# Patient Record
Sex: FEMALE | Race: WHITE | NOT HISPANIC OR LATINO | Employment: FULL TIME | URBAN - METROPOLITAN AREA
[De-identification: names, ages, dates, MRNs, and addresses within clinical notes are randomized per-mention and may not be internally consistent; named-entity substitution may affect disease eponyms.]

---

## 2017-01-17 DIAGNOSIS — G47.00 INSOMNIA, UNSPECIFIED TYPE: ICD-10-CM

## 2017-01-17 RX ORDER — ZOLPIDEM TARTRATE 10 MG/1
TABLET ORAL
Qty: 30 TAB | Refills: 0 | Status: SHIPPED
Start: 2017-01-18 | End: 2017-02-14 | Stop reason: SDUPTHER

## 2017-02-01 RX ORDER — CYCLOBENZAPRINE HCL 10 MG
TABLET ORAL
Qty: 90 TAB | Refills: 0 | Status: SHIPPED | OUTPATIENT
Start: 2017-02-01 | End: 2017-02-27 | Stop reason: SDUPTHER

## 2017-02-01 NOTE — TELEPHONE ENCOUNTER
Was the patient seen in the last year in this department? Yes     Does patient have an active prescription for medications requested? No     Received Request Via: Pharmacy      Pt met protocol?: Yes  OV 9/30/16

## 2017-02-01 NOTE — TELEPHONE ENCOUNTER
Please advise pt per last MD note 9/16 pt needs labs and follow-up appt. Will send #90 to pharmacy.

## 2017-02-05 ENCOUNTER — PATIENT OUTREACH (OUTPATIENT)
Dept: HEALTH INFORMATION MANAGEMENT | Facility: OTHER | Age: 52
End: 2017-02-05

## 2017-02-05 NOTE — PROGRESS NOTES
Outcome:DIDNT SCHEDULE ANNUAL     Attempt # 1ST    Annual Wellness Visit Scheduling  Scheduling Status: Scheduled; Not Scheduled; NOT SCHEDULED   If Not Scheduled, Choose Reason Why: WENT TO [facility]         Care Gap Scheduling (Attempt to Schedule EACH Overdue Care Gap!)  Health Maintenance Due   Topic Date Due   • COLON CANCER SCREENING ANNUAL FIT  DISCUSS WITH PT    • Annual Wellness Visit  [facility]          MyChart Activation:  Already Active/Declined/Sent Activation Code  NA

## 2017-02-16 NOTE — PROGRESS NOTES
02/15/2017  Attempt #:final      Annual Wellness Visit Scheduling  1. Scheduling Status:Scheduled          Care Gap Scheduling (Attempt to Schedule EACH Overdue Care Gap!)     Health Maintenance Due   Topic Date Due   • Annual Wellness Visit  Scheduled          MyChart Activation: already active

## 2017-02-27 RX ORDER — CYCLOBENZAPRINE HCL 10 MG
TABLET ORAL
Qty: 90 TAB | Refills: 0 | Status: SHIPPED | OUTPATIENT
Start: 2017-02-27 | End: 2017-03-31 | Stop reason: SDUPTHER

## 2017-02-27 NOTE — TELEPHONE ENCOUNTER
Was the patient seen in the last year in this department? Yes     Does patient have an active prescription for medications requested? No     Received Request Via: Pharmacy      Pt met protocol?: Yes, OV 9/16. Pt reminded at last refill (2/7/17) to make an appt and get labs. Pt has scheduled appt w/Samanda for March, labs still need to be scheduled.

## 2017-03-10 ENCOUNTER — TELEPHONE (OUTPATIENT)
Dept: MEDICAL GROUP | Facility: PHYSICIAN GROUP | Age: 52
End: 2017-03-10

## 2017-03-10 RX ORDER — TIZANIDINE 2 MG/1
1 TABLET ORAL
COMMUNITY
Start: 2017-01-31 | End: 2017-07-28

## 2017-03-10 NOTE — TELEPHONE ENCOUNTER
ANNUAL WELLNESS VISIT PRE-VISIT PLANNING     1.  Reviewed last PCP office visit assessment and plan notes: Yes    2.  If any orders were placed last visit do we have Results/Consult Notes?        •  Labs? No none ordered       •  Imaging? No  none ordered       •  Referrals? No  none ordered    3.  Patient Care Coordination Note was updated with diagnosis information:  Yes    4.  Patient is due for these Health Maintenance Topics:   Health Maintenance Due   Topic Date Due   • Annual Wellness Visit  1965       5.  Immunizations were updated in [facility] using [employer]?: Yes       •  [employer] Recommendations:  Pt is up to date on all vaccines.        •  Is patient due for Tdap/Shingles? No.     6.  Patient has:       •   Diabetes: no       •   COPD: no       •   CHF: no       •   Depression: no    7.  Updated Care Team with [employer] and all specialists?        •   Gait devices, O2, CPAP, etc: N\A        •   Eye professional: no       •   Other specialists (GYN, cardiology, endo, etc): yes    8.  Is patient in need of any refills prior to office visit? No       •    Separate refill encounter created?: no    9.  Patient was informed to arrive 15 min prior to their scheduled appointment and bring in their medication bottles? yes    10.  Patient was advised: “This is a free wellness visit. The provider will screen for medical conditions to help you stay healthy. If you have other concerns to address you may be asked to discuss these at a separate visit or there may be an additional fee.”  Yes

## 2017-03-15 ENCOUNTER — APPOINTMENT (OUTPATIENT)
Dept: SLEEP MEDICINE | Facility: MEDICAL CENTER | Age: 52
End: 2017-03-15
Payer: MEDICARE

## 2017-03-17 ENCOUNTER — OFFICE VISIT (OUTPATIENT)
Dept: MEDICAL GROUP | Facility: PHYSICIAN GROUP | Age: 52
End: 2017-03-17
Payer: MEDICARE

## 2017-03-17 VITALS
RESPIRATION RATE: 16 BRPM | HEART RATE: 94 BPM | TEMPERATURE: 99.1 F | SYSTOLIC BLOOD PRESSURE: 134 MMHG | BODY MASS INDEX: 46.65 KG/M2 | OXYGEN SATURATION: 92 % | WEIGHT: 280 LBS | HEIGHT: 65 IN | DIASTOLIC BLOOD PRESSURE: 80 MMHG

## 2017-03-17 DIAGNOSIS — G47.00 INSOMNIA, UNSPECIFIED TYPE: ICD-10-CM

## 2017-03-17 DIAGNOSIS — I10 ESSENTIAL HYPERTENSION: ICD-10-CM

## 2017-03-17 DIAGNOSIS — M51.36 DDD (DEGENERATIVE DISC DISEASE), LUMBAR: ICD-10-CM

## 2017-03-17 DIAGNOSIS — Z96.651 PRESENCE OF RIGHT ARTIFICIAL KNEE JOINT: ICD-10-CM

## 2017-03-17 DIAGNOSIS — E03.8 SUBCLINICAL HYPOTHYROIDISM: ICD-10-CM

## 2017-03-17 DIAGNOSIS — Z12.11 SCREENING FOR COLON CANCER: ICD-10-CM

## 2017-03-17 DIAGNOSIS — R73.01 IMPAIRED FASTING BLOOD SUGAR: ICD-10-CM

## 2017-03-17 DIAGNOSIS — Z00.00 ROUTINE HEALTH MAINTENANCE: ICD-10-CM

## 2017-03-17 DIAGNOSIS — E78.5 HYPERLIPIDEMIA, UNSPECIFIED HYPERLIPIDEMIA TYPE: ICD-10-CM

## 2017-03-17 DIAGNOSIS — E66.01 MORBID OBESITY WITH BMI OF 45.0-49.9, ADULT (HCC): ICD-10-CM

## 2017-03-17 PROCEDURE — 1036F TOBACCO NON-USER: CPT | Performed by: INTERNAL MEDICINE

## 2017-03-17 PROCEDURE — G8510 SCR DEP NEG, NO PLAN REQD: HCPCS | Performed by: INTERNAL MEDICINE

## 2017-03-17 PROCEDURE — G0439 PPPS, SUBSEQ VISIT: HCPCS | Performed by: INTERNAL MEDICINE

## 2017-03-17 RX ORDER — ZOLPIDEM TARTRATE 10 MG/1
10 TABLET ORAL NIGHTLY PRN
Qty: 30 TAB | Refills: 0 | Status: SHIPPED
Start: 2017-03-19 | End: 2017-04-17 | Stop reason: SDUPTHER

## 2017-03-17 ASSESSMENT — PAIN SCALES - GENERAL: PAINLEVEL: 7=MODERATE-SEVERE PAIN

## 2017-03-17 ASSESSMENT — PATIENT HEALTH QUESTIONNAIRE - PHQ9: CLINICAL INTERPRETATION OF PHQ2 SCORE: 0

## 2017-03-17 NOTE — PROGRESS NOTES
HPI:  Carolynn is a 52 y.o. female here for [facility] Annual Wellness Visit      Patient Active Problem List    Diagnosis Date Noted   • Morbid obesity with BMI of 45.0-49.9, adult ([facility]) 03/17/2017   • BMI 45.0-49.9, adult (CMS-[facility]) 09/30/2016   • Insomnia 06/20/2016   • Degenerative spondylolisthesis 02/17/2016   • Presence of right artificial knee joint 10/08/2015   • Subclinical hypothyroidism 02/21/2012   • Hypertension 12/28/2011   • DDD (degenerative disc disease), lumbar 12/28/2011     Patients most recent cholesterol was reviewed:  Lab Results   Component Value Date/Time    CHOLESTEROL, 03/16/2015 09:46 AM    CHOLESTEROL, 03/13/2014 09:04 AM     Lab Results   Component Value Date/Time    * 03/16/2015 09:46 AM    * 03/13/2014 09:04 AM     Lab Results   Component Value Date/Time    HDL 45 03/16/2015 09:46 AM    HDL 43 03/13/2014 09:04 AM     Lab Results   Component Value Date/Time    TRIGLYCERIDES 156* 03/16/2015 09:46 AM    TRIGLYCERIDES 149 03/13/2014 09:04 AM     Lab Results   Component Value Date/Time    GLYCOHEMOGLOBIN 6.5* 01/29/2016 12:45 PM    GLYCOHEMOGLOBIN 6.2* 03/16/2015 09:46 AM       Current Outpatient Prescriptions   Medication Sig Dispense Refill   • [START ON 3/19/2017] zolpidem (AMBIEN) 10 MG Tab Take 1 Tab by mouth at bedtime as needed for Sleep. 30 Tab 0   • tizanidine (ZANAFLEX) 2 MG tablet Take 1 Tab by mouth every bedtime.     • cyclobenzaprine (FLEXERIL) 10 MG Tab TAKE ONE TABLET BY MOUTH THREE TIMES DAILY AS NEEDED 90 Tab 0   • hydrochlorothiazide (HYDRODIURIL) 25 MG Tab TAKE ONE TABLET BY MOUTH ONCE DAILY Patient must see a new provider for future renewals 90 Tab 3   • amlodipine (NORVASC) 10 MG Tab Take 1 Tab by mouth every day. 90 Tab 3   • losartan (COZAAR) 100 MG Tab Take 1 Tab by mouth every day. 90 Tab 1   • acetaminophen (TYLENOL) 500 MG Tab Take 1,500 mg by mouth 2 Times a Day. Indications: Pain     • Ibuprofen 200 MG Cap Take 800 mg by mouth 2 Times a Day.  Indications: Mild to Moderate Pain       No current facility-administered medications for this visit.        The patient reports adherence to this regimen   Current supplements as per medication list.   Chronic narcotic pain medicines: no    Allergies: Cortisone; Penicillins; Food; Keflex; Latex; Naprosyn; and Tape    Current social contact/activities: visits with friends and family     Is patient current with immunizations?  yes   If no, due for Pt is up to date on all vaccines.     She  reports that she has never smoked. She has never used smokeless tobacco. She reports that she does not drink alcohol or use illicit drugs.  Counseling given: Yes        DPA/Advanced Directive:  Patient does not have an advanced directive.  If not on file, instructed to bring in a copy to scan into her chart. If no advanced directive exists, a packet and workshop information was provided    ROS:    Gait: Uses no assistive device   Ostomy: no   Other tubes: no   Amputations: no   Chronic oxygen use no   Last eye exam July 2016   : Reports incontinence. Only when laughing / sneezing or coughing       Depression Screening    Little interest or pleasure in doing things?  0 - not at all  Feeling down, depressed, or hopeless?  0 - not at all  Patient Health Questionnaire Score: 0    If depressive symptoms identified deferred to follow up visit unless specifically addressed in assessment and plan.    Screening for Cognitive Impairment    Three Minute Recall (banana, sunrise, fence)  3/3    Draws clock face with all 12 numbers and sets the hands to show 10 minutes past 10 o'clock  1 5/5  Cognitive concerns identified deferred for follow up unless specifically addressed in assessment and plan.    Fall Risk Assessment    Has the patient had two or more falls in the last year or any fall with injury in the last year?  No    Safety Assessment    Throw rugs on floor.  No  Handrails on all stairs.  No  Good lighting in all hallways.   Yes  Difficulty hearing.  Yes  Patient counseled about all safety risks that were identified.    Functional Assessment ADLs    Are there any barriers preventing you from cooking for yourself or meeting nutritional needs?  No.    Are there any barriers preventing you from driving safely or obtaining transportation?  Yes. Doesn't drive but has no issues obtaining transportation  Are there any barriers preventing you from using a telephone or calling for help?  No.    Are there any barriers preventing you from shopping?  No. Difficulties due to back problems  Are there any barriers preventing you from taking care of your own finances?  No.    Are there any barriers preventing you from managing your medications?  No.    Are currently engaging any exercise or physical activity?  Yes.  Tries to walk about 15 mins 3 times a wk    Health Maintenance Summary                Annual Wellness Visit Overdue 1965     COLON CANCER SCREENING ANNUAL FIT Postponed 2/15/2018 Originally 1965. Patient Refused    MAMMOGRAM Next Due 6/29/2017      Done 6/29/2016 MA-SCREEN MAMMO W/CAD-BILAT     Patient has more history with this topic...    IMM DTaP/Tdap/Td Vaccine Next Due 9/30/2026      Done 9/30/2016 Imm Admin: Tdap Vaccine     Patient has more history with this topic...          Patient Care Team:  Khadar Fonseca D.O. as PCP - General (Internal Medicine)  Alfredo Belcher M.D. as Consulting Physician (Orthopaedics)    Social History   Substance Use Topics   • Smoking status: Never Smoker    • Smokeless tobacco: Never Used   • Alcohol Use: No     Family History   Problem Relation Age of Onset   • Hypertension Mother    • Cancer Mother 81     breast DCIS   • Heart Disease Father    • Heart Attack Father    • Hypertension Father    • Lung Disease Sister      asthma   • Hypertension Sister    • Arthritis Sister      knee   • Arthritis Brother    • Hypertension Brother    • Diabetes Brother    • Heart Disease Maternal Aunt    •  "Hypertension Maternal Aunt    • Cancer Maternal Aunt 80     breast   • Stroke Maternal Uncle    • Heart Disease Maternal Uncle    • Hypertension Maternal Uncle    • Heart Disease Paternal Grandmother    • Psychiatry Paternal Grandfather      Suicide   • Alcohol/Drug Paternal Grandfather    • Arthritis Maternal Grandmother    • Arthritis Maternal Grandfather    • Arthritis Brother    • Hyperlipidemia Neg Hx      She  has a past medical history of Allergy; Anxiety; ASTHMA; GERD (gastroesophageal reflux disease); Migraine; Heart murmur; Unspecified urinary incontinence; Unspecified disorder of thyroid; Snoring; Anesthesia; Hypertension; Arthritis; Other specified disorder of intestines; Bronchitis (2014); Headache(784.0); and Pain (01-29-16).   Past Surgical History   Procedure Laterality Date   • Other       back surgery    • Laminotomy       Dr. [facility]   • Athroplasty       bi later knees   • Abdominal exploration     • Primary c section       x 2   • Abdominal hysterectomy total  1995   • Appendectomy       1997   • Open reduction  2001, 1990     CLAVICLE BILAT   • Shoulder arthroscopy w/ rotator cuff repair  2001     LEFT   • Other neurological surg       fusion L5-S1    • Knee revision total Right 10/8/2015     Procedure: KNEE REVISION TOTAL KNEE ARTHROPLASTY;  Surgeon: Ihsan Hawthorne M.D.;  Location: [facility];  Service:    • Extreme lateral interbody fusion  2/17/2016     Procedure: EXTREME LATERAL INTERBODY FUSION far lateral interbody  L3-4 ;  Surgeon: Alfredo Belcher M.D.;  Location: [facility];  Service:    • Lumbar decompression  2/17/2016     Procedure: LUMBAR DECOMPRESSION L3-4;  Surgeon: Alfredo Belcher M.D.;  Location: [facility];  Service:        Exam:     Blood pressure 134/80, pulse 94, temperature 37.3 °C (99.1 °F), resp. rate 16, height 1.651 m (5' 5\"), weight 127.007 kg (280 lb), SpO2 92 %. Body mass index is 46.59 kg/(m^2).    Hearing " good.    Dentition poor, no recent dental exam. Last one was 2 years ago.   Alert, oriented in no acute distress.  Eye contact is good, speech goal directed, affect calm    Assessment and Plan. The following treatment and monitoring plan is recommended:   1. Routine health maintenance  - Initial Wellness Visit - Includes PPPS ()    2. Screening for colon cancer  - OCCULT BLOOD,FECAL,IMMUNOASSAY    3. Essential hypertension  Controlled on current medications-continue losartan, hydrochlorothiazide and amlodipine. Check metabolic panel.  - COMP METABOLIC PANEL; Future    4. Insomnia, unspecified type  Has been managed on Ambien. Patient has a sleep evaluation pending to rule out sleep apnea.  - zolpidem (AMBIEN) 10 MG Tab; Take 1 Tab by mouth at bedtime as needed for Sleep.  Dispense: 30 Tab; Refill: 0    5. Subclinical hypothyroidism  Patient has been having increased constipation, dry skin, and hair thinning since stopping the thyroid medication. She has not had any follow-up labs since stopping the medication last year. May need to restart medications if still subclinical and symptomatic.  - TSH WITH REFLEX TO FT4; Future    6. Impaired fasting blood sugar  Last A1c was 6.5% in the range of diabetes. No formal diagnosis of diabetes has been made yet-will recheck labs.  - HEMOGLOBIN A1C; Future    7. Hyperlipidemia, unspecified hyperlipidemia type  Not currently on statin medications.  - TSH WITH REFLEX TO FT4; Future  - LIPID PROFILE; Future    8. BMI 45.0-49.9, adult ([facility])  Patient has been struggling with weight-would like to talk about pharmacotherapy for obesity-she will schedule another office visit to discuss this further. Patient previously saw nutrition/dietitian but had stopped seeing them as she has not taken care of her grandchildren and does not have time to go to appointments.  - Patient identified as having weight management issue.  Appropriate orders and counseling given.    Services needed:  No services needed at this time  Health Care Screening recommendations as per orders if indicated.  Referrals offered: PT/OT/Nutrition counseling/Behavioral Health/Smoking cessation as per orders if indicated.    Discussion today about general wellness and lifestyle habits:    · Prevent falls and reduce trip hazards; Cautioned about securing or removing rugs.  · Have a working fire alarm and carbon monoxide detector;   · Engage in regular physical activity and social activities   · Continue with current daily activities (goals)    Follow-up: Return in about 1 year (around 3/17/2018) for follow-up with PCP.     Please note that this dictation was created using voice recognition software. I have made every reasonable attempt to correct obvious errors, but I expect that there are errors of grammar and possibly content that I did not discover before finalizing the note.

## 2017-03-17 NOTE — MR AVS SNAPSHOT
"        Carolynn Casey   3/17/2017 1:00 PM   Office Visit   MRN: [id number]    Department:  [facility]   Dept Phone:  [phone]    Description:  Female : 1965   Provider:  Khadar Fonseca D.O.; [facility]            Reason for Visit     Annual Wellness Visit           Allergies as of 3/17/2017     Allergen Noted Reactions    Cortisone 2010   Anaphylaxis    RXN=since age 12    Penicillins 2010   Anaphylaxis    RXN=since age 1    Food 2015   Unspecified    \"citrus juice\" =burn ALL FISH - nausea  RXN=whole life    Keflex 2015   Diarrhea, Nausea    RXN=20 years ago    Latex 2015   Rash, Itching    Rash, itching  RXN=20 years ago    Naprosyn [Naproxen] 2010   Unspecified    \"GI bleed\"  RXN=whole life    Tape 2015   Rash    \"bubble up the skin\", reports tegaderm OK  RXN=ongoing      You were diagnosed with     Routine health maintenance   [924300]       Screening for colon cancer   [050926]       Essential hypertension   [5751427]       Insomnia, unspecified type   [3448911]       Subclinical hypothyroidism   [216277]       Impaired fasting blood sugar   [665006]       Hyperlipidemia, unspecified hyperlipidemia type   [3363383]       BMI 45.0-49.9, adult (CMS-[facility])   [886853]         Vital Signs     Blood Pressure Pulse Temperature Respirations Height Weight    134/80 mmHg 94 37.3 °C (99.1 °F) 16 1.651 m (5' 5\") 127.007 kg (280 lb)    Body Mass Index Oxygen Saturation Smoking Status             46.59 kg/m2 92% Never Smoker          Basic Information     Date Of Birth Sex Race Ethnicity Preferred Language    1965 Female White Non- English      Your appointments     Mar 27, 2017  4:25 PM   Established Patient with Khadar Fonseca D.O.   [facility])    [address] [place] [address], [address]  [facility] [address] [phone]           You will be receiving a confirmation call a few days before your appointment " from our automated call confirmation system.              Problem List              ICD-10-CM Priority Class Noted - Resolved    Hypertension I10   12/28/2011 - Present    DDD (degenerative disc disease), lumbar M51.36   12/28/2011 - Present    Subclinical hypothyroidism E03.9   2/21/2012 - Present    Presence of right artificial knee joint Z96.651   10/8/2015 - Present    Degenerative spondylolisthesis M43.10   2/17/2016 - Present    Insomnia G47.00   6/20/2016 - Present    BMI 45.0-49.9, adult (CMS-HCC) Z68.42   9/30/2016 - Present    Morbid obesity with BMI of 45.0-49.9, adult ([facility]) E66.01, Z68.42   3/17/2017 - Present      Health Maintenance        Date Due Completion Dates    COLON CANCER SCREENING ANNUAL FIT 2/15/2018 (Originally 1965) ---    MAMMOGRAM 6/29/2017 6/29/2016, 5/21/2015, 4/28/2014, 9/10/2010    IMM DTaP/Tdap/Td Vaccine (3 - Td) 9/30/2026 9/30/2016, 4/24/2014            Current Immunizations     Hepatitis B Vaccine Non-Recombivax (Ped/Adol) 3/15/2010, 11/2/2009, 8/17/2009    Influenza Vaccine Adult HD 10/13/2007    Influenza Vaccine Quad Inj (Pf) 9/29/2014, 12/27/2012, 10/26/2011    Influenza Vaccine Quad Inj (Preserved) 10/15/2016, 10/9/2015  3:19 AM    Tdap Vaccine 9/30/2016  4:42 PM, 4/24/2014    Tuberculin Skin Test 3/30/2011, 3/22/2011      Below and/or attached are the medications your provider expects you to take. Review all of your home medications and newly ordered medications with your provider and/or pharmacist. Follow medication instructions as directed by your provider and/or pharmacist. Please keep your medication list with you and share with your provider. Update the information when medications are discontinued, doses are changed, or new medications (including over-the-counter products) are added; and carry medication information at all times in the event of emergency situations     Allergies:  CORTISONE - Anaphylaxis     PENICILLINS - Anaphylaxis     FOOD - Unspecified      KEFLEX - Diarrhea,Nausea     LATEX - Rash,Itching     NAPROSYN - Unspecified     TAPE - Rash               Medications  Valid as of: March 17, 2017 -  2:25 PM    Generic Name Brand Name Tablet Size Instructions for use    Acetaminophen (Tab) TYLENOL 500 MG Take 1,500 mg by mouth 2 Times a Day. Indications: Pain        AmLODIPine Besylate (Tab) NORVASC 10 MG Take 1 Tab by mouth every day.        Cyclobenzaprine HCl (Tab) FLEXERIL 10 MG TAKE ONE TABLET BY MOUTH THREE TIMES DAILY AS NEEDED        HydroCHLOROthiazide (Tab) HYDRODIURIL 25 MG TAKE ONE TABLET BY MOUTH ONCE DAILY Patient must see a new provider for future renewals        Ibuprofen (Cap) Ibuprofen 200 MG Take 800 mg by mouth 2 Times a Day. Indications: Mild to Moderate Pain        Losartan Potassium (Tab) COZAAR 100 MG Take 1 Tab by mouth every day.        TiZANidine HCl (Tab) ZANAFLEX 2 MG Take 1 Tab by mouth every bedtime.        Zolpidem Tartrate (Tab) AMBIEN 10 MG Take 1 Tab by mouth at bedtime as needed for Sleep.        .                 Medicines prescribed today were sent to:     [facility] [address] [place] [id number]    Phone: [phone] Fax: [phone]    Open 24 Hours?: No      Medication refill instructions:       If your prescription bottle indicates you have medication refills left, it is not necessary to call your provider’s office. Please contact your pharmacy and they will refill your medication.    If your prescription bottle indicates you do not have any refills left, you may request refills at any time through one of the following ways: The online [employer] system (except Urgent Care), by calling your provider’s office, or by asking your pharmacy to contact your provider’s office with a refill request. Medication refills are processed only during regular business hours and may not be available until the next business day. Your provider may request additional information or to  have a follow-up visit with you prior to refilling your medication.   *Please Note: Medication refills are assigned a new Rx number when refilled electronically. Your pharmacy may indicate that no refills were authorized even though a new prescription for the same medication is available at the pharmacy. Please request the medicine by name with the pharmacy before contacting your provider for a refill.        Your To Do List     Future Labs/Procedures Complete By Expires    COMP METABOLIC PANEL  As directed 3/18/2018    HEMOGLOBIN A1C  As directed 3/18/2018    LIPID PROFILE  As directed 3/18/2018    TSH WITH REFLEX TO FT4  As directed 3/18/2018      Other Notes About Your Plan     This patient has an appointment on 02/10/2016. At the beginning of the year all chronic conditions should be assessed and Documented in conjunction with any other identified concerns during the visit.   Query: Please assess the following Diagnosis:  E66.01: Morbid obesity if BMI is 40+  Z68.4X: please status BMI           MyChart Access Code: Activation code not generated  Current [employer] Status: Active

## 2017-04-03 RX ORDER — CYCLOBENZAPRINE HCL 10 MG
TABLET ORAL
Qty: 90 TAB | Refills: 0 | Status: SHIPPED | OUTPATIENT
Start: 2017-04-03 | End: 2017-05-16 | Stop reason: SDUPTHER

## 2017-04-13 ENCOUNTER — HOSPITAL ENCOUNTER (OUTPATIENT)
Dept: LAB | Facility: MEDICAL CENTER | Age: 52
End: 2017-04-13
Attending: INTERNAL MEDICINE
Payer: MEDICARE

## 2017-04-13 DIAGNOSIS — E55.9 VITAMIN D DEFICIENCY: ICD-10-CM

## 2017-04-13 DIAGNOSIS — R73.01 IMPAIRED FASTING GLUCOSE: ICD-10-CM

## 2017-04-13 DIAGNOSIS — I10 ESSENTIAL HYPERTENSION: ICD-10-CM

## 2017-04-13 DIAGNOSIS — E78.5 HYPERLIPIDEMIA: ICD-10-CM

## 2017-04-13 LAB
25(OH)D3 SERPL-MCNC: 11 NG/ML (ref 30–100)
ALBUMIN SERPL BCP-MCNC: 4.1 G/DL (ref 3.2–4.9)
ALBUMIN/GLOB SERPL: 1.4 G/DL
ALP SERPL-CCNC: 77 U/L (ref 30–99)
ALT SERPL-CCNC: 30 U/L (ref 2–50)
ANION GAP SERPL CALC-SCNC: 6 MMOL/L (ref 0–11.9)
AST SERPL-CCNC: 21 U/L (ref 12–45)
BILIRUB SERPL-MCNC: 0.8 MG/DL (ref 0.1–1.5)
BUN SERPL-MCNC: 16 MG/DL (ref 8–22)
CALCIUM SERPL-MCNC: 9.4 MG/DL (ref 8.5–10.5)
CHLORIDE SERPL-SCNC: 103 MMOL/L (ref 96–112)
CHOLEST SERPL-MCNC: 174 MG/DL (ref 100–199)
CO2 SERPL-SCNC: 29 MMOL/L (ref 20–33)
CREAT SERPL-MCNC: 0.53 MG/DL (ref 0.5–1.4)
EST. AVERAGE GLUCOSE BLD GHB EST-MCNC: 128 MG/DL
GFR SERPL CREATININE-BSD FRML MDRD: >60 ML/MIN/1.73 M 2
GLOBULIN SER CALC-MCNC: 2.9 G/DL (ref 1.9–3.5)
GLUCOSE SERPL-MCNC: 113 MG/DL (ref 65–99)
HBA1C MFR BLD: 6.1 % (ref 0–5.6)
HDLC SERPL-MCNC: 48 MG/DL
LDLC SERPL CALC-MCNC: 96 MG/DL
POTASSIUM SERPL-SCNC: 3.8 MMOL/L (ref 3.6–5.5)
PROT SERPL-MCNC: 7 G/DL (ref 6–8.2)
SODIUM SERPL-SCNC: 138 MMOL/L (ref 135–145)
T4 FREE SERPL-MCNC: 0.59 NG/DL (ref 0.53–1.43)
TRIGL SERPL-MCNC: 151 MG/DL (ref 0–149)
TSH SERPL DL<=0.005 MIU/L-ACNC: 3.98 UIU/ML (ref 0.3–3.7)

## 2017-04-13 PROCEDURE — 36415 COLL VENOUS BLD VENIPUNCTURE: CPT

## 2017-04-13 PROCEDURE — 82306 VITAMIN D 25 HYDROXY: CPT

## 2017-04-13 PROCEDURE — 83036 HEMOGLOBIN GLYCOSYLATED A1C: CPT

## 2017-04-13 PROCEDURE — 84439 ASSAY OF FREE THYROXINE: CPT

## 2017-04-13 PROCEDURE — 80061 LIPID PANEL: CPT

## 2017-04-13 PROCEDURE — 84443 ASSAY THYROID STIM HORMONE: CPT

## 2017-04-13 PROCEDURE — 80053 COMPREHEN METABOLIC PANEL: CPT

## 2017-04-17 ENCOUNTER — OFFICE VISIT (OUTPATIENT)
Dept: MEDICAL GROUP | Facility: PHYSICIAN GROUP | Age: 52
End: 2017-04-17
Payer: MEDICARE

## 2017-04-17 VITALS
OXYGEN SATURATION: 99 % | WEIGHT: 283 LBS | HEART RATE: 91 BPM | TEMPERATURE: 99 F | DIASTOLIC BLOOD PRESSURE: 86 MMHG | HEIGHT: 65 IN | RESPIRATION RATE: 16 BRPM | BODY MASS INDEX: 47.15 KG/M2 | SYSTOLIC BLOOD PRESSURE: 140 MMHG

## 2017-04-17 DIAGNOSIS — G47.00 INSOMNIA, UNSPECIFIED TYPE: ICD-10-CM

## 2017-04-17 DIAGNOSIS — I10 ESSENTIAL HYPERTENSION: ICD-10-CM

## 2017-04-17 DIAGNOSIS — E03.8 SUBCLINICAL HYPOTHYROIDISM: ICD-10-CM

## 2017-04-17 DIAGNOSIS — E55.9 VITAMIN D DEFICIENCY: ICD-10-CM

## 2017-04-17 PROCEDURE — G8417 CALC BMI ABV UP PARAM F/U: HCPCS | Performed by: INTERNAL MEDICINE

## 2017-04-17 PROCEDURE — 3014F SCREEN MAMMO DOC REV: CPT | Performed by: INTERNAL MEDICINE

## 2017-04-17 PROCEDURE — G8432 DEP SCR NOT DOC, RNG: HCPCS | Performed by: INTERNAL MEDICINE

## 2017-04-17 PROCEDURE — 99214 OFFICE O/P EST MOD 30 MIN: CPT | Performed by: INTERNAL MEDICINE

## 2017-04-17 PROCEDURE — 1036F TOBACCO NON-USER: CPT | Performed by: INTERNAL MEDICINE

## 2017-04-17 RX ORDER — ZOLPIDEM TARTRATE 10 MG/1
10 TABLET ORAL NIGHTLY PRN
Qty: 30 TAB | Refills: 0 | Status: SHIPPED
Start: 2017-04-19 | End: 2017-05-12 | Stop reason: SDUPTHER

## 2017-04-17 RX ORDER — LOSARTAN POTASSIUM 100 MG/1
100 TABLET ORAL DAILY
Qty: 90 TAB | Refills: 2 | Status: ON HOLD | OUTPATIENT
Start: 2017-04-17 | End: 2017-08-08

## 2017-04-17 RX ORDER — LEVOTHYROXINE SODIUM 0.03 MG/1
25 TABLET ORAL
Qty: 90 TAB | Refills: 0 | Status: SHIPPED | OUTPATIENT
Start: 2017-04-17 | End: 2017-07-17 | Stop reason: SDUPTHER

## 2017-04-17 RX ORDER — CHOLECALCIFEROL (VITAMIN D3) 1250 MCG
50000 CAPSULE ORAL
Qty: 6 CAP | Refills: 0 | Status: SHIPPED | OUTPATIENT
Start: 2017-04-17 | End: 2017-05-23

## 2017-04-17 NOTE — MR AVS SNAPSHOT
"        Carolynn Casey   2017 10:45 AM   Office Visit   MRN: [id number]    Department:  [facility]   Dept Phone:  [phone]    Description:  Female : 1965   Provider:  Khadar Fonseca D.O.           Reason for Visit     Weight Loss Pt would like to discuss different options for weight loss.       Allergies as of 2017     Allergen Noted Reactions    Cortisone 2010   Anaphylaxis    RXN=since age 12    Penicillins 2010   Anaphylaxis    RXN=since age 1    Food 2015   Unspecified    \"citrus juice\" =burn ALL FISH - nausea  RXN=whole life    Keflex 2015   Diarrhea, Nausea    RXN=20 years ago    Latex 2015   Rash, Itching    Rash, itching  RXN=20 years ago    Naprosyn [Naproxen] 2010   Unspecified    \"GI bleed\"  RXN=whole life    Tape 2015   Rash    \"bubble up the skin\", reports tegaderm OK  RXN=ongoing      You were diagnosed with     Subclinical hypothyroidism   [012114]       Vitamin D deficiency   [7195444]       BMI 45.0-49.9, adult (CMS-[facility])   [226049]       Essential hypertension   [2117468]   Stable, continue losartan 100 mg, hydrochlorothiazide 25 mg, amlodipine 10 mg.    Insomnia, unspecified type   [4370146]         Vital Signs     Blood Pressure Pulse Temperature Respirations Height Weight    140/86 mmHg 91 37.2 °C (99 °F) 16 1.651 m (5' 5\") 128.368 kg (283 lb)    Body Mass Index Oxygen Saturation Breastfeeding? Smoking Status          47.09 kg/m2 99% No Never Smoker         Basic Information     Date Of Birth Sex Race Ethnicity Preferred Language    1965 Female White Non- English      Problem List              ICD-10-CM Priority Class Noted - Resolved    Hypertension I10   2011 - Present    DDD (degenerative disc disease), lumbar M51.36   2011 - Present    Subclinical hypothyroidism E03.9   2012 - Present    Presence of right artificial knee joint Z96.651   10/8/2015 - Present    Degenerative spondylolisthesis " M43.10   2/17/2016 - Present    Insomnia G47.00   6/20/2016 - Present    BMI 45.0-49.9, adult (CMS-[facility]) Z68.42   9/30/2016 - Present    Morbid obesity with BMI of 45.0-49.9, adult ([facility]) E66.01, Z68.42   3/17/2017 - Present      Health Maintenance        Date Due Completion Dates    COLON CANCER SCREENING ANNUAL FIT 2/15/2018 (Originally 1965) ---    MAMMOGRAM 6/29/2017 6/29/2016, 5/21/2015, 4/28/2014, 9/10/2010    IMM DTaP/Tdap/Td Vaccine (3 - Td) 9/30/2026 9/30/2016, 4/24/2014            Current Immunizations     Hepatitis B Vaccine Non-Recombivax (Ped/Adol) 3/15/2010, 11/2/2009, 8/17/2009    Influenza Vaccine Adult HD 10/13/2007    Influenza Vaccine Quad Inj (Pf) 9/29/2014, 12/27/2012, 10/26/2011    Influenza Vaccine Quad Inj (Preserved) 10/15/2016, 10/9/2015  3:19 AM    Tdap Vaccine 9/30/2016  4:42 PM, 4/24/2014    Tuberculin Skin Test 3/30/2011, 3/22/2011      Below and/or attached are the medications your provider expects you to take. Review all of your home medications and newly ordered medications with your provider and/or pharmacist. Follow medication instructions as directed by your provider and/or pharmacist. Please keep your medication list with you and share with your provider. Update the information when medications are discontinued, doses are changed, or new medications (including over-the-counter products) are added; and carry medication information at all times in the event of emergency situations     Allergies:  CORTISONE - Anaphylaxis     PENICILLINS - Anaphylaxis     FOOD - Unspecified     KEFLEX - Diarrhea,Nausea     LATEX - Rash,Itching     NAPROSYN - Unspecified     TAPE - Rash               Medications  Valid as of: April 17, 2017 - 12:46 PM    Generic Name Brand Name Tablet Size Instructions for use    Acetaminophen (Tab) TYLENOL 500 MG Take 1,500 mg by mouth 2 Times a Day. Indications: Pain        AmLODIPine Besylate (Tab) NORVASC 10 MG Take 1 Tab by mouth every day.         Cholecalciferol (Cap) Vitamin D3 46080 UNITS Take 50,000 Int'l Units by mouth every 7 days for 6 doses.        Cyclobenzaprine HCl (Tab) FLEXERIL 10 MG TAKE ONE TABLET BY MOUTH THREE TIMES DAILY AS NEEDED        HydroCHLOROthiazide (Tab) HYDRODIURIL 25 MG TAKE ONE TABLET BY MOUTH ONCE DAILY Patient must see a new provider for future renewals        Ibuprofen (Cap) Ibuprofen 200 MG Take 800 mg by mouth 2 Times a Day. Indications: Mild to Moderate Pain        Levothyroxine Sodium (Tab) SYNTHROID 25 MCG Take 1 Tab by mouth Every morning on an empty stomach.        Losartan Potassium (Tab) COZAAR 100 MG Take 1 Tab by mouth every day.        Orlistat (Cap) SUJEY 60 MG Take 1 Cap by mouth 3 times a day, with meals.        Pseudoephedrine HCl   Take  by mouth.        TiZANidine HCl (Tab) ZANAFLEX 2 MG Take 1 Tab by mouth every bedtime.        Zolpidem Tartrate (Tab) AMBIEN 10 MG Take 1 Tab by mouth at bedtime as needed for Sleep.        .                 Medicines prescribed today were sent to:     [facility] [address] [place] [id number]    Phone: [phone] Fax: [phone]    Open 24 Hours?: No      Medication refill instructions:       If your prescription bottle indicates you have medication refills left, it is not necessary to call your provider’s office. Please contact your pharmacy and they will refill your medication.    If your prescription bottle indicates you do not have any refills left, you may request refills at any time through one of the following ways: The online [employer] system (except Urgent Care), by calling your provider’s office, or by asking your pharmacy to contact your provider’s office with a refill request. Medication refills are processed only during regular business hours and may not be available until the next business day. Your provider may request additional information or to have a follow-up visit with you prior to refilling your  medication.   *Please Note: Medication refills are assigned a new Rx number when refilled electronically. Your pharmacy may indicate that no refills were authorized even though a new prescription for the same medication is available at the pharmacy. Please request the medicine by name with the pharmacy before contacting your provider for a refill.        Your To Do List     Future Labs/Procedures Complete By Expires    TSH WITH REFLEX TO FT4  As directed 4/18/2018      Referral     A referral request has been sent to our patient care coordination department. Please allow 3-5 business days for us to process this request and contact you either by phone or mail. If you do not hear from us by the 5th business day, please call us at [phone].        Other Notes About Your Plan     This patient has an appointment on 02/10/2016. At the beginning of the year all chronic conditions should be assessed and Documented in conjunction with any other identified concerns during the visit.   Query: Please assess the following Diagnosis:  E66.01: Morbid obesity if BMI is 40+  Z68.4X: please status BMI           [email or web address] Access Code: Activation code not generated  Current [employer] Status: Active

## 2017-04-18 NOTE — ASSESSMENT & PLAN NOTE
Patient reports she has been having lost her life-started in middle school. As mentioned  before, she has tried several diets including Weight Watchers, Smiley Hernesto, Slim fast, Nutrisystem - without success or had regained in weight. States that the diets were difficult to stay on. Was previously on Phen-Fen for one year when it was available. Patient has been trying to exercise by walking on her treadmill but recently is helping to take care of her 6-week-old grandson and this is impeding on her schedule. She is motivated to lose weight and is interested in other options prior to considering bariatric surgery. Has seen a dietitian once in the past but quit found it difficult to schedule future appointments with him due to helping take care of her daughter who had just given birth. Previously treated for subclinical hypothyroidism and is not currently on medication. Does have mild constipation and dry skin, hair thinning, decreased energy/sluggishness. History of hypothyroidism. No history of seizure disorder. No history of pancreatitis. History of hypertension. No history of gastric issues including diarrhea/constipation, bloating, acid reflux, excess gas.

## 2017-04-18 NOTE — PROGRESS NOTES
Subjective:   Carolynn Casey is a 52 y.o. female here today for multiple problems as listed below.      BMI 45.0-49.9, adult  Patient reports she has been having lost her life-started in middle school. As mentioned  before, she has tried several diets including Weight Watchers, Smiley Hernesto, Slim fast, Nutrisystem - without success or had regained in weight. States that the diets were difficult to stay on. Was previously on Phen-Fen for one year when it was available. Patient has been trying to exercise by walking on her treadmill but recently is helping to take care of her 6-week-old grandson and this is impeding on her schedule. She is motivated to lose weight and is interested in other options prior to considering bariatric surgery. Has seen a dietitian once in the past but quit found it difficult to schedule future appointments with him due to helping take care of her daughter who had just given birth. Previously treated for subclinical hypothyroidism and is not currently on medication. Does have mild constipation and dry skin, hair thinning, decreased energy/sluggishness. History of hypothyroidism. No history of seizure disorder. No history of pancreatitis. History of hypertension. No history of gastric issues including diarrhea/constipation, bloating, acid reflux, excess gas.       Patients most recent cholesterol was reviewed:  Lab Results   Component Value Date/Time    CHOLESTEROL, 04/13/2017 09:39 AM    CHOLESTEROL, 03/16/2015 09:46 AM     Lab Results   Component Value Date/Time    LDL 96 04/13/2017 09:39 AM    * 03/16/2015 09:46 AM     Lab Results   Component Value Date/Time    HDL 48 04/13/2017 09:39 AM    HDL 45 03/16/2015 09:46 AM     Lab Results   Component Value Date/Time    TRIGLYCERIDES 151* 04/13/2017 09:39 AM    TRIGLYCERIDES 156* 03/16/2015 09:46 AM     Lab Results   Component Value Date/Time    GLYCOHEMOGLOBIN 6.1* 04/13/2017 09:39 AM    GLYCOHEMOGLOBIN 6.5* 01/29/2016  12:45 PM       Lab Results   Component Value Date/Time    25-HYDROXY   VITAMIN D 25 11* 04/13/2017 09:39 AM    25-HYDROXY   VITAMIN D 25 9* 03/16/2015 09:46 AM    not taking vitamin D supplements.    Current medicines (including changes today)  Current Outpatient Prescriptions   Medication Sig Dispense Refill   • Pseudoephedrine HCl (SUDAFED PO) Take  by mouth.     • levothyroxine (SYNTHROID) 25 MCG Tab Take 1 Tab by mouth Every morning on an empty stomach. 90 Tab 0   • Cholecalciferol (VITAMIN D3) 72865 UNITS Cap Take 50,000 Int'l Units by mouth every 7 days for 6 doses. 6 Cap 0   • orlistat (SUJEY) 60 MG capsule Take 1 Cap by mouth 3 times a day, with meals. 90 Cap 0   • losartan (COZAAR) 100 MG Tab Take 1 Tab by mouth every day. 90 Tab 2   • [START ON 4/19/2017] zolpidem (AMBIEN) 10 MG Tab Take 1 Tab by mouth at bedtime as needed for Sleep. 30 Tab 0   • cyclobenzaprine (FLEXERIL) 10 MG Tab TAKE ONE TABLET BY MOUTH THREE TIMES DAILY AS NEEDED 90 Tab 0   • tizanidine (ZANAFLEX) 2 MG tablet Take 1 Tab by mouth every bedtime.     • hydrochlorothiazide (HYDRODIURIL) 25 MG Tab TAKE ONE TABLET BY MOUTH ONCE DAILY Patient must see a new provider for future renewals 90 Tab 3   • amlodipine (NORVASC) 10 MG Tab Take 1 Tab by mouth every day. 90 Tab 3   • acetaminophen (TYLENOL) 500 MG Tab Take 1,500 mg by mouth 2 Times a Day. Indications: Pain     • Ibuprofen 200 MG Cap Take 800 mg by mouth 2 Times a Day. Indications: Mild to Moderate Pain       No current facility-administered medications for this visit.     She  has a past medical history of Allergy; Anxiety; ASTHMA; GERD (gastroesophageal reflux disease); Migraine; Heart murmur; Unspecified urinary incontinence; Unspecified disorder of thyroid; Snoring; Anesthesia; Hypertension; Arthritis; Other specified disorder of intestines; Bronchitis (2014); Headache(784.0); and Pain (01-29-16).    ROS   No chest pain, no shortness of breath, no abdominal pain, no  "diarrhea/constipation, no urinary symptoms.     Objective:     Blood pressure 140/86, pulse 91, temperature 37.2 °C (99 °F), resp. rate 16, height 1.651 m (5' 5\"), weight 128.368 kg (283 lb), SpO2 99 %, not currently breastfeeding. Body mass index is 47.09 kg/(m^2).   Physical Exam:  Alert, oriented in no acute distress.  Eye contact is good, speech goal directed, affect calm  HEENT: conjunctiva non-injected, sclera non-icteric.  Neck No adenopathy or masses in the neck or supraclavicular regions.  Lungs: clear to auscultation bilaterally with good excursion.  CV: regular rate and rhythm.  Ext: no edema, color normal, vascularity normal, temperature normal    Assessment and Plan:   The following treatment plan was discussed   1. BMI 45.0-49.9, adult (CMS-[facility])  Patient is a candidate for obesity pharmacotherapy.  Efficacy and safety of obesity pharmacotherapy will be assessed at least monthly for the first 3 months and at least every 3 months thereafter.  Goals is loss of ?5% body weight at 3 months and medication is safe/tolerated. Advised patient that pharmacotherapy are used as adjuncts to lifestyle modifications. Encouraged her to restart meetings with Shane, dietitian, and to see if she can work with her schedule to keep up with these visits. Discussed pharmacotherapy options-patient would like to try orlistat. She is concerned that other weight loss medications may not be covered by her insurance-advise her to check with her insurance. Discussed medication administration and possible side effects.  Discussed increasing physical activity with goal to start with at least 5-10 minutes of regular exercise daily.Pt advised to keep a food and activity log for at least 1-2 weeks.   - REFERRAL TO [facility] IMPROVEMENT PROGRAMS (HIP) Services Requested:: Weight Management Program; Reason for Visit:: Overweight/Obesity  - orlistat (SUJEY) 60 MG capsule; Take 1 Cap by mouth 3 times a day, with meals.  Dispense: 90 " Cap; Refill: 0    2. Subclinical hypothyroidism  Patient has mild hypothyroid symptoms-trial of restarting thyroid replacement therapy. Recheck labs in 6-8 weeks.  - levothyroxine (SYNTHROID) 25 MCG Tab; Take 1 Tab by mouth Every morning on an empty stomach.  Dispense: 90 Tab; Refill: 0  - TSH WITH REFLEX TO FT4; Future    3. Vitamin D deficiency  Advise high-dose vitamin D replacement. May need to continue daily vitamin D3 supplementation. Recheck labs in 3 months.  - Cholecalciferol (VITAMIN D3) 11314 UNITS Cap; Take 50,000 Int'l Units by mouth every 7 days for 6 doses.  Dispense: 6 Cap; Refill: 0    4. Essential hypertension  Controlled on current medications.  - losartan (COZAAR) 100 MG Tab; Take 1 Tab by mouth every day.  Dispense: 90 Tab; Refill: 2    5. Insomnia, unspecified type  Managed on Ambien which she has been using daily. To discuss further at another office visit.   - zolpidem (AMBIEN) 10 MG Tab; Take 1 Tab by mouth at bedtime as needed for Sleep.  Dispense: 30 Tab; Refill: 0    Followup: Return in about 1 month (around 5/17/2017) for Long, follow-up with PCP.         Please note that dictation has been dictated using voice recognition soft ware. I have made every reasonable attempt to correct obvious errors, but I expect that there are errors of grammar and possibly content that I did not discover before finalizing the note.

## 2017-05-04 ENCOUNTER — TELEPHONE (OUTPATIENT)
Dept: MEDICAL GROUP | Facility: PHYSICIAN GROUP | Age: 52
End: 2017-05-04

## 2017-05-04 NOTE — TELEPHONE ENCOUNTER
ESTABLISHED PATIENT PRE-VISIT PLANNING     Note: Patient will not be contacted if there is no indication to call.     1.  Reviewed note from last office visit with PCP and/or other med group provider: Yes    2.  If any orders were placed at last visit, do we have Results/Consult Notes?        •  Labs - labs ordered but not due yet       •  Imaging - Imaging was not ordered at last office visit.       •  Referrals - No referrals were ordered at last office visit.    3.  Immunizations were updated in [facility] using Web[employer]?: Yes       •  [employer] Recommendations: Patient is up to date on all vaccines    4.  Patient is due for the following Health Maintenance Topics:   Health Maintenance Due   Topic Date Due   • Annual Wellness Visit  1965       - Patient is up-to-date on all Health Maintenance topics. No records have been requested at this time.    5.  Patient was not informed to arrive 15 min prior to their scheduled appointment and bring in their medication bottles.

## 2017-05-08 ENCOUNTER — OFFICE VISIT (OUTPATIENT)
Dept: MEDICAL GROUP | Facility: PHYSICIAN GROUP | Age: 52
End: 2017-05-08
Payer: MEDICARE

## 2017-05-08 VITALS
HEART RATE: 94 BPM | TEMPERATURE: 97.8 F | BODY MASS INDEX: 47.09 KG/M2 | SYSTOLIC BLOOD PRESSURE: 134 MMHG | WEIGHT: 282.6 LBS | RESPIRATION RATE: 16 BRPM | DIASTOLIC BLOOD PRESSURE: 92 MMHG | HEIGHT: 65 IN | OXYGEN SATURATION: 92 %

## 2017-05-08 DIAGNOSIS — N64.4 BREAST PAIN, LEFT: ICD-10-CM

## 2017-05-08 DIAGNOSIS — Z80.3 FAMILY HISTORY OF BREAST CANCER IN MOTHER: ICD-10-CM

## 2017-05-08 PROCEDURE — G8432 DEP SCR NOT DOC, RNG: HCPCS | Performed by: INTERNAL MEDICINE

## 2017-05-08 PROCEDURE — 3014F SCREEN MAMMO DOC REV: CPT | Performed by: INTERNAL MEDICINE

## 2017-05-08 PROCEDURE — 99214 OFFICE O/P EST MOD 30 MIN: CPT | Performed by: INTERNAL MEDICINE

## 2017-05-08 PROCEDURE — G8417 CALC BMI ABV UP PARAM F/U: HCPCS | Performed by: INTERNAL MEDICINE

## 2017-05-08 PROCEDURE — 1036F TOBACCO NON-USER: CPT | Performed by: INTERNAL MEDICINE

## 2017-05-08 ASSESSMENT — PAIN SCALES - GENERAL: PAINLEVEL: 2=MINIMAL-SLIGHT

## 2017-05-08 NOTE — PROGRESS NOTES
Subjective:   Carolynn Casey is a 52 y.o. female here today for multiple problems as listed below.      Patient reports 2 month history of left breast pain with pressure-noticed this when she is having her grandson lie in her arms on her left side. She describes a heaviness sensation on the left compared to right breast. She also notes a pulling sensation on the left area above the breast. No skin changes. Possibly some more itchiness in the area. No nipple discharge. Patient does not feel any lumps. Instead she at first, she thought she might a pulled muscle.  No new soaps or other lotion products.  Not wearing a new problem. Last mammogram was in June 2016 and was within normal limits. Patient is a family history of breast cancer in her mother and aunt.     Current medicines (including changes today)  Current Outpatient Prescriptions   Medication Sig Dispense Refill   • Pseudoephedrine HCl (SUDAFED PO) Take  by mouth.     • levothyroxine (SYNTHROID) 25 MCG Tab Take 1 Tab by mouth Every morning on an empty stomach. 90 Tab 0   • Cholecalciferol (VITAMIN D3) 29014 UNITS Cap Take 50,000 Int'l Units by mouth every 7 days for 6 doses. 6 Cap 0   • orlistat (SUJEY) 60 MG capsule Take 1 Cap by mouth 3 times a day, with meals. 90 Cap 0   • losartan (COZAAR) 100 MG Tab Take 1 Tab by mouth every day. 90 Tab 2   • zolpidem (AMBIEN) 10 MG Tab Take 1 Tab by mouth at bedtime as needed for Sleep. 30 Tab 0   • cyclobenzaprine (FLEXERIL) 10 MG Tab TAKE ONE TABLET BY MOUTH THREE TIMES DAILY AS NEEDED 90 Tab 0   • tizanidine (ZANAFLEX) 2 MG tablet Take 1 Tab by mouth every bedtime.     • hydrochlorothiazide (HYDRODIURIL) 25 MG Tab TAKE ONE TABLET BY MOUTH ONCE DAILY Patient must see a new provider for future renewals 90 Tab 3   • amlodipine (NORVASC) 10 MG Tab Take 1 Tab by mouth every day. 90 Tab 3   • acetaminophen (TYLENOL) 500 MG Tab Take 1,500 mg by mouth 2 Times a Day. Indications: Pain     • Ibuprofen 200 MG Cap Take 800 mg  "by mouth 2 Times a Day. Indications: Mild to Moderate Pain       No current facility-administered medications for this visit.     She  has a past medical history of Allergy; Anxiety; ASTHMA; GERD (gastroesophageal reflux disease); Migraine; Heart murmur; Unspecified urinary incontinence; Unspecified disorder of thyroid; Snoring; Anesthesia; Hypertension; Arthritis; Other specified disorder of intestines; Bronchitis (2014); Headache; and Pain (01-29-16).    ROS   No chest pain, no shortness of breath, no abdominal pain, no diarrhea/constipation, no urinary symptoms.     Objective:     Blood pressure 134/92, pulse 94, temperature 36.6 °C (97.8 °F), resp. rate 16, height 1.651 m (5' 5\"), weight 128.187 kg (282 lb 9.6 oz), SpO2 92 %. Body mass index is 47.03 kg/(m^2).   Physical Exam:  Alert, oriented in no acute distress.  Eye contact is good, speech goal directed, affect calm  HEENT: conjunctiva non-injected, sclera non-icteric.  Neck No adenopathy or masses in the neck or supraclavicular regions.  Lungs: clear to auscultation bilaterally with good excursion.  CV: regular rate and rhythm.  Breast: no axillary tenderness or palpable lymph nodes, no dimpling, masses, inflammation or spontaneous nipple discharge. Left breast with some tenderness to palpation above areola 1 o'clock - higher in breast - possible chest wall mass not freely movable with tenderness  Ext: no edema, color normal, vascularity normal, temperature normal    Assessment and Plan:   The following treatment plan was discussed   1. Breast pain, left  MA-DIAGNOSTIC MAMMO W/O CAD-BILAT    US-BREAST COMPLETE-LEFT   2. Family history of breast cancer in mother  MA-DIAGNOSTIC MAMMO W/O CAD-BILAT    US-BREAST COMPLETE-LEFT     Advise diagnostic mammogram and ultrasound to evaluate abnormality in left breast. Pt is at risk for breast cancer with family history.     Followup: Return if symptoms worsen or fail to improve.         Please note that dictation has " been dictated using voice recognition soft ware. I have made every reasonable attempt to correct obvious errors, but I expect that there are errors of grammar and possibly content that I did not discover before finalizing the note.

## 2017-05-08 NOTE — MR AVS SNAPSHOT
"        Carolynn Casey   2017 10:45 AM   Office Visit   MRN: [id number]    Department:  [facility]   Dept Phone:  [phone]    Description:  Female : 1965   Provider:  Khadar Fonseca D.O.           Reason for Visit     Breast Pain left breast, x 2 months      Allergies as of 2017     Allergen Noted Reactions    Cortisone 2010   Anaphylaxis    RXN=since age 12    Penicillins 2010   Anaphylaxis    RXN=since age 1    Food 2015   Unspecified    \"citrus juice\" =burn ALL FISH - nausea  RXN=whole life    Keflex 2015   Diarrhea, Nausea    RXN=20 years ago    Latex 2015   Rash, Itching    Rash, itching  RXN=20 years ago    Naprosyn [Naproxen] 2010   Unspecified    \"GI bleed\"  RXN=whole life    Tape 2015   Rash    \"bubble up the skin\", reports tegaderm [place]  RXN=ongoing      You were diagnosed with     Breast pain, left   [535377]       Family history of breast cancer in mother   [947069]         Vital Signs     Blood Pressure Pulse Temperature Respirations Height Weight    134/92 mmHg 94 36.6 °C (97.8 °F) 16 1.651 m (5' 5\") 128.187 kg (282 lb 9.6 oz)    Body Mass Index Oxygen Saturation Smoking Status             47.03 kg/m2 92% Never Smoker          Basic Information     Date Of Birth Sex Race Ethnicity Preferred Language    1965 Female White Non- English      Your appointments     2017 10:40 AM   Access New To You with Adriane Lara D.O.   [facility])    [address] [place] [address], [address]  [facility] [id number] [phone]              Problem List              ICD-10-CM Priority Class Noted - Resolved    Hypertension I10   2011 - Present    DDD (degenerative disc disease), lumbar M51.36   2011 - Present    Subclinical hypothyroidism E03.9   2012 - Present    Presence of right artificial knee joint Z96.651   10/8/2015 - Present    Degenerative spondylolisthesis M43.10   2016 - " Present    Insomnia G47.00   6/20/2016 - Present    BMI 45.0-49.9, adult (CMS-[facility]) Z68.42   9/30/2016 - Present    Morbid obesity with BMI of 45.0-49.9, adult ([facility]) E66.01, Z68.42   3/17/2017 - Present      Health Maintenance        Date Due Completion Dates    COLON CANCER SCREENING ANNUAL FIT 2/15/2018 (Originally 1965) ---    MAMMOGRAM 6/29/2017 6/29/2016, 5/21/2015, 4/28/2014, 9/10/2010    IMM DTaP/Tdap/Td Vaccine (3 - Td) 9/30/2026 9/30/2016, 4/24/2014            Current Immunizations     Hepatitis B Vaccine Recombivax (Adol/Adult) 3/15/2010, 11/2/2009, 8/17/2009    Influenza Vaccine Adult HD 10/13/2007    Influenza Vaccine Quad Inj (Pf) 9/29/2014, 12/27/2012, 10/26/2011    Influenza Vaccine Quad Inj (Preserved) 10/15/2016, 10/9/2015  3:19 AM    Tdap Vaccine 9/30/2016  4:42 PM, 4/24/2014    Tuberculin Skin Test 3/30/2011, 3/22/2011      Below and/or attached are the medications your provider expects you to take. Review all of your home medications and newly ordered medications with your provider and/or pharmacist. Follow medication instructions as directed by your provider and/or pharmacist. Please keep your medication list with you and share with your provider. Update the information when medications are discontinued, doses are changed, or new medications (including over-the-counter products) are added; and carry medication information at all times in the event of emergency situations     Allergies:  CORTISONE - Anaphylaxis     PENICILLINS - Anaphylaxis     FOOD - Unspecified     KEFLEX - Diarrhea,Nausea     LATEX - Rash,Itching     NAPROSYN - Unspecified     TAPE - Rash               Medications  Valid as of: May 08, 2017 - 11:47 AM    Generic Name Brand Name Tablet Size Instructions for use    Acetaminophen (Tab) TYLENOL 500 MG Take 1,500 mg by mouth 2 Times a Day. Indications: Pain        AmLODIPine Besylate (Tab) NORVASC 10 MG Take 1 Tab by mouth every day.        Cholecalciferol (Cap) Vitamin D3 90203  UNITS Take 50,000 Int'l Units by mouth every 7 days for 6 doses.        Cyclobenzaprine HCl (Tab) FLEXERIL 10 MG TAKE ONE TABLET BY MOUTH THREE TIMES DAILY AS NEEDED        HydroCHLOROthiazide (Tab) HYDRODIURIL 25 MG TAKE ONE TABLET BY MOUTH ONCE DAILY Patient must see a new provider for future renewals        Ibuprofen (Cap) Ibuprofen 200 MG Take 800 mg by mouth 2 Times a Day. Indications: Mild to Moderate Pain        Levothyroxine Sodium (Tab) SYNTHROID 25 MCG Take 1 Tab by mouth Every morning on an empty stomach.        Losartan Potassium (Tab) COZAAR 100 MG Take 1 Tab by mouth every day.        Orlistat (Cap) SUJEY 60 MG Take 1 Cap by mouth 3 times a day, with meals.        Pseudoephedrine HCl   Take  by mouth.        TiZANidine HCl (Tab) ZANAFLEX 2 MG Take 1 Tab by mouth every bedtime.        Zolpidem Tartrate (Tab) AMBIEN 10 MG Take 1 Tab by mouth at bedtime as needed for Sleep.        .                 Medicines prescribed today were sent to:     [facility] [address] [place] [address]    Phone: [phone] Fax: [phone]    Open 24 Hours?: No      Medication refill instructions:       If your prescription bottle indicates you have medication refills left, it is not necessary to call your provider’s office. Please contact your pharmacy and they will refill your medication.    If your prescription bottle indicates you do not have any refills left, you may request refills at any time through one of the following ways: The online "[employer] system (except Urgent Care), by calling your provider’s office, or by asking your pharmacy to contact your provider’s office with a refill request. Medication refills are processed only during regular business hours and may not be available until the next business day. Your provider may request additional information or to have a follow-up visit with you prior to refilling your medication.   *Please Note: Medication  refills are assigned a new Rx number when refilled electronically. Your pharmacy may indicate that no refills were authorized even though a new prescription for the same medication is available at the pharmacy. Please request the medicine by name with the pharmacy before contacting your provider for a refill.        Your To Do List     Future Labs/Procedures Complete By Expires    MA-DIAGNOSTIC MAMMO W/O CAD-BILAT  As directed 6/9/2018    US-BREAST COMPLETE-LEFT  As directed 5/8/2018      Other Notes About Your Plan     This patient has an appointment on 02/10/2016. At the beginning of the year all chronic conditions should be assessed and Documented in conjunction with any other identified concerns during the visit.   Query: Please assess the following Diagnosis:  E66.01: Morbid obesity if BMI is 40+  Z68.4X: please status BMI           MyChart Access Code: Activation code not generated  Current Trove[facility] Status: Active

## 2017-05-12 ENCOUNTER — HOSPITAL ENCOUNTER (OUTPATIENT)
Dept: RADIOLOGY | Facility: MEDICAL CENTER | Age: 52
End: 2017-05-12
Attending: INTERNAL MEDICINE
Payer: MEDICARE

## 2017-05-12 DIAGNOSIS — Z80.3 FAMILY HISTORY OF BREAST CANCER IN MOTHER: ICD-10-CM

## 2017-05-12 DIAGNOSIS — N64.4 BREAST PAIN, LEFT: ICD-10-CM

## 2017-05-12 PROCEDURE — G0204 DX MAMMO INCL CAD BI: HCPCS

## 2017-05-12 PROCEDURE — 76642 ULTRASOUND BREAST LIMITED: CPT | Mod: LT

## 2017-05-16 NOTE — TELEPHONE ENCOUNTER
Was the patient seen in the last year in this department? Yes     Does patient have an active prescription for medications requested? No     Received Request Via: Pharmacy      Pt met protocol?: Yes, OV last week.

## 2017-05-17 RX ORDER — CYCLOBENZAPRINE HCL 10 MG
TABLET ORAL
Qty: 90 TAB | Refills: 0 | Status: SHIPPED | OUTPATIENT
Start: 2017-05-17 | End: 2017-06-16 | Stop reason: SDUPTHER

## 2017-05-18 RX ORDER — CYCLOBENZAPRINE HCL 10 MG
TABLET ORAL
Refills: 0 | OUTPATIENT
Start: 2017-05-18

## 2017-05-18 RX ORDER — ZOLPIDEM TARTRATE 10 MG/1
10 TABLET ORAL NIGHTLY PRN
Qty: 30 TAB | Refills: 0 | Status: SHIPPED
Start: 2017-05-18 | End: 2017-06-16 | Stop reason: SDUPTHER

## 2017-05-18 NOTE — TELEPHONE ENCOUNTER
reviewed. Please call in script. Please advise patient cyclobenzaprine was filled/faxed to [facility] yesterday.

## 2017-06-15 DIAGNOSIS — M51.36 DDD (DEGENERATIVE DISC DISEASE), LUMBAR: ICD-10-CM

## 2017-06-15 DIAGNOSIS — G47.00 INSOMNIA, UNSPECIFIED TYPE: ICD-10-CM

## 2017-06-16 ENCOUNTER — TELEPHONE (OUTPATIENT)
Dept: MEDICAL GROUP | Facility: PHYSICIAN GROUP | Age: 52
End: 2017-06-16

## 2017-06-16 RX ORDER — CYCLOBENZAPRINE HCL 10 MG
TABLET ORAL
Qty: 90 TAB | Refills: 0 | Status: SHIPPED | OUTPATIENT
Start: 2017-06-16 | End: 2017-08-03 | Stop reason: SDUPTHER

## 2017-06-16 RX ORDER — ZOLPIDEM TARTRATE 10 MG/1
10 TABLET ORAL NIGHTLY PRN
Qty: 30 TAB | Refills: 0 | Status: SHIPPED
Start: 2017-06-16 | End: 2017-07-17 | Stop reason: SDUPTHER

## 2017-06-16 NOTE — TELEPHONE ENCOUNTER
From: Carolynn Casey  To: Khadar Fonseca D.O.  Sent: 6/15/2017 11:07 PM PDT  Subject: Medication Renewal Request    Original authorizing provider: ANGIE Ahuja would like a refill of the following medications:  zolpidem (AMBIEN) 10 MG Tab [Khadar Fonseca D.O.]  cyclobenzaprine (FLEXERIL) 10 MG Tab [Khadar Fonseca D.O.]    Preferred pharmacy: [facility] [address] [place] [address], [place] - [address]    Comment:

## 2017-06-16 NOTE — TELEPHONE ENCOUNTER
ESTABLISHED PATIENT PRE-VISIT PLANNING     Note: Patient will not be contacted if there is no indication to call.     1.  Reviewed notes from the last few office visits within the medical group: Yes    2.  If any orders were placed at last visit or intended to be done for this visit (i.e. 6 mos follow-up), do we have Results/Consult Notes?        •  Labs - Labs ordered, NOT completed. Patient advised to complete prior to next appointment.       •  Imaging - Imaging ordered, completed and results are in chart.       •  Referrals - No referrals were ordered at last office visit.    3. Is this appointment scheduled as a Hospital Follow-Up? No    4.  Immunizations were updated in [employer] using WebIZ?: Yes       •  Web Iz Recommendations: Patient is up to date on all vaccines    5.  Patient is due for the following Health Maintenance Topics:   Health Maintenance Due   Topic Date Due   • Annual Wellness Visit  1965       - Patient is up-to-date on all Health Maintenance topics. No records have been requested at this time.    6.  Patient was NOT informed to arrive 15 min prior to their scheduled appointment and bring in their medication bottles.

## 2017-07-16 DIAGNOSIS — E03.8 SUBCLINICAL HYPOTHYROIDISM: ICD-10-CM

## 2017-07-16 DIAGNOSIS — G47.00 INSOMNIA, UNSPECIFIED TYPE: ICD-10-CM

## 2017-07-17 DIAGNOSIS — G47.00 INSOMNIA, UNSPECIFIED TYPE: ICD-10-CM

## 2017-07-17 RX ORDER — ZOLPIDEM TARTRATE 10 MG/1
10 TABLET ORAL NIGHTLY PRN
Qty: 30 TAB | Refills: 0 | Status: SHIPPED
Start: 2017-07-17 | End: 2017-08-03 | Stop reason: SDUPTHER

## 2017-07-17 RX ORDER — ZOLPIDEM TARTRATE 10 MG/1
10 TABLET ORAL NIGHTLY PRN
Qty: 30 TAB | Refills: 0 | Status: SHIPPED
Start: 2017-07-17 | End: 2017-07-17 | Stop reason: SDUPTHER

## 2017-07-17 RX ORDER — LEVOTHYROXINE SODIUM 0.03 MG/1
25 TABLET ORAL
Qty: 30 TAB | Refills: 1 | Status: SHIPPED | OUTPATIENT
Start: 2017-07-17 | End: 2017-09-25 | Stop reason: SDUPTHER

## 2017-07-17 NOTE — TELEPHONE ENCOUNTER
From: Carolynn Casey  To: Khadar Fonseca D.O.  Sent: 7/16/2017 3:55 PM PDT  Subject: Medication Renewal Request    Original authorizing provider: ANGIE Ahuja would like a refill of the following medications:  levothyroxine (SYNTHROID) 25 MCG Tab [Khadar Fonseca D.O.]  zolpidem (AMBIEN) 10 MG Tab [Khadar Fonseca D.O.]    Preferred pharmacy: [facility] [address] [place] [address], [place] - [address]    Comment:

## 2017-07-28 ENCOUNTER — HOSPITAL ENCOUNTER (OUTPATIENT)
Dept: RADIOLOGY | Facility: MEDICAL CENTER | Age: 52
DRG: 460 | End: 2017-07-28
Attending: ORTHOPAEDIC SURGERY | Admitting: ORTHOPAEDIC SURGERY
Payer: MEDICARE

## 2017-07-28 DIAGNOSIS — Z01.810 PRE-OPERATIVE CARDIOVASCULAR EXAMINATION: ICD-10-CM

## 2017-07-28 DIAGNOSIS — Z01.812 PRE-OPERATIVE LABORATORY EXAMINATION: ICD-10-CM

## 2017-07-28 DIAGNOSIS — Z01.811 PRE-OPERATIVE RESPIRATORY EXAMINATION: ICD-10-CM

## 2017-07-28 LAB
ALBUMIN SERPL BCP-MCNC: 4.3 G/DL (ref 3.2–4.9)
ALBUMIN/GLOB SERPL: 1.3 G/DL
ALP SERPL-CCNC: 73 U/L (ref 30–99)
ALT SERPL-CCNC: 39 U/L (ref 2–50)
ANION GAP SERPL CALC-SCNC: 12 MMOL/L (ref 0–11.9)
APPEARANCE UR: CLEAR
AST SERPL-CCNC: 32 U/L (ref 12–45)
BASOPHILS # BLD AUTO: 0.8 % (ref 0–1.8)
BASOPHILS # BLD: 0.07 K/UL (ref 0–0.12)
BILIRUB SERPL-MCNC: 1.2 MG/DL (ref 0.1–1.5)
BILIRUB UR QL STRIP.AUTO: NEGATIVE
BUN SERPL-MCNC: 16 MG/DL (ref 8–22)
CALCIUM SERPL-MCNC: 10.2 MG/DL (ref 8.5–10.5)
CHLORIDE SERPL-SCNC: 102 MMOL/L (ref 96–112)
CO2 SERPL-SCNC: 22 MMOL/L (ref 20–33)
COLOR UR: YELLOW
CREAT SERPL-MCNC: 0.57 MG/DL (ref 0.5–1.4)
EKG IMPRESSION: NORMAL
EOSINOPHIL # BLD AUTO: 0.45 K/UL (ref 0–0.51)
EOSINOPHIL NFR BLD: 5.2 % (ref 0–6.9)
ERYTHROCYTE [DISTWIDTH] IN BLOOD BY AUTOMATED COUNT: 40.1 FL (ref 35.9–50)
GFR SERPL CREATININE-BSD FRML MDRD: >60 ML/MIN/1.73 M 2
GLOBULIN SER CALC-MCNC: 3.4 G/DL (ref 1.9–3.5)
GLUCOSE SERPL-MCNC: 107 MG/DL (ref 65–99)
GLUCOSE UR STRIP.AUTO-MCNC: NEGATIVE MG/DL
HCT VFR BLD AUTO: 47.7 % (ref 37–47)
HGB BLD-MCNC: 15.6 G/DL (ref 12–16)
IMM GRANULOCYTES # BLD AUTO: 0.03 K/UL (ref 0–0.11)
IMM GRANULOCYTES NFR BLD AUTO: 0.3 % (ref 0–0.9)
KETONES UR STRIP.AUTO-MCNC: NEGATIVE MG/DL
LEUKOCYTE ESTERASE UR QL STRIP.AUTO: NEGATIVE
LYMPHOCYTES # BLD AUTO: 2.74 K/UL (ref 1–4.8)
LYMPHOCYTES NFR BLD: 31.9 % (ref 22–41)
MCH RBC QN AUTO: 28.3 PG (ref 27–33)
MCHC RBC AUTO-ENTMCNC: 32.7 G/DL (ref 33.6–35)
MCV RBC AUTO: 86.4 FL (ref 81.4–97.8)
MICRO URNS: NORMAL
MONOCYTES # BLD AUTO: 0.59 K/UL (ref 0–0.85)
MONOCYTES NFR BLD AUTO: 6.9 % (ref 0–13.4)
NEUTROPHILS # BLD AUTO: 4.72 K/UL (ref 2–7.15)
NEUTROPHILS NFR BLD: 54.9 % (ref 44–72)
NITRITE UR QL STRIP.AUTO: NEGATIVE
NRBC # BLD AUTO: 0 K/UL
NRBC BLD AUTO-RTO: 0 /100 WBC
PH UR STRIP.AUTO: 6.5 [PH]
PLATELET # BLD AUTO: 308 K/UL (ref 164–446)
PMV BLD AUTO: 8.9 FL (ref 9–12.9)
POTASSIUM SERPL-SCNC: 3.8 MMOL/L (ref 3.6–5.5)
PROT SERPL-MCNC: 7.7 G/DL (ref 6–8.2)
PROT UR QL STRIP: NEGATIVE MG/DL
RBC # BLD AUTO: 5.52 M/UL (ref 4.2–5.4)
RBC UR QL AUTO: NEGATIVE
SODIUM SERPL-SCNC: 136 MMOL/L (ref 135–145)
SP GR UR STRIP.AUTO: 1.03
UROBILINOGEN UR STRIP.AUTO-MCNC: 0.2 MG/DL
WBC # BLD AUTO: 8.6 K/UL (ref 4.8–10.8)

## 2017-07-28 PROCEDURE — 93010 ELECTROCARDIOGRAM REPORT: CPT | Performed by: INTERNAL MEDICINE

## 2017-07-28 PROCEDURE — 71010 DX-CHEST-LIMITED (1 VIEW): CPT

## 2017-07-28 PROCEDURE — 81003 URINALYSIS AUTO W/O SCOPE: CPT

## 2017-07-28 PROCEDURE — 80053 COMPREHEN METABOLIC PANEL: CPT

## 2017-07-28 PROCEDURE — 36415 COLL VENOUS BLD VENIPUNCTURE: CPT

## 2017-07-28 PROCEDURE — 93005 ELECTROCARDIOGRAM TRACING: CPT

## 2017-07-28 PROCEDURE — 85025 COMPLETE CBC W/AUTO DIFF WBC: CPT

## 2017-07-28 RX ORDER — TIZANIDINE 4 MG/1
4 TABLET ORAL
COMMUNITY
End: 2017-11-29 | Stop reason: SDUPTHER

## 2017-07-28 RX ORDER — PSEUDOEPHEDRINE HCL 120 MG/1
120 TABLET, FILM COATED, EXTENDED RELEASE ORAL 2 TIMES DAILY PRN
Status: ON HOLD | COMMUNITY
End: 2017-08-02

## 2017-08-02 ENCOUNTER — HOSPITAL ENCOUNTER (INPATIENT)
Facility: MEDICAL CENTER | Age: 52
LOS: 1 days | DRG: 460 | End: 2017-08-02
Attending: ORTHOPAEDIC SURGERY | Admitting: ORTHOPAEDIC SURGERY
Payer: MEDICARE

## 2017-08-02 ENCOUNTER — APPOINTMENT (OUTPATIENT)
Dept: RADIOLOGY | Facility: MEDICAL CENTER | Age: 52
DRG: 460 | End: 2017-08-02
Attending: ORTHOPAEDIC SURGERY
Payer: MEDICARE

## 2017-08-02 VITALS
HEART RATE: 69 BPM | RESPIRATION RATE: 18 BRPM | TEMPERATURE: 96.8 F | OXYGEN SATURATION: 95 % | HEIGHT: 65 IN | WEIGHT: 274.03 LBS | BODY MASS INDEX: 45.66 KG/M2

## 2017-08-02 PROBLEM — M46.1 SACROILIITIS (HCC): Status: ACTIVE | Noted: 2017-08-02

## 2017-08-02 PROCEDURE — 160048 HCHG OR STATISTICAL LEVEL 1-5: Performed by: ORTHOPAEDIC SURGERY

## 2017-08-02 PROCEDURE — 700111 HCHG RX REV CODE 636 W/ 250 OVERRIDE (IP)

## 2017-08-02 PROCEDURE — 72202 X-RAY EXAM SI JOINTS 3/> VWS: CPT

## 2017-08-02 PROCEDURE — 0SG704Z FUSION OF RIGHT SACROILIAC JOINT WITH INTERNAL FIXATION DEVICE, OPEN APPROACH: ICD-10-PCS | Performed by: ORTHOPAEDIC SURGERY

## 2017-08-02 PROCEDURE — 160041 HCHG SURGERY MINUTES - EA ADDL 1 MIN LEVEL 4: Performed by: ORTHOPAEDIC SURGERY

## 2017-08-02 PROCEDURE — 700102 HCHG RX REV CODE 250 W/ 637 OVERRIDE(OP)

## 2017-08-02 PROCEDURE — A9270 NON-COVERED ITEM OR SERVICE: HCPCS

## 2017-08-02 PROCEDURE — 502000 HCHG MISC OR IMPLANTS RC 0278: Performed by: ORTHOPAEDIC SURGERY

## 2017-08-02 PROCEDURE — 160002 HCHG RECOVERY MINUTES (STAT): Performed by: ORTHOPAEDIC SURGERY

## 2017-08-02 PROCEDURE — 501445 HCHG STAPLER, SKIN DISP: Performed by: ORTHOPAEDIC SURGERY

## 2017-08-02 PROCEDURE — 160029 HCHG SURGERY MINUTES - 1ST 30 MINS LEVEL 4: Performed by: ORTHOPAEDIC SURGERY

## 2017-08-02 PROCEDURE — 160009 HCHG ANES TIME/MIN: Performed by: ORTHOPAEDIC SURGERY

## 2017-08-02 PROCEDURE — C1713 ANCHOR/SCREW BN/BN,TIS/BN: HCPCS | Performed by: ORTHOPAEDIC SURGERY

## 2017-08-02 PROCEDURE — 160036 HCHG PACU - EA ADDL 30 MINS PHASE I: Performed by: ORTHOPAEDIC SURGERY

## 2017-08-02 PROCEDURE — 306637 HCHG MISC ORTHO ITEM RC 0274

## 2017-08-02 PROCEDURE — 160035 HCHG PACU - 1ST 60 MINS PHASE I: Performed by: ORTHOPAEDIC SURGERY

## 2017-08-02 PROCEDURE — 700101 HCHG RX REV CODE 250: Mod: JW

## 2017-08-02 PROCEDURE — 501838 HCHG SUTURE GENERAL: Performed by: ORTHOPAEDIC SURGERY

## 2017-08-02 PROCEDURE — 500885 HCHG PACK, JACKSON TABLE: Performed by: ORTHOPAEDIC SURGERY

## 2017-08-02 PROCEDURE — 700101 HCHG RX REV CODE 250

## 2017-08-02 DEVICE — DBM CHUNKY PROGENIX PLS 10.CC: Type: IMPLANTABLE DEVICE | Site: SPINE LUMBAR | Status: FUNCTIONAL

## 2017-08-02 RX ORDER — AMOXICILLIN 250 MG
1 CAPSULE ORAL NIGHTLY
Status: DISCONTINUED | OUTPATIENT
Start: 2017-08-02 | End: 2017-08-02 | Stop reason: HOSPADM

## 2017-08-02 RX ORDER — BISACODYL 10 MG
10 SUPPOSITORY, RECTAL RECTAL
Status: DISCONTINUED | OUTPATIENT
Start: 2017-08-02 | End: 2017-08-02 | Stop reason: HOSPADM

## 2017-08-02 RX ORDER — OXYCODONE HCL 5 MG/5 ML
SOLUTION, ORAL ORAL
Status: COMPLETED
Start: 2017-08-02 | End: 2017-08-02

## 2017-08-02 RX ORDER — LIDOCAINE AND PRILOCAINE 25; 25 MG/G; MG/G
1 CREAM TOPICAL
Status: COMPLETED | OUTPATIENT
Start: 2017-08-02 | End: 2017-08-02

## 2017-08-02 RX ORDER — ONDANSETRON 2 MG/ML
4 INJECTION INTRAMUSCULAR; INTRAVENOUS EVERY 4 HOURS PRN
Status: DISCONTINUED | OUTPATIENT
Start: 2017-08-02 | End: 2017-08-02 | Stop reason: HOSPADM

## 2017-08-02 RX ORDER — DOCUSATE SODIUM 100 MG/1
100 CAPSULE, LIQUID FILLED ORAL 2 TIMES DAILY
Status: DISCONTINUED | OUTPATIENT
Start: 2017-08-02 | End: 2017-08-02 | Stop reason: HOSPADM

## 2017-08-02 RX ORDER — AMOXICILLIN 250 MG
1 CAPSULE ORAL
Status: DISCONTINUED | OUTPATIENT
Start: 2017-08-02 | End: 2017-08-02 | Stop reason: HOSPADM

## 2017-08-02 RX ORDER — ONDANSETRON 4 MG/1
4 TABLET, ORALLY DISINTEGRATING ORAL EVERY 4 HOURS PRN
Status: DISCONTINUED | OUTPATIENT
Start: 2017-08-02 | End: 2017-08-02 | Stop reason: HOSPADM

## 2017-08-02 RX ORDER — DIPHENHYDRAMINE HCL 25 MG
25 TABLET ORAL EVERY 6 HOURS PRN
Status: DISCONTINUED | OUTPATIENT
Start: 2017-08-02 | End: 2017-08-02 | Stop reason: HOSPADM

## 2017-08-02 RX ORDER — METHOCARBAMOL 750 MG/1
750 TABLET, FILM COATED ORAL EVERY 8 HOURS PRN
Status: DISCONTINUED | OUTPATIENT
Start: 2017-08-02 | End: 2017-08-02 | Stop reason: HOSPADM

## 2017-08-02 RX ORDER — BUPIVACAINE HYDROCHLORIDE AND EPINEPHRINE 5; 5 MG/ML; UG/ML
INJECTION, SOLUTION EPIDURAL; INTRACAUDAL; PERINEURAL
Status: DISCONTINUED | OUTPATIENT
Start: 2017-08-02 | End: 2017-08-02 | Stop reason: HOSPADM

## 2017-08-02 RX ORDER — ACETAMINOPHEN 500 MG
1000 TABLET ORAL EVERY 6 HOURS
Status: DISCONTINUED | OUTPATIENT
Start: 2017-08-02 | End: 2017-08-02 | Stop reason: HOSPADM

## 2017-08-02 RX ORDER — OXYCODONE HYDROCHLORIDE 10 MG/1
10 TABLET ORAL
Status: DISCONTINUED | OUTPATIENT
Start: 2017-08-02 | End: 2017-08-02 | Stop reason: HOSPADM

## 2017-08-02 RX ORDER — PROMETHAZINE HYDROCHLORIDE 25 MG/1
12.5-25 TABLET ORAL EVERY 4 HOURS PRN
Status: DISCONTINUED | OUTPATIENT
Start: 2017-08-02 | End: 2017-08-02 | Stop reason: HOSPADM

## 2017-08-02 RX ORDER — SODIUM CHLORIDE 9 MG/ML
INJECTION, SOLUTION INTRAVENOUS CONTINUOUS
Status: DISCONTINUED | OUTPATIENT
Start: 2017-08-02 | End: 2017-08-02 | Stop reason: HOSPADM

## 2017-08-02 RX ORDER — OXYCODONE HYDROCHLORIDE 5 MG/1
5 TABLET ORAL
Status: DISCONTINUED | OUTPATIENT
Start: 2017-08-02 | End: 2017-08-02 | Stop reason: HOSPADM

## 2017-08-02 RX ORDER — PROMETHAZINE HYDROCHLORIDE 25 MG/1
12.5-25 SUPPOSITORY RECTAL EVERY 4 HOURS PRN
Status: DISCONTINUED | OUTPATIENT
Start: 2017-08-02 | End: 2017-08-02 | Stop reason: HOSPADM

## 2017-08-02 RX ORDER — POLYETHYLENE GLYCOL 3350 17 G/17G
1 POWDER, FOR SOLUTION ORAL 2 TIMES DAILY PRN
Status: DISCONTINUED | OUTPATIENT
Start: 2017-08-02 | End: 2017-08-02 | Stop reason: HOSPADM

## 2017-08-02 RX ORDER — ENEMA 19; 7 G/133ML; G/133ML
1 ENEMA RECTAL
Status: DISCONTINUED | OUTPATIENT
Start: 2017-08-02 | End: 2017-08-02 | Stop reason: HOSPADM

## 2017-08-02 RX ORDER — LIDOCAINE HYDROCHLORIDE 10 MG/ML
INJECTION, SOLUTION INFILTRATION; PERINEURAL
Status: COMPLETED
Start: 2017-08-02 | End: 2017-08-02

## 2017-08-02 RX ORDER — HYDROMORPHONE HYDROCHLORIDE 2 MG/ML
INJECTION, SOLUTION INTRAMUSCULAR; INTRAVENOUS; SUBCUTANEOUS
Status: COMPLETED
Start: 2017-08-02 | End: 2017-08-02

## 2017-08-02 RX ORDER — LIDOCAINE HYDROCHLORIDE 10 MG/ML
0.5 INJECTION, SOLUTION INFILTRATION; PERINEURAL
Status: COMPLETED | OUTPATIENT
Start: 2017-08-02 | End: 2017-08-02

## 2017-08-02 RX ORDER — DIPHENHYDRAMINE HYDROCHLORIDE 50 MG/ML
25 INJECTION INTRAMUSCULAR; INTRAVENOUS EVERY 6 HOURS PRN
Status: DISCONTINUED | OUTPATIENT
Start: 2017-08-02 | End: 2017-08-02 | Stop reason: HOSPADM

## 2017-08-02 RX ORDER — MORPHINE SULFATE 4 MG/ML
4 INJECTION, SOLUTION INTRAMUSCULAR; INTRAVENOUS
Status: DISCONTINUED | OUTPATIENT
Start: 2017-08-02 | End: 2017-08-02 | Stop reason: HOSPADM

## 2017-08-02 RX ORDER — SODIUM CHLORIDE, SODIUM LACTATE, POTASSIUM CHLORIDE, CALCIUM CHLORIDE 600; 310; 30; 20 MG/100ML; MG/100ML; MG/100ML; MG/100ML
INJECTION, SOLUTION INTRAVENOUS CONTINUOUS
Status: DISCONTINUED | OUTPATIENT
Start: 2017-08-02 | End: 2017-08-02 | Stop reason: HOSPADM

## 2017-08-02 RX ADMIN — FENTANYL CITRATE 25 MCG: 50 INJECTION, SOLUTION INTRAMUSCULAR; INTRAVENOUS at 14:40

## 2017-08-02 RX ADMIN — HYDROMORPHONE HYDROCHLORIDE 0.5 MG: 2 INJECTION INTRAMUSCULAR; INTRAVENOUS; SUBCUTANEOUS at 14:45

## 2017-08-02 RX ADMIN — HYDROMORPHONE HYDROCHLORIDE 0.5 MG: 2 INJECTION INTRAMUSCULAR; INTRAVENOUS; SUBCUTANEOUS at 16:45

## 2017-08-02 RX ADMIN — ALBUTEROL SULFATE 2.5 MG: 2.5 SOLUTION RESPIRATORY (INHALATION) at 17:15

## 2017-08-02 RX ADMIN — Medication 2.5 MG: at 17:15

## 2017-08-02 RX ADMIN — LIDOCAINE HYDROCHLORIDE 0.5 ML: 10 INJECTION, SOLUTION INFILTRATION; PERINEURAL at 12:00

## 2017-08-02 RX ADMIN — FENTANYL CITRATE 25 MCG: 50 INJECTION, SOLUTION INTRAMUSCULAR; INTRAVENOUS at 14:50

## 2017-08-02 RX ADMIN — OXYCODONE HYDROCHLORIDE 10 MG: 5 SOLUTION ORAL at 15:00

## 2017-08-02 RX ADMIN — FENTANYL CITRATE 25 MCG: 50 INJECTION, SOLUTION INTRAMUSCULAR; INTRAVENOUS at 16:40

## 2017-08-02 RX ADMIN — HYDROMORPHONE HYDROCHLORIDE 0.5 MG: 2 INJECTION INTRAMUSCULAR; INTRAVENOUS; SUBCUTANEOUS at 14:55

## 2017-08-02 RX ADMIN — SODIUM CHLORIDE, SODIUM LACTATE, POTASSIUM CHLORIDE, CALCIUM CHLORIDE: 600; 310; 30; 20 INJECTION, SOLUTION INTRAVENOUS at 12:00

## 2017-08-02 ASSESSMENT — PAIN SCALES - GENERAL
PAINLEVEL_OUTOF10: 2
PAINLEVEL_OUTOF10: 3
PAINLEVEL_OUTOF10: 6
PAINLEVEL_OUTOF10: 5
PAINLEVEL_OUTOF10: 2
PAINLEVEL_OUTOF10: 2
PAINLEVEL_OUTOF10: 3
PAINLEVEL_OUTOF10: 3
PAINLEVEL_OUTOF10: 4
PAINLEVEL_OUTOF10: 3
PAINLEVEL_OUTOF10: 2
PAINLEVEL_OUTOF10: 2
PAINLEVEL_OUTOF10: 0
PAINLEVEL_OUTOF10: 3
PAINLEVEL_OUTOF10: 6
PAINLEVEL_OUTOF10: 3
PAINLEVEL_OUTOF10: 2
PAINLEVEL_OUTOF10: 2

## 2017-08-02 NOTE — OR SURGEON
Operative Report    PreOp Diagnosis: right SI jt OA    PostOp Diagnosis: as above    Procedure(s):  S I JOINT FUSION - Wound Class: Clean    Surgeon(s):  Alfredo Belcher M.D.    Anesthesiologist/Type of Anesthesia:  Anesthesiologist: Getachew Ariza M.D./General    Surgical Staff:  Circulator: Carolynn Pringle R.N.  Scrub Person: Zulema Olmos  Radiology Technologist: Best Hernandez    Specimens:  * No specimens in log *    Estimated Blood Loss: min    Findings: see dictation    Complications: none        8/2/2017 2:03 PM Alfredo Belcher

## 2017-08-02 NOTE — DISCHARGE SUMMARY
CHIEF COMPLAINT ON ADMISSION  Right buttock pain    CODE STATUS  Prior    HPI & HOSPITAL COURSE  This is a 52 y.o. female here with right buttock pain     Therefore, she is discharged in good and stable condition with close outpatient follow-up.    SPECIFIC OUTPATIENT FOLLOW-UP  2 weeks    DISCHARGE PROBLEM LIST  Active Problems:    Sacroiliitis (CMS-HCC) POA: Unknown  Resolved Problems:    * No resolved hospital problems. *      FOLLOW UP  Future Appointments  Date Time Provider Department Center   8/11/2017 8:20 AM Adriane Lara D.O. [facility] NCali Belcher M.D.  [address]  [address]  [facility] [address]  [phone]    In 2 weeks        MEDICATIONS ON DISCHARGE   Carolynn Casey   Home Medication Instructions ROSE:[id number]    Printed on:08/02/17 1226   Medication Information                      acetaminophen (TYLENOL) 500 MG Tab  Take 1,500 mg by mouth every 6 hours as needed. Indications: Pain             amlodipine (NORVASC) 10 MG Tab  Take 1 Tab by mouth every day.             cyclobenzaprine (FLEXERIL) 10 MG Tab  TAKE ONE TABLET BY MOUTH THREE TIMES DAILY AS NEEDED             hydrochlorothiazide (HYDRODIURIL) 25 MG Tab  TAKE ONE TABLET BY MOUTH ONCE DAILY Patient must see a new provider for future renewals             Ibuprofen 200 MG Cap  Take 800 mg by mouth 3 times a day as needed. Indications: Mild to Moderate Pain             levothyroxine (SYNTHROID) 25 MCG Tab  Take 1 Tab by mouth Every morning on an empty stomach.             losartan (COZAAR) 100 MG Tab  Take 1 Tab by mouth every day.             tizanidine (ZANAFLEX) 4 MG Tab  Take 4 mg by mouth every bedtime. Indications: Muscle Spasticity             zolpidem (AMBIEN) 10 MG Tab  Take 1 Tab by mouth at bedtime as needed for Sleep.                 DIET  No orders of the defined types were placed in this encounter.       ACTIVITY  As tolerated.  Touch-down weight bearing RIGHT leg      CONSULTATIONS  none    PROCEDURES  Right SI jt  fusion    LABORATORY  Lab Results   Component Value Date/Time    SODIUM 136 07/28/2017 11:16 AM    POTASSIUM 3.8 07/28/2017 11:16 AM    CHLORIDE 102 07/28/2017 11:16 AM    CO2 22 07/28/2017 11:16 AM    GLUCOSE 107* 07/28/2017 11:16 AM    BUN 16 07/28/2017 11:16 AM    CREATININE 0.57 07/28/2017 11:16 AM        Lab Results   Component Value Date/Time    WBC 8.6 07/28/2017 11:16 AM    HEMOGLOBIN 15.6 07/28/2017 11:16 AM    HEMATOCRIT 47.7* 07/28/2017 11:16 AM    PLATELET COUNT 308 07/28/2017 11:16 AM        Total time of the discharge process exceeds 31 minutes

## 2017-08-02 NOTE — IP AVS SNAPSHOT
8/2/2017    Carolynn Stover Carm[place]  [address] [facility] [address]    Dear Carolynn:    [facility] wants to ensure your discharge home is safe and you or your loved ones have had all of your questions answered regarding your care after you leave the hospital.    Below is a list of resources and contact information should you have any questions regarding your hospital stay, follow-up instructions, or active medical symptoms.    Questions or Concerns Regarding… Contact   Medical Questions Related to Your Discharge  (7 days a week, 8am-5pm) Contact a Nurse Care Coordinator   [phone]   Medical Questions Not Related to Your Discharge  (24 hours a day / 7 days a week)  Contact the Nurse Health Line   [phone]    Medications or Discharge Instructions Refer to your discharge packet   or contact your [facility] Primary Care Provider   [phone]   Follow-up Appointment(s) Schedule your appointment via [employer]   or contact Scheduling [phone]   Billing Review your statement via [employer]  or contact Billing [phone]   Medical Records Review your records via [employer]   or contact Medical Records [phone]     You may receive a telephone call within two days of discharge. This call is to make certain you understand your discharge instructions and have the opportunity to have any questions answered. You can also easily access your medical information, test results and upcoming appointments via the [employer] free online health management tool. You can learn more and sign up at [employer]. For assistance setting up your [employer] account, please call [phone].    Once again, we want to ensure your discharge home is safe and that you have a clear understanding of any next steps in your care. If you have any questions or concerns, please do not hesitate to contact us, we are here for you. Thank you for choosing [facility] for your healthcare needs.    Sincerely,    Your [facility] Healthcare Team

## 2017-08-02 NOTE — OP REPORT
DATE OF SERVICE:  08/02/2017    PREOPERATIVE DIAGNOSES:    1.  Morbid obesity.  2.  Failed back syndrome, fusion syndrome of the lumbar spine.  3.  Right sacroiliac joint arthritis.    POSTOPERATIVE DIAGNOSES:  1.  Morbid obesity.  2.  Failed back syndrome, fusion syndrome of the lumbar spine.  3.  Right sacroiliac joint arthritis.    PROCEDURE RENDERED:  Posterior at least sacroiliac joint fusion using Zyga   screws, right-sided.    Please add a modifier 22 to the case.  Patient is morbidly obese.  She has a   BMI of over 46.  Significant girth density of the tissues, which increased the   technical difficulty, duration, visualization of the case by approximately 40   minutes.    PRIMARY SURGEON:  Alfredo Belcher MD    ANESTHESIA:  GETA.    ANESTHESIOLOGIST:  Getachew Ariza MD    ESTIMATED BLOOD LOSS:  Minimal.    COMPLICATIONS:  None.    DRAINS:  No drains.    DISPOSITION:  Vital signs stable, extubated, taken back to the recovery room   in a satisfactory stable condition.    INDICATIONS FOR PROCEDURE:  Please see attached clinicals.    PROCEDURE IN DETAIL:  After proper consent was obtained from patient, patient   was wheeled back to operating theater room #4.  Patient was placed in usual   supine position, intubated under general anesthesia without any difficulties.    Patient rolled onto the OSI table in usual prone position and prepped and   draped in the usual sterile fashion.  Biplanar fluoroscopy obtained orthogonal   views of the sacroiliac joint, delineating the sacral ala line as well as the   S1, S2 pedicles and foramen.  Patient was prepped and draped in the usual   sterile fashion and antibiotics were administered.  Incision was made over the   right sacral notch.  Once this was done, 2 guidewires were introduced into   the anterior vertebral sacral line.  Going in a trajectory 20 degrees   superiorly as well as going to 15 degrees anterior to posterior, the   guidewires were placed.  Good alignment  was seen both in the AP, lateral as   well as the inlet views.  All guidewires were placed just shy at the lateral   margins of the S1, S2 foramen.  Once this was done, the appropriate drilling   and decortication was performed and 2 screws were placed across the SI joint.    Demineralized bone matrix putty was placed over in the decorticated regions   approximately 10 mL.  The first screw was 55 mm in length superiorly and   inferiorly it was 45 mm in length.  All screws were 12.5 fully threaded Zyga   screws.  The wound was copiously irrigated with normal saline.  The AP,   lateral and inlet views confirmed good placement of the instrumentation.  All   screws were just lateral to the S1, S2 pedicles and foramen.  The subcutaneous   tissues were opposed using 2-0 Vicryl suture.  Dermal edge approximated using   staples.  Wound was dressed sterilely using Bacitracin ointment, Xeroform,   4x4, and tape.  Patient subsequently undraped, rolled onto the stretcher in   the usual supine position, extubated uneventfully and taken back to recovery   room in a satisfactory stable condition.  No complications arose.       ____________________________________     MD MERLENE WELCH / GILBERT    DD:  08/02/2017 14:07:51  DT:  08/02/2017 16:26:00    D#:  [id number]  Job#:  [id number]

## 2017-08-02 NOTE — IP AVS SNAPSHOT
" Home Care Instructions                                                                                                                Name:Carolynn Casey  Medical Record Number:[id number]  CSN: [id number]    YOB: 1965   Age: 52 y.o.  Sex: female  HT:1.638 m (5' 4.5\") WT: 124.3 kg (274 lb 0.5 oz)          Admit Date: 8/2/2017     Discharge Date:   Today's Date: 8/2/2017  Attending Doctor:  Alfredo Belcher M.D.                  Allergies:  Cortisone; Food; Penicillins; Fish; Keflex; Latex; Naprosyn; Tape; and Shellfish allergy              Follow-up Information     1. Follow up with Alfredo Belcher M.D. In 2 weeks.    Specialty:  Orthopaedics    Contact information    [address]  [address]  [place] [address]  [phone]          Discharge Instructions         ACTIVITY: Rest and take it easy for the first 24 hours.  A responsible adult is recommended to remain with you during that time.  It is normal to feel sleepy.  We encourage you to not do anything that requires balance, judgment or coordination.    MILD FLU-LIKE SYMPTOMS ARE NORMAL. YOU MAY EXPERIENCE GENERALIZED MUSCLE ACHES, THROAT IRRITATION, HEADACHE AND/OR SOME NAUSEA.    FOR 24 HOURS DO NOT:  Drive, operate machinery or run household appliances.  Drink beer or alcoholic beverages.   Make important decisions or sign legal documents.    SPECIAL INSTRUCTIONS:  No driving on narcotics   Staple removal in 2 weeks.  Partial weight bearing right leg for 3 weeks   Ice incision to keep swelling down  No lifting over 10 lbs until follow up appointment.    DIET: To avoid nausea, slowly advance diet as tolerated, avoiding spicy or greasy foods for the first day.  Add more substantial food to your diet according to your physician's instructions.  Babies can be fed formula or breast milk as soon as they are hungry.  INCREASE FLUIDS AND FIBER TO AVOID CONSTIPATION.    SURGICAL DRESSING/BATHING: No tub baths or soaking of incision site until surgeon gives " approval.    FOLLOW-UP APPOINTMENT:  A follow-up appointment should be arranged with your doctor.  Call to schedule.    You should CALL YOUR PHYSICIAN if you develop:  Fever greater than 101 degrees F.  Pain not relieved by medication, or persistent nausea or vomiting.  Excessive bleeding (blood soaking through dressing) or unexpected drainage from the wound.  Extreme redness or swelling around the incision site, drainage of pus or foul smelling drainage.  Inability to urinate or empty your bladder within 8 hours.  Problems with breathing or chest pain.    You should call 911 if you develop problems with breathing or chest pain.  If you are unable to contact your doctor or surgical center, you should go to the nearest emergency room or urgent care center.  Physician's telephone #: Dr. Belcher >> [phone]    If any questions arise, call your doctor.  If your doctor is not available, please feel free to call the Surgical Center at [phone].  The Center is open Monday through Friday from 7AM to 7PM.  You can also call the [employer] HOTLINE open 24 hours/day, 7 days/week and speak to a nurse at [phone], or toll free at [phone].    A registered nurse may call you a few days after your surgery to see how you are doing after your procedure.    MEDICATIONS: Resume taking daily medication.  Take prescribed pain medication with food.  If no medication is prescribed, you may take non-aspirin pain medication if needed.  PAIN MEDICATION CAN BE VERY CONSTIPATING.  Take a stool softener or laxative such as senokot, pericolace, or milk of magnesia if needed.    Prescription given for Antibiotic.  Last pain medication given at 3:00PM.    If your physician has prescribed pain medication that includes Acetaminophen (Tylenol), do not take additional Acetaminophen (Tylenol) while taking the prescribed medication.    Depression / Suicide Risk    As you are discharged from this [facility] facility, it is important  to learn how to keep safe from harming yourself.    Recognize the warning signs:  · Abrupt changes in personality, positive or negative- including increase in energy   · Giving away possessions  · Change in eating patterns- significant weight changes-  positive or negative  · Change in sleeping patterns- unable to sleep or sleeping all the time   · Unwillingness or inability to communicate  · Depression  · Unusual sadness, discouragement and loneliness  · Talk of wanting to die  · Neglect of personal appearance   · Rebelliousness- reckless behavior  · Withdrawal from people/activities they love  · Confusion- inability to concentrate     If you or a loved one observes any of these behaviors or has concerns about self-harm, here's what you can do:  · Talk about it- your feelings and reasons for harming yourself  · Remove any means that you might use to hurt yourself (examples: pills, rope, extension cords, firearm)  · Get professional help from the community (Mental Health, Substance Abuse, psychological counseling)  · Do not be alone:Call your Safe Contact- someone whom you trust who will be there for you.  · Call your local CRISIS HOTLINE 102-5884 or 041-018-4281  · Call your local Children's Mobile Crisis Response Team [place] (851) 966-3744 or www.[employer]  · Call the toll free National Suicide Prevention Hotlines   · National Suicide Prevention Lifeline 965-437-NYNL (3087)  · National Hope Line Network 800-SUICIDE (478-8216)       Medication List      CONTINUE taking these medications        Instructions    Morning Afternoon Evening Bedtime    acetaminophen 500 MG Tabs   Commonly known as:  TYLENOL        Take 1,500 mg by mouth every 6 hours as needed. Indications: Pain   Dose:  1500 mg                        amlodipine 10 MG Tabs   Commonly known as:  NORVASC        Take 1 Tab by mouth every day.   Dose:  10 mg                        cyclobenzaprine 10 MG Tabs   Commonly known as:  FLEXERIL         TAKE ONE TABLET BY MOUTH THREE TIMES DAILY AS NEEDED                        hydrochlorothiazide 25 MG Tabs   Commonly known as:  HYDRODIURIL        TAKE ONE TABLET BY MOUTH ONCE DAILY Patient must see a new provider for future renewals                        levothyroxine 25 MCG Tabs   Commonly known as:  SYNTHROID        Doctor's comments:  Must be seen in follow-up appointment for further refills   Take 1 Tab by mouth Every morning on an empty stomach.   Dose:  25 mcg                        losartan 100 MG Tabs   Commonly known as:  COZAAR        Take 1 Tab by mouth every day.   Dose:  100 mg                        tizanidine 4 MG Tabs   Commonly known as:  ZANAFLEX        Take 4 mg by mouth every bedtime. Indications: Muscle Spasticity   Dose:  4 mg                        zolpidem 10 MG Tabs   Commonly known as:  AMBIEN        Doctor's comments:  May refill on 6/17/17. Must last 30 days.   Take 1 Tab by mouth at bedtime as needed for Sleep.   Dose:  10 mg                          STOP taking these medications     Ibuprofen 200 MG Caps                       Medication Information     Above and/or attached are the medications your physician expects you to take upon discharge. Review all of your home medications and newly ordered medications with your doctor and/or pharmacist. Follow medication instructions as directed by your doctor and/or pharmacist. Please keep your medication list with you and share with your physician. Update the information when medications are discontinued, doses are changed, or new medications (including over-the-counter products) are added; and carry medication information at all times in the event of emergency situations.        Resources     Quit Smoking / Tobacco Use:    I understand the use of any tobacco products increases my chance of suffering from future heart disease or stroke and could cause other illnesses which may shorten my life. Quitting the use of tobacco products is the  single most important thing I can do to improve my health. For further information on smoking / tobacco cessation call a Toll Free Quit Line at 1-458.663.2140 (*[employer] [place]) or 1-377.285.2151 (American Lung Association) or you can access the web based program at www.lungusa.org.    [place] Tobacco Users Help Line:  (678) 290-2642       Toll Free: 1-233.113.2350  Quit Tobacco Program [facility] (078)233-9521    Crisis Hotline:    [place] Crisis Hotline:  2-099-IGZMBEC or 1-354.885.7812    [place] Crisis Hotline:    1-950.253.8154 or 217-737-7058    Discharge Survey:   Thank you for choosing [facility]. We hope we did everything we could to make your hospital stay a pleasant one. You may be receiving a survey and we would appreciate your time and participation in answering the questions. Your input is very valuable to us in our efforts to improve our service to our patients and their families.            Signatures     My signature on this form indicates that:    1. I acknowledge receipt and understanding of these Home Care Instruction.  2. My questions regarding this information have been answered to my satisfaction.  3. I have formulated a plan with my discharge nurse to obtain my prescribed medications for home.    __________________________________      __________________________________                   Patient Signature                                 Guardian/Responsible Adult Signature      __________________________________                 __________       ________                       Nurse Signature                                               Date                 Time

## 2017-08-03 ENCOUNTER — OFFICE VISIT (OUTPATIENT)
Dept: MEDICAL GROUP | Facility: PHYSICIAN GROUP | Age: 52
End: 2017-08-03
Payer: MEDICARE

## 2017-08-03 ENCOUNTER — HOSPITAL ENCOUNTER (OUTPATIENT)
Dept: RADIOLOGY | Facility: MEDICAL CENTER | Age: 52
End: 2017-08-03
Attending: FAMILY MEDICINE
Payer: MEDICARE

## 2017-08-03 ENCOUNTER — TELEPHONE (OUTPATIENT)
Dept: MEDICAL GROUP | Facility: PHYSICIAN GROUP | Age: 52
End: 2017-08-03

## 2017-08-03 VITALS
BODY MASS INDEX: 46.65 KG/M2 | OXYGEN SATURATION: 98 % | SYSTOLIC BLOOD PRESSURE: 110 MMHG | HEART RATE: 85 BPM | RESPIRATION RATE: 16 BRPM | WEIGHT: 280 LBS | DIASTOLIC BLOOD PRESSURE: 72 MMHG | TEMPERATURE: 99 F | HEIGHT: 65 IN

## 2017-08-03 DIAGNOSIS — R09.02 HYPOXIA: ICD-10-CM

## 2017-08-03 DIAGNOSIS — E03.8 HYPOTHYROIDISM DUE TO HASHIMOTO'S THYROIDITIS: ICD-10-CM

## 2017-08-03 DIAGNOSIS — E06.3 HYPOTHYROIDISM DUE TO HASHIMOTO'S THYROIDITIS: ICD-10-CM

## 2017-08-03 DIAGNOSIS — N63.20 LEFT BREAST MASS: ICD-10-CM

## 2017-08-03 DIAGNOSIS — I10 ESSENTIAL HYPERTENSION: ICD-10-CM

## 2017-08-03 DIAGNOSIS — M15.9 PRIMARY OSTEOARTHRITIS INVOLVING MULTIPLE JOINTS: ICD-10-CM

## 2017-08-03 DIAGNOSIS — G47.00 INSOMNIA, UNSPECIFIED TYPE: ICD-10-CM

## 2017-08-03 DIAGNOSIS — M46.1 SACROILIITIS (HCC): ICD-10-CM

## 2017-08-03 PROBLEM — E66.01 MORBID OBESITY WITH BMI OF 45.0-49.9, ADULT (HCC): Status: RESOLVED | Noted: 2017-03-17 | Resolved: 2017-08-03

## 2017-08-03 PROBLEM — M15.0 PRIMARY OSTEOARTHRITIS INVOLVING MULTIPLE JOINTS: Status: ACTIVE | Noted: 2017-08-03

## 2017-08-03 PROCEDURE — 99215 OFFICE O/P EST HI 40 MIN: CPT | Performed by: FAMILY MEDICINE

## 2017-08-03 RX ORDER — AMLODIPINE BESYLATE 10 MG/1
10 TABLET ORAL DAILY
Qty: 90 TAB | Refills: 3 | Status: ON HOLD | OUTPATIENT
Start: 2017-08-03 | End: 2017-08-08

## 2017-08-03 RX ORDER — ZOLPIDEM TARTRATE 10 MG/1
10 TABLET ORAL NIGHTLY PRN
Qty: 30 TAB | Refills: 3 | Status: SHIPPED | OUTPATIENT
Start: 2017-08-03 | End: 2017-11-29 | Stop reason: SDUPTHER

## 2017-08-03 RX ORDER — ALBUTEROL SULFATE 90 UG/1
2 AEROSOL, METERED RESPIRATORY (INHALATION) EVERY 6 HOURS PRN
Qty: 8.5 G | Refills: 3 | Status: SHIPPED | OUTPATIENT
Start: 2017-08-03 | End: 2019-03-05

## 2017-08-03 RX ORDER — CLINDAMYCIN HYDROCHLORIDE 300 MG/1
600 CAPSULE ORAL 2 TIMES DAILY
Status: ON HOLD | COMMUNITY
Start: 2017-08-02 | End: 2017-08-06

## 2017-08-03 RX ORDER — CYCLOBENZAPRINE HCL 10 MG
TABLET ORAL
Qty: 90 TAB | Refills: 2 | Status: SHIPPED | OUTPATIENT
Start: 2017-08-03 | End: 2017-11-14 | Stop reason: SDUPTHER

## 2017-08-03 NOTE — OR NURSING
Pt AA/Ox4. VSS. Dressing to right buttock, CDI. CMS+. Pt moves all extremities. Pt reports 2/10 pain scale. Pain medications given. Pt denies numbness or tingling that is new. Pt denies nausea or vomiting. SCDs in place.  at bedside.    Home instructions given to Pt and Pt's . Both were agreeable and expressed understanding. All questions answered at this time. Pt via wheelchair, accompanied by RN, was transported to their vehicle. All personal belongings brought by .    Pt discharged at 1920.

## 2017-08-03 NOTE — PROGRESS NOTES
"Subjective:     Chief Complaint   Patient presents with   • Establish Care     NU2U       Carolynn Casey is a 52 y.o. female here today to [facility] care with new provider. Patient is a former patient of Dr. Fonseca who has changed offices.  Patient's oxygen saturation is low on presentation. Patient denies shortness of breath, cough, chest pain, orthopnea. Patient reports she does feel slight tightness with deep breaths. Patient reports yesterday at the hospital she required nebulized treatments before she could be due to hypoxia.    Pt has hx of OA. Pt had R SI joint fusion yesterday.  She report R sided SI soreness.  Pt is using tylenol, flexeril, and zanaflex PRN  For pain.     Most recent TSH 3.98 in April 2017.  Allergies   Allergen Reactions   • Cortisone Anaphylaxis     RXN=since age 12   • Food Unspecified     \"citrus juice\" =burn and throat swelling.    ALL FISH - nausea  RXN=whole life   • Penicillins Anaphylaxis     RXN=since age 1   • Fish Vomiting and Nausea   • Keflex Diarrhea and Nausea     RXN=20 years ago   • Latex Rash and Itching     Rash, itching  RXN=20 years ago   • Naprosyn [Naproxen] Unspecified     \"GI bleed\"  RXN=whole life   • Tape Rash     Plastic tape \"bubble up the skin\", reports tegaderm OK  RXN=ongoing   • Shellfish Allergy      Betadine/iodine for surgery is ok     Current medicines (including changes today)  Current Outpatient Prescriptions   Medication Sig Dispense Refill   • zolpidem (AMBIEN) 10 MG Tab Take 1 Tab by mouth at bedtime as needed for Sleep. 30 Tab 3   • amlodipine (NORVASC) 10 MG Tab Take 1 Tab by mouth every day. 90 Tab 3   • cyclobenzaprine (FLEXERIL) 10 MG Tab TAKE ONE TABLET BY MOUTH THREE TIMES DAILY AS NEEDED 90 Tab 2   • albuterol 108 (90 BASE) MCG/ACT Aero Soln inhalation aerosol Inhale 2 Puffs by mouth every 6 hours as needed for Shortness of Breath. 8.5 g 3   • tizanidine (ZANAFLEX) 4 MG Tab Take 4 mg by mouth every bedtime. Indications: Muscle Spasticity     • " levothyroxine (SYNTHROID) 25 MCG Tab Take 1 Tab by mouth Every morning on an empty stomach. 30 Tab 1   • losartan (COZAAR) 100 MG Tab Take 1 Tab by mouth every day. 90 Tab 2   • hydrochlorothiazide (HYDRODIURIL) 25 MG Tab TAKE ONE TABLET BY MOUTH ONCE DAILY Patient must see a new provider for future renewals 90 Tab 3   • acetaminophen (TYLENOL) 500 MG Tab Take 1,500 mg by mouth every 6 hours as needed. Indications: Pain     • clindamycin (CLEOCIN) 300 MG Cap Take 600 mg by mouth 2 Times a Day.       No current facility-administered medications for this visit.     Social History   Substance Use Topics   • Smoking status: Never Smoker    • Smokeless tobacco: Never Used      Comment: continue to avoid   • Alcohol Use: No     Family Status   Relation Status Death Age   • Mother Alive    • Father  59     MI   • Sister Alive    • Brother Alive    • Maternal Aunt       breast cancer   • Maternal Uncle     • Paternal Grandmother       MI   • Paternal Grandfather     • Maternal Grandmother  85   • Maternal Grandfather  98   • Brother Alive    • Son Alive    • Daughter Alive      Family History   Problem Relation Age of Onset   • Hypertension Mother    • Cancer Mother 81     breast DCIS   • Heart Disease Father    • Heart Attack Father    • Hypertension Father    • Lung Disease Sister      asthma   • Hypertension Sister    • Arthritis Sister      knee   • Arthritis Brother    • Hypertension Brother    • Diabetes Brother    • Heart Disease Maternal Aunt    • Hypertension Maternal Aunt    • Cancer Maternal Aunt 80     breast   • Stroke Maternal Uncle    • Heart Disease Maternal Uncle    • Hypertension Maternal Uncle    • Heart Disease Paternal Grandmother    • Psychiatry Paternal Grandfather      Suicide   • Alcohol/Drug Paternal Grandfather    • Arthritis Maternal Grandmother    • Arthritis Maternal Grandfather    • Arthritis Brother    • Hyperlipidemia Neg Hx      She     "has a past medical history of Allergy; Anxiety; ASTHMA; GERD (gastroesophageal reflux disease); Migraine; Heart murmur; Unspecified urinary incontinence; Unspecified disorder of thyroid; Snoring; Hypertension; Primary osteoarthritis involving multiple joints; Constipation; Bronchitis (2014); and Pain (01-29-16).        ROS   GEN: no weight loss, fevers, or chills  HEENT: no blurry vision or change in vision, no ear pain, no difficulty swallowing, no throat pain, no runny nose, no nasal congestion  Resp: no shortness of breath, no cough  CV: no racing heart, no irregular beats, no chest pain  Abd: no nausea, no vomiting, no diarrhea, no constipation, no blood in stool, no dark stools, no incontinence  : no dysuria, no hematuria, no urinary incontinence, no increased frequency  MSK: Low back and SI pain       Objective:     Blood pressure 110/72, pulse 85, temperature 37.2 °C (99 °F), resp. rate 16, height 1.651 m (5' 5\"), weight 127.007 kg (280 lb), SpO2 98 %. Body mass index is 46.59 kg/(m^2).   Physical Exam:  Constitutional: Alert, no distress.  Skin: Warm, dry, good turgor, no rashes in visible areas.  Eye: Equal, round and reactive, conjunctiva clear, lids normal.  ENMT: Lips without lesions, oropharynx non-erythematous, no exudate, moist oral mucosa  Neck: Trachea midline, no masses, no thyromegaly. No cervical or supraclavicular lymphadenopathy. Full ROM  Respiratory: Unlabored respiratory effort, good air movement, lungs clear to auscultation, no wheezes, no ronchi.  Cardiovascular:RRR, +S1, S2, no murmur, no peripheral edema, pedal and radial pulses equal and intact bilat  Abdomen: Soft, non-tender, no masses, no hepatosplenomegaly.  MSK:5/5 muscle strength in upper extremities as well as lower extremity bilaterally, Pt ambultes with walker today due to recent surgery, right gluteal area surgical dressing in place without discharge warmth or erythema.   Psych: Alert and oriented, appropriate affect and " mood.  Neuro: CN2-12 intact, no gross motor or sensory deficits      Assessment and Plan:   The following treatment plan was discussed    1. Hypoxia  On presentation oxygen saturation was 80%. With rest saturation was 84%. Patient was then given nebulized treatment during which time oxygen saturation improved to 98%. Although there has been a significant improvement in oxygen saturation and still very concerned about extremely low oxygen saturation. I therefore ordered a CTA to rule out any possibility of pulmonary embolism due to recent surgery. I have also sent albuterol inhaler to pharmacy and advised patient to take 3 times a day until I see her in one week. Patient has no history of COPD, asthma, or tobacco abuse.  - CT-CTA CHEST PULMONARY ARTERY W/ RECONS; Future    2. Primary osteoarthritis involving multiple joints  Chronic: Continue Tylenol, Flexeril, and tizanidine as needed  - cyclobenzaprine (FLEXERIL) 10 MG Tab; TAKE ONE TABLET BY MOUTH THREE TIMES DAILY AS NEEDED  Dispense: 90 Tab; Refill: 2    3. Left breast mass  Patient will follow for removal with Dr. Hansen    4. Essential hypertension  Chronic: Well-controlled  - amlodipine (NORVASC) 10 MG Tab; Take 1 Tab by mouth every day.  Dispense: 90 Tab; Refill: 3    5. Insomnia, unspecified type  Chronic: Advised patient to not take Ambien until follow-up appointment as it may decreased respiratory drive and worsening hypoxia.  - zolpidem (AMBIEN) 10 MG Tab; Take 1 Tab by mouth at bedtime as needed for Sleep.  Dispense: 30 Tab; Refill: 3    6. Hypothyroidism due to Hashimoto's thyroiditis  Chronic: Recommend repeat  - TSH WITH REFLEX TO FT4; Future    7. Sacroiliitis (CMS-HCC)  Chronic: Patient recently underwent fusion. Continue follow-up ortho    This was a 40 minute visit of which greater than 30mins of the time was spent face-to-face with the patient. Additional time was spent coordinating CT scan and reviewing history.  Followup: Return in about 1 week  (around 8/10/2017) for hypoxia.    Please note that this dictation was created using voice recognition software. I have made every reasonable attempt to correct obvious errors, but I expect that there are errors of grammar and possibly content that I did not discover before finalizing the note.

## 2017-08-03 NOTE — OR NURSING
Pt had breathing treatment and was taken off O2. Pt is maintaining O2sats above 90%. Pt will be discharge today per Dr. Belcher order.    RN erroneously put an admission order in. Notified admissions personnel and bed request was removed. She notified RN that it should revert back previous Discharge order.

## 2017-08-03 NOTE — OR NURSING
1700 Unable to wean Pt off O2 at 3LPM. Pt unable to keep O2sats above 90% without O2. Notified Dr. Belcher regarding Pt's status. MD ordered Respiratory Therapy Protocol and breathing treatment. Will continue to closely monitor Pt.

## 2017-08-03 NOTE — DISCHARGE INSTRUCTIONS
ACTIVITY: Rest and take it easy for the first 24 hours.  A responsible adult is recommended to remain with you during that time.  It is normal to feel sleepy.  We encourage you to not do anything that requires balance, judgment or coordination.    MILD FLU-LIKE SYMPTOMS ARE NORMAL. YOU MAY EXPERIENCE GENERALIZED MUSCLE ACHES, THROAT IRRITATION, HEADACHE AND/OR SOME NAUSEA.    FOR 24 HOURS DO NOT:  Drive, operate machinery or run household appliances.  Drink beer or alcoholic beverages.   Make important decisions or sign legal documents.    SPECIAL INSTRUCTIONS:  No driving on narcotics   Staple removal in 2 weeks.  Partial weight bearing right leg for 3 weeks   Ice incision to keep swelling down  No lifting over 10 lbs until follow up appointment.    DIET: To avoid nausea, slowly advance diet as tolerated, avoiding spicy or greasy foods for the first day.  Add more substantial food to your diet according to your physician's instructions.  Babies can be fed formula or breast milk as soon as they are hungry.  INCREASE FLUIDS AND FIBER TO AVOID CONSTIPATION.    SURGICAL DRESSING/BATHING: No tub baths or soaking of incision site until surgeon gives approval.    FOLLOW-UP APPOINTMENT:  A follow-up appointment should be arranged with your doctor.  Call to schedule.    You should CALL YOUR PHYSICIAN if you develop:  Fever greater than 101 degrees F.  Pain not relieved by medication, or persistent nausea or vomiting.  Excessive bleeding (blood soaking through dressing) or unexpected drainage from the wound.  Extreme redness or swelling around the incision site, drainage of pus or foul smelling drainage.  Inability to urinate or empty your bladder within 8 hours.  Problems with breathing or chest pain.    You should call 911 if you develop problems with breathing or chest pain.  If you are unable to contact your doctor or surgical center, you should go to the nearest emergency room or urgent care center.  Physician's  telephone #: Dr. Belcher >> [phone]    If any questions arise, call your doctor.  If your doctor is not available, please feel free to call the Surgical Center at [phone].  The Center is open Monday through Friday from 7AM to 7PM.  You can also call the HEALTH HOTLINE open 24 hours/day, 7 days/week and speak to a nurse at [phone], or toll free at [phone].    A registered nurse may call you a few days after your surgery to see how you are doing after your procedure.    MEDICATIONS: Resume taking daily medication.  Take prescribed pain medication with food.  If no medication is prescribed, you may take non-aspirin pain medication if needed.  PAIN MEDICATION CAN BE VERY CONSTIPATING.  Take a stool softener or laxative such as senokot, pericolace, or milk of magnesia if needed.    Prescription given for Antibiotic.  Last pain medication given at 3:00PM.    If your physician has prescribed pain medication that includes Acetaminophen (Tylenol), do not take additional Acetaminophen (Tylenol) while taking the prescribed medication.    Depression / Suicide Risk    As you are discharged from this [facility] facility, it is important to learn how to keep safe from harming yourself.    Recognize the warning signs:  · Abrupt changes in personality, positive or negative- including increase in energy   · Giving away possessions  · Change in eating patterns- significant weight changes-  positive or negative  · Change in sleeping patterns- unable to sleep or sleeping all the time   · Unwillingness or inability to communicate  · Depression  · Unusual sadness, discouragement and loneliness  · Talk of wanting to die  · Neglect of personal appearance   · Rebelliousness- reckless behavior  · Withdrawal from people/activities they love  · Confusion- inability to concentrate     If you or a loved one observes any of these behaviors or has concerns about self-harm, here's what you can do:  · Talk about it- your  feelings and reasons for harming yourself  · Remove any means that you might use to hurt yourself (examples: pills, rope, extension cords, firearm)  · Get professional help from the community (Mental Health, Substance Abuse, psychological counseling)  · Do not be alone:Call your Safe Contact- someone whom you trust who will be there for you.  · Call your local CRISIS HOTLINE 600-1381 or 625-678-8814  · Call your local Children's Mobile Crisis Response Team [place] (442) 181-5661 or www.[employer]  · Call the toll free National Suicide Prevention Hotlines   · National Suicide Prevention Lifeline 386-149-LSVG (0691)  · National Hope Line Network 800-SUICIDE (262-8136)

## 2017-08-03 NOTE — MR AVS SNAPSHOT
"        Carolynn Casey   8/3/2017 11:20 AM   Office Visit   MRN: [id number]    Department:  [facility]   Dept Phone:  [phone]    Description:  Female : 1965   Provider:  Adriane Lara D.O.           Reason for Visit     Establish Care NU2U      Allergies as of 8/3/2017     Allergen Noted Reactions    Cortisone 2010   Anaphylaxis    RXN=since age 12    Food 2015   Unspecified    \"citrus juice\" =burn and throat swelling.    ALL FISH - nausea  RXN=whole life    Penicillins 2010   Anaphylaxis    RXN=since age 1    Fish 2017   Vomiting, Nausea    Keflex 2015   Diarrhea, Nausea    RXN=20 years ago    Latex 2015   Rash, Itching    Rash, itching  RXN=20 years ago    Naprosyn [Naproxen] 2010   Unspecified    \"GI bleed\"  RXN=whole life    Tape 2015   Rash    Plastic tape \"bubble up the skin\", reports tegaderm OK  RXN=ongoing    Shellfish Allergy 2017       Betadine/iodine for surgery is ok      You were diagnosed with     Hypoxia   [528765]       Primary osteoarthritis involving multiple joints   [3508372]       Left breast mass   [604242]       Essential hypertension   [2505016]       Insomnia, unspecified type   [1490438]       Hypothyroidism due to Hashimoto's thyroiditis   [0339549]       Sacroiliitis (CMS-HCC)   [335072]         Vital Signs     Blood Pressure Pulse Temperature Respirations Height Weight    110/72 mmHg 85 37.2 °C (99 °F) 16 1.651 m (5' 5\") 127.007 kg (280 lb)    Body Mass Index Oxygen Saturation Smoking Status             46.59 kg/m2 98% Never Smoker          Basic Information     Date Of Birth Sex Race Ethnicity Preferred Language    1965 Female White Non- English      Your appointments     Aug 03, 2017  4:30 PM   CT-CTA CHEST W/WO POST PROCESS with [facility]   [facility]    [address]  [place] [address]   [phone]            Aug 11, 2017  3:40 PM   Established Patient with " Adriane Lara D.O.   [facility]    [address], [address]  [facility] [address] [phone]           You will be receiving a confirmation call a few days before your appointment from our automated call confirmation system.              Problem List              ICD-10-CM Priority Class Noted - Resolved    Hypertension I10   12/28/2011 - Present    DDD (degenerative disc disease), lumbar M51.36   12/28/2011 - Present    Hypothyroidism due to Hashimoto's thyroiditis E03.8, E06.3   2/21/2012 - Present    Presence of right artificial knee joint Z96.651   10/8/2015 - Present    Degenerative spondylolisthesis M43.10   2/17/2016 - Present    Insomnia G47.00   6/20/2016 - Present    BMI 45.0-49.9, adult (CMS-HCC) Z68.42   9/30/2016 - Present    Sacroiliitis (CMS-HCC) M46.1   8/2/2017 - Present    Primary osteoarthritis involving multiple joints M15.0   8/3/2017 - Present    Left breast mass N63   8/3/2017 - Present      Health Maintenance        Date Due Completion Dates    COLON CANCER SCREENING ANNUAL FIT 2/15/2018 (Originally 2/4/2013) ---    IMM INFLUENZA (1) 9/1/2017 10/15/2016, 10/9/2015, 9/29/2014, 12/27/2012, 10/26/2011, 10/13/2007    MAMMOGRAM 5/12/2018 5/12/2017, 6/29/2016, 5/21/2015, 4/28/2014, 9/10/2010    IMM DTaP/Tdap/Td Vaccine (3 - Td) 9/30/2026 9/30/2016, 4/24/2014            Current Immunizations     Hepatitis B Vaccine Recombivax (Adol/Adult) 3/15/2010, 11/2/2009, 8/17/2009    Influenza Vaccine Adult HD 10/13/2007    Influenza Vaccine Quad Inj (Pf) 9/29/2014, 12/27/2012, 10/26/2011    Influenza Vaccine Quad Inj (Preserved) 10/15/2016, 10/9/2015  3:19 AM    Tdap Vaccine 9/30/2016  4:42 PM, 4/24/2014    Tuberculin Skin Test 3/30/2011, 3/22/2011      Below and/or attached are the medications your provider expects you to take. Review all of your home medications and newly ordered medications with your provider and/or pharmacist. Follow medication instructions  as directed by your provider and/or pharmacist. Please keep your medication list with you and share with your provider. Update the information when medications are discontinued, doses are changed, or new medications (including over-the-counter products) are added; and carry medication information at all times in the event of emergency situations     Allergies:  CORTISONE - Anaphylaxis     FOOD - Unspecified     PENICILLINS - Anaphylaxis     FISH - Vomiting,Nausea     KEFLEX - Diarrhea,Nausea     LATEX - Rash,Itching     NAPROSYN - Unspecified     TAPE - Rash     SHELLFISH ALLERGY - (reactions not documented)               Medications  Valid as of: August 03, 2017 - 12:12 PM    Generic Name Brand Name Tablet Size Instructions for use    Acetaminophen (Tab) TYLENOL 500 MG Take 1,500 mg by mouth every 6 hours as needed. Indications: Pain        AmLODIPine Besylate (Tab) NORVASC 10 MG Take 1 Tab by mouth every day.        Clindamycin HCl (Cap) CLEOCIN 300 MG Take 600 mg by mouth 2 Times a Day.        Cyclobenzaprine HCl (Tab) FLEXERIL 10 MG TAKE ONE TABLET BY MOUTH THREE TIMES DAILY AS NEEDED        HydroCHLOROthiazide (Tab) HYDRODIURIL 25 MG TAKE ONE TABLET BY MOUTH ONCE DAILY Patient must see a new provider for future renewals        Levothyroxine Sodium (Tab) SYNTHROID 25 MCG Take 1 Tab by mouth Every morning on an empty stomach.        Losartan Potassium (Tab) COZAAR 100 MG Take 1 Tab by mouth every day.        TiZANidine HCl (Tab) ZANAFLEX 4 MG Take 4 mg by mouth every bedtime. Indications: Muscle Spasticity        Zolpidem Tartrate (Tab) AMBIEN 10 MG Take 1 Tab by mouth at bedtime as needed for Sleep.        .                 Medicines prescribed today were sent to:     [facility] [address] [place] [address] - [address] [place] [address]    Phone: [phone] Fax: [phone]    Open 24 Hours?: No      Medication refill instructions:       If your prescription bottle indicates you  have medication refills left, it is not necessary to call your provider’s office. Please contact your pharmacy and they will refill your medication.    If your prescription bottle indicates you do not have any refills left, you may request refills at any time through one of the following ways: The online [employer] system (except Urgent Care), by calling your provider’s office, or by asking your pharmacy to contact your provider’s office with a refill request. Medication refills are processed only during regular business hours and may not be available until the next business day. Your provider may request additional information or to have a follow-up visit with you prior to refilling your medication.   *Please Note: Medication refills are assigned a new Rx number when refilled electronically. Your pharmacy may indicate that no refills were authorized even though a new prescription for the same medication is available at the pharmacy. Please request the medicine by name with the pharmacy before contacting your provider for a refill.        Your To Do List     Future Labs/Procedures Complete By Expires    CT-CTA CHEST PULMONARY ARTERY W/ RECONS  As directed 8/3/2018    TSH WITH REFLEX TO FT4  As directed 8/3/2018      Other Notes About Your Plan     This patient has an appointment on 02/10/2016. At the beginning of the year all chronic conditions should be assessed and Documented in conjunction with any other identified concerns during the visit.   Query: Please assess the following Diagnosis:  E66.01: Morbid obesity if BMI is 40+  Z68.4X: please status BMI           [employer] Access Code: Activation code not generated  Current [employer] Status: Active

## 2017-08-04 ENCOUNTER — HOSPITAL ENCOUNTER (OUTPATIENT)
Dept: RADIOLOGY | Facility: MEDICAL CENTER | Age: 52
DRG: 175 | End: 2017-08-04
Attending: FAMILY MEDICINE
Payer: MEDICARE

## 2017-08-04 ENCOUNTER — TELEPHONE (OUTPATIENT)
Dept: MEDICAL GROUP | Facility: PHYSICIAN GROUP | Age: 52
End: 2017-08-04

## 2017-08-04 ENCOUNTER — RESOLUTE PROFESSIONAL BILLING HOSPITAL PROF FEE (OUTPATIENT)
Dept: HOSPITALIST | Facility: MEDICAL CENTER | Age: 52
End: 2017-08-04
Payer: MEDICARE

## 2017-08-04 ENCOUNTER — HOSPITAL ENCOUNTER (INPATIENT)
Facility: MEDICAL CENTER | Age: 52
LOS: 4 days | DRG: 175 | End: 2017-08-08
Attending: EMERGENCY MEDICINE | Admitting: HOSPITALIST
Payer: MEDICARE

## 2017-08-04 ENCOUNTER — PATIENT OUTREACH (OUTPATIENT)
Dept: HEALTH INFORMATION MANAGEMENT | Facility: OTHER | Age: 52
End: 2017-08-04

## 2017-08-04 DIAGNOSIS — R09.02 HYPOXIA: ICD-10-CM

## 2017-08-04 DIAGNOSIS — I26.02 ACUTE SADDLE PULMONARY EMBOLISM WITH ACUTE COR PULMONALE (HCC): ICD-10-CM

## 2017-08-04 DIAGNOSIS — I26.99 PULMONARY EMBOLISM, OTHER: ICD-10-CM

## 2017-08-04 DIAGNOSIS — I26.99 OTHER ACUTE PULMONARY EMBOLISM WITHOUT ACUTE COR PULMONALE (HCC): ICD-10-CM

## 2017-08-04 LAB
ALBUMIN SERPL BCP-MCNC: 3.6 G/DL (ref 3.2–4.9)
ALBUMIN/GLOB SERPL: 1.1 G/DL
ALP SERPL-CCNC: 61 U/L (ref 30–99)
ALT SERPL-CCNC: 52 U/L (ref 2–50)
ANION GAP SERPL CALC-SCNC: 12 MMOL/L (ref 0–11.9)
AST SERPL-CCNC: 51 U/L (ref 12–45)
BASOPHILS # BLD AUTO: 0.7 % (ref 0–1.8)
BASOPHILS # BLD: 0.07 K/UL (ref 0–0.12)
BILIRUB SERPL-MCNC: 1 MG/DL (ref 0.1–1.5)
BNP SERPL-MCNC: 43 PG/ML (ref 0–100)
BUN SERPL-MCNC: 26 MG/DL (ref 8–22)
CALCIUM SERPL-MCNC: 9.1 MG/DL (ref 8.4–10.2)
CHLORIDE SERPL-SCNC: 100 MMOL/L (ref 96–112)
CO2 SERPL-SCNC: 24 MMOL/L (ref 20–33)
CREAT SERPL-MCNC: 0.91 MG/DL (ref 0.5–1.4)
EOSINOPHIL # BLD AUTO: 0.27 K/UL (ref 0–0.51)
EOSINOPHIL NFR BLD: 2.8 % (ref 0–6.9)
ERYTHROCYTE [DISTWIDTH] IN BLOOD BY AUTOMATED COUNT: 41.8 FL (ref 35.9–50)
GFR SERPL CREATININE-BSD FRML MDRD: >60 ML/MIN/1.73 M 2
GLOBULIN SER CALC-MCNC: 3.2 G/DL (ref 1.9–3.5)
GLUCOSE SERPL-MCNC: 108 MG/DL (ref 65–99)
HCT VFR BLD AUTO: 39.5 % (ref 37–47)
HGB BLD-MCNC: 13 G/DL (ref 12–16)
IMM GRANULOCYTES # BLD AUTO: 0.1 K/UL (ref 0–0.11)
IMM GRANULOCYTES NFR BLD AUTO: 1 % (ref 0–0.9)
INR PPP: 1.01 (ref 0.87–1.13)
LYMPHOCYTES # BLD AUTO: 2.37 K/UL (ref 1–4.8)
LYMPHOCYTES NFR BLD: 24.3 % (ref 22–41)
MCH RBC QN AUTO: 28.6 PG (ref 27–33)
MCHC RBC AUTO-ENTMCNC: 32.9 G/DL (ref 33.6–35)
MCV RBC AUTO: 86.8 FL (ref 81.4–97.8)
MONOCYTES # BLD AUTO: 0.87 K/UL (ref 0–0.85)
MONOCYTES NFR BLD AUTO: 8.9 % (ref 0–13.4)
NEUTROPHILS # BLD AUTO: 6.06 K/UL (ref 2–7.15)
NEUTROPHILS NFR BLD: 62.3 % (ref 44–72)
NRBC # BLD AUTO: 0 K/UL
NRBC BLD AUTO-RTO: 0 /100 WBC
PLATELET # BLD AUTO: 227 K/UL (ref 164–446)
PMV BLD AUTO: 8.5 FL (ref 9–12.9)
POTASSIUM SERPL-SCNC: 3 MMOL/L (ref 3.6–5.5)
PROT SERPL-MCNC: 6.8 G/DL (ref 6–8.2)
PROTHROMBIN TIME: 13.1 SEC (ref 12–14.6)
RBC # BLD AUTO: 4.55 M/UL (ref 4.2–5.4)
SODIUM SERPL-SCNC: 136 MMOL/L (ref 135–145)
TROPONIN I SERPL-MCNC: 0.04 NG/ML (ref 0–0.04)
WBC # BLD AUTO: 9.7 K/UL (ref 4.8–10.8)

## 2017-08-04 PROCEDURE — 700102 HCHG RX REV CODE 250 W/ 637 OVERRIDE(OP): Performed by: HOSPITALIST

## 2017-08-04 PROCEDURE — 304562 HCHG STAT O2 MASK/CANNULA

## 2017-08-04 PROCEDURE — 770020 HCHG ROOM/CARE - TELE (206)

## 2017-08-04 PROCEDURE — 700102 HCHG RX REV CODE 250 W/ 637 OVERRIDE(OP): Performed by: NURSE PRACTITIONER

## 2017-08-04 PROCEDURE — 304561 HCHG STAT O2

## 2017-08-04 PROCEDURE — 99223 1ST HOSP IP/OBS HIGH 75: CPT | Performed by: HOSPITALIST

## 2017-08-04 PROCEDURE — 83880 ASSAY OF NATRIURETIC PEPTIDE: CPT

## 2017-08-04 PROCEDURE — 36415 COLL VENOUS BLD VENIPUNCTURE: CPT

## 2017-08-04 PROCEDURE — 94760 N-INVAS EAR/PLS OXIMETRY 1: CPT

## 2017-08-04 PROCEDURE — 96372 THER/PROPH/DIAG INJ SC/IM: CPT

## 2017-08-04 PROCEDURE — 80053 COMPREHEN METABOLIC PANEL: CPT

## 2017-08-04 PROCEDURE — A9270 NON-COVERED ITEM OR SERVICE: HCPCS | Performed by: NURSE PRACTITIONER

## 2017-08-04 PROCEDURE — 700111 HCHG RX REV CODE 636 W/ 250 OVERRIDE (IP): Performed by: EMERGENCY MEDICINE

## 2017-08-04 PROCEDURE — A9270 NON-COVERED ITEM OR SERVICE: HCPCS | Performed by: HOSPITALIST

## 2017-08-04 PROCEDURE — 85610 PROTHROMBIN TIME: CPT

## 2017-08-04 PROCEDURE — 85025 COMPLETE CBC W/AUTO DIFF WBC: CPT

## 2017-08-04 PROCEDURE — 700111 HCHG RX REV CODE 636 W/ 250 OVERRIDE (IP): Performed by: HOSPITALIST

## 2017-08-04 PROCEDURE — 93005 ELECTROCARDIOGRAM TRACING: CPT | Performed by: EMERGENCY MEDICINE

## 2017-08-04 PROCEDURE — 84484 ASSAY OF TROPONIN QUANT: CPT

## 2017-08-04 PROCEDURE — 99285 EMERGENCY DEPT VISIT HI MDM: CPT

## 2017-08-04 RX ORDER — ONDANSETRON 4 MG/1
4 TABLET, ORALLY DISINTEGRATING ORAL EVERY 4 HOURS PRN
Status: DISCONTINUED | OUTPATIENT
Start: 2017-08-04 | End: 2017-08-08 | Stop reason: HOSPADM

## 2017-08-04 RX ORDER — POTASSIUM CHLORIDE 20 MEQ/1
40 TABLET, EXTENDED RELEASE ORAL ONCE
Status: COMPLETED | OUTPATIENT
Start: 2017-08-04 | End: 2017-08-04

## 2017-08-04 RX ORDER — LEVOTHYROXINE SODIUM 0.05 MG/1
25 TABLET ORAL
Status: DISCONTINUED | OUTPATIENT
Start: 2017-08-05 | End: 2017-08-08 | Stop reason: HOSPADM

## 2017-08-04 RX ORDER — MORPHINE SULFATE 4 MG/ML
4 INJECTION, SOLUTION INTRAMUSCULAR; INTRAVENOUS
Status: DISCONTINUED | OUTPATIENT
Start: 2017-08-04 | End: 2017-08-07

## 2017-08-04 RX ORDER — OXYCODONE HYDROCHLORIDE 5 MG/1
5 TABLET ORAL
Status: DISCONTINUED | OUTPATIENT
Start: 2017-08-04 | End: 2017-08-08 | Stop reason: HOSPADM

## 2017-08-04 RX ORDER — HYDROCHLOROTHIAZIDE 25 MG/1
25 TABLET ORAL
Status: DISCONTINUED | OUTPATIENT
Start: 2017-08-04 | End: 2017-08-07

## 2017-08-04 RX ORDER — ZOLPIDEM TARTRATE 5 MG/1
10 TABLET ORAL NIGHTLY PRN
Status: DISCONTINUED | OUTPATIENT
Start: 2017-08-04 | End: 2017-08-08 | Stop reason: HOSPADM

## 2017-08-04 RX ORDER — AMOXICILLIN 250 MG
2 CAPSULE ORAL 2 TIMES DAILY
Status: DISCONTINUED | OUTPATIENT
Start: 2017-08-04 | End: 2017-08-08 | Stop reason: HOSPADM

## 2017-08-04 RX ORDER — PROMETHAZINE HYDROCHLORIDE 25 MG/1
12.5-25 TABLET ORAL EVERY 4 HOURS PRN
Status: DISCONTINUED | OUTPATIENT
Start: 2017-08-04 | End: 2017-08-08 | Stop reason: HOSPADM

## 2017-08-04 RX ORDER — LOSARTAN POTASSIUM 25 MG/1
100 TABLET ORAL DAILY
Status: DISCONTINUED | OUTPATIENT
Start: 2017-08-04 | End: 2017-08-07

## 2017-08-04 RX ORDER — ACETAMINOPHEN 325 MG/1
650 TABLET ORAL EVERY 6 HOURS PRN
Status: DISCONTINUED | OUTPATIENT
Start: 2017-08-04 | End: 2017-08-08 | Stop reason: HOSPADM

## 2017-08-04 RX ORDER — POLYETHYLENE GLYCOL 3350 17 G/17G
1 POWDER, FOR SOLUTION ORAL
Status: DISCONTINUED | OUTPATIENT
Start: 2017-08-04 | End: 2017-08-08 | Stop reason: HOSPADM

## 2017-08-04 RX ORDER — PROMETHAZINE HYDROCHLORIDE 25 MG/1
12.5-25 SUPPOSITORY RECTAL EVERY 4 HOURS PRN
Status: DISCONTINUED | OUTPATIENT
Start: 2017-08-04 | End: 2017-08-08 | Stop reason: HOSPADM

## 2017-08-04 RX ORDER — ONDANSETRON 2 MG/ML
4 INJECTION INTRAMUSCULAR; INTRAVENOUS EVERY 4 HOURS PRN
Status: DISCONTINUED | OUTPATIENT
Start: 2017-08-04 | End: 2017-08-08 | Stop reason: HOSPADM

## 2017-08-04 RX ORDER — TIZANIDINE 4 MG/1
4 TABLET ORAL
Status: DISCONTINUED | OUTPATIENT
Start: 2017-08-04 | End: 2017-08-08 | Stop reason: HOSPADM

## 2017-08-04 RX ORDER — AMLODIPINE BESYLATE 5 MG/1
10 TABLET ORAL DAILY
Status: DISCONTINUED | OUTPATIENT
Start: 2017-08-04 | End: 2017-08-07

## 2017-08-04 RX ORDER — BISACODYL 10 MG
10 SUPPOSITORY, RECTAL RECTAL
Status: DISCONTINUED | OUTPATIENT
Start: 2017-08-04 | End: 2017-08-08 | Stop reason: HOSPADM

## 2017-08-04 RX ORDER — OXYCODONE HYDROCHLORIDE 5 MG/1
10 TABLET ORAL
Status: DISCONTINUED | OUTPATIENT
Start: 2017-08-04 | End: 2017-08-08 | Stop reason: HOSPADM

## 2017-08-04 RX ORDER — CLINDAMYCIN HYDROCHLORIDE 150 MG/1
600 CAPSULE ORAL 2 TIMES DAILY
Status: DISCONTINUED | OUTPATIENT
Start: 2017-08-04 | End: 2017-08-04

## 2017-08-04 RX ADMIN — POTASSIUM CHLORIDE 40 MEQ: 1500 TABLET, EXTENDED RELEASE ORAL at 23:52

## 2017-08-04 RX ADMIN — ENOXAPARIN SODIUM 40 MG: 100 INJECTION SUBCUTANEOUS at 21:55

## 2017-08-04 RX ADMIN — TIZANIDINE 4 MG: 4 TABLET ORAL at 21:56

## 2017-08-04 RX ADMIN — OXYCODONE HYDROCHLORIDE 10 MG: 5 TABLET ORAL at 21:55

## 2017-08-04 RX ADMIN — ENOXAPARIN SODIUM 80 MG: 100 INJECTION SUBCUTANEOUS at 19:04

## 2017-08-04 RX ADMIN — ZOLPIDEM TARTRATE 10 MG: 5 TABLET ORAL at 21:55

## 2017-08-04 ASSESSMENT — PATIENT HEALTH QUESTIONNAIRE - PHQ9
2. FEELING DOWN, DEPRESSED, IRRITABLE, OR HOPELESS: SEVERAL DAYS
SUM OF ALL RESPONSES TO PHQ QUESTIONS 1-9: 6
6. FEELING BAD ABOUT YOURSELF - OR THAT YOU ARE A FAILURE OR HAVE LET YOURSELF OR YOUR FAMILY DOWN: NOT AL ALL
7. TROUBLE CONCENTRATING ON THINGS, SUCH AS READING THE NEWSPAPER OR WATCHING TELEVISION: NOT AT ALL
SUM OF ALL RESPONSES TO PHQ9 QUESTIONS 1 AND 2: 3
8. MOVING OR SPEAKING SO SLOWLY THAT OTHER PEOPLE COULD HAVE NOTICED. OR THE OPPOSITE, BEING SO FIGETY OR RESTLESS THAT YOU HAVE BEEN MOVING AROUND A LOT MORE THAN USUAL: NOT AT ALL
3. TROUBLE FALLING OR STAYING ASLEEP OR SLEEPING TOO MUCH: SEVERAL DAYS
1. LITTLE INTEREST OR PLEASURE IN DOING THINGS: MORE THAN HALF THE DAYS
5. POOR APPETITE OR OVEREATING: SEVERAL DAYS
4. FEELING TIRED OR HAVING LITTLE ENERGY: SEVERAL DAYS
9. THOUGHTS THAT YOU WOULD BE BETTER OFF DEAD, OR OF HURTING YOURSELF: NOT AT ALL

## 2017-08-04 ASSESSMENT — PAIN SCALES - GENERAL: PAINLEVEL_OUTOF10: 9

## 2017-08-04 ASSESSMENT — LIFESTYLE VARIABLES
ALCOHOL_USE: NO
EVER_SMOKED: NEVER

## 2017-08-04 NOTE — TELEPHONE ENCOUNTER
Pt called due to no results from CT.  Pt states that she could not find a vein. Rescheduled. Pt denies SOB, RICE, or chest pain.  Her  is currently picking up her albuterol. Pt advised to go to ER for  SOB, RICE, or chest pain.

## 2017-08-04 NOTE — IP AVS SNAPSHOT
8/8/2017    Carolynn Stover [place]  [address] [facility] [facility] [address]    Dear Carolynn:    [facility] wants to ensure your discharge home is safe and you or your loved ones have had all of your questions answered regarding your care after you leave the hospital.    Below is a list of resources and contact information should you have any questions regarding your hospital stay, follow-up instructions, or active medical symptoms.    Questions or Concerns Regarding… Contact   Medical Questions Related to Your Discharge  (7 days a week, 8am-5pm) Contact a Nurse Care Coordinator   [phone]   Medical Questions Not Related to Your Discharge  (24 hours a day / 7 days a week)  Contact the Nurse Health Line   [phone]    Medications or Discharge Instructions Refer to your discharge packet   or contact your [facility] Primary Care Provider   [phone]   Follow-up Appointment(s) Schedule your appointment via [employer]   or contact Scheduling [phone]   Billing Review your statement via [employer]  or contact Billing [phone]   Medical Records Review your records via [employer]   or contact Medical Records [phone]     You may receive a telephone call within two days of discharge. This call is to make certain you understand your discharge instructions and have the opportunity to have any questions answered. You can also easily access your medical information, test results and upcoming appointments via the [employer] free online health management tool. You can learn more and sign up at [employer]. For assistance setting up your [employer] account, please call [phone].    Once again, we want to ensure your discharge home is safe and that you have a clear understanding of any next steps in your care. If you have any questions or concerns, please do not hesitate to contact us, we are here for you. Thank you for choosing [facility] for your healthcare needs.    Sincerely,    Your [facility] Healthcare Team

## 2017-08-04 NOTE — IP AVS SNAPSHOT
" <p align=\"LEFT\"><IMG SRC=\"//[email or web address]$/[email or web address]\" alt=\"Image\" WIDTH=\"50%\" HEIGHT=\"200\" BORDER=\"\"></p>                   Name:Carolynn Casey  Medical Record Number:[id number]  CSN: [id number]    YOB: 1965   Age: 52 y.o.  Sex: female  HT:1.626 m (5' 4.02\") WT: 124.74 kg (275 lb)          Admit Date: 8/4/2017     Discharge Date:   Today's Date: 8/8/2017  Attending Doctor:  Nathaly William M.D.                  Allergies:  Cortisone; Food; Penicillins; Fish; Keflex; Latex; Naprosyn; Tape; and Shellfish allergy          Your appointments     Aug 09, 2017  2:00 PM   CARE MANAGER TELEPHONE VISIT with CARE MANAGER   [address]  [facility] [address] [phone]           ***IMPORTANT**** This is a phone visit only. Do not come into the office. The Care Manager will call you at the scheduled time for Care Manager Telephone Visit.            Aug 09, 2017  4:30 PM   New Patient with [facility] EXAM 4   [facility]  (--)    [address] [address]   [phone]            Aug 10, 2017  1:20 PM   Established Patient with MINGO Almendarez   [address]  [facility] [id number] [phone]           You will be receiving a confirmation call a few days before your appointment from our automated call confirmation system.            Aug 11, 2017  3:40 PM   Established Patient with Adriane Lara D.O.   [address], [address]  [facility] [address] [phone]           You will be receiving a confirmation call a few days before your appointment from our automated call confirmation system.              Follow-up Information     1. Follow up with Alfredo Belcher M.D. In 4 weeks.    Specialty:  Orthopaedics    Contact information    [address]  [address]  [place] " [address]  [phone]          2. Follow up with [facility] In 1 week.    Contact information    [place] [id number] [phone]           Medication List      Take these Medications        Instructions    acetaminophen 500 MG Tabs   Commonly known as:  TYLENOL    Take 1,500 mg by mouth every 6 hours as needed. Indications: Pain   Dose:  1500 mg       albuterol 108 (90 BASE) MCG/ACT Aers inhalation aerosol    Inhale 2 Puffs by mouth every 6 hours as needed for Shortness of Breath.   Dose:  2 Puff       cyclobenzaprine 10 MG Tabs   Commonly known as:  FLEXERIL    TAKE ONE TABLET BY MOUTH THREE TIMES DAILY AS NEEDED       levothyroxine 25 MCG Tabs   Commonly known as:  SYNTHROID    Doctor's comments:  Must be seen in follow-up appointment for further refills   Take 1 Tab by mouth Every morning on an empty stomach.   Dose:  25 mcg       * rivaroxaban 15 MG Tabs tablet   Commonly known as:  XARELTO    Take 1 Tab by mouth 2 Times a Day for 21 days.   Dose:  15 mg       * rivaroxaban 20 MG Tabs tablet   Commonly known as:  XARELTO    Take 1 Tab by mouth with dinner.   Dose:  20 mg       tizanidine 4 MG Tabs   Commonly known as:  ZANAFLEX    Take 4 mg by mouth every bedtime. Indications: Muscle Spasticity   Dose:  4 mg       zolpidem 10 MG Tabs   Commonly known as:  AMBIEN    Doctor's comments:  May refill on 8/15/17   Take 1 Tab by mouth at bedtime as needed for Sleep.   Dose:  10 mg       * Notice:  This list has 2 medication(s) that are the same as other medications prescribed for you. Read the directions carefully, and ask your doctor or other care provider to review them with you.

## 2017-08-04 NOTE — IP AVS SNAPSHOT
" Home Care Instructions                                                                                                                  Name:Carolynn Casey  Medical Record Number:[id number]  CSN: [id number]    YOB: 1965   Age: 52 y.o.  Sex: female  HT:1.626 m (5' 4.02\") WT: 124.74 kg (275 lb)          Admit Date: 8/4/2017     Discharge Date:   Today's Date: 8/8/2017  Attending Doctor:  Nathaly William M.D.                  Allergies:  Cortisone; Food; Penicillins; Fish; Keflex; Latex; Naprosyn; Tape; and Shellfish allergy            Discharge Instructions       Discharge Instructions    Discharged to home by car with relative. Discharged via wheelchair, hospital escort: Yes.  Special equipment needed: Not Applicable    Be sure to schedule a follow-up appointment with your primary care doctor or any specialists as instructed.     Discharge Plan:   Influenza Vaccine Indication: Not indicated: Previously immunized this influenza season and > 8 years of age    I understand that a diet low in cholesterol, fat, and sodium is recommended for good health. Unless I have been given specific instructions below for another diet, I accept this instruction as my diet prescription.   Other diet: Regular    Special Instructions: None    · Is patient discharged on Warfarin / Coumadin?   No     · Is patient Post Blood Transfusion?  No    Depression / Suicide Risk    As you are discharged from this [facility] facility, it is important to learn how to keep safe from harming yourself.    Recognize the warning signs:  · Abrupt changes in personality, positive or negative- including increase in energy   · Giving away possessions  · Change in eating patterns- significant weight changes-  positive or negative  · Change in sleeping patterns- unable to sleep or sleeping all the time   · Unwillingness or inability to communicate  · Depression  · Unusual sadness, discouragement and loneliness  · Talk of wanting to " die  · Neglect of personal appearance   · Rebelliousness- reckless behavior  · Withdrawal from people/activities they love  · Confusion- inability to concentrate     If you or a loved one observes any of these behaviors or has concerns about self-harm, here's what you can do:  · Talk about it- your feelings and reasons for harming yourself  · Remove any means that you might use to hurt yourself (examples: pills, rope, extension cords, firearm)  · Get professional help from the community (Mental Health, Substance Abuse, psychological counseling)  · Do not be alone:Call your Safe Contact- someone whom you trust who will be there for you.  · Call your local CRISIS HOTLINE 914-2149 or 333-480-4952  · Call your local Children's Mobile Crisis Response Team [place] (362) 880-2545 or www.Xoinka  · Call the toll free National Suicide Prevention Hotlines   · National Suicide Prevention Lifeline 003-619-YYDB (0817)  · National Hope Line Network 800-SUICIDE (907-9660)        Your appointments     Aug 09, 2017  2:00 PM   CARE MANAGER TELEPHONE VISIT with CARE MANAGER   [address] [id number] [phone]           ***IMPORTANT**** This is a phone visit only. Do not come into the office. The Care Manager will call you at the scheduled time for Care Manager Telephone Visit.            Aug 09, 2017  4:30 PM   New Patient with [facility] EXAM 4   [facility]  (--)    [address] [address]   [phone]            Aug 10, 2017  1:20 PM   Established Patient with MINGO Almendarez   [address] [id number] [phone]           You will be receiving a confirmation call a few days before your appointment from our automated call confirmation system.            Aug 11, 2017  3:40 PM   Established Patient with Adriane Lara D.O.    [facility]    [address], [address]  [place] [id number] [phone]           You will be receiving a confirmation call a few days before your appointment from our automated call confirmation system.              Follow-up Information     1. Follow up with Alfredo Belcher M.D. In 4 weeks.    Specialty:  Orthopaedics    Contact information    [address]  [address]  [place] [address]  [phone]          2. Follow up with [facility] In 1 week.    Contact information    [address]  [facility] [address]  [phone]           Discharge Medication Instructions:    Below are the medications your physician expects you to take upon discharge:    Review all your home medications and newly ordered medications with your doctor and/or pharmacist. Follow medication instructions as directed by your doctor and/or pharmacist.    Please keep your medication list with you and share with your physician.               Medication List      START taking these medications        Instructions    Morning Afternoon Evening Bedtime    * rivaroxaban 15 MG Tabs tablet   Last time this was given:  15 mg on 8/8/2017  7:49 AM   Commonly known as:  XARELTO        Take 1 Tab by mouth 2 Times a Day for 21 days.   Dose:  15 mg                        * rivaroxaban 20 MG Tabs tablet   Last time this was given:  15 mg on 8/8/2017  7:49 AM   Commonly known as:  XARELTO        Take 1 Tab by mouth with dinner.   Dose:  20 mg                        * Notice:  This list has 2 medication(s) that are the same as other medications prescribed for you. Read the directions carefully, and ask your doctor or other care provider to review them with you.      CONTINUE taking these medications        Instructions    Morning Afternoon Evening Bedtime    acetaminophen 500 MG Tabs   Last time this was given:  650 mg on 8/8/2017  6:05 AM   Commonly known as:  TYLENOL        Take 1,500 mg by mouth every  6 hours as needed. Indications: Pain   Dose:  1500 mg                        albuterol 108 (90 BASE) MCG/ACT Aers inhalation aerosol        Inhale 2 Puffs by mouth every 6 hours as needed for Shortness of Breath.   Dose:  2 Puff                        cyclobenzaprine 10 MG Tabs   Last time this was given:  10 mg on 8/7/2017  1:34 PM   Commonly known as:  FLEXERIL        TAKE ONE TABLET BY MOUTH THREE TIMES DAILY AS NEEDED                        levothyroxine 25 MCG Tabs   Last time this was given:  25 mcg on 8/8/2017  6:05 AM   Commonly known as:  SYNTHROID        Doctor's comments:  Must be seen in follow-up appointment for further refills   Take 1 Tab by mouth Every morning on an empty stomach.   Dose:  25 mcg                        tizanidine 4 MG Tabs   Last time this was given:  4 mg on 8/7/2017  8:19 PM   Commonly known as:  ZANAFLEX        Take 4 mg by mouth every bedtime. Indications: Muscle Spasticity   Dose:  4 mg                        zolpidem 10 MG Tabs   Last time this was given:  10 mg on 8/7/2017 11:21 PM   Commonly known as:  AMBIEN        Doctor's comments:  May refill on 8/15/17   Take 1 Tab by mouth at bedtime as needed for Sleep.   Dose:  10 mg                          STOP taking these medications     amlodipine 10 MG Tabs   Commonly known as:  NORVASC               clindamycin 300 MG Caps   Commonly known as:  CLEOCIN               hydrochlorothiazide 25 MG Tabs   Commonly known as:  HYDRODIURIL               losartan 100 MG Tabs   Commonly known as:  COZAAR                    Where to Get Your Medications      You can get these medications from any pharmacy     Bring a paper prescription for each of these medications    - rivaroxaban 15 MG Tabs tablet  - rivaroxaban 20 MG Tabs tablet            Orders for after discharge     DME O2 New Set Up    Complete by:  As directed        DME Pulse Oximeter    Complete by:  As directed    [id number]       REFERRAL TO ANTICOAGULATION MONITORING     Complete by:  As directed    If this Referral to the anticoagulation clinic is being ordered with a Referral to home health, then schedule the anticoagulation visit after the home health treatments are completed.             Instructions           Diet / Nutrition:    Follow any diet instructions given to you by your doctor or the dietician, including how much salt (sodium) you are allowed each day.    If you are overweight, talk to your doctor about a weight reduction plan.    Activity:    Remain physically active following your doctor's instructions about exercise and activity.    Rest often.     Any time you become even a little tired or short of breath, SIT DOWN and rest.    Worsening Symptoms:    Report any of the following signs and symptoms to the doctor's office immediately:    *Pain of jaw, arm, or neck  *Chest pain not relieved by medication                               *Dizziness or loss of consciousness  *Difficulty breathing even when at rest   *More tired than usual                                       *Bleeding drainage or swelling of surgical site  *Swelling of feet, ankles, legs or stomach                 *Fever (>100ºF)  *Pink or blood tinged sputum  *Weight gain (3lbs/day or 5lbs /week)           *Shock from internal defibrillator (if applicable)  *Palpitations or irregular heartbeats                *Cool and/or numb extremities    Stroke Awareness    Common Risk Factors for Stroke include:    Age  Atrial Fibrillation  Carotid Artery Stenosis  Diabetes Mellitus  Excessive alcohol consumption  High blood pressure  Overweight   Physical inactivity  Smoking    Warning signs and symptoms of a stroke include:    *Sudden numbness or weakness of the face, arm or leg (especially on one side of the body).  *Sudden confusion, trouble speaking or understanding.  *Sudden trouble seeing in one or both eyes.  *Sudden trouble walking, dizziness, loss of balance or coordination.Sudden severe headache with no  known cause.    It is very important to get treatment quickly when a stroke occurs. If you experience any of the above warning signs, call 911 immediately.                   Disclaimer         Quit Smoking / Tobacco Use:    I understand the use of any tobacco products increases my chance of suffering from future heart disease or stroke and could cause other illnesses which may shorten my life. Quitting the use of tobacco products is the single most important thing I can do to improve my health. For further information on smoking / tobacco cessation call a Toll Free Quit Line at 1-150.228.9266 (*National Cancer [place]) or 1-501.743.3814 (American Lung Association) or you can access the web based program at www.lungusa.org.    [place] Tobacco Users Help Line:  (259) 381-3830       Toll Free: 1-262.101.3665  Quit Tobacco Program [facility] (376)860-0984    Crisis Hotline:    [place] Crisis Hotline:  0-736-XZJVMIP or 1-305.579.2507    [place] Crisis Hotline:    1-268.557.3302 or 619-999-8915    Discharge Survey:   Thank you for choosing [facility]. We hope we did everything we could to make your hospital stay a pleasant one. You may be receiving a phone survey and we would appreciate your time and participation in answering the questions. Your input is very valuable to us in our efforts to improve our service to our patients and their families.        My signature on this form indicates that:    1. I have reviewed and understand the above information.  2. My questions regarding this information have been answered to my satisfaction.  3. I have formulated a plan with my discharge nurse to obtain my prescribed medications for home.                  Disclaimer         __________________________________                     __________       ________                       Patient Signature                                                 Date                    Time

## 2017-08-04 NOTE — IP AVS SNAPSHOT
EvolveMol Access Code: Activation code not generated  Current EvolveMol Status: Active    Feeligohart  A secure, online tool to manage your health information     [employer]’s EvolveMol® is a secure, online tool that connects you to your personalized health information from the privacy of your home -- day or night - making it very easy for you to manage your healthcare. Once the activation process is completed, you can even access your medical information using the EvolveMol mariam, which is available for free in the Apple Mariam store or Google Play store.     EvolveMol provides the following levels of access (as shown below):   My Chart Features   [facility] Primary Care Doctor [facility]  Specialists [facility]  Urgent  Care Non-[facility]  Primary Care  Doctor   Email your healthcare team securely and privately 24/7 X X X X   Manage appointments: schedule your next appointment; view details of past/upcoming appointments X      Request prescription refills. X      View recent personal medical records, including lab and immunizations X X X X   View health record, including health history, allergies, medications X X X X   Read reports about your outpatient visits, procedures, consult and ER notes X X X X   See your discharge summary, which is a recap of your hospital and/or ER visit that includes your diagnosis, lab results, and care plan. X X       How to register for EvolveMol:  1. Go to  [email or web address].  2. Click on the Sign Up Now box, which takes you to the New Member Sign Up page. You will need to provide the following information:  a. Enter your EvolveMol Access Code exactly as it appears at the top of this page. (You will not need to use this code after you’ve completed the sign-up process. If you do not sign up before the expiration date, you must request a new code.)   b. Enter your date of birth.   c. Enter your home email address.   d. Click Submit, and follow the next screen’s instructions.  3. Create a EvolveMol ID. This will  be your Farseer login ID and cannot be changed, so think of one that is secure and easy to remember.  4. Create a Farseer password. You can change your password at any time.  5. Enter your Password Reset Question and Answer. This can be used at a later time if you forget your password.   6. Enter your e-mail address. This allows you to receive e-mail notifications when new information is available in Farseer.  7. Click Sign Up. You can now view your health information.    For assistance activating your Farseer account, call [phone]

## 2017-08-05 ENCOUNTER — APPOINTMENT (OUTPATIENT)
Dept: RADIOLOGY | Facility: MEDICAL CENTER | Age: 52
DRG: 175 | End: 2017-08-05
Attending: INTERNAL MEDICINE
Payer: MEDICARE

## 2017-08-05 PROBLEM — J96.01 ACUTE RESPIRATORY FAILURE WITH HYPOXIA (HCC): Status: ACTIVE | Noted: 2017-08-05

## 2017-08-05 LAB
ANION GAP SERPL CALC-SCNC: 7 MMOL/L (ref 0–11.9)
BUN SERPL-MCNC: 15 MG/DL (ref 8–22)
CALCIUM SERPL-MCNC: 8.7 MG/DL (ref 8.4–10.2)
CHLORIDE SERPL-SCNC: 101 MMOL/L (ref 96–112)
CO2 SERPL-SCNC: 26 MMOL/L (ref 20–33)
CREAT SERPL-MCNC: 0.68 MG/DL (ref 0.5–1.4)
GFR SERPL CREATININE-BSD FRML MDRD: >60 ML/MIN/1.73 M 2
GLUCOSE SERPL-MCNC: 120 MG/DL (ref 65–99)
LV EJECT FRACT  99904: 65
MAGNESIUM SERPL-MCNC: 1.7 MG/DL (ref 1.5–2.5)
POTASSIUM SERPL-SCNC: 3.7 MMOL/L (ref 3.6–5.5)
SODIUM SERPL-SCNC: 134 MMOL/L (ref 135–145)

## 2017-08-05 PROCEDURE — 93970 EXTREMITY STUDY: CPT

## 2017-08-05 PROCEDURE — 700102 HCHG RX REV CODE 250 W/ 637 OVERRIDE(OP): Performed by: HOSPITALIST

## 2017-08-05 PROCEDURE — 770020 HCHG ROOM/CARE - TELE (206)

## 2017-08-05 PROCEDURE — 80048 BASIC METABOLIC PNL TOTAL CA: CPT

## 2017-08-05 PROCEDURE — 93306 TTE W/DOPPLER COMPLETE: CPT

## 2017-08-05 PROCEDURE — 99232 SBSQ HOSP IP/OBS MODERATE 35: CPT | Performed by: INTERNAL MEDICINE

## 2017-08-05 PROCEDURE — 700111 HCHG RX REV CODE 636 W/ 250 OVERRIDE (IP): Performed by: HOSPITALIST

## 2017-08-05 PROCEDURE — 93306 TTE W/DOPPLER COMPLETE: CPT | Mod: 26 | Performed by: INTERNAL MEDICINE

## 2017-08-05 PROCEDURE — A9270 NON-COVERED ITEM OR SERVICE: HCPCS | Performed by: INTERNAL MEDICINE

## 2017-08-05 PROCEDURE — 83735 ASSAY OF MAGNESIUM: CPT

## 2017-08-05 PROCEDURE — A9270 NON-COVERED ITEM OR SERVICE: HCPCS | Performed by: HOSPITALIST

## 2017-08-05 PROCEDURE — 700102 HCHG RX REV CODE 250 W/ 637 OVERRIDE(OP): Performed by: INTERNAL MEDICINE

## 2017-08-05 RX ORDER — CYCLOBENZAPRINE HCL 10 MG
10 TABLET ORAL 3 TIMES DAILY PRN
Status: DISCONTINUED | OUTPATIENT
Start: 2017-08-05 | End: 2017-08-08 | Stop reason: HOSPADM

## 2017-08-05 RX ADMIN — TIZANIDINE 4 MG: 4 TABLET ORAL at 20:15

## 2017-08-05 RX ADMIN — OXYCODONE HYDROCHLORIDE 10 MG: 5 TABLET ORAL at 10:58

## 2017-08-05 RX ADMIN — ENOXAPARIN SODIUM 120 MG: 150 INJECTION SUBCUTANEOUS at 10:51

## 2017-08-05 RX ADMIN — AMLODIPINE BESYLATE 10 MG: 5 TABLET ORAL at 10:46

## 2017-08-05 RX ADMIN — DOCUSATE SODIUM AND SENNOSIDES 2 TABLET: 8.6; 5 TABLET, FILM COATED ORAL at 20:15

## 2017-08-05 RX ADMIN — OXYCODONE HYDROCHLORIDE 10 MG: 5 TABLET ORAL at 04:03

## 2017-08-05 RX ADMIN — OXYCODONE HYDROCHLORIDE 10 MG: 5 TABLET ORAL at 20:14

## 2017-08-05 RX ADMIN — OXYCODONE HYDROCHLORIDE 10 MG: 5 TABLET ORAL at 14:04

## 2017-08-05 RX ADMIN — OXYCODONE HYDROCHLORIDE 10 MG: 5 TABLET ORAL at 07:32

## 2017-08-05 RX ADMIN — CYCLOBENZAPRINE HYDROCHLORIDE 10 MG: 10 TABLET, FILM COATED ORAL at 14:04

## 2017-08-05 RX ADMIN — HYDROCHLOROTHIAZIDE 25 MG: 25 TABLET ORAL at 10:50

## 2017-08-05 RX ADMIN — LEVOTHYROXINE SODIUM 25 MCG: 50 TABLET ORAL at 06:17

## 2017-08-05 RX ADMIN — OXYCODONE HYDROCHLORIDE 10 MG: 5 TABLET ORAL at 17:03

## 2017-08-05 RX ADMIN — LOSARTAN POTASSIUM 100 MG: 25 TABLET, FILM COATED ORAL at 10:50

## 2017-08-05 RX ADMIN — ENOXAPARIN SODIUM 120 MG: 150 INJECTION SUBCUTANEOUS at 20:14

## 2017-08-05 ASSESSMENT — ENCOUNTER SYMPTOMS
SHORTNESS OF BREATH: 1
FEVER: 0
VOMITING: 0
NAUSEA: 0
PALPITATIONS: 0
DIAPHORESIS: 0
HEADACHES: 0
WEAKNESS: 0
ABDOMINAL PAIN: 0
MYALGIAS: 0
CHILLS: 0
COUGH: 0
WHEEZING: 0
DIZZINESS: 0
CONSTIPATION: 0
DIARRHEA: 0

## 2017-08-05 ASSESSMENT — PATIENT HEALTH QUESTIONNAIRE - PHQ9
SUM OF ALL RESPONSES TO PHQ9 QUESTIONS 1 AND 2: 3
8. MOVING OR SPEAKING SO SLOWLY THAT OTHER PEOPLE COULD HAVE NOTICED. OR THE OPPOSITE, BEING SO FIGETY OR RESTLESS THAT YOU HAVE BEEN MOVING AROUND A LOT MORE THAN USUAL: NOT AT ALL
SUM OF ALL RESPONSES TO PHQ QUESTIONS 1-9: 6
2. FEELING DOWN, DEPRESSED, IRRITABLE, OR HOPELESS: SEVERAL DAYS
5. POOR APPETITE OR OVEREATING: SEVERAL DAYS
3. TROUBLE FALLING OR STAYING ASLEEP OR SLEEPING TOO MUCH: SEVERAL DAYS
6. FEELING BAD ABOUT YOURSELF - OR THAT YOU ARE A FAILURE OR HAVE LET YOURSELF OR YOUR FAMILY DOWN: NOT AL ALL
7. TROUBLE CONCENTRATING ON THINGS, SUCH AS READING THE NEWSPAPER OR WATCHING TELEVISION: NOT AT ALL
9. THOUGHTS THAT YOU WOULD BE BETTER OFF DEAD, OR OF HURTING YOURSELF: NOT AT ALL
4. FEELING TIRED OR HAVING LITTLE ENERGY: SEVERAL DAYS
1. LITTLE INTEREST OR PLEASURE IN DOING THINGS: MORE THAN HALF THE DAYS

## 2017-08-05 ASSESSMENT — PAIN SCALES - GENERAL
PAINLEVEL_OUTOF10: 4
PAINLEVEL_OUTOF10: 6
PAINLEVEL_OUTOF10: 7
PAINLEVEL_OUTOF10: 3
PAINLEVEL_OUTOF10: 4
PAINLEVEL_OUTOF10: 9
PAINLEVEL_OUTOF10: 8

## 2017-08-05 ASSESSMENT — COPD QUESTIONNAIRES
DURING THE PAST 4 WEEKS HOW MUCH DID YOU FEEL SHORT OF BREATH: SOME OF THE TIME
DO YOU EVER COUGH UP ANY MUCUS OR PHLEGM?: NO/ONLY WITH OCCASIONAL COLDS OR INFECTIONS
HAVE YOU SMOKED AT LEAST 100 CIGARETTES IN YOUR ENTIRE LIFE: NO/DON'T KNOW
COPD SCREENING SCORE: 2

## 2017-08-05 NOTE — DISCHARGE PLANNING
YORDAN Myrick called [facility] and was informed that Xarelto 15 mg for 21 days is $31.50 and 20 mg is $45 per month. YORDAN informed [facility].

## 2017-08-05 NOTE — ED PROVIDER NOTES
ED Provider Note    HPI: Patient is a 52-year-old female who presented to the emergency department August 4, 2017 at 6:14 PM with a chief complaint of shortness of breath and hypoxia.    Patient had pelvic surgery/sacral surgery a few days ago. She was at a follow-up appointment where she was noted to be hypoxic with pulse oximetry. She also notes some shortness of breath. She was brought here for CT imaging which showed large pulmonary emboli bilaterally. Patient is not a fever chills or cough. She's had no leg pain or leg swelling. She said no nausea vomiting. No other somatic complaints.    Review of Systems: Positive for shortness of breath negative for fever chills cough-like pain leg swelling nausea vomiting. Review of systems reviewed with patient, all other systems negative    Past medical/surgical history: Anxiety reflux disease heart murmur recent pelvic surgery/back surgery    Medications: Ambien Norvasc Flexeril albuterol Zanaflex Synthroid Cozaar hydrochlorothiazide    Allergies: Cortisone and penicillin Keflex latex Naprosyn    Social History: Patient does not smoke no alcohol use      Physical exam: Constitutional: Pleasant female awake and alert  Vital signs:  Temperature 98.5 pulse 98 respirations 20 blood pressure 181/93 pulse oximetry 87% on room air 92% on 3 L  EYES: PERRL, EOMI, Conjunctivae and sclera normal, eyelids normal bilaterally.  Neck: Trachea midline. No cervical masses seen or palpated. Normal range of motion, supple. No meningeal signs elicited.  Cardiac: Regular rate and rhythm. S1-S2 present. No S3 or S4 present. No murmurs, rubs, or gallops heard. No edema or varicosities were seen.   Lungs: Clear to auscultation with good aeration throughout. No wheezes, rales, or rhonchi heard. Patient's chest wall moved symmetrically with each respiratory effort. Patient was not making use of accessory muscles of respiration in breathing.  Abdomen: Somewhat obese. Soft nontender to palpation.  No rebound or guarding elicited. No organomegaly identified. No pulsatile abdominal masses identified.   Musculoskeletal:  no  pain with palpitation or movement of muscle, bone or joint , no obvious musculoskeletal deformities identified.  Neurologic: alert and awake answers questions appropriately. Moves all four extremities independently, no gross focal abnormalities identified. Normal strength and motor.  Skin: no rash or lesion seen, no palpable dermatologic lesions identified.  Psychiatric: Patient appeared to be somewhat anxious but not inappropriately so. She was not delusional or hallucinating.    Medical decision making:  I reviewed the CT study from earlier today. This was significant for bilateral segmental/subsegmental pulmonary emboli with some extension of the right main pulmonary artery. There is no obvious evidence of right heart strain.    Laboratory studies were obtained (please see lab sheet for all results) significant findings included mild hypokalemia potassium 3.0. Mild induration BUN 26. Troponin is normal at 0.04. Regulation studies unremarkable BNP normal. CBC unremarkable    Patient started on Lovenox. Patient admitted by hospitalist service. Given her CT imaging studies I do consider this patient was critically ill and she certainly has a significant possibility for decompensation.    Please note that I spent 35 minutes in direct care of this critically ill patient. This time was spent evaluating the patient, reviewing test results, discussion with consultants, and for private medical record    Impression 1)  pulmonary embolism, bilateral  2) hypoxia  3) critical care greater than 30 minutes

## 2017-08-05 NOTE — PROGRESS NOTES
Patient approached for vital signs and to administer K-Dur. Patient has no trouble swallowing tablets.

## 2017-08-05 NOTE — ASSESSMENT & PLAN NOTE
Continue xarelto   No ambulation today as patient had a rapid response called for hypotension and is orthostatic  Will check a repeat echocardiogram to evaluate pulmonary artery pressure, initially measured at 40mHg  Patient with oxygen saturation of 88% at rest, will continue supplemental oxygen

## 2017-08-05 NOTE — TELEPHONE ENCOUNTER
Text from answering service at 6:10  Radiology called immediately. Patient has bilateral pulmonary embolisms. She is currently en route to the ER being walks there by an x-ray technician.  Dr. Lara

## 2017-08-05 NOTE — PROGRESS NOTES
2 RN skin assessment done; skin not WDL. See Wound flowsheet.2 RN skin assessment done; skin not WDL. See Wound flowsheet.

## 2017-08-05 NOTE — H&P
CHIEF COMPLAINT:  Hypoxia.    HISTORY OF PRESENT ILLNESS:  This is a 52-year-old female who on Wednesday of   this week (2017) underwent a posterior sacroiliac joint fusion on the   right side by Dr. Alfredo Belcher.  The procedure was completed without any   apparent complications and the patient was discharged home.  She was noted to   be hypoxic during her hospital stay per the patient's report.    Patient went to her primary care physician today, Dr. Adriane Lara.  She was   noted to be hypoxic at the physician's office.  The patient had no complaint   of chest pain or shortness of breath; however.  Dr. Lara was concerned enough   to send her here to the [facility] to have a CT angiogram done.    This has been done and was found to be positive for bilateral pulmonary emboli   including 1 extending down into the right main pulmonary artery.  She was   then transferred immediately to the emergency room.    The patient states that the pain from her back is improving slowly and she   feels she is healing well.  She again does not have any chest pain, no cough   and no shortness of breath.  She notes some swelling in her legs; however, she   states this is normal for her.  She does not complain of any tenderness in   her legs.  She has never had any blood clots on any other occasions which she   is aware of.    REVIEW OF SYSTEMS:  Positive as noted above, otherwise all systems are   reviewed and negative.    PREVIOUS MEDICAL HISTORY:  1.  GERD.  2.  Migraine.  3.  Hypertension.  4.  Asthma.  5.  Hypothyroidism.  6.  Morbid obesity.    SURGICAL HISTORY:  1.  Sacral fusion is noted.  2.  Multiple knee surgeries, the patient reports 29 bilaterally.  3.  .  4.  Appendectomy.  5.  Shoulder arthroscopy.  6.  Lumbar spine surgeries.    MEDICATIONS:  1.  Tylenol.  2.  Albuterol.  3.  Amlodipine 10 mg p.o. daily.  4.  Clindamycin 300 mg p.o. b.i.d.  This is a prophylactic dose started on the   day of  surgery and Dr. Belcher had recommended 6 days' total course.  5.  Flexeril 10 mg 1 p.o. t.i.d. as needed for muscle spasm.  6.  Hydrochlorothiazide 25 mg p.o. daily.  7.  Synthroid 25 mcg p.o. daily.  8.  Cozaar 100 mg p.o. daily.  9.  Zanaflex 4 mg p.o. b.i.d. p.r.n. muscle spasm.  10.  Ambien 10 mg at bedtime.    ALLERGIES:  1.  CORTISONE.  2.  PENICILLIN.  3.  KEFLEX.  4.  LATEX.  5.  NAPROSYN.  6.  TAPE.    SOCIAL HISTORY:  The patient is a lifelong nonsmoker and does not drink any   alcohol.    FAMILY HISTORY:  Negative for any episodes of venous thromboembolism that the   patient is aware of.    PHYSICAL EXAMINATION:  VITAL SIGNS:  Temperature 36.9, heart rate 98, respiratory rate 20, /93,   satting 92% on 2 L nasal cannula.  GENERAL:  The patient is a morbidly obese female in no acute distress.  HEENT:  Head is normocephalic and atraumatic.  Mucous membranes are moist.    Sclerae and conjunctivae are benign.  NECK:  Trachea is in the midline.  Neck is supple.  There is no JVD or bruits   however, the examination for JVD is compromised by body habitus.  RESPIRATORY:  Clear to auscultation bilaterally.  CARDIAC:  Distant heart sounds.  No murmurs, rubs or clicks.  ABDOMEN:  Soft, no guarding or rebound.  Unable to assess hepatosplenomegaly   or masses due to body habitus.  EXTREMITIES:  Trace edema.  No clubbing or cyanosis.  Peripheral pulses   maintained.  Calves are nontender to palpation.  SKIN:  Warm and dry.  No rashes are appreciated.  NEUROLOGIC:  Alert and oriented.  Speech is clear and content logical.  PSYCHIATRIC:  Mood and affect are appropriate.  Hygiene neat and clean.    LABORATORY DATA:  CBC shows white count 9.7, hemoglobin 13.0, platelet count   227.  Chemistries are pending.    IMAGING STUDIES:  CTA of the chest, bilateral segmental and subsegmental   pulmonary emboli with some extension into the right main pulmonary artery.  No   signs of right heart strain.  Question some pulmonary  vascular or congestion   or edema.  Hepatic fatty infiltration.  Question hiatal hernia.    ASSESSMENT AND PLAN:  This is a 52-year-old female with problem list as   follows:  1.  Bilateral pulmonary emboli:  This appears to have been a provoked event.    She does not have any personal or family history of venous thromboembolism.    She carries risk factors including morbid obesity as well as recent surgical   intervention.  Patient will be admitted to telemetry given the rather heavy   clot burden.  We will start her on Lovenox.  Check deep venous thrombosis   study of both legs to assess further clot burden and get a cardiac echo.    Consider discharge on a novel agent if her insurance will cover it if not   Coumadin.  2.  Hypertension:  Continue the outpatient regimen and monitor and titrate.  3.  Asthma:  Placed on O2 and respiratory protocols.  4.  Acute hypoxic respiratory failure:  This would appear to be secondary to   the patient's bilateral pulmonary emboli.  Continue oxygen support and treat   as noted.  5.  Hypothyroidism:  Most recent TSH in our system was in April of this year,   slightly elevated at 3.98.  6.  Morbid obesity:  Encourage weight loss.  7.  Chronic back pain:  Continue supportive care.  8.  Recent sacral fusion:  Dr. Son Vigil will be made aware of the   patient's presence.  She appears to be healing well as far as this is   concerned.  We will continue the clindamycin for a total of 6-day course of   prophylaxis beginning on the day of surgery on August 2nd.       ____________________________________     DO CHARLIE Segovia / GILBERT    DD:  08/04/2017 19:41:03  DT:  08/04/2017 20:27:27    D#:  [id number]  Job#:  [id number]    cc: SON VIGIL MD, Adriane Wynn DO

## 2017-08-05 NOTE — PROGRESS NOTES
[facility] Hospitalist Progress Note    Date of Service: 2017    Chief Complaint  52 y.o. female admitted 2017 with hypoxia. She denies shortness of breath but with movement she does have increased work of breathing.    Interval Problem Update  Today she denies any chest pain  She is on 2 liters oxygen    Consultants/Specialty  None    Disposition  home        Review of Systems   Constitutional: Negative for fever, chills and diaphoresis.   Respiratory: Positive for shortness of breath. Negative for cough and wheezing.    Cardiovascular: Negative for chest pain, palpitations and leg swelling.   Gastrointestinal: Negative for nausea, vomiting, abdominal pain, diarrhea and constipation.   Genitourinary: Negative for dysuria and urgency.   Musculoskeletal: Negative for myalgias.   Skin: Negative for rash.   Neurological: Negative for dizziness, weakness and headaches.      Physical Exam  Laboratory/Imaging   Hemodynamics  Temp (24hrs), Av °C (98.6 °F), Min:36.8 °C (98.2 °F), Max:37.4 °C (99.4 °F)   Temperature: 37.4 °C (99.4 °F)  Pulse  Av.1  Min: 76  Max: 99 Heart Rate (Monitored): 88  Blood Pressure: 130/72 mmHg, NIBP: 154/66 mmHg      Respiratory      Respiration: 18, Pulse Oximetry: 92 %, O2 Daily Delivery Respiratory : Nasal Cannula        RUL Breath Sounds: Clear, RML Breath Sounds: Clear, RLL Breath Sounds: Diminished, REZA Breath Sounds: Clear, LLL Breath Sounds: Diminished    Fluids    Intake/Output Summary (Last 24 hours) at 17 1400  Last data filed at 17 0630   Gross per 24 hour   Intake    120 ml   Output    500 ml   Net   -380 ml       Nutrition  Orders Placed This Encounter   Procedures   • Diet Order     Standing Status: Standing      Number of Occurrences: 1      Standing Expiration Date:      Order Specific Question:  Diet:     Answer:  Regular [1]     Physical Exam   Constitutional: She is oriented to person, place, and time. No distress.   obese   HENT:   Mouth/Throat:  Oropharynx is clear and moist.   Eyes: Conjunctivae are normal. No scleral icterus.   Neck: Neck supple.   Cardiovascular: Normal rate and regular rhythm.    Pulmonary/Chest: Effort normal and breath sounds normal. No respiratory distress. She has no wheezes.   Increased work of breathing with movement   Abdominal: Soft. Bowel sounds are normal.   Musculoskeletal: She exhibits no edema.   Neurological: She is alert and oriented to person, place, and time.   Skin: Skin is warm and dry. She is not diaphoretic.   Nursing note and vitals reviewed.      Recent Labs      08/04/17 1845   WBC  9.7   RBC  4.55   HEMOGLOBIN  13.0   HEMATOCRIT  39.5   MCV  86.8   MCH  28.6   MCHC  32.9*   RDW  41.8   PLATELETCT  227   MPV  8.5*     Recent Labs      08/04/17 1845  08/05/17   0620   SODIUM  136  134*   POTASSIUM  3.0*  3.7   CHLORIDE  100  101   CO2  24  26   GLUCOSE  108*  120*   BUN  26*  15   CREATININE  0.91  0.68   CALCIUM  9.1  8.7     Recent Labs      08/04/17   1845   INR  1.01     Recent Labs      08/04/17   1845   BNPBTYPENAT  43              Assessment/Plan     Pulmonary emboli (CMS-[facility])  Assessment & Plan  Continue anticoagulation with lovenox  Will need xarelto on discharge    Acute respiratory failure with hypoxia (CMS-HCC)  Assessment & Plan  Continue oxygen  Taper as tolerated  Will check an echocardiogram    Labs reviewed and Medications reviewed  Howe catheter: No Howe      DVT Prophylaxis: Enoxaparin (Lovenox)    Ulcer prophylaxis: Not indicated    Assessed for rehab: Patient was assess for and/or received rehabilitation services during this hospitalization

## 2017-08-05 NOTE — PROGRESS NOTES
Ambulated patient to the end of cai 150 ft. Ambulating on room air oxygen saturation dropped to 87%. Titrated oxygen to 3L and patient maintained oxygen saturation 95%. Ambulated back to room and patient got into bed. Mild dyspnea on extertion.

## 2017-08-05 NOTE — ED NOTES
Report called to Maureen PELAEZ. Patient will be transferred to [facility] at this time.

## 2017-08-05 NOTE — PROGRESS NOTES
Patient oriented to room 301 bed 2, in the service of the hospitalists. Patient awake, alert and oriented, walks from rCorea to bed with standby assist for line management. Patient had not foreseen admission, had right total hip surgery on Wednesday, August 2. Dressing to right hip 4x4 with tape, CDI. Admission assessment completed. Patient wears oxygen and 2 liters via nasal cannula. She denies chest pain or discomfort.

## 2017-08-05 NOTE — ED NOTES
Chief Complaint   Patient presents with   • Shortness of Breath     pt to room 10 from radiology. reported pt with positive PE after CT scan.

## 2017-08-05 NOTE — PROGRESS NOTES
Patient calls for assistance to bathroom. Up with standby assistance for line management. Requests and receives prn pain medication.

## 2017-08-05 NOTE — DISCHARGE PLANNING
Medical Social Work    Referral: Pt discussed at IDT rounds this AM.    Intervention: Per flowsheet, pt lives with spouse and expects to d.c home.  SW given Xarelto RX.    Plan: SW met with pt to obtain pharmacy information - [facility] on [address] in [place].  SW faxed RX for Xarelto 15 mg BID X 21 days and then 20 mg daily.  SW will follow up by noon [phone] to obtain cost and coverage.      Care Transition Team Assessment    Information Source  Information Given By: Patient    Readmission Evaluation  Is this a readmission?: Yes - unplanned readmission    Elopement Risk  Legal Hold: No  Ambulatory or Self Mobile in Wheelchair: No-Not an Elopement Risk    Interdisciplinary Discharge Planning  Does Admitting Nurse Feel This Could be a Complex Discharge?: No  Primary Care Physician: Dr. Lara  Lives with - Patient's Self Care Capacity: Spouse  Patient or legal guardian wants to designate a caregiver (see row info): No  Support Systems: Family Member(s), Friends / Neighbors  Housing / Facility: [address]  Do You Take your Prescribed Medications Regularly: Yes  Able to Return to Previous ADL's: Yes  Mobility Issues: No  Prior Services: None  Patient Expects to be Discharged to:: home  Assistance Needed: Unknown at this Time  Durable Medical Equipment: Not Applicable    Discharge Preparedness  What is your plan after discharge?: Uncertain - pending medical team collaboration  What are your discharge supports?: Spouse         Finances  Prescription Coverage: Yes    Vision / Hearing Impairment  Vision Impairment : Yes  Hearing Impairment : No    Values / Beliefs / Concerns  Values / Beliefs Concerns : No    Advance Directive  Advance Directive?: None    Domestic Abuse  Have you ever been the victim of abuse or violence?: No  Physical Abuse or Sexual Abuse: No  Verbal Abuse or Emotional Abuse: No  Possible Abuse Reported to:: Not Applicable    Psychological Assessment  History of Substance Abuse: None          Anticipated Discharge Information  Anticipated discharge disposition: Home

## 2017-08-06 PROCEDURE — 700102 HCHG RX REV CODE 250 W/ 637 OVERRIDE(OP): Performed by: INTERNAL MEDICINE

## 2017-08-06 PROCEDURE — A9270 NON-COVERED ITEM OR SERVICE: HCPCS | Performed by: INTERNAL MEDICINE

## 2017-08-06 PROCEDURE — 700102 HCHG RX REV CODE 250 W/ 637 OVERRIDE(OP): Performed by: HOSPITALIST

## 2017-08-06 PROCEDURE — 99232 SBSQ HOSP IP/OBS MODERATE 35: CPT | Performed by: INTERNAL MEDICINE

## 2017-08-06 PROCEDURE — A9270 NON-COVERED ITEM OR SERVICE: HCPCS | Performed by: HOSPITALIST

## 2017-08-06 PROCEDURE — 94760 N-INVAS EAR/PLS OXIMETRY 1: CPT

## 2017-08-06 PROCEDURE — 770020 HCHG ROOM/CARE - TELE (206)

## 2017-08-06 RX ADMIN — HYDROCHLOROTHIAZIDE 25 MG: 25 TABLET ORAL at 08:36

## 2017-08-06 RX ADMIN — AMLODIPINE BESYLATE 10 MG: 5 TABLET ORAL at 08:36

## 2017-08-06 RX ADMIN — OXYCODONE HYDROCHLORIDE 10 MG: 5 TABLET ORAL at 08:41

## 2017-08-06 RX ADMIN — CYCLOBENZAPRINE HYDROCHLORIDE 10 MG: 10 TABLET, FILM COATED ORAL at 18:15

## 2017-08-06 RX ADMIN — CYCLOBENZAPRINE HYDROCHLORIDE 10 MG: 10 TABLET, FILM COATED ORAL at 08:41

## 2017-08-06 RX ADMIN — LEVOTHYROXINE SODIUM 25 MCG: 50 TABLET ORAL at 06:08

## 2017-08-06 RX ADMIN — RIVAROXABAN 15 MG: 15 TABLET, FILM COATED ORAL at 08:36

## 2017-08-06 RX ADMIN — OXYCODONE HYDROCHLORIDE 10 MG: 5 TABLET ORAL at 13:27

## 2017-08-06 RX ADMIN — OXYCODONE HYDROCHLORIDE 10 MG: 5 TABLET ORAL at 03:59

## 2017-08-06 RX ADMIN — OXYCODONE HYDROCHLORIDE 10 MG: 5 TABLET ORAL at 22:11

## 2017-08-06 RX ADMIN — ZOLPIDEM TARTRATE 10 MG: 5 TABLET ORAL at 22:12

## 2017-08-06 RX ADMIN — DOCUSATE SODIUM AND SENNOSIDES 2 TABLET: 8.6; 5 TABLET, FILM COATED ORAL at 21:08

## 2017-08-06 RX ADMIN — RIVAROXABAN 15 MG: 15 TABLET, FILM COATED ORAL at 18:12

## 2017-08-06 RX ADMIN — OXYCODONE HYDROCHLORIDE 10 MG: 5 TABLET ORAL at 19:20

## 2017-08-06 RX ADMIN — OXYCODONE HYDROCHLORIDE 10 MG: 5 TABLET ORAL at 16:20

## 2017-08-06 RX ADMIN — LOSARTAN POTASSIUM 100 MG: 25 TABLET, FILM COATED ORAL at 08:37

## 2017-08-06 RX ADMIN — TIZANIDINE 4 MG: 4 TABLET ORAL at 21:08

## 2017-08-06 ASSESSMENT — ENCOUNTER SYMPTOMS
COUGH: 0
HEADACHES: 0
ABDOMINAL PAIN: 0
PALPITATIONS: 0
FEVER: 0
VOMITING: 0
DIARRHEA: 0
DIAPHORESIS: 0
SHORTNESS OF BREATH: 0
CHILLS: 0
NAUSEA: 0
MYALGIAS: 0
BRUISES/BLEEDS EASILY: 0

## 2017-08-06 ASSESSMENT — PAIN SCALES - GENERAL
PAINLEVEL_OUTOF10: 3
PAINLEVEL_OUTOF10: 7
PAINLEVEL_OUTOF10: 0
PAINLEVEL_OUTOF10: 9
PAINLEVEL_OUTOF10: 9

## 2017-08-06 NOTE — CARE PLAN
Problem: Knowledge Deficit  Goal: Knowledge of the prescribed therapeutic regimen will improve  Patient instructed on anticoagulation medications.

## 2017-08-06 NOTE — PROGRESS NOTES
Gave report to BENIGNO Reddy. Plan of care discussed. Safety precautions in place. Personal belongings and call light with in reach. Patient has no other needs at this time.

## 2017-08-06 NOTE — CARE PLAN
Problem: Respiratory:  Goal: Respiratory status will improve  Respiratory assessment, monitoring pulmonary status.

## 2017-08-06 NOTE — CARE PLAN
Problem: Knowledge Deficit  Goal: Knowledge of disease process/condition, treatment plan, diagnostic tests, and medications will improve  Outcome: PROGRESSING AS EXPECTED  Educate patient regarding disease process, treatment, and testing. Patient will verbalize understanding.    Problem: Pain Management  Goal: Pain level will decrease to patient’s comfort goal  Outcome: PROGRESSING AS EXPECTED  Patient will use nonpharmacologic methods to reduce pain. Patient will verbalize understanding of pain control and side effects

## 2017-08-06 NOTE — PROGRESS NOTES
Staff approaches patient to administer morning medication. Patient awakens without difficulty, swallows medication and resumes sleeping with respirations even and unlabored.

## 2017-08-06 NOTE — PROGRESS NOTES
Patient awake, reports continuing to require pain medication for back pain. Patient reports her family was in to visit, along with her grandson. Aware of ultrasound results. Patient reports feeling quite tired, falls back asleep.

## 2017-08-06 NOTE — PROGRESS NOTES
[facility] Hospitalist Progress Note    Date of Service: 2017    Chief Complaint  52 y.o. female admitted 2017 with pulmonary emboli and hypoxia after surgery.    Interval Problem Update  She is ambulating in the halls independently    Consultants/Specialty  None    Disposition  Home        Review of Systems   Constitutional: Negative for fever, chills and diaphoresis.   Respiratory: Negative for cough and shortness of breath.    Cardiovascular: Negative for chest pain and palpitations.   Gastrointestinal: Negative for nausea, vomiting, abdominal pain and diarrhea.   Genitourinary: Negative for urgency and frequency.   Musculoskeletal: Negative for myalgias.   Skin: Negative for rash.   Neurological: Negative for headaches.   Endo/Heme/Allergies: Does not bruise/bleed easily.      Physical Exam  Laboratory/Imaging   Hemodynamics  Temp (24hrs), Av.2 °C (98.9 °F), Min:36.9 °C (98.5 °F), Max:37.4 °C (99.4 °F)   Temperature: 36.9 °C (98.5 °F)  Pulse  Av.2  Min: 66  Max: 99 Heart Rate (Monitored): 78  Blood Pressure: 115/78 mmHg      Respiratory      Respiration: 18, Pulse Oximetry: 98 %, O2 Daily Delivery Respiratory : Room Air with O2 Available     Work Of Breathing / Effort: Moderate  RUL Breath Sounds: Clear, RML Breath Sounds: Clear, RLL Breath Sounds: Clear, REZA Breath Sounds: Clear, LLL Breath Sounds: Clear    Fluids    Intake/Output Summary (Last 24 hours) at 17 1039  Last data filed at 17 0300   Gross per 24 hour   Intake    560 ml   Output    350 ml   Net    210 ml       Nutrition  Orders Placed This Encounter   Procedures   • Diet Order     Standing Status: Standing      Number of Occurrences: 1      Standing Expiration Date:      Order Specific Question:  Diet:     Answer:  Regular [1]     Physical Exam   Constitutional: She is oriented to person, place, and time. No distress.   HENT:   Mouth/Throat: Oropharynx is clear and moist.   Eyes: Conjunctivae are normal. No scleral icterus.    Neck: Neck supple.   Cardiovascular: Normal rate and regular rhythm.    Pulmonary/Chest: No respiratory distress. She has no wheezes. She has no rales.   Abdominal: Soft. Bowel sounds are normal.   Musculoskeletal: She exhibits no edema.   Neurological: She is alert and oriented to person, place, and time. Coordination normal.   Skin: No rash noted. No erythema.   Psychiatric: Her behavior is normal.   Nursing note and vitals reviewed.      Recent Labs      08/04/17   1845   WBC  9.7   RBC  4.55   HEMOGLOBIN  13.0   HEMATOCRIT  39.5   MCV  86.8   MCH  28.6   MCHC  32.9*   RDW  41.8   PLATELETCT  227   MPV  8.5*     Recent Labs      08/04/17   1845  08/05/17   0620   SODIUM  136  134*   POTASSIUM  3.0*  3.7   CHLORIDE  100  101   CO2  24  26   GLUCOSE  108*  120*   BUN  26*  15   CREATININE  0.91  0.68   CALCIUM  9.1  8.7     Recent Labs      08/04/17   1845   INR  1.01     Recent Labs      08/04/17   1845   BNPBTYPENAT  43              Assessment/Plan     Pulmonary emboli (CMS-HCC)  Assessment & Plan  Start xarelto today  Ambulate  Patient with oxygen saturation of 88% at rest, will continue supplemental oxygen  Echocardiogram with pulmonary artery pressure of 40mmHg    Acute respiratory failure with hypoxia (CMS-[facility])  Assessment & Plan  Continue oxygen  Taper as tolerated      Labs reviewed and Medications reviewed  Howe catheter: No Howe      DVT: xarelto.    Ulcer prophylaxis: Not indicated    Assessed for rehab: Patient returned to prior level of function, rehabilitation not indicated at this time

## 2017-08-07 ENCOUNTER — PATIENT OUTREACH (OUTPATIENT)
Dept: HEALTH INFORMATION MANAGEMENT | Facility: OTHER | Age: 52
End: 2017-08-07

## 2017-08-07 ENCOUNTER — APPOINTMENT (OUTPATIENT)
Dept: RADIOLOGY | Facility: MEDICAL CENTER | Age: 52
DRG: 175 | End: 2017-08-07
Attending: INTERNAL MEDICINE
Payer: MEDICARE

## 2017-08-07 PROBLEM — E86.0 DEHYDRATION: Status: ACTIVE | Noted: 2017-08-07

## 2017-08-07 PROBLEM — E87.1 HYPONATREMIA: Status: ACTIVE | Noted: 2017-08-07

## 2017-08-07 PROBLEM — N17.9 AKI (ACUTE KIDNEY INJURY) (HCC): Status: ACTIVE | Noted: 2017-08-07

## 2017-08-07 LAB
ANION GAP SERPL CALC-SCNC: 11 MMOL/L (ref 0–11.9)
BASOPHILS # BLD AUTO: 0.4 % (ref 0–1.8)
BASOPHILS # BLD: 0.04 K/UL (ref 0–0.12)
BUN SERPL-MCNC: 29 MG/DL (ref 8–22)
CALCIUM SERPL-MCNC: 8.3 MG/DL (ref 8.4–10.2)
CHLORIDE SERPL-SCNC: 95 MMOL/L (ref 96–112)
CO2 SERPL-SCNC: 23 MMOL/L (ref 20–33)
CREAT SERPL-MCNC: 1.8 MG/DL (ref 0.5–1.4)
EOSINOPHIL # BLD AUTO: 0.29 K/UL (ref 0–0.51)
EOSINOPHIL NFR BLD: 2.8 % (ref 0–6.9)
ERYTHROCYTE [DISTWIDTH] IN BLOOD BY AUTOMATED COUNT: 44.2 FL (ref 35.9–50)
GFR SERPL CREATININE-BSD FRML MDRD: 29 ML/MIN/1.73 M 2
GLUCOSE SERPL-MCNC: 122 MG/DL (ref 65–99)
HCT VFR BLD AUTO: 34.7 % (ref 37–47)
HGB BLD-MCNC: 11 G/DL (ref 12–16)
IMM GRANULOCYTES # BLD AUTO: 0.08 K/UL (ref 0–0.11)
IMM GRANULOCYTES NFR BLD AUTO: 0.8 % (ref 0–0.9)
LV EJECT FRACT  99904: 65
LYMPHOCYTES # BLD AUTO: 1.49 K/UL (ref 1–4.8)
LYMPHOCYTES NFR BLD: 14.6 % (ref 22–41)
MCH RBC QN AUTO: 28.4 PG (ref 27–33)
MCHC RBC AUTO-ENTMCNC: 31.7 G/DL (ref 33.6–35)
MCV RBC AUTO: 89.4 FL (ref 81.4–97.8)
MONOCYTES # BLD AUTO: 0.91 K/UL (ref 0–0.85)
MONOCYTES NFR BLD AUTO: 8.9 % (ref 0–13.4)
NEUTROPHILS # BLD AUTO: 7.39 K/UL (ref 2–7.15)
NEUTROPHILS NFR BLD: 72.5 % (ref 44–72)
NRBC # BLD AUTO: 0 K/UL
NRBC BLD AUTO-RTO: 0 /100 WBC
PLATELET # BLD AUTO: 185 K/UL (ref 164–446)
PMV BLD AUTO: 9.5 FL (ref 9–12.9)
POTASSIUM SERPL-SCNC: 3.4 MMOL/L (ref 3.6–5.5)
RBC # BLD AUTO: 3.88 M/UL (ref 4.2–5.4)
SODIUM SERPL-SCNC: 129 MMOL/L (ref 135–145)
WBC # BLD AUTO: 10.2 K/UL (ref 4.8–10.8)

## 2017-08-07 PROCEDURE — 80048 BASIC METABOLIC PNL TOTAL CA: CPT

## 2017-08-07 PROCEDURE — 700111 HCHG RX REV CODE 636 W/ 250 OVERRIDE (IP): Performed by: INTERNAL MEDICINE

## 2017-08-07 PROCEDURE — 700102 HCHG RX REV CODE 250 W/ 637 OVERRIDE(OP): Performed by: INTERNAL MEDICINE

## 2017-08-07 PROCEDURE — 93308 TTE F-UP OR LMTD: CPT

## 2017-08-07 PROCEDURE — 93308 TTE F-UP OR LMTD: CPT | Mod: 26 | Performed by: INTERNAL MEDICINE

## 2017-08-07 PROCEDURE — 99233 SBSQ HOSP IP/OBS HIGH 50: CPT | Performed by: INTERNAL MEDICINE

## 2017-08-07 PROCEDURE — A9270 NON-COVERED ITEM OR SERVICE: HCPCS | Performed by: INTERNAL MEDICINE

## 2017-08-07 PROCEDURE — A9270 NON-COVERED ITEM OR SERVICE: HCPCS | Performed by: HOSPITALIST

## 2017-08-07 PROCEDURE — 700105 HCHG RX REV CODE 258: Performed by: INTERNAL MEDICINE

## 2017-08-07 PROCEDURE — 700102 HCHG RX REV CODE 250 W/ 637 OVERRIDE(OP): Performed by: HOSPITALIST

## 2017-08-07 PROCEDURE — 85025 COMPLETE CBC W/AUTO DIFF WBC: CPT

## 2017-08-07 PROCEDURE — 71010 DX-CHEST-PORTABLE (1 VIEW): CPT

## 2017-08-07 PROCEDURE — 94760 N-INVAS EAR/PLS OXIMETRY 1: CPT

## 2017-08-07 PROCEDURE — 770020 HCHG ROOM/CARE - TELE (206)

## 2017-08-07 RX ORDER — SODIUM CHLORIDE 9 MG/ML
INJECTION, SOLUTION INTRAVENOUS CONTINUOUS
Status: DISCONTINUED | OUTPATIENT
Start: 2017-08-07 | End: 2017-08-08 | Stop reason: HOSPADM

## 2017-08-07 RX ORDER — SODIUM CHLORIDE 9 MG/ML
INJECTION, SOLUTION INTRAVENOUS
Status: DISCONTINUED | OUTPATIENT
Start: 2017-08-07 | End: 2017-08-07

## 2017-08-07 RX ORDER — SODIUM CHLORIDE 9 MG/ML
INJECTION, SOLUTION INTRAVENOUS ONCE
Status: COMPLETED | OUTPATIENT
Start: 2017-08-07 | End: 2017-08-07

## 2017-08-07 RX ORDER — MORPHINE SULFATE 4 MG/ML
2 INJECTION, SOLUTION INTRAMUSCULAR; INTRAVENOUS EVERY 4 HOURS PRN
Status: DISCONTINUED | OUTPATIENT
Start: 2017-08-07 | End: 2017-08-08 | Stop reason: HOSPADM

## 2017-08-07 RX ADMIN — RIVAROXABAN 15 MG: 15 TABLET, FILM COATED ORAL at 17:20

## 2017-08-07 RX ADMIN — SODIUM CHLORIDE: 9 INJECTION, SOLUTION INTRAVENOUS at 10:30

## 2017-08-07 RX ADMIN — ACETAMINOPHEN 650 MG: 325 TABLET, FILM COATED ORAL at 20:18

## 2017-08-07 RX ADMIN — POLYETHYLENE GLYCOL 3350 1 PACKET: 17 POWDER, FOR SOLUTION ORAL at 20:20

## 2017-08-07 RX ADMIN — ZOLPIDEM TARTRATE 10 MG: 5 TABLET ORAL at 23:21

## 2017-08-07 RX ADMIN — RIVAROXABAN 15 MG: 15 TABLET, FILM COATED ORAL at 08:39

## 2017-08-07 RX ADMIN — OXYCODONE HYDROCHLORIDE 10 MG: 5 TABLET ORAL at 15:10

## 2017-08-07 RX ADMIN — TIZANIDINE 4 MG: 4 TABLET ORAL at 20:19

## 2017-08-07 RX ADMIN — CYCLOBENZAPRINE HYDROCHLORIDE 10 MG: 10 TABLET, FILM COATED ORAL at 04:49

## 2017-08-07 RX ADMIN — OXYCODONE HYDROCHLORIDE 10 MG: 5 TABLET ORAL at 20:17

## 2017-08-07 RX ADMIN — OXYCODONE HYDROCHLORIDE 10 MG: 5 TABLET ORAL at 04:49

## 2017-08-07 RX ADMIN — MORPHINE SULFATE 2 MG: 4 INJECTION INTRAVENOUS at 17:20

## 2017-08-07 RX ADMIN — SODIUM CHLORIDE: 9 INJECTION, SOLUTION INTRAVENOUS at 13:35

## 2017-08-07 RX ADMIN — LEVOTHYROXINE SODIUM 25 MCG: 50 TABLET ORAL at 06:12

## 2017-08-07 RX ADMIN — OXYCODONE HYDROCHLORIDE 10 MG: 5 TABLET ORAL at 23:20

## 2017-08-07 RX ADMIN — DOCUSATE SODIUM AND SENNOSIDES 2 TABLET: 8.6; 5 TABLET, FILM COATED ORAL at 20:19

## 2017-08-07 RX ADMIN — SODIUM CHLORIDE 250 ML/HR: 9 INJECTION, SOLUTION INTRAVENOUS at 09:08

## 2017-08-07 RX ADMIN — CYCLOBENZAPRINE HYDROCHLORIDE 10 MG: 10 TABLET, FILM COATED ORAL at 13:34

## 2017-08-07 RX ADMIN — OXYCODONE HYDROCHLORIDE 10 MG: 5 TABLET ORAL at 09:21

## 2017-08-07 ASSESSMENT — ENCOUNTER SYMPTOMS
NAUSEA: 0
DIAPHORESIS: 0
DIZZINESS: 1
COUGH: 0
HEADACHES: 0
FEVER: 0
SHORTNESS OF BREATH: 1
BRUISES/BLEEDS EASILY: 0
WEAKNESS: 1
DIARRHEA: 0
ABDOMINAL PAIN: 0
MYALGIAS: 0

## 2017-08-07 ASSESSMENT — PAIN SCALES - GENERAL
PAINLEVEL_OUTOF10: 9
PAINLEVEL_OUTOF10: 10
PAINLEVEL_OUTOF10: 7
PAINLEVEL_OUTOF10: 9
PAINLEVEL_OUTOF10: 10
PAINLEVEL_OUTOF10: 8

## 2017-08-07 NOTE — DISCHARGE PLANNING
Patient was discussed in morning rounds. Reportedly this patient's d/c plan continues to be to return home with her spouse when medically able. No current SS needs noted.

## 2017-08-07 NOTE — PROGRESS NOTES
Report received. Care assumed. Resting in bed. Alert and oriented. Respirations even and unlabored. On room air. C/o pain in hip and lower back, will medicate when available. Call light in reach.

## 2017-08-07 NOTE — CARE PLAN
Problem: Safety  Goal: Will remain free from injury  Outcome: PROGRESSING AS EXPECTED  Oriented to room and equipment. Call light instructions given, in reach at all times. Slipper socks in place. Floor dry and uncluttered. Bed locked and in lowest position.     Problem: Pain Management  Goal: Pain level will decrease to patient’s comfort goal  Outcome: PROGRESSING AS EXPECTED  Uses 0-10 scale appropriately. Pharmacologic and nonpharmacologic interventions in place.

## 2017-08-07 NOTE — PROGRESS NOTES
Due medications given without issue. States pain in right hip unrelieved by oxy, MS offered per orders, declines. Ambulate to bathroom as needed using FWW. Call light in reach.

## 2017-08-07 NOTE — PROGRESS NOTES
Assessment completed. Pt noted to be hypotensive and lethargic. RRT initiated. 1 L bolus, CBC, BNP, CXR ordered. Pt c/o pain in hips/back 10/10. Medicated per MAR, OK per MD. Pt noted to desaturate down to 78% this AM on RA by respiratory. Pt on 3 L O2 now. Will monitor closely.

## 2017-08-07 NOTE — PROGRESS NOTES
Bedside report received from Bhakti PELAEZ @ 0730. Assumed care. POC discussed. Pt resting comfortably in bed. Safety precautions in place.

## 2017-08-07 NOTE — RESPIRATORY CARE
Rapid Response note: Rapid called for low BP. 3 liter of 02 being delivered via NC. 02 saturation 98%, RR20. Did not appear to be in respiratory distress at this time. Pt alert and appropriate in responding to staffs questions. No orders given by the MD to respiratory at this time

## 2017-08-07 NOTE — PROGRESS NOTES
Due medication given without issue. Resting on and off. States pain now 3/10, able to sleep and ambulate without increased pain. Call light in reach.

## 2017-08-07 NOTE — PROGRESS NOTES
Additional NS bolus ordered by MD. Completed. Pt BP improving, NS @ 83 infusion ordered. Pt medicated for pain/muscle spasms per MAR. Pt ambulating hallway FWW with SBA, tolerating well. Will CTM.

## 2017-08-07 NOTE — RESPIRATORY CARE
"COPD EDUCATION by COPD CLINICAL EDUCATOR  8/7/2017  at  10:40 AM by Elva Nunez     Patient interviewed by COPD education team.  Patient unable to participate in full program.  Short intervention was completed with this patient covering: What is Asthma (how the lungs work), asthma medications, controlling asthma, asthma action plan, peak flow meter and understanding asthma triggers were covered in detail.  A comprehensive packet including information about asthma and treatments.    A personalized \"Asthma Action Plan\" was reviewed with and provided to the patient.    "

## 2017-08-07 NOTE — PROGRESS NOTES
Resting on and off. Respirations even and unlabored. NC in place. States pain 4/10, declined intervention. Call light in reach.     Tele Report:   Sinus Rhythm/ Sinus Tach  HR   Rare PVC  NM 0.16  QRS 0.08  QT 0.32

## 2017-08-08 VITALS
HEART RATE: 92 BPM | BODY MASS INDEX: 46.95 KG/M2 | SYSTOLIC BLOOD PRESSURE: 140 MMHG | OXYGEN SATURATION: 94 % | HEIGHT: 64 IN | RESPIRATION RATE: 14 BRPM | WEIGHT: 275 LBS | DIASTOLIC BLOOD PRESSURE: 61 MMHG | TEMPERATURE: 97.5 F

## 2017-08-08 LAB
ANION GAP SERPL CALC-SCNC: 9 MMOL/L (ref 0–11.9)
BASOPHILS # BLD AUTO: 0.3 % (ref 0–1.8)
BASOPHILS # BLD: 0.02 K/UL (ref 0–0.12)
BUN SERPL-MCNC: 26 MG/DL (ref 8–22)
CALCIUM SERPL-MCNC: 8.2 MG/DL (ref 8.4–10.2)
CHLORIDE SERPL-SCNC: 98 MMOL/L (ref 96–112)
CO2 SERPL-SCNC: 25 MMOL/L (ref 20–33)
CREAT SERPL-MCNC: 0.92 MG/DL (ref 0.5–1.4)
EOSINOPHIL # BLD AUTO: 0.23 K/UL (ref 0–0.51)
EOSINOPHIL NFR BLD: 3.1 % (ref 0–6.9)
ERYTHROCYTE [DISTWIDTH] IN BLOOD BY AUTOMATED COUNT: 41.5 FL (ref 35.9–50)
GFR SERPL CREATININE-BSD FRML MDRD: >60 ML/MIN/1.73 M 2
GLUCOSE SERPL-MCNC: 114 MG/DL (ref 65–99)
HCT VFR BLD AUTO: 32.3 % (ref 37–47)
HGB BLD-MCNC: 10.7 G/DL (ref 12–16)
IMM GRANULOCYTES # BLD AUTO: 0.08 K/UL (ref 0–0.11)
IMM GRANULOCYTES NFR BLD AUTO: 1.1 % (ref 0–0.9)
LYMPHOCYTES # BLD AUTO: 0.97 K/UL (ref 1–4.8)
LYMPHOCYTES NFR BLD: 13.2 % (ref 22–41)
MCH RBC QN AUTO: 28.3 PG (ref 27–33)
MCHC RBC AUTO-ENTMCNC: 33.1 G/DL (ref 33.6–35)
MCV RBC AUTO: 85.4 FL (ref 81.4–97.8)
MONOCYTES # BLD AUTO: 0.98 K/UL (ref 0–0.85)
MONOCYTES NFR BLD AUTO: 13.3 % (ref 0–13.4)
NEUTROPHILS # BLD AUTO: 5.08 K/UL (ref 2–7.15)
NEUTROPHILS NFR BLD: 69 % (ref 44–72)
NRBC # BLD AUTO: 0 K/UL
NRBC BLD AUTO-RTO: 0 /100 WBC
PLATELET # BLD AUTO: 212 K/UL (ref 164–446)
PMV BLD AUTO: 8.9 FL (ref 9–12.9)
POTASSIUM SERPL-SCNC: 3.3 MMOL/L (ref 3.6–5.5)
RBC # BLD AUTO: 3.78 M/UL (ref 4.2–5.4)
SODIUM SERPL-SCNC: 132 MMOL/L (ref 135–145)
WBC # BLD AUTO: 7.4 K/UL (ref 4.8–10.8)

## 2017-08-08 PROCEDURE — 700102 HCHG RX REV CODE 250 W/ 637 OVERRIDE(OP): Performed by: HOSPITALIST

## 2017-08-08 PROCEDURE — A9270 NON-COVERED ITEM OR SERVICE: HCPCS | Performed by: INTERNAL MEDICINE

## 2017-08-08 PROCEDURE — 80048 BASIC METABOLIC PNL TOTAL CA: CPT

## 2017-08-08 PROCEDURE — A9270 NON-COVERED ITEM OR SERVICE: HCPCS | Performed by: HOSPITALIST

## 2017-08-08 PROCEDURE — 700105 HCHG RX REV CODE 258: Performed by: INTERNAL MEDICINE

## 2017-08-08 PROCEDURE — 85025 COMPLETE CBC W/AUTO DIFF WBC: CPT

## 2017-08-08 PROCEDURE — 700102 HCHG RX REV CODE 250 W/ 637 OVERRIDE(OP): Performed by: INTERNAL MEDICINE

## 2017-08-08 PROCEDURE — 99239 HOSP IP/OBS DSCHRG MGMT >30: CPT | Performed by: FAMILY MEDICINE

## 2017-08-08 RX ADMIN — OXYCODONE HYDROCHLORIDE 10 MG: 5 TABLET ORAL at 10:11

## 2017-08-08 RX ADMIN — SODIUM CHLORIDE 1000 ML: 9 INJECTION, SOLUTION INTRAVENOUS at 06:07

## 2017-08-08 RX ADMIN — ACETAMINOPHEN 650 MG: 325 TABLET, FILM COATED ORAL at 06:05

## 2017-08-08 RX ADMIN — OXYCODONE HYDROCHLORIDE 10 MG: 5 TABLET ORAL at 06:05

## 2017-08-08 RX ADMIN — RIVAROXABAN 15 MG: 15 TABLET, FILM COATED ORAL at 07:49

## 2017-08-08 RX ADMIN — LEVOTHYROXINE SODIUM 25 MCG: 50 TABLET ORAL at 06:05

## 2017-08-08 ASSESSMENT — PAIN SCALES - GENERAL
PAINLEVEL_OUTOF10: 3
PAINLEVEL_OUTOF10: 2
PAINLEVEL_OUTOF10: 2
PAINLEVEL_OUTOF10: 6
PAINLEVEL_OUTOF10: 0

## 2017-08-08 NOTE — PROGRESS NOTES
Received bedside report from RNDahlia.  Discussed POC.  Pt sitting on edge of bed, respirations moderate labored, on 3 L of oxygen NC, c/o back pain, informed pt that this RN will bring morphine after this report is finish, restless, all lines patent, IVF infusing, pt's family at bedside, personal belongings w/in reach, safety precautions in place, assumed pt care, will CTM.

## 2017-08-08 NOTE — FACE TO FACE
Face to Face Note  -  Durable Medical Equipment    Nathaly William M.D. - NPI: [id number]  I certify that this patient is under my care and that they had a durable medical equipment(DME)face to face encounter by myself that meets the physician DME face-to-face encounter requirements with this patient on:    Date of encounter:   Patient:                    MRN:                       YOB: 2019  Carolynn Mahaajney  [id number]  1965     The encounter with the patient was in whole, or in part, for the following medical condition, which is the primary reason for durable medical equipment:  Other - HYPOXIA WITH PE    I certify that, based on my findings, the following durable medical equipment is medically necessary:  Oxygen.    HOME O2 Saturation Measurements:(Values must be present for Home Oxygen orders)  Room air sat at rest: 85  Room air sat with amb: 82  With liters of O2: 3, O2 sat at rest with O2: 95  With Liters of O2: 3, O2 sat with amb with O2 : 94  Is the patient mobile?: Yes    My Clinical findings support the need for the above equipment due to:  Hypoxia    Supporting Symptoms: SHORTNESS OF BREATH

## 2017-08-08 NOTE — ASSESSMENT & PLAN NOTE
Due to low volume state, will stop blood pressure medications until her pressures come back up  Will monitor urine output and recheck creatinine

## 2017-08-08 NOTE — PROGRESS NOTES
Pt c/o 10/10 back pain, administered 2 mg morphine IV (see MAR), respirations even and moderate labored, on 3 L of oxygen via NC, all lines patent, IVF, safety precautions in place, will CTM.

## 2017-08-08 NOTE — PROGRESS NOTES
[facility]ist Progress Note    Date of Service: 2017    Chief Complaint  52 y.o. female admitted 2017 with pulmonary emboli and hypoxia after surgery.    Interval Problem Update  Today she had a rapid response called at about 8am and was found to have blood pressure of systolic in the 70's  She is lethargic and weak today    Consultants/Specialty  None    Disposition  Home        Review of Systems   Constitutional: Positive for malaise/fatigue. Negative for fever and diaphoresis.        Weaker with standing   Respiratory: Positive for shortness of breath. Negative for cough.    Cardiovascular: Negative for chest pain and leg swelling.   Gastrointestinal: Negative for nausea, abdominal pain and diarrhea.   Genitourinary: Negative for dysuria and frequency.   Musculoskeletal: Negative for myalgias.   Skin: Negative for rash.   Neurological: Positive for dizziness and weakness. Negative for headaches.   Endo/Heme/Allergies: Does not bruise/bleed easily.      Physical Exam  Laboratory/Imaging   Hemodynamics  Temp (24hrs), Av.2 °C (98.9 °F), Min:36.8 °C (98.2 °F), Max:37.7 °C (99.9 °F)   Temperature: 36.8 °C (98.2 °F)  Pulse  Av.1  Min: 61  Max: 124 Heart Rate (Monitored): 73  Blood Pressure: 116/83 mmHg      Respiratory      Respiration: 20, Pulse Oximetry: 91 %, O2 Daily Delivery Respiratory : Nasal Cannula     Work Of Breathing / Effort: Mild  RUL Breath Sounds: Clear, RML Breath Sounds: Clear, RLL Breath Sounds: Diminished, REZA Breath Sounds: Clear, LLL Breath Sounds: Diminished    Fluids    Intake/Output Summary (Last 24 hours) at 17 1728  Last data filed at 17 1300   Gross per 24 hour   Intake   4220 ml   Output      0 ml   Net   4220 ml       Nutrition  Orders Placed This Encounter   Procedures   • Diet Order     Standing Status: Standing      Number of Occurrences: 1      Standing Expiration Date:      Order Specific Question:  Diet:     Answer:  Regular [1]     Physical Exam    Constitutional: She is oriented to person, place, and time. No distress.   HENT:   Mouth/Throat: Oropharynx is clear and moist. No oropharyngeal exudate.   Eyes: No scleral icterus.   Cardiovascular: Normal rate and regular rhythm.    Pulmonary/Chest: No respiratory distress. She has no wheezes. She has no rales.   Abdominal: Soft. Bowel sounds are normal. She exhibits no distension.   Musculoskeletal: She exhibits no edema.   Neurological: She is alert and oriented to person, place, and time. Coordination normal.   Skin: Skin is warm and dry. There is pallor.   Psychiatric: Her behavior is normal.   Nursing note and vitals reviewed.      Recent Labs      08/04/17 1845 08/07/17   0920   WBC  9.7  10.2   RBC  4.55  3.88*   HEMOGLOBIN  13.0  11.0*   HEMATOCRIT  39.5  34.7*   MCV  86.8  89.4   MCH  28.6  28.4   MCHC  32.9*  31.7*   RDW  41.8  44.2   PLATELETCT  227  185   MPV  8.5*  9.5     Recent Labs      08/04/17   1845  08/05/17   0620  08/07/17   0920   SODIUM  136  134*  129*   POTASSIUM  3.0*  3.7  3.4*   CHLORIDE  100  101  95*   CO2  24  26  23   GLUCOSE  108*  120*  122*   BUN  26*  15  29*   CREATININE  0.91  0.68  1.80*   CALCIUM  9.1  8.7  8.3*     Recent Labs      08/04/17 1845   INR  1.01     Recent Labs      08/04/17   1845   BNPBTYPENAT  43              Assessment/Plan     Pulmonary emboli (CMS-HCC)  Assessment & Plan  Continue xarelto   No ambulation today as patient had a rapid response called for hypotension and is orthostatic  Will check a repeat echocardiogram to evaluate pulmonary artery pressure, initially measured at 40mHg  Patient with oxygen saturation of 88% at rest, will continue supplemental oxygen      Acute respiratory failure with hypoxia (CMS-HCC)  Assessment & Plan  Continue oxygen  Taper as tolerated      Dehydration  Assessment & Plan  With hypotension  Will give iv fluids today and recheck labs    TASIA (acute kidney injury) (CMS-HCC)  Assessment & Plan  Due to low volume  state, will stop blood pressure medications until her pressures come back up  Will monitor urine output and recheck creatinine    Hyponatremia  Assessment & Plan  Due to poor oral intake,   Will recheck labs  Will hydrate with saline      Labs reviewed and Medications reviewed  Howe catheter: No Howe      DVT: xarelto.    Ulcer prophylaxis: Not indicated    Assessed for rehab: Patient returned to prior level of function, rehabilitation not indicated at this time

## 2017-08-08 NOTE — DISCHARGE SUMMARY
CHIEF COMPLAINT ON ADMISSION  Chief Complaint   Patient presents with   • Shortness of Breath     pt to room 10 from radiology. reported pt with positive PE after CT scan.       CODE STATUS  Full Code    HPI & HOSPITAL COURSE  This is a 52 y.o. female here with pulmonary emboli and hypoxia after surgery,  AND HYPOTENSION.  HER HYPOTENSION HAS RESOLVED AFTER HER BP MEDS WERE DCED.  SHE NEEDS O2 TANK TO GO HOME WITH.SHE ALSO HAD TASIA  2ND TO DEHYDRATION AND HYPOTENSION WHICH RESOLVED WITH IV FLUID.    Therefore, she is discharged in good and stable condition with close outpatient follow-up.    SPECIFIC OUTPATIENT FOLLOW-UP  WITH PCP THIS COMING Thursday OR FRIDAY    DISCHARGE PROBLEM LIST  Active Problems:    Pulmonary emboli (CMS-HCC) POA: Unknown    Acute respiratory failure with hypoxia (CMS-HCC) POA: Unknown    Dehydration POA: Unknown    TASIA (acute kidney injury) (CMS-HCC) POA: Unknown    Hyponatremia POA: Unknown  Resolved Problems:    * No resolved hospital problems. *      FOLLOW UP  Future Appointments  Date Time Provider Department Center   8/9/2017 2:00 PM CARE MANAGER [facility]   8/9/2017 4:30 PM [facility] EXAM 4 VMED None   8/10/2017 1:20 PM MINGO Almendarez [facility]   8/11/2017 3:40 PM Adriane Lara D.O. [facility] Aisha Belcher M.D.  [address]  [address]  [place] [address]  [phone]    In 4 weeks      [facility]  [facility] [facility] [address]  [phone]    In 1 week        MEDICATIONS ON DISCHARGE   Carolynn Casey   Home Medication Instructions ROSE:[id number]    Printed on:08/08/17 1043   Medication Information                      acetaminophen (TYLENOL) 500 MG Tab  Take 1,500 mg by mouth every 6 hours as needed. Indications: Pain             albuterol 108 (90 BASE) MCG/ACT Aero Soln inhalation aerosol  Inhale 2 Puffs by mouth every 6 hours as needed for Shortness of Breath.             cyclobenzaprine (FLEXERIL) 10 MG Tab  TAKE ONE TABLET BY MOUTH THREE  TIMES DAILY AS NEEDED             levothyroxine (SYNTHROID) 25 MCG Tab  Take 1 Tab by mouth Every morning on an empty stomach.             rivaroxaban (XARELTO) 15 MG Tab tablet  Take 1 Tab by mouth 2 Times a Day for 21 days.             rivaroxaban (XARELTO) 20 MG Tab tablet  Take 1 Tab by mouth with dinner.             tizanidine (ZANAFLEX) 4 MG Tab  Take 4 mg by mouth every bedtime. Indications: Muscle Spasticity             zolpidem (AMBIEN) 10 MG Tab  Take 1 Tab by mouth at bedtime as needed for Sleep.                 DIET  Orders Placed This Encounter   Procedures   • Diet Order     Standing Status: Standing      Number of Occurrences: 1      Standing Expiration Date:      Order Specific Question:  Diet:     Answer:  Regular [1]       ACTIVITY  As tolerated.  Weight bearing as tolerated      CONSULTATIONS  NONE    PROCEDURES  NONE    LABORATORY  Lab Results   Component Value Date/Time    SODIUM 132* 08/08/2017 04:29 AM    POTASSIUM 3.3* 08/08/2017 04:29 AM    CHLORIDE 98 08/08/2017 04:29 AM    CO2 25 08/08/2017 04:29 AM    GLUCOSE 114* 08/08/2017 04:29 AM    BUN 26* 08/08/2017 04:29 AM    CREATININE 0.92 08/08/2017 04:29 AM        Lab Results   Component Value Date/Time    WBC 7.4 08/08/2017 04:29 AM    HEMOGLOBIN 10.7* 08/08/2017 04:29 AM    HEMATOCRIT 32.3* 08/08/2017 04:29 AM    PLATELET COUNT 212 08/08/2017 04:29 AM        Total time of the discharge process exceeds 32 minutes

## 2017-08-08 NOTE — DISCHARGE PLANNING
Medical Social Work    Referral: Pt discussed at IDT rounds this AM.    Intervention: Per flowsheet, pt lives with spouse and expects to d.c home.  PT evaluation shows no needs.  Pt d.c on Xarelto (cost in previous note 8/5/17).    Plan: .YORDAN Myrick received an order for anticoagulation clinic and called scheduling at 2077.  YORDAN left a voicemail requesting an appointment 21 days out as pt is on Xarelto and d.c today.  YORDAN received a call from Nancy in scheduling.  She stated that pt has appointment tomorrow and want her to keep so she can establish as a pt and they will schedule follow up appointments after that.  YORDAN informed bs BENIGNO Escalera.

## 2017-08-08 NOTE — DISCHARGE INSTRUCTIONS
Discharge Instructions    Discharged to home by car with relative. Discharged via wheelchair, hospital escort: Yes.  Special equipment needed: Not Applicable    Be sure to schedule a follow-up appointment with your primary care doctor or any specialists as instructed.     Discharge Plan:   Influenza Vaccine Indication: Not indicated: Previously immunized this influenza season and > 8 years of age    I understand that a diet low in cholesterol, fat, and sodium is recommended for good health. Unless I have been given specific instructions below for another diet, I accept this instruction as my diet prescription.   Other diet: Regular    Special Instructions: None    · Is patient discharged on Warfarin / Coumadin?   No     · Is patient Post Blood Transfusion?  No    Depression / Suicide Risk    As [facility] are [facility], it is important to learn how to keep safe from harming yourself.    Recognize the warning signs:  · Abrupt changes in personality, positive or negative- including increase in energy   · Giving away possessions  · Change in eating patterns- significant weight changes-  positive or negative  · Change in sleeping patterns- unable to sleep or sleeping all the time   · Unwillingness or inability to communicate  · Depression  · Unusual sadness, discouragement and loneliness  · Talk of wanting to die  · Neglect of personal appearance   · Rebelliousness- reckless behavior  · Withdrawal from people/activities they love  · Confusion- inability to concentrate     If you or a loved one observes any of these behaviors or has concerns about self-harm, here's what you can do:  · Talk about it- your feelings and reasons for harming yourself  · Remove any means that you might use to hurt yourself (examples: pills, rope, extension cords, firearm)  · Get professional help from the community (Mental Health, Substance Abuse, psychological counseling)  · Do not be alone:Call your Safe Contact-  someone whom you trust who will be there for you.  · Call your local CRISIS HOTLINE 950-1955 or 690-078-9171  · Call your local Children's Mobile Crisis Response Team [place] (137) 417-2522 or www.LiveOnDemand  · Call the toll free National Suicide Prevention Hotlines   · National Suicide Prevention Lifeline 044-951-OWEZ (6554)  · [employer] Line Network 800-SUICIDE (899-5357)

## 2017-08-08 NOTE — DISCHARGE PLANNING
SW noted new order for Home O2 and pulse ox.  YORDAN met with this patient bedside and completed choice form.  Patient chose [facility].  YORDAN faxed completed choice form to PACO Fisher for referral follow up.

## 2017-08-08 NOTE — PROGRESS NOTES
Bedside report given to Gemma PELAEZ @ 3818. POC discussed. Pt resting comfortably in bed. Safety precautions in place. Additional pain medication orders received from MD.    Tele strip at 0706 shows SR w/ HR of 63  Measurements: .16/.08/.38    Tele Shift Summary:  Rhythm: SR/ST  Rate: 70's-100's  Ectopy: Per CCT Tonya, pt had rare PVC's.    Telemetry monitoring strips placed in pt chart.

## 2017-08-08 NOTE — PROGRESS NOTES
Gave bedside report to NOC nurseJo Ann.  Discussed POC.  Pt resting in bed, mild/moderate work of breathing, on 3 L of oxygen via NC, some irritability, no s/s of acute distress, al lines patent, IVF infusing, personal belongings w/in reach, safety precautions in place.

## 2017-08-09 ENCOUNTER — TELEPHONE (OUTPATIENT)
Dept: MEDICAL GROUP | Facility: PHYSICIAN GROUP | Age: 52
End: 2017-08-09

## 2017-08-09 ENCOUNTER — PATIENT OUTREACH (OUTPATIENT)
Dept: HEALTH INFORMATION MANAGEMENT | Facility: OTHER | Age: 52
End: 2017-08-09

## 2017-08-09 NOTE — PROGRESS NOTES
· Chart reviewed.  Discharge clinic appt scheduled for 8/10/17 canceled per pt today 8/9 at 11:10 AM.  Care manager telephone visit scheduled for today at 2:00 PM canceled.  Pt has a PCP f/u scheduled for 8/11/17.  Pt has a lace score of 26 and does not qualify for a discharge outreach phone call from this writer.

## 2017-08-09 NOTE — TELEPHONE ENCOUNTER
1. Caller Name: Jeannine ([facility])                                         Call Back Number: [phone] ext. [phone]    Pt #: [phone] (home)         Patient approves a detailed voicemail message: N\A    Jeannine called requesting pain meds for Pt. She states Pt was recently discharged from hospital for pulmonary embolisms and she is in a lot of pain. Please advise.

## 2017-08-10 DIAGNOSIS — M15.9 PRIMARY OSTEOARTHRITIS INVOLVING MULTIPLE JOINTS: ICD-10-CM

## 2017-08-10 RX ORDER — TRAMADOL HYDROCHLORIDE 50 MG/1
50 TABLET ORAL EVERY 4 HOURS PRN
Qty: 30 TAB | Refills: 0 | Status: SHIPPED
Start: 2017-08-10 | End: 2017-11-29

## 2017-08-10 NOTE — TELEPHONE ENCOUNTER
I recommend tramadol for pain. Patient should not have a significant amount of pain from pulmonary embolism. If she continues to have pain and recommend follow-up appointment

## 2017-08-11 ENCOUNTER — APPOINTMENT (OUTPATIENT)
Dept: MEDICAL GROUP | Facility: PHYSICIAN GROUP | Age: 52
End: 2017-08-11
Payer: MEDICARE

## 2017-08-17 ENCOUNTER — APPOINTMENT (OUTPATIENT)
Dept: VASCULAR LAB | Facility: MEDICAL CENTER | Age: 52
End: 2017-08-17
Payer: MEDICARE

## 2017-08-29 ENCOUNTER — OFFICE VISIT (OUTPATIENT)
Dept: MEDICAL GROUP | Facility: PHYSICIAN GROUP | Age: 52
End: 2017-08-29
Payer: MEDICARE

## 2017-08-29 VITALS
TEMPERATURE: 98.2 F | WEIGHT: 280 LBS | SYSTOLIC BLOOD PRESSURE: 120 MMHG | DIASTOLIC BLOOD PRESSURE: 70 MMHG | RESPIRATION RATE: 16 BRPM | HEART RATE: 125 BPM | BODY MASS INDEX: 46.65 KG/M2 | HEIGHT: 65 IN | OXYGEN SATURATION: 94 %

## 2017-08-29 DIAGNOSIS — M46.1 SACROILIITIS (HCC): ICD-10-CM

## 2017-08-29 DIAGNOSIS — I26.99 OTHER ACUTE PULMONARY EMBOLISM WITHOUT ACUTE COR PULMONALE (HCC): ICD-10-CM

## 2017-08-29 DIAGNOSIS — I10 ESSENTIAL HYPERTENSION: ICD-10-CM

## 2017-08-29 PROCEDURE — 99215 OFFICE O/P EST HI 40 MIN: CPT | Performed by: FAMILY MEDICINE

## 2017-08-29 NOTE — PATIENT INSTRUCTIONS
Pulmonary Embolism  A pulmonary (lung) embolism (PE) is a blood clot that has traveled to the lung and results in a blockage of blood flow in the affected lung. Most clots come from deep veins in the legs or pelvis. PE is a dangerous and potentially life-threatening condition that can be treated if identified.  CAUSES  Blood clots form in a vein for different reasons. Usually several things cause blood clots. They include:  · The flow of blood slows down.  · The inside of the vein is damaged in some way.  · The person has a condition that makes the blood clot more easily.  RISK FACTORS  Some people are more likely than others to develop PE. Risk factors include:   · Smoking.  · Being overweight (obese).  · Sitting or lying still for a long time. This includes long-distance travel, paralysis, or recovery from an illness or surgery.  Other factors that increase risk are:   · Older age, especially over 75 years of age.  · Having a family history of blood clots or if you have already had a blood clot.  · Having major or lengthy surgery. This is especially true for surgery on the hip, knee, or belly (abdomen). Hip surgery is particularly high risk.  · Having a long, thin tube (catheter) placed inside a vein during a medical procedure.  · Breaking a hip or leg.  · Having cancer or cancer treatment.  · Medicines containing the female hormone estrogen. This includes birth control pills and hormone replacement therapy.  · Other circulation or heart problems.  · Pregnancy and childbirth.  ¨ Hormone changes make the blood clot more easily during pregnancy.  ¨ The fetus puts pressure on the veins of the pelvis.  ¨ There is a risk of injury to veins during delivery or a caesarean delivery. The risk is highest just after childbirth.    PREVENTION   · Exercise the legs regularly. Take a brisk 30 minute walk every day.  · Maintain a weight that is appropriate for your height.  · Avoid sitting or lying in bed for long periods of  time without moving your legs.  · Women, particularly those over the age of 35 years, should consider the risks and benefits of taking estrogen medicines, including birth control pills.  · Do not smoke, especially if you take estrogen medicines.  · Long-distance travel can increase your risk. You should exercise your legs by walking or pumping the muscles every hour.  · Many of the risk factors above relate to situations that exist with hospitalization, either for illness, injury, or elective surgery. Prevention may include medical and nonmedical measures.    ¨ Your health care provider will assess you for the need for venous thromboembolism prevention when you are admitted to the hospital. If you are having surgery, your surgeon will assess you the day of or day after surgery.     SYMPTOMS   The symptoms of a PE usually start suddenly and include:  · Shortness of breath.  · Coughing.  · Coughing up blood or blood-tinged mucus.  · Chest pain. Pain is often worse with deep breaths.  · Rapid heartbeat.  DIAGNOSIS   Your health care provider will take a medical history, perform a physical exam, and use rule-out criteria to assess your risk for PE. If your risk is intermediate or high, other tests may be done. These include:  · Blood tests, such as studies of the clotting properties of your blood.  · Imaging tests, such as ultrasound, CT, MRI, and other tests to see if you have clots in your legs or lungs.  · An electrocardiogram. This can look for heart strain from blood clots in the lungs.  TREATMENT   · The most common treatment for a PE is blood thinning (anticoagulant) medicine, which reduces the blood's tendency to clot. Anticoagulants can stop new blood clots from forming and old clots from growing. They cannot dissolve existing clots. Your body does this by itself over time. Anticoagulants can be given by mouth, through an intravenous (IV) tube, or by injection. Your health care provider will determine the best  program for you.  · Less commonly, clot-dissolving medicines (thrombolytics) are used to dissolve a PE. They carry a high risk of bleeding, so they are used mainly in severe cases.  · Very rarely, a blood clot in the leg needs to be removed surgically.  · If you are unable to take anticoagulants, your health care provider may arrange for you to have a filter placed in a main vein in your abdomen. This filter prevents clots from traveling to your lungs.  HOME CARE INSTRUCTIONS   · Take all medicines as directed by your health care provider.  · Learn as much as you can about DVT.  · Wear a medical alert bracelet or carry a medical alert card.  · Ask your health care provider how soon you can go back to normal activities. It is important to stay active to prevent blood clots. If you are on anticoagulant medicine, avoid contact sports.  · It is very important to exercise. This is especially important while traveling, sitting, or standing for long periods of time. Exercise your legs by walking or by tightening and relaxing your leg muscles regularly. Take frequent walks.  · You may need to wear compression stockings. These are tight elastic stockings that apply pressure to the lower legs. This pressure can help keep the blood in the legs from clotting.  Taking Warfarin  Warfarin is a daily medicine that is taken by mouth. Your health care provider will advise you on the length of treatment (usually 3-6 months, sometimes lifelong). If you take warfarin:  · Understand how to take warfarin and foods that can affect how warfarin works in your body.  · Too much and too little warfarin are both dangerous. Too much warfarin increases the risk of bleeding. Too little warfarin continues to allow the risk for blood clots.  Warfarin and Regular Blood Testing  While taking warfarin, you will need to have regular blood tests to measure your blood clotting time. These blood tests usually include both the prothrombin time (PT) and  international normalized ratio (INR) tests. The PT and INR results allow your health care provider to adjust your dose of warfarin. It is very important that you have your PT and INR tested as often as directed by your health care provider.   Warfarin and Your Diet  Avoid major changes in your diet, or notify your health care provider before changing your diet. Arrange a visit with a registered dietitian to answer your questions. Many foods, especially foods high in vitamin K, can interfere with warfarin and affect the PT and INR results. You should eat a consistent amount of foods high in vitamin K. Foods high in vitamin K include:   · Spinach, kale, broccoli, cabbage, lanie and turnip greens, [place] sprouts, peas, cauliflower, seaweed, and parsley.  · Beef and pork liver.  · Green tea.  · Soybean oil.  Warfarin with Other Medicines  Many medicines can interfere with warfarin and affect the PT and INR results. You must:  · Tell your health care provider about any and all medicines, vitamins, and supplements you take, including aspirin and other over-the-counter anti-inflammatory medicines. Be especially cautious with aspirin and anti-inflammatory medicines. Ask your health care provider before taking these.  · Do not take or discontinue any prescribed or over-the-counter medicine except on the advice of your health care provider or pharmacist.  Warfarin Side Effects  Warfarin can have side effects, such as easy bruising and difficulty stopping bleeding. Ask your health care provider or pharmacist about other side effects of warfarin. You will need to:  · Hold pressure over cuts for longer than usual.  · Notify your dentist and other health care providers that you are taking warfarin before you undergo any procedures where bleeding may occur.  Warfarin with Alcohol and Tobacco   · Drinking alcohol frequently can increase the effect of warfarin, leading to excess bleeding. It is best to avoid alcoholic drinks or  consume only very small amounts while taking warfarin. Notify your health care provider if you change your alcohol intake.  · Do not use any tobacco products including cigarettes, chewing tobacco, or electronic cigarettes. If you smoke, quit. Ask your health care provider for help with quitting smoking.  Alternative Medicines to Warfarin: Factor Xa Inhibitor Medicines  · These blood thinning medicines are taken by mouth, usually for several weeks or longer. It is important to take the medicine every single day, at the same time each day.  · There are no regular blood tests required when using these medicines.  · There are fewer food and drug interactions than with warfarin.  · The side effects of this class of medicine is similar to that of warfarin, including excessive bruising or bleeding. Ask your health care provider or pharmacist about other potential side effects.  SEEK MEDICAL CARE IF:   · You notice a rapid heartbeat.  · You feel weaker or more tired than usual.  · You feel faint.  · You notice increased bruising.  · Your symptoms are not getting better in the time expected.  · You are having side effects of medicine.  SEEK IMMEDIATE MEDICAL CARE IF:   · You have chest pain.  · You have trouble breathing.  · You have new or increased swelling or pain in one leg.  · You cough up blood.  · You notice blood in vomit, in a bowel movement, or in urine.  · You have a fever.  Symptoms of PE may represent a serious problem that is an emergency. Do not wait to see if the symptoms will go away. Get medical help right away. Call your local emergency services (911 in the [place]). Do not drive yourself to the hospital.       This information is not intended to replace advice given to you by your health care provider. Make sure you discuss any questions you have with your health care provider.     Document Released: 12/15/2001 Document Revised: 01/08/2016 Document Reviewed: 04/13/2016  [employer] Interactive Patient  Education ©2016 [employer]

## 2017-08-29 NOTE — PROGRESS NOTES
"Subjective:     Chief Complaint   Patient presents with   • Breathing Problem     PE hospital follow up       Carolynn Casey is a 52 y.o. female here today for hospital follow-up for pulmonary embolism.  Patient was seen in our office on 8/3/17. The patient had S I fusion on 8/2/17. At previous office visit patient was found to be hypoxic which resolved with nebulizer. Nevertheless patient was advised to have CTA. Same-day CTA was canceled by radiology due to lack of access for IV contrast. Patient had a TIA on 8/4/17 which showed bilateral pulmonary embolisms. Patient was immediately admitted to [facility] from 8/4/17-8/8/17. During hospitalization patient's blood pressure was noted to be low and blood pressure medications were held. Patient was advised to go home on oxygen. Patient was also started on Lovenox and then Xarelto. Lower extremity ultrasound was done which showed no DVT. Patient had echo on 8/5/17 which showed ejection fraction 65%, patient had second echo on 8/7/17 to evaluate pulmonary artery pressure. Right ventricular systolic pressure was 45 mmHg.  She was discharged on Xarelto 15 mg twice a day. She was told to take for 21 days and then Xarelto 20 mg daily.    Patient states she continues to have no chest pain, shortness of breath, dyspnea on exertion, orthopnea, or lower extremity swelling. She states that her low back pain from SI fusion has improved.    Allergies   Allergen Reactions   • Cortisone Anaphylaxis     RXN=since age 12   • Food Unspecified     \"citrus juice\" =burn and throat swelling.    ALL FISH - nausea  RXN=whole life   • Penicillins Anaphylaxis     RXN=since age 1   • Fish Vomiting and Nausea   • Keflex Diarrhea and Nausea     RXN=20 years ago   • Latex Rash and Itching     Rash, itching  RXN=20 years ago   • Naprosyn [Naproxen] Unspecified     \"GI bleed\"  RXN=whole life   • Tape Rash     Plastic tape \"bubble up the skin\", reports tegaderm OK  RXN=ongoing   • Shellfish " Allergy      Betadine/iodine for surgery is ok     Current medicines (including changes today)  Current Outpatient Prescriptions   Medication Sig Dispense Refill   • LOSARTAN POTASSIUM PO Take  by mouth.     • rivaroxaban (XARELTO) 20 MG Tab tablet Take 1 Tab by mouth with dinner. 30 Tab 3   • zolpidem (AMBIEN) 10 MG Tab Take 1 Tab by mouth at bedtime as needed for Sleep. 30 Tab 3   • cyclobenzaprine (FLEXERIL) 10 MG Tab TAKE ONE TABLET BY MOUTH THREE TIMES DAILY AS NEEDED 90 Tab 2   • albuterol 108 (90 BASE) MCG/ACT Aero Soln inhalation aerosol Inhale 2 Puffs by mouth every 6 hours as needed for Shortness of Breath. 8.5 g 3   • tizanidine (ZANAFLEX) 4 MG Tab Take 4 mg by mouth every bedtime. Indications: Muscle Spasticity     • levothyroxine (SYNTHROID) 25 MCG Tab Take 1 Tab by mouth Every morning on an empty stomach. 30 Tab 1   • acetaminophen (TYLENOL) 500 MG Tab Take 1,500 mg by mouth every 6 hours as needed. Indications: Pain     • hydrocodone/acetaminophen (NORCO)  MG Tab Take 1 Tab by mouth 2 times a day as needed.     • tramadol (ULTRAM) 50 MG Tab Take 1 Tab by mouth every four hours as needed. 30 Tab 0     No current facility-administered medications for this visit.      Social History   Substance Use Topics   • Smoking status: Never Smoker   • Smokeless tobacco: Never Used      Comment: continue to avoid   • Alcohol use No     Family Status   Relation Status   • Mother Alive   • Father  at age 59    MI   • Sister Alive   • Brother Alive   • Maternal Aunt     breast cancer   • Maternal Uncle    • Paternal Grandmother     MI   • Paternal Grandfather    • Maternal Grandmother  at age 85   • Maternal Grandfather  at age 98   • Brother Alive   • Son Alive   • Daughter Alive   • Neg Hx      Family History   Problem Relation Age of Onset   • Hypertension Mother    • Cancer Mother 81     breast DCIS   • Heart Disease Father    • Heart Attack Father    •  "Hypertension Father    • Lung Disease Sister      asthma   • Hypertension Sister    • Arthritis Sister      knee   • Arthritis Brother    • Hypertension Brother    • Diabetes Brother    • Heart Disease Maternal Aunt    • Hypertension Maternal Aunt    • Cancer Maternal Aunt 80     breast   • Stroke Maternal Uncle    • Heart Disease Maternal Uncle    • Hypertension Maternal Uncle    • Heart Disease Paternal Grandmother    • Psychiatry Paternal Grandfather      Suicide   • Alcohol/Drug Paternal Grandfather    • Arthritis Maternal Grandmother    • Arthritis Maternal Grandfather    • Arthritis Brother    • Hyperlipidemia Neg Hx      She    has a past medical history of Allergy; Anxiety; ASTHMA; Bronchitis (2014); Constipation; GERD (gastroesophageal reflux disease); Heart murmur; Hypertension; Migraine; Pain (01-29-16); Primary osteoarthritis involving multiple joints; Snoring; Unspecified disorder of thyroid; and Unspecified urinary incontinence.        ROS   GEN: no weight loss, fevers, or chills  HEENT: no blurry vision or change in vision, no ear pain, no difficulty swallowing, no throat pain, no runny nose, no nasal congestion  Resp: no shortness of breath, no cough  CV: no racing heart, no irregular beats, no chest pain  Abd: no nausea, no vomiting, no diarrhea, no constipation, no blood in stool, no dark stools, no incontinence  : no dysuria, no hematuria, no urinary incontinence, no increased frequency  Neuro: no headaches, no dizziness, no LOC, no weakness, no numbness/tingling       Objective:     Blood pressure 120/70, pulse (!) 125, temperature 36.8 °C (98.2 °F), resp. rate 16, height 1.651 m (5' 5\"), weight (!) 127 kg (280 lb), SpO2 94 %. Body mass index is 46.59 kg/m².   Physical Exam:  Constitutional: Alert, no distress.  Skin: Warm, dry, good turgor, no rashes in visible areas.  Eye: Equal, round and reactive, conjunctiva clear, lids normal.  ENMT: Lips without lesions, oropharynx non-erythematous, no " exudate, moist oral mucosa, bilateral tympanic membranes: No bulging, no retraction, no fluid, nonerythematous, positive light reflex, external auditory canals: Clear, scant cerumen, nonerythematous  Neck: Trachea midline, no masses, no thyromegaly. No cervical or supraclavicular lymphadenopathy. Full ROM  Respiratory: Unlabored respiratory effort, good air movement, lungs clear to auscultation, no wheezes, no ronchi.  Cardiovascular:RRR, +S1, S2, no murmur, no peripheral edema, pedal and radial pulses equal and intact bilat  Abdomen: Soft, non-tender, no masses, no hepatosplenomegaly.  MSK:5/5 muscle strength in upper extremities as well as lower extremity bilaterally, Patient ambulates with walker.  Psych: Alert and oriented, appropriate affect and mood.  Neuro: CN2-12 intact, no gross motor or sensory deficits      Assessment and Plan:   The following treatment plan was discussed    1. Other acute pulmonary embolism without acute cor pulmonale  Provoked pulmonary embolism. Recommends a relative for at least 3 months. We will evaluate risk factors in 3 months. Patient advised to takes about 2 with food. Risks of bleeding discussed.    2. Sacroiliitis (CMS-HCC)  Chronic: Patient has not yet started physical therapy. Recommend continuing walker as needed. Also continue tramadol or hydrocodone as needed.    3. Essential hypertension  Chronic: Currently well controlled. Patient had hypotensive event while in the hospital. Patient's continue taking losartan with blood pressure now well controlled.      This was a 40 minute face-to-face visit spent reviewing patient's medical records and discussing anticoagulation    Followup: Return in about 8 weeks (around 10/24/2017) for chronic conditions.    Please note that this dictation was created using voice recognition software. I have made every reasonable attempt to correct obvious errors, but I expect that there are errors of grammar and possibly content that I did not  discover before finalizing the note.

## 2017-08-31 PROBLEM — I27.20 PULMONARY HTN (HCC): Status: ACTIVE | Noted: 2017-08-31

## 2017-08-31 RX ORDER — HYDROCODONE BITARTRATE AND ACETAMINOPHEN 10; 325 MG/1; MG/1
1 TABLET ORAL 2 TIMES DAILY PRN
COMMUNITY
Start: 2017-08-11 | End: 2017-11-29

## 2017-09-12 ENCOUNTER — PATIENT OUTREACH (OUTPATIENT)
Dept: HEALTH INFORMATION MANAGEMENT | Facility: OTHER | Age: 52
End: 2017-09-12

## 2017-09-12 NOTE — PROGRESS NOTES
Patient discharged on 8/8/2017. [facility] patient advocate assisted with multiple discharge needs including 2 appointments, 1 Surgeon appointment, 1 Primary care physician appointment. Of the 2 appointments the patient kept 2.

## 2017-09-25 DIAGNOSIS — I10 ESSENTIAL HYPERTENSION: ICD-10-CM

## 2017-09-25 DIAGNOSIS — E03.8 SUBCLINICAL HYPOTHYROIDISM: ICD-10-CM

## 2017-09-26 RX ORDER — LEVOTHYROXINE SODIUM 0.03 MG/1
25 TABLET ORAL
Qty: 90 TAB | Refills: 0 | Status: SHIPPED | OUTPATIENT
Start: 2017-09-26 | End: 2017-12-22 | Stop reason: SDUPTHER

## 2017-09-26 RX ORDER — HYDROCHLOROTHIAZIDE 25 MG/1
25 TABLET ORAL DAILY
Qty: 90 TAB | Refills: 0 | Status: SHIPPED | OUTPATIENT
Start: 2017-09-26 | End: 2017-12-22 | Stop reason: SDUPTHER

## 2017-10-02 ENCOUNTER — NON-PROVIDER VISIT (OUTPATIENT)
Dept: MEDICAL GROUP | Facility: PHYSICIAN GROUP | Age: 52
End: 2017-10-02
Payer: MEDICARE

## 2017-10-02 DIAGNOSIS — Z23 NEED FOR VACCINATION: ICD-10-CM

## 2017-10-02 PROCEDURE — G0008 ADMIN INFLUENZA VIRUS VAC: HCPCS | Performed by: FAMILY MEDICINE

## 2017-10-02 PROCEDURE — 90686 IIV4 VACC NO PRSV 0.5 ML IM: CPT | Performed by: FAMILY MEDICINE

## 2017-11-03 NOTE — ADDENDUM NOTE
Encounter addended by: Kimberlyn Coburn on: 11/3/2017 11:34 AM<BR>    Actions taken: Flowsheet accepted

## 2017-11-14 DIAGNOSIS — M15.9 PRIMARY OSTEOARTHRITIS INVOLVING MULTIPLE JOINTS: ICD-10-CM

## 2017-11-15 RX ORDER — CYCLOBENZAPRINE HCL 10 MG
TABLET ORAL
Qty: 90 TAB | Refills: 0 | Status: SHIPPED | OUTPATIENT
Start: 2017-11-15 | End: 2017-11-29 | Stop reason: SDUPTHER

## 2017-11-15 NOTE — TELEPHONE ENCOUNTER
Was the patient seen in the last year in this department? Yes     Does patient have an active prescription for medications requested? No     Received Request Via: Pharmacy      Pt met protocol?: Yes  O.V 8/17

## 2017-11-19 ENCOUNTER — PATIENT MESSAGE (OUTPATIENT)
Dept: MEDICAL GROUP | Facility: PHYSICIAN GROUP | Age: 52
End: 2017-11-19

## 2017-11-20 ENCOUNTER — PATIENT MESSAGE (OUTPATIENT)
Dept: MEDICAL GROUP | Facility: PHYSICIAN GROUP | Age: 52
End: 2017-11-20

## 2017-11-21 ENCOUNTER — HOSPITAL ENCOUNTER (OUTPATIENT)
Dept: LAB | Facility: MEDICAL CENTER | Age: 52
End: 2017-11-21
Attending: FAMILY MEDICINE
Payer: MEDICARE

## 2017-11-21 DIAGNOSIS — E03.8 HYPOTHYROIDISM DUE TO HASHIMOTO'S THYROIDITIS: ICD-10-CM

## 2017-11-21 DIAGNOSIS — E06.3 HYPOTHYROIDISM DUE TO HASHIMOTO'S THYROIDITIS: ICD-10-CM

## 2017-11-21 LAB — TSH SERPL DL<=0.005 MIU/L-ACNC: 3.24 UIU/ML (ref 0.3–3.7)

## 2017-11-21 PROCEDURE — 36415 COLL VENOUS BLD VENIPUNCTURE: CPT

## 2017-11-21 PROCEDURE — 84443 ASSAY THYROID STIM HORMONE: CPT

## 2017-11-29 ENCOUNTER — OFFICE VISIT (OUTPATIENT)
Dept: MEDICAL GROUP | Facility: PHYSICIAN GROUP | Age: 52
End: 2017-11-29
Payer: MEDICARE

## 2017-11-29 VITALS
TEMPERATURE: 98.2 F | BODY MASS INDEX: 46.48 KG/M2 | RESPIRATION RATE: 16 BRPM | WEIGHT: 279 LBS | SYSTOLIC BLOOD PRESSURE: 140 MMHG | HEART RATE: 78 BPM | DIASTOLIC BLOOD PRESSURE: 80 MMHG | HEIGHT: 65 IN | OXYGEN SATURATION: 95 %

## 2017-11-29 DIAGNOSIS — M15.9 PRIMARY OSTEOARTHRITIS INVOLVING MULTIPLE JOINTS: ICD-10-CM

## 2017-11-29 DIAGNOSIS — J96.01 ACUTE RESPIRATORY FAILURE WITH HYPOXIA (HCC): ICD-10-CM

## 2017-11-29 DIAGNOSIS — I27.20 PULMONARY HTN (HCC): ICD-10-CM

## 2017-11-29 DIAGNOSIS — G47.00 INSOMNIA, UNSPECIFIED TYPE: ICD-10-CM

## 2017-11-29 DIAGNOSIS — M46.1 SACROILIITIS (HCC): ICD-10-CM

## 2017-11-29 DIAGNOSIS — I26.99 OTHER ACUTE PULMONARY EMBOLISM WITHOUT ACUTE COR PULMONALE (HCC): ICD-10-CM

## 2017-11-29 PROBLEM — N17.9 AKI (ACUTE KIDNEY INJURY) (HCC): Status: RESOLVED | Noted: 2017-08-07 | Resolved: 2017-11-29

## 2017-11-29 PROBLEM — E86.0 DEHYDRATION: Status: RESOLVED | Noted: 2017-08-07 | Resolved: 2017-11-29

## 2017-11-29 PROCEDURE — 99214 OFFICE O/P EST MOD 30 MIN: CPT | Performed by: FAMILY MEDICINE

## 2017-11-29 RX ORDER — ZOLPIDEM TARTRATE 10 MG/1
10 TABLET ORAL NIGHTLY PRN
Qty: 30 TAB | Refills: 3 | Status: SHIPPED
Start: 2017-11-29 | End: 2018-04-11 | Stop reason: SDUPTHER

## 2017-11-29 RX ORDER — TIZANIDINE 4 MG/1
4 TABLET ORAL
Qty: 30 TAB | Refills: 11 | Status: SHIPPED | OUTPATIENT
Start: 2017-11-29 | End: 2018-12-04 | Stop reason: SDUPTHER

## 2017-11-29 RX ORDER — CYCLOBENZAPRINE HCL 10 MG
TABLET ORAL
Qty: 90 TAB | Refills: 0 | Status: SHIPPED | OUTPATIENT
Start: 2017-11-29 | End: 2018-01-08 | Stop reason: SDUPTHER

## 2017-11-29 NOTE — PROGRESS NOTES
"Subjective:     Chief Complaint   Patient presents with   • Insomnia   • Pain       Carolynn Casey is a 52 y.o. female here today forFollow-up on pulmonary embolism, chronic low back pain, and insomnia.    Pt completed 3 months of treatment with Xarelto for provoked pulmonary embolism after surgical sacral fusion. Patient has stopped the Route toe. She denies any shortness of breath, chest pain, hematuria, hematochezia, epistaxis, or easy bruising.    Pt has not taken losartan for several weeks. She has forgotten to take medication. Denies blurry vision, change of vision, headaches, chest pain, change in urination or lower extremity swelling.    Patient is followed by orthopedics for sacroiliitis. She reports mild improvement in pain with sacral fusion. She continues to take Tylenol and Advil as needed 1-2 times per day. She is also taking Flexeril during the day and tizanidine at night if needed. Patient reports 20 years of opiate use with self weaning. She does not wish to restart medications.    Patient is using Ambien at night to induce sleep. His Ambien she is unable to sleep.    Allergies   Allergen Reactions   • Cortisone Anaphylaxis     RXN=since age 12   • Food Unspecified     \"citrus juice\" =burn and throat swelling.    ALL FISH - nausea  RXN=whole life   • Penicillins Anaphylaxis     RXN=since age 1   • Fish Vomiting and Nausea   • Keflex Diarrhea and Nausea     RXN=20 years ago   • Latex Rash and Itching     Rash, itching  RXN=20 years ago   • Naprosyn [Naproxen] Unspecified     \"GI bleed\"  RXN=whole life   • Tape Rash     Plastic tape \"bubble up the skin\", reports tegaderm OK  RXN=ongoing   • Shellfish Allergy      Betadine/iodine for surgery is ok     Current medicines (including changes today)  Current Outpatient Prescriptions   Medication Sig Dispense Refill   • zolpidem (AMBIEN) 10 MG Tab Take 1 Tab by mouth at bedtime as needed for Sleep. 30 Tab 3   • cyclobenzaprine (FLEXERIL) 10 MG Tab TAKE " ONE TABLET BY MOUTH THREE TIMES DAILY AS NEEDED 90 Tab 0   • tizanidine (ZANAFLEX) 4 MG Tab Take 1 Tab by mouth every bedtime. 30 Tab 11   • hydrochlorothiazide (HYDRODIURIL) 25 MG Tab Take 1 Tab by mouth every day. 90 Tab 0   • levothyroxine (SYNTHROID) 25 MCG Tab Take 1 Tab by mouth Every morning on an empty stomach. 90 Tab 0   • albuterol 108 (90 BASE) MCG/ACT Aero Soln inhalation aerosol Inhale 2 Puffs by mouth every 6 hours as needed for Shortness of Breath. 8.5 g 3   • acetaminophen (TYLENOL) 500 MG Tab Take 1,500 mg by mouth every 6 hours as needed. Indications: Pain     • LOSARTAN POTASSIUM PO Take  by mouth.       No current facility-administered medications for this visit.      Social History   Substance Use Topics   • Smoking status: Never Smoker   • Smokeless tobacco: Never Used      Comment: continue to avoid   • Alcohol use No     Family Status   Relation Status   • Mother Alive   • Father  at age 59    MI   • Sister Alive   • Brother Alive   • Maternal Aunt     breast cancer   • Maternal Uncle    • Paternal Grandmother     MI   • Paternal Grandfather    • Maternal Grandmother  at age 85   • Maternal Grandfather  at age 98   • Brother Alive   • Son Alive   • Daughter Alive   • Neg Hx      Family History   Problem Relation Age of Onset   • Hypertension Mother    • Cancer Mother 81     breast DCIS   • Heart Disease Father    • Heart Attack Father    • Hypertension Father    • Lung Disease Sister      asthma   • Hypertension Sister    • Arthritis Sister      knee   • Arthritis Brother    • Hypertension Brother    • Diabetes Brother    • Heart Disease Maternal Aunt    • Hypertension Maternal Aunt    • Cancer Maternal Aunt 80     breast   • Stroke Maternal Uncle    • Heart Disease Maternal Uncle    • Hypertension Maternal Uncle    • Heart Disease Paternal Grandmother    • Psychiatry Paternal Grandfather      Suicide   • Alcohol/Drug Paternal  "Grandfather    • Arthritis Maternal Grandmother    • Arthritis Maternal Grandfather    • Arthritis Brother    • Hyperlipidemia Neg Hx      She    has a past medical history of Allergy; Anxiety; ASTHMA; Bronchitis (2014); Constipation; GERD (gastroesophageal reflux disease); Heart murmur; Hypertension; Migraine; Pain (01-29-16); Primary osteoarthritis involving multiple joints; Snoring; Unspecified disorder of thyroid; and Unspecified urinary incontinence.        ROS  As per history of present illness. All others reviewed and negative         Objective:     Blood pressure 140/80, pulse 78, temperature 36.8 °C (98.2 °F), resp. rate 16, height 1.651 m (5' 5\"), weight (!) 126.6 kg (279 lb), SpO2 95 %. Body mass index is 46.43 kg/m².   Physical Exam:  Constitutional: Alert, no distress.  Skin: Warm, dry, good turgor, no rashes in visible areas.  Eye: Equal, round and reactive, conjunctiva clear, lids normal.  ENMT: Lips without lesions, oropharynx non-erythematous, no exudate, moist oral mucosa  Neck: Trachea midline, no masses, no thyromegaly. No cervical or supraclavicular lymphadenopathy. Full ROM  Respiratory: Unlabored respiratory effort, good air movement, lungs clear to auscultation, no wheezes, no ronchi.  Cardiovascular:RRR, +S1, S2, no murmur, no peripheral edema, pedal and radial pulses equal and intact bilat  Abdomen: Soft, non-tender, no masses, no hepatosplenomegaly.  Psych: Alert and oriented, appropriate affect and mood.  Neuro: CN2-12 intact, no gross motor or sensory deficits      Assessment and Plan:   The following treatment plan was discussed    1. Pulmonary HTN  Noted on Echo. Pt notes SOB or chest pain    2. Acute respiratory failure with hypoxia (CMS-HCC)  Resolved    3. Other acute pulmonary embolism without acute cor pulmonale (CMS-HCC)  Provoked due to surgery, completed 3 months of Xarelto    4. Sacroiliitis (CMS-HCC)  Controlled: continue to follow with Ortho  - cyclobenzaprine (FLEXERIL) 10 " MG Tab; TAKE ONE TABLET BY MOUTH THREE TIMES DAILY AS NEEDED  Dispense: 90 Tab; Refill: 0  - tizanidine (ZANAFLEX) 4 MG Tab; Take 1 Tab by mouth every bedtime.  Dispense: 30 Tab; Refill: 11    5. Primary osteoarthritis involving multiple joints  Controlled: continue to follow with Ortho  - cyclobenzaprine (FLEXERIL) 10 MG Tab; TAKE ONE TABLET BY MOUTH THREE TIMES DAILY AS NEEDED  Dispense: 90 Tab; Refill: 0  - tizanidine (ZANAFLEX) 4 MG Tab; Take 1 Tab by mouth every bedtime.  Dispense: 30 Tab; Refill: 11    6. Insomnia, unspecified type  Chronic: stable  - zolpidem (AMBIEN) 10 MG Tab; Take 1 Tab by mouth at bedtime as needed for Sleep.  Dispense: 30 Tab; Refill: 3      Followup: Return in about 6 months (around 5/29/2018) for chronic conditions.    Please note that this dictation was created using voice recognition software. I have made every reasonable attempt to correct obvious errors, but I expect that there are errors of grammar and possibly content that I did not discover before finalizing the note.

## 2017-12-22 DIAGNOSIS — E03.8 SUBCLINICAL HYPOTHYROIDISM: ICD-10-CM

## 2017-12-22 DIAGNOSIS — I10 ESSENTIAL HYPERTENSION: ICD-10-CM

## 2017-12-22 RX ORDER — HYDROCHLOROTHIAZIDE 25 MG/1
TABLET ORAL
Qty: 90 TAB | Refills: 1 | Status: SHIPPED | OUTPATIENT
Start: 2017-12-22 | End: 2018-06-22 | Stop reason: SDUPTHER

## 2017-12-22 RX ORDER — LEVOTHYROXINE SODIUM 0.03 MG/1
TABLET ORAL
Qty: 90 TAB | Refills: 1 | Status: SHIPPED | OUTPATIENT
Start: 2017-12-22 | End: 2018-06-22 | Stop reason: SDUPTHER

## 2017-12-22 NOTE — TELEPHONE ENCOUNTER
Was the patient seen in the last year in this department? Yes     Does patient have an active prescription for medications requested? No     Received Request Via: Pharmacy      Pt met protocol?: Yes pt last ov 11/17   TSH   Date Value Ref Range Status   11/21/2017 3.240 0.300 - 3.700 uIU/mL Final       BP Readings from Last 1 Encounters:   11/29/17 140/80

## 2017-12-23 NOTE — TELEPHONE ENCOUNTER
Refill X 6 months, sent to pharmacy.Pt. Seen in the last 6 months per protocol. Last TSH from 11/17 was 3.24.  Lab Results   Component Value Date/Time    SODIUM 132 (L) 08/08/2017 04:29 AM    POTASSIUM 3.3 (L) 08/08/2017 04:29 AM    CHLORIDE 98 08/08/2017 04:29 AM    CO2 25 08/08/2017 04:29 AM    GLUCOSE 114 (H) 08/08/2017 04:29 AM    BUN 26 (H) 08/08/2017 04:29 AM    CREATININE 0.92 08/08/2017 04:29 AM

## 2018-01-08 DIAGNOSIS — M46.1 SACROILIITIS (HCC): ICD-10-CM

## 2018-01-08 DIAGNOSIS — M15.9 PRIMARY OSTEOARTHRITIS INVOLVING MULTIPLE JOINTS: ICD-10-CM

## 2018-01-09 RX ORDER — CYCLOBENZAPRINE HCL 10 MG
TABLET ORAL
Qty: 90 TAB | Refills: 0 | Status: SHIPPED | OUTPATIENT
Start: 2018-01-09 | End: 2018-03-01 | Stop reason: SDUPTHER

## 2018-01-12 ENCOUNTER — PATIENT OUTREACH (OUTPATIENT)
Dept: HEALTH INFORMATION MANAGEMENT | Facility: OTHER | Age: 53
End: 2018-01-12

## 2018-01-12 NOTE — PROGRESS NOTES
1. Attempt #: 1    2. [facility] Verified: yes    3. Verify PCP: yes    4. Care Team Updated:       •   DME Company (gait device, O2, CPAP, etc.): YES       •   Other Specialists (eye doctor, derm, GYN, cardiology, endo, etc): YES    5.  Reviewed/Updated the following with patient:       •   Communication Preference Obtained? YES       •   Preferred Pharmacy? YES       •   Preferred Lab? YES       •   Family History (document living status of immediate family members and if + hx of cancer, diabetes, hypertension, hyperlipidemia, heart attack, stroke) YES. Was Abstract Encounter opened and chart updated? YES    6. [employer] Activation: already active    7. [employer] Mariam: no    8. Annual Wellness Visit Scheduling  Scheduling Status:Scheduled      9. Care Gap Scheduling (Attempt to Schedule EACH Overdue Care Gap!)     Health Maintenance Due   Topic Date Due   • Annual Wellness Visit  1965   • IMM PNEUMOCOCCAL 19-64 (ADULT) MEDIUM RISK SERIES (1 of 1 - PPSV23) 02/04/1982        Scheduled patient for Annual Wellness Visit      10. Patient was advised: “This is a free wellness visit. The provider will screen for medical conditions to help you stay healthy. If you have other concerns to address you may be asked to discuss these at a separate visit or there may be an additional fee.”     11. Patient was informed to arrive 15 min prior to their scheduled appointment and bring in their medication bottles.

## 2018-02-05 ENCOUNTER — OFFICE VISIT (OUTPATIENT)
Dept: MEDICAL GROUP | Facility: PHYSICIAN GROUP | Age: 53
End: 2018-02-05
Payer: MEDICARE

## 2018-02-05 VITALS
OXYGEN SATURATION: 100 % | HEIGHT: 65 IN | DIASTOLIC BLOOD PRESSURE: 92 MMHG | TEMPERATURE: 97 F | HEART RATE: 85 BPM | BODY MASS INDEX: 44.65 KG/M2 | WEIGHT: 268 LBS | SYSTOLIC BLOOD PRESSURE: 130 MMHG

## 2018-02-05 DIAGNOSIS — M43.10 DEGENERATIVE SPONDYLOLISTHESIS: ICD-10-CM

## 2018-02-05 DIAGNOSIS — E06.3 HYPOTHYROIDISM DUE TO HASHIMOTO'S THYROIDITIS: ICD-10-CM

## 2018-02-05 DIAGNOSIS — I10 ESSENTIAL HYPERTENSION: ICD-10-CM

## 2018-02-05 DIAGNOSIS — E87.6 HYPOKALEMIA: ICD-10-CM

## 2018-02-05 DIAGNOSIS — D64.9 ANEMIA, UNSPECIFIED TYPE: ICD-10-CM

## 2018-02-05 DIAGNOSIS — E03.8 HYPOTHYROIDISM DUE TO HASHIMOTO'S THYROIDITIS: ICD-10-CM

## 2018-02-05 DIAGNOSIS — E87.1 HYPONATREMIA: ICD-10-CM

## 2018-02-05 DIAGNOSIS — E66.01 MORBID OBESITY WITH BMI OF 40.0-44.9, ADULT (HCC): ICD-10-CM

## 2018-02-05 DIAGNOSIS — Z13.220 SCREENING FOR LIPID DISORDERS: ICD-10-CM

## 2018-02-05 DIAGNOSIS — M46.1 SACROILIITIS (HCC): ICD-10-CM

## 2018-02-05 DIAGNOSIS — M15.9 PRIMARY OSTEOARTHRITIS INVOLVING MULTIPLE JOINTS: ICD-10-CM

## 2018-02-05 DIAGNOSIS — F51.01 PRIMARY INSOMNIA: ICD-10-CM

## 2018-02-05 DIAGNOSIS — R06.83 SNORING: ICD-10-CM

## 2018-02-05 DIAGNOSIS — I27.20 PULMONARY HTN (HCC): ICD-10-CM

## 2018-02-05 DIAGNOSIS — N63.20 LEFT BREAST MASS: ICD-10-CM

## 2018-02-05 DIAGNOSIS — M51.36 DDD (DEGENERATIVE DISC DISEASE), LUMBAR: ICD-10-CM

## 2018-02-05 DIAGNOSIS — E55.9 VITAMIN D DEFICIENCY: ICD-10-CM

## 2018-02-05 PROCEDURE — 99214 OFFICE O/P EST MOD 30 MIN: CPT | Performed by: PHYSICIAN ASSISTANT

## 2018-02-05 NOTE — ASSESSMENT & PLAN NOTE
Endorses arthritis in neck, both shoulders, thumbs, wrists, and knees. Endorses 29 prior knee surgeries, including bilateral replacements and partial right knee revision. Taking Tylenol and Advil as needed for pain which she states works only some of the time. Has previously tried Celebrex which she states was not helpful at all. Follows with orthopedist Dr. Ihsan Hawthorne at [facility].

## 2018-02-05 NOTE — ASSESSMENT & PLAN NOTE
Chronic issue per patient. History of previous SI joint fusion. Continues to have pain which is managed with PRN Tylenol/Advil.

## 2018-02-05 NOTE — PROGRESS NOTES
"Subjective:   Carolynn Casey is a 53 y.o. female here today for insomnia, left breast mass, follow-up on other chronic conditions. She is a new patient to me and is also establishing care today.    Previous PCP: Adriane Lara,     HPI: Patient has the following current medical problems:    Hypertension  Has had hypertension \"for years\" (20 years or more) per patient. Current regimen includes losartan, HCTZ, and amlodipine. She is needing refills of her medication today but is unsure of her doses. States has not been compliant with her losartan lately. States blood pressure has been relatively well-controlled with current medications. Notes elevations with stress. Not currently checking home blood pressures.    Hypothyroidism due to Hashimoto's thyroiditis  Diagnosed with hypothyroidism about 10 years ago per patient. Has been on levothyroxine since then. Endorses stability on current dose. Last TSH done 11/2017 which patient states was normal.    Primary osteoarthritis involving multiple joints  Endorses arthritis in neck, both shoulders, thumbs, wrists, and knees. Endorses 29 prior knee surgeries, including bilateral replacements and partial right knee revision. Taking Tylenol and Advil as needed for pain which she states works only some of the time. Has previously tried Celebrex which she states was not helpful at all. Follows with orthopedist Dr. Ihsan Hawthorne at [facility].    DDD (degenerative disc disease), lumbar  Ongoing for many years per patient. Has had multiple back surgeries, the first of which was in early 2000s. Follows with Dr. Ames at [facility]. Takes Tizanidine 4 mg at bedtime as well as Tylenol/Advil and cyclobenzaprine PRN during the day to help with pain. States does the cyclobenzaprine during the day because it doesn't make her sleepy.    Insomnia  Ongoing for many years per patient. Has previously tried all OTC options, \"old wives' remedies\" with no relief. Has been on Ambien which she feels works " reasonably well, although there are still nights where she has a hard time getting to sleep. Does endorse snoring on regular basis.  has not noticed any apneic spells. Denies previous evaluation for sleep apnea.    Left breast mass  History of left breast tenderness for over a year. Mass found on left mammogram/US in 5/2017. States has appointment on 3/9/18 with surgeon Dr. Madden at [facility].    Sacroiliitis (CMS-[facility])  Chronic issue per patient. History of previous SI joint fusion. Continues to have pain which is managed with PRN Tylenol/Advil.       Current medicines (including changes today)  Current Outpatient Prescriptions   Medication Sig Dispense Refill   • AMLODIPINE BESYLATE PO Take  by mouth.     • cyclobenzaprine (FLEXERIL) 10 MG Tab TAKE ONE TABLET BY MOUTH THREE TIMES DAILY AS NEEDED 90 Tab 0   • hydrochlorothiazide (HYDRODIURIL) 25 MG Tab TAKE ONE TABLET BY MOUTH ONCE DAILY 90 Tab 1   • levothyroxine (SYNTHROID) 25 MCG Tab TAKE ONE TABLET BY MOUTH ONCE DAILY IN THE MORNING ON EMPTY STOMACH 90 Tab 1   • zolpidem (AMBIEN) 10 MG Tab Take 1 Tab by mouth at bedtime as needed for Sleep. 30 Tab 3   • tizanidine (ZANAFLEX) 4 MG Tab Take 1 Tab by mouth every bedtime. 30 Tab 11   • LOSARTAN POTASSIUM PO Take  by mouth.     • albuterol 108 (90 BASE) MCG/ACT Aero Soln inhalation aerosol Inhale 2 Puffs by mouth every 6 hours as needed for Shortness of Breath. 8.5 g 3   • acetaminophen (TYLENOL) 500 MG Tab Take 1,500 mg by mouth every 6 hours as needed. Indications: Pain       No current facility-administered medications for this visit.      She  has a past medical history of Allergy; Anxiety; ASTHMA; Bronchitis (2014); Constipation; GERD (gastroesophageal reflux disease); Heart murmur; Hypertension; Migraine; Pain (01-29-16); Primary osteoarthritis involving multiple joints; Snoring; Unspecified disorder of thyroid; and Unspecified urinary incontinence.      ROS  As per HPI.       Objective:     Blood  "pressure 130/92, pulse 85, temperature 36.1 °C (97 °F), height 1.651 m (5' 5\"), weight 121.6 kg (268 lb), SpO2 100 %. Body mass index is 44.6 kg/m².   Physical Exam:  Constitutional: Alert, obese, no distress.  Skin: No rashes in visible areas.  Eye: Conjunctiva clear, lids normal.  ENMT: Lips without lesions, moist mucus membranes.        Assessment and Plan:   The following treatment plan was discussed    1. Essential hypertension  Chronic issue, well-controlled on current medication regimen. Advised to re-start losartan and take daily. BP slightly above goal today at 130/92. Advised to send message or call office with her medication doses when she gets home so I can refill. Would prefer combination losartan-HCTZ rather than each medicine separately.    2. Hypothyroidism due to Hashimoto's thyroiditis  Chronic issue, well-controlled on current medication regimen. TSH from 11/2017 reviewed which was normal at 3.240. Continue levothyroxine at current dose.    3. Primary insomnia  4. Snoring  5. Pulmonary HTN  Insomnia has been chronic problem. Historically managed with Ambien. She does have risk factors for DANIELLE, however, including persistent snoring and pulmonary HTN. Recommend referral to sleep clinic for sleep study to further evaluate.  - REFERRAL TO SLEEP STUDIES    6. Primary osteoarthritis involving multiple joints  Chronic issue, currently controlling pain with PRN Tylenol/Advil which has worked reasonably well for her. Weight loss encouraged. Was going to prescribe prescription anti-inflammatory, but patient endorses history of upper GI bleed with past Naproxen use so will hold off on that. Explained that even OTC anti-inflammatories can lead to GI bleeding, so cautioned against excessive Advil use.    7. DDD (degenerative disc disease), lumbar  8. Degenerative spondylolisthesis  9. Sacroiliitis (CMS-HCC)  Chronic issues, pain controlled with OTC agents. Will continue to follow with back specialist at [facility].    10. Left breast mass  Chronic issue. Reviewed breast US from 5/2017 which showed mass to be likely fibroadenoma. F/u imaging was recommended, which I encouraged her to have done before her appointment with surgeon. Order was placed for this.  - US-BREAST COMPLETE-LEFT; Future    11. Vitamin D deficiency  Reviewed prior level from 4/2017 which was low at 11. Will re-check and if still low, recommend prescription vitamin D supplementation.  - VITAMIN D,25 HYDROXY; Future    12. Hypokalemia  Potassium noted to be low (3.3) on last set of labs from 8/2017. Not currently on supplementation. Will re-check with next set of labs.  - COMP METABOLIC PANEL; Future    13. Hyponatremia  Sodium noted to be low (132) on last set of labs from 8/2017. Will re-check with next set of labs.  - COMP METABOLIC PANEL; Future    14. Anemia, unspecified type  Hgb/Hct noted to be low on last set of labs from 8/2017. Will re-check with next set of labs.  - CBC WITH DIFFERENTIAL; Future    15. Morbid obesity with BMI of 40.0-44.9, adult (CMS-HCC)  Patient counseled on importance of dietary improvement and regular exercise, which will also help with her other chronic conditions. Offered referral to [facility] but she declines at this time. Has recently been successful in losing some weight on her own.  - REFERRAL TO SLEEP STUDIES  - Patient identified as having weight management issue.  Appropriate orders and counseling given.    16. Screening for lipid disorders  - LIPID PROFILE; Future    Patient advised to have lab work done just before next appointment in 3 months.      Followup: Return in about 3 months (around 5/5/2018) for F/u, lab results; Short.    Lisa Gilbert P.A.-C.

## 2018-02-05 NOTE — ASSESSMENT & PLAN NOTE
"Ongoing for many years per patient. Has previously tried all OTC options, \"old wives' remedies\" with no relief. Has been on Ambien which she feels works reasonably well, although there are still nights where she has a hard time getting to sleep. Does endorse snoring on regular basis.  has not noticed any apneic spells. Denies previous evaluation for sleep apnea.  "

## 2018-02-05 NOTE — ASSESSMENT & PLAN NOTE
Diagnosed with hypothyroidism about 10 years ago per patient. Has been on levothyroxine since then. Endorses stability on current dose. Last TSH done 11/2017 which patient states was normal.

## 2018-02-05 NOTE — ASSESSMENT & PLAN NOTE
"Has had hypertension \"for years\" (20 years or more) per patient. Current regimen includes losartan, HCTZ, and amlodipine. She is needing refills of her medication today but is unsure of her doses.  has not been compliant with her losartan lately. States blood pressure has been relatively well-controlled with current medications. Notes elevations with stress. Not currently checking home blood pressures.  "

## 2018-02-05 NOTE — ASSESSMENT & PLAN NOTE
Ongoing for many years per patient. Has had multiple back surgeries, the first of which was in early 2000s. Follows with Dr. Ames at [facility]. Takes Tizanidine 4 mg at bedtime as well as Tylenol/Advil and cyclobenzaprine PRN during the day to help with pain. States does the cyclobenzaprine during the day because it doesn't make her sleepy.

## 2018-02-05 NOTE — ASSESSMENT & PLAN NOTE
History of left breast tenderness for over a year. Mass found on left mammogram/US in 5/2017. States has appointment on 3/9/18 with surgeon Dr. Madden at [facility].

## 2018-02-21 ENCOUNTER — TELEPHONE (OUTPATIENT)
Dept: MEDICAL GROUP | Facility: PHYSICIAN GROUP | Age: 53
End: 2018-02-21

## 2018-02-21 NOTE — TELEPHONE ENCOUNTER
Future Appointments       Provider Department Center    2/27/2018 10:05 AM Lisa Gilbert P.A.-C.; [facility]  [facility]    3/1/2018 10:30 AM [address] [place] [address]    5/16/2018 11:05 AM Lisa Gilbert P.A.-C. [facility]    5/25/2018 11:40 AM [facility] Sleep Medicine         ANNUAL WELLNESS VISIT PRE-VISIT PLANNING WITH OUTREACH    1.  Immunizations were updated in Epic using WebIZ?:Yes       •  WebIZ Recommendations: Patient is up to date on all vaccines       •  Is patient due for Tdap? NO       •  Is patient due for Shingles?NO    2.  MDX printed and highlighted for Provider? YES    3.  Reviewed note from last office visit with PCP: YES 02/05/2018    4.  If any orders were placed at last visit, do we have Results/Consult Notes?        •  Labs - Labs ordered, NOT completed. Patient advised to complete prior to next appointment. Pt. Will need to complete before 05/126/2018       •  Imaging - Imaging ordered, NOT completed. Patient advised to complete prior to next appointment.       •  Referrals - Referral ordered, patient has NOT been seen. sleep medicine     5.  Patient is due for the following Health Maintenance Topics:   Health Maintenance Due   Topic Date Due   • Annual Wellness Visit  1965   • IMM PNEUMOCOCCAL 19 (1 of 1 - PPSV23) 02/04/1982   • COLON CANCER SCREENING ANNUAL FIT  02/04/2013       6.  Patient has the following Care Path diagnoses on Problem List:  NONE

## 2018-02-27 ENCOUNTER — OFFICE VISIT (OUTPATIENT)
Dept: MEDICAL GROUP | Facility: PHYSICIAN GROUP | Age: 53
End: 2018-02-27
Payer: MEDICARE

## 2018-02-27 VITALS
DIASTOLIC BLOOD PRESSURE: 86 MMHG | HEART RATE: 80 BPM | BODY MASS INDEX: 44.98 KG/M2 | HEIGHT: 65 IN | WEIGHT: 270 LBS | SYSTOLIC BLOOD PRESSURE: 130 MMHG | OXYGEN SATURATION: 96 % | TEMPERATURE: 97.6 F

## 2018-02-27 DIAGNOSIS — E06.3 HYPOTHYROIDISM DUE TO HASHIMOTO'S THYROIDITIS: ICD-10-CM

## 2018-02-27 DIAGNOSIS — N63.20 LEFT BREAST MASS: ICD-10-CM

## 2018-02-27 DIAGNOSIS — I27.20 PULMONARY HTN (HCC): ICD-10-CM

## 2018-02-27 DIAGNOSIS — Z23 NEED FOR PNEUMOCOCCAL VACCINATION: ICD-10-CM

## 2018-02-27 DIAGNOSIS — F51.01 PRIMARY INSOMNIA: ICD-10-CM

## 2018-02-27 DIAGNOSIS — M46.1 SACROILIITIS (HCC): ICD-10-CM

## 2018-02-27 DIAGNOSIS — H91.92 HEARING LOSS OF LEFT EAR, UNSPECIFIED HEARING LOSS TYPE: ICD-10-CM

## 2018-02-27 DIAGNOSIS — E55.9 VITAMIN D DEFICIENCY: ICD-10-CM

## 2018-02-27 DIAGNOSIS — D64.9 ANEMIA, UNSPECIFIED TYPE: ICD-10-CM

## 2018-02-27 DIAGNOSIS — I10 ESSENTIAL HYPERTENSION: ICD-10-CM

## 2018-02-27 DIAGNOSIS — E87.1 HYPONATREMIA: ICD-10-CM

## 2018-02-27 DIAGNOSIS — Z12.11 SCREENING FOR COLORECTAL CANCER: ICD-10-CM

## 2018-02-27 DIAGNOSIS — E66.01 MORBID OBESITY WITH BMI OF 40.0-44.9, ADULT (HCC): ICD-10-CM

## 2018-02-27 DIAGNOSIS — M51.36 DDD (DEGENERATIVE DISC DISEASE), LUMBAR: ICD-10-CM

## 2018-02-27 DIAGNOSIS — F32.1 MODERATE SINGLE CURRENT EPISODE OF MAJOR DEPRESSIVE DISORDER (HCC): ICD-10-CM

## 2018-02-27 DIAGNOSIS — M15.9 PRIMARY OSTEOARTHRITIS INVOLVING MULTIPLE JOINTS: ICD-10-CM

## 2018-02-27 DIAGNOSIS — E87.6 HYPOKALEMIA: ICD-10-CM

## 2018-02-27 DIAGNOSIS — M43.10 DEGENERATIVE SPONDYLOLISTHESIS: ICD-10-CM

## 2018-02-27 DIAGNOSIS — E03.8 HYPOTHYROIDISM DUE TO HASHIMOTO'S THYROIDITIS: ICD-10-CM

## 2018-02-27 DIAGNOSIS — Z12.12 SCREENING FOR COLORECTAL CANCER: ICD-10-CM

## 2018-02-27 DIAGNOSIS — Z00.00 MEDICARE ANNUAL WELLNESS VISIT, SUBSEQUENT: ICD-10-CM

## 2018-02-27 PROBLEM — F32.9 MAJOR DEPRESSIVE DISORDER: Status: ACTIVE | Noted: 2018-02-27

## 2018-02-27 PROCEDURE — 90732 PPSV23 VACC 2 YRS+ SUBQ/IM: CPT | Performed by: PHYSICIAN ASSISTANT

## 2018-02-27 PROCEDURE — G0439 PPPS, SUBSEQ VISIT: HCPCS | Mod: 25 | Performed by: PHYSICIAN ASSISTANT

## 2018-02-27 PROCEDURE — G0009 ADMIN PNEUMOCOCCAL VACCINE: HCPCS | Performed by: PHYSICIAN ASSISTANT

## 2018-02-27 RX ORDER — AMLODIPINE BESYLATE 10 MG/1
10 TABLET ORAL DAILY
Qty: 30 TAB | Refills: 5 | Status: SHIPPED | OUTPATIENT
Start: 2018-02-27 | End: 2018-07-25 | Stop reason: SDUPTHER

## 2018-02-27 RX ORDER — IBUPROFEN 200 MG
800 TABLET ORAL EVERY 6 HOURS PRN
Status: ON HOLD | COMMUNITY
End: 2019-05-16

## 2018-02-27 ASSESSMENT — PATIENT HEALTH QUESTIONNAIRE - PHQ9
5. POOR APPETITE OR OVEREATING: 3 - NEARLY EVERY DAY
SUM OF ALL RESPONSES TO PHQ QUESTIONS 1-9: 17
CLINICAL INTERPRETATION OF PHQ2 SCORE: 4

## 2018-02-27 ASSESSMENT — ACTIVITIES OF DAILY LIVING (ADL): BATHING_REQUIRES_ASSISTANCE: 0

## 2018-02-27 NOTE — PROGRESS NOTES
Chief Complaint   Patient presents with   • Annual Wellness Visit         HPI:  Carolynn is a 53 y.o. here for Medicare Annual Wellness Visit. Is an established patient of mine.        Patient Active Problem List    Diagnosis Date Noted   • Left ear hearing loss 02/27/2018   • Major depressive disorder 02/27/2018   • Morbid obesity with BMI of 40.0-44.9, adult ([facility]) 02/05/2018   • Vitamin D deficiency 02/05/2018   • Hypokalemia 02/05/2018   • Anemia 02/05/2018   • Pulmonary HTN 08/31/2017   • Hyponatremia 08/07/2017   • Primary osteoarthritis involving multiple joints 08/03/2017   • Left breast mass 08/03/2017   • Sacroiliitis (CMS-[facility]) 08/02/2017   • Insomnia 06/20/2016   • Degenerative spondylolisthesis 02/17/2016   • Hypothyroidism due to Hashimoto's thyroiditis 02/21/2012   • Hypertension 12/28/2011   • DDD (degenerative disc disease), lumbar 12/28/2011       Current Outpatient Prescriptions   Medication Sig Dispense Refill   • ibuprofen (MOTRIN) 200 MG Tab Take 200 mg by mouth every 6 hours as needed.     • Pseudoephedrine HCl (SUDAFED PO) Take  by mouth.     • amLODIPine (NORVASC) 10 MG Tab Take 1 Tab by mouth every day. 30 Tab 5   • cyclobenzaprine (FLEXERIL) 10 MG Tab TAKE ONE TABLET BY MOUTH THREE TIMES DAILY AS NEEDED 90 Tab 0   • hydrochlorothiazide (HYDRODIURIL) 25 MG Tab TAKE ONE TABLET BY MOUTH ONCE DAILY 90 Tab 1   • levothyroxine (SYNTHROID) 25 MCG Tab TAKE ONE TABLET BY MOUTH ONCE DAILY IN THE MORNING ON EMPTY STOMACH 90 Tab 1   • zolpidem (AMBIEN) 10 MG Tab Take 1 Tab by mouth at bedtime as needed for Sleep. 30 Tab 3   • tizanidine (ZANAFLEX) 4 MG Tab Take 1 Tab by mouth every bedtime. 30 Tab 11   • LOSARTAN POTASSIUM PO Take 100 mg by mouth.     • acetaminophen (TYLENOL) 500 MG Tab Take 1,500 mg by mouth every 6 hours as needed. Indications: Pain     • albuterol 108 (90 BASE) MCG/ACT Aero Soln inhalation aerosol Inhale 2 Puffs by mouth every 6 hours as needed for Shortness of Breath. 8.5 g 3     No  current facility-administered medications for this visit.         Patient is taking medications as noted in medication list.  Current supplements as per medication list.     Allergies: Cortisone; Food; Penicillins; Fish; Keflex; Latex; Naprosyn [naproxen]; Tape; and Shellfish allergy    Current social contact/activities: visit with family and friends, in town events     Is patient current with immunizations? No, due for PNEUMOVAX (PPSV23). Patient is interested in receiving PNEUMOVAX (PPSV23) today.    She  reports that she has never smoked. She has never used smokeless tobacco. She reports that she does not drink alcohol or use drugs.  Counseling given: Not Answered        DPA/Advanced directive: Patient does not have an Advanced Directive.  A packet and workshop information was given on Advanced Directives.    ROS:    Gait: Uses a cane, walker   Ostomy: no  Other tubes: no   Amputations: no  Chronic oxygen use no   Last eye exam 2017   Wears hearing aids: no   : Reports urinary leakage during the last 6 months that has not interfered at all with their daily activities or sleep.      Depression Screening    Little interest or pleasure in doing things?  2 - more than half the days  Feeling down, depressed, or hopeless? 2 - more than half the days  Trouble falling or staying asleep, or sleeping too much?  3 - nearly every day  Feeling tired or having little energy?  3 - nearly every day  Poor appetite or overeating?  3 - nearly every day  Feeling bad about yourself - or that you are a failure or have let yourself or your family down? 1 - several days  Trouble concentrating on things, such as reading the newspaper or watching television? 2 - more than half the days  Moving or speaking so slowly that other people could have noticed.  Or the opposite - being so fidgety or restless that you have been moving around a lot more than usual?  0 - not at all  Thoughts that you would be better off dead, or of hurting  yourself?  1 - several days  Patient Health Questionnaire Score: 17      If depressive symptoms identified deferred to follow up visit unless specifically addressed in assessment and plan.    Interpretation of PHQ-9 Total Score   Score Severity   1-4 No Depression   5-9 Mild Depression   10-14 Moderate Depression   15-19 Moderately Severe Depression   20-27 Severe Depression      Screening for Cognitive Impairment    Three Minute Recall   3/3 [facility] kitchen baby  Draw clock face with all 12 numbers set to the hand to show  1 3:45  If cognitive concerns identified, deferred for follow up unless specifically addressed in assessment and plan.    Fall Risk Assessment    Has the patient had two or more falls in the last year or any fall with injury in the last year?  No  If fall risk identified, deferred for follow up unless specifically addressed in assessment and plan.      Safety Assessment    Throw rugs on floor.  No  Handrails on all stairs.  Yes  Good lighting in all hallways.  Yes  Difficulty hearing.  No  Patient counseled about all safety risks that were identified.    Functional Assessment ADLs    Are there any barriers preventing you from cooking for yourself or meeting nutritional needs?  No.    Are there any barriers preventing you from driving safely or obtaining transportation?  No.    Are there any barriers preventing you from using a telephone or calling for help?  No.    Are there any barriers preventing you from shopping?  No.    Are there any barriers preventing you from taking care of your own finances?  No.    Are there any barriers preventing you from managing your medications?    No.    Are there any barriers preventing you from showering/bathing yourself?  No.    Are you currently engaging any exercise or physical activity?  Yes.  Chasing grandson around     Health Maintenance Summary                Annual Wellness Visit Overdue 1965     IMM PNEUMOCOCCAL 19-64 (ADULT) MEDIUM RISK  SERIES Overdue 2/4/1982     COLON CANCER SCREENING ANNUAL FIT Overdue 2/4/2013     MAMMOGRAM Next Due 5/12/2018      Done 5/12/2017 MA-MAMMO DIAGNOSTIC BILAT W/TOMOSYNTHESIS W/CAD     Patient has more history with this topic...    IMM DTaP/Tdap/Td Vaccine Next Due 9/30/2026      Done 9/30/2016 Imm Admin: Tdap Vaccine     Patient has more history with this topic...          Patient Care Team:  Lisa Gilbert P.A.-C. as PCP - General (Physician Assistant)  Getachew Hansen M.D. (Surgery)  Nima Ames M.D. (Orthopaedics)      Social History   Substance Use Topics   • Smoking status: Never Smoker   • Smokeless tobacco: Never Used      Comment: continue to avoid   • Alcohol use No     Family History   Problem Relation Age of Onset   • Hypertension Mother    • Cancer Mother 81     breast DCIS   • Heart Disease Father    • Heart Attack Father    • Hypertension Father    • Lung Disease Sister      asthma   • Hypertension Sister    • Arthritis Sister      knee   • Arthritis Brother    • Hypertension Brother    • Diabetes Brother    • Heart Disease Maternal Aunt    • Hypertension Maternal Aunt    • Cancer Maternal Aunt 80     breast   • Stroke Maternal Uncle    • Heart Disease Maternal Uncle    • Hypertension Maternal Uncle    • Heart Disease Paternal Grandmother    • Psychiatry Paternal Grandfather      Suicide   • Alcohol/Drug Paternal Grandfather    • Arthritis Maternal Grandmother    • Arthritis Maternal Grandfather    • Arthritis Brother    • Hyperlipidemia Neg Hx      She  has a past medical history of Allergy; Anxiety; ASTHMA; Bronchitis (2014); Constipation; DVT (deep venous thrombosis) ([facility]); GERD (gastroesophageal reflux disease); Heart murmur; Hypertension; Migraine; Pain (01-29-16); Primary osteoarthritis involving multiple joints; Snoring; Unspecified disorder of thyroid; Unspecified urinary incontinence; and Upper GI bleed.   Past Surgical History:   Procedure Laterality Date   • S I JOINT FUSION  "Right 8/2/2017    Procedure: S I JOINT FUSION;  Surgeon: Alfredo Belcher M.D.;  Location: SURGERY [facility];  Service:    • EXTREME LATERAL INTERBODY FUSION  2/17/2016    Procedure: EXTREME LATERAL INTERBODY FUSION far lateral interbody  L3-4 ;  Surgeon: Alfredo Belcher M.D.;  Location: SURGERY [facility];  Service:    • LUMBAR DECOMPRESSION  2/17/2016    Procedure: LUMBAR DECOMPRESSION L3-4;  Surgeon: Alfredo Belcher M.D.;  Location: SURGERY [facility];  Service:    • KNEE REVISION TOTAL Right 10/8/2015    Procedure: KNEE REVISION TOTAL KNEE ARTHROPLASTY;  Surgeon: Ihsan Hawthorne M.D.;  Location: SURGERY [facility];  Service:    • SHOULDER ARTHROSCOPY W/ ROTATOR CUFF REPAIR  2001    LEFT   • ABDOMINAL HYSTERECTOMY TOTAL  1995   • ABDOMINAL EXPLORATION     • APPENDECTOMY      1997   • ATHROPLASTY      bi later knees   • LAMINOTOMY      Dr. [facility]   • OPEN REDUCTION  2001, 1990    CLAVICLE BILAT   • OTHER      back surgery    • OTHER NEUROLOGICAL SURG      fusion L5-S1    • PRIMARY C SECTION      x 2         Exam:     Blood pressure 130/86, pulse 80, temperature 36.4 °C (97.6 °F), height 1.651 m (5' 5\"), weight 122.5 kg (270 lb), SpO2 96 %. Body mass index is 44.93 kg/m².    Hearing fair.  Decreased perception of finger rubbing in front of left EAC. External ear canals are clear, minimal cerumen. Tympanic membranes pearly gray without injection or bulging. +scarring noted bilaterally.  Dentition poor. Decay/caries noted.  Alert, oriented in no acute distress.  Eye contact is good, speech goal directed, affect calm  Unlabored respiratory effort. Lungs clear to auscultation bilaterally.  Normal S1/S2, RRR.    Assessment and Plan. The following treatment and monitoring plan is recommended:      Hypertension  Chronic issue, well-controlled on current medication regimen. Continue amlodipine, HCTZ, and losartan.    DDD (degenerative disc disease), lumbar  Chronic issue, " well-controlled with current medication regimen. Continue tizanidine, Tylenol/Advil, and PRN cyclobenzaprine. Will continue to follows with Dr. Ames at [facility].    Hypothyroidism due to Hashimoto's thyroiditis  Chronic issue, well-controlled on daily levothyroxine. Last TSH from 11/2017 reviewed which was normal. Will be due for repeat TSH in 11/2018.    Degenerative spondylolisthesis  Chronic issue, diagnosed about 8-9 years ago per patient. Controlled with PRN medication. Will continue to follow with Dr. Ames at [facility].    Insomnia  Chronic issue for many years, reasonably well-controlled with nightly Ambien 10 mg. Still has nights where it takes her 3-4 hours to fall asleep.    Sacroiliitis (CMS-HCC)  Chronic issue, well-controlled on PRN medication. Previous SI joint fusion which did not help much with symptoms.    Primary osteoarthritis involving multiple joints  Chronic issue, multiple prior knee surgeries. Pain reasonably well-controlled with PRN Tylenol/Advil. Will continue to follow with orthopedist, Dr. Ihsan Hawthorne at [facility].    Left breast mass  Chronic issue, stable, found on left breast imaging in 5/2017. Has upcoming appointments for repeat breast imaging and for consultation with surgeon.    Hyponatremia  Problem since most recent hospitalization. Most recent labs from 8/2017 reviewed which show low sodium level of 132. Repeat labs ordered at last visit, which patient was advised to have done.    Vitamin D deficiency  Chronic issue, unsure of current level of control given no recent labs. Most recent vitamin D level reviewed from 4/2017 which was low at 11. Patient not currently on vitamin D supplement. Will re-check level.    Hypokalemia  Problem since most recent hospitalization. Most recent labs from 8/2017 reviewed which show low potassium level of 3.3. Repeat labs ordered at last visit, which patient was advised to have done.    Anemia  Chronic issue since 2/2016 per lab review. No previous  evaluation. Most recent labs from 8/2017 reviewed which show Hgb/Hct of 10.7/32.3. Repeat labs ordered at last visit which patient was advised to have done.    Pulmonary HTN  Discovered during hospitalization for PE in 8/2017. ECHO reviewed, which showed R ventricular systolic pressure of 45 mmHg. Likely secondary to acute PE. Will continue to monitor.    Left ear hearing loss  Endorses chronic problems hearing out of L ear. Would like to have evaluated.    Major depressive disorder  New problem, patient attributes mostly to chronic pain. Patient advised to schedule follow-up appointment with me to discuss further.      Services suggested: Audiology referral for evaluation of left-sided hearing loss.  Health Care Screening recommendations as per orders if indicated.  Referrals offered: PT/OT/Nutrition counseling/Behavioral Health/Smoking cessation as per orders if indicated.    Discussion today about general wellness and lifestyle habits:    · Prevent falls and reduce trip hazards; Cautioned about securing or removing rugs.  · Have a working fire alarm and carbon monoxide detector;   · Engage in regular physical activity and social activities  · Follow up with dentist to have dental decay addressed  · Have labs done that were ordered at last visit       Follow-up: Return for f/u depression; Short.       Lisa Gilbert P.A.-C.

## 2018-02-27 NOTE — ASSESSMENT & PLAN NOTE
Chronic issue, multiple prior knee surgeries. Pain reasonably well-controlled with PRN Tylenol/Advil. Will continue to follow with orthopedist, Dr. Ihsan Hawthorne at [facility].

## 2018-02-27 NOTE — ASSESSMENT & PLAN NOTE
Chronic issue, well-controlled on daily levothyroxine. Last TSH from 11/2017 reviewed which was normal. Will be due for repeat TSH in 11/2018.

## 2018-02-27 NOTE — ASSESSMENT & PLAN NOTE
Discovered during hospitalization for PE in 8/2017. ECHO reviewed, which showed R ventricular systolic pressure of 45 mmHg. Likely secondary to acute PE. Will continue to monitor.

## 2018-02-27 NOTE — ASSESSMENT & PLAN NOTE
Chronic issue, well-controlled on current medication regimen. Continue amlodipine, HCTZ, and losartan.

## 2018-02-27 NOTE — ASSESSMENT & PLAN NOTE
New problem, patient attributes mostly to chronic pain. Patient advised to schedule follow-up appointment with me to discuss further.

## 2018-02-27 NOTE — ASSESSMENT & PLAN NOTE
Problem since most recent hospitalization. Most recent labs from 8/2017 reviewed which show low sodium level of 132. Repeat labs ordered at last visit, which patient was advised to have done.

## 2018-02-27 NOTE — ASSESSMENT & PLAN NOTE
Chronic issue for many years, reasonably well-controlled with nightly Ambien 10 mg. Still has nights where it takes her 3-4 hours to fall asleep.

## 2018-02-27 NOTE — ASSESSMENT & PLAN NOTE
Chronic issue, well-controlled with current medication regimen. Continue tizanidine, Tylenol/Advil, and PRN cyclobenzaprine. Will continue to follows with Dr. Ames at [facility].

## 2018-02-27 NOTE — ASSESSMENT & PLAN NOTE
Problem since most recent hospitalization. Most recent labs from 8/2017 reviewed which show low potassium level of 3.3. Repeat labs ordered at last visit, which patient was advised to have done.

## 2018-02-27 NOTE — ASSESSMENT & PLAN NOTE
Chronic issue, stable, found on left breast imaging in 5/2017. Has upcoming appointments for repeat breast imaging and for consultation with surgeon.

## 2018-02-27 NOTE — ASSESSMENT & PLAN NOTE
Chronic issue, well-controlled on PRN medication. Previous SI joint fusion which did not help much with symptoms.

## 2018-02-27 NOTE — ASSESSMENT & PLAN NOTE
Chronic issue since 2/2016 per lab review. No previous evaluation. Most recent labs from 8/2017 reviewed which show Hgb/Hct of 10.7/32.3. Repeat labs ordered at last visit which patient was advised to have done.

## 2018-02-27 NOTE — ASSESSMENT & PLAN NOTE
Chronic issue, unsure of current level of control given no recent labs. Most recent vitamin D level reviewed from 4/2017 which was low at 11. Patient not currently on vitamin D supplement. Will re-check level.

## 2018-02-27 NOTE — ASSESSMENT & PLAN NOTE
Chronic issue, diagnosed about 8-9 years ago per patient. Controlled with PRN medication. Will continue to follow with Dr. Ames at [facility].

## 2018-02-28 ENCOUNTER — TELEPHONE (OUTPATIENT)
Dept: RADIOLOGY | Facility: MEDICAL CENTER | Age: 53
End: 2018-02-28

## 2018-03-01 ENCOUNTER — HOSPITAL ENCOUNTER (OUTPATIENT)
Dept: RADIOLOGY | Facility: MEDICAL CENTER | Age: 53
End: 2018-03-01
Attending: PHYSICIAN ASSISTANT
Payer: MEDICARE

## 2018-03-01 DIAGNOSIS — M15.9 PRIMARY OSTEOARTHRITIS INVOLVING MULTIPLE JOINTS: ICD-10-CM

## 2018-03-01 DIAGNOSIS — N63.20 LEFT BREAST MASS: ICD-10-CM

## 2018-03-01 DIAGNOSIS — M46.1 SACROILIITIS (HCC): ICD-10-CM

## 2018-03-01 PROCEDURE — 76642 ULTRASOUND BREAST LIMITED: CPT | Mod: LT

## 2018-03-01 PROCEDURE — G0279 TOMOSYNTHESIS, MAMMO: HCPCS

## 2018-03-01 RX ORDER — CYCLOBENZAPRINE HCL 10 MG
TABLET ORAL
Qty: 90 TAB | Refills: 0 | Status: SHIPPED | OUTPATIENT
Start: 2018-03-01 | End: 2018-04-12 | Stop reason: SDUPTHER

## 2018-03-01 NOTE — TELEPHONE ENCOUNTER
Was the patient seen in the last year in this department? Yes     Does patient have an active prescription for medications requested? No     Received Request Via: Pharmacy      Pt met protocol?: Yes, OV 2/18

## 2018-03-12 ENCOUNTER — TELEPHONE (OUTPATIENT)
Dept: MEDICAL GROUP | Facility: PHYSICIAN GROUP | Age: 53
End: 2018-03-12

## 2018-04-11 DIAGNOSIS — G47.00 INSOMNIA, UNSPECIFIED TYPE: ICD-10-CM

## 2018-04-12 DIAGNOSIS — M46.1 SACROILIITIS (HCC): ICD-10-CM

## 2018-04-12 DIAGNOSIS — M15.9 PRIMARY OSTEOARTHRITIS INVOLVING MULTIPLE JOINTS: ICD-10-CM

## 2018-04-12 RX ORDER — CYCLOBENZAPRINE HCL 10 MG
TABLET ORAL
Qty: 90 TAB | Refills: 0 | Status: CANCELLED | OUTPATIENT
Start: 2018-04-12

## 2018-04-12 RX ORDER — ZOLPIDEM TARTRATE 10 MG/1
10 TABLET ORAL NIGHTLY PRN
Qty: 30 TAB | Refills: 0 | Status: SHIPPED | OUTPATIENT
Start: 2018-04-12 | End: 2018-04-12 | Stop reason: SDUPTHER

## 2018-04-12 RX ORDER — ZOLPIDEM TARTRATE 10 MG/1
10 TABLET ORAL NIGHTLY PRN
Qty: 30 TAB | Refills: 0 | Status: SHIPPED
Start: 2018-04-12 | End: 2018-05-08 | Stop reason: SDUPTHER

## 2018-04-12 NOTE — TELEPHONE ENCOUNTER
Was the patient seen in the last year in this department? Yes     Does patient have an active prescription for medications requested? No     Received Request Via: Patient  '

## 2018-04-12 NOTE — TELEPHONE ENCOUNTER
Was the patient seen in the last year in this department? Yes     Does patient have an active prescription for medications requested? No     Received Request Via: Pharmacy      Pt met protocol?: Yes    OV 2/18

## 2018-04-13 RX ORDER — CYCLOBENZAPRINE HCL 10 MG
TABLET ORAL
Qty: 90 TAB | Refills: 0 | Status: SHIPPED | OUTPATIENT
Start: 2018-04-13 | End: 2018-07-12 | Stop reason: SDUPTHER

## 2018-05-08 DIAGNOSIS — G47.00 INSOMNIA, UNSPECIFIED TYPE: ICD-10-CM

## 2018-05-08 RX ORDER — ZOLPIDEM TARTRATE 10 MG/1
10 TABLET ORAL NIGHTLY PRN
Qty: 30 TAB | Refills: 0 | OUTPATIENT
Start: 2018-05-12 | End: 2018-06-14 | Stop reason: SDUPTHER

## 2018-05-23 ENCOUNTER — APPOINTMENT (OUTPATIENT)
Dept: MEDICAL GROUP | Facility: PHYSICIAN GROUP | Age: 53
End: 2018-05-23
Payer: MEDICARE

## 2018-05-25 ENCOUNTER — APPOINTMENT (OUTPATIENT)
Dept: SLEEP MEDICINE | Facility: MEDICAL CENTER | Age: 53
End: 2018-05-25
Payer: MEDICARE

## 2018-06-14 DIAGNOSIS — G47.00 INSOMNIA, UNSPECIFIED TYPE: ICD-10-CM

## 2018-06-14 RX ORDER — ZOLPIDEM TARTRATE 10 MG/1
10 TABLET ORAL NIGHTLY PRN
Qty: 30 TAB | Refills: 2 | Status: SHIPPED
Start: 2018-06-14 | End: 2018-07-14

## 2018-06-22 DIAGNOSIS — I10 ESSENTIAL HYPERTENSION: ICD-10-CM

## 2018-06-22 DIAGNOSIS — E03.8 SUBCLINICAL HYPOTHYROIDISM: ICD-10-CM

## 2018-06-24 RX ORDER — HYDROCHLOROTHIAZIDE 25 MG/1
TABLET ORAL
Qty: 90 TAB | Refills: 1 | Status: SHIPPED | OUTPATIENT
Start: 2018-06-24 | End: 2018-08-10

## 2018-06-24 RX ORDER — LOSARTAN POTASSIUM 100 MG/1
TABLET ORAL
Qty: 90 TAB | Refills: 0 | Status: SHIPPED | OUTPATIENT
Start: 2018-06-24 | End: 2018-07-17 | Stop reason: SDUPTHER

## 2018-06-24 RX ORDER — LEVOTHYROXINE SODIUM 0.03 MG/1
TABLET ORAL
Qty: 90 TAB | Refills: 1 | Status: SHIPPED | OUTPATIENT
Start: 2018-06-24 | End: 2018-12-11 | Stop reason: SDUPTHER

## 2018-06-25 NOTE — TELEPHONE ENCOUNTER
Refill X 6 months, sent to pharmacy.Pt. Seen in the last 6 months per protocol. TSH from 11/17 was 3.4.  Lab Results   Component Value Date/Time    SODIUM 132 (L) 08/08/2017 04:29 AM    POTASSIUM 3.3 (L) 08/08/2017 04:29 AM    CHLORIDE 98 08/08/2017 04:29 AM    CO2 25 08/08/2017 04:29 AM    GLUCOSE 114 (H) 08/08/2017 04:29 AM    BUN 26 (H) 08/08/2017 04:29 AM    CREATININE 0.92 08/08/2017 04:29 AM

## 2018-07-12 ENCOUNTER — HOSPITAL ENCOUNTER (OUTPATIENT)
Facility: MEDICAL CENTER | Age: 53
End: 2018-07-12
Attending: PAIN MEDICINE | Admitting: PAIN MEDICINE
Payer: MEDICARE

## 2018-07-12 DIAGNOSIS — M46.1 SACROILIITIS (HCC): ICD-10-CM

## 2018-07-12 DIAGNOSIS — M15.9 PRIMARY OSTEOARTHRITIS INVOLVING MULTIPLE JOINTS: ICD-10-CM

## 2018-07-12 NOTE — TELEPHONE ENCOUNTER
Was the patient seen in the last year in this department? Yes     Does patient have an active prescription for medications requested? No     Received Request Via: Pharmacy      Pt met protocol?: Yes, ov 2/18 no appt upcoming

## 2018-07-13 RX ORDER — CYCLOBENZAPRINE HCL 10 MG
TABLET ORAL
Qty: 90 TAB | Refills: 0 | Status: SHIPPED | OUTPATIENT
Start: 2018-07-13 | End: 2018-09-25 | Stop reason: SDUPTHER

## 2018-07-17 DIAGNOSIS — M46.1 SACROILIITIS (HCC): ICD-10-CM

## 2018-07-17 DIAGNOSIS — I10 ESSENTIAL HYPERTENSION: ICD-10-CM

## 2018-07-17 DIAGNOSIS — M15.9 PRIMARY OSTEOARTHRITIS INVOLVING MULTIPLE JOINTS: ICD-10-CM

## 2018-07-17 RX ORDER — LOSARTAN POTASSIUM 100 MG/1
TABLET ORAL
Qty: 90 TAB | Refills: 0 | Status: SHIPPED | OUTPATIENT
Start: 2018-07-17 | End: 2019-02-07 | Stop reason: SDUPTHER

## 2018-07-17 RX ORDER — CYCLOBENZAPRINE HCL 10 MG
TABLET ORAL
Qty: 90 TAB | Refills: 0 | Status: CANCELLED | OUTPATIENT
Start: 2018-07-17

## 2018-07-17 RX ORDER — LOSARTAN POTASSIUM 100 MG/1
100 TABLET ORAL
Qty: 90 TAB | Refills: 0 | Status: CANCELLED | OUTPATIENT
Start: 2018-07-17

## 2018-07-17 NOTE — TELEPHONE ENCOUNTER
*Change in pharmacy*  Was the patient seen in the last year in this department? Yes     Does patient have an active prescription for medications requested? Yes     Received Request Via: Pharmacy    Pt met protocol?: Yes     Last OV 05/2018  BP Readings from Last 1 Encounters:   02/27/18 130/86

## 2018-07-18 VITALS — BODY MASS INDEX: 46.63 KG/M2 | HEIGHT: 65 IN | WEIGHT: 279.9 LBS

## 2018-07-18 DIAGNOSIS — Z01.812 PRE-OPERATIVE LABORATORY EXAMINATION: ICD-10-CM

## 2018-07-18 DIAGNOSIS — Z01.810 PRE-OPERATIVE CARDIOVASCULAR EXAMINATION: ICD-10-CM

## 2018-07-18 LAB
ANION GAP SERPL CALC-SCNC: 11 MMOL/L (ref 0–11.9)
BUN SERPL-MCNC: 15 MG/DL (ref 8–22)
CALCIUM SERPL-MCNC: 9.7 MG/DL (ref 8.5–10.5)
CHLORIDE SERPL-SCNC: 100 MMOL/L (ref 96–112)
CO2 SERPL-SCNC: 25 MMOL/L (ref 20–33)
CREAT SERPL-MCNC: 0.58 MG/DL (ref 0.5–1.4)
EKG IMPRESSION: NORMAL
ERYTHROCYTE [DISTWIDTH] IN BLOOD BY AUTOMATED COUNT: 40.7 FL (ref 35.9–50)
GLUCOSE SERPL-MCNC: 203 MG/DL (ref 65–99)
HCT VFR BLD AUTO: 45 % (ref 37–47)
HGB BLD-MCNC: 14.9 G/DL (ref 12–16)
MCH RBC QN AUTO: 28 PG (ref 27–33)
MCHC RBC AUTO-ENTMCNC: 33.1 G/DL (ref 33.6–35)
MCV RBC AUTO: 84.4 FL (ref 81.4–97.8)
PLATELET # BLD AUTO: 350 K/UL (ref 164–446)
PMV BLD AUTO: 9 FL (ref 9–12.9)
POTASSIUM SERPL-SCNC: 3.1 MMOL/L (ref 3.6–5.5)
RBC # BLD AUTO: 5.33 M/UL (ref 4.2–5.4)
SODIUM SERPL-SCNC: 136 MMOL/L (ref 135–145)
WBC # BLD AUTO: 10.6 K/UL (ref 4.8–10.8)

## 2018-07-18 PROCEDURE — 93010 ELECTROCARDIOGRAM REPORT: CPT | Performed by: INTERNAL MEDICINE

## 2018-07-18 PROCEDURE — 36415 COLL VENOUS BLD VENIPUNCTURE: CPT

## 2018-07-18 PROCEDURE — 93005 ELECTROCARDIOGRAM TRACING: CPT

## 2018-07-18 PROCEDURE — 85027 COMPLETE CBC AUTOMATED: CPT

## 2018-07-18 PROCEDURE — 80048 BASIC METABOLIC PNL TOTAL CA: CPT

## 2018-07-18 PROCEDURE — 83036 HEMOGLOBIN GLYCOSYLATED A1C: CPT

## 2018-07-18 RX ORDER — ZOLPIDEM TARTRATE 10 MG/1
10 TABLET ORAL EVERY EVENING
COMMUNITY
End: 2018-09-12 | Stop reason: SDUPTHER

## 2018-07-18 NOTE — OR NURSING
"Preadmit appointment: \" Preparing for your Procedure information\" sheet given to patient with verbal and written instructions. Patient instructed to continue prescribed medications through the day before surgery, instructed to take the following medications the day of surgery per anesthesia protocol: Amlodipine, Levothyroxine, Albuterol inhaler if needed and pt instructed to bring inhaler day of surgery. Dr. Flores notified of admit, health summary and BMI  "

## 2018-07-18 NOTE — OP REPORT
Surgeon:   Dominique Saha MD  Patient name:   Carolynn Casey  YOB: 1965  Date Seen:   08/16/2018    Assistants: None    Surgeon: Dominique Saha MD    Assistants: None    Preoperative diagnosis: Chronic Pain, failed back surgery syndrome  Post operative diagnosis: Chronic Pain, failed back surgery syndrome    Type of Sedation:  General Anesthesia with ETT  Procedure:  1. Insertion of a Permanent Midline Octrode Spinal Cord Stimulator Lead  2. Insertion of a Permanent Left Octrode Spinal Cord Stimulator Lead  3. Intraoperative Neurostimulation Trial  4. Creation of a  right Implantable Power Generator (IPG) Pocket  5. Implantation of the IPG    Method of Surgery:  After discussing risks, benefits, and alternatives to the procedure, the patient expressed understanding and wished to proceed.  An IV was started in the pre-op area and IV fluid administration was continued throughout the procedure.  The patient was brought into the fluoroscopy suite and placed in the prone position.  Procedural pause was conducted to verify: correct patient identity, procedure to be performed and as applicable, correct side and site, correct patient position, and availability of implants, special equipment or special instruments. Anesthesia appropriate to the procedure was administered by the physician and is accurately reflected in the nurse's notes. Blood pressure, heart rate, pulse oximetry, and cardiac monitoring were monitored throughout the procedure. The patient's vital signs remained stable throughout the procedure.  A dose of Ancef was administered intravenously prior to starting the procedure.     The skin was sterilely prepped and draped in the usual fashion using chlorhexidine times three in the region that the procedure was to be performed.      Insertion of a Permanent Midline Octrode Spinal Cord Stimulator Lead    The right T12 - L1 interlaminar space was identified fluoroscopically.  The skin and subcutaneous  tissues overlying the right L1 lamina were anesthetized with 1% Lidocaine without epinephrine using a 25G 1.5 inch needle and a 25G 3.5 inch spinal needle for deeper tissues.  Then the 25 gauge 3.5 inch spinal needle was used to inject 0.5% bupivacaine with epinephrine for further local anesthetic control and hemostasis.  A 10-blade scalpel was used to make a 5 cm midline incision overlying the L1 spinous process to the L4 spinous process. Dissection was carried out with bovie to expose the bilateral interlaminar ligaments.  After blunt dissection was completed, the incision was copiously irrigated with bacitracin solution.  A 14-gauge 4.5 inch Tuohy epidural needle was inserted through the incision to the right L1 lamina and was then “walked off” the superior aspect of this lamina into the T12-L1 epidural space.  The epidural space was localized using a loss of resistance technique and intermittent fluoroscopic    guidance.  An octrode spinal cord stimulator lead was then advanced up the midline of the posterior epidural space to the top of T8 vertebra.  The 14-gauge 4.5 inch Tuohy epidural needle was then removed using a push-pull technique under direct fluoroscopic visualization to make sure the lead did not move. The proximal portion of the lead was anchored to the right interlaminar ligament using standard technique with interrupted 2-0 ethiobond sutures and a torpedo anchor.    Insertion of a Permanent Left Octrode Spinal Cord Stimulator Lead    The right T12-L1 interlaminar space was identified fluoroscopically.  The skin and subcutaneous tissues overlying the right L2 lamina were anesthetized with 1% Lidocaine without epinephrine using a 25G 1.5 inch needle and a 25G 3.5 inch spinal needle for deeper tissues.  A 14-gauge 4.5 inch Tuohy epidural needle was inserted through the skin to the right L1  lamina and was then “walked off” the superior aspect of this lamina into the T12-L1  epidural space.  The  epidural space was localized using a loss of resistance technique and intermittent fluoroscopic guidance.  An octrode spinal cord stimulator lead was then advanced up the left side of the posterior epidural space to the top of T9  vertebra.  The 14-gauge 4.5 inch Tuohy epidural needle was then removed using a push-pull technique under direct fluoroscopic visualization to make sure the lead did not move and the end of the lead  was pulled back into the incision side through the skin. The proximal portion of the lead was anchored to the left interlaminar ligament using standard technique with interrupted 2-0 ethibond sutures and a torpedo anchor.     Intraoperative Neurostimulation Trial    At this point, the distal ends of the leads were inserted and locked into the IPG.  Impedance was checked and multiple electrode combinations were utilized to provide coverage of the patient's area of pain.      Creation of a right IPG Pocket    The subcutaneous tissues overlying the right midline incision  was anesthetized with 1% Lidocaine without epinephrine using a 25G 1.5 inch needle. Dissection was made with bunt technique, [place] and bovie right lateral to the incision. Hemostasis was achieved.  A generous subcutaneous pocket was created using blunt dissection that was approximately 2 cm deep to the skin's surface.  After it was completed, the pocket was copiously irrigated.  The left spinal cord stimulator lead was connected to the inferior connector of the IPG.  The right    spinal cord stimulator lead was connected to the superior connector of the IPG.  After connection to the IPG, the system was activated confirming that the system was operational.    Implantation of the IPG    After the distal ends of the spinal cord stimulator leads were placed into the IPG, the sterile 4x4's in the IPG pocket were removed.  The remaining length of the spinal cord stimulator leads were coiled underneath the IPG and the IPG was placed  into the pocket.  A sponge count was performed and all sponges were accounted for.  2-0 Vicryl was used to close the subcutaneous tissues of the pocket and midline incision with interrupted sutures.  The skin of both incisions were closed using staples.  An AP spot film was obtained to record the final lead positions.  one gram vancomycin was applied into the pocket and midline incision.    The patient was seen in recovery and the patient received good stimulation that covered their targeted pain.  The patient was instructed in the proper use of the  and understood its use prior to discharge.  The patient tolerated the procedure well and there were no apparent complications.  After an appropriate amount of observation, the patient was dismissed from the clinic in good condition under their own power.    Complications:  None   Disposition:   1.  The patient was discharged to the recovery area in good condition.  2.  Discharge instructions were provided and explained.  3.  Patient was instructed to call with any questions or problems.  4.  Apply ice to the procedure site and keep clean and dry for 24 hours.  5.  Medications were reviewed for efficacy and appropriateness.  6.  Continue current medications.  7.  Return in 1 to 2 weeks for follow up.

## 2018-07-19 ENCOUNTER — TELEPHONE (OUTPATIENT)
Dept: MEDICAL GROUP | Facility: PHYSICIAN GROUP | Age: 53
End: 2018-07-19

## 2018-07-19 ENCOUNTER — HOSPITAL ENCOUNTER (OUTPATIENT)
Dept: LAB | Facility: MEDICAL CENTER | Age: 53
End: 2018-07-19
Attending: PHYSICIAN ASSISTANT
Payer: MEDICARE

## 2018-07-19 DIAGNOSIS — E55.9 VITAMIN D DEFICIENCY: ICD-10-CM

## 2018-07-19 DIAGNOSIS — E87.1 HYPONATREMIA: ICD-10-CM

## 2018-07-19 DIAGNOSIS — E87.6 HYPOKALEMIA: ICD-10-CM

## 2018-07-19 DIAGNOSIS — Z13.220 SCREENING FOR LIPID DISORDERS: ICD-10-CM

## 2018-07-19 LAB
25(OH)D3 SERPL-MCNC: 16 NG/ML (ref 30–100)
ALBUMIN SERPL BCP-MCNC: 4.1 G/DL (ref 3.2–4.9)
ALBUMIN/GLOB SERPL: 1.3 G/DL
ALP SERPL-CCNC: 67 U/L (ref 30–99)
ALT SERPL-CCNC: 46 U/L (ref 2–50)
ANION GAP SERPL CALC-SCNC: 12 MMOL/L (ref 0–11.9)
AST SERPL-CCNC: 33 U/L (ref 12–45)
BILIRUB SERPL-MCNC: 1.2 MG/DL (ref 0.1–1.5)
BUN SERPL-MCNC: 14 MG/DL (ref 8–22)
CALCIUM SERPL-MCNC: 9.9 MG/DL (ref 8.5–10.5)
CHLORIDE SERPL-SCNC: 102 MMOL/L (ref 96–112)
CHOLEST SERPL-MCNC: 172 MG/DL (ref 100–199)
CO2 SERPL-SCNC: 24 MMOL/L (ref 20–33)
CREAT SERPL-MCNC: 0.56 MG/DL (ref 0.5–1.4)
EST. AVERAGE GLUCOSE BLD GHB EST-MCNC: 160 MG/DL
GLOBULIN SER CALC-MCNC: 3.1 G/DL (ref 1.9–3.5)
GLUCOSE SERPL-MCNC: 124 MG/DL (ref 65–99)
HBA1C MFR BLD: 7.2 % (ref 0–5.6)
HDLC SERPL-MCNC: 42 MG/DL
LDLC SERPL CALC-MCNC: 93 MG/DL
POTASSIUM SERPL-SCNC: 3.3 MMOL/L (ref 3.6–5.5)
PROT SERPL-MCNC: 7.2 G/DL (ref 6–8.2)
SODIUM SERPL-SCNC: 138 MMOL/L (ref 135–145)
TRIGL SERPL-MCNC: 186 MG/DL (ref 0–149)

## 2018-07-19 PROCEDURE — 36415 COLL VENOUS BLD VENIPUNCTURE: CPT

## 2018-07-19 PROCEDURE — 80061 LIPID PANEL: CPT | Mod: GA

## 2018-07-19 PROCEDURE — 82306 VITAMIN D 25 HYDROXY: CPT

## 2018-07-19 PROCEDURE — 80053 COMPREHEN METABOLIC PANEL: CPT

## 2018-07-19 NOTE — TELEPHONE ENCOUNTER
Future Appointments       Provider Department [place]    7/19/2018 12:45 PM [facility]     7/20/2018 11:05 AM Lisa Gilbert P.A.-C. [facility]      ESTABLISHED PATIENT PRE-VISIT PLANNING     Note: Patient will not be contacted if there is no indication to call.     1.  Reviewed notes from the last few office visits within the medical group: Yes    2.  If any orders were placed at last visit or intended to be done for this visit (i.e. 6 mos follow-up), do we have Results/Consult Notes?        •  Labs - Labs ordered, completed on 07/18/18 and results are in chart.       •  Imaging - Imaging ordered, completed and results are in chart.       •  Referrals - Referral ordered, patient has NOT been seen.    3. Is this appointment scheduled as a Hospital Follow-Up? No    4.  Immunizations were updated in [employer] using WebIZ?: Yes       •  Web Iz Recommendations: FLU, HEPATITIS A , MMR , TD, VARICELLA (Chicken Pox)  and ZOSTAVAX (Shingles)    5.  Patient is due for the following Health Maintenance Topics:   Health Maintenance Due   Topic Date Due   • COLON CANCER SCREENING ANNUAL FIT  02/04/2013       6.  MDX printed for Provider? NO complete and compliant on 02/27/18    7.  Patient was informed to arrive 15 min prior to their scheduled appointment and bring in their medication bottles.

## 2018-07-20 ENCOUNTER — OFFICE VISIT (OUTPATIENT)
Dept: MEDICAL GROUP | Facility: PHYSICIAN GROUP | Age: 53
End: 2018-07-20
Payer: MEDICARE

## 2018-07-20 VITALS
SYSTOLIC BLOOD PRESSURE: 134 MMHG | OXYGEN SATURATION: 94 % | WEIGHT: 275.7 LBS | HEIGHT: 65 IN | HEART RATE: 89 BPM | TEMPERATURE: 99.6 F | BODY MASS INDEX: 45.94 KG/M2 | DIASTOLIC BLOOD PRESSURE: 72 MMHG

## 2018-07-20 DIAGNOSIS — E11.9 TYPE 2 DIABETES MELLITUS WITHOUT COMPLICATION, WITHOUT LONG-TERM CURRENT USE OF INSULIN (HCC): ICD-10-CM

## 2018-07-20 DIAGNOSIS — E87.6 HYPOKALEMIA: ICD-10-CM

## 2018-07-20 DIAGNOSIS — E55.9 VITAMIN D DEFICIENCY: ICD-10-CM

## 2018-07-20 PROBLEM — E87.1 HYPONATREMIA: Status: RESOLVED | Noted: 2017-08-07 | Resolved: 2018-07-20

## 2018-07-20 PROCEDURE — 99214 OFFICE O/P EST MOD 30 MIN: CPT | Performed by: PHYSICIAN ASSISTANT

## 2018-07-20 RX ORDER — METFORMIN HYDROCHLORIDE 500 MG/1
500 TABLET, EXTENDED RELEASE ORAL DAILY
Qty: 30 TAB | Refills: 5 | Status: SHIPPED | OUTPATIENT
Start: 2018-07-20 | End: 2018-08-10

## 2018-07-20 RX ORDER — POTASSIUM CHLORIDE 750 MG/1
10 TABLET, FILM COATED, EXTENDED RELEASE ORAL DAILY
Qty: 30 TAB | Refills: 5 | Status: SHIPPED | OUTPATIENT
Start: 2018-07-20 | End: 2019-05-13 | Stop reason: SDUPTHER

## 2018-07-20 RX ORDER — LANCETS 30 GAUGE
EACH MISCELLANEOUS
Qty: 100 EACH | Refills: 3 | Status: SHIPPED | OUTPATIENT
Start: 2018-07-20 | End: 2018-08-10

## 2018-07-20 RX ORDER — ERGOCALCIFEROL 1.25 MG/1
50000 CAPSULE ORAL
Qty: 12 CAP | Refills: 0 | Status: SHIPPED | OUTPATIENT
Start: 2018-07-20 | End: 2019-03-05

## 2018-07-20 NOTE — PATIENT INSTRUCTIONS
Type 2 Diabetes Mellitus, Self Care, Adult  When you have type 2 diabetes (type 2 diabetes mellitus), you must keep your blood sugar (glucose) under control. You can do this with:  · Nutrition.  · Exercise.  · Lifestyle changes.  · Medicines or insulin, if needed.  · Support from your doctors and others.  How do I manage my blood sugar?  · Check your blood sugar level every day, as often as told.  · Call your doctor if your blood sugar is above your goal numbers for 2 tests in a row.  · Have your A1c (hemoglobin A1c) level checked at least two times a year. Have it checked more often if your doctor tells you to.  Your doctor will set treatment goals for you. Generally, you should have these blood sugar levels:  · Before meals (preprandial):  mg/dL (4.4-7.2 mmol/L).  · After meals (postprandial): lower than 180 mg/dL (10 mmol/L).  · A1c level: less than 7%.  What do I need to know about high blood sugar?  High blood sugar is called hyperglycemia. Know the signs of high blood sugar. Signs may include:  · Feeling:  ¨ Thirsty.  ¨ Hungry.  ¨ Very tired.  · Needing to pee (urinate) more than usual.  · Blurry vision.  What do I need to know about low blood sugar?  Low blood sugar is called hypoglycemia. This is when blood sugar is at or below 70 mg/dL (3.9 mmol/L). Symptoms may include:  · Feeling:  ¨ Hungry.  ¨ Worried or nervous (anxious).  ¨ Sweaty and clammy.  ¨ Confused.  ¨ Dizzy.  ¨ Sleepy.  ¨ Sick to your stomach (nauseous).  · Having:  ¨ A fast heartbeat (palpitations).  ¨ A headache.  ¨ A change in your vision.  ¨ Jerky movements that you cannot control (seizure).  ¨ Nightmares.  ¨ Tingling or no feeling (numbness) around the mouth, lips, or tongue.  · Having trouble with:  ¨ Talking.  ¨ Paying attention (concentrating).  ¨ Moving (coordination).  ¨ Sleeping.  · Shaking.  · Passing out (fainting).  · Getting upset easily (irritability).  Treating low blood sugar   To treat low blood sugar, eat or drink  something sugary right away. If you can think clearly and swallow safely, follow the 15:15 rule:  · Take 15 grams of a fast-acting carb (carbohydrate). Some fast-acting carbs are:  ¨ 1 tube of glucose gel.  ¨ 3 sugar tablets (glucose pills).  ¨ 6-8 pieces of hard candy.  ¨ 4 oz (120 mL) of fruit juice.  ¨ 4 oz (120 mL) regular (not diet) soda.  · Check your blood sugar 15 minutes after you take the carb.  · If your blood sugar is still at or below 70 mg/dL (3.9 mmol/L), take 15 grams of a carb again.  · If your blood sugar does not go above 70 mg/dL (3.9 mmol/L) after 3 tries, get help right away.  · After your blood sugar goes back to normal, eat a meal or a snack within 1 hour.  Treating very low blood sugar   If your blood sugar is at or below 54 mg/dL (3 mmol/L), you have very low blood sugar (severe hypoglycemia). This is an emergency. Do not wait to see if the symptoms will go away. Get medical help right away. Call your local emergency services (911 in the [place] Do not drive yourself to the hospital.  If you have very low blood sugar and you cannot eat or drink, you may need a glucagon shot (injection). A family member or friend should learn how to check your blood sugar and how to give you a glucagon shot. Ask your doctor if you need to have a glucagon shot kit at home.  What else is important to manage my diabetes?  Medicine   Follow these instructions about insulin and diabetes medicines:  · Take them as told by your doctor.  · Adjust them as told by your doctor.  · Do not run out of them.  Having diabetes can raise your risk for other long-term conditions. These include heart or kidney disease. Your doctor may prescribe medicines to help prevent problems from diabetes.  Food   · Make healthy food choices. These include:  ¨ Chicken, fish, egg whites, and beans.  ¨ Oats, whole wheat, bulgur, brown rice, quinoa, and millet.  ¨ Fresh fruits and vegetables.  ¨ Low-fat dairy products.  ¨ Nuts, avocado, olive  oil, and canola oil.  · Make a food plan with a specialist (dietitian).  · Follow instructions from your doctor about what you cannot eat or drink.  · Drink enough fluid to keep your pee (urine) clear or pale yellow.  · Eat healthy snacks between healthy meals.  · Keep track of carbs that you eat. Read food labels. Learn food serving sizes.  · Follow your sick day plan when you cannot eat or drink normally. Make this plan with your doctor so it is ready to use.  Activity  · Exercise at least 3 times a week.  · Do not go more than 2 days without exercising.  · Talk with your doctor before you start a new exercise. Your doctor may need to adjust your insulin, medicines, or food.  Lifestyle   · Do not use any tobacco products. These include cigarettes, chewing tobacco, and e-cigarettes.If you need help quitting, ask your doctor.  · Ask your doctor how much alcohol is safe for you.  · Learn to deal with stress. If you need help with this, ask your doctor.  Body care  · Stay up to date with your shots (immunizations).  · Have your eyes and feet checked by a doctor as often as told.  · Check your skin and feet every day. Check for cuts, bruises, redness, blisters, or sores.  · Brush your teeth and gums two times a day.  · Floss at least one time a day.  · Go to the dentist least one time every 6 months.  · Stay at a healthy weight.  General instructions   · Take over-the-counter and prescription medicines only as told by your doctor.  · Share your diabetes care plan with:  ¨ Your work or school.  ¨ People you live with.  · Check your pee (urine) for ketones:  ¨ When you are sick.  ¨ As told by your doctor.  · Carry a card or wear jewelry that says that you have diabetes.  · Ask your doctor:  ¨ Do I need to meet with a diabetes educator?  ¨ Where can I find a support group for people with diabetes?  · Keep all follow-up visits as told by your doctor. This is important.  Where to find more information:  To learn more about  diabetes, visit:  · American Diabetes Association: www.diabetes.org  · American Association of Diabetes Educators: www.diabeteseducator.org/patient-resources  This information is not intended to replace advice given to you by your health care provider. Make sure you discuss any questions you have with your health care provider.  Document Released: 04/10/2017 Document Revised: 05/25/2017 Document Reviewed: 01/20/2017  [employer] Interactive Patient Education © 2017 [employer]    Metformin extended-release tablets  What is this medicine?  METFORMIN (met FOR min) is used to treat type 2 diabetes. It helps to control blood sugar. Treatment is combined with diet and exercise. This medicine can be used alone or with other medicines for diabetes.  This medicine may be used for other purposes; ask your health care provider or pharmacist if you have questions.  COMMON BRAND NAME(S): Fortamet, Glucophage XR, Glumetza  What should I tell my health care provider before I take this medicine?  They need to know if you have any of these conditions:  -anemia  -frequently drink alcohol-containing beverages  -become easily dehydrated  -heart attack  -heart failure  -kidney disease  -liver disease  -polycystic ovary syndrome  -serious infection or injury  -vomiting  -an unusual or allergic reaction to metformin, other medicines, foods, dyes, or preservatives  -pregnant or trying to get pregnant  -breast-feeding  How should I use this medicine?  Take this medicine by mouth with a glass of water. Take it with meals. Swallow whole, do not crush or chew. Follow the directions on the prescription label. Take your medicine at regular intervals. Do not take your medicine more often than directed.  Talk to your pediatrician regarding the use of this medicine in children. Special care may be needed.  Overdosage: If you think you have taken too much of this medicine contact a poison control center or emergency room at once.  NOTE: This  medicine is only for you. Do not share this medicine with others.  What if I miss a dose?  If you miss a dose, take it as soon as you can. If it is almost time for your next dose, take only that dose. Do not take double or extra doses.  What may interact with this medicine?  Do not take this medicine with any of the following medications:  -dofetilide  -gatifloxacin  -certain contrast medicines given before X-rays, CT scans, MRI, or other procedures  This medicine may also interact with the following medications:  -acetazolamide  -certain medicines for HIV infection or hepatitis, like adefovir, emtricitabine, entecavir, lamivudine, or tenofovir  -cimetidine  -crizotinib  -digoxin  -diuretics  -female hormones, like estrogens or progestins and birth control pills  -glycopyrrolate  -isoniazid  -lamotrigine  -medicines for blood pressure, heart disease, irregular heart beat  -memantine  -midodrine  -methazolamide  -morphine  -nicotinic acid  -phenothiazines like chlorpromazine, mesoridazine, prochlorperazine, thioridazine  -phenytoin  -procainamide  -propantheline  -quinidine  -quinine  -ranitidine  -ranolazine  -steroid medicines like prednisone or cortisone  -stimulant medicines for attention disorders, weight loss, or to stay awake  -thyroid medicines  -topiramate  -trimethoprim  -trospium  -vancomycin  -vandetanib  -zonisamide  This list may not describe all possible interactions. Give your health care provider a list of all the medicines, herbs, non-prescription drugs, or dietary supplements you use. Also tell them if you smoke, drink alcohol, or use illegal drugs. Some items may interact with your medicine.  What should I watch for while using this medicine?  Visit your doctor or health care professional for regular checks on your progress.  A test called the HbA1C (A1C) will be monitored. This is a simple blood test. It measures your blood sugar control over the last 2 to 3 months. You will receive this test  every 3 to 6 months.  Learn how to check your blood sugar. Learn the symptoms of low and high blood sugar and how to manage them.  Always carry a quick-source of sugar with you in case you have symptoms of low blood sugar. Examples include hard sugar candy or glucose tablets. Make sure others know that you can choke if you eat or drink when you develop serious symptoms of low blood sugar, such as seizures or unconsciousness. They must get medical help at once.  Tell your doctor or health care professional if you have high blood sugar. You might need to change the dose of your medicine. If you are sick or exercising more than usual, you might need to change the dose of your medicine.  Do not skip meals. Ask your doctor or health care professional if you should avoid alcohol. Many nonprescription cough and cold products contain sugar or alcohol. These can affect blood sugar.  This medicine may cause ovulation in premenopausal women who do not have regular monthly periods. This may increase your chances of becoming pregnant. You should not take this medicine if you become pregnant or think you may be pregnant. Talk with your doctor or health care professional about your birth control options while taking this medicine. Contact your doctor or health care professional right away if think you are pregnant.  The tablet shell for some brands of this medicine does not dissolve. This is normal. The tablet shell may appear whole in the stool. This is not a cause for concern.  If you are going to need surgery, a MRI, CT scan, or other procedure, tell your doctor that you are taking this medicine. You may need to stop taking this medicine before the procedure.  Wear a medical ID bracelet or chain, and carry a card that describes your disease and details of your medicine and dosage times.  What side effects may I notice from receiving this medicine?  Side effects that you should report to your doctor or health care professional  as soon as possible:  -allergic reactions like skin rash, itching or hives, swelling of the face, lips, or tongue  -breathing problems  -feeling faint or lightheaded, falls  -muscle aches or pains  -signs and symptoms of low blood sugar such as feeling anxious, confusion, dizziness, increased hunger, unusually weak or tired, sweating, shakiness, cold, irritable, headache, blurred vision, fast heartbeat, loss of consciousness  -slow or irregular heartbeat  -unusual stomach pain or discomfort  -unusually tired or weak  Side effects that usually do not require medical attention (report to your doctor or health care professional if they continue or are bothersome):  -diarrhea  -headache  -heartburn  -metallic taste in mouth  -nausea  -stomach gas, upset  This list may not describe all possible side effects. Call your doctor for medical advice about side effects. You may report side effects to FDA at 3-721-FDA-1203.  Where should I keep my medicine?  Keep out of the reach of children.  Store at room temperature between 15 and 30 degrees C (59 and 86 degrees F). Protect from light. Throw away any unused medicine after the expiration date.  NOTE: This sheet is a summary. It may not cover all possible information. If you have questions about this medicine, talk to your doctor, pharmacist, or health care provider.  © 2018 [employer] (2015-06-02 22:12:16)

## 2018-07-24 NOTE — PROGRESS NOTES
Subjective:   Carolynn Casey is a 53 y.o. female here today for follow-up on diabetes, hypokalemia, vitamin D. Is an established patient of mine.    HPI:    Patient presents to the office today to follow-up on recent lab results. Significant findings as follows:    1) Elevated fasting glucose of 124 and A1c elevated at 7.2. Patient was unaware she is a diabetic. Is not currently checking blood sugars nor is she on medication. Last A1c before this one was back in 4/2017. Currently difficult for her to exercise given ongoing arthritis/back issues. Scheduled for spinal cord stimulator placement in the near future.    2)  Continued low potassium of 3.3. Patient has been taking oral supplement.    3) Low vitamin D level of 16. Not currently on supplement.    Current medicines (including changes today)  Current Outpatient Prescriptions   Medication Sig Dispense Refill   • potassium chloride ER (KLOR-CON) 10 MEQ tablet Take 1 Tab by mouth every day. 30 Tab 5   • vitamin D, Ergocalciferol, (DRISDOL) 59865 units Cap capsule Take 1 Cap by mouth every 7 days. 12 Cap 0   • metFORMIN ER (GLUCOPHAGE XR) 500 MG TABLET SR 24 HR Take 1 Tab by mouth every day. 30 Tab 5   • Lancets Misc Lancets order: Lancets for Contour Next One meter. Sig: use every morning before breakfast and prn ssx high or low sugar. 100 Each 3   • Blood Glucose Monitoring Suppl Supplies Misc Test strips order: Test strips for Contour Next One meter. Sig: use every morning before breakfast and prn ssx high or low sugar 100 Strip 3   • zolpidem (AMBIEN) 10 MG Tab Take 10 mg by mouth every evening.     • losartan (COZAAR) 100 MG Tab TAKE ONE TABLET BY MOUTH ONCE DAILY (Patient taking differently: TAKE ONE TABLET BY MOUTH ONCE DAILY AT NOON) 90 Tab 0   • cyclobenzaprine (FLEXERIL) 10 MG Tab TAKE ONE TABLET BY MOUTH THREE TIMES DAILY AS NEEDED 90 Tab 0   • hydroCHLOROthiazide (HYDRODIURIL) 25 MG Tab TAKE ONE TABLET BY MOUTH ONCE DAILY 90 Tab 1   • levothyroxine  "(SYNTHROID) 25 MCG Tab TAKE ONE TABLET BY MOUTH ONCE DAILY IN THE MORNING ON EMPTY STOMACH 90 Tab 1   • ibuprofen (MOTRIN) 200 MG Tab Take 800 mg by mouth every 6 hours as needed.     • amLODIPine (NORVASC) 10 MG Tab Take 1 Tab by mouth every day. 30 Tab 5   • tizanidine (ZANAFLEX) 4 MG Tab Take 1 Tab by mouth every bedtime. 30 Tab 11   • acetaminophen (TYLENOL) 500 MG Tab Take 1,500 mg by mouth every 6 hours as needed. Indications: Pain     • Pseudoephedrine HCl (SUDAFED PO) Take 10 mg by mouth as needed.     • albuterol 108 (90 BASE) MCG/ACT Aero Soln inhalation aerosol Inhale 2 Puffs by mouth every 6 hours as needed for Shortness of Breath. 8.5 g 3     No current facility-administered medications for this visit.      She  has a past medical history of Allergy; Anesthesia; Anxiety; Arthritis; ASTHMA; Bronchitis (2014); Constipation; DVT (deep venous thrombosis) (HCC); Elevated glucose; GERD (gastroesophageal reflux disease); Heart burn; Heart murmur; Hypertension; Migraine; Pain (01-29-16); Pain; Primary osteoarthritis involving multiple joints; Pulmonary emboli ([facility]) (2017); Snoring; Unspecified disorder of thyroid; Unspecified urinary incontinence; and Upper GI bleed.    ROS  Musculoskeletal/Extremities ROS: Positive for chronic low back pain, bilateral knee pain/stiffness       Objective:     Blood pressure 134/72, pulse 89, temperature 37.6 °C (99.6 °F), height 1.651 m (5' 5\"), weight (!) 125.1 kg (275 lb 11.2 oz), SpO2 94 %. Body mass index is 45.88 kg/m².     Physical Exam:  Constitutional: Alert, morbidly obese but otherwise well-appearing, no distress.  Skin: No rashes in visible areas.  Eye: Conjunctiva clear, lids normal.  ENMT: Lips without lesions, moist mucus membranes.      Assessment and Plan:   The following treatment plan was discussed    1. Type 2 diabetes mellitus without complication, without long-term current use of insulin (HCC)  New problem, uncontrolled given A1c >7. Recommend initiation of " metformin. Will prescribe extended-release formulation to minimize side effects. I have also ordered glucometer and supplies. Patient advised to start checking daily fasting BS, write down, and bring log to next office visit for review. Discussed importance of diabetic diet, exercise, weight loss. Will refer to diabetes education for classes. Next follow-up for diabetes care visit in 3 months.  - REFERRAL TO DIABETIC EDUCATION Diabetes Self Management Education / Training (DSME/T) and Medical Nutrition Therapy (MNT): Initial Group DSME/MNT as authorized by payor, Follow-Up DSME/MNT as authorized by payor; DSME/T Content: Monitoring Diabete...  - metFORMIN ER (GLUCOPHAGE XR) 500 MG TABLET SR 24 HR; Take 1 Tab by mouth every day.  Dispense: 30 Tab; Refill: 5  - Lancets Misc; Lancets order: Lancets for Contour Next One meter. Sig: use every morning before breakfast and prn ssx high or low sugar.  Dispense: 100 Each; Refill: 3  - Blood Glucose Monitoring Suppl Supplies Misc; Test strips order: Test strips for Contour Next One meter. Sig: use every morning before breakfast and prn ssx high or low sugar  Dispense: 100 Strip; Refill: 3    2. Hypokalemia  Established problem, uncontrolled on OTC potassium supplementation. Recommend starting prescription supplement. Will start at dose of 10 mEq daily. Repeat BMP in 3 months with dose adjustment at that time if needed.  - potassium chloride ER (KLOR-CON) 10 MEQ tablet; Take 1 Tab by mouth every day.  Dispense: 30 Tab; Refill: 5  - BASIC METABOLIC PANEL; Future    3. Vitamin D deficiency  New problem. Recommend prescription supplementation which she was advised to take weekly for a total of 12 weeks. Needs to have level re-checked at that time.  - VITAMIN D,25 HYDROXY; Future  - vitamin D, Ergocalciferol, (DRISDOL) 89550 units Cap capsule; Take 1 Cap by mouth every 7 days.  Dispense: 12 Cap; Refill: 0    Total 25 minutes face-to-face time spent with patient, with greater than  50% of the total time discussing patient's issues and symptoms as listed above in assessment and plan, as well as managing coordination of care for future evaluation and treatment.    Followup: Return in about 3 months (around 10/20/2018) for diabetes care visit.    Lisa Gilbert P.A.-C.

## 2018-07-25 DIAGNOSIS — I10 ESSENTIAL HYPERTENSION: ICD-10-CM

## 2018-07-25 RX ORDER — AMLODIPINE BESYLATE 10 MG/1
10 TABLET ORAL DAILY
Qty: 90 TAB | Refills: 1 | Status: SHIPPED | OUTPATIENT
Start: 2018-07-25 | End: 2018-08-28 | Stop reason: SDUPTHER

## 2018-07-25 NOTE — TELEPHONE ENCOUNTER
Lisa- Just wanted to let you know last few potassium levels have been low. Not sure if you wanted to adjust K supplement. Please advise.    Lab Results   Component Value Date/Time    SODIUM 138 07/19/2018 12:09 PM    POTASSIUM 3.3 (L) 07/19/2018 12:09 PM    CHLORIDE 102 07/19/2018 12:09 PM    CO2 24 07/19/2018 12:09 PM    GLUCOSE 124 (H) 07/19/2018 12:09 PM    BUN 14 07/19/2018 12:09 PM    CREATININE 0.56 07/19/2018 12:09 PM

## 2018-07-25 NOTE — TELEPHONE ENCOUNTER
I recently saw patient in clinic and started her on prescription potassium supplement. Will be re-checking potassium in 3 months.  Lisa Gilbert P.A.-C.

## 2018-07-25 NOTE — TELEPHONE ENCOUNTER
Was the patient seen in the last year in this department? No     Does patient have an active prescription for medications requested? No     Received Request Via: Patient      Pt met protocol?: Yes, pt has one refill left at pharmacy. Ins is asking for 90 ds at pharmacy. Ov 7/18 bp 134/72

## 2018-08-01 ENCOUNTER — PATIENT MESSAGE (OUTPATIENT)
Dept: HEALTH INFORMATION MANAGEMENT | Facility: OTHER | Age: 53
End: 2018-08-01

## 2018-08-10 ENCOUNTER — APPOINTMENT (OUTPATIENT)
Dept: ADMISSIONS | Facility: MEDICAL CENTER | Age: 53
End: 2018-08-10
Attending: PAIN MEDICINE
Payer: MEDICARE

## 2018-08-10 DIAGNOSIS — Z01.812 PRE-OPERATIVE LABORATORY EXAMINATION: ICD-10-CM

## 2018-08-10 LAB
ERYTHROCYTE [DISTWIDTH] IN BLOOD BY AUTOMATED COUNT: 43.9 FL (ref 35.9–50)
HCT VFR BLD AUTO: 45.1 % (ref 37–47)
HGB BLD-MCNC: 14.4 G/DL (ref 12–16)
MCH RBC QN AUTO: 27.9 PG (ref 27–33)
MCHC RBC AUTO-ENTMCNC: 31.9 G/DL (ref 33.6–35)
MCV RBC AUTO: 87.4 FL (ref 81.4–97.8)
PLATELET # BLD AUTO: 322 K/UL (ref 164–446)
PMV BLD AUTO: 8.9 FL (ref 9–12.9)
RBC # BLD AUTO: 5.16 M/UL (ref 4.2–5.4)
WBC # BLD AUTO: 8.6 K/UL (ref 4.8–10.8)

## 2018-08-10 PROCEDURE — 36415 COLL VENOUS BLD VENIPUNCTURE: CPT

## 2018-08-10 PROCEDURE — 85027 COMPLETE CBC AUTOMATED: CPT

## 2018-08-10 NOTE — OR NURSING
Hx and meds reviewed, pre op instructions given. Pt aware may take the listed meds morning of surgery; amlodipine,synthroid and albuterol and tylenol if needed. Asked to bring albuterol DOS. Anesthesia fasting guidelines reviewed with pt

## 2018-08-15 NOTE — OP REPORT
Surgeon: Dominique Saha MD    Assistants: None    Preoperative diagnosis: Chronic Pain , Failed Back Surgery syndrome     Post operative diagnosis: Chronic Pain, Failed Back surgery syndrome     Type of Sedation:  GA with ETT  Procedure:  1. Insertion of a Permanent Midline Octrode Spinal Cord Stimulator Lead  2. Insertion of a Permanent Left Octrode Spinal Cord Stimulator Lead  3. Intraoperative Neurostimulation Trial  4. Creation of a right Implantable Power Generator (IPG) Pocket  5. Implantation of the IPG    Method of Surgery:  After discussing risks, benefits, and alternatives to the procedure, the patient expressed understanding and wished to proceed.  An IV was started in the pre-op area and IV fluid administration was continued throughout the procedure.  The patient was brought into the fluoroscopy suite and placed in the prone position.  Procedural pause was conducted to verify: correct patient identity, procedure to be performed and as applicable, correct side and site, correct patient position, and availability of implants, special equipment or special instruments.  Intravenous sedation appropriate to the procedure was administered by the physician/nurse and is accurately reflected in the nurse's notes. Blood pressure, heart rate, pulse oximetry, and cardiac monitoring were monitored throughout the procedure. The patient's vital signs remained stable throughout the procedure.  EKG monitoring revealed normal sinus rhythm.  A dose of Ancef was administered intravenously prior to starting the procedure.     The skin was sterilely prepped and draped in the usual fashion using chlorhexidine times three in the region that the procedure was to be performed.      Insertion of a Permanent Midline Octrode Spinal Cord Stimulator Lead    The right T12 - L1 interlaminar space was identified fluoroscopically.  The skin and subcutaneous tissues overlying the right L1 lamina were anesthetized with 1% Lidocaine without  epinephrine using a 25G 1.5 inch needle and a 25G 3.5 inch spinal needle for deeper tissues.  Then the 25 gauge 3.5 inch spinal needle was used to inject 0.5% bupivacaine with epinephrine for further local anesthetic control and hemostasis.  A 10-blade scalpel was used to make a 5 cm midline incision overlying the L1 spinous process to the L4 spinous process. Dissection was carried out with bovie to expose the bilateral interlaminar ligaments, The thoracolumbar fascia was exposed on the left side.  After blunt dissection was completed, the incision was copiously irrigated with bacitracin solution.  A 14-gauge 4.5 inch Tuohy epidural needle was inserted through the incision to the right L1 lamina and was then “walked off” the superior aspect of this lamina into the T12-L1 epidural space.  The epidural space was localized using a loss of resistance technique and intermittent fluoroscopic guidance.  An octrode spinal cord stimulator lead was then advanced up the midline of the posterior epidural space to the mid of T9 vertebra.  The 14-gauge 4.5 inch Tuohy epidural needle was then removed using a push-pull technique under direct fluoroscopic visualization to make sure the lead did not move. The proximal portion of the lead was anchored to the right interlaminar ligament using standard technique with interrupted 2-0 ethiobond sutures and a torpedo anchor.    Insertion of a Permanent Left Octrode Spinal Cord Stimulator Lead    The right T12-L1 interlaminar space was identified fluoroscopically.  The skin and subcutaneous tissues overlying the right L1 lamina were anesthetized with 1% Lidocaine without epinephrine using a 25G 1.5 inch needle and a 25G 3.5 inch spinal needle for deeper tissues.  A 14-gauge 4.5 inch Tuohy epidural needle was inserted through the skin to the right L2 lamina and was then “walked off” the superior aspect of this lamina into the T12-L1 epidural space.  The epidural space was localized using a  loss of resistance technique and intermittent fluoroscopic guidance.  An octrode spinal cord stimulator lead was then advanced up the left side of the posterior epidural space to the top of T8 vertebra.  The 14-gauge 4.5 inch Tuohy epidural needle was then removed using a push-pull technique under direct fluoroscopic visualization to make sure the lead did not move and the end of the lead  was pulled back into the incision side through the skin. The proximal portion of the lead was anchored to the left interlaminar ligament using standard technique with interrupted 2-0 ethibond sutures and a torpedo anchor.     Intraoperative Neurostimulation Trial    At this point, the distal ends of the leads were inserted and locked into the trial stimulator connectors.  Impedance was checked and multiple electrode combinations were utilized to provide paresthesia coverage of the patient's area of pain.      Creation of a Right IPG Pocket    The  subcutaneous tissues overlying the right midline incision was anesthetized with 1% Lidocaine without epinephrine using a 25G 1.5 inch needle. Dissection was made with Bovgregoria echavarria and  blunt technique . Hemostasis was achieved.  A generous subcutaneous pocket was created using blunt dissection that was approximately 2 cm deep to the skin's surface.  After it was completed, the pocket was copiously irrigated with bacitracin solution and packed with sterile 4x4's. The left spinal cord stimulator lead was connected to the inferior connector of the IPG.  The right spinal cord stimulator lead was connected to the superior connector of the IPG.  After connection to the IPG, the system was activated confirming that the system was operational.    Implantation of the IPG    After the distal ends of the spinal cord stimulator leads were placed into the IPG, the sterile 4x4's in the IPG pocket were removed.  The remaining length of the spinal cord stimulator leads were coiled underneath the IPG and  the IPG was placed into the pocket.  A sponge count was performed and all sponges were accounted for.  2-0 Vicryl was used to close the subcutaneous tissues of the pocket and midline incision with interrupted sutures.  The skin of incision was closed using staples.  An AP spot film was obtained to record the final lead positions.  One gram of vancomyin was applied inside the pocket. Triple antibiotic ointment was placed over the incision.     The patient was seen in recovery and the patient received good stimulation that covered their targeted pain.  The patient was instructed in the proper use of the  and understood its use prior to discharge.  The patient tolerated the procedure well and there were no apparent complications.  After an appropriate amount of observation, the patient was dismissed from the clinic in good condition under their own power.    Complications:  None   Disposition:   1.  The patient was discharged to the recovery area in good condition.  2.  Discharge instructions were provided and explained.  3.  Patient was instructed to call with any questions or problems.  4.  Apply ice to the procedure site and keep clean and dry for 24 hours.  5.  Medications were reviewed for efficacy and appropriateness.  6.  Continue current medications.  7.  Return in 1 to 2 weeks for follow up.

## 2018-08-16 ENCOUNTER — APPOINTMENT (OUTPATIENT)
Dept: RADIOLOGY | Facility: MEDICAL CENTER | Age: 53
End: 2018-08-16
Attending: PAIN MEDICINE
Payer: MEDICARE

## 2018-08-16 ENCOUNTER — HOSPITAL ENCOUNTER (OUTPATIENT)
Facility: MEDICAL CENTER | Age: 53
End: 2018-08-16
Attending: PAIN MEDICINE | Admitting: PAIN MEDICINE
Payer: MEDICARE

## 2018-08-16 VITALS
BODY MASS INDEX: 46.32 KG/M2 | WEIGHT: 278 LBS | DIASTOLIC BLOOD PRESSURE: 61 MMHG | SYSTOLIC BLOOD PRESSURE: 126 MMHG | HEART RATE: 84 BPM | OXYGEN SATURATION: 93 % | RESPIRATION RATE: 16 BRPM | HEIGHT: 65 IN | TEMPERATURE: 97.5 F

## 2018-08-16 PROCEDURE — 160009 HCHG ANES TIME/MIN: Performed by: PAIN MEDICINE

## 2018-08-16 PROCEDURE — 160002 HCHG RECOVERY MINUTES (STAT): Performed by: PAIN MEDICINE

## 2018-08-16 PROCEDURE — 501838 HCHG SUTURE GENERAL: Performed by: PAIN MEDICINE

## 2018-08-16 PROCEDURE — 502240 HCHG MISC OR SUPPLY RC 0272: Performed by: PAIN MEDICINE

## 2018-08-16 PROCEDURE — 700111 HCHG RX REV CODE 636 W/ 250 OVERRIDE (IP)

## 2018-08-16 PROCEDURE — 700105 HCHG RX REV CODE 258: Performed by: PAIN MEDICINE

## 2018-08-16 PROCEDURE — 700101 HCHG RX REV CODE 250

## 2018-08-16 PROCEDURE — C1787 PATIENT PROGR, NEUROSTIM: HCPCS | Performed by: PAIN MEDICINE

## 2018-08-16 PROCEDURE — 160041 HCHG SURGERY MINUTES - EA ADDL 1 MIN LEVEL 4: Performed by: PAIN MEDICINE

## 2018-08-16 PROCEDURE — C1883 ADAPT/EXT, PACING/NEURO LEAD: HCPCS | Performed by: PAIN MEDICINE

## 2018-08-16 PROCEDURE — 160029 HCHG SURGERY MINUTES - 1ST 30 MINS LEVEL 4: Performed by: PAIN MEDICINE

## 2018-08-16 PROCEDURE — 160025 RECOVERY II MINUTES (STATS): Performed by: PAIN MEDICINE

## 2018-08-16 PROCEDURE — 160046 HCHG PACU - 1ST 60 MINS PHASE II: Performed by: PAIN MEDICINE

## 2018-08-16 PROCEDURE — 72070 X-RAY EXAM THORAC SPINE 2VWS: CPT

## 2018-08-16 PROCEDURE — 700105 HCHG RX REV CODE 258

## 2018-08-16 PROCEDURE — 700102 HCHG RX REV CODE 250 W/ 637 OVERRIDE(OP)

## 2018-08-16 PROCEDURE — 160048 HCHG OR STATISTICAL LEVEL 1-5: Performed by: PAIN MEDICINE

## 2018-08-16 PROCEDURE — 160036 HCHG PACU - EA ADDL 30 MINS PHASE I: Performed by: PAIN MEDICINE

## 2018-08-16 PROCEDURE — A9270 NON-COVERED ITEM OR SERVICE: HCPCS

## 2018-08-16 PROCEDURE — 500448 HCHG DRESSING, TELFA 3X4: Performed by: PAIN MEDICINE

## 2018-08-16 PROCEDURE — 160035 HCHG PACU - 1ST 60 MINS PHASE I: Performed by: PAIN MEDICINE

## 2018-08-16 PROCEDURE — 502000 HCHG MISC OR IMPLANTS RC 0278: Performed by: PAIN MEDICINE

## 2018-08-16 PROCEDURE — C1822 GEN, NEURO, HF, RECHG BAT: HCPCS | Performed by: PAIN MEDICINE

## 2018-08-16 PROCEDURE — A6402 STERILE GAUZE <= 16 SQ IN: HCPCS | Performed by: PAIN MEDICINE

## 2018-08-16 DEVICE — IMPLANTABLE DEVICE: Type: IMPLANTABLE DEVICE | Site: BACK | Status: FUNCTIONAL

## 2018-08-16 RX ORDER — SCOLOPAMINE TRANSDERMAL SYSTEM 1 MG/1
PATCH, EXTENDED RELEASE TRANSDERMAL
Status: COMPLETED
Start: 2018-08-16 | End: 2018-08-16

## 2018-08-16 RX ORDER — BUPIVACAINE HYDROCHLORIDE 2.5 MG/ML
INJECTION, SOLUTION EPIDURAL; INFILTRATION; INTRACAUDAL
Status: DISCONTINUED | OUTPATIENT
Start: 2018-08-16 | End: 2018-08-16 | Stop reason: HOSPADM

## 2018-08-16 RX ORDER — LIDOCAINE HYDROCHLORIDE 10 MG/ML
INJECTION, SOLUTION EPIDURAL; INFILTRATION; INTRACAUDAL; PERINEURAL
Status: COMPLETED
Start: 2018-08-16 | End: 2018-08-16

## 2018-08-16 RX ORDER — SODIUM CHLORIDE 9 MG/ML
1000 INJECTION, SOLUTION INTRAVENOUS
Status: DISCONTINUED | OUTPATIENT
Start: 2018-08-16 | End: 2018-08-16 | Stop reason: HOSPADM

## 2018-08-16 RX ORDER — BACITRACIN 65 UNIT/MG
POWDER (GRAM) MISCELLANEOUS
Status: DISCONTINUED | OUTPATIENT
Start: 2018-08-16 | End: 2018-08-16 | Stop reason: HOSPADM

## 2018-08-16 RX ADMIN — ALBUTEROL SULFATE: 2.5 SOLUTION RESPIRATORY (INHALATION) at 09:28

## 2018-08-16 RX ADMIN — SODIUM CHLORIDE 1000 ML: 900 INJECTION, SOLUTION INTRAVENOUS at 07:00

## 2018-08-16 RX ADMIN — SCOPALAMINE 1 PATCH: 1 PATCH, EXTENDED RELEASE TRANSDERMAL at 06:57

## 2018-08-16 ASSESSMENT — PAIN SCALES - GENERAL
PAINLEVEL_OUTOF10: 0
PAINLEVEL_OUTOF10: 0
PAINLEVEL_OUTOF10: 7
PAINLEVEL_OUTOF10: 0

## 2018-08-16 NOTE — OR NURSING
АЛЕКСАНДР MCINTYRE tolerated well. Sats above 90 percent for half hour in PACU 2.  Patient and her  verbalize good understanding of discharge instructions. Gauze and tegaderm clean,dry,intact and abdominal binder in place.

## 2018-08-16 NOTE — DISCHARGE INSTRUCTIONS
ACTIVITY: Rest and take it easy for the first 24 hours.  A responsible adult is recommended to remain with you during that time.  It is normal to feel sleepy.  We encourage you to not do anything that requires balance, judgment or coordination.    MILD FLU-LIKE SYMPTOMS ARE NORMAL. YOU MAY EXPERIENCE GENERALIZED MUSCLE ACHES, THROAT IRRITATION, HEADACHE AND/OR SOME NAUSEA.    FOR 24 HOURS DO NOT:  Drive, operate machinery or run household appliances.  Drink beer or alcoholic beverages.   Make important decisions or sign legal documents.    SPECIAL INSTRUCTIONS:    Continue antibiotics as provided with your discharge instructions    DIET: To avoid nausea, slowly advance diet as tolerated, avoiding spicy or greasy foods for the first day.  Add more substantial food to your diet according to your physician's instructions.  Babies can be fed formula or breast milk as soon as they are hungry.  INCREASE FLUIDS AND FIBER TO AVOID CONSTIPATION.    SURGICAL DRESSING/BATHING: Do not remove dressing for 72 hours, Do not shower/bath for 72 hours    FOLLOW-UP APPOINTMENT:  A follow-up appointment should be arranged with your doctor in 10-14 days; call to schedule.    You should CALL YOUR PHYSICIAN if you develop:  Fever greater than 101 degrees F.  Pain not relieved by medication, or persistent nausea or vomiting.  Excessive bleeding (blood soaking through dressing) or unexpected drainage from the wound.  Extreme redness or swelling around the incision site, drainage of pus or foul smelling drainage.  Inability to urinate or empty your bladder within 8 hours.  Problems with breathing or chest pain.    You should call 911 if you develop problems with breathing or chest pain.  If you are unable to contact your doctor or surgical center, you should go to the nearest emergency room or urgent care center.  Physician's telephone #: [phone]    If any questions arise, call your doctor.  If your doctor is not available, please  feel free to call the Surgical Center at [phone].  The Center is open Monday through Friday from 7AM to 7PM.  You can also call the HEALTH HOTLINE open 24 hours/day, 7 days/week and speak to a nurse at [phone], or toll free at [phone].    A registered nurse may call you a few days after your surgery to see how you are doing after your procedure.    MEDICATIONS: Resume taking daily medication.  Take prescribed pain medication with food.  If no medication is prescribed, you may take non-aspirin pain medication if needed.  PAIN MEDICATION CAN BE VERY CONSTIPATING.  Take a stool softener or laxative such as senokot, pericolace, or milk of magnesia if needed.    Prescription given for antibiotic Clindamycin.     If your physician has prescribed pain medication that includes Acetaminophen (Tylenol), do not take additional Acetaminophen (Tylenol) while taking the prescribed medication.    Depression / Suicide Risk    As you are discharged from this [facility] facility, it is important to learn how to keep safe from harming yourself.    Recognize the warning signs:  · Abrupt changes in personality, positive or negative- including increase in energy   · Giving away possessions  · Change in eating patterns- significant weight changes-  positive or negative  · Change in sleeping patterns- unable to sleep or sleeping all the time   · Unwillingness or inability to communicate  · Depression  · Unusual sadness, discouragement and loneliness  · Talk of wanting to die  · Neglect of personal appearance   · Rebelliousness- reckless behavior  · Withdrawal from people/activities they love  · Confusion- inability to concentrate     If you or a loved one observes any of these behaviors or has concerns about self-harm, here's what you can do:  · Talk about it- your feelings and reasons for harming yourself  · Remove any means that you might use to hurt yourself (examples: pills, rope, extension cords, firearm)  · Get  professional help from the community (Mental Health, Substance Abuse, psychological counseling)  · Do not be alone:Call your Safe Contact- someone whom you trust who will be there for you.  · Call your local CRISIS HOTLINE 700-7182 or 782-881-4922  · Call your local Children's Mobile Crisis Response Team [place] (476) 874-0752 or www.Gingerd  · Call the toll free National Suicide Prevention Hotlines   · National Suicide Prevention Lifeline 588-377-AXBA (4227)  · National Hope Line Network 800-SUICIDE (629-3033)

## 2018-08-16 NOTE — OR NURSING
0620 PT TO PRE OP TO ASSUME CARE.  0711 Patient allergies and NPO status verified, home medication reconciliation completed and belongings secured. Patient verbalizes understanding of pain scale, expected course of stay and plan of care. Surgical site verified with patient. IV access established. Sequentials placed AT BEDSIDE

## 2018-08-16 NOTE — OR NURSING
0924- Patient admitted to PACU stage I area. Expiratory wheezing noted to upper airways. Patient currently receiving oxygen via a face mask at 10 liters with an SPO2 reading of 98%. Albuterol nebulizer ordered by physician at bedside.     0930- Albuterol treatment given as charted in Medication administration record. Patient placed on oxygen via nasal cannula at 2 liters.    0940- Patient resting in bed. Patient denies any pain or discomfort at this time    0955- Patient resting in bed. SPO2 95% on 2 liters of oxygen infusing via nasal cannula. Patient denies pain at surgical site. Abdominal binder in place.     1005- Incentive spirometer given and patient instructed on use. SPO2 fluctuating between 90%-93% on room air. Patient denies any pain with deep inspiration. Oxygen placed to patient at 2 liters.    1020- Patient resting in bed. Patient denies any pain.      1030- Report given to stage II RN.

## 2018-08-28 DIAGNOSIS — I10 ESSENTIAL HYPERTENSION: ICD-10-CM

## 2018-08-29 RX ORDER — AMLODIPINE BESYLATE 10 MG/1
TABLET ORAL
Qty: 90 TAB | Refills: 1 | Status: SHIPPED | OUTPATIENT
Start: 2018-08-29 | End: 2019-02-18 | Stop reason: SDUPTHER

## 2018-08-29 NOTE — TELEPHONE ENCOUNTER
Was the patient seen in the last year in this department? Yes    Does patient have an active prescription for medications requested? No     Received Request Via: Pharmacy      Pt met protocol?: Yes    OV 7/18     BP Readings from Last 1 Encounters:   08/16/18 126/61     PHARMACY CHG

## 2018-08-29 NOTE — TELEPHONE ENCOUNTER
Keep an eye on K.  Refill X 6 months, sent to pharmacy.Pt. Seen in the last 6 months per protocol.   Lab Results   Component Value Date/Time    SODIUM 138 07/19/2018 12:09 PM    POTASSIUM 3.3 (L) 07/19/2018 12:09 PM    CHLORIDE 102 07/19/2018 12:09 PM    CO2 24 07/19/2018 12:09 PM    GLUCOSE 124 (H) 07/19/2018 12:09 PM    BUN 14 07/19/2018 12:09 PM    CREATININE 0.56 07/19/2018 12:09 PM

## 2018-09-12 DIAGNOSIS — G47.00 INSOMNIA, UNSPECIFIED TYPE: ICD-10-CM

## 2018-09-13 RX ORDER — ZOLPIDEM TARTRATE 10 MG/1
TABLET ORAL
Qty: 30 TAB | Refills: 0 | Status: SHIPPED
Start: 2018-09-13 | End: 2018-10-10 | Stop reason: SDUPTHER

## 2018-09-13 NOTE — TELEPHONE ENCOUNTER
Refill done.  reviewed. Last fill of Ambien on 8/15/18--#30 with no refills.  Lisa Gilbert P.A.-C.

## 2018-09-25 DIAGNOSIS — M46.1 SACROILIITIS (HCC): ICD-10-CM

## 2018-09-25 DIAGNOSIS — M15.9 PRIMARY OSTEOARTHRITIS INVOLVING MULTIPLE JOINTS: ICD-10-CM

## 2018-09-26 RX ORDER — CYCLOBENZAPRINE HCL 10 MG
TABLET ORAL
Qty: 90 TAB | Refills: 0 | Status: SHIPPED | OUTPATIENT
Start: 2018-09-26 | End: 2018-10-23 | Stop reason: SDUPTHER

## 2018-10-10 DIAGNOSIS — G47.00 INSOMNIA, UNSPECIFIED TYPE: ICD-10-CM

## 2018-10-11 RX ORDER — ZOLPIDEM TARTRATE 10 MG/1
10 TABLET ORAL NIGHTLY PRN
Qty: 30 TAB | Refills: 2 | Status: SHIPPED | OUTPATIENT
Start: 2018-10-11 | End: 2018-11-10

## 2018-10-11 NOTE — TELEPHONE ENCOUNTER
Refill done.  reviewed. Last fill of Ambien on 9/13/18--#30 with no refills.  Lisa Gilbert P.A.-C.

## 2018-10-23 DIAGNOSIS — M15.9 PRIMARY OSTEOARTHRITIS INVOLVING MULTIPLE JOINTS: ICD-10-CM

## 2018-10-23 DIAGNOSIS — M46.1 SACROILIITIS (HCC): ICD-10-CM

## 2018-10-23 RX ORDER — CYCLOBENZAPRINE HCL 10 MG
TABLET ORAL
Qty: 90 TAB | Refills: 0 | Status: SHIPPED | OUTPATIENT
Start: 2018-10-23 | End: 2019-06-10 | Stop reason: SDUPTHER

## 2018-12-04 DIAGNOSIS — M15.9 PRIMARY OSTEOARTHRITIS INVOLVING MULTIPLE JOINTS: ICD-10-CM

## 2018-12-04 DIAGNOSIS — M46.1 SACROILIITIS (HCC): ICD-10-CM

## 2018-12-04 NOTE — TELEPHONE ENCOUNTER
----- Message from Carolynn Casey sent at 12/3/2018 10:29 PM PST -----  Regarding: Prescription Question  Contact: [phone]  I need to get my tizanadine refilled. I have no refills on it, and need the rest of meds to have refills added.    Thank you

## 2018-12-04 NOTE — TELEPHONE ENCOUNTER
New Rx to you, refill at your discretion    Was the patient seen in the last year in this department? Yes    Does patient have an active prescription for medications requested? No     Received Request Via: Patient

## 2018-12-06 RX ORDER — TIZANIDINE 4 MG/1
4 TABLET ORAL
Qty: 90 TAB | Refills: 1 | Status: SHIPPED | OUTPATIENT
Start: 2018-12-06 | End: 2019-05-20

## 2018-12-11 DIAGNOSIS — E03.8 SUBCLINICAL HYPOTHYROIDISM: ICD-10-CM

## 2018-12-11 DIAGNOSIS — E06.3 HYPOTHYROIDISM DUE TO HASHIMOTO'S THYROIDITIS: ICD-10-CM

## 2018-12-11 DIAGNOSIS — E03.8 HYPOTHYROIDISM DUE TO HASHIMOTO'S THYROIDITIS: ICD-10-CM

## 2018-12-12 RX ORDER — LEVOTHYROXINE SODIUM 0.03 MG/1
TABLET ORAL
Qty: 90 TAB | Refills: 0 | Status: SHIPPED | OUTPATIENT
Start: 2018-12-12 | End: 2018-12-20

## 2018-12-12 NOTE — TELEPHONE ENCOUNTER
*PT NEEDS TO GET LABS DONE*  Was the patient seen in the last year in this department? Yes    Does patient have an active prescription for medications requested? No     Received Request Via: Pharmacy      Pt met protocol?: NO    LAST OV 07/20/2018      Lab Results  Component Value Date/Time   TSHULTRASEN 3.240 11/21/2017 1229     No results found for: TSH

## 2018-12-19 DIAGNOSIS — E03.8 SUBCLINICAL HYPOTHYROIDISM: ICD-10-CM

## 2018-12-20 RX ORDER — LEVOTHYROXINE SODIUM 0.03 MG/1
TABLET ORAL
Qty: 90 TAB | Refills: 0 | Status: SHIPPED | OUTPATIENT
Start: 2018-12-20 | End: 2019-02-07 | Stop reason: SDUPTHER

## 2018-12-20 NOTE — TELEPHONE ENCOUNTER
Was the patient seen in the last year in this department? Yes    Does patient have an active prescription for medications requested? No     Received Request Via: Pharmacy    Pt met protocol?: Yes     Last OV 07/2018  TSH   Date Value Ref Range Status   11/21/2017 3.240 0.300 - 3.700 uIU/mL Final

## 2019-01-12 DIAGNOSIS — G47.00 INSOMNIA, UNSPECIFIED TYPE: ICD-10-CM

## 2019-01-15 RX ORDER — ZOLPIDEM TARTRATE 10 MG/1
TABLET ORAL
Qty: 30 TAB | Refills: 0 | Status: SHIPPED | OUTPATIENT
Start: 2019-01-15 | End: 2019-02-13 | Stop reason: SDUPTHER

## 2019-01-16 NOTE — TELEPHONE ENCOUNTER
Refill done. Last fill of zolpidem on 12/12/18--#30 with no refills. Please advise patient that she is due for a follow-up appointment with me. No further refills until she is seen.  Lisa Gilbert P.A.-C.

## 2019-02-07 DIAGNOSIS — E11.9 TYPE 2 DIABETES MELLITUS WITHOUT COMPLICATION, WITHOUT LONG-TERM CURRENT USE OF INSULIN (HCC): ICD-10-CM

## 2019-02-07 DIAGNOSIS — G47.00 INSOMNIA, UNSPECIFIED TYPE: ICD-10-CM

## 2019-02-07 DIAGNOSIS — I10 ESSENTIAL HYPERTENSION: ICD-10-CM

## 2019-02-07 DIAGNOSIS — E03.8 SUBCLINICAL HYPOTHYROIDISM: ICD-10-CM

## 2019-02-08 NOTE — TELEPHONE ENCOUNTER
From: Carolynn Casey  Sent: 2/7/2019 11:11 PM PST  Subject: Medication Renewal Request    Carolynn Casey would like a refill of the following medications:     losartan (COZAAR) 100 MG Tab [Lisa Gilbert, P.A.-C.]     levothyroxine (SYNTHROID) 25 MCG Tab [Lisa Gilbert, P.A.-C.]     zolpidem (AMBIEN) 10 MG Tab [Lisa Gilbert, P.A.-C.]    Preferred pharmacy: [facility] [address] [place] [address], [place] - [address] [facility]

## 2019-02-09 RX ORDER — LEVOTHYROXINE SODIUM 0.03 MG/1
25 TABLET ORAL
Qty: 90 TAB | Refills: 0 | Status: SHIPPED | OUTPATIENT
Start: 2019-02-09 | End: 2019-03-05 | Stop reason: SDUPTHER

## 2019-02-09 RX ORDER — LOSARTAN POTASSIUM 100 MG/1
TABLET ORAL
Qty: 90 TAB | Refills: 0 | Status: SHIPPED | OUTPATIENT
Start: 2019-02-09 | End: 2019-03-05 | Stop reason: SDUPTHER

## 2019-02-13 DIAGNOSIS — G47.00 INSOMNIA, UNSPECIFIED TYPE: ICD-10-CM

## 2019-02-13 RX ORDER — ZOLPIDEM TARTRATE 10 MG/1
10 TABLET ORAL NIGHTLY PRN
Qty: 30 TAB | Refills: 0 | OUTPATIENT
Start: 2019-02-13

## 2019-02-13 NOTE — TELEPHONE ENCOUNTER
Patient was informed last month that no further refills would be given without appointment. Needs to schedule appointment.  Lisa Gilbert P.A.-C.

## 2019-02-15 RX ORDER — ZOLPIDEM TARTRATE 10 MG/1
TABLET ORAL
Qty: 30 TAB | Refills: 0 | Status: SHIPPED | OUTPATIENT
Start: 2019-02-15 | End: 2019-03-05 | Stop reason: SDUPTHER

## 2019-02-18 DIAGNOSIS — I10 ESSENTIAL HYPERTENSION: ICD-10-CM

## 2019-02-20 RX ORDER — AMLODIPINE BESYLATE 10 MG/1
TABLET ORAL
Qty: 90 TAB | Refills: 1 | Status: SHIPPED | OUTPATIENT
Start: 2019-02-20 | End: 2019-03-05 | Stop reason: SDUPTHER

## 2019-02-20 NOTE — TELEPHONE ENCOUNTER
Was the patient seen in the last year in this department? Yes    Does patient have an active prescription for medications requested? No     Received Request Via: Pharmacy      Pt met protocol?: No, OV 7/18   BP Readings from Last 1 Encounters:   08/16/18 126/61

## 2019-02-20 NOTE — TELEPHONE ENCOUNTER
Refill X 6 months, sent to pharmacy.Pt. Seen in the last 6 months per protocol.   Lab Results   Component Value Date/Time    SODIUM 138 07/19/2018 12:09 PM    POTASSIUM 3.3 (L) 07/19/2018 12:09 PM    CHLORIDE 102 07/19/2018 12:09 PM    CO2 24 07/19/2018 12:09 PM    GLUCOSE 124 (H) 07/19/2018 12:09 PM    BUN 14 07/19/2018 12:09 PM    CREATININE 0.56 07/19/2018 12:09 PM

## 2019-03-05 ENCOUNTER — OFFICE VISIT (OUTPATIENT)
Dept: MEDICAL GROUP | Facility: PHYSICIAN GROUP | Age: 54
End: 2019-03-05
Payer: MEDICARE

## 2019-03-05 ENCOUNTER — APPOINTMENT (OUTPATIENT)
Dept: RADIOLOGY | Facility: IMAGING CENTER | Age: 54
End: 2019-03-05
Attending: FAMILY MEDICINE
Payer: MEDICARE

## 2019-03-05 VITALS
SYSTOLIC BLOOD PRESSURE: 130 MMHG | OXYGEN SATURATION: 97 % | RESPIRATION RATE: 18 BRPM | WEIGHT: 277 LBS | DIASTOLIC BLOOD PRESSURE: 82 MMHG | BODY MASS INDEX: 46.15 KG/M2 | TEMPERATURE: 97.3 F | HEART RATE: 80 BPM | HEIGHT: 65 IN

## 2019-03-05 DIAGNOSIS — F33.42 RECURRENT MAJOR DEPRESSIVE DISORDER, IN FULL REMISSION (HCC): ICD-10-CM

## 2019-03-05 DIAGNOSIS — I27.20 PULMONARY HTN (HCC): ICD-10-CM

## 2019-03-05 DIAGNOSIS — M15.9 PRIMARY OSTEOARTHRITIS INVOLVING MULTIPLE JOINTS: ICD-10-CM

## 2019-03-05 DIAGNOSIS — R07.81 RIB PAIN: ICD-10-CM

## 2019-03-05 DIAGNOSIS — M46.1 SACROILIITIS (HCC): ICD-10-CM

## 2019-03-05 DIAGNOSIS — G47.00 INSOMNIA, UNSPECIFIED TYPE: ICD-10-CM

## 2019-03-05 DIAGNOSIS — E06.3 HYPOTHYROIDISM DUE TO HASHIMOTO'S THYROIDITIS: ICD-10-CM

## 2019-03-05 DIAGNOSIS — R06.83 SNORING: ICD-10-CM

## 2019-03-05 DIAGNOSIS — E11.9 TYPE 2 DIABETES MELLITUS WITHOUT COMPLICATION, WITHOUT LONG-TERM CURRENT USE OF INSULIN (HCC): ICD-10-CM

## 2019-03-05 DIAGNOSIS — E66.01 MORBID OBESITY WITH BMI OF 40.0-44.9, ADULT (HCC): ICD-10-CM

## 2019-03-05 DIAGNOSIS — I10 ESSENTIAL HYPERTENSION: ICD-10-CM

## 2019-03-05 DIAGNOSIS — F13.20 HYPNOTIC DEPENDENCE (HCC): ICD-10-CM

## 2019-03-05 DIAGNOSIS — E03.8 HYPOTHYROIDISM DUE TO HASHIMOTO'S THYROIDITIS: ICD-10-CM

## 2019-03-05 PROBLEM — D64.9 ANEMIA: Status: RESOLVED | Noted: 2018-02-05 | Resolved: 2019-03-05

## 2019-03-05 PROCEDURE — 71046 X-RAY EXAM CHEST 2 VIEWS: CPT | Mod: TC | Performed by: FAMILY MEDICINE

## 2019-03-05 PROCEDURE — 8041 PR SCP AHA: Performed by: FAMILY MEDICINE

## 2019-03-05 PROCEDURE — 99396 PREV VISIT EST AGE 40-64: CPT | Mod: 25 | Performed by: FAMILY MEDICINE

## 2019-03-05 RX ORDER — ZOLPIDEM TARTRATE 10 MG/1
10 TABLET ORAL NIGHTLY PRN
Qty: 30 TAB | Refills: 0 | Status: SHIPPED | OUTPATIENT
Start: 2019-03-05 | End: 2019-04-17 | Stop reason: SDUPTHER

## 2019-03-05 RX ORDER — LOSARTAN POTASSIUM 100 MG/1
TABLET ORAL
Qty: 90 TAB | Refills: 1 | Status: SHIPPED | OUTPATIENT
Start: 2019-03-05 | End: 2019-05-13 | Stop reason: SDUPTHER

## 2019-03-05 RX ORDER — LEVOTHYROXINE SODIUM 0.03 MG/1
25 TABLET ORAL
Qty: 90 TAB | Refills: 0 | Status: SHIPPED | OUTPATIENT
Start: 2019-03-05 | End: 2019-05-13 | Stop reason: SDUPTHER

## 2019-03-05 RX ORDER — AMLODIPINE BESYLATE 10 MG/1
TABLET ORAL
Qty: 90 TAB | Refills: 1 | Status: SHIPPED | OUTPATIENT
Start: 2019-03-05 | End: 2019-05-13 | Stop reason: SDUPTHER

## 2019-03-05 ASSESSMENT — PATIENT HEALTH QUESTIONNAIRE - PHQ9
9. THOUGHTS THAT YOU WOULD BE BETTER OFF DEAD, OR OF HURTING YOURSELF: NOT AT ALL
7. TROUBLE CONCENTRATING ON THINGS, SUCH AS READING THE NEWSPAPER OR WATCHING TELEVISION: NOT AT ALL
SUM OF ALL RESPONSES TO PHQ QUESTIONS 1-9: 0
4. FEELING TIRED OR HAVING LITTLE ENERGY: NOT AT ALL
SUM OF ALL RESPONSES TO PHQ9 QUESTIONS 1 AND 2: 0
1. LITTLE INTEREST OR PLEASURE IN DOING THINGS: NOT AT ALL
2. FEELING DOWN, DEPRESSED, IRRITABLE, OR HOPELESS: NOT AT ALL
6. FEELING BAD ABOUT YOURSELF - OR THAT YOU ARE A FAILURE OR HAVE LET YOURSELF OR YOUR FAMILY DOWN: NOT AL ALL
8. MOVING OR SPEAKING SO SLOWLY THAT OTHER PEOPLE COULD HAVE NOTICED. OR THE OPPOSITE, BEING SO FIGETY OR RESTLESS THAT YOU HAVE BEEN MOVING AROUND A LOT MORE THAN USUAL: NOT AT ALL
3. TROUBLE FALLING OR STAYING ASLEEP OR SLEEPING TOO MUCH: NOT AT ALL
5. POOR APPETITE OR OVEREATING: NOT AT ALL

## 2019-03-05 NOTE — PROGRESS NOTES
Subjective:     Carolynn Casey is a 54 y.o. female here today for pain after recent fall and Annual Health Assessment.    Fall-4 days ago patient had a mechanical fall where she tripped and fell forward.  She landed on her left front/side.  The pain from this has been slowly improving she is continued to have a tender area on the left lateral ribs.  She does not have any shortness of breath, cough.  The area is sore with big breaths and sneezes.  No fever/chills, sternal chest pain.    Otherwise, patient is feeling at baseline.  She admits she is not taking metformin,, she would like a repeat hemoglobin A1c to ensure she truly has diabetes.  She is not having any lightheadedness, chest pain, swelling, palpitations.    Health Maintenance Summary                DIABETES MONOFILAMENT / LE EXAM Overdue 1965     RETINAL SCREENING Overdue 2/4/1981     COLON CANCER SCREENING ANNUAL FIT Overdue 2/4/2013     IMM ZOSTER VACCINES Overdue 2/4/2013     URINE ACR / MICROALBUMIN Overdue 3/16/2016      Done 3/16/2015 MICROALBUMIN CREAT RATIO URINE     Patient has more history with this topic...    IMM INFLUENZA Overdue 9/1/2018      Done 10/2/2017 Imm Admin: Influenza Vaccine Quad Inj (Pf)     Patient has more history with this topic...    A1C SCREENING Overdue 1/18/2019      Done 7/18/2018 HEMOGLOBIN A1C      Patient has more history with this topic...    Annual Wellness Visit Overdue 2/28/2019      Done 2/27/2018 Visit Dx: Medicare annual wellness visit, subsequent    MAMMOGRAM Overdue 3/1/2019      Done 3/1/2018 MA-MAMMO DIAGNOSTIC BILAT W/TOMOSYNTHESIS W/CAD     Patient has more history with this topic...    FASTING LIPID PROFILE Next Due 7/19/2019      Done 7/19/2018 LIPID PROFILE      Patient has more history with this topic...    SERUM CREATININE Next Due 7/19/2019      Done 7/19/2018 COMP METABOLIC PANEL      Patient has more history with this topic...    IMM DTaP/Tdap/Td Vaccine Next Due 9/30/2026      Done  9/30/2016 Imm Admin: Tdap Vaccine     Patient has more history with this topic...          Annual Health Assessment Questions:     1.  Are you currently engaging in any exercise or physical activity? Yes    2.  How would you describe your mood or emotional well-being today? good    3.  Have you had any falls in the last year? No (She has, see above)    4.  Have you noticed any problems with your balance or had difficulty walking? No    5.  In the last six months have you experienced any leakage of urine? No    6. DPA/Advanced Directive: Patient does not have an Advanced Directive.  A packet and workshop information was given on Advanced Directives.    Current medicines (including changes today)  Current Outpatient Prescriptions   Medication Sig Dispense Refill   • levothyroxine (SYNTHROID) 25 MCG Tab Take 1 Tab by mouth Every morning on an empty stomach. 90 Tab 0   • losartan (COZAAR) 100 MG Tab TAKE ONE TABLET BY MOUTH ONCE DAILY 90 Tab 1   • zolpidem (AMBIEN) 10 MG Tab Take 1 Tab by mouth at bedtime as needed for Sleep for up to 30 days. 30 Tab 0   • amLODIPine (NORVASC) 10 MG Tab TAKE ONE TABLET BY MOUTH DAILY 90 Tab 1   • tizanidine (ZANAFLEX) 4 MG Tab Take 1 Tab by mouth every bedtime. 90 Tab 1   • cyclobenzaprine (FLEXERIL) 10 MG Tab TAKE ONE TABLET BY MOUTH THREE TIMES A DAY AS NEEDED   GENERIC FOR FLEXERIL 90 Tab 0   • potassium chloride ER (KLOR-CON) 10 MEQ tablet Take 1 Tab by mouth every day. 30 Tab 5   • ibuprofen (MOTRIN) 200 MG Tab Take 800 mg by mouth every 6 hours as needed.     • Pseudoephedrine HCl (SUDAFED PO) Take 10 mg by mouth as needed.     • acetaminophen (TYLENOL) 500 MG Tab Take 1,500 mg by mouth every 6 hours as needed. Indications: Pain       No current facility-administered medications for this visit.        She  has a past medical history of Allergy; Anesthesia; Anxiety; Arthritis; ASTHMA; Bronchitis (2014); Constipation; Diabetes (HCC); DVT (deep venous thrombosis) ([facility]); Elevated  "glucose; GERD (gastroesophageal reflux disease); Heart burn; Heart murmur; Hiatus hernia syndrome; Hypertension; Migraine; Pain (01-29-16); Pain; Primary osteoarthritis involving multiple joints; Pulmonary emboli ([facility]) (2017); Snoring; Unspecified disorder of thyroid; Unspecified urinary incontinence; and Upper GI bleed.    Cortisone; Food; Penicillins; Fish; Keflex; Latex; Naprosyn [naproxen]; Tape; and Shellfish allergy    She  reports that she has never smoked. She has never used smokeless tobacco. She reports that she does not drink alcohol or use drugs.  Counseling given: Not Answered      ROS   No sternal chest pain, no shortness of breath, no abdominal pain.     Objective:     Physical Exam:  Blood pressure 130/82, pulse 80, temperature 36.3 °C (97.3 °F), temperature source Temporal, resp. rate 18, height 1.651 m (5' 5\"), weight (!) 125.6 kg (277 lb), SpO2 97 %, not currently breastfeeding. Body mass index is 46.1 kg/m².   Constitutional: Alert, no distress.  Skin: Warm, dry, good turgor, no rashes in visible areas.  Eye: Equal, round and reactive, conjunctiva clear, lids normal.  ENMT: Lips without lesions, good dentition, oropharynx clear.  Neck: Trachea midline, no masses, no thyromegaly. No cervical or supraclavicular lymphadenopathy.  Respiratory: Unlabored respiratory effort, lungs clear to auscultation, no wheezes, no rhonchi.  Tender to palpation diffusely over left lateral ribs.  Cardiovascular: Normal S1, S2, no murmur, no edema.  Abdomen: Soft, non-tender, no masses, no hepatosplenomegaly.  Psych: Alert and oriented x3, normal affect and mood.    Assessment and Plan:     Rib pain  Patient with recent fall onto her front and is mostly improving but does have a consistent sore area on left lateral ribs.  Her oxygen saturations are fine and she denies shortness of breath, so less likely a recurrent pulmonary embolus.  Will check chest x-ray to ensure no pneumonia, rib fracture.  - DX-CHEST-2 VIEWS; " Future    Type 2 diabetes mellitus without complication, without long-term current use of insulin (HCC)  Patient has not been taking metformin as she is not yet convinced that she has diabetes.  Will recheck hemoglobin A1c to ensure this is a true diagnosis.  Patient does high risk due to obesity.  - HEMOGLOBIN A1C; Future  - Comp Metabolic Panel; Future     Insomnia, unspecified type/Hypnotic dependence (HCC): Patient has been on Ambien for many years but is interested in stopping this medication.  Will instead trial amitriptyline for both nerve pain and sleep.  Explained that amitriptyline can react with cyclobenzaprine and increase risk of serotonin syndrome and QT prolongation.  Patient does not have a history of QT prolongation..  And as patient takes cyclobenzaprine 10 mg twice daily in the morning and noon and we will start with a low-dose amitriptyline at nighttime, this is less likely to be a problem.  Patient to then wean Ambien as tolerated.  Patient understands to not take amitriptyline and Ambien together.  - REFERRAL TO SLEEP STUDIES  - zolpidem (AMBIEN) 10 MG Tab; Take 1 Tab by mouth at bedtime as needed for Sleep for up to 30 days.  Dispense: 30 Tab; Refill: 0  - AMITRIPTYLINE (ELAVIL) SERUM; Future    Essential hypertension  Well-controlled chronic problem patient without side effects on current medications.  Will check renal and liver function.  - losartan (COZAAR) 100 MG Tab; TAKE ONE TABLET BY MOUTH ONCE DAILY  Dispense: 90 Tab; Refill: 1  - Comp Metabolic Panel; Future  - amLODIPine (NORVASC) 10 MG Tab; TAKE ONE TABLET BY MOUTH DAILY  Dispense: 90 Tab; Refill: 1    Primary osteoarthritis involving multiple joints/ Sacroiliitis (HCC): Patient does have chronic pain from these conditions and is followed by pain management.  Does take cyclobenzaprine 10 mg twice daily and Zanaflex 4 mg at nighttime.  These medications managed by pain management.    Hypothyroidism due to Hashimoto's  thyroiditis  Patient on low-dose Synthroid.  Will check TSH and T4 to ensure this is needed.  - levothyroxine (SYNTHROID) 25 MCG Tab; Take 1 Tab by mouth Every morning on an empty stomach.  Dispense: 90 Tab; Refill: 0  - TSH; Future  - FREE THYROXINE; Future    Pulmonary HTN (HCC)/Snoring: Patient was diagnosed with pulmonary embolism and pulmonary hypertension after surgery in 2017.  Since then she has not had any chronic shortness of breath or chest pain but does have difficulty with insomnia and also daytime sleepiness.  Also her weight has worsened so concern for sleep apnea.  Patient was referred to pulmonary medicine/sleep studies in the past but now feels more ready to do this so referral again placed today.  - REFERRAL TO SLEEP STUDIES     Morbid obesity with BMI of 40.0-44.9, adult (HCC)  Patient is aware that this is a problem but does have difficulty with exercise and diet.    Recurrent major depressive disorder, in full remission ([facility])  Patient denies low mood currently.    Discussion today about general wellness and lifestyle habits:    · Engage in regular physical activity and social activities.  · Prevent falls and reduce trip hazards; using ambulatory aides, hearing and vision testing if appropriate.  · Steps to improve urinary incontinence.  · Advanced care planning.    Follow-Up: Return in about 4 weeks (around 4/2/2019).         PLEASE NOTE: This dictation was created using voice recognition software. I have made every reasonable attempt to correct obvious errors, but I expect that there are errors of grammar and possibly content that I did not discover before finalizing the note.

## 2019-03-12 DIAGNOSIS — Z72.4 PROBLEMS RELATED TO INAPPROPRIATE DIET AND EATING HABITS: ICD-10-CM

## 2019-03-12 DIAGNOSIS — E66.01 MORBID OBESITY WITH BMI OF 40.0-44.9, ADULT (HCC): ICD-10-CM

## 2019-03-28 ENCOUNTER — HOSPITAL ENCOUNTER (OUTPATIENT)
Dept: LAB | Facility: MEDICAL CENTER | Age: 54
End: 2019-03-28
Attending: FAMILY MEDICINE
Payer: MEDICARE

## 2019-03-28 DIAGNOSIS — G47.00 INSOMNIA, UNSPECIFIED TYPE: ICD-10-CM

## 2019-03-28 DIAGNOSIS — E06.3 HYPOTHYROIDISM DUE TO HASHIMOTO'S THYROIDITIS: ICD-10-CM

## 2019-03-28 DIAGNOSIS — E11.9 TYPE 2 DIABETES MELLITUS WITHOUT COMPLICATION, WITHOUT LONG-TERM CURRENT USE OF INSULIN (HCC): ICD-10-CM

## 2019-03-28 DIAGNOSIS — I10 ESSENTIAL HYPERTENSION: ICD-10-CM

## 2019-03-28 DIAGNOSIS — E03.8 HYPOTHYROIDISM DUE TO HASHIMOTO'S THYROIDITIS: ICD-10-CM

## 2019-03-28 LAB
ALBUMIN SERPL BCP-MCNC: 4.1 G/DL (ref 3.2–4.9)
ALBUMIN/GLOB SERPL: 1.4 G/DL
ALP SERPL-CCNC: 104 U/L (ref 30–99)
ALT SERPL-CCNC: 39 U/L (ref 2–50)
ANION GAP SERPL CALC-SCNC: 8 MMOL/L (ref 0–11.9)
AST SERPL-CCNC: 23 U/L (ref 12–45)
BILIRUB SERPL-MCNC: 0.8 MG/DL (ref 0.1–1.5)
BUN SERPL-MCNC: 19 MG/DL (ref 8–22)
CALCIUM SERPL-MCNC: 9.4 MG/DL (ref 8.5–10.5)
CHLORIDE SERPL-SCNC: 103 MMOL/L (ref 96–112)
CO2 SERPL-SCNC: 25 MMOL/L (ref 20–33)
CREAT SERPL-MCNC: 0.53 MG/DL (ref 0.5–1.4)
GLOBULIN SER CALC-MCNC: 3 G/DL (ref 1.9–3.5)
GLUCOSE SERPL-MCNC: 225 MG/DL (ref 65–99)
POTASSIUM SERPL-SCNC: 3.5 MMOL/L (ref 3.6–5.5)
PROT SERPL-MCNC: 7.1 G/DL (ref 6–8.2)
SODIUM SERPL-SCNC: 136 MMOL/L (ref 135–145)
T4 FREE SERPL-MCNC: 0.84 NG/DL (ref 0.53–1.43)
TSH SERPL DL<=0.005 MIU/L-ACNC: 3.87 UIU/ML (ref 0.38–5.33)

## 2019-03-28 PROCEDURE — 83036 HEMOGLOBIN GLYCOSYLATED A1C: CPT

## 2019-03-28 PROCEDURE — G0480 DRUG TEST DEF 1-7 CLASSES: HCPCS

## 2019-03-28 PROCEDURE — 80053 COMPREHEN METABOLIC PANEL: CPT

## 2019-03-28 PROCEDURE — 84439 ASSAY OF FREE THYROXINE: CPT

## 2019-03-28 PROCEDURE — 84443 ASSAY THYROID STIM HORMONE: CPT

## 2019-03-28 PROCEDURE — 36415 COLL VENOUS BLD VENIPUNCTURE: CPT

## 2019-03-29 LAB
EST. AVERAGE GLUCOSE BLD GHB EST-MCNC: 166 MG/DL
HBA1C MFR BLD: 7.4 % (ref 0–5.6)

## 2019-03-30 ENCOUNTER — PATIENT MESSAGE (OUTPATIENT)
Dept: MEDICAL GROUP | Facility: PHYSICIAN GROUP | Age: 54
End: 2019-03-30

## 2019-04-01 ENCOUNTER — HOSPITAL ENCOUNTER (OUTPATIENT)
Dept: RADIOLOGY | Facility: MEDICAL CENTER | Age: 54
End: 2019-04-01
Attending: COLON & RECTAL SURGERY
Payer: MEDICARE

## 2019-04-01 ENCOUNTER — HOSPITAL ENCOUNTER (OUTPATIENT)
Dept: RADIOLOGY | Facility: MEDICAL CENTER | Age: 54
End: 2019-04-01
Attending: SURGERY
Payer: MEDICARE

## 2019-04-01 DIAGNOSIS — N60.02 BREAST CYST, LEFT: ICD-10-CM

## 2019-04-01 DIAGNOSIS — N63.24 UNSPECIFIED LUMP IN THE LEFT BREAST, LOWER INNER QUADRANT: ICD-10-CM

## 2019-04-01 DIAGNOSIS — E66.01 MORBID OBESITY (HCC): ICD-10-CM

## 2019-04-01 DIAGNOSIS — Z01.818 PREOP EXAMINATION: ICD-10-CM

## 2019-04-01 PROCEDURE — 76642 ULTRASOUND BREAST LIMITED: CPT | Mod: LT

## 2019-04-01 PROCEDURE — G0279 TOMOSYNTHESIS, MAMMO: HCPCS

## 2019-04-01 RX ORDER — AMITRIPTYLINE HYDROCHLORIDE 10 MG/1
10 TABLET, FILM COATED ORAL NIGHTLY PRN
Qty: 30 TAB | Refills: 2 | Status: SHIPPED | OUTPATIENT
Start: 2019-04-01 | End: 2019-04-17

## 2019-04-01 NOTE — TELEPHONE ENCOUNTER
From: Carolynn Casey  To: Charu Tamayo M.D.  Sent: 3/30/2019 9:09 PM PDT  Subject: Test Result Question    Hi Dr. Tamayo,    When I called the lab to make the appointment for my blood work, they said no fasting was necessary. But looking at  the results, all of them said u should have.    How much will this affect the results? Do  I need to have them done again?    If you could let me know, I'd appreciate it.    Thank you

## 2019-04-08 ENCOUNTER — HOSPITAL ENCOUNTER (OUTPATIENT)
Dept: RADIOLOGY | Facility: MEDICAL CENTER | Age: 54
End: 2019-04-08
Attending: COLON & RECTAL SURGERY
Payer: MEDICARE

## 2019-04-08 DIAGNOSIS — Z01.818 PREOP EXAMINATION: ICD-10-CM

## 2019-04-08 DIAGNOSIS — E66.01 MORBID OBESITY (HCC): ICD-10-CM

## 2019-04-08 PROCEDURE — 74247 DX-UPPER GI-AIR CONTRAST: CPT

## 2019-04-08 PROCEDURE — A9270 NON-COVERED ITEM OR SERVICE: HCPCS | Performed by: COLON & RECTAL SURGERY

## 2019-04-08 PROCEDURE — 700112 HCHG RX REV CODE 229: Performed by: COLON & RECTAL SURGERY

## 2019-04-08 RX ADMIN — ANTACID/ANTIFLATULENT 1 PACKET: 380; 550; 10; 10 GRANULE, EFFERVESCENT ORAL at 09:30

## 2019-04-17 ENCOUNTER — TELEPHONE (OUTPATIENT)
Dept: MEDICAL GROUP | Facility: PHYSICIAN GROUP | Age: 54
End: 2019-04-17

## 2019-04-17 ENCOUNTER — PATIENT MESSAGE (OUTPATIENT)
Dept: MEDICAL GROUP | Facility: PHYSICIAN GROUP | Age: 54
End: 2019-04-17

## 2019-04-17 DIAGNOSIS — G47.00 INSOMNIA, UNSPECIFIED TYPE: ICD-10-CM

## 2019-04-17 RX ORDER — ZOLPIDEM TARTRATE 10 MG/1
10 TABLET ORAL NIGHTLY PRN
Qty: 30 TAB | Refills: 0 | Status: SHIPPED | OUTPATIENT
Start: 2019-04-17 | End: 2019-04-18 | Stop reason: SDUPTHER

## 2019-04-17 NOTE — TELEPHONE ENCOUNTER
Patient requesting ambien refill, but it looks like it was a hospital med. I can't pend it to refill for you. Sorry!    Was the patient seen in the last year in this department? Yes    Does patient have an active prescription for medications requested? No     Received Request Via: Patient

## 2019-04-17 NOTE — TELEPHONE ENCOUNTER
Pt states she will not be able to come in for a while and would like to know her options. Please advise.

## 2019-04-17 NOTE — TELEPHONE ENCOUNTER
On my last appointment with her we had discussed weaning off of Ambien and starting amitriptyline at nighttime.  If this wean has not been successful, I suggest she make an appointment to discuss her sleep.

## 2019-04-18 DIAGNOSIS — G47.00 INSOMNIA, UNSPECIFIED TYPE: ICD-10-CM

## 2019-04-18 NOTE — TELEPHONE ENCOUNTER
She can come for an appointment at her convenience. Medication management requires an appointment.

## 2019-04-19 RX ORDER — ZOLPIDEM TARTRATE 10 MG/1
TABLET ORAL
Qty: 30 TAB | Refills: 0 | Status: SHIPPED | OUTPATIENT
Start: 2019-04-19 | End: 2019-04-19

## 2019-05-13 ENCOUNTER — TELEPHONE (OUTPATIENT)
Dept: MEDICAL GROUP | Facility: PHYSICIAN GROUP | Age: 54
End: 2019-05-13

## 2019-05-13 ENCOUNTER — PATIENT MESSAGE (OUTPATIENT)
Dept: MEDICAL GROUP | Facility: PHYSICIAN GROUP | Age: 54
End: 2019-05-13

## 2019-05-13 DIAGNOSIS — E06.3 HYPOTHYROIDISM DUE TO HASHIMOTO'S THYROIDITIS: ICD-10-CM

## 2019-05-13 DIAGNOSIS — E03.8 HYPOTHYROIDISM DUE TO HASHIMOTO'S THYROIDITIS: ICD-10-CM

## 2019-05-13 DIAGNOSIS — M46.1 SACROILIITIS (HCC): ICD-10-CM

## 2019-05-13 DIAGNOSIS — I10 ESSENTIAL HYPERTENSION: ICD-10-CM

## 2019-05-13 DIAGNOSIS — M15.9 PRIMARY OSTEOARTHRITIS INVOLVING MULTIPLE JOINTS: ICD-10-CM

## 2019-05-13 DIAGNOSIS — E87.6 HYPOKALEMIA: ICD-10-CM

## 2019-05-13 RX ORDER — TIZANIDINE 4 MG/1
4 TABLET ORAL
Qty: 90 TAB | Refills: 1 | Status: CANCELLED | OUTPATIENT
Start: 2019-05-13

## 2019-05-13 RX ORDER — LEVOTHYROXINE SODIUM 0.03 MG/1
25 TABLET ORAL
Qty: 90 TAB | Refills: 1 | OUTPATIENT
Start: 2019-05-13

## 2019-05-13 RX ORDER — CYCLOBENZAPRINE HCL 10 MG
TABLET ORAL
Qty: 90 TAB | Refills: 0 | Status: CANCELLED | OUTPATIENT
Start: 2019-05-13

## 2019-05-13 RX ORDER — LEVOTHYROXINE SODIUM 0.03 MG/1
25 TABLET ORAL
Qty: 90 TAB | Refills: 1 | Status: SHIPPED | OUTPATIENT
Start: 2019-05-13 | End: 2019-06-17 | Stop reason: SDUPTHER

## 2019-05-13 RX ORDER — LOSARTAN POTASSIUM 100 MG/1
TABLET ORAL
Qty: 90 TAB | Refills: 1 | Status: ON HOLD | OUTPATIENT
Start: 2019-05-13 | End: 2019-05-15

## 2019-05-13 RX ORDER — LOSARTAN POTASSIUM 100 MG/1
TABLET ORAL
Qty: 90 TAB | Refills: 1 | Status: CANCELLED | OUTPATIENT
Start: 2019-05-13

## 2019-05-13 RX ORDER — LEVOTHYROXINE SODIUM 0.03 MG/1
25 TABLET ORAL
Qty: 90 TAB | Refills: 0 | Status: CANCELLED | OUTPATIENT
Start: 2019-05-13

## 2019-05-13 RX ORDER — AMLODIPINE BESYLATE 10 MG/1
TABLET ORAL
Qty: 90 TAB | Refills: 1 | Status: ON HOLD | OUTPATIENT
Start: 2019-05-13 | End: 2019-05-15

## 2019-05-13 RX ORDER — ACETAMINOPHEN 500 MG
1500 TABLET ORAL EVERY 6 HOURS PRN
Qty: 30 TAB | Status: CANCELLED | OUTPATIENT
Start: 2019-05-13

## 2019-05-13 RX ORDER — IBUPROFEN 200 MG
800 TABLET ORAL EVERY 6 HOURS PRN
Qty: 30 TAB | Status: CANCELLED | OUTPATIENT
Start: 2019-05-13

## 2019-05-13 RX ORDER — AMLODIPINE BESYLATE 10 MG/1
TABLET ORAL
Qty: 90 TAB | Refills: 1 | Status: CANCELLED | OUTPATIENT
Start: 2019-05-13

## 2019-05-13 RX ORDER — POTASSIUM CHLORIDE 750 MG/1
10 TABLET, FILM COATED, EXTENDED RELEASE ORAL DAILY
Qty: 90 TAB | Refills: 1 | Status: SHIPPED | OUTPATIENT
Start: 2019-05-13 | End: 2019-05-14

## 2019-05-13 NOTE — TELEPHONE ENCOUNTER
From: Carolynn Casey  Sent: 5/13/2019 1:52 AM PDT  Subject: Medication Renewal Request    Carolynn Casey would like a refill of the following medications:     levothyroxine (SYNTHROID) 25 MCG Tab [Charu Tamayo M.D.]    Preferred pharmacy: [facility] [address] [place] [address]

## 2019-05-13 NOTE — TELEPHONE ENCOUNTER
Dr Tamayo- Not sure if you want pt to be on both muscle relaxers. Please refill as you see fit. You also have never prescribed APAP or IBU for pt.   -Refill X 6 months, sent to pharmacy.Pt. Seen in the last 6 months per protocol.   Lab Results   Component Value Date/Time    SODIUM 136 03/28/2019 09:12 AM    POTASSIUM 3.5 (L) 03/28/2019 09:12 AM    CHLORIDE 103 03/28/2019 09:12 AM    CO2 25 03/28/2019 09:12 AM    GLUCOSE 225 (H) 03/28/2019 09:12 AM    BUN 19 03/28/2019 09:12 AM    CREATININE 0.53 03/28/2019 09:12 AM

## 2019-05-13 NOTE — TELEPHONE ENCOUNTER
----- Message from Carolynn Casey sent at 5/13/2019  1:59 AM PDT -----  Regarding: Prescription Question  Contact: [phone]  Hi again,   I need refills on all my meds. I know you said you would not refill them without seeing me, but with my money right now, all I can do is try. It will either have to be Monday or at  the end of the week after I get home from the hospital. I can't promise anything, but I do need all my meds refilled. I'll be in the hospital over night Wednesday, you can come see me?  Take care

## 2019-05-13 NOTE — TELEPHONE ENCOUNTER
Future Appointments       Provider Department Center    5/14/2019 10:15 AM PREADMIT 5 PREADMIT TESTS [facility]     5/20/2019 9:45 AM Lisa Gilbert P.A.-C. [facility]    8/1/2019 2:00 PM C [facility]

## 2019-05-13 NOTE — TELEPHONE ENCOUNTER
Waiting on mychart message from patient regarding muscle relaxer refill. These haven't been filled in quite some time. Also shouldn't be on NSAID as she has upcoming gastric surgery. Will fill if appropriate.

## 2019-05-13 NOTE — TELEPHONE ENCOUNTER
Regarding: RE: Non-Urgent Medical Question  Contact: [phone]  ----- Message from MINGO Fregoso sent at 2019 12:49 PM PDT -----       ----- Message sent from MINGO Fregoso to Carolynn Casey at 2019 12:48 PM -----   I will send your message to Dr. Tamayo's staff, this will require an in office visit for refill due to the controlled nature of this medication.   Take care.     ----- Message -----     From: Carolynn aCsey     Sent: 2019 12:26 PM PDT       To: MINGO Fregoso  Subject: Non-Urgent Medical Question    Just my ambien. I can't take the other stuff she gave me for sleep.  ----- Message -----  From: MINGO Fregoso  Sent: 2019 10:23 AM PDT  To: Carolynn Casey  Subject: RE: Non-Urgent Medical Question  I have refilled your synthroid, losartan and amlodipine. Are there any other daily medications that you take?     ----- Message -----     From: Carolynn Casey     Sent: 2019  1:51 AM PDT       To: Charu Tamayo M.D.  Subject: Non-Urgent Medical Question    Hi DR. Tamayo,  Didn't know if you years, but I am scheduled to have   Gastric Sleeve laporotomy on Wednesday, May 15th. I  need to her if there is any way that my meds Davie be switched to either chewables or liquid forms. My doctor, Dr Cox says that either form would be better.   I know I need to see you, but literally don't have the copay right now. Everything is going to [facility]. I won't get paid until the end of the month, but I obviously will need to switch them very soon.  Thank you, and hope you're doing well with baby.  Carolynn Casey   1965  Home phone (message is ok) [phone]  Pharmacy-[facility] on [address]

## 2019-05-14 ENCOUNTER — HOSPITAL ENCOUNTER (OUTPATIENT)
Dept: RADIOLOGY | Facility: MEDICAL CENTER | Age: 54
End: 2019-05-14
Attending: COLON & RECTAL SURGERY | Admitting: COLON & RECTAL SURGERY
Payer: MEDICARE

## 2019-05-14 DIAGNOSIS — Z01.810 PRE-OPERATIVE CARDIOVASCULAR EXAMINATION: ICD-10-CM

## 2019-05-14 DIAGNOSIS — Z01.811 PRE-OPERATIVE RESPIRATORY EXAMINATION: ICD-10-CM

## 2019-05-14 DIAGNOSIS — Z01.812 PRE-OPERATIVE LABORATORY EXAMINATION: ICD-10-CM

## 2019-05-14 LAB
ALBUMIN SERPL BCP-MCNC: 4.4 G/DL (ref 3.2–4.9)
ALBUMIN/GLOB SERPL: 1.3 G/DL
ALP SERPL-CCNC: 96 U/L (ref 30–99)
ALT SERPL-CCNC: 44 U/L (ref 2–50)
ANION GAP SERPL CALC-SCNC: 13 MMOL/L (ref 0–11.9)
AST SERPL-CCNC: 27 U/L (ref 12–45)
BASOPHILS # BLD AUTO: 0.7 % (ref 0–1.8)
BASOPHILS # BLD: 0.06 K/UL (ref 0–0.12)
BILIRUB SERPL-MCNC: 1.3 MG/DL (ref 0.1–1.5)
BUN SERPL-MCNC: 17 MG/DL (ref 8–22)
CALCIUM SERPL-MCNC: 11.1 MG/DL (ref 8.5–10.5)
CHLORIDE SERPL-SCNC: 102 MMOL/L (ref 96–112)
CO2 SERPL-SCNC: 22 MMOL/L (ref 20–33)
CREAT SERPL-MCNC: 0.72 MG/DL (ref 0.5–1.4)
EKG IMPRESSION: NORMAL
EOSINOPHIL # BLD AUTO: 0.35 K/UL (ref 0–0.51)
EOSINOPHIL NFR BLD: 4.1 % (ref 0–6.9)
ERYTHROCYTE [DISTWIDTH] IN BLOOD BY AUTOMATED COUNT: 42.7 FL (ref 35.9–50)
GLOBULIN SER CALC-MCNC: 3.3 G/DL (ref 1.9–3.5)
GLUCOSE SERPL-MCNC: 154 MG/DL (ref 65–99)
HCT VFR BLD AUTO: 48.5 % (ref 37–47)
HGB BLD-MCNC: 15.7 G/DL (ref 12–16)
IMM GRANULOCYTES # BLD AUTO: 0.02 K/UL (ref 0–0.11)
IMM GRANULOCYTES NFR BLD AUTO: 0.2 % (ref 0–0.9)
INR PPP: 1.03 (ref 0.87–1.13)
LYMPHOCYTES # BLD AUTO: 2.57 K/UL (ref 1–4.8)
LYMPHOCYTES NFR BLD: 30.2 % (ref 22–41)
MCH RBC QN AUTO: 28.7 PG (ref 27–33)
MCHC RBC AUTO-ENTMCNC: 32.4 G/DL (ref 33.6–35)
MCV RBC AUTO: 88.7 FL (ref 81.4–97.8)
MONOCYTES # BLD AUTO: 0.53 K/UL (ref 0–0.85)
MONOCYTES NFR BLD AUTO: 6.2 % (ref 0–13.4)
NEUTROPHILS # BLD AUTO: 4.97 K/UL (ref 2–7.15)
NEUTROPHILS NFR BLD: 58.6 % (ref 44–72)
NRBC # BLD AUTO: 0 K/UL
NRBC BLD-RTO: 0 /100 WBC
PLATELET # BLD AUTO: 342 K/UL (ref 164–446)
PMV BLD AUTO: 8.7 FL (ref 9–12.9)
POTASSIUM SERPL-SCNC: 3.8 MMOL/L (ref 3.6–5.5)
PROT SERPL-MCNC: 7.7 G/DL (ref 6–8.2)
PROTHROMBIN TIME: 13.6 SEC (ref 12–14.6)
RBC # BLD AUTO: 5.47 M/UL (ref 4.2–5.4)
SODIUM SERPL-SCNC: 137 MMOL/L (ref 135–145)
WBC # BLD AUTO: 8.5 K/UL (ref 4.8–10.8)

## 2019-05-14 PROCEDURE — 93010 ELECTROCARDIOGRAM REPORT: CPT | Performed by: INTERNAL MEDICINE

## 2019-05-14 PROCEDURE — 71046 X-RAY EXAM CHEST 2 VIEWS: CPT

## 2019-05-14 PROCEDURE — 85610 PROTHROMBIN TIME: CPT

## 2019-05-14 PROCEDURE — 93005 ELECTROCARDIOGRAM TRACING: CPT

## 2019-05-14 PROCEDURE — 80053 COMPREHEN METABOLIC PANEL: CPT

## 2019-05-14 PROCEDURE — 85025 COMPLETE CBC W/AUTO DIFF WBC: CPT

## 2019-05-14 PROCEDURE — 36415 COLL VENOUS BLD VENIPUNCTURE: CPT

## 2019-05-14 RX ORDER — ZOLPIDEM TARTRATE 10 MG/1
10 TABLET ORAL
COMMUNITY
End: 2019-05-20 | Stop reason: SDUPTHER

## 2019-05-14 RX ORDER — ACETAMINOPHEN 10 MG/ML
1 INJECTION, SOLUTION INTRAVENOUS ONCE
Status: DISCONTINUED | OUTPATIENT
Start: 2019-05-15 | End: 2019-05-15 | Stop reason: HOSPADM

## 2019-05-15 ENCOUNTER — HOSPITAL ENCOUNTER (OUTPATIENT)
Facility: MEDICAL CENTER | Age: 54
End: 2019-05-16
Attending: COLON & RECTAL SURGERY | Admitting: COLON & RECTAL SURGERY
Payer: MEDICARE

## 2019-05-15 ENCOUNTER — ANESTHESIA EVENT (OUTPATIENT)
Dept: SURGERY | Facility: MEDICAL CENTER | Age: 54
End: 2019-05-15
Payer: MEDICARE

## 2019-05-15 ENCOUNTER — ANESTHESIA (OUTPATIENT)
Dept: SURGERY | Facility: MEDICAL CENTER | Age: 54
End: 2019-05-15
Payer: MEDICARE

## 2019-05-15 LAB — PATHOLOGY CONSULT NOTE: NORMAL

## 2019-05-15 PROCEDURE — 700111 HCHG RX REV CODE 636 W/ 250 OVERRIDE (IP): Performed by: ANESTHESIOLOGY

## 2019-05-15 PROCEDURE — 88305 TISSUE EXAM BY PATHOLOGIST: CPT

## 2019-05-15 PROCEDURE — 700101 HCHG RX REV CODE 250: Performed by: ANESTHESIOLOGY

## 2019-05-15 PROCEDURE — 500448 HCHG DRESSING, TELFA 3X4: Performed by: COLON & RECTAL SURGERY

## 2019-05-15 PROCEDURE — 700105 HCHG RX REV CODE 258: Performed by: COLON & RECTAL SURGERY

## 2019-05-15 PROCEDURE — G0378 HOSPITAL OBSERVATION PER HR: HCPCS

## 2019-05-15 PROCEDURE — 160009 HCHG ANES TIME/MIN: Performed by: COLON & RECTAL SURGERY

## 2019-05-15 PROCEDURE — 700111 HCHG RX REV CODE 636 W/ 250 OVERRIDE (IP): Performed by: COLON & RECTAL SURGERY

## 2019-05-15 PROCEDURE — 501497 HCHG SURGICLIP: Performed by: COLON & RECTAL SURGERY

## 2019-05-15 PROCEDURE — 160002 HCHG RECOVERY MINUTES (STAT): Performed by: COLON & RECTAL SURGERY

## 2019-05-15 PROCEDURE — 160029 HCHG SURGERY MINUTES - 1ST 30 MINS LEVEL 4: Performed by: COLON & RECTAL SURGERY

## 2019-05-15 PROCEDURE — 501583 HCHG TROCAR, THRD CAN&SEAL 5X100: Performed by: COLON & RECTAL SURGERY

## 2019-05-15 PROCEDURE — 160041 HCHG SURGERY MINUTES - EA ADDL 1 MIN LEVEL 4: Performed by: COLON & RECTAL SURGERY

## 2019-05-15 PROCEDURE — 82962 GLUCOSE BLOOD TEST: CPT

## 2019-05-15 PROCEDURE — 700105 HCHG RX REV CODE 258: Performed by: ANESTHESIOLOGY

## 2019-05-15 PROCEDURE — 700102 HCHG RX REV CODE 250 W/ 637 OVERRIDE(OP): Performed by: ANESTHESIOLOGY

## 2019-05-15 PROCEDURE — A9270 NON-COVERED ITEM OR SERVICE: HCPCS | Performed by: ANESTHESIOLOGY

## 2019-05-15 PROCEDURE — 501838 HCHG SUTURE GENERAL: Performed by: COLON & RECTAL SURGERY

## 2019-05-15 PROCEDURE — 160035 HCHG PACU - 1ST 60 MINS PHASE I: Performed by: COLON & RECTAL SURGERY

## 2019-05-15 PROCEDURE — 501399 HCHG SPECIMAN BAG, ENDO CATC: Performed by: COLON & RECTAL SURGERY

## 2019-05-15 PROCEDURE — 700102 HCHG RX REV CODE 250 W/ 637 OVERRIDE(OP): Performed by: COLON & RECTAL SURGERY

## 2019-05-15 PROCEDURE — 160036 HCHG PACU - EA ADDL 30 MINS PHASE I: Performed by: COLON & RECTAL SURGERY

## 2019-05-15 PROCEDURE — A9270 NON-COVERED ITEM OR SERVICE: HCPCS | Performed by: COLON & RECTAL SURGERY

## 2019-05-15 PROCEDURE — 501338 HCHG SHEARS, ENDO: Performed by: COLON & RECTAL SURGERY

## 2019-05-15 PROCEDURE — 700111 HCHG RX REV CODE 636 W/ 250 OVERRIDE (IP)

## 2019-05-15 PROCEDURE — 501570 HCHG TROCAR, SEPARATOR: Performed by: COLON & RECTAL SURGERY

## 2019-05-15 PROCEDURE — 700111 HCHG RX REV CODE 636 W/ 250 OVERRIDE (IP): Performed by: NURSE PRACTITIONER

## 2019-05-15 PROCEDURE — 160048 HCHG OR STATISTICAL LEVEL 1-5: Performed by: COLON & RECTAL SURGERY

## 2019-05-15 PROCEDURE — 502570 HCHG PACK, GASTRIC BANDING: Performed by: COLON & RECTAL SURGERY

## 2019-05-15 RX ORDER — PROMETHAZINE HYDROCHLORIDE 25 MG/1
25 SUPPOSITORY RECTAL EVERY 4 HOURS PRN
Status: DISCONTINUED | OUTPATIENT
Start: 2019-05-15 | End: 2019-05-16 | Stop reason: HOSPADM

## 2019-05-15 RX ORDER — SODIUM CHLORIDE, SODIUM LACTATE, POTASSIUM CHLORIDE, CALCIUM CHLORIDE 600; 310; 30; 20 MG/100ML; MG/100ML; MG/100ML; MG/100ML
INJECTION, SOLUTION INTRAVENOUS CONTINUOUS
Status: DISCONTINUED | OUTPATIENT
Start: 2019-05-15 | End: 2019-05-15 | Stop reason: HOSPADM

## 2019-05-15 RX ORDER — OXYCODONE HCL 5 MG/5 ML
10 SOLUTION, ORAL ORAL
Status: DISCONTINUED | OUTPATIENT
Start: 2019-05-15 | End: 2019-05-16 | Stop reason: HOSPADM

## 2019-05-15 RX ORDER — OXYCODONE HCL 5 MG/5 ML
5 SOLUTION, ORAL ORAL
Status: DISCONTINUED | OUTPATIENT
Start: 2019-05-15 | End: 2019-05-16 | Stop reason: HOSPADM

## 2019-05-15 RX ORDER — GABAPENTIN 300 MG/1
300 CAPSULE ORAL EVERY 8 HOURS
Status: DISCONTINUED | OUTPATIENT
Start: 2019-05-15 | End: 2019-05-16 | Stop reason: HOSPADM

## 2019-05-15 RX ORDER — MEPERIDINE HYDROCHLORIDE 25 MG/ML
12.5 INJECTION INTRAMUSCULAR; INTRAVENOUS; SUBCUTANEOUS
Status: DISCONTINUED | OUTPATIENT
Start: 2019-05-15 | End: 2019-05-15 | Stop reason: HOSPADM

## 2019-05-15 RX ORDER — HYDROMORPHONE HYDROCHLORIDE 1 MG/ML
0.1 INJECTION, SOLUTION INTRAMUSCULAR; INTRAVENOUS; SUBCUTANEOUS
Status: DISCONTINUED | OUTPATIENT
Start: 2019-05-15 | End: 2019-05-15 | Stop reason: HOSPADM

## 2019-05-15 RX ORDER — DIPHENHYDRAMINE HYDROCHLORIDE 50 MG/ML
12.5 INJECTION INTRAMUSCULAR; INTRAVENOUS EVERY 6 HOURS PRN
Status: DISCONTINUED | OUTPATIENT
Start: 2019-05-15 | End: 2019-05-16 | Stop reason: HOSPADM

## 2019-05-15 RX ORDER — MIDAZOLAM HYDROCHLORIDE 1 MG/ML
1 INJECTION INTRAMUSCULAR; INTRAVENOUS
Status: DISCONTINUED | OUTPATIENT
Start: 2019-05-15 | End: 2019-05-15 | Stop reason: HOSPADM

## 2019-05-15 RX ORDER — LORAZEPAM 2 MG/ML
0.5 INJECTION INTRAMUSCULAR EVERY 4 HOURS PRN
Status: DISCONTINUED | OUTPATIENT
Start: 2019-05-15 | End: 2019-05-16 | Stop reason: HOSPADM

## 2019-05-15 RX ORDER — GABAPENTIN 300 MG/1
300 CAPSULE ORAL ONCE
Status: COMPLETED | OUTPATIENT
Start: 2019-05-15 | End: 2019-05-15

## 2019-05-15 RX ORDER — SODIUM CHLORIDE, SODIUM LACTATE, POTASSIUM CHLORIDE, CALCIUM CHLORIDE 600; 310; 30; 20 MG/100ML; MG/100ML; MG/100ML; MG/100ML
INJECTION, SOLUTION INTRAVENOUS CONTINUOUS
Status: ACTIVE | OUTPATIENT
Start: 2019-05-15 | End: 2019-05-16

## 2019-05-15 RX ORDER — SODIUM CHLORIDE, SODIUM LACTATE, POTASSIUM CHLORIDE, AND CALCIUM CHLORIDE .6; .31; .03; .02 G/100ML; G/100ML; G/100ML; G/100ML
500 INJECTION, SOLUTION INTRAVENOUS
Status: DISCONTINUED | OUTPATIENT
Start: 2019-05-15 | End: 2019-05-16 | Stop reason: HOSPADM

## 2019-05-15 RX ORDER — HALOPERIDOL 5 MG/ML
1 INJECTION INTRAMUSCULAR EVERY 6 HOURS PRN
Status: DISCONTINUED | OUTPATIENT
Start: 2019-05-15 | End: 2019-05-16 | Stop reason: HOSPADM

## 2019-05-15 RX ORDER — HYDROMORPHONE HYDROCHLORIDE 1 MG/ML
0.5 INJECTION, SOLUTION INTRAMUSCULAR; INTRAVENOUS; SUBCUTANEOUS
Status: DISCONTINUED | OUTPATIENT
Start: 2019-05-15 | End: 2019-05-16 | Stop reason: HOSPADM

## 2019-05-15 RX ORDER — HYDROMORPHONE HYDROCHLORIDE 1 MG/ML
0.4 INJECTION, SOLUTION INTRAMUSCULAR; INTRAVENOUS; SUBCUTANEOUS
Status: DISCONTINUED | OUTPATIENT
Start: 2019-05-15 | End: 2019-05-15 | Stop reason: HOSPADM

## 2019-05-15 RX ORDER — LOSARTAN POTASSIUM 50 MG/1
100 TABLET ORAL DAILY
Status: DISCONTINUED | OUTPATIENT
Start: 2019-05-16 | End: 2019-05-16 | Stop reason: HOSPADM

## 2019-05-15 RX ORDER — HALOPERIDOL 5 MG/ML
1 INJECTION INTRAMUSCULAR
Status: DISCONTINUED | OUTPATIENT
Start: 2019-05-15 | End: 2019-05-15 | Stop reason: HOSPADM

## 2019-05-15 RX ORDER — OXYCODONE HCL 5 MG/5 ML
10 SOLUTION, ORAL ORAL
Status: COMPLETED | OUTPATIENT
Start: 2019-05-15 | End: 2019-05-15

## 2019-05-15 RX ORDER — ONDANSETRON 2 MG/ML
INJECTION INTRAMUSCULAR; INTRAVENOUS PRN
Status: DISCONTINUED | OUTPATIENT
Start: 2019-05-15 | End: 2019-05-15 | Stop reason: SURG

## 2019-05-15 RX ORDER — DEXAMETHASONE SODIUM PHOSPHATE 4 MG/ML
INJECTION, SOLUTION INTRA-ARTICULAR; INTRALESIONAL; INTRAMUSCULAR; INTRAVENOUS; SOFT TISSUE PRN
Status: DISCONTINUED | OUTPATIENT
Start: 2019-05-15 | End: 2019-05-15 | Stop reason: SURG

## 2019-05-15 RX ORDER — SCOLOPAMINE TRANSDERMAL SYSTEM 1 MG/1
1 PATCH, EXTENDED RELEASE TRANSDERMAL
Status: DISCONTINUED | OUTPATIENT
Start: 2019-05-15 | End: 2019-05-16 | Stop reason: HOSPADM

## 2019-05-15 RX ORDER — OXYCODONE HCL 5 MG/5 ML
5 SOLUTION, ORAL ORAL
Status: COMPLETED | OUTPATIENT
Start: 2019-05-15 | End: 2019-05-15

## 2019-05-15 RX ORDER — AMLODIPINE BESYLATE 10 MG/1
10 TABLET ORAL DAILY
Status: DISCONTINUED | OUTPATIENT
Start: 2019-05-16 | End: 2019-05-16 | Stop reason: HOSPADM

## 2019-05-15 RX ORDER — SODIUM CHLORIDE 9 MG/ML
INJECTION, SOLUTION INTRAVENOUS
Status: COMPLETED
Start: 2019-05-15 | End: 2019-05-16

## 2019-05-15 RX ORDER — ACETAMINOPHEN 10 MG/ML
1000 INJECTION, SOLUTION INTRAVENOUS EVERY 6 HOURS
Status: COMPLETED | OUTPATIENT
Start: 2019-05-15 | End: 2019-05-16

## 2019-05-15 RX ORDER — AMLODIPINE BESYLATE 10 MG/1
10 TABLET ORAL DAILY
COMMUNITY
End: 2019-06-17 | Stop reason: SDUPTHER

## 2019-05-15 RX ORDER — ACETAMINOPHEN 500 MG
1000 TABLET ORAL EVERY 6 HOURS
Status: DISCONTINUED | OUTPATIENT
Start: 2019-05-16 | End: 2019-05-16 | Stop reason: HOSPADM

## 2019-05-15 RX ORDER — BUPIVACAINE HYDROCHLORIDE AND EPINEPHRINE 5; 5 MG/ML; UG/ML
INJECTION, SOLUTION PERINEURAL
Status: DISCONTINUED | OUTPATIENT
Start: 2019-05-15 | End: 2019-05-15 | Stop reason: HOSPADM

## 2019-05-15 RX ORDER — LEVOTHYROXINE SODIUM 0.03 MG/1
25 TABLET ORAL
Status: DISCONTINUED | OUTPATIENT
Start: 2019-05-16 | End: 2019-05-16 | Stop reason: HOSPADM

## 2019-05-15 RX ORDER — LOSARTAN POTASSIUM 100 MG/1
100 TABLET ORAL DAILY
COMMUNITY
End: 2020-01-06

## 2019-05-15 RX ORDER — OXYCODONE HCL 10 MG/1
10 TABLET, FILM COATED, EXTENDED RELEASE ORAL ONCE
Status: COMPLETED | OUTPATIENT
Start: 2019-05-15 | End: 2019-05-15

## 2019-05-15 RX ORDER — HYDRALAZINE HYDROCHLORIDE 20 MG/ML
20 INJECTION INTRAMUSCULAR; INTRAVENOUS EVERY 6 HOURS PRN
Status: DISCONTINUED | OUTPATIENT
Start: 2019-05-15 | End: 2019-05-16 | Stop reason: HOSPADM

## 2019-05-15 RX ORDER — DIPHENHYDRAMINE HYDROCHLORIDE 50 MG/ML
12.5 INJECTION INTRAMUSCULAR; INTRAVENOUS
Status: DISCONTINUED | OUTPATIENT
Start: 2019-05-15 | End: 2019-05-15 | Stop reason: HOSPADM

## 2019-05-15 RX ORDER — SIMETHICONE 80 MG
80 TABLET,CHEWABLE ORAL 3 TIMES DAILY PRN
Status: DISCONTINUED | OUTPATIENT
Start: 2019-05-15 | End: 2019-05-16 | Stop reason: HOSPADM

## 2019-05-15 RX ORDER — ENALAPRILAT 1.25 MG/ML
2.5 INJECTION INTRAVENOUS EVERY 6 HOURS PRN
Status: DISCONTINUED | OUTPATIENT
Start: 2019-05-15 | End: 2019-05-16 | Stop reason: HOSPADM

## 2019-05-15 RX ORDER — ONDANSETRON 2 MG/ML
4 INJECTION INTRAMUSCULAR; INTRAVENOUS EVERY 6 HOURS
Status: DISCONTINUED | OUTPATIENT
Start: 2019-05-15 | End: 2019-05-16 | Stop reason: HOSPADM

## 2019-05-15 RX ORDER — ONDANSETRON 2 MG/ML
4 INJECTION INTRAMUSCULAR; INTRAVENOUS
Status: COMPLETED | OUTPATIENT
Start: 2019-05-15 | End: 2019-05-15

## 2019-05-15 RX ORDER — METOCLOPRAMIDE HYDROCHLORIDE 5 MG/ML
10 INJECTION INTRAMUSCULAR; INTRAVENOUS EVERY 6 HOURS PRN
Status: DISCONTINUED | OUTPATIENT
Start: 2019-05-15 | End: 2019-05-16 | Stop reason: HOSPADM

## 2019-05-15 RX ORDER — HYDRALAZINE HYDROCHLORIDE 20 MG/ML
5 INJECTION INTRAMUSCULAR; INTRAVENOUS
Status: DISCONTINUED | OUTPATIENT
Start: 2019-05-15 | End: 2019-05-15 | Stop reason: HOSPADM

## 2019-05-15 RX ORDER — HYDROMORPHONE HYDROCHLORIDE 1 MG/ML
0.2 INJECTION, SOLUTION INTRAMUSCULAR; INTRAVENOUS; SUBCUTANEOUS
Status: DISCONTINUED | OUTPATIENT
Start: 2019-05-15 | End: 2019-05-15 | Stop reason: HOSPADM

## 2019-05-15 RX ORDER — CALCIUM CARBONATE 500 MG/1
500 TABLET, CHEWABLE ORAL
Status: DISCONTINUED | OUTPATIENT
Start: 2019-05-15 | End: 2019-05-16 | Stop reason: HOSPADM

## 2019-05-15 RX ADMIN — CLINDAMYCIN PHOSPHATE 900 MG: 150 INJECTION, SOLUTION INTRAMUSCULAR; INTRAVENOUS at 14:57

## 2019-05-15 RX ADMIN — SCOPALAMINE 1 PATCH: 1 PATCH, EXTENDED RELEASE TRANSDERMAL at 13:15

## 2019-05-15 RX ADMIN — HALOPERIDOL LACTATE 1 MG: 5 INJECTION, SOLUTION INTRAMUSCULAR at 18:41

## 2019-05-15 RX ADMIN — SODIUM CHLORIDE, POTASSIUM CHLORIDE, SODIUM LACTATE AND CALCIUM CHLORIDE: 600; 310; 30; 20 INJECTION, SOLUTION INTRAVENOUS at 16:20

## 2019-05-15 RX ADMIN — FENTANYL CITRATE 50 MCG: 50 INJECTION, SOLUTION INTRAMUSCULAR; INTRAVENOUS at 16:16

## 2019-05-15 RX ADMIN — MIDAZOLAM HYDROCHLORIDE 1 MG: 1 INJECTION, SOLUTION INTRAMUSCULAR; INTRAVENOUS at 16:40

## 2019-05-15 RX ADMIN — MIDAZOLAM HYDROCHLORIDE 1 MG: 1 INJECTION, SOLUTION INTRAMUSCULAR; INTRAVENOUS at 16:32

## 2019-05-15 RX ADMIN — OXYCODONE HYDROCHLORIDE 10 MG: 10 TABLET, FILM COATED, EXTENDED RELEASE ORAL at 13:16

## 2019-05-15 RX ADMIN — ACETAMINOPHEN 1000 MG: 10 INJECTION, SOLUTION INTRAVENOUS at 20:46

## 2019-05-15 RX ADMIN — PROPOFOL 200 MG: 10 INJECTION, EMULSION INTRAVENOUS at 14:52

## 2019-05-15 RX ADMIN — LIDOCAINE HYDROCHLORIDE 40 MG: 20 INJECTION, SOLUTION INTRAVENOUS at 14:52

## 2019-05-15 RX ADMIN — ONDANSETRON 4 MG: 2 INJECTION INTRAMUSCULAR; INTRAVENOUS at 23:59

## 2019-05-15 RX ADMIN — SUGAMMADEX 200 MG: 100 INJECTION, SOLUTION INTRAVENOUS at 15:34

## 2019-05-15 RX ADMIN — FENTANYL CITRATE 100 MCG: 50 INJECTION, SOLUTION INTRAMUSCULAR; INTRAVENOUS at 14:50

## 2019-05-15 RX ADMIN — OXYCODONE HYDROCHLORIDE 10 MG: 5 SOLUTION ORAL at 16:11

## 2019-05-15 RX ADMIN — FENTANYL CITRATE 50 MCG: 50 INJECTION, SOLUTION INTRAMUSCULAR; INTRAVENOUS at 15:58

## 2019-05-15 RX ADMIN — ONDANSETRON 4 MG: 2 INJECTION INTRAMUSCULAR; INTRAVENOUS at 17:16

## 2019-05-15 RX ADMIN — SODIUM CHLORIDE, POTASSIUM CHLORIDE, SODIUM LACTATE AND CALCIUM CHLORIDE: 600; 310; 30; 20 INJECTION, SOLUTION INTRAVENOUS at 13:36

## 2019-05-15 RX ADMIN — FAMOTIDINE 20 MG: 10 INJECTION INTRAVENOUS at 18:45

## 2019-05-15 RX ADMIN — ROCURONIUM BROMIDE 50 MG: 10 INJECTION, SOLUTION INTRAVENOUS at 14:52

## 2019-05-15 RX ADMIN — ONDANSETRON 4 MG: 2 INJECTION INTRAMUSCULAR; INTRAVENOUS at 15:35

## 2019-05-15 RX ADMIN — DEXAMETHASONE SODIUM PHOSPHATE 8 MG: 4 INJECTION, SOLUTION INTRA-ARTICULAR; INTRALESIONAL; INTRAMUSCULAR; INTRAVENOUS; SOFT TISSUE at 15:12

## 2019-05-15 RX ADMIN — FENTANYL CITRATE 50 MCG: 50 INJECTION, SOLUTION INTRAMUSCULAR; INTRAVENOUS at 16:04

## 2019-05-15 RX ADMIN — SODIUM CHLORIDE, POTASSIUM CHLORIDE, SODIUM LACTATE AND CALCIUM CHLORIDE: 600; 310; 30; 20 INJECTION, SOLUTION INTRAVENOUS at 14:48

## 2019-05-15 RX ADMIN — GABAPENTIN 300 MG: 300 CAPSULE ORAL at 13:16

## 2019-05-15 RX ADMIN — DIPHENHYDRAMINE HYDROCHLORIDE 12.5 MG: 50 INJECTION INTRAMUSCULAR; INTRAVENOUS at 18:45

## 2019-05-15 ASSESSMENT — COGNITIVE AND FUNCTIONAL STATUS - GENERAL
HELP NEEDED FOR BATHING: A LITTLE
MOVING TO AND FROM BED TO CHAIR: A LITTLE
TOILETING: A LITTLE
DAILY ACTIVITIY SCORE: 20
CLIMB 3 TO 5 STEPS WITH RAILING: A LITTLE
STANDING UP FROM CHAIR USING ARMS: A LITTLE
MOBILITY SCORE: 20
SUGGESTED CMS G CODE MODIFIER MOBILITY: CJ
DRESSING REGULAR UPPER BODY CLOTHING: A LITTLE
DRESSING REGULAR LOWER BODY CLOTHING: A LITTLE
TURNING FROM BACK TO SIDE WHILE IN FLAT BAD: A LITTLE
SUGGESTED CMS G CODE MODIFIER DAILY ACTIVITY: CJ

## 2019-05-15 ASSESSMENT — PATIENT HEALTH QUESTIONNAIRE - PHQ9
SUM OF ALL RESPONSES TO PHQ9 QUESTIONS 1 AND 2: 0
2. FEELING DOWN, DEPRESSED, IRRITABLE, OR HOPELESS: NOT AT ALL
1. LITTLE INTEREST OR PLEASURE IN DOING THINGS: NOT AT ALL

## 2019-05-15 ASSESSMENT — LIFESTYLE VARIABLES: EVER_SMOKED: NEVER

## 2019-05-15 ASSESSMENT — PAIN SCALES - GENERAL: PAIN_LEVEL: 3

## 2019-05-15 NOTE — OP REPORT
NAME:  Carolynn Casey  MRN:  [id number]  :  1965      DATE OF OPERATION: 5/15/2019    PREOPERATIVE DIAGNOSIS: Morbid Obesity with medical sequelae;  Hiatal hernia    POSTOPERATIVE DIAGNOSIS: Morbid Obesity with medical sequelae;  Hiatal hernia    OPERATION PERFORMED: 1.  Laparoscopic Sleeve Gastrectomy  2.  Cruroplasty hiatal hernia repair      SURGEON: Gadiel Cox MD    ASSISTANT:  Lani Buckner PA-C    ANESTHESIOLOGIST:  Anesthesiologist: Bill Piedra M.D.    ANESTHESIA: General endotracheal anesthesia.     SPECIMEN: Stomach; Liver Biopsy    ESTIMATED BLOOD LOSS: <10cc.     INDICATIONS: The patient is a 54 y.o. female with a diagnosis of morbid obesity with medical sequelae. She is taken to the operating room today for Laparoscopic Sleeve Gastrectomy, hiatal hernia repair and liver biopsy.     PROCEDURE: Following informed consent, the patient was properly identified, taken to the operating room, and placed in the supine position where general endotracheal anesthesia was administered. Intravenous antibiotics were administered by the anesthesiologist in the correct time interval. Sequential compression devices were employed. The abdomen was prepped and draped into a sterile field.     An optical entry bladeless  trocar was utilized and pneumoperitoneum carefully established in the usual fashion.  The bladeless 5 mm separator trocar was introduced and the 5 mm lens/camera was passed into the peritoneal cavity.  Three additional separator trocars were placed under direct vision.  A 5 mm Kisha-type liver retractor was placed into position.  This was used to elevate the left sided segment of the liver.  It was secured to the patients right side with a robot arm.  Careful inspection revealed no untoward events with placement of the trocars.    The gastrocolic omentum was examined and dissected with the ligasure device and a point on the distal antrum was selected to begin the sleeve gastrectomy.  A  40 Yi bougie was then passed down into the antrum.  A careful inspection at the hiatus demonstrated a 3 cm hiatal hernia which would require repair or risk significant reflux. An [employer] linear stapler with a thick-tissue cartridges, was employed to divide partway across the stomach.  With the bougie in position, the stapler was then used to march proximally along the stomach and transsection of the stomach was performed, beginning 5 cm proximal to the pylorus in the method of the sleeve gastrectomy.  The greater curvature aspect of the stomach was then dissected and the greater curvature vessels and short gastric vessels were divided with the ligasure, and use of some hemoclips.  The hiataus and crura were exposed, The herniated fat and retroesophageal lipoma were reduced. 2cm or more of intraabdominal esophagus was freed.    The last endomechanical stapler firings were used to complete the transection of the stomach.  Hemoclips were used if any oozing along staple line locations. Careful inspection of the staple line demonstrated excellent, meticulous hemostasis and a completely intact staple line with seemless tissue approximation.  Seromuscular sutures were placed using polysorb suture to further secure the staple line.  The liver exhibited hepatic steatosis.     The hiatus was examined and dissected to free the hernia sac and mobilize the esophagus circumferentially, into the mediastinum and freeing 3cm of intra-abdominal esophagus. The hiatal hernia was then repaired using 0 Ethibond endostitches as anterior cruroplasty.The bougie was then removed.     The large endocatch bag was then used to retrieve the stomach specimen.  Tisseal fibrin glue sealant was sprayed along the entire staple line. The ports were removed under direct vision and the pneumoperitoneum was allowed to escape. The fascia of this port was closed with 0-Vicryl suture.  The port sites were then irrigated well.  The port site skin  incisions were closed with interrupted 4-0 Vicryl subcuticular sutures.  Steri-Strips and Benzoin were applied beneath sterile Band-Aids.     The patient tolerated the procedure well and there were no apparent complications. All sponge, needle, and instrument counts were correct on 2 separate occasions. She was awakened, extubated, and transferred to the recovery room in satisfactory condition.       ____________________________________   Gadiel Cox MD  DD: 5/15/2019  3:37 PM    CC:  [facility];

## 2019-05-15 NOTE — ANESTHESIA POSTPROCEDURE EVALUATION
Patient: Carolynn Casey    Procedure Summary     Date:  05/15/19 Room / Location:  [place] [address] / SURGERY [facility]    Anesthesia Start:  1448 Anesthesia Stop:  1544    Procedures:       GASTRECTOMY, SLEEVE, LAPAROSCOPIC (Abdomen)      REPAIR, HERNIA, HIATAL, LAPAROSCOPIC (Abdomen) Diagnosis:       Morbid obesity (HCC)      (MORBID OBESITY )    Surgeon:  Gadiel Cox M.D. Responsible Provider:  Bill Piedra M.D.    Anesthesia Type:  general ASA Status:  3          Final Anesthesia Type: general  Last vitals  BP   Blood Pressure: (!) 178/96    Temp   36.6 °C (97.8 °F)    Pulse   Pulse: 96   Resp   18    SpO2   93 %      Anesthesia Post Evaluation    Patient location during evaluation: PACU  Patient participation: complete - patient participated  Level of consciousness: awake and alert  Pain score: 3    Airway patency: patent  Anesthetic complications: no  Cardiovascular status: hemodynamically stable  Respiratory status: acceptable  Hydration status: euvolemic    PONV: none           Nurse Pain Score: 1 (NPRS)

## 2019-05-15 NOTE — OR NURSING
Dr Piedra and Dr Cox notified pt took Tylenol 2gm PO at 0915 this am, orders to hold Ofirmev 1gm IVPB at this time.

## 2019-05-15 NOTE — PROGRESS NOTES
Med rec updated and complete  Allergies reviewed  Pt reports for years she has been taking TYLENOL 500MG 4 tablets twice a day.  I informed the nurse and pharmacists.  Pt reports no antibiotics in the last 30 days.  Pt reports no vitamins

## 2019-05-15 NOTE — ANESTHESIA PREPROCEDURE EVALUATION
Relevant Problems   (+) Hypertension   (+) Hypothyroidism due to Hashimoto's thyroiditis   (+) Type 2 diabetes mellitus without complication (HCC)       Physical Exam    Airway   Mallampati: II  TM distance: >3 FB  Neck ROM: full       Cardiovascular - normal exam  Rhythm: regular  Rate: normal  (-) murmur     Dental - normal exam         Pulmonary - normal exam  Breath sounds clear to auscultation     Abdominal    Neurological - normal exam                 Anesthesia Plan    ASA 3   ASA physical status 3 criteria: diabetes - poorly controlled, morbid obesity - BMI greater than or equal to 40 and hypertension - poorly controlled    Plan - general       Airway plan will be ETT        Induction: intravenous    Postoperative Plan: Postoperative administration of opioids is intended.    Pertinent diagnostic labs and testing reviewed    Informed Consent:    Anesthetic plan and risks discussed with patient.    Use of blood products discussed with: patient whom consented to blood products.

## 2019-05-15 NOTE — ANESTHESIA PROCEDURE NOTES
Airway  Date/Time: 5/15/2019 2:53 PM  Performed by: JUSTINE CALZADA  Authorized by: JUSTINE CALZADA     Location:  OR  Urgency:  Elective  Difficult Airway: No    Indications for Airway Management:  Anesthesia  Spontaneous Ventilation: absent    Sedation Level:  Deep  Preoxygenated: Yes    Patient Position:  Sniffing  Final Airway Type:  Endotracheal airway  Final Endotracheal Airway:  ETT  Cuffed: Yes    Technique Used for Successful ETT Placement:  Direct laryngoscopy  Insertion Site:  Oral  Blade Type:  Ashli  Laryngoscope Blade/Videolaryngoscope Blade Size:  3  ETT Size (mm):  7.5  Measured from:  Lips  ETT to Lips (cm):  21  Placement Verified by: auscultation and capnometry    Cormack-Lehane Classification:  Grade IIa - partial view of glottis  Number of Attempts at Approach:  1

## 2019-05-15 NOTE — ANESTHESIA QCDR
2019 [facility] Clinical Data Registry (for Quality Improvement)     Postoperative nausea/vomiting risk protocol (Adult = 18 yrs and Pediatric 3-17 yrs)- (430 and 463)  General inhalation anesthetic (NOT TIVA) with PONV risk factors: Yes  Provision of anti-emetic therapy with at least 2 different classes of agents: Yes   Patient DID NOT receive anti-emetic therapy and reason is documented in Medical Record:  N/A    Multimodal Pain Management- (AQI59)  Patient undergoing Elective Surgery (i.e. Outpatient, or ASC, or Prescheduled Surgery prior to Hospital Admission): Yes  Use of Multimodal Pain Management, two or more drugs and/or interventions, NOT including systemic opioids: Yes   Exception: Documented allergy to multiple classes of analgesics:  N/A    PACU assessment of acute postoperative pain prior to Anesthesia Care End- Applies to Patients Age = 18- (ABG7)  Initial PACU pain score is which of the following: < 7/10  Patient unable to report pain score: N/A    Post-anesthetic transfer of care checklist/protocol to PACU/ICU- (426 and 427)  Upon conclusion of case, patient transferred to which of the following locations: PACU/Non-ICU  Use of transfer checklist/protocol: Yes  Exclusion: Service Performed in Patient Hospital Room (and thus did not require transfer): N/A    PACU Reintubation- (AQI31)  General anesthesia requiring endotracheal intubation (ETT) along with subsequent extubation in OR or PACU: Yes  Required reintubation in the PACU: No   Extubation was a planned trial documented in the medical record prior to removal of the original airway device:  N/A    Unplanned admission to ICU related to anesthesia service up through end of PACU care- (MD51)  Unplanned admission to ICU (not initially anticipated at anesthesia start time): No

## 2019-05-15 NOTE — ANESTHESIA TIME REPORT
Anesthesia Start and Stop Event Times     Date Time Event    5/15/2019 1448 Anesthesia Start     1544 Anesthesia Stop        Responsible Staff  05/15/19    Name Role Begin End    Bill Piedra M.D. Anesth 1448 1544        Preop Diagnosis (Free Text):  Pre-op Diagnosis     MORBID OBESITY         Preop Diagnosis (Codes):  Diagnosis Information     Diagnosis Code(s): Morbid obesity (HCC) [E66.01]        Post op Diagnosis  Morbid obesity (HCC)      Premium Reason  A. 3PM - 7AM    Comments:

## 2019-05-16 VITALS
HEART RATE: 80 BPM | HEIGHT: 65 IN | SYSTOLIC BLOOD PRESSURE: 135 MMHG | WEIGHT: 270.5 LBS | DIASTOLIC BLOOD PRESSURE: 68 MMHG | OXYGEN SATURATION: 92 % | TEMPERATURE: 99.5 F | BODY MASS INDEX: 45.07 KG/M2 | RESPIRATION RATE: 16 BRPM

## 2019-05-16 LAB
ALBUMIN SERPL BCP-MCNC: 4.3 G/DL (ref 3.2–4.9)
ALBUMIN/GLOB SERPL: 1.3 G/DL
ALP SERPL-CCNC: 85 U/L (ref 30–99)
ALT SERPL-CCNC: 58 U/L (ref 2–50)
ANION GAP SERPL CALC-SCNC: 11 MMOL/L (ref 0–11.9)
AST SERPL-CCNC: 36 U/L (ref 12–45)
BILIRUB SERPL-MCNC: 1.1 MG/DL (ref 0.1–1.5)
BUN SERPL-MCNC: 10 MG/DL (ref 8–22)
CALCIUM SERPL-MCNC: 9.1 MG/DL (ref 8.5–10.5)
CHLORIDE SERPL-SCNC: 102 MMOL/L (ref 96–112)
CO2 SERPL-SCNC: 22 MMOL/L (ref 20–33)
CREAT SERPL-MCNC: 0.54 MG/DL (ref 0.5–1.4)
ERYTHROCYTE [DISTWIDTH] IN BLOOD BY AUTOMATED COUNT: 41.6 FL (ref 35.9–50)
GLOBULIN SER CALC-MCNC: 3.3 G/DL (ref 1.9–3.5)
GLUCOSE BLD-MCNC: 99 MG/DL (ref 65–99)
GLUCOSE SERPL-MCNC: 144 MG/DL (ref 65–99)
HCT VFR BLD AUTO: 47.7 % (ref 37–47)
HGB BLD-MCNC: 15.1 G/DL (ref 12–16)
MCH RBC QN AUTO: 27.7 PG (ref 27–33)
MCHC RBC AUTO-ENTMCNC: 31.7 G/DL (ref 33.6–35)
MCV RBC AUTO: 87.5 FL (ref 81.4–97.8)
PLATELET # BLD AUTO: 326 K/UL (ref 164–446)
PMV BLD AUTO: 8.5 FL (ref 9–12.9)
POTASSIUM SERPL-SCNC: 4.1 MMOL/L (ref 3.6–5.5)
PROT SERPL-MCNC: 7.6 G/DL (ref 6–8.2)
RBC # BLD AUTO: 5.45 M/UL (ref 4.2–5.4)
SODIUM SERPL-SCNC: 135 MMOL/L (ref 135–145)
WBC # BLD AUTO: 11.6 K/UL (ref 4.8–10.8)

## 2019-05-16 PROCEDURE — 94760 N-INVAS EAR/PLS OXIMETRY 1: CPT

## 2019-05-16 PROCEDURE — 36415 COLL VENOUS BLD VENIPUNCTURE: CPT

## 2019-05-16 PROCEDURE — 85027 COMPLETE CBC AUTOMATED: CPT

## 2019-05-16 PROCEDURE — A9270 NON-COVERED ITEM OR SERVICE: HCPCS | Performed by: NURSE PRACTITIONER

## 2019-05-16 PROCEDURE — G0378 HOSPITAL OBSERVATION PER HR: HCPCS

## 2019-05-16 PROCEDURE — 80053 COMPREHEN METABOLIC PANEL: CPT

## 2019-05-16 PROCEDURE — 700102 HCHG RX REV CODE 250 W/ 637 OVERRIDE(OP): Performed by: NURSE PRACTITIONER

## 2019-05-16 PROCEDURE — 700105 HCHG RX REV CODE 258

## 2019-05-16 PROCEDURE — 700111 HCHG RX REV CODE 636 W/ 250 OVERRIDE (IP): Performed by: NURSE PRACTITIONER

## 2019-05-16 PROCEDURE — 700105 HCHG RX REV CODE 258: Performed by: NURSE PRACTITIONER

## 2019-05-16 RX ADMIN — FAMOTIDINE 20 MG: 10 INJECTION INTRAVENOUS at 04:59

## 2019-05-16 RX ADMIN — OXYCODONE HYDROCHLORIDE 5 MG: 5 SOLUTION ORAL at 05:45

## 2019-05-16 RX ADMIN — OXYCODONE HYDROCHLORIDE 10 MG: 5 SOLUTION ORAL at 10:15

## 2019-05-16 RX ADMIN — AMLODIPINE BESYLATE 10 MG: 10 TABLET ORAL at 07:59

## 2019-05-16 RX ADMIN — ACETAMINOPHEN 1000 MG: 10 INJECTION, SOLUTION INTRAVENOUS at 02:34

## 2019-05-16 RX ADMIN — ENOXAPARIN SODIUM 40 MG: 100 INJECTION SUBCUTANEOUS at 04:58

## 2019-05-16 RX ADMIN — GABAPENTIN 300 MG: 300 CAPSULE ORAL at 00:00

## 2019-05-16 RX ADMIN — ONDANSETRON 4 MG: 2 INJECTION INTRAMUSCULAR; INTRAVENOUS at 04:58

## 2019-05-16 RX ADMIN — SODIUM CHLORIDE 500 ML: 9 INJECTION, SOLUTION INTRAVENOUS at 00:00

## 2019-05-16 RX ADMIN — GABAPENTIN 300 MG: 300 CAPSULE ORAL at 04:59

## 2019-05-16 RX ADMIN — LEVOTHYROXINE SODIUM 25 MCG: 25 TABLET ORAL at 07:59

## 2019-05-16 RX ADMIN — POTASSIUM CHLORIDE: 2 INJECTION, SOLUTION, CONCENTRATE INTRAVENOUS at 00:00

## 2019-05-16 ASSESSMENT — LIFESTYLE VARIABLES: EVER_SMOKED: NEVER

## 2019-05-16 ASSESSMENT — ENCOUNTER SYMPTOMS
HEARTBURN: 0
SHORTNESS OF BREATH: 0
FEVER: 0
COUGH: 0
CHILLS: 0
VOMITING: 0
ABDOMINAL PAIN: 1
DIARRHEA: 0
NAUSEA: 0

## 2019-05-16 ASSESSMENT — COPD QUESTIONNAIRES
COPD SCREENING SCORE: 4
DURING THE PAST 4 WEEKS HOW MUCH DID YOU FEEL SHORT OF BREATH: SOME OF THE TIME
HAVE YOU SMOKED AT LEAST 100 CIGARETTES IN YOUR ENTIRE LIFE: NO/DON'T KNOW
DO YOU EVER COUGH UP ANY MUCUS OR PHLEGM?: YES, A FEW DAYS A WEEK OR MONTH

## 2019-05-16 NOTE — PROGRESS NOTES
"Received from PACU, c/o nausea medication given too soon for zofran see Mar.  /88   Pulse 85   Temp 36.6 °C (97.8 °F) (Temporal)   Resp 16   Ht 1.651 m (5' 5\")   Wt 122.7 kg (270 lb 8.1 oz)   SpO2 92%   4L NC on with saturation 92%.   CLD starts 1140pm.    Report endorsed to karl PELAEZ as a new admission.   Pharmacy notified of missing medication.   Patient is otherwise comfortable.   "

## 2019-05-16 NOTE — CARE PLAN
Problem: Safety  Goal: Will remain free from falls  Outcome: PROGRESSING AS EXPECTED  Calls for assistance when appropriate. Call light and personal belongings within reach. Bed in low and locked position. Fall education provided to patient.     Problem: Infection  Goal: Will remain free from infection  Outcome: PROGRESSING AS EXPECTED  Dressing will remain CDI throughout shift. Dressing changes performed as ordered. Patient will receive IV antibiotics as ordered and seen in eMAR. Lab values monitored daily, abnormals reported to MD.  Dressings monitored for signs of infection including redness, swelling, warmth, odor, drainage every shift.  Standard precautions in use. Handwashing before and after patient contact. Education provided to patient regarding infection prevention techniques.

## 2019-05-16 NOTE — RESPIRATORY CARE
COPD EDUCATION by COPD CLINICAL EDUCATOR  5/16/2019 at 9:13 AM by Rachel Trotter     Patient reviewed by COPD education team. Patient does not have a history or diagnosis of COPD and is a non-smoker, therefore does not qualify for the COPD program.

## 2019-05-16 NOTE — OR NURSING
The pt is awake and oriented. Respirations are regular and easy. Pain is controlled, the pt is comfortable. Dressings on abdomen, one lap site reinforced for oozing, the other 3 dry and intact.

## 2019-05-16 NOTE — PROGRESS NOTES
Surgical Progress Note    Author: Jayla Nguyen Date & Time created: 2019   8:18 AM     Interval Events:  Pt doing well POD #1 s/p sleeve gastrectomy. Denies nausea, vomiting. Sitting up in chair. Voiding, ambulating. Encourage IS. Alert and oriented. NAD. Breathing unlabored. Abdomen soft, tender to incision sites. Dressings clean, dry, intact. VS reviewed. Labs reviewed. Hopefully home alter today. Discussed with Dr. Cox.   Review of Systems   Constitutional: Negative for chills and fever.   Respiratory: Negative for cough and shortness of breath.    Gastrointestinal: Positive for abdominal pain. Negative for diarrhea, heartburn, nausea and vomiting.   Skin: Negative for itching and rash.     Hemodynamics:  Temp (24hrs), Av.7 °C (98 °F), Min:36.2 °C (97.2 °F), Max:37 °C (98.6 °F)  Temperature: 36.6 °C (97.8 °F)  Pulse  Av.1  Min: 69  Max: 96Heart Rate (Monitored): 92  Blood Pressure: 142/88, NIBP: 149/78     Respiratory:    Respiration: 17, Pulse Oximetry: 90 %, O2 Daily Delivery Respiratory : Room Air with O2 Available           Neuro:  GCS       Fluids:    Intake/Output Summary (Last 24 hours) at 19 0818  Last data filed at 19 0000   Gross per 24 hour   Intake              470 ml   Output               25 ml   Net              445 ml     Weight: 122.7 kg (270 lb 8.1 oz)  Current Diet Order   Procedures   • Diet Order Clear Liquid     Physical Exam   Constitutional: She is oriented to person, place, and time. She appears well-developed and well-nourished. No distress.   Cardiovascular: Normal rate.    Pulmonary/Chest: Effort normal. No respiratory distress.   Abdominal: Soft. She exhibits distension. There is tenderness. There is no rebound and no guarding.   Neurological: She is alert and oriented to person, place, and time.   Skin: Skin is warm and dry. No rash noted. She is not diaphoretic. No erythema.   Psychiatric: She has a normal mood and affect. Her behavior is normal.   Vitals  reviewed.    Labs:  Recent Results (from the past 24 hour(s))   ACCU-CHEK GLUCOSE    Collection Time: 05/15/19  1:23 PM   Result Value Ref Range    Glucose - Accu-Ck 99 65 - 99 mg/dL   Histology Request    Collection Time: 05/15/19  4:47 PM   Result Value Ref Range    Pathology Request Sent to Histo    CBC WITHOUT DIFFERENTIAL    Collection Time: 05/16/19  4:17 AM   Result Value Ref Range    WBC 11.6 (H) 4.8 - 10.8 K/uL    RBC 5.45 (H) 4.20 - 5.40 M/uL    Hemoglobin 15.1 12.0 - 16.0 g/dL    Hematocrit 47.7 (H) 37.0 - 47.0 %    MCV 87.5 81.4 - 97.8 fL    MCH 27.7 27.0 - 33.0 pg    MCHC 31.7 (L) 33.6 - 35.0 g/dL    RDW 41.6 35.9 - 50.0 fL    Platelet Count 326 164 - 446 K/uL    MPV 8.5 (L) 9.0 - 12.9 fL   Comp Metabolic Panel    Collection Time: 05/16/19  4:17 AM   Result Value Ref Range    Sodium 135 135 - 145 mmol/L    Potassium 4.1 3.6 - 5.5 mmol/L    Chloride 102 96 - 112 mmol/L    Co2 22 20 - 33 mmol/L    Anion Gap 11.0 0.0 - 11.9    Glucose 144 (H) 65 - 99 mg/dL    Bun 10 8 - 22 mg/dL    Creatinine 0.54 0.50 - 1.40 mg/dL    Calcium 9.1 8.5 - 10.5 mg/dL    AST(SGOT) 36 12 - 45 U/L    ALT(SGPT) 58 (H) 2 - 50 U/L    Alkaline Phosphatase 85 30 - 99 U/L    Total Bilirubin 1.1 0.1 - 1.5 mg/dL    Albumin 4.3 3.2 - 4.9 g/dL    Total Protein 7.6 6.0 - 8.2 g/dL    Globulin 3.3 1.9 - 3.5 g/dL    A-G Ratio 1.3 g/dL   ESTIMATED GFR    Collection Time: 05/16/19  4:17 AM   Result Value Ref Range    GFR If African American >60 >60 mL/min/1.73 m 2    GFR If Non African American >60 >60 mL/min/1.73 m 2     Medical Decision Making, by Problem:  There are no active hospital problems to display for this patient.    Plan:  Pt doing well POD #1 s/p sleeve gastrectomy. Denies nausea, vomiting. Sitting up in chair. Voiding, ambulating. Encourage IS. Alert and oriented. NAD. Breathing unlabored. Abdomen soft, tender to incision sites. Dressings clean, dry, intact. VS reviewed. Labs reviewed. Hopefully home alter today. Discussed with   Kenny.     Quality Measures:  Quality-Core Measures   Reviewed items::  Labs reviewed and Medications reviewed  Howe catheter::  No Howe  DVT prophylaxis pharmacological::  Enoxaparin (Lovenox)  DVT prophylaxis - mechanical:  SCDs  Ulcer Prophylaxis::  Yes      Discussed patient condition with Patient and Dr. Cox.

## 2019-05-16 NOTE — DIETARY
NUTRITION SERVICES: BMI - Pt with BMI >40 (=Body mass index is 45.01 kg/m².), class III (extreme) obesity. Weight loss counseling not appropriate in acute care setting. Pt s/p Laparoscopic Sleeve Gastrectomy anticipate F/u planned with MD/RD post D/c. RECOMMEND - Referral to outpatient nutrition services for weight management, or F/u with MD/RD after D/C.

## 2019-05-16 NOTE — DISCHARGE INSTRUCTIONS
1. DIET: Follow the diet progression detailed in your post-op booklet.  Progressing as instructed will make for a smooth transition after surgery and prevent any pain associated with eating foods before your stomach is ready or eating too much.  Water and hydration is much more important than food intake the first week or two after surgery.  Drink enough water to keep your urine pale yellow.  Increase water intake if your urine is a darker yellow or if have burning with urination.  Nausea and vomiting once or twice after you leave the hospital is normal.  Sip fluids continually and stay hydrated.     2.  SUPPLEMENTS: Start your supplements 1 week after surgery.  You may need to cut some supplements in half initially.  Follow the guidelines from your supplement handout from your pre-op class.     3. ACTIVITIES: After discharge from the hospital, you may resume full routine activities. However, there should be no heavy lifting (greater than 15 pounds) and no strenuous activities until after your follow-up visit. Otherwise, routine activities of daily living are acceptable.  More movement and walking after surgery the better.     4. DRIVING: You may drive whenever you are off pain medications and are able to perform the activities needed to drive, i.e. turning, bending, twisting, etc.     5. BATHING AND WOUND CARE: You may get the wound wet 2 days after surgery. You may shower, but do not submerge in a bath for at least a week. Dressings may come off after 48 hours. Please leave the steri-strips in place, they will fall off over 5-7 days. You may notice some clear or slightly bloody drainage from your wounds and there may be some mild redness or bruising around your incision sites.  This is normal.     6. BOWEL FUNCTION: Constipation is common after an operation, especially with pain medications. The combination of pain medication and decreased activity level can cause constipation in otherwise normal patients. If you  feel this is occurring, take a laxative (Milk of Magnesia, Miralax, etc.) until the problem has resolved.  Diarrhea the first few days after surgery can also be normal.  You may notice that it is dark in color or see blood, which is also normal and should subside in the first week post-op.     7. PAIN MEDICATION: You have been given a prescription for pain medication preoperatively.  Please take these as directed. It is important to remember not to take medications on an empty stomach as this may cause nausea.  For minimal discomfort you may use liquid Tylenol 650 mg every 4 hours.  DO NOT exceed 4,000 mg of Tylenol in 24 hours.  DO NOT use non steroidal anti-inflamatory medication such as: Asprin, Ibuprofen, Advil, Motrin, Aleve, or steroids.  These medication can cause ulcers after weight loss surgery.     8.CALL IF YOU HAVE: (1) Fevers to more than 101.5 F, (2) leg pain or swelling (3) Drainage or fluid from incision that may be foul smelling, increased tenderness or soreness at the wound or the wound edges are no longer together, redness or swelling at the incision site. (4) Night Sweats (5) Shaking, Chills (6) Persistent Nausea or Vomiting for over 24 hours.  Use your anti nausea medication as prescribed. (7) Call 911 if sudden onset of chest pain or shortness of breath that does not improve with 5-10 min of rest.     9. APPOINTMENT: Contact our office at [phone] for a follow-up appointment in 1 weeks following your procedure.  Our office hours are Monday-Friday, 8am-5pm.  Please try to call during these hours when we are better able to assist you.     If you have any additional questions, please do not hesitate to call the office and speak to either myself or the physician on call.     Office address:   [facility].   [address] [place] [address]   [address]   [place] [address]                  Discharge Instructions    Discharged to home by car with relative. Discharged via wheelchair, hospital escort:  Yes.  Special equipment needed: Not Applicable    Be sure to schedule a follow-up appointment with your primary care doctor or any specialists as instructed.     Discharge Plan:   Influenza Vaccine Indication: Patient Refuses    I understand that a diet low in cholesterol, fat, and sodium is recommended for good health. Unless I have been given specific instructions below for another diet, I accept this instruction as my diet prescription.   Other diet: gastric bypass diet    Special Instructions: None    · Is patient discharged on Warfarin / Coumadin?   No     Depression / Suicide Risk    As you are discharged from this [facility] facility, it is important to learn how to keep safe from harming yourself.    Recognize the warning signs:  · Abrupt changes in personality, positive or negative- including increase in energy   · Giving away possessions  · Change in eating patterns- significant weight changes-  positive or negative  · Change in sleeping patterns- unable to sleep or sleeping all the time   · Unwillingness or inability to communicate  · Depression  · Unusual sadness, discouragement and loneliness  · Talk of wanting to die  · Neglect of personal appearance   · Rebelliousness- reckless behavior  · Withdrawal from people/activities they love  · Confusion- inability to concentrate     If you or a loved one observes any of these behaviors or has concerns about self-harm, here's what you can do:  · Talk about it- your feelings and reasons for harming yourself  · Remove any means that you might use to hurt yourself (examples: pills, rope, extension cords, firearm)  · Get professional help from the community (Mental Health, Substance Abuse, psychological counseling)  · Do not be alone:Call your Safe Contact- someone whom you trust who will be there for you.  · Call your local CRISIS HOTLINE 352-9482 or 693-213-2783  · Call your local Children's Mobile Crisis Response Team [place] (031) 613-5995 or  www."[employer]  · Call the toll free National Suicide Prevention Hotlines   · National Suicide Prevention Lifeline 495-966-JFXM (7591)  · National Hope Line Network 800-SUICIDE (257-3049)

## 2019-05-16 NOTE — PROGRESS NOTES
2 RN skin check completed with Diane PELAEZ. No skin break down noted. Sacrum red and blanchable, abdominal incision dressings clean dry and intact. Patient ambulatory and tolerates positioning self well. SCD in place.

## 2019-05-16 NOTE — CARE PLAN
Problem: Pain Management  Goal: Pain level will decrease to patient's comfort goal    Intervention: Follow pain managment plan developed in collaboration with patient and Interdisciplinary Team  Patient will be aware of pain managment interventions available.      Problem: Safety  Goal: Will remain free from injury    Intervention: Provide assistance with mobility  Patient will call before getting up.

## 2019-05-16 NOTE — OR NURSING
The pt has her phone and is wearing her glasses.She was dangled from [employer]. She became nauseated and was medicated. She was not able to stand.

## 2019-05-16 NOTE — PROGRESS NOTES
Bedside shift report received from night RN.  Assumed care at 0715, patient awake in bed, belongings and call light within reach, PIV assessed CDI, infusing, dressing drain visualized CDI. Needs met at this time.    Reviewed current POC with family present. POC review with CNA including vital sign frequency/drains/mobility.  Labs, notes, orders reviewed at this time.

## 2019-05-17 ENCOUNTER — TELEPHONE (OUTPATIENT)
Dept: MEDICAL GROUP | Facility: PHYSICIAN GROUP | Age: 54
End: 2019-05-17

## 2019-05-17 ENCOUNTER — PATIENT OUTREACH (OUTPATIENT)
Dept: HEALTH INFORMATION MANAGEMENT | Facility: OTHER | Age: 54
End: 2019-05-17

## 2019-05-17 RX ORDER — OMEPRAZOLE 20 MG/1
20 CAPSULE, DELAYED RELEASE ORAL DAILY
COMMUNITY
End: 2020-01-06

## 2019-05-17 RX ORDER — ONDANSETRON 4 MG/1
4 TABLET, ORALLY DISINTEGRATING ORAL EVERY 6 HOURS PRN
COMMUNITY
End: 2019-05-20

## 2019-05-17 NOTE — TELEPHONE ENCOUNTER
Future Appointments       Provider Department Center    5/20/2019 9:45 AM Lisa Gilbert P.A.-C. [facility]    8/1/2019 2:00 PM STEPH Galloway [facility] Sleep Medicine         ESTABLISHED PATIENT PRE-VISIT PLANNING     Patient was NOT contacted to complete PVP.     Note: Patient will not be contacted if there is no indication to call.     1.  Reviewed notes from the last few office visits within the medical group: Yes    2.  If any orders were placed at last visit or intended to be done for this visit (i.e. 6 mos follow-up), do we have Results/Consult Notes?        •  Labs - Labs ordered, completed on 05/16/2019 and results are in chart.       •  Imaging - Imaging ordered, completed and results are in chart.       •  Referrals - Referral ordered, patient was seen and consult notes are in chart. Care Teams updated  YES.    3. Is this appointment scheduled as a Hospital Follow-Up? No    4.  Immunizations were updated in [facility] using WebIZ?: Yes       •  Web Iz Recommendations: HEPATITIS A , MMR , TD, VARICELLA (Chicken Pox)  and SHINGRIX (Shingles)    5.  Patient is due for the following Health Maintenance Topics:   Health Maintenance Due   Topic Date Due   • DIABETES MONOFILAMENT / LE EXAM  1965   • RETINAL SCREENING  02/04/1981   • COLON CANCER SCREENING ANNUAL FIT  02/04/2013   • IMM ZOSTER VACCINES (1 of 2) 02/04/2013   • URINE ACR / MICROALBUMIN  03/16/2016   • Annual Wellness Visit  02/28/2019     6. Orders for overdue Health Maintenance topics pended in Pre-Charting? YES    7.  AHA (MDX) form printed for Provider? No, already completed    8.  Patient was informed to arrive 15 min prior to their scheduled appointment and bring in their medication bottles.confirmed through automated call

## 2019-05-17 NOTE — PROGRESS NOTES
SCP post discharge med rec completed. No clinically significant medication issues noted. Patient denies any side effects, barriers to accessing medications, or trouble with adherence.     Updated patient's med list with post surgery medications. She takes a potassium supplement which may or may not be crushable. Advised patient to call pharmacy for  recommendations. Patient is agreeable.

## 2019-05-20 ENCOUNTER — OFFICE VISIT (OUTPATIENT)
Dept: MEDICAL GROUP | Facility: PHYSICIAN GROUP | Age: 54
End: 2019-05-20
Payer: MEDICARE

## 2019-05-20 VITALS
HEART RATE: 90 BPM | BODY MASS INDEX: 44.32 KG/M2 | WEIGHT: 266 LBS | HEIGHT: 65 IN | SYSTOLIC BLOOD PRESSURE: 122 MMHG | TEMPERATURE: 97.9 F | DIASTOLIC BLOOD PRESSURE: 84 MMHG | OXYGEN SATURATION: 92 %

## 2019-05-20 DIAGNOSIS — E11.9 TYPE 2 DIABETES MELLITUS WITHOUT COMPLICATION, WITHOUT LONG-TERM CURRENT USE OF INSULIN (HCC): ICD-10-CM

## 2019-05-20 DIAGNOSIS — Z98.84 S/P BARIATRIC SURGERY: ICD-10-CM

## 2019-05-20 DIAGNOSIS — E66.01 MORBID OBESITY WITH BMI OF 40.0-44.9, ADULT (HCC): ICD-10-CM

## 2019-05-20 DIAGNOSIS — M51.36 DDD (DEGENERATIVE DISC DISEASE), LUMBAR: ICD-10-CM

## 2019-05-20 DIAGNOSIS — F51.01 PRIMARY INSOMNIA: ICD-10-CM

## 2019-05-20 PROCEDURE — 99214 OFFICE O/P EST MOD 30 MIN: CPT | Performed by: PHYSICIAN ASSISTANT

## 2019-05-20 RX ORDER — TIZANIDINE HYDROCHLORIDE 4 MG/1
4 CAPSULE, GELATIN COATED ORAL NIGHTLY PRN
Qty: 90 CAP | Refills: 1 | Status: SHIPPED | OUTPATIENT
Start: 2019-05-20 | End: 2019-07-17 | Stop reason: SDUPTHER

## 2019-05-20 RX ORDER — ZOLPIDEM TARTRATE 10 MG/1
10 TABLET ORAL
Qty: 30 EACH | Refills: 2 | Status: SHIPPED | OUTPATIENT
Start: 2019-05-20 | End: 2019-06-13 | Stop reason: SDUPTHER

## 2019-05-20 NOTE — PROGRESS NOTES
Subjective:   Carolynn Casey is a 54 y.o. female here today for follow-up insomnia, bariatric surgery. Is an established patient of mine.    HPI:    Patient presents to the office today for follow-up on insomnia. She is requesting refills of Ambien. She received trial of amitriptyline to take as alternative for Ambien but developed visual hallucinations so quickly stopped taking it. The Ambien continues to work well for her. She does have an upcoming evaluation scheduled with the [facility] Sleep Medicine department in August.     Patient recently underwent gastric sleeve surgery on 5/15. She denies concern for infection, but would like me to look at her incisions to make sure they're healing fine. Her bariatric doctor recommended that all of her medications be changed to either chewable or liquid formulations if possible for better absorption. She is currently on protein shakes and will be starting vitamin supplements in the near future. Next f/u with Dr. Cox is later this week.    She is also requesting refill of Zanaflex, which she takes every night to help relax her back. She has lumbar DDD and chronic back pain, and states the medication helps the the pain and spasms so she can sleep. During the day, takes Flexeril (usually just once/day). She knows not to take the Flexeril and Zanaflex together.    Patient is known type 2 diabetic. The last time I saw her in the office I had prescribed her metformin, which she states she was unable to tolerate. She states that her diabetes was a big motivating factor for having the recent weight loss surgery. It is her goal to be able to manage her diabetes without surgery as she prefers not to be on medications.      Current medicines (including changes today)  Current Outpatient Prescriptions   Medication Sig Dispense Refill   • tizanidine (ZANAFLEX) 4 MG capsule Take 1 Cap by mouth at bedtime as needed. 90 Cap 1   • zolpidem (AMBIEN) 10 MG Tab Take 1 Tab by mouth every  "bedtime for 30 days. 30 Each 2   • omeprazole (PRILOSEC) 20 MG delayed-release capsule Take 20 mg by mouth every day.     • Enoxaparin Sodium (LOVENOX SC) Inject  as instructed.     • Scopolamine Base (SCOPOLAMINE TD) Apply  to skin as directed.     • POTASSIUM CHLORIDE JOHN ER PO Take  by mouth.     • amLODIPine (NORVASC) 10 MG Tab Take 10 mg by mouth every day.     • losartan (COZAAR) 100 MG Tab Take 100 mg by mouth every day.     • levothyroxine (SYNTHROID) 25 MCG Tab Take 1 Tab by mouth Every morning on an empty stomach. OK TO CRUSH. 90 Tab 1   • cyclobenzaprine (FLEXERIL) 10 MG Tab TAKE ONE TABLET BY MOUTH THREE TIMES A DAY AS NEEDED   GENERIC FOR FLEXERIL 90 Tab 0   • Pseudoephedrine HCl (SUDAFED PO) Take 10 mg by mouth as needed (For allergies).     • acetaminophen (TYLENOL) 500 MG Tab Take 2,000 mg by mouth 2 Times a Day.       No current facility-administered medications for this visit.      She  has a past medical history of Allergy; Anesthesia; Anxiety; Arthritis; ASTHMA; Bronchitis (2014); Constipation; Diabetes ([facility]); DVT (deep venous thrombosis) ([facility]); Elevated glucose; GERD (gastroesophageal reflux disease); Heart burn; Heart murmur; Hiatus hernia syndrome; Hypertension; Migraine; Pain (01-29-16); Pain; Primary osteoarthritis involving multiple joints; Pulmonary emboli ([facility]) (2017); Snoring; Unspecified disorder of thyroid; Unspecified urinary incontinence; and Upper GI bleed.    ROS  Pulmonary ROS: No shortness of breath  Cardiovascular ROS: No chest pain       Objective:     /84 (BP Location: Right arm, Patient Position: Sitting, BP Cuff Size: Large adult long)   Pulse 90   Temp 36.6 °C (97.9 °F)   Ht 1.651 m (5' 5\")   Wt 120.7 kg (266 lb)   SpO2 92%  Body mass index is 44.26 kg/m².     Physical Exam:  Constitutional: Alert, well-appearing, no distress.  Skin: No rashes in visible areas. Incision sites on abdominal wall are covered by steri-strips. There is surrounding ecchymosis, but no " surrounding erythema or edema.  Eye: Pupils are equal and round, conjunctiva clear, lids normal.  ENMT: Lips without lesions, moist mucus membranes.  Respiratory: Unlabored respiratory effort, lungs clear to auscultation, no wheezes, no rhonchi.  Cardiovascular: Normal S1, S2, no murmur, no lower extremity edema.      Assessment and Plan:   The following treatment plan was discussed    1. Primary insomnia  Chronic issue, well controlled with nightly Ambien.  For the time being, she wishes to continue this as alternatives have not been successful.  She will be evaluated by sleep medicine, August. Roc reviewed.  Last fill of Ambien on 4/18/2019--#30 with no refills.  - zolpidem (AMBIEN) 10 MG Tab; Take 1 Tab by mouth every bedtime for 30 days.  Dispense: 30 Each; Refill: 2    2. Morbid obesity with BMI of 40.0-44.9, adult ([facility])  3. S/P bariatric surgery  Chronic issue, improving and expect continued improvement given recent gastric sleeve surgery.  She will continue to follow with her bariatric surgeon, Dr. Cox. Reassured patient that her incisions appear to be healing well with no signs of infection. She will f/u as scheduled later this week for post-op exam.  I did go through Carolynn his entire medication list with her.  There are a couple that have the option for liquid formulation, but explained that this would likely be more expensive and she is able to crush up her tablets which is what she is opting to do.    4. DDD (degenerative disc disease), lumbar  Chronic issue, well-controlled with daytime Flexeril as needed as well as nightly tizanidine.  Continue current management.  I have refilled the tizanidine for her.  I did switch to the capsule form of the medication per her preference, so she can open it up and just consume the powder given her recent surgery.  - tizanidine (ZANAFLEX) 4 MG capsule; Take 1 Cap by mouth at bedtime as needed.  Dispense: 90 Cap; Refill: 1    5. Type 2 diabetes mellitus without  complication, without long-term current use of insulin (HCC)  Chronic issue, uncontrolled.  Her most recent A1c done at the end of March was high at 7.4 which I discussed with patient.  She did not tolerate metformin.  She is open to the idea of medication, but is wanting to do a trial of lifestyle modification, especially given her recent surgery and the fact that she has already lost weight.  Will plan to recheck A1c at the end of June and reassess at that time.  - HEMOGLOBIN A1C; Future      Followup: Return for f/u pending A1c results.    Lisa Gilbert P.A.-C.

## 2019-06-06 DIAGNOSIS — M46.1 SACROILIITIS (HCC): ICD-10-CM

## 2019-06-06 DIAGNOSIS — M15.9 PRIMARY OSTEOARTHRITIS INVOLVING MULTIPLE JOINTS: ICD-10-CM

## 2019-06-07 RX ORDER — CYCLOBENZAPRINE HCL 10 MG
TABLET ORAL
Qty: 90 TAB | Refills: 0 | Status: CANCELLED | OUTPATIENT
Start: 2019-06-07

## 2019-06-07 NOTE — PROGRESS NOTES
----- Message from Jerome Nettles DO sent at 6/7/2019  9:23 AM CDT -----  Please notify patient that his testosterone levels have actually worsened. Please check to make sure that the patient is taking his medication as prescribed. If he is taking the medication as prescribed I would recommend increasing to 2 mL twice monthly. Recheck testosterone level in one month     Med rec complete per pt  Allergies reviewed

## 2019-06-08 DIAGNOSIS — M15.9 PRIMARY OSTEOARTHRITIS INVOLVING MULTIPLE JOINTS: ICD-10-CM

## 2019-06-08 DIAGNOSIS — M46.1 SACROILIITIS (HCC): ICD-10-CM

## 2019-06-10 ENCOUNTER — PATIENT MESSAGE (OUTPATIENT)
Dept: MEDICAL GROUP | Facility: PHYSICIAN GROUP | Age: 54
End: 2019-06-10

## 2019-06-10 DIAGNOSIS — M46.1 SACROILIITIS (HCC): ICD-10-CM

## 2019-06-10 DIAGNOSIS — M15.9 PRIMARY OSTEOARTHRITIS INVOLVING MULTIPLE JOINTS: ICD-10-CM

## 2019-06-10 RX ORDER — CYCLOBENZAPRINE HCL 10 MG
TABLET ORAL
Qty: 90 TAB | Refills: 0 | Status: SHIPPED | OUTPATIENT
Start: 2019-06-10 | End: 2019-08-19 | Stop reason: SDUPTHER

## 2019-06-10 RX ORDER — CYCLOBENZAPRINE HCL 10 MG
TABLET ORAL
Qty: 90 TAB | Refills: 0 | OUTPATIENT
Start: 2019-06-10

## 2019-06-11 ENCOUNTER — TELEPHONE (OUTPATIENT)
Dept: MEDICAL GROUP | Facility: PHYSICIAN GROUP | Age: 54
End: 2019-06-11

## 2019-06-11 NOTE — TELEPHONE ENCOUNTER
Future Appointments       Provider Department Center    6/12/2019 1:50 PM Sindy Quinteros D.O. [facility]    8/1/2019 2:00 PM STEPH Galloway [facility] Sleep Medicine         ESTABLISHED PATIENT PRE-VISIT PLANNING     Patient was NOT contacted to complete PVP.     Note: Patient will not be contacted if there is no indication to call.     1.  Reviewed notes from the last few office visits within the medical group: Yes    2.  If any orders were placed at last visit or intended to be done for this visit (i.e. 6 mos follow-up), do we have Results/Consult Notes?        •  Labs - Labs ordered, NOT completed. Patient advised to complete prior to next appointment.   Note: If patient appointment is for lab review and patient did not complete labs, check with provider if OK to reschedule patient until labs completed.       •  Imaging - Imaging was not ordered at last office visit.       •  Referrals - No referrals were ordered at last office visit.    3. Is this appointment scheduled as a Hospital Follow-Up? No    4.  Immunizations were updated in [place] using WebIZ?: Yes       •  Web Iz Recommendations: HEPATITIS A , MMR , TD, VARICELLA (Chicken Pox)  and SHINGRIX (Shingles)    5.  Patient is due for the following Health Maintenance Topics:   Health Maintenance Due   Topic Date Due   • HEPATITIS C SCREENING  1965   • DIABETES MONOFILAMENT / LE EXAM  1965   • RETINAL SCREENING  02/04/1981   • COLON CANCER SCREENING ANNUAL FIT  02/04/2013   • IMM ZOSTER VACCINES (1 of 2) 02/04/2013   • URINE ACR / MICROALBUMIN  03/16/2016   • Annual Wellness Visit  02/28/2019       6. Orders for overdue Health Maintenance topics pended in Pre-Charting? YES    7.  AHA (MDX) form printed for Provider? No, already completed    8.  Patient was informed to arrive 15 min prior to their scheduled appointment and bring in their medication bottles.

## 2019-06-12 ENCOUNTER — OFFICE VISIT (OUTPATIENT)
Dept: MEDICAL GROUP | Facility: PHYSICIAN GROUP | Age: 54
End: 2019-06-12
Payer: MEDICARE

## 2019-06-12 DIAGNOSIS — F51.01 PRIMARY INSOMNIA: ICD-10-CM

## 2019-06-12 PROCEDURE — 99214 OFFICE O/P EST MOD 30 MIN: CPT | Performed by: FAMILY MEDICINE

## 2019-06-13 VITALS
DIASTOLIC BLOOD PRESSURE: 76 MMHG | HEART RATE: 92 BPM | WEIGHT: 256 LBS | TEMPERATURE: 98 F | OXYGEN SATURATION: 97 % | SYSTOLIC BLOOD PRESSURE: 128 MMHG | BODY MASS INDEX: 42.65 KG/M2 | HEIGHT: 65 IN

## 2019-06-13 RX ORDER — ZOLPIDEM TARTRATE 10 MG/1
10 TABLET ORAL
Qty: 30 EACH | Refills: 0 | Status: SHIPPED | OUTPATIENT
Start: 2019-06-19 | End: 2019-07-16 | Stop reason: SDUPTHER

## 2019-06-17 ENCOUNTER — TELEPHONE (OUTPATIENT)
Dept: MEDICAL GROUP | Facility: PHYSICIAN GROUP | Age: 54
End: 2019-06-17

## 2019-06-17 DIAGNOSIS — E06.3 HYPOTHYROIDISM DUE TO HASHIMOTO'S THYROIDITIS: ICD-10-CM

## 2019-06-17 DIAGNOSIS — E03.8 HYPOTHYROIDISM DUE TO HASHIMOTO'S THYROIDITIS: ICD-10-CM

## 2019-06-17 RX ORDER — LEVOTHYROXINE SODIUM 0.03 MG/1
25 TABLET ORAL
Qty: 90 TAB | Refills: 1 | Status: SHIPPED | OUTPATIENT
Start: 2019-06-17 | End: 2020-01-06 | Stop reason: SDUPTHER

## 2019-06-17 RX ORDER — AMLODIPINE BESYLATE 10 MG/1
10 TABLET ORAL DAILY
Qty: 30 TAB | Refills: 11 | Status: SHIPPED
Start: 2019-06-17 | End: 2020-01-06

## 2019-06-17 NOTE — TELEPHONE ENCOUNTER
----- Message from Sindy Quinteros D.O. sent at 6/17/2019 12:05 PM PDT -----  Regarding: FW: Prescription Question  Contact: [phone]  Could you please call patient and let her know that all 3 medications have already been sent to the pharmacy.  I do not know why I am receiving multiple Watcher Enterprisest messages about this, but it does not look like she is reading them.    ----- Message -----  From: Natalie Freire, Lalo Ass't  Sent: 6/17/2019  11:32 AM  To: Sindy Quinteros D.O.  Subject: FW: Prescription Question                            ----- Message -----  From: Carolynn Casey  Sent: 6/17/2019  10:50 AM  To: [facility]  Subject: Prescription Question                            Hi dr. Quinteros,    I just wanted to remind you to please send the ok to refill my Ambien over to [facility] on [address]. This is why I came to see you last week. I also need to get a one month refill on my amlodipine and levothyroxin.  Thank you. I only have two Ambien left, so if that could be sent soon it would be great.    Thanks again

## 2019-06-21 ENCOUNTER — PATIENT OUTREACH (OUTPATIENT)
Dept: HEALTH INFORMATION MANAGEMENT | Facility: OTHER | Age: 54
End: 2019-06-21

## 2019-06-25 NOTE — PROGRESS NOTES
Carolynn Casey was admitted on 5/15/19 for elective gastrectomy sleeve laparoscopic, once treated she was discharged home on 5/16/19. [facility] patient advocate assisted with multiple discharge needs including 3 appointments, 1 Primary Care Physician appointment, 2 Surgeon appointments with Dr. Gadiel Cox @ [facility]. Of the 3 appointments the patient kept 3. Patient is also scheduled for 2 future appointments with, Pulmonology on 8/1, and Surgeon on 8/6. [facility] Patient Advocate did not conduct a PPS Screening as the LACE + was at a 42.

## 2019-07-16 DIAGNOSIS — M51.36 DDD (DEGENERATIVE DISC DISEASE), LUMBAR: ICD-10-CM

## 2019-07-16 DIAGNOSIS — F51.01 PRIMARY INSOMNIA: ICD-10-CM

## 2019-07-17 RX ORDER — ZOLPIDEM TARTRATE 10 MG/1
10 TABLET ORAL
Qty: 30 EACH | Refills: 0 | Status: SHIPPED | OUTPATIENT
Start: 2019-07-17 | End: 2019-08-16

## 2019-07-17 RX ORDER — TIZANIDINE HYDROCHLORIDE 4 MG/1
4 CAPSULE, GELATIN COATED ORAL NIGHTLY PRN
Qty: 90 CAP | Refills: 1 | Status: SHIPPED | OUTPATIENT
Start: 2019-07-17 | End: 2020-01-06 | Stop reason: SDUPTHER

## 2019-07-17 NOTE — TELEPHONE ENCOUNTER
From: Carolynn Casey  Sent: 7/16/2019 8:14 PM PDT  Subject: Medication Renewal Request    Carolynn Casey would like a refill of the following medications:     zolpidem (AMBIEN) 10 MG Tab [Sindy Quinteros D.O.]    Preferred pharmacy: [facility] [address] [place] [address], [place] - [address] [place] [address]        Medication renewals requested in this message routed separately:      cyclobenzaprine (FLEXERIL) 10 MG Tab [Lisa Amador A.P.R.N.]

## 2019-07-26 ENCOUNTER — HOSPITAL ENCOUNTER (OUTPATIENT)
Dept: LAB | Facility: MEDICAL CENTER | Age: 54
End: 2019-07-26
Attending: PHYSICIAN ASSISTANT
Payer: MEDICARE

## 2019-07-26 LAB
25(OH)D3 SERPL-MCNC: 23 NG/ML (ref 30–100)
ALBUMIN SERPL BCP-MCNC: 4.4 G/DL (ref 3.2–4.9)
ALBUMIN/GLOB SERPL: 1.3 G/DL
ALP SERPL-CCNC: 91 U/L (ref 30–99)
ALT SERPL-CCNC: 29 U/L (ref 2–50)
ANION GAP SERPL CALC-SCNC: 10 MMOL/L (ref 0–11.9)
AST SERPL-CCNC: 21 U/L (ref 12–45)
BASOPHILS # BLD AUTO: 0.6 % (ref 0–1.8)
BASOPHILS # BLD: 0.05 K/UL (ref 0–0.12)
BILIRUB SERPL-MCNC: 1.2 MG/DL (ref 0.1–1.5)
BUN SERPL-MCNC: 16 MG/DL (ref 8–22)
CALCIUM SERPL-MCNC: 10 MG/DL (ref 8.5–10.5)
CHLORIDE SERPL-SCNC: 102 MMOL/L (ref 96–112)
CHOLEST SERPL-MCNC: 184 MG/DL (ref 100–199)
CO2 SERPL-SCNC: 26 MMOL/L (ref 20–33)
CREAT SERPL-MCNC: 0.71 MG/DL (ref 0.5–1.4)
EOSINOPHIL # BLD AUTO: 0.32 K/UL (ref 0–0.51)
EOSINOPHIL NFR BLD: 3.8 % (ref 0–6.9)
ERYTHROCYTE [DISTWIDTH] IN BLOOD BY AUTOMATED COUNT: 43.9 FL (ref 35.9–50)
FOLATE SERPL-MCNC: 6.7 NG/ML
GLOBULIN SER CALC-MCNC: 3.4 G/DL (ref 1.9–3.5)
GLUCOSE SERPL-MCNC: 98 MG/DL (ref 65–99)
HCT VFR BLD AUTO: 47 % (ref 37–47)
HDLC SERPL-MCNC: 40 MG/DL
HGB BLD-MCNC: 14.9 G/DL (ref 12–16)
IMM GRANULOCYTES # BLD AUTO: 0.02 K/UL (ref 0–0.11)
IMM GRANULOCYTES NFR BLD AUTO: 0.2 % (ref 0–0.9)
IRON SATN MFR SERPL: 20 % (ref 15–55)
IRON SERPL-MCNC: 79 UG/DL (ref 40–170)
LDLC SERPL CALC-MCNC: 106 MG/DL
LYMPHOCYTES # BLD AUTO: 2.32 K/UL (ref 1–4.8)
LYMPHOCYTES NFR BLD: 27.5 % (ref 22–41)
MCH RBC QN AUTO: 28.5 PG (ref 27–33)
MCHC RBC AUTO-ENTMCNC: 31.7 G/DL (ref 33.6–35)
MCV RBC AUTO: 89.9 FL (ref 81.4–97.8)
MONOCYTES # BLD AUTO: 0.56 K/UL (ref 0–0.85)
MONOCYTES NFR BLD AUTO: 6.6 % (ref 0–13.4)
NEUTROPHILS # BLD AUTO: 5.16 K/UL (ref 2–7.15)
NEUTROPHILS NFR BLD: 61.3 % (ref 44–72)
NRBC # BLD AUTO: 0 K/UL
NRBC BLD-RTO: 0 /100 WBC
PLATELET # BLD AUTO: 295 K/UL (ref 164–446)
PMV BLD AUTO: 8.9 FL (ref 9–12.9)
POTASSIUM SERPL-SCNC: 3.4 MMOL/L (ref 3.6–5.5)
PREALB SERPL-MCNC: 23 MG/DL (ref 18–38)
PROT SERPL-MCNC: 7.8 G/DL (ref 6–8.2)
RBC # BLD AUTO: 5.23 M/UL (ref 4.2–5.4)
SODIUM SERPL-SCNC: 138 MMOL/L (ref 135–145)
TIBC SERPL-MCNC: 400 UG/DL (ref 250–450)
TRANSFERRIN SERPL-MCNC: 286 MG/DL (ref 200–370)
TRIGL SERPL-MCNC: 191 MG/DL (ref 0–149)
VIT B12 SERPL-MCNC: 489 PG/ML (ref 211–911)
WBC # BLD AUTO: 8.4 K/UL (ref 4.8–10.8)

## 2019-07-26 PROCEDURE — 84252 ASSAY OF VITAMIN B-2: CPT

## 2019-07-26 PROCEDURE — 80053 COMPREHEN METABOLIC PANEL: CPT

## 2019-07-26 PROCEDURE — 82746 ASSAY OF FOLIC ACID SERUM: CPT

## 2019-07-26 PROCEDURE — 84425 ASSAY OF VITAMIN B-1: CPT

## 2019-07-26 PROCEDURE — 84134 ASSAY OF PREALBUMIN: CPT

## 2019-07-26 PROCEDURE — 84466 ASSAY OF TRANSFERRIN: CPT

## 2019-07-26 PROCEDURE — 85025 COMPLETE CBC W/AUTO DIFF WBC: CPT

## 2019-07-26 PROCEDURE — 84207 ASSAY OF VITAMIN B-6: CPT

## 2019-07-26 PROCEDURE — 36415 COLL VENOUS BLD VENIPUNCTURE: CPT

## 2019-07-26 PROCEDURE — 83540 ASSAY OF IRON: CPT

## 2019-07-26 PROCEDURE — 83550 IRON BINDING TEST: CPT

## 2019-07-26 PROCEDURE — 82607 VITAMIN B-12: CPT

## 2019-07-26 PROCEDURE — 82306 VITAMIN D 25 HYDROXY: CPT

## 2019-07-26 PROCEDURE — 80061 LIPID PANEL: CPT

## 2019-07-29 ENCOUNTER — PATIENT MESSAGE (OUTPATIENT)
Dept: MEDICAL GROUP | Facility: PHYSICIAN GROUP | Age: 54
End: 2019-07-29

## 2019-07-29 DIAGNOSIS — E03.8 HYPOTHYROIDISM DUE TO HASHIMOTO'S THYROIDITIS: ICD-10-CM

## 2019-07-29 DIAGNOSIS — E06.3 HYPOTHYROIDISM DUE TO HASHIMOTO'S THYROIDITIS: ICD-10-CM

## 2019-07-29 DIAGNOSIS — E11.9 TYPE 2 DIABETES MELLITUS WITHOUT COMPLICATION, WITHOUT LONG-TERM CURRENT USE OF INSULIN (HCC): ICD-10-CM

## 2019-07-30 LAB
VIT B1 BLD-MCNC: 119 NMOL/L (ref 70–180)
VIT B6 SERPL-MCNC: 34.3 NMOL/L (ref 20–125)

## 2019-07-31 ENCOUNTER — HOSPITAL ENCOUNTER (OUTPATIENT)
Dept: LAB | Facility: MEDICAL CENTER | Age: 54
End: 2019-07-31
Attending: NURSE PRACTITIONER
Payer: MEDICARE

## 2019-07-31 DIAGNOSIS — E03.8 HYPOTHYROIDISM DUE TO HASHIMOTO'S THYROIDITIS: ICD-10-CM

## 2019-07-31 DIAGNOSIS — E06.3 HYPOTHYROIDISM DUE TO HASHIMOTO'S THYROIDITIS: ICD-10-CM

## 2019-07-31 DIAGNOSIS — E11.9 TYPE 2 DIABETES MELLITUS WITHOUT COMPLICATION, WITHOUT LONG-TERM CURRENT USE OF INSULIN (HCC): ICD-10-CM

## 2019-07-31 LAB
EST. AVERAGE GLUCOSE BLD GHB EST-MCNC: 128 MG/DL
HBA1C MFR BLD: 6.1 % (ref 0–5.6)
T4 FREE SERPL-MCNC: 0.86 NG/DL (ref 0.53–1.43)
TSH SERPL DL<=0.005 MIU/L-ACNC: 3.28 UIU/ML (ref 0.38–5.33)

## 2019-07-31 PROCEDURE — 84439 ASSAY OF FREE THYROXINE: CPT

## 2019-07-31 PROCEDURE — 83036 HEMOGLOBIN GLYCOSYLATED A1C: CPT

## 2019-07-31 PROCEDURE — 84443 ASSAY THYROID STIM HORMONE: CPT

## 2019-07-31 PROCEDURE — 36415 COLL VENOUS BLD VENIPUNCTURE: CPT

## 2019-08-01 LAB — VIT B2 SERPL-SCNC: 7 NMOL/L (ref 5–50)

## 2019-08-14 DIAGNOSIS — F51.01 PRIMARY INSOMNIA: ICD-10-CM

## 2019-08-14 DIAGNOSIS — M46.1 SACROILIITIS (HCC): ICD-10-CM

## 2019-08-14 DIAGNOSIS — M15.9 PRIMARY OSTEOARTHRITIS INVOLVING MULTIPLE JOINTS: ICD-10-CM

## 2019-08-15 RX ORDER — CYCLOBENZAPRINE HCL 10 MG
TABLET ORAL
Qty: 90 TAB | Refills: 0 | Status: CANCELLED | OUTPATIENT
Start: 2019-08-15

## 2019-08-15 RX ORDER — ZOLPIDEM TARTRATE 10 MG/1
10 TABLET ORAL
Qty: 30 EACH | Refills: 0 | Status: CANCELLED | OUTPATIENT
Start: 2019-08-15 | End: 2019-09-14

## 2019-08-16 ENCOUNTER — TELEPHONE (OUTPATIENT)
Dept: MEDICAL GROUP | Facility: PHYSICIAN GROUP | Age: 54
End: 2019-08-16

## 2019-08-16 NOTE — TELEPHONE ENCOUNTER
Future Appointments       Provider Department Center    8/19/2019 1:25 PM Lisa Gilbert P.A.-C. [facility]        ESTABLISHED PATIENT PRE-VISIT PLANNING     Patient was NOT contacted to complete PVP.     Note: Patient will not be contacted if there is no indication to call.     1.  Reviewed notes from the last few office visits within the medical group: Yes    2.  If any orders were placed at last visit or intended to be done for this visit (i.e. 6 mos follow-up), do we have Results/Consult Notes?        •  Labs - Labs ordered, completed on 07/26-31/2019 and results are in chart.   Note: If patient appointment is for lab review and patient did not complete labs, check with provider if OK to reschedule patient until labs completed.       •  Imaging - Imaging was not ordered at last office visit.       •  Referrals - No referrals were ordered at last office visit.    3. Is this appointment scheduled as a Hospital Follow-Up? No    4.  Immunizations were updated in [place] using WebIZ?: Yes       •  Web Iz Recommendations: FLU, HEPATITIS A , MMR , TD, VARICELLA (Chicken Pox)  and SHINGRIX (Shingles)    5.  Patient is due for the following Health Maintenance Topics:   Health Maintenance Due   Topic Date Due   • HEPATITIS C SCREENING  1965   • DIABETES MONOFILAMENT / LE EXAM  1965   • RETINAL SCREENING  02/04/1981   • COLON CANCER SCREENING ANNUAL FIT  02/04/2013   • IMM ZOSTER VACCINES (1 of 2) 02/04/2013   • URINE ACR / MICROALBUMIN  03/16/2016   • Annual Wellness Visit  02/28/2019       6. Orders for overdue Health Maintenance topics pended in Pre-Charting? YES    7.  AHA (MDX) form printed for Provider? No, already completed    8.  Patient was informed to arrive 15 min prior to their scheduled appointment and bring in their medication bottles.

## 2019-08-19 ENCOUNTER — OFFICE VISIT (OUTPATIENT)
Dept: MEDICAL GROUP | Facility: PHYSICIAN GROUP | Age: 54
End: 2019-08-19
Payer: MEDICARE

## 2019-08-19 VITALS
OXYGEN SATURATION: 94 % | WEIGHT: 244 LBS | TEMPERATURE: 97.3 F | DIASTOLIC BLOOD PRESSURE: 90 MMHG | HEIGHT: 65 IN | BODY MASS INDEX: 40.65 KG/M2 | HEART RATE: 110 BPM | SYSTOLIC BLOOD PRESSURE: 152 MMHG

## 2019-08-19 DIAGNOSIS — F51.01 PRIMARY INSOMNIA: ICD-10-CM

## 2019-08-19 DIAGNOSIS — Z12.11 SCREENING FOR COLON CANCER: ICD-10-CM

## 2019-08-19 DIAGNOSIS — E11.9 TYPE 2 DIABETES MELLITUS WITHOUT COMPLICATION, WITHOUT LONG-TERM CURRENT USE OF INSULIN (HCC): ICD-10-CM

## 2019-08-19 DIAGNOSIS — M15.9 PRIMARY OSTEOARTHRITIS INVOLVING MULTIPLE JOINTS: ICD-10-CM

## 2019-08-19 DIAGNOSIS — M46.1 SACROILIITIS (HCC): ICD-10-CM

## 2019-08-19 PROCEDURE — 99214 OFFICE O/P EST MOD 30 MIN: CPT | Performed by: PHYSICIAN ASSISTANT

## 2019-08-19 RX ORDER — ZOLPIDEM TARTRATE 10 MG/1
10 TABLET ORAL
Qty: 30 EACH | Refills: 0 | Status: CANCELLED | OUTPATIENT
Start: 2019-08-19 | End: 2019-09-18

## 2019-08-19 RX ORDER — ZOLPIDEM TARTRATE 10 MG/1
10 TABLET ORAL NIGHTLY PRN
Qty: 30 TAB | Refills: 2 | Status: SHIPPED | OUTPATIENT
Start: 2019-08-19 | End: 2019-09-18

## 2019-08-19 RX ORDER — CYCLOBENZAPRINE HCL 10 MG
10 TABLET ORAL 2 TIMES DAILY PRN
Qty: 180 TAB | Refills: 1 | Status: SHIPPED | OUTPATIENT
Start: 2019-08-19 | End: 2020-01-06 | Stop reason: SDUPTHER

## 2019-08-19 RX ORDER — TRAZODONE HYDROCHLORIDE 50 MG/1
50 TABLET ORAL NIGHTLY PRN
Qty: 30 TAB | Refills: 3 | Status: SHIPPED
Start: 2019-08-19 | End: 2020-01-06

## 2019-08-19 NOTE — PROGRESS NOTES
Subjective:   Carolynn Casey is a 54 y.o. female here today for follow-up on insomnia and other chronic conditions. Is an established patient of mine.    HPI:    Patient presents to the office today for routine follow-up.  I last saw her in May.  She states that she has been completely out of her Ambien which she takes nightly to help her sleep.  She is open to trying other options.  She previously tried amitriptyline which gave her hallucinations so she stopped it.  She does suspect that she has sleep apnea and is in the process of establishing with the sleep medicine clinic.  She had to cancel her last appointment but is planning on rescheduling.    Is also needing refills of cyclobenzaprine which she takes for back pain.  Typically takes twice daily during the day and at nighttime only she takes tizanidine.    She states that she has lost a total of 39 pounds since her weight loss surgery.  She follows closely with Dr. Cox's office.  She is very pleased that her A1c has come down.  On most recent check it was 6.1, down from 7.4 prior to her surgery.  She is not currently on any diabetic medication.  She has been following a low sugar diet and has not drink soda in about 6 months.    Blood pressure is noted to be elevated today in the office at 152/90.  She states that this is the highest it has been since her surgery.  She has it checked regularly at her follow-ups with Dr. Cox and states it has been running normal, most recently 122/80 1 week ago at her appointment.  Her blood pressure is currently treated with amlodipine 10 mg daily.  She was on losartan prior to her surgery, but has not been taking due to the recall.      Current medicines (including changes today)  Current Outpatient Medications   Medication Sig Dispense Refill   • cyclobenzaprine (FLEXERIL) 10 MG Tab Take 1 Tab by mouth 2 times a day as needed. 180 Tab 1   • traZODone (DESYREL) 50 MG Tab Take 1 Tab by mouth at bedtime as needed for  "Sleep. 30 Tab 3   • zolpidem (AMBIEN) 10 MG Tab Take 1 Tab by mouth at bedtime as needed for Sleep for up to 30 days. 30 Tab 2   • tizanidine (ZANAFLEX) 4 MG capsule Take 1 Cap by mouth at bedtime as needed. 90 Cap 1   • amLODIPine (NORVASC) 10 MG Tab Take 1 Tab by mouth every day. 30 Tab 11   • levothyroxine (SYNTHROID) 25 MCG Tab Take 1 Tab by mouth Every morning on an empty stomach. OK TO CRUSH. 90 Tab 1   • omeprazole (PRILOSEC) 20 MG delayed-release capsule Take 20 mg by mouth every day.     • Pseudoephedrine HCl (SUDAFED PO) Take 10 mg by mouth as needed (For allergies).     • acetaminophen (TYLENOL) 500 MG Tab Take 2,000 mg by mouth 2 Times a Day.     • losartan (COZAAR) 100 MG Tab Take 100 mg by mouth every day.       No current facility-administered medications for this visit.      She  has a past medical history of Allergy, Anesthesia, Anxiety, Arthritis, ASTHMA, Bronchitis (2014), Constipation, Diabetes ([facility]), DVT (deep venous thrombosis) ([facility]), Elevated glucose, GERD (gastroesophageal reflux disease), Heart burn, Heart murmur, Hiatus hernia syndrome, Hypertension, Migraine, Pain (01-29-16), Pain, Primary osteoarthritis involving multiple joints, Pulmonary emboli ([facility]) (2017), Snoring, Unspecified disorder of thyroid, Unspecified urinary incontinence, and Upper GI bleed.    ROS  No chest pain  No shortness of breath  +occasional constipation. Denies other GI issues.  +insomnia - chronic  +back pain - chronic        Objective:     /90 (BP Location: Right arm, Patient Position: Sitting, BP Cuff Size: Large adult)   Pulse (!) 110   Temp 36.3 °C (97.3 °F)   Ht 1.651 m (5' 5\")   Wt 110.7 kg (244 lb)   SpO2 94%  Body mass index is 40.6 kg/m².     Physical Exam:  Constitutional: Alert, no distress.  Skin: Warm, dry, good turgor, no rashes in visible areas.  Eye: Pupils are equal and round, conjunctiva clear, lids normal.  ENMT: Lips without lesions, moist mucus membranes.  Neck: No masses. No " submandibular or cervical lymphadenopathy.  Respiratory: Unlabored respiratory effort, lungs clear to auscultation, no wheezes, no rhonchi.  Cardiovascular: Normal S1, S2, no murmur, no lower extremity edema.      Assessment and Plan:   The following treatment plan was discussed    1. Primary insomnia  Chronic issue, well-controlled on Ambien but we discussed that an alternative would be preferred. She is very open to this. I have prescribed trazodone. Advised to trial this for at least 1 week. If not effective, can fill the Ambien. Also recommend follow-up with Sleep Medicine as she is already planning.  - traZODone (DESYREL) 50 MG Tab; Take 1 Tab by mouth at bedtime as needed for Sleep.  Dispense: 30 Tab; Refill: 3  - zolpidem (AMBIEN) 10 MG Tab; Take 1 Tab by mouth at bedtime as needed for Sleep for up to 30 days.  Dispense: 30 Tab; Refill: 2    2. Type 2 diabetes mellitus without complication, without long-term current use of insulin (HCC)  Chronic issue, well-controlled with dietary improvement/weight loss. Recent A1c is 6.1. Recommend continued monitoring every 6 months.  - MICROALBUMIN CREAT RATIO URINE; Future  - HEMOGLOBIN A1C; Future    3. Primary osteoarthritis involving multiple joints  4. Sacroiliitis (HCC)  Chronic issues, well-controlled with muscle relaxant therapy. Questioned regarding need for 2 muscle relaxants. She is substituting bedtime dose of Flexeril with tizanidine. She knows not to take both at onc.e  - cyclobenzaprine (FLEXERIL) 10 MG Tab; Take 1 Tab by mouth 2 times a day as needed.  Dispense: 180 Tab; Refill: 1    5. Screening for colon cancer   - OCCULT BLOOD FECES IMMUNOASSAY; Future      Followup: Return in about 6 months (around 2/19/2020).    Lisa Gilbert P.A.-C.

## 2019-09-06 NOTE — TELEPHONE ENCOUNTER
Was the patient seen in the last year in this department? Yes     Does patient have an active prescription for medications requested? No     Received Request Via: Pharmacy      Pt met protocol?: Yes    OV 11/17      Patient is not pregnant (male or female)

## 2019-10-18 ENCOUNTER — HOSPITAL ENCOUNTER (OUTPATIENT)
Dept: RADIOLOGY | Facility: MEDICAL CENTER | Age: 54
End: 2019-10-18
Attending: SURGERY
Payer: MEDICARE

## 2019-10-18 ENCOUNTER — TELEPHONE (OUTPATIENT)
Dept: RADIOLOGY | Facility: MEDICAL CENTER | Age: 54
End: 2019-10-18

## 2019-10-18 DIAGNOSIS — N60.02 CYST, BREAST, LEFT: ICD-10-CM

## 2019-10-18 PROCEDURE — 76642 ULTRASOUND BREAST LIMITED: CPT | Mod: LT

## 2019-10-18 PROCEDURE — G0279 TOMOSYNTHESIS, MAMMO: HCPCS

## 2019-10-18 NOTE — TELEPHONE ENCOUNTER
PER MUKUL: PT REQUESTED A BILAT DIAG W/US TO RE-EVALUATE SAME AREA, [facility] LAST EXAM NORMAL BILAT DIAG/TML

## 2019-10-31 ENCOUNTER — HOSPITAL ENCOUNTER (OUTPATIENT)
Dept: LAB | Facility: MEDICAL CENTER | Age: 54
End: 2019-10-31
Attending: NURSE PRACTITIONER
Payer: MEDICARE

## 2019-10-31 LAB
25(OH)D3 SERPL-MCNC: 18 NG/ML (ref 30–100)
ALBUMIN SERPL BCP-MCNC: 4 G/DL (ref 3.2–4.9)
ALBUMIN/GLOB SERPL: 1.4 G/DL
ALP SERPL-CCNC: 109 U/L (ref 30–99)
ALT SERPL-CCNC: 56 U/L (ref 2–50)
ANION GAP SERPL CALC-SCNC: 9 MMOL/L (ref 0–11.9)
AST SERPL-CCNC: 32 U/L (ref 12–45)
BASOPHILS # BLD AUTO: 0.5 % (ref 0–1.8)
BASOPHILS # BLD: 0.04 K/UL (ref 0–0.12)
BILIRUB SERPL-MCNC: 0.9 MG/DL (ref 0.1–1.5)
BUN SERPL-MCNC: 18 MG/DL (ref 8–22)
CALCIUM SERPL-MCNC: 9.2 MG/DL (ref 8.5–10.5)
CHLORIDE SERPL-SCNC: 103 MMOL/L (ref 96–112)
CHOLEST SERPL-MCNC: 149 MG/DL (ref 100–199)
CO2 SERPL-SCNC: 28 MMOL/L (ref 20–33)
CREAT SERPL-MCNC: 0.57 MG/DL (ref 0.5–1.4)
EOSINOPHIL # BLD AUTO: 0.25 K/UL (ref 0–0.51)
EOSINOPHIL NFR BLD: 3.2 % (ref 0–6.9)
ERYTHROCYTE [DISTWIDTH] IN BLOOD BY AUTOMATED COUNT: 44.1 FL (ref 35.9–50)
EST. AVERAGE GLUCOSE BLD GHB EST-MCNC: 126 MG/DL
FOLATE SERPL-MCNC: 6.6 NG/ML
GLOBULIN SER CALC-MCNC: 2.9 G/DL (ref 1.9–3.5)
GLUCOSE SERPL-MCNC: 79 MG/DL (ref 65–99)
HBA1C MFR BLD: 6 % (ref 0–5.6)
HCT VFR BLD AUTO: 48.4 % (ref 37–47)
HDLC SERPL-MCNC: 36 MG/DL
HGB BLD-MCNC: 15.3 G/DL (ref 12–16)
IMM GRANULOCYTES # BLD AUTO: 0.03 K/UL (ref 0–0.11)
IMM GRANULOCYTES NFR BLD AUTO: 0.4 % (ref 0–0.9)
IRON SATN MFR SERPL: 19 % (ref 15–55)
IRON SERPL-MCNC: 78 UG/DL (ref 40–170)
LDLC SERPL CALC-MCNC: 81 MG/DL
LYMPHOCYTES # BLD AUTO: 2.55 K/UL (ref 1–4.8)
LYMPHOCYTES NFR BLD: 32.7 % (ref 22–41)
MCH RBC QN AUTO: 28.9 PG (ref 27–33)
MCHC RBC AUTO-ENTMCNC: 31.6 G/DL (ref 33.6–35)
MCV RBC AUTO: 91.5 FL (ref 81.4–97.8)
MONOCYTES # BLD AUTO: 0.53 K/UL (ref 0–0.85)
MONOCYTES NFR BLD AUTO: 6.8 % (ref 0–13.4)
NEUTROPHILS # BLD AUTO: 4.4 K/UL (ref 2–7.15)
NEUTROPHILS NFR BLD: 56.4 % (ref 44–72)
NRBC # BLD AUTO: 0 K/UL
NRBC BLD-RTO: 0 /100 WBC
PLATELET # BLD AUTO: 345 K/UL (ref 164–446)
PMV BLD AUTO: 8.8 FL (ref 9–12.9)
POTASSIUM SERPL-SCNC: 3.7 MMOL/L (ref 3.6–5.5)
PREALB SERPL-MCNC: 23 MG/DL (ref 18–38)
PROT SERPL-MCNC: 6.9 G/DL (ref 6–8.2)
RBC # BLD AUTO: 5.29 M/UL (ref 4.2–5.4)
SODIUM SERPL-SCNC: 140 MMOL/L (ref 135–145)
TIBC SERPL-MCNC: 410 UG/DL (ref 250–450)
TRANSFERRIN SERPL-MCNC: 284 MG/DL (ref 200–370)
TRIGL SERPL-MCNC: 159 MG/DL (ref 0–149)
VIT B12 SERPL-MCNC: 388 PG/ML (ref 211–911)
WBC # BLD AUTO: 7.8 K/UL (ref 4.8–10.8)

## 2019-10-31 PROCEDURE — 84466 ASSAY OF TRANSFERRIN: CPT

## 2019-10-31 PROCEDURE — 36415 COLL VENOUS BLD VENIPUNCTURE: CPT

## 2019-10-31 PROCEDURE — 82306 VITAMIN D 25 HYDROXY: CPT

## 2019-10-31 PROCEDURE — 84425 ASSAY OF VITAMIN B-1: CPT

## 2019-10-31 PROCEDURE — 82607 VITAMIN B-12: CPT

## 2019-10-31 PROCEDURE — 84134 ASSAY OF PREALBUMIN: CPT

## 2019-10-31 PROCEDURE — 85025 COMPLETE CBC W/AUTO DIFF WBC: CPT

## 2019-10-31 PROCEDURE — 83550 IRON BINDING TEST: CPT

## 2019-10-31 PROCEDURE — 83540 ASSAY OF IRON: CPT

## 2019-10-31 PROCEDURE — 84207 ASSAY OF VITAMIN B-6: CPT

## 2019-10-31 PROCEDURE — 84252 ASSAY OF VITAMIN B-2: CPT

## 2019-10-31 PROCEDURE — 80061 LIPID PANEL: CPT

## 2019-10-31 PROCEDURE — 80053 COMPREHEN METABOLIC PANEL: CPT

## 2019-10-31 PROCEDURE — 82746 ASSAY OF FOLIC ACID SERUM: CPT

## 2019-10-31 PROCEDURE — 83036 HEMOGLOBIN GLYCOSYLATED A1C: CPT

## 2019-11-04 LAB
VIT B1 BLD-MCNC: 94 NMOL/L (ref 70–180)
VIT B2 SERPL-SCNC: 6 NMOL/L (ref 5–50)
VIT B6 SERPL-MCNC: 31 NMOL/L (ref 20–125)

## 2020-01-06 ENCOUNTER — OFFICE VISIT (OUTPATIENT)
Dept: MEDICAL GROUP | Facility: PHYSICIAN GROUP | Age: 55
End: 2020-01-06
Payer: MEDICARE

## 2020-01-06 VITALS
TEMPERATURE: 97 F | HEIGHT: 65 IN | OXYGEN SATURATION: 97 % | RESPIRATION RATE: 18 BRPM | DIASTOLIC BLOOD PRESSURE: 80 MMHG | BODY MASS INDEX: 40.65 KG/M2 | HEART RATE: 78 BPM | SYSTOLIC BLOOD PRESSURE: 122 MMHG | WEIGHT: 244 LBS

## 2020-01-06 DIAGNOSIS — I27.20 PULMONARY HTN (HCC): ICD-10-CM

## 2020-01-06 DIAGNOSIS — E11.9 TYPE 2 DIABETES MELLITUS WITHOUT COMPLICATION, WITHOUT LONG-TERM CURRENT USE OF INSULIN (HCC): ICD-10-CM

## 2020-01-06 DIAGNOSIS — M51.36 DDD (DEGENERATIVE DISC DISEASE), LUMBAR: ICD-10-CM

## 2020-01-06 DIAGNOSIS — E06.3 HYPOTHYROIDISM DUE TO HASHIMOTO'S THYROIDITIS: ICD-10-CM

## 2020-01-06 DIAGNOSIS — I10 ESSENTIAL HYPERTENSION: ICD-10-CM

## 2020-01-06 DIAGNOSIS — E66.01 MORBID OBESITY WITH BMI OF 40.0-44.9, ADULT (HCC): ICD-10-CM

## 2020-01-06 DIAGNOSIS — M15.9 PRIMARY OSTEOARTHRITIS INVOLVING MULTIPLE JOINTS: ICD-10-CM

## 2020-01-06 DIAGNOSIS — F13.20 HYPNOTIC DEPENDENCE (HCC): ICD-10-CM

## 2020-01-06 DIAGNOSIS — R73.03 PREDIABETES: ICD-10-CM

## 2020-01-06 DIAGNOSIS — F51.01 PRIMARY INSOMNIA: ICD-10-CM

## 2020-01-06 DIAGNOSIS — F33.42 RECURRENT MAJOR DEPRESSIVE DISORDER, IN FULL REMISSION (HCC): ICD-10-CM

## 2020-01-06 DIAGNOSIS — Z98.84 S/P BARIATRIC SURGERY: ICD-10-CM

## 2020-01-06 DIAGNOSIS — E03.8 HYPOTHYROIDISM DUE TO HASHIMOTO'S THYROIDITIS: ICD-10-CM

## 2020-01-06 PROBLEM — M46.1 SACROILIITIS (HCC): Status: RESOLVED | Noted: 2017-08-02 | Resolved: 2020-01-06

## 2020-01-06 PROBLEM — E87.6 HYPOKALEMIA: Status: RESOLVED | Noted: 2018-02-05 | Resolved: 2020-01-06

## 2020-01-06 PROCEDURE — 99214 OFFICE O/P EST MOD 30 MIN: CPT | Performed by: FAMILY MEDICINE

## 2020-01-06 RX ORDER — LEVOTHYROXINE SODIUM 0.03 MG/1
25 TABLET ORAL
Qty: 90 TAB | Refills: 1 | Status: SHIPPED | OUTPATIENT
Start: 2020-01-06 | End: 2020-09-30

## 2020-01-06 RX ORDER — ZOLPIDEM TARTRATE 10 MG/1
TABLET ORAL
Qty: 16 TAB | Refills: 2 | Status: SHIPPED | OUTPATIENT
Start: 2020-01-06 | End: 2020-01-27 | Stop reason: SDUPTHER

## 2020-01-06 RX ORDER — TIZANIDINE HYDROCHLORIDE 4 MG/1
4 CAPSULE, GELATIN COATED ORAL NIGHTLY PRN
Qty: 90 CAP | Refills: 1 | Status: SHIPPED | OUTPATIENT
Start: 2020-01-06 | End: 2020-08-10 | Stop reason: SDUPTHER

## 2020-01-06 RX ORDER — ZOLPIDEM TARTRATE 10 MG/1
TABLET ORAL
COMMUNITY
End: 2020-01-06 | Stop reason: SDUPTHER

## 2020-01-06 RX ORDER — CYCLOBENZAPRINE HCL 10 MG
10 TABLET ORAL 2 TIMES DAILY PRN
Qty: 180 TAB | Refills: 1 | Status: SHIPPED | OUTPATIENT
Start: 2020-01-06 | End: 2020-08-10 | Stop reason: SDUPTHER

## 2020-01-06 RX ORDER — LOSARTAN POTASSIUM 100 MG/1
100 TABLET ORAL DAILY
Qty: 90 TAB | Refills: 3 | Status: SHIPPED
Start: 2020-01-06 | End: 2020-04-30

## 2020-01-06 RX ORDER — TRAZODONE HYDROCHLORIDE 50 MG/1
50 TABLET ORAL NIGHTLY PRN
Qty: 30 TAB | Refills: 3 | Status: SHIPPED
Start: 2020-01-06 | End: 2020-04-30

## 2020-01-06 NOTE — PROGRESS NOTES
"cc:       Subjective:     Carolynn Casey is a 54 y.o. female presenting for the following:     Patient with bariatric surgery May 2019. Has been loosing weight and has started exercising.     HA1C has been improving. Now at 6.0. Patient denies any polyuria/polydipsia, rashes, recurrent infections, changes in vision, changes in sensation.      Review of systems:  All others reviewed and are negative.       Current Outpatient Medications:   •  losartan (COZAAR) 100 MG Tab, Take 1 Tab by mouth every day., Disp: 90 Tab, Rfl: 3  •  cyclobenzaprine (FLEXERIL) 10 MG Tab, Take 1 Tab by mouth 2 times a day as needed for Mild Pain or Muscle Spasms., Disp: 180 Tab, Rfl: 1  •  traZODone (DESYREL) 50 MG Tab, Take 1 Tab by mouth at bedtime as needed for Sleep., Disp: 30 Tab, Rfl: 3  •  tizanidine (ZANAFLEX) 4 MG capsule, Take 1 Cap by mouth at bedtime as needed., Disp: 90 Cap, Rfl: 1  •  zolpidem (AMBIEN) 10 MG Tab, Take one tablet nightly for up to 4 nights per week as needed for sleep., Disp: 16 Tab, Rfl: 2  •  levothyroxine (SYNTHROID) 25 MCG Tab, Take 1 Tab by mouth Every morning on an empty stomach. OK TO CRUSH., Disp: 90 Tab, Rfl: 1  •  Pseudoephedrine HCl (SUDAFED PO), Take 10 mg by mouth as needed (For allergies)., Disp: , Rfl:   •  acetaminophen (TYLENOL) 500 MG Tab, Take 2,000 mg by mouth 2 Times a Day., Disp: , Rfl:     Allergies, past medical history, past surgical history, family history, social history reviewed and updated    Objective:     Vitals: /80 (BP Location: Right arm, Patient Position: Sitting, BP Cuff Size: Large adult)   Pulse 78   Temp 36.1 °C (97 °F) (Temporal)   Resp 18   Ht 1.651 m (5' 5\")   Wt 110.7 kg (244 lb)   SpO2 97%   BMI 40.60 kg/m²   General: Alert, pleasant, NAD  HEENT: Normocephalic.   EOMI, no icterus or pallor.    Heart: Regular rate   Respiratory: Normal respiratory effort.    Psych:  Affect is normal, judgement is good, grooming is appropriate.    Assessment/Plan: "     Carolynn was seen today for medication refill.    Diagnoses and all orders for this visit:    Type 2 diabetes mellitus without complication, without long-term current use of insulin (HCC)/Prediabetes: Patient previously with a hemoglobin A1c in the diabetic range.  However, now status post gastric sleeve and has been losing weight.  Last 2 hemoglobin A1c in prediabetic range.  Will change diagnosis to prediabetes.    Primary osteoarthritis involving multiple joints: Chronic problem for this patient.  Patient does take 1-2 tablets of Flexeril daily and this is been stable for more than a year.  -     cyclobenzaprine (FLEXERIL) 10 MG Tab; Take 1 Tab by mouth 2 times a day as needed for Mild Pain or Muscle Spasms.    Hypnotic dependence (HCC)/Primary insomnia: Patient did have side effect with amitriptyline in the past.  She does usually take Ambien every night but is open to weaning from this.  Will trial trazodone 3 nights a week and Ambien 4 nights a week.  Patient to wean more if tolerated.   No side effect with Ambien, denies oversedation, confusion, sleepwalking.  -     zolpidem (AMBIEN) 10 MG Tab; Take one tablet nightly for up to 4 nights per week as needed for sleep.  -     traZODone (DESYREL) 50 MG Tab; Take 1 Tab by mouth at bedtime as needed for Sleep.    DDD (degenerative disc disease), lumbar: Chronic unchanged problem for this patient.  When having muscle tightness will take Zanaflex at nighttime.  -     tizanidine (ZANAFLEX) 4 MG capsule; Take 1 Cap by mouth at bedtime as needed.    Essential hypertension : Patient was previously on both losartan and amlodipine.  She has stopped the losartan because of the recall and is currently only on amlodipine.  As she has had weight loss her blood pressure is well controlled on one agent.  However, because of her history of diabetes and now prediabetes would prefer losartan over amlodipine.  So patient to switch to losartan and discontinue amlodipine.  -      losartan (COZAAR) 100 MG Tab; Take 1 Tab by mouth every day.    Hypothyroidism due to Hashimoto's thyroiditis: Patient on low-dose Synthroid.  Will refill this now.  -     levothyroxine (SYNTHROID) 25 MCG Tab; Take 1 Tab by mouth Every morning on an empty stomach. OK TO CRUSH.    Morbid obesity with BMI of 40.0-44.9, adult (HCC): Improving problem after patient's weight loss surgery.    S/P bariatric surgery: Patient losing weight and feeling well.  Does have follow-up with surgery next week.    Recurrent major depressive disorder, in full remission ([facility]): Recurrent problem for patient in the past but currently feeling well without low mood.    Pulmonary HTN ([facility]): Last echocardiogram did show some mild pulmonary hypertension.  This developed after a pulmonary embolism that has not resolved.      Return in about 6 months (around 7/6/2020), or if symptoms worsen or fail to improve.

## 2020-01-20 ENCOUNTER — HOSPITAL ENCOUNTER (OUTPATIENT)
Dept: LAB | Facility: MEDICAL CENTER | Age: 55
End: 2020-01-20
Attending: NURSE PRACTITIONER
Payer: MEDICARE

## 2020-01-20 LAB
25(OH)D3 SERPL-MCNC: 16 NG/ML (ref 30–100)
ALBUMIN SERPL BCP-MCNC: 4.2 G/DL (ref 3.2–4.9)
ALBUMIN/GLOB SERPL: 1.4 G/DL
ALP SERPL-CCNC: 96 U/L (ref 30–99)
ALT SERPL-CCNC: 24 U/L (ref 2–50)
ANION GAP SERPL CALC-SCNC: 10 MMOL/L (ref 0–11.9)
AST SERPL-CCNC: 18 U/L (ref 12–45)
BASOPHILS # BLD AUTO: 0.7 % (ref 0–1.8)
BASOPHILS # BLD: 0.05 K/UL (ref 0–0.12)
BILIRUB SERPL-MCNC: 1 MG/DL (ref 0.1–1.5)
BUN SERPL-MCNC: 20 MG/DL (ref 8–22)
CALCIUM SERPL-MCNC: 9.7 MG/DL (ref 8.5–10.5)
CHLORIDE SERPL-SCNC: 104 MMOL/L (ref 96–112)
CHOLEST SERPL-MCNC: 179 MG/DL (ref 100–199)
CO2 SERPL-SCNC: 26 MMOL/L (ref 20–33)
CREAT SERPL-MCNC: 0.6 MG/DL (ref 0.5–1.4)
EOSINOPHIL # BLD AUTO: 0.33 K/UL (ref 0–0.51)
EOSINOPHIL NFR BLD: 4.3 % (ref 0–6.9)
ERYTHROCYTE [DISTWIDTH] IN BLOOD BY AUTOMATED COUNT: 42.1 FL (ref 35.9–50)
GLOBULIN SER CALC-MCNC: 3.1 G/DL (ref 1.9–3.5)
GLUCOSE SERPL-MCNC: 75 MG/DL (ref 65–99)
HCT VFR BLD AUTO: 46.8 % (ref 37–47)
HDLC SERPL-MCNC: 49 MG/DL
HGB BLD-MCNC: 14.9 G/DL (ref 12–16)
IMM GRANULOCYTES # BLD AUTO: 0.02 K/UL (ref 0–0.11)
IMM GRANULOCYTES NFR BLD AUTO: 0.3 % (ref 0–0.9)
IRON SATN MFR SERPL: 16 % (ref 15–55)
IRON SERPL-MCNC: 70 UG/DL (ref 40–170)
LDLC SERPL CALC-MCNC: 99 MG/DL
LYMPHOCYTES # BLD AUTO: 2.7 K/UL (ref 1–4.8)
LYMPHOCYTES NFR BLD: 35.6 % (ref 22–41)
MCH RBC QN AUTO: 29 PG (ref 27–33)
MCHC RBC AUTO-ENTMCNC: 31.8 G/DL (ref 33.6–35)
MCV RBC AUTO: 91.2 FL (ref 81.4–97.8)
MONOCYTES # BLD AUTO: 0.53 K/UL (ref 0–0.85)
MONOCYTES NFR BLD AUTO: 7 % (ref 0–13.4)
NEUTROPHILS # BLD AUTO: 3.96 K/UL (ref 2–7.15)
NEUTROPHILS NFR BLD: 52.1 % (ref 44–72)
NRBC # BLD AUTO: 0 K/UL
NRBC BLD-RTO: 0 /100 WBC
PLATELET # BLD AUTO: 356 K/UL (ref 164–446)
PMV BLD AUTO: 9.2 FL (ref 9–12.9)
POTASSIUM SERPL-SCNC: 4.1 MMOL/L (ref 3.6–5.5)
PREALB SERPL-MCNC: 27 MG/DL (ref 18–38)
PROT SERPL-MCNC: 7.3 G/DL (ref 6–8.2)
RBC # BLD AUTO: 5.13 M/UL (ref 4.2–5.4)
SODIUM SERPL-SCNC: 140 MMOL/L (ref 135–145)
TIBC SERPL-MCNC: 431 UG/DL (ref 250–450)
TRANSFERRIN SERPL-MCNC: 310 MG/DL (ref 200–370)
TRIGL SERPL-MCNC: 154 MG/DL (ref 0–149)
WBC # BLD AUTO: 7.6 K/UL (ref 4.8–10.8)

## 2020-01-20 PROCEDURE — 82746 ASSAY OF FOLIC ACID SERUM: CPT

## 2020-01-20 PROCEDURE — 80061 LIPID PANEL: CPT

## 2020-01-20 PROCEDURE — 84466 ASSAY OF TRANSFERRIN: CPT

## 2020-01-20 PROCEDURE — 84207 ASSAY OF VITAMIN B-6: CPT

## 2020-01-20 PROCEDURE — 84425 ASSAY OF VITAMIN B-1: CPT

## 2020-01-20 PROCEDURE — 84134 ASSAY OF PREALBUMIN: CPT

## 2020-01-20 PROCEDURE — 83540 ASSAY OF IRON: CPT

## 2020-01-20 PROCEDURE — 85025 COMPLETE CBC W/AUTO DIFF WBC: CPT

## 2020-01-20 PROCEDURE — 36415 COLL VENOUS BLD VENIPUNCTURE: CPT

## 2020-01-20 PROCEDURE — 80053 COMPREHEN METABOLIC PANEL: CPT

## 2020-01-20 PROCEDURE — 84252 ASSAY OF VITAMIN B-2: CPT

## 2020-01-20 PROCEDURE — 83550 IRON BINDING TEST: CPT

## 2020-01-20 PROCEDURE — 82306 VITAMIN D 25 HYDROXY: CPT

## 2020-01-20 PROCEDURE — 82607 VITAMIN B-12: CPT

## 2020-01-20 PROCEDURE — 84443 ASSAY THYROID STIM HORMONE: CPT

## 2020-01-20 PROCEDURE — 83036 HEMOGLOBIN GLYCOSYLATED A1C: CPT

## 2020-01-21 LAB
EST. AVERAGE GLUCOSE BLD GHB EST-MCNC: 123 MG/DL
FOLATE SERPL-MCNC: 7.9 NG/ML
HBA1C MFR BLD: 5.9 % (ref 0–5.6)
TSH SERPL DL<=0.005 MIU/L-ACNC: 2.39 UIU/ML (ref 0.38–5.33)
VIT B12 SERPL-MCNC: 325 PG/ML (ref 211–911)

## 2020-01-23 LAB — VIT B6 SERPL-MCNC: 27.5 NMOL/L (ref 20–125)

## 2020-01-24 LAB
VIT B1 BLD-MCNC: 126 NMOL/L (ref 70–180)
VIT B2 SERPL-SCNC: 8 NMOL/L (ref 5–50)

## 2020-01-28 ENCOUNTER — PATIENT MESSAGE (OUTPATIENT)
Dept: MEDICAL GROUP | Facility: PHYSICIAN GROUP | Age: 55
End: 2020-01-28

## 2020-01-29 NOTE — PATIENT COMMUNICATION
I called and spoke with pharmacy, they did receive the RX but there is no day supply. What should the day supply be? Please advise.

## 2020-02-04 DIAGNOSIS — F51.01 PRIMARY INSOMNIA: ICD-10-CM

## 2020-02-04 DIAGNOSIS — F13.20 HYPNOTIC DEPENDENCE (HCC): ICD-10-CM

## 2020-02-06 RX ORDER — ZOLPIDEM TARTRATE 10 MG/1
10 TABLET ORAL NIGHTLY PRN
Qty: 30 TAB | OUTPATIENT
Start: 2020-02-06 | End: 2020-05-06

## 2020-03-17 DIAGNOSIS — M15.9 PRIMARY OSTEOARTHRITIS INVOLVING MULTIPLE JOINTS: ICD-10-CM

## 2020-03-17 DIAGNOSIS — E03.8 HYPOTHYROIDISM DUE TO HASHIMOTO'S THYROIDITIS: ICD-10-CM

## 2020-03-17 DIAGNOSIS — E06.3 HYPOTHYROIDISM DUE TO HASHIMOTO'S THYROIDITIS: ICD-10-CM

## 2020-03-17 RX ORDER — LEVOTHYROXINE SODIUM 0.03 MG/1
25 TABLET ORAL
Qty: 90 TAB | Refills: 1 | Status: CANCELLED | OUTPATIENT
Start: 2020-03-17

## 2020-03-17 RX ORDER — CYCLOBENZAPRINE HCL 10 MG
10 TABLET ORAL 2 TIMES DAILY PRN
Qty: 180 TAB | Refills: 1 | Status: CANCELLED | OUTPATIENT
Start: 2020-03-17

## 2020-03-30 ENCOUNTER — PATIENT OUTREACH (OUTPATIENT)
Dept: HEALTH INFORMATION MANAGEMENT | Facility: OTHER | Age: 55
End: 2020-03-30

## 2020-03-30 NOTE — PROGRESS NOTES
1. [facility] Verified: yes    2. Verify PCP: yes    3. Review and add  to Care Team: yes        5. Reviewed/Updated the following with patient:       •   Communication Preference Obtained? YES  • MyChart Activation: already active       •   E-Mail Address Obtained? YES       •   Appointment Day and Time Preferences? YES       •   Preferred Pharmacy? YES       •   Preferred Lab? YES    6. Care Gap Scheduling (Attempt to Schedule EACH Overdue Care Gap!)    Scheduled patient for Annual Wellness Visit   Called member to introduce myself as their SCP  and schedule AHA/AWV. Member had no questions at this time, direct phone number given for future contact.

## 2020-04-21 ENCOUNTER — HOSPITAL ENCOUNTER (OUTPATIENT)
Dept: LAB | Facility: MEDICAL CENTER | Age: 55
End: 2020-04-21
Attending: PHYSICIAN ASSISTANT
Payer: MEDICARE

## 2020-04-21 ENCOUNTER — HOSPITAL ENCOUNTER (OUTPATIENT)
Facility: MEDICAL CENTER | Age: 55
End: 2020-04-21
Attending: PHYSICIAN ASSISTANT
Payer: MEDICARE

## 2020-04-21 DIAGNOSIS — E11.9 TYPE 2 DIABETES MELLITUS WITHOUT COMPLICATION, WITHOUT LONG-TERM CURRENT USE OF INSULIN (HCC): ICD-10-CM

## 2020-04-21 LAB
25(OH)D3 SERPL-MCNC: 14 NG/ML (ref 30–100)
ALBUMIN SERPL BCP-MCNC: 4.4 G/DL (ref 3.2–4.9)
ALBUMIN/GLOB SERPL: 1.3 G/DL
ALP SERPL-CCNC: 112 U/L (ref 30–99)
ALT SERPL-CCNC: 26 U/L (ref 2–50)
ANION GAP SERPL CALC-SCNC: 19 MMOL/L (ref 7–16)
AST SERPL-CCNC: 18 U/L (ref 12–45)
BASOPHILS # BLD AUTO: 0.7 % (ref 0–1.8)
BASOPHILS # BLD: 0.05 K/UL (ref 0–0.12)
BILIRUB SERPL-MCNC: 0.8 MG/DL (ref 0.1–1.5)
BUN SERPL-MCNC: 18 MG/DL (ref 8–22)
CALCIUM SERPL-MCNC: 9.6 MG/DL (ref 8.5–10.5)
CHLORIDE SERPL-SCNC: 102 MMOL/L (ref 96–112)
CHOLEST SERPL-MCNC: 209 MG/DL (ref 100–199)
CO2 SERPL-SCNC: 19 MMOL/L (ref 20–33)
CREAT SERPL-MCNC: 0.47 MG/DL (ref 0.5–1.4)
CREAT UR-MCNC: 57.94 MG/DL
EOSINOPHIL # BLD AUTO: 0.37 K/UL (ref 0–0.51)
EOSINOPHIL NFR BLD: 5.2 % (ref 0–6.9)
ERYTHROCYTE [DISTWIDTH] IN BLOOD BY AUTOMATED COUNT: 45.2 FL (ref 35.9–50)
FASTING STATUS PATIENT QL REPORTED: NORMAL
FOLATE SERPL-MCNC: 7.2 NG/ML
GLOBULIN SER CALC-MCNC: 3.3 G/DL (ref 1.9–3.5)
GLUCOSE SERPL-MCNC: 76 MG/DL (ref 65–99)
HCT VFR BLD AUTO: 49.2 % (ref 37–47)
HDLC SERPL-MCNC: 63 MG/DL
HGB BLD-MCNC: 16.1 G/DL (ref 12–16)
IMM GRANULOCYTES # BLD AUTO: 0.02 K/UL (ref 0–0.11)
IMM GRANULOCYTES NFR BLD AUTO: 0.3 % (ref 0–0.9)
LDLC SERPL CALC-MCNC: 111 MG/DL
LYMPHOCYTES # BLD AUTO: 2.66 K/UL (ref 1–4.8)
LYMPHOCYTES NFR BLD: 37.5 % (ref 22–41)
MCH RBC QN AUTO: 29.4 PG (ref 27–33)
MCHC RBC AUTO-ENTMCNC: 32.7 G/DL (ref 33.6–35)
MCV RBC AUTO: 89.8 FL (ref 81.4–97.8)
MICROALBUMIN UR-MCNC: <1.2 MG/DL
MICROALBUMIN/CREAT UR: NORMAL MG/G (ref 0–30)
MONOCYTES # BLD AUTO: 0.42 K/UL (ref 0–0.85)
MONOCYTES NFR BLD AUTO: 5.9 % (ref 0–13.4)
NEUTROPHILS # BLD AUTO: 3.57 K/UL (ref 2–7.15)
NEUTROPHILS NFR BLD: 50.4 % (ref 44–72)
NRBC # BLD AUTO: 0 K/UL
NRBC BLD-RTO: 0 /100 WBC
PLATELET # BLD AUTO: 278 K/UL (ref 164–446)
PMV BLD AUTO: 9.8 FL (ref 9–12.9)
POTASSIUM SERPL-SCNC: 4.2 MMOL/L (ref 3.6–5.5)
PREALB SERPL-MCNC: 26.2 MG/DL (ref 18–38)
PROT SERPL-MCNC: 7.7 G/DL (ref 6–8.2)
RBC # BLD AUTO: 5.48 M/UL (ref 4.2–5.4)
SODIUM SERPL-SCNC: 140 MMOL/L (ref 135–145)
TRANSFERRIN SERPL-MCNC: 301 MG/DL (ref 200–370)
TRIGL SERPL-MCNC: 173 MG/DL (ref 0–149)
VIT B12 SERPL-MCNC: 475 PG/ML (ref 211–911)
WBC # BLD AUTO: 7.1 K/UL (ref 4.8–10.8)

## 2020-04-21 PROCEDURE — 83036 HEMOGLOBIN GLYCOSYLATED A1C: CPT

## 2020-04-21 PROCEDURE — 84425 ASSAY OF VITAMIN B-1: CPT

## 2020-04-21 PROCEDURE — 82043 UR ALBUMIN QUANTITATIVE: CPT

## 2020-04-21 PROCEDURE — 85025 COMPLETE CBC W/AUTO DIFF WBC: CPT

## 2020-04-21 PROCEDURE — 83550 IRON BINDING TEST: CPT

## 2020-04-21 PROCEDURE — 36415 COLL VENOUS BLD VENIPUNCTURE: CPT

## 2020-04-21 PROCEDURE — 80061 LIPID PANEL: CPT

## 2020-04-21 PROCEDURE — 82746 ASSAY OF FOLIC ACID SERUM: CPT

## 2020-04-21 PROCEDURE — 84466 ASSAY OF TRANSFERRIN: CPT

## 2020-04-21 PROCEDURE — 80053 COMPREHEN METABOLIC PANEL: CPT

## 2020-04-21 PROCEDURE — 82570 ASSAY OF URINE CREATININE: CPT

## 2020-04-21 PROCEDURE — 84207 ASSAY OF VITAMIN B-6: CPT

## 2020-04-21 PROCEDURE — 82306 VITAMIN D 25 HYDROXY: CPT

## 2020-04-21 PROCEDURE — 83540 ASSAY OF IRON: CPT

## 2020-04-21 PROCEDURE — 84134 ASSAY OF PREALBUMIN: CPT

## 2020-04-21 PROCEDURE — 84252 ASSAY OF VITAMIN B-2: CPT

## 2020-04-21 PROCEDURE — 82607 VITAMIN B-12: CPT

## 2020-04-22 LAB
EST. AVERAGE GLUCOSE BLD GHB EST-MCNC: 128 MG/DL
HBA1C MFR BLD: 6.1 % (ref 0–5.6)
IRON SATN MFR SERPL: 17 % (ref 15–55)
IRON SERPL-MCNC: 65 UG/DL (ref 40–170)
TIBC SERPL-MCNC: 376 UG/DL (ref 250–450)
UIBC SERPL-MCNC: 311 UG/DL (ref 110–370)

## 2020-04-24 LAB
VIT B1 BLD-MCNC: 148 NMOL/L (ref 70–180)
VIT B6 SERPL-MCNC: 52.3 NMOL/L (ref 20–125)

## 2020-04-25 LAB — VIT B2 SERPL-SCNC: 6 NMOL/L (ref 5–50)

## 2020-04-27 ENCOUNTER — TELEPHONE (OUTPATIENT)
Dept: MEDICAL GROUP | Facility: PHYSICIAN GROUP | Age: 55
End: 2020-04-27

## 2020-04-27 NOTE — TELEPHONE ENCOUNTER
ESTABLISHED PATIENT PRE-VISIT PLANNING     Patient was NOT contacted to complete PVP.    1.  Reviewed notes from the last few office visits within the medical group: Yes    2.  If any orders were placed at last visit or intended to be done for this visit (i.e. 6 mos follow-up), do we have Results/Consult Notes?        •  Labs - Labs were not ordered at last office visit.       •  Imaging - Imaging was not ordered at last office visit.       •  Referrals - No referrals were ordered at last office visit.    3. Is this appointment scheduled as a Hospital Follow-Up? No    4.  Immunizations were updated in [employer] using WebIZ?: Yes       •  Web Iz Recommendations: HEPATITIS A , MMR , TD, VARICELLA (Chicken Pox)  and SHINGRIX (Shingles)    5.  Patient is due for the following Health Maintenance Topics:   Health Maintenance Due   Topic Date Due   • HEPATITIS C SCREENING  1965   • DIABETES MONOFILAMENT / LE EXAM  1965   • RETINAL SCREENING  02/04/1981   • COLON CANCER SCREENING ANNUAL FIT  02/04/2013   • IMM ZOSTER VACCINES (1 of 2) 02/04/2013   • Annual Wellness Visit  02/28/2019       6. Orders for overdue Health Maintenance topics pended in Pre-Charting? NO    7.  AHA (MDX) form printed for Provider? No, already completed    8.  Patient was NOT informed to arrive 15 min prior to their scheduled appointment and bring in their medication bottles.

## 2020-04-30 ENCOUNTER — TELEMEDICINE (OUTPATIENT)
Dept: MEDICAL GROUP | Facility: PHYSICIAN GROUP | Age: 55
End: 2020-04-30
Payer: MEDICARE

## 2020-04-30 DIAGNOSIS — F13.20 HYPNOTIC DEPENDENCE (HCC): ICD-10-CM

## 2020-04-30 DIAGNOSIS — D58.2 ELEVATED HEMOGLOBIN (HCC): ICD-10-CM

## 2020-04-30 DIAGNOSIS — F51.01 PRIMARY INSOMNIA: ICD-10-CM

## 2020-04-30 DIAGNOSIS — I10 ESSENTIAL HYPERTENSION: ICD-10-CM

## 2020-04-30 DIAGNOSIS — E66.01 MORBID OBESITY WITH BMI OF 40.0-44.9, ADULT (HCC): ICD-10-CM

## 2020-04-30 PROCEDURE — 99214 OFFICE O/P EST MOD 30 MIN: CPT | Mod: 95,CR | Performed by: FAMILY MEDICINE

## 2020-04-30 RX ORDER — AMLODIPINE BESYLATE 10 MG/1
TABLET ORAL
COMMUNITY
Start: 2020-04-28 | End: 2020-07-20

## 2020-04-30 RX ORDER — ERGOCALCIFEROL 1.25 MG/1
CAPSULE ORAL
COMMUNITY
Start: 2020-04-27 | End: 2020-08-31

## 2020-04-30 RX ORDER — ZOLPIDEM TARTRATE 10 MG/1
10 TABLET ORAL NIGHTLY PRN
Qty: 30 TAB | Refills: 2 | Status: SHIPPED | OUTPATIENT
Start: 2020-04-30 | End: 2020-07-29

## 2020-04-30 RX ORDER — LOSARTAN POTASSIUM 50 MG/1
50 TABLET ORAL DAILY
Qty: 90 TAB | Refills: 1 | Status: SHIPPED | OUTPATIENT
Start: 2020-04-30 | End: 2020-08-13 | Stop reason: SDUPTHER

## 2020-04-30 RX ORDER — ZOLPIDEM TARTRATE 10 MG/1
10 TABLET ORAL NIGHTLY PRN
Qty: 30 TAB | Refills: 2 | Status: CANCELLED | OUTPATIENT
Start: 2020-04-30 | End: 2020-07-29

## 2020-04-30 RX ORDER — TOPIRAMATE 25 MG/1
TABLET ORAL
COMMUNITY
Start: 2020-04-14 | End: 2020-04-30

## 2020-04-30 NOTE — PROGRESS NOTES
"Telemedicine Visit: Established Patient     This encounter was conducted via Zoom .   Verbal consent was obtained. Patient's identity was verified.    Subjective:   CC: Insomnia    Carolynn Casey is a 55 y.o. female presenting for evaluation and management of:    Patient does have a long history of insomnia.  Did trial weaning off of Ambien but had very poor sleep every night she did not take this.  She did not have any improvement in her sleep with trazodone.  She denies any side effect with Ambien-no oversedation, confusion, sleepwalking/activity.    Patient thinks she does snore.  She has been losing weight since having bariatric surgery but has not had any improvement in her snoring.  She does have some daytime sleepiness.  She has never fallen asleep while driving.  She has never had a sleep study.  No history of smoking or known history of lung disease.    ROS   Denies any recent fevers or chills. No nausea or vomiting. No chest pains or shortness of breath.     Allergies   Allergen Reactions   • Cortisone Anaphylaxis     RXN=since age 12   • Food Unspecified     \"citrus juice\" =burn and throat swelling.    ALL FISH - nausea  RXN=whole life   • Penicillins Anaphylaxis     RXN=since age 1   • Fish Vomiting and Nausea   • Keflex Diarrhea and Nausea     RXN=20 years ago   • Latex Rash and Itching     Rash, itching  RXN=20 years ago   • Naprosyn [Naproxen] Unspecified     \"GI bleed\"  RXN=whole life   • Tape Rash     Plastic tape \"bubble up the skin\", reports tegaderm OK  RXN=ongoing   • Shellfish Allergy Vomiting and Nausea     Betadine/iodine for surgery is ok       Current medicines (including changes today)  Current Outpatient Medications   Medication Sig Dispense Refill   • losartan (COZAAR) 50 MG Tab Take 1 Tab by mouth every day. 90 Tab 1   • zolpidem (AMBIEN) 10 MG Tab Take 1 Tab by mouth at bedtime as needed for Sleep for up to 90 days. 30 Tab 2   • cyclobenzaprine (FLEXERIL) 10 MG Tab Take 1 Tab by " mouth 2 times a day as needed for Mild Pain or Muscle Spasms. 180 Tab 1   • tizanidine (ZANAFLEX) 4 MG capsule Take 1 Cap by mouth at bedtime as needed. 90 Cap 1   • levothyroxine (SYNTHROID) 25 MCG Tab Take 1 Tab by mouth Every morning on an empty stomach. OK TO CRUSH. 90 Tab 1   • Pseudoephedrine HCl (SUDAFED PO) Take 10 mg by mouth as needed (For allergies).     • acetaminophen (TYLENOL) 500 MG Tab Take 2,000 mg by mouth 2 Times a Day.     • amLODIPine (NORVASC) 10 MG Tab      • vitamin D, Ergocalciferol, (DRISDOL) 1.25 MG (74427 UT) Cap capsule        No current facility-administered medications for this visit.        Patient Active Problem List    Diagnosis Date Noted   • Prediabetes 01/06/2020   • S/P bariatric surgery 05/20/2019   • Hypnotic dependence ([facility]) 03/05/2019   • Left ear hearing loss 02/27/2018   • Major depressive disorder 02/27/2018   • Morbid obesity with BMI of 40.0-44.9, adult ([facility]) 02/05/2018   • Vitamin D deficiency 02/05/2018   • Pulmonary HTN ([facility]) 08/31/2017   • Primary osteoarthritis involving multiple joints 08/03/2017   • Left breast mass 08/03/2017   • Insomnia 06/20/2016   • Degenerative spondylolisthesis 02/17/2016   • Hypothyroidism due to Hashimoto's thyroiditis 02/21/2012   • Hypertension 12/28/2011   • DDD (degenerative disc disease), lumbar 12/28/2011       Family History   Problem Relation Age of Onset   • Hypertension Mother    • Cancer Mother 81        breast DCIS   • Heart Disease Father    • Heart Attack Father    • Hypertension Father    • Lung Disease Sister         asthma   • Hypertension Sister    • Arthritis Sister         knee   • Arthritis Brother    • Hypertension Brother    • Diabetes Brother    • Heart Disease Maternal Aunt    • Hypertension Maternal Aunt    • Cancer Maternal Aunt 80        breast   • Stroke Maternal Uncle    • Heart Disease Maternal Uncle    • Hypertension Maternal Uncle    • Heart Disease Paternal Grandmother    • Psychiatric Illness Paternal  Grandfather         Suicide   • Alcohol/Drug Paternal Grandfather    • Arthritis Maternal Grandmother    • Arthritis Maternal Grandfather    • Arthritis Brother    • Hyperlipidemia Neg Hx        She  has a past medical history of Allergy, Anesthesia, Anxiety, Arthritis, ASTHMA, Bronchitis (2014), Constipation, Diabetes (HCC), DVT (deep venous thrombosis) (HCC), Elevated glucose, GERD (gastroesophageal reflux disease), Heart burn, Heart murmur, Hiatus hernia syndrome, Hypertension, Migraine, Pain (01-29-16), Pain, Primary osteoarthritis involving multiple joints, Pulmonary emboli ([facility]) (2017), Sacroiliitis ([facility]) (8/2/2017), Snoring, Type 2 diabetes mellitus without complication ([facility]) (7/20/2018), Unspecified disorder of thyroid, Unspecified urinary incontinence, and Upper GI bleed.  She  has a past surgical history that includes primary c section (1983, 1990); abdominal hysterectomy total (1995); appendectomy (1998); shoulder arthroscopy w/ rotator cuff repair (2001); knee revision total (Right, 10/8/2015); extreme lateral interbody fusion (2/17/2016); lumbar decompression (2/17/2016); s i joint fusion (Right, 8/2/2017); laminotomy (2006); open reduction (2001, 1990); knee arthroplasty total (Left, 2003); knee arthroplasty total (Right, 2003); shoulder arthroscopy (Right, 2001); lumbar fusion anterior (2013); carpal tunnel release (Right, 1983); spinal cord stimulator (8/16/2018); and pr lap, margie restrict proc, longitudinal gas* (5/15/2019).       Objective:   Vitals obtained by patient:  Respirations through observation: 18, Height: 5 5 and Weight: 240    Physical Exam:  Constitutional: Alert, no distress, well-groomed.  Skin: No rashes in visible areas.  Eye: Round. Conjunctiva clear, lids normal. No icterus.   ENMT: Lips pink without lesions, good dentition, moist mucous membranes. Phonation normal.  Neck: No masses, no thyromegaly. Moves freely without pain.  CV: Pulse as reported by patient  Respiratory:  Unlabored respiratory effort, no cough or audible wheeze  Psych: Alert and oriented x3, normal affect and mood.       Assessment and Plan:   The following treatment plan was discussed:     Patient with difficulty with insomnia for many years.  Has been taking Ambien nightly and did not tolerate a trial wean from this.  As patient is at elevated risk for sleep apnea and does have daytime sleepiness, snoring, and a slightly elevated hemoglobin, will refer for sleep studies for evaluation of this.  1. Primary insomnia  - zolpidem (AMBIEN) 10 MG Tab; Take 1 Tab by mouth at bedtime as needed for Sleep for up to 90 days.  Dispense: 30 Tab; Refill: 2    2. Hypnotic dependence (HCC)  - zolpidem (AMBIEN) 10 MG Tab; Take 1 Tab by mouth at bedtime as needed for Sleep for up to 90 days.  Dispense: 30 Tab; Refill: 2    3. Morbid obesity with BMI of 40.0-44.9, adult (HCC)  - REFERRAL TO SLEEP STUDIES    4. Elevated hemoglobin (HCC)  - REFERRAL TO SLEEP STUDIES    5. Essential hypertension  Patient was previously on 100 mg of losartan.  But she did start feeling lightheaded and nauseated when she restarted this dose.  As she has had some weight loss, possibly due to overtreatment.  So will reduce dose to 50 mg.  Other orders  - amLODIPine (NORVASC) 10 MG Tab  - vitamin D, Ergocalciferol, (DRISDOL) 1.25 MG (91244 UT) Cap capsule  - losartan (COZAAR) 50 MG Tab; Take 1 Tab by mouth every day.  Dispense: 90 Tab; Refill: 1        Follow-up: Return in about 3 months (around 7/30/2020), or if symptoms worsen or fail to improve.

## 2020-05-11 DIAGNOSIS — F13.20 HYPNOTIC DEPENDENCE (HCC): ICD-10-CM

## 2020-05-11 DIAGNOSIS — F51.01 PRIMARY INSOMNIA: ICD-10-CM

## 2020-05-11 NOTE — TELEPHONE ENCOUNTER
Received request via: Patient    Was the patient seen in the last year in this department? Yes LOV 01/06/2020    Does the patient have an active prescription (recently filled or refills available) for medication(s) requested? No

## 2020-05-12 RX ORDER — ZOLPIDEM TARTRATE 10 MG/1
10 TABLET ORAL NIGHTLY PRN
Qty: 30 TAB | Refills: 0
Start: 2020-05-12 | End: 2020-08-10

## 2020-07-20 RX ORDER — AMLODIPINE BESYLATE 10 MG/1
TABLET ORAL
Qty: 90 TAB | Refills: 1 | Status: SHIPPED | OUTPATIENT
Start: 2020-07-20 | End: 2021-01-28

## 2020-07-20 NOTE — TELEPHONE ENCOUNTER
Last seen by PCP 04/30/2020. Will send 6 month(s) to the pharmacy.  Last Blood Pressure reading was 122/80 on 1/6/2020

## 2020-08-10 ENCOUNTER — OFFICE VISIT (OUTPATIENT)
Dept: MEDICAL GROUP | Facility: PHYSICIAN GROUP | Age: 55
End: 2020-08-10
Payer: MEDICARE

## 2020-08-10 ENCOUNTER — HOSPITAL ENCOUNTER (OUTPATIENT)
Dept: LAB | Facility: MEDICAL CENTER | Age: 55
End: 2020-08-10
Attending: PHYSICIAN ASSISTANT
Payer: MEDICARE

## 2020-08-10 VITALS
HEART RATE: 106 BPM | HEIGHT: 65 IN | WEIGHT: 260.1 LBS | TEMPERATURE: 97.4 F | BODY MASS INDEX: 43.34 KG/M2 | OXYGEN SATURATION: 92 % | DIASTOLIC BLOOD PRESSURE: 60 MMHG | SYSTOLIC BLOOD PRESSURE: 112 MMHG

## 2020-08-10 DIAGNOSIS — E55.9 VITAMIN D DEFICIENCY: ICD-10-CM

## 2020-08-10 DIAGNOSIS — E03.8 HYPOTHYROIDISM DUE TO HASHIMOTO'S THYROIDITIS: ICD-10-CM

## 2020-08-10 DIAGNOSIS — M15.9 PRIMARY OSTEOARTHRITIS INVOLVING MULTIPLE JOINTS: ICD-10-CM

## 2020-08-10 DIAGNOSIS — F51.01 PRIMARY INSOMNIA: ICD-10-CM

## 2020-08-10 DIAGNOSIS — E06.3 HYPOTHYROIDISM DUE TO HASHIMOTO'S THYROIDITIS: ICD-10-CM

## 2020-08-10 DIAGNOSIS — Z98.84 S/P BARIATRIC SURGERY: ICD-10-CM

## 2020-08-10 DIAGNOSIS — Z11.59 NEED FOR HEPATITIS C SCREENING TEST: ICD-10-CM

## 2020-08-10 DIAGNOSIS — R73.03 PREDIABETES: ICD-10-CM

## 2020-08-10 DIAGNOSIS — I10 ESSENTIAL HYPERTENSION: ICD-10-CM

## 2020-08-10 DIAGNOSIS — M51.36 DDD (DEGENERATIVE DISC DISEASE), LUMBAR: ICD-10-CM

## 2020-08-10 DIAGNOSIS — E11.9 TYPE 2 DIABETES MELLITUS WITHOUT COMPLICATION, WITHOUT LONG-TERM CURRENT USE OF INSULIN (HCC): ICD-10-CM

## 2020-08-10 DIAGNOSIS — Z23 NEED FOR VACCINATION: ICD-10-CM

## 2020-08-10 LAB
25(OH)D3 SERPL-MCNC: 24 NG/ML (ref 30–100)
EST. AVERAGE GLUCOSE BLD GHB EST-MCNC: 128 MG/DL
HBA1C MFR BLD: 6.1 % (ref 0–5.6)
HCV AB SER QL: NORMAL
T4 FREE SERPL-MCNC: 0.99 NG/DL (ref 0.93–1.7)
TSH SERPL DL<=0.005 MIU/L-ACNC: 1.5 UIU/ML (ref 0.38–5.33)

## 2020-08-10 PROCEDURE — G0472 HEP C SCREEN HIGH RISK/OTHER: HCPCS

## 2020-08-10 PROCEDURE — 99214 OFFICE O/P EST MOD 30 MIN: CPT | Mod: 25 | Performed by: PHYSICIAN ASSISTANT

## 2020-08-10 PROCEDURE — 90670 PCV13 VACCINE IM: CPT | Performed by: PHYSICIAN ASSISTANT

## 2020-08-10 PROCEDURE — G0009 ADMIN PNEUMOCOCCAL VACCINE: HCPCS | Performed by: PHYSICIAN ASSISTANT

## 2020-08-10 PROCEDURE — 82306 VITAMIN D 25 HYDROXY: CPT

## 2020-08-10 PROCEDURE — 36415 COLL VENOUS BLD VENIPUNCTURE: CPT

## 2020-08-10 PROCEDURE — 84443 ASSAY THYROID STIM HORMONE: CPT

## 2020-08-10 PROCEDURE — 83036 HEMOGLOBIN GLYCOSYLATED A1C: CPT

## 2020-08-10 PROCEDURE — 84439 ASSAY OF FREE THYROXINE: CPT

## 2020-08-10 RX ORDER — CYCLOBENZAPRINE HCL 10 MG
10 TABLET ORAL 2 TIMES DAILY PRN
Qty: 180 TAB | Refills: 1 | Status: SHIPPED | OUTPATIENT
Start: 2020-08-10 | End: 2021-03-04 | Stop reason: SINTOL

## 2020-08-10 RX ORDER — TIZANIDINE 4 MG/1
4 TABLET ORAL
COMMUNITY
Start: 2020-05-25 | End: 2020-08-10

## 2020-08-10 RX ORDER — TIZANIDINE HYDROCHLORIDE 4 MG/1
4 CAPSULE, GELATIN COATED ORAL NIGHTLY PRN
Qty: 90 CAP | Refills: 1 | Status: SHIPPED | OUTPATIENT
Start: 2020-08-10 | End: 2021-03-01 | Stop reason: SDUPTHER

## 2020-08-10 RX ORDER — ZOLPIDEM TARTRATE 10 MG/1
10 TABLET ORAL NIGHTLY PRN
Qty: 30 TAB | Refills: 2 | Status: SHIPPED
Start: 2020-08-10 | End: 2020-11-10 | Stop reason: SDUPTHER

## 2020-08-10 ASSESSMENT — FIBROSIS 4 INDEX: FIB4 SCORE: 0.72

## 2020-08-10 NOTE — PROGRESS NOTES
"Subjective:   Carolynn Casey is a 55 y.o. female here today for follow up on hypothyroidism and other medical problems. Is an established patient of mine.    HPI:    Carolynn presents to the office today for routine follow-up on chronic conditions. Last office visit was in 1/2020--was seen by my colleague. Last visit with me was in 8/2019. Patient has concerns today regarding her thyroid. She is on low-dose levothyroxine for hypothyroidism which she states she's been on for 12 years. Takes 25 mcg daily and dose has never been adjusted. She questions if she may need dose increase as she's had multiple ongoing symptoms that she feels may be related to her thyroid.  She states that she has had very dry, itchy skin, dry hair, and brittle nails that break easily.  Has noticed hair loss.  She has had ongoing bouts of depression and some mood swings with continued difficulty sleeping.  She feels like she is constipated constantly with no improvement with increasing water and fiber intake.  All of her joints hurt.  She has noticed some variations in her body temperature.  Complains of \"foggy brain.\"  Feels constantly fatigued.  Finally, has noticed some weight gain since her gastric sleeve surgery and has not had any significant change in diet.    Patient has chronic hypertension which is treated with amlodipine 10 mg in the morning and losartan 100 mg at night. BP today in the office is 112/60.     She continues to follow closely with Dr. Cox's office for routine lab work. Underwent gastric sleeve surgery in May 2019. Did have type 2 diabetes prior to surgery which resolved with weight loss. A1c has been in pre-diabetic range--mostly recently 6.1 in April.    She has chronic insomnia which has historically been treated with Ambien. She was started on trazodone 3 nights/week by my colleague back in January in an attempt to help wean from the Ambien but she states that this did not help at all with her sleep, so has only " been taking the Ambien.  She denies any new side effects, has always tolerated Ambien well.  She specifically denies any sleep behaviors, morning grogginess/sedation, dizziness, or falls.    Finally, she is needing refills of both cyclobenzaprine and tizanidine which help with her back and joint pains. Typically takes the cyclobenzaprine twice daily during the day (as it doesn't make her sleeping) and at nighttime only she takes tizanidine. In addition to the muscle relaxants, also takes extra-strength Tylenol (total of 4) and Advil (total of 2) at bedtime to help with pain. She follows with pain management--Dr. Hobbs. Recently had another back surgery.      Current medicines (including changes today)  Current Outpatient Medications   Medication Sig Dispense Refill   • tizanidine (ZANAFLEX) 4 MG capsule Take 1 Cap by mouth at bedtime as needed. 90 Cap 1   • cyclobenzaprine (FLEXERIL) 10 MG Tab Take 1 Tab by mouth 2 times a day as needed for Mild Pain or Muscle Spasms. 180 Tab 1   • zolpidem (AMBIEN) 10 MG Tab Take 1 Tab by mouth at bedtime as needed for Sleep for up to 30 days. 30 Tab 2   • amLODIPine (NORVASC) 10 MG Tab Take 1 tablet by mouth once daily 90 Tab 1   • vitamin D, Ergocalciferol, (DRISDOL) 1.25 MG (80823 UT) Cap capsule      • losartan (COZAAR) 50 MG Tab Take 1 Tab by mouth every day. 90 Tab 1   • levothyroxine (SYNTHROID) 25 MCG Tab Take 1 Tab by mouth Every morning on an empty stomach. OK TO CRUSH. 90 Tab 1   • Pseudoephedrine HCl (SUDAFED PO) Take 10 mg by mouth as needed (For allergies).     • acetaminophen (TYLENOL) 500 MG Tab Take 2,000 mg by mouth 2 Times a Day.       No current facility-administered medications for this visit.      She  has a past medical history of Allergy, Anesthesia, Anxiety, Arthritis, ASTHMA, Bronchitis (2014), Constipation, Diabetes (HCC), DVT (deep venous thrombosis) ([facility]), Elevated glucose, GERD (gastroesophageal reflux disease), Heart burn, Heart murmur, Hiatus  "hernia syndrome, Hypertension, Migraine, Pain (01-29-16), Pain, Primary osteoarthritis involving multiple joints, Pulmonary emboli (HCC) (2017), Sacroiliitis (HCC) (8/2/2017), Snoring, Type 2 diabetes mellitus without complication (HCC) (7/20/2018), Unspecified disorder of thyroid, Unspecified urinary incontinence, and Upper GI bleed.    ROS  No chest pain  No shortness of breath  No dizziness, lightheadedness, headaches       Objective:     /60 (BP Location: Right arm, Patient Position: Sitting, BP Cuff Size: Adult)   Pulse (!) 106   Temp 36.3 °C (97.4 °F) (Temporal)   Ht 1.651 m (5' 5\")   Wt 118 kg (260 lb 1.6 oz)   SpO2 92%  Body mass index is 43.28 kg/m².     Physical Exam:  Constitutional: Alert, obese but otherwise well-appearing, no distress.  Skin: Warm, dry, good turgor, no rashes in visible areas.  Eye: Pupils are equal and round, conjunctiva clear, lids normal.  ENMT: Lips without lesions, moist mucus membranes.  Neck: No masses. No submandibular or cervical lymphadenopathy. No palpable thyromegaly.  Respiratory: Unlabored respiratory effort, lungs clear to auscultation, no wheezes, no rhonchi.  Cardiovascular: Normal S1, S2, no murmur.      Assessment and Plan:   The following treatment plan was discussed    1. Hypothyroidism due to Hashimoto's thyroiditis  Established problem, unclear level of control. TSH was normal at last check in January, but having multiple symptoms that may be related to uncontrolled hypothyroidism. Will check both TSH and free T4. Further recommendations regarding levothyroxine dose pending results. We did discuss that a lot of the symptoms she is having are non-specific and may be attributable to other things besides thyroid--I.e. discussed that there is good chance that her vitamin D deficiency is at least partially contributing to her fatigue, low mood, body aches.   - TSH; Future  - FREE THYROXINE; Future    2. Essential hypertension  Established problem, " well-controlled with amlodipine and losartan. BP is at-goal. Continue current management.    3. Prediabetes  Established problem, slight worsening on last check per review (went from 5.9 to 6.1.). Discussed importance of carbohydrate limitation. Will continue to have A1c monitored through her bariatric surgeon's office.    4. S/P bariatric surgery  S/p gastric sleeve in 5/2019. Will continue to follow with Dr. Cox for routine lab monitoring.    5. Primary insomnia  Established problem, well-controlled with Ambien. Has been on for long time, continues to tolerate well with no concerning side effects. Roc reviewed. Last fill on 7/11/2020--#30 with no refills. I have refilled.  - zolpidem (AMBIEN) 10 MG Tab; Take 1 Tab by mouth at bedtime as needed for Sleep for up to 30 days.  Dispense: 30 Tab; Refill: 2    6. DDD (degenerative disc disease), lumbar  7. Primary osteoarthritis involving multiple joints  Established problems, well-controlled with cyclobenzaprine during the day and tizanidine at night, both of which I have refilled.  In regards to her over-the-counter pain medications, explained that 4 extra strength Tylenol capsules at a time is too much.  Maximum is 2.  She could increase the amount of Advil, however, to 3 to 4 tablets.  - tizanidine (ZANAFLEX) 4 MG capsule; Take 1 Cap by mouth at bedtime as needed.  Dispense: 90 Cap; Refill: 1  - cyclobenzaprine (FLEXERIL) 10 MG Tab; Take 1 Tab by mouth 2 times a day as needed for Mild Pain or Muscle Spasms.  Dispense: 180 Tab; Refill: 1    8. Vitamin D deficiency  Established problem, uncontrolled per review of last lab work from April. Patient does state that she was prescribed high-dose weekly vitamin D supplement by her bariatric surgeon's office which she took as directed for a total of 12 weeks. She has not yet had level re-checked since stopping which I advised her to have done. I have ordered this.  - VITAMIN D,25 HYDROXY; Future    9. Need for  hepatitis C screening test  - HCV Scrn ( [id number] 1xLife); Future    10. Need for vaccination  - Pneumococcal Conjugate Vaccine 13-Valent    Had discussion with patient regarding recommended health maintenances items for her age and chronic conditions. Discussed recommendation for Shingrix vaccine. She would like to hold off for now due to financial reasons but plans to have done in the future. She is agreeable with Prevnar vaccine today. Also agreeable with hepatitis C screening.      Followup: Return for establish care with new PCP.    Lisa Gilbert P.A.-C.

## 2020-08-11 ENCOUNTER — TELEPHONE (OUTPATIENT)
Dept: MEDICAL GROUP | Facility: PHYSICIAN GROUP | Age: 55
End: 2020-08-11

## 2020-08-11 NOTE — TELEPHONE ENCOUNTER
MEDICATION PRIOR AUTHORIZATION NEEDED:    1. Name of Medication: Tizanidine 4 mg Cap    2. Requested By (Name of Pharmacy): [facility]     3. Is insurance on file current? yes    4. What is the name & phone number of the 3rd party payor? yes     DOCUMENTATION OF PAR STATUS:    1. Name of Medication & Dose: Tizanidine 4 mg Cap     2. Name of Prescription Coverage Company & phone #: [facility] [phone]    3. Date Prior Auth Submitted: 8/11/20    4. What information was given to obtain insurance decision? Diagnosis, medication information, reason for medication usage.    5. Prior Auth Status? Pending    6. Patient Notified: no

## 2020-08-12 DIAGNOSIS — E55.9 VITAMIN D DEFICIENCY: ICD-10-CM

## 2020-08-13 ENCOUNTER — TELEPHONE (OUTPATIENT)
Dept: MEDICAL GROUP | Facility: PHYSICIAN GROUP | Age: 55
End: 2020-08-13

## 2020-08-13 NOTE — TELEPHONE ENCOUNTER
FINAL PRIOR AUTHORIZATION STATUS:    1.  Name of Medication & Dose: Tizanidine 4 mg     2. Prior Auth Status: Approved through insurance.     3. Action Taken: Pharmacy Notified: yes Patient Notified: yes

## 2020-08-14 RX ORDER — LOSARTAN POTASSIUM 50 MG/1
50 TABLET ORAL DAILY
Qty: 90 TAB | Refills: 0 | Status: SHIPPED | OUTPATIENT
Start: 2020-08-14 | End: 2021-03-04 | Stop reason: SDUPTHER

## 2020-08-31 ENCOUNTER — PRE-ADMISSION TESTING (OUTPATIENT)
Dept: ADMISSIONS | Facility: MEDICAL CENTER | Age: 55
End: 2020-08-31
Attending: COLON & RECTAL SURGERY
Payer: MEDICARE

## 2020-08-31 DIAGNOSIS — Z01.812 PRE-OPERATIVE LABORATORY EXAMINATION: ICD-10-CM

## 2020-08-31 DIAGNOSIS — Z01.810 PRE-OPERATIVE CARDIOVASCULAR EXAMINATION: ICD-10-CM

## 2020-08-31 LAB
ANION GAP SERPL CALC-SCNC: 15 MMOL/L (ref 7–16)
BUN SERPL-MCNC: 14 MG/DL (ref 8–22)
CALCIUM SERPL-MCNC: 10 MG/DL (ref 8.5–10.5)
CHLORIDE SERPL-SCNC: 98 MMOL/L (ref 96–112)
CO2 SERPL-SCNC: 24 MMOL/L (ref 20–33)
COVID ORDER STATUS COVID19: NORMAL
CREAT SERPL-MCNC: 0.5 MG/DL (ref 0.5–1.4)
EKG IMPRESSION: NORMAL
GLUCOSE SERPL-MCNC: 112 MG/DL (ref 65–99)
POTASSIUM SERPL-SCNC: 4.3 MMOL/L (ref 3.6–5.5)
SARS-COV-2 RNA RESP QL NAA+PROBE: NOTDETECTED
SODIUM SERPL-SCNC: 137 MMOL/L (ref 135–145)
SPECIMEN SOURCE: NORMAL

## 2020-08-31 PROCEDURE — 80048 BASIC METABOLIC PNL TOTAL CA: CPT

## 2020-08-31 PROCEDURE — U0003 INFECTIOUS AGENT DETECTION BY NUCLEIC ACID (DNA OR RNA); SEVERE ACUTE RESPIRATORY SYNDROME CORONAVIRUS 2 (SARS-COV-2) (CORONAVIRUS DISEASE [COVID-19]), AMPLIFIED PROBE TECHNIQUE, MAKING USE OF HIGH THROUGHPUT TECHNOLOGIES AS DESCRIBED BY CMS-2020-01-R: HCPCS

## 2020-08-31 PROCEDURE — 93005 ELECTROCARDIOGRAM TRACING: CPT

## 2020-08-31 PROCEDURE — 36415 COLL VENOUS BLD VENIPUNCTURE: CPT

## 2020-08-31 PROCEDURE — 93010 ELECTROCARDIOGRAM REPORT: CPT | Performed by: INTERNAL MEDICINE

## 2020-08-31 RX ORDER — OMEGA-3 FATTY ACIDS/FISH OIL 300-1000MG
400 CAPSULE ORAL 2 TIMES DAILY
Status: ON HOLD | COMMUNITY
End: 2020-09-02

## 2020-08-31 ASSESSMENT — FIBROSIS 4 INDEX: FIB4 SCORE: 0.72

## 2020-09-02 ENCOUNTER — ANESTHESIA (OUTPATIENT)
Dept: SURGERY | Facility: MEDICAL CENTER | Age: 55
End: 2020-09-02
Payer: MEDICARE

## 2020-09-02 ENCOUNTER — HOSPITAL ENCOUNTER (OUTPATIENT)
Facility: MEDICAL CENTER | Age: 55
End: 2020-09-02
Attending: COLON & RECTAL SURGERY | Admitting: COLON & RECTAL SURGERY
Payer: MEDICARE

## 2020-09-02 ENCOUNTER — ANESTHESIA EVENT (OUTPATIENT)
Dept: SURGERY | Facility: MEDICAL CENTER | Age: 55
End: 2020-09-02
Payer: MEDICARE

## 2020-09-02 ENCOUNTER — TELEPHONE (OUTPATIENT)
Dept: MEDICAL GROUP | Facility: PHYSICIAN GROUP | Age: 55
End: 2020-09-02

## 2020-09-02 VITALS
OXYGEN SATURATION: 95 % | WEIGHT: 259.04 LBS | HEIGHT: 65 IN | RESPIRATION RATE: 16 BRPM | TEMPERATURE: 97.9 F | DIASTOLIC BLOOD PRESSURE: 78 MMHG | BODY MASS INDEX: 43.16 KG/M2 | SYSTOLIC BLOOD PRESSURE: 116 MMHG | HEART RATE: 75 BPM

## 2020-09-02 LAB
GLUCOSE BLD-MCNC: 89 MG/DL (ref 65–99)
PATHOLOGY CONSULT NOTE: NORMAL

## 2020-09-02 PROCEDURE — 502240 HCHG MISC OR SUPPLY RC 0272: Performed by: COLON & RECTAL SURGERY

## 2020-09-02 PROCEDURE — 160002 HCHG RECOVERY MINUTES (STAT): Performed by: COLON & RECTAL SURGERY

## 2020-09-02 PROCEDURE — 700105 HCHG RX REV CODE 258: Performed by: COLON & RECTAL SURGERY

## 2020-09-02 PROCEDURE — 160035 HCHG PACU - 1ST 60 MINS PHASE I: Performed by: COLON & RECTAL SURGERY

## 2020-09-02 PROCEDURE — 501629 HCHG TUBE, LUKI TRAP STERILE DISP: Performed by: COLON & RECTAL SURGERY

## 2020-09-02 PROCEDURE — 82962 GLUCOSE BLOOD TEST: CPT

## 2020-09-02 PROCEDURE — 700101 HCHG RX REV CODE 250: Performed by: COLON & RECTAL SURGERY

## 2020-09-02 PROCEDURE — 700101 HCHG RX REV CODE 250: Performed by: ANESTHESIOLOGY

## 2020-09-02 PROCEDURE — 500066 HCHG BITE BLOCK, ECT: Performed by: COLON & RECTAL SURGERY

## 2020-09-02 PROCEDURE — 160048 HCHG OR STATISTICAL LEVEL 1-5: Performed by: COLON & RECTAL SURGERY

## 2020-09-02 PROCEDURE — 160009 HCHG ANES TIME/MIN: Performed by: COLON & RECTAL SURGERY

## 2020-09-02 PROCEDURE — 160202 HCHG ENDO MINUTES - 1ST 30 MINS LEVEL 3: Performed by: COLON & RECTAL SURGERY

## 2020-09-02 PROCEDURE — 88305 TISSUE EXAM BY PATHOLOGIST: CPT

## 2020-09-02 PROCEDURE — 700102 HCHG RX REV CODE 250 W/ 637 OVERRIDE(OP): Performed by: COLON & RECTAL SURGERY

## 2020-09-02 PROCEDURE — 700111 HCHG RX REV CODE 636 W/ 250 OVERRIDE (IP): Performed by: ANESTHESIOLOGY

## 2020-09-02 PROCEDURE — A9270 NON-COVERED ITEM OR SERVICE: HCPCS | Performed by: COLON & RECTAL SURGERY

## 2020-09-02 RX ORDER — LIDOCAINE HYDROCHLORIDE 20 MG/ML
INJECTION, SOLUTION EPIDURAL; INFILTRATION; INTRACAUDAL; PERINEURAL PRN
Status: DISCONTINUED | OUTPATIENT
Start: 2020-09-02 | End: 2020-09-02 | Stop reason: SURG

## 2020-09-02 RX ORDER — SUCCINYLCHOLINE/SOD CL,ISO/PF 200MG/10ML
SYRINGE (ML) INTRAVENOUS PRN
Status: DISCONTINUED | OUTPATIENT
Start: 2020-09-02 | End: 2020-09-02 | Stop reason: SURG

## 2020-09-02 RX ORDER — MEPERIDINE HYDROCHLORIDE 25 MG/ML
INJECTION INTRAMUSCULAR; INTRAVENOUS; SUBCUTANEOUS PRN
Status: DISCONTINUED | OUTPATIENT
Start: 2020-09-02 | End: 2020-09-02 | Stop reason: SURG

## 2020-09-02 RX ORDER — ONDANSETRON 2 MG/ML
4 INJECTION INTRAMUSCULAR; INTRAVENOUS
Status: DISCONTINUED | OUTPATIENT
Start: 2020-09-02 | End: 2020-09-02 | Stop reason: HOSPADM

## 2020-09-02 RX ORDER — DIPHENHYDRAMINE HYDROCHLORIDE 50 MG/ML
12.5 INJECTION INTRAMUSCULAR; INTRAVENOUS
Status: DISCONTINUED | OUTPATIENT
Start: 2020-09-02 | End: 2020-09-02 | Stop reason: HOSPADM

## 2020-09-02 RX ORDER — SODIUM CHLORIDE, SODIUM LACTATE, POTASSIUM CHLORIDE, CALCIUM CHLORIDE 600; 310; 30; 20 MG/100ML; MG/100ML; MG/100ML; MG/100ML
INJECTION, SOLUTION INTRAVENOUS CONTINUOUS
Status: DISCONTINUED | OUTPATIENT
Start: 2020-09-02 | End: 2020-09-02 | Stop reason: HOSPADM

## 2020-09-02 RX ORDER — LIDOCAINE HYDROCHLORIDE 40 MG/ML
SOLUTION TOPICAL PRN
Status: DISCONTINUED | OUTPATIENT
Start: 2020-09-02 | End: 2020-09-02 | Stop reason: SURG

## 2020-09-02 RX ORDER — HALOPERIDOL 5 MG/ML
1 INJECTION INTRAMUSCULAR
Status: DISCONTINUED | OUTPATIENT
Start: 2020-09-02 | End: 2020-09-02 | Stop reason: HOSPADM

## 2020-09-02 RX ORDER — ONDANSETRON 2 MG/ML
INJECTION INTRAMUSCULAR; INTRAVENOUS PRN
Status: DISCONTINUED | OUTPATIENT
Start: 2020-09-02 | End: 2020-09-02 | Stop reason: SURG

## 2020-09-02 RX ADMIN — SODIUM CHLORIDE, POTASSIUM CHLORIDE, SODIUM LACTATE AND CALCIUM CHLORIDE: 600; 310; 30; 20 INJECTION, SOLUTION INTRAVENOUS at 07:03

## 2020-09-02 RX ADMIN — ONDANSETRON 4 MG: 2 INJECTION INTRAMUSCULAR; INTRAVENOUS at 07:24

## 2020-09-02 RX ADMIN — PROPOFOL 100 MG: 10 INJECTION, EMULSION INTRAVENOUS at 07:09

## 2020-09-02 RX ADMIN — MEPERIDINE HYDROCHLORIDE 25 MG: 25 INJECTION INTRAMUSCULAR; INTRAVENOUS; SUBCUTANEOUS at 07:24

## 2020-09-02 RX ADMIN — ALFENTANIL HYDROCHLORIDE 1000 MCG: 500 INJECTION INTRAVENOUS at 07:07

## 2020-09-02 RX ADMIN — POVIDONE-IODINE 15 ML: 10 SOLUTION TOPICAL at 06:21

## 2020-09-02 RX ADMIN — ROCURONIUM BROMIDE 5 MG: 10 INJECTION, SOLUTION INTRAVENOUS at 07:07

## 2020-09-02 RX ADMIN — LIDOCAINE HYDROCHLORIDE 160 MG: 40 SOLUTION TOPICAL at 07:10

## 2020-09-02 RX ADMIN — LIDOCAINE HYDROCHLORIDE 0.5 ML: 10 INJECTION, SOLUTION EPIDURAL; INFILTRATION; INTRACAUDAL at 07:03

## 2020-09-02 RX ADMIN — Medication 120 MG: at 07:09

## 2020-09-02 RX ADMIN — LIDOCAINE HYDROCHLORIDE 20 MG: 20 INJECTION, SOLUTION EPIDURAL; INFILTRATION; INTRACAUDAL at 07:07

## 2020-09-02 RX ADMIN — PROPOFOL 20 MG: 10 INJECTION, EMULSION INTRAVENOUS at 07:07

## 2020-09-02 SDOH — HEALTH STABILITY: MENTAL HEALTH: HOW OFTEN DO YOU HAVE 6 OR MORE DRINKS ON ONE OCCASION?: NEVER

## 2020-09-02 SDOH — HEALTH STABILITY: MENTAL HEALTH: HOW OFTEN DO YOU HAVE A DRINK CONTAINING ALCOHOL?: NEVER

## 2020-09-02 ASSESSMENT — PAIN SCALES - GENERAL: PAIN_LEVEL: 0

## 2020-09-02 ASSESSMENT — FIBROSIS 4 INDEX: FIB4 SCORE: 0.72

## 2020-09-02 NOTE — DISCHARGE INSTRUCTIONS
ACTIVITY: Rest and take it easy for the first 24 hours.  A responsible adult is recommended to remain with you during that time.  It is normal to feel sleepy.  We encourage you to not do anything that requires balance, judgment or coordination.    MILD FLU-LIKE SYMPTOMS ARE NORMAL. YOU MAY EXPERIENCE GENERALIZED MUSCLE ACHES, THROAT IRRITATION, HEADACHE AND/OR SOME NAUSEA.    FOR 24 HOURS DO NOT:  Drive, operate machinery or run household appliances.  Drink beer or alcoholic beverages.   Make important decisions or sign legal documents.    SPECIAL INSTRUCTIONs    1. DIET: Upon discharge from the hospital you may resume your normal preoperative diet. Depending on how you are feeling and whether you have nausea or not, you may wish to stay with a bland diet for the first few days. However, you can advance this as quickly as you feel ready.    2. ACTIVITIES: Upon discharge from the hospital, the day of surgery it is requested that you do no significant physical activity and limit mental activities, as you have had sedation. The day after discharge, you may resume full routine activities.     3. DRIVING: You may drive whenever you are off pain medications.    4. BOWEL FUNCTION: Constipation is common after receiving anesthesia. The combination of pain medication and decreased activity level can cause constipation in otherwise normal patients. If you feel this is occurring, take a laxative (Miralax, Milk of Magnesia, Ex-Lax, Senokot, etc.) until the problem has resolved.    5.CALL IF YOU HAVE: (1) Fevers to more than 101.0 F, (2) Unusual chest or leg pain, (3) Excessive bleeding or persistent nausea/vomiting, Please do not hesitate to call with any other questions.     6. APPOINTMENT: A biopsy was performed, Dr. Cox's office will receive a copy of the report and you can call the office in 2 weeks for the results.Contact our office at [phone] with any questions or concerns. Our office hours are Monday-Friday,  8am-5pm.  Please try to call during these hours when we are better able to assist you.    If you have any additional questions, please do not hesitate to call the office and speak to either myself or the physician on call.    Office address:  [facility].  [address] [place] [address]  [address]  [place] [address]      DIET: To avoid nausea, slowly advance diet as tolerated, avoiding spicy or greasy foods for the first day.  Add more substantial food to your diet according to your physician's instructions.  Babies can be fed formula or breast milk as soon as they are hungry.  INCREASE FLUIDS AND FIBER TO AVOID CONSTIPATION.    SURGICAL DRESSING/BATHING: see above    FOLLOW-UP APPOINTMENT:  A follow-up appointment should be arranged with your doctor in 1-2 weeks; call to schedule.    You should CALL YOUR PHYSICIAN if you develop:  Fever greater than 101 degrees F.  Pain not relieved by medication, or persistent nausea or vomiting.  Excessive bleeding (blood soaking through dressing) or unexpected drainage from the wound.  Extreme redness or swelling around the incision site, drainage of pus or foul smelling drainage.  Inability to urinate or empty your bladder within 8 hours.  Problems with breathing or chest pain.    You should call 911 if you develop problems with breathing or chest pain.  If you are unable to contact your doctor or surgical center, you should go to the nearest emergency room or urgent care center.  Physician's telephone #: [phone]    If any questions arise, call your doctor.  If your doctor is not available, please feel free to call the Surgical Center at [phone].  The Center is open Monday through Friday from 7AM to 7PM.  You can also call the [facility] HOTLINE open 24 hours/day, 7 days/week and speak to a nurse at [phone], or toll free at [phone].    A registered nurse may call you a few days after your surgery to see how you are doing after your  procedure.    MEDICATIONS: Resume taking daily medication.  Take prescribed pain medication with food.  If no medication is prescribed, you may take non-aspirin pain medication if needed.  PAIN MEDICATION CAN BE VERY CONSTIPATING.  Take a stool softener or laxative such as senokot, pericolace, or milk of magnesia if needed.        If your physician has prescribed pain medication that includes Acetaminophen (Tylenol), do not take additional Acetaminophen (Tylenol) while taking the prescribed medication.    Depression / Suicide Risk    As you are discharged from this [facility] facility, it is important to learn how to keep safe from harming yourself.    Recognize the warning signs:  · Abrupt changes in personality, positive or negative- including increase in energy   · Giving away possessions  · Change in eating patterns- significant weight changes-  positive or negative  · Change in sleeping patterns- unable to sleep or sleeping all the time   · Unwillingness or inability to communicate  · Depression  · Unusual sadness, discouragement and loneliness  · Talk of wanting to die  · Neglect of personal appearance   · Rebelliousness- reckless behavior  · Withdrawal from people/activities they love  · Confusion- inability to concentrate     If you or a loved one observes any of these behaviors or has concerns about self-harm, here's what you can do:  · Talk about it- your feelings and reasons for harming yourself  · Remove any means that you might use to hurt yourself (examples: pills, rope, extension cords, firearm)  · Get professional help from the community (Mental Health, Substance Abuse, psychological counseling)  · Do not be alone:Call your Safe Contact- someone whom you trust who will be there for you.  · Call your local CRISIS HOTLINE 762-3127 or 219-187-2105  · Call your local Children's Mobile Crisis Response Team [place] (998) 041-7167 or www.[facility]  · Call the toll free National Suicide  Prevention Hotlines   · National Suicide Prevention Lifeline 050-824-RCPC (5716)  · National Hope Line Network 800-SUICIDE (071-6252)

## 2020-09-02 NOTE — OP REPORT
"DATE OF SERVICE:  09/02/2020    PREOPERATIVE DIAGNOSES:  Dysphagia, severe gastroesophageal reflux.    POSTOPERATIVE DIAGNOSES:  1.  Irregular Z-line with distal esophagitis, LA grade B.  2.  Polypoid lesion in distal esophagus, 4 cm proximal to Z-line.  3.  Hiatal hernia.  4.  Somewhat irregular, mildly narrowed proximal sleeve.  5.  Mild antral gastritis.  6.  Otherwise, normal gastric sleeve anatomy and duodenum.    OPERATIONS PERFORMED:  1.  Esophagogastroduodenoscopy.  2.  WATS brushings biopsy of gastroesophageal junction and distal esophagus.  3.  Hot snare polypectomy and retrieval of 10 mm lobular polypoid distal   esophageal lesion.    SURGEON:  Gadiel Cox MD    ANESTHESIOLOGIST:  Elbert Cornelius MD    INDICATIONS FOR PROCEDURE:  The patient is a 55-year-old female with history   of morbid obesity and prior sleeve gastrectomy who has severe gastroesophageal   reflux disease as well as a sensation of dysphagia and \"cat esophagus\" per   her description.  She comes today for endoscopic evaluation.    DETAILS OF PROCEDURE:  After an extensive informed consent discussion process,   the patient was brought to the operating room.  She was placed in a supine   position on the operating table.  After induction of general anesthesia and   placement of an endotracheal tube, the oral bite block was carefully placed   into position.  The well-lubricated gastroscope was slowly advanced into the   mouth and into the oropharynx and esophagus without difficulty.  It was slowly   advanced down and an erythematous beefy red polypoid lesion was present in   the distal esophagus approximately 4 cm proximal to the Z-line, which was   irregular and had significant 1-2 cm tongues of salmon-colored mucosa   extending upward with further subtle irregularity.  The gastroscope was slowly   advanced through the gastroesophageal junction and the proximal portion of   the sleeve exhibited a bit of narrowing with some web-like scar " tissue and   angulation, but the gastroscope was easily advanced through this area.  The   remainder of the sleeve anatomy appeared generally normal.  Mild antral   gastritis was noted.  The pylorus was normal in appearance.  The gastroscope   was advanced through the pylorus to the first, second, and third portions of   the duodenum.  Careful inspection was performed, which demonstrated   normal-appearing duodenum and papillae.  Gradually, the gastroscope was   withdrawn.  The anatomy was otherwise generally normal.  No retroflexion view   was performed.    Slowly, the gastroscope was withdrawn.  The rest of the sleeve exhibited   fairly normal appearance with proximal mentioned findings.    The WATS brushings were passed down the gastroscope channel and brushings were   obtained of the gastroesophageal junction, Z-line, distal esophagus area and   sent for outside pathology.  Next, careful examination of the polypoid lesion   was performed.  A hot snare was chosen.  This was passed down the gastroscope   and looped around the lesion at its neck.  Cautery was applied and the polyp   was excised.  It was retrieved with the snare and brought out and sent for   pathology.    Gastroscope was reintroduced and careful inspection demonstrated excellent   hemostasis at the stalk of the snare.  The remainder of the tissue exhibited   normal hemostasis.  Gastroscope was then slowly withdrawn.    Note that there was a small-to-moderate hiatal hernia present.    IMPRESSION:  1.  Small-to-moderate hiatal hernia.  2.  Esophagitis and suspicion for Hand's esophagus.  3.  Distal esophageal polypoid lesion.    PLAN:  Await pathology.  Recommend revision surgery with hiatal hernia repair   and conversion to gastroenterostomy given these findings.       ____________________________________     MD SASHA MARY / NTS    DD:  09/02/2020 07:48:05  DT:  09/02/2020 08:38:20    D#:  [id number]  Job#:  [id number]   The patient is a 1y3m Female complaining of see chief complaint quote.

## 2020-09-02 NOTE — ANESTHESIA QCDR
2019 [facility] Clinical Data Registry (for Quality Improvement)     Postoperative nausea/vomiting risk protocol (Adult = 18 yrs and Pediatric 3-17 yrs)- (430 and 463)  General inhalation anesthetic (NOT TIVA) with PONV risk factors: No  Provision of anti-emetic therapy with at least 2 different classes of agents: N/A  Patient DID NOT receive anti-emetic therapy and reason is documented in Medical Record: N/A    Multimodal Pain Management- (477)  Non-emergent surgery AND patient age >= 18: Yes  Use of Multimodal Pain Management, two or more drugs and/or interventions, NOT including systemic opioids: Yes  Exception: Documented allergy to multiple classes of analgesics: N/A    Smoking Abstinence (404)  Patient is current smoker (cigarette, pipe, e-cig, marijuanna): No  Elective Surgery:   Abstinence instructions provided prior to day of surgery:   Patient abstained from smoking on day of surgery:     Pre-Op Beta-Blocker in Isolated CABG (44)  Isolated CABG AND patient age >= 18: No  Beta-blocker admin within 24 hours of surgical incision:   Exception:of medical reason(s) for not administering beta blocker within 24 hours prior to surgical incision (e.g., not  indicated,other medical reason):     PACU assessment of acute postoperative pain prior to Anesthesia Care End- Applies to Patients Age = 18- (ABG7)  Initial PACU pain score is which of the following: < 7/10  Patient unable to report pain score: N/A    Post-anesthetic transfer of care checklist/protocol to PACU/ICU- (426 and 427)  Upon conclusion of case, patient transferred to which of the following locations: PACU/Non-ICU  Use of transfer checklist/protocol: Yes  Exclusion: Service Performed in Patient Hospital Room (and thus did not require transfer): N/A  Unplanned admission to ICU related to anesthesia service up through end of PACU care- (MD51)  Unplanned admission to ICU (not initially anticipated at anesthesia start time): No

## 2020-09-02 NOTE — ANESTHESIA TIME REPORT
Anesthesia Start and Stop Event Times     Date Time Event    9/2/2020 0634 Ready for Procedure     0706 Anesthesia Start     0733 Anesthesia Stop        Responsible Staff  09/02/20    Name Role Begin End    Elbert Cornelius M.D. Anesth 0706 0733        Preop Diagnosis (Free Text):  Pre-op Diagnosis     GERD        Preop Diagnosis (Codes):    Post op Diagnosis  Morbid (severe) obesity due to excess calories      Premium Reason  Non-Premium    Comments:

## 2020-09-02 NOTE — TELEPHONE ENCOUNTER
ESTABLISHED PATIENT PRE-VISIT PLANNING     Patient was NOT contacted to complete PVP.  1.  Reviewed notes from the last few office visits within the medical group: Yes    2.  If any orders were placed at last visit or intended to be done for this visit (i.e. 6 mos follow-up), do we have Results/Consult Notes?        •  Labs - Labs were not ordered at last office visit.       •  Imaging - Imaging was not ordered at last office visit.       •  Referrals - No referrals were ordered at last office visit.    3. Is this appointment scheduled as a Hospital Follow-Up? No    4.  Immunizations were updated in Epic using WebIZ?:        •  Web Iz Recommendations:     5.  Patient is due for the following Health Maintenance Topics:   Health Maintenance Due   Topic Date Due   • COLON CANCER SCREENING ANNUAL FIT  02/04/2013   • IMM ZOSTER VACCINES (1 of 2) 02/04/2013   • Annual Wellness Visit  02/28/2019   • IMM INFLUENZA (1) 09/01/2020       6. Orders for overdue Health Maintenance topics pended in Pre-Charting? NO    7.  AHA (MDX) form printed for Provider? No, already completed    8.  Patient was NOT informed to arrive 15 min prior to their scheduled appointment and bring in their medication bottles.

## 2020-09-02 NOTE — ANESTHESIA PREPROCEDURE EVALUATION
Relevant Problems   CARDIAC   (+) Hypertension   (+) Pulmonary HTN (HCC)      ENDO   (+) Hypothyroidism due to Hashimoto's thyroiditis       Physical Exam    Airway   Mallampati: II  TM distance: >3 FB  Neck ROM: full       Cardiovascular - normal exam  Rhythm: regular  Rate: normal     Dental - normal exam           Pulmonary - normal exam  Breath sounds clear to auscultation     Abdominal    Neurological - normal exam                 Anesthesia Plan    ASA 4       Plan - general       Airway plan will be ETT        Induction: intravenous    Postoperative Plan: Postoperative administration of opioids is intended.    Pertinent diagnostic labs and testing reviewed    Informed Consent:    Anesthetic plan and risks discussed with patient.    Use of blood products discussed with: patient whom consented to blood products.

## 2020-09-02 NOTE — ANESTHESIA POSTPROCEDURE EVALUATION
Patient: Carolynn Casey    Procedure Summary     Date: 09/02/20 Room / Location: [place] [address] / SURGERY [facility]    Anesthesia Start: 0706 Anesthesia Stop: 0733    Procedure: GASTROSCOPY-WITH WATS BIOPSY (Esophagus) Diagnosis: (GERD)    Surgeon: Gadiel Cox M.D. Responsible Provider: Elbert Cornelius M.D.    Anesthesia Type: general ASA Status: 4          Final Anesthesia Type: general  Last vitals  BP   Blood Pressure: (!) 181/102    Temp   36.5 °C (97.7 °F)    Pulse   Pulse: 85   Resp   16    SpO2   95 %      Anesthesia Post Evaluation    Patient location during evaluation: PACU  Patient participation: complete - patient participated  Level of consciousness: awake and alert  Pain score: 0    Airway patency: patent  Anesthetic complications: no  Cardiovascular status: hemodynamically stable  Respiratory status: acceptable  Hydration status: euvolemic    PONV: none           Nurse Pain Score: 3 (NPRS)

## 2020-09-02 NOTE — ANESTHESIA PROCEDURE NOTES
Airway    Date/Time: 9/2/2020 7:10 AM  Performed by: Elbert Cornelius M.D.  Authorized by: Elbert Cornelius M.D.     Location:  OR  Urgency:  Elective  Indications for Airway Management:  Anesthesia      Spontaneous Ventilation: absent    Sedation Level:  Deep  Preoxygenated: Yes    Patient Position:  Sniffing  Final Airway Type:  Endotracheal airway  Final Endotracheal Airway:  ETT  Cuffed: Yes    Technique Used for Successful ETT Placement:  Direct laryngoscopy  Devices/Methods Used in Placement:  Intubating stylet    Insertion Site:  Oral  Blade Type:  Oates  Laryngoscope Blade/Videolaryngoscope Blade Size:  2  ETT Size (mm):  7.0  Measured from:  Lips  ETT to Lips (cm):  21  Placement Verified by: auscultation and capnometry    Cormack-Lehane Classification:  Grade IIa - partial view of glottis  Number of Attempts at Approach:  1

## 2020-09-04 SDOH — ECONOMIC STABILITY: HOUSING INSECURITY
IN THE LAST 12 MONTHS, WAS THERE A TIME WHEN YOU DID NOT HAVE A STEADY PLACE TO SLEEP OR SLEPT IN A SHELTER (INCLUDING NOW)?: NO

## 2020-09-04 SDOH — ECONOMIC STABILITY: HOUSING INSECURITY: IN THE LAST 12 MONTHS, HOW MANY PLACES HAVE YOU LIVED?: 1

## 2020-09-04 SDOH — ECONOMIC STABILITY: INCOME INSECURITY: IN THE LAST 12 MONTHS, WAS THERE A TIME WHEN YOU WERE NOT ABLE TO PAY THE MORTGAGE OR RENT ON TIME?: NO

## 2020-09-04 SDOH — ECONOMIC STABILITY: TRANSPORTATION INSECURITY
IN THE PAST 12 MONTHS, HAS LACK OF RELIABLE TRANSPORTATION KEPT YOU FROM MEDICAL APPOINTMENTS, MEETINGS, WORK OR FROM GETTING THINGS NEEDED FOR DAILY LIVING?: YES

## 2020-09-04 SDOH — HEALTH STABILITY: PHYSICAL HEALTH: ON AVERAGE, HOW MANY DAYS PER WEEK DO YOU ENGAGE IN MODERATE TO STRENUOUS EXERCISE (LIKE A BRISK WALK)?: 1 DAY

## 2020-09-04 SDOH — ECONOMIC STABILITY: TRANSPORTATION INSECURITY
IN THE PAST 12 MONTHS, HAS THE LACK OF TRANSPORTATION KEPT YOU FROM MEDICAL APPOINTMENTS OR FROM GETTING MEDICATIONS?: YES

## 2020-09-04 SDOH — HEALTH STABILITY: MENTAL HEALTH
STRESS IS WHEN SOMEONE FEELS TENSE, NERVOUS, ANXIOUS, OR CAN'T SLEEP AT NIGHT BECAUSE THEIR MIND IS TROUBLED. HOW STRESSED ARE YOU?: VERY MUCH

## 2020-09-04 SDOH — HEALTH STABILITY: PHYSICAL HEALTH: ON AVERAGE, HOW MANY MINUTES DO YOU ENGAGE IN EXERCISE AT THIS LEVEL?: 20 MINUTES

## 2020-09-04 ASSESSMENT — SOCIAL DETERMINANTS OF HEALTH (SDOH)
HOW OFTEN DO YOU HAVE SIX OR MORE DRINKS ON ONE OCCASION: NEVER
HOW HARD IS IT FOR YOU TO PAY FOR THE VERY BASICS LIKE FOOD, HOUSING, MEDICAL CARE, AND HEATING?: SOMEWHAT HARD
IN A TYPICAL WEEK, HOW MANY TIMES DO YOU TALK ON THE PHONE WITH FAMILY, FRIENDS, OR NEIGHBORS?: MORE THAN THREE TIMES A WEEK
HOW OFTEN DO YOU GET TOGETHER WITH FRIENDS OR RELATIVES?: THREE TIMES A WEEK
WITHIN THE PAST 12 MONTHS, THE FOOD YOU BOUGHT JUST DIDN'T LAST AND YOU DIDN'T HAVE MONEY TO GET MORE: NEVER TRUE
HOW OFTEN DO YOU ATTENT MEETINGS OF THE CLUB OR ORGANIZATION YOU BELONG TO?: NEVER
WITHIN THE PAST 12 MONTHS, YOU WORRIED THAT YOUR FOOD WOULD RUN OUT BEFORE YOU GOT THE MONEY TO BUY MORE: NEVER TRUE
HOW OFTEN DO YOU ATTEND CHURCH OR RELIGIOUS SERVICES?: NEVER
DO YOU BELONG TO ANY CLUBS OR ORGANIZATIONS SUCH AS CHURCH GROUPS UNIONS, FRATERNAL OR ATHLETIC GROUPS, OR SCHOOL GROUPS?: NO
HOW OFTEN DO YOU HAVE A DRINK CONTAINING ALCOHOL: NEVER

## 2020-09-08 ENCOUNTER — OFFICE VISIT (OUTPATIENT)
Dept: MEDICAL GROUP | Facility: MEDICAL CENTER | Age: 55
End: 2020-09-08
Payer: MEDICARE

## 2020-09-08 VITALS
RESPIRATION RATE: 16 BRPM | HEIGHT: 65 IN | OXYGEN SATURATION: 96 % | SYSTOLIC BLOOD PRESSURE: 128 MMHG | WEIGHT: 260 LBS | TEMPERATURE: 97.6 F | HEART RATE: 74 BPM | DIASTOLIC BLOOD PRESSURE: 70 MMHG | BODY MASS INDEX: 43.32 KG/M2

## 2020-09-08 DIAGNOSIS — R73.03 PREDIABETES: ICD-10-CM

## 2020-09-08 DIAGNOSIS — E03.9 HYPOTHYROIDISM, UNSPECIFIED TYPE: ICD-10-CM

## 2020-09-08 DIAGNOSIS — Z98.84 S/P BARIATRIC SURGERY: ICD-10-CM

## 2020-09-08 DIAGNOSIS — Z12.11 SCREEN FOR COLON CANCER: ICD-10-CM

## 2020-09-08 DIAGNOSIS — I10 ESSENTIAL HYPERTENSION: ICD-10-CM

## 2020-09-08 DIAGNOSIS — E66.01 MORBID OBESITY WITH BMI OF 40.0-44.9, ADULT (HCC): ICD-10-CM

## 2020-09-08 DIAGNOSIS — M15.9 PRIMARY OSTEOARTHRITIS INVOLVING MULTIPLE JOINTS: ICD-10-CM

## 2020-09-08 DIAGNOSIS — F51.01 PRIMARY INSOMNIA: ICD-10-CM

## 2020-09-08 DIAGNOSIS — M43.10 DEGENERATIVE SPONDYLOLISTHESIS: ICD-10-CM

## 2020-09-08 DIAGNOSIS — Z98.890 HX OF LUMBOSACRAL SPINE SURGERY: ICD-10-CM

## 2020-09-08 DIAGNOSIS — F13.20 HYPNOTIC DEPENDENCE (HCC): ICD-10-CM

## 2020-09-08 PROBLEM — N63.20 LEFT BREAST MASS: Status: RESOLVED | Noted: 2017-08-03 | Resolved: 2020-09-08

## 2020-09-08 PROBLEM — F32.9 MAJOR DEPRESSIVE DISORDER: Status: RESOLVED | Noted: 2018-02-27 | Resolved: 2020-09-08

## 2020-09-08 PROCEDURE — 99214 OFFICE O/P EST MOD 30 MIN: CPT | Performed by: FAMILY MEDICINE

## 2020-09-08 RX ORDER — TIZANIDINE 4 MG/1
4 TABLET ORAL
COMMUNITY
Start: 2020-08-14 | End: 2020-09-08

## 2020-09-08 ASSESSMENT — FIBROSIS 4 INDEX: FIB4 SCORE: 0.72

## 2020-09-08 NOTE — PROGRESS NOTES
cc: Hypothyroidism    Subjective:     Carolynn Casey is a 55 y.o. female presenting [facility] care:    1.  Hypothyroidism: She reports being diagnosed with hypothyroidism over 12 years ago.  She has been on levothyroxine 25 mcg daily since then.  She wonders if the dose is accurate.  Her last labs recently were normal.  She continues to describe dry skin, itchy at times, hair loss, brittle nails, constipation, heat/cold intolerance, weight gain.  She occasionally feels depressed and has mood swings, but is not consistent.  She also has low energy, feels somewhat foggy brain.  She denies any suicidal thoughts, anhedonia.  She denies any apneic episodes, no snoring.  She does not feel refreshed in the morning, but does not feel the need to nap during the daytime.      2.  Hypertension: Has a history of hypertension, has been stable.  Is currently on amlodipine 10 mg daily, losartan 50 mg daily.  She does not exercise regularly.  She does not eat healthy.    3.  Obesity: Has a history of a gastric sleeve surgery last year, but has gained some weight.  She also has severe heartburn.  She plans to undergo a revision later this year, surgery has not been set yet.  She has tried omeprazole, Tums, Luz-Blossom for her heartburn, which do not seem to help.  She is not interested in trying another medication for this.  She would like to wait until after her surgery.    4.  Arthritis: Has a history of arthritis in her knees on back.  She sees the [place] [facility].  Has had multiple surgeries in the past.  Is currently on Tylenol thousand milligrams twice a day, ibuprofen 400 mg twice a day, Flexeril as needed during the daytime, tizanidine at night.  Denies any side effects.  She reports being on opioids in the past, but has been off of these for about 10 years.    5.  Insomnia: Has a history of insomnia, is currently on Ambien 10 mg daily.  Denies any side effects.      Review of systems:  See above.       Current  "Outpatient Medications:   •  losartan (COZAAR) 50 MG Tab, Take 1 Tab by mouth every day., Disp: 90 Tab, Rfl: 0  •  tizanidine (ZANAFLEX) 4 MG capsule, Take 1 Cap by mouth at bedtime as needed., Disp: 90 Cap, Rfl: 1  •  cyclobenzaprine (FLEXERIL) 10 MG Tab, Take 1 Tab by mouth 2 times a day as needed for Mild Pain or Muscle Spasms., Disp: 180 Tab, Rfl: 1  •  zolpidem (AMBIEN) 10 MG Tab, Take 1 Tab by mouth at bedtime as needed for Sleep for up to 30 days., Disp: 30 Tab, Rfl: 2  •  amLODIPine (NORVASC) 10 MG Tab, Take 1 tablet by mouth once daily, Disp: 90 Tab, Rfl: 1  •  levothyroxine (SYNTHROID) 25 MCG Tab, Take 1 Tab by mouth Every morning on an empty stomach. OK TO CRUSH., Disp: 90 Tab, Rfl: 1  •  acetaminophen (TYLENOL) 500 MG Tab, Take 2,000 mg by mouth 2 Times a Day., Disp: , Rfl:     Allergies, past medical history, past surgical history, family history, social history reviewed and updated    Objective:     Vitals: /70   Pulse 74   Temp 36.4 °C (97.6 °F)   Resp 16   Ht 1.651 m (5' 5\")   Wt 117.9 kg (260 lb)   LMP  (LMP Unknown)   SpO2 96%   BMI 43.27 kg/m²   General: Alert, pleasant, NAD  HEENT: Normocephalic.  EOMI, no icterus or pallor.  Conjunctivae and lids normal. External ears normal. Oropharynx non-erythematous, mucous membranes moist.  Neck supple.  No thyromegaly or masses palpated. No cervical or supraclavicular lymphadenopathy.  Heart: Regular rate and rhythm.  S1 and S2 normal.  No murmurs appreciated.  Respiratory: Normal respiratory effort.  Clear to auscultation bilaterally.  Abdomen: Non-distended, soft  Skin: Warm, dry, no rashes.  Musculoskeletal: Gait is normal.  Moves all extremities well.  Extremities: No leg edema.  Psych:  Affect/mood is normal, judgement is good, memory is intact, grooming is appropriate.    Assessment/Plan:     Carolynn was seen today for establish care.    Diagnoses and all orders for this visit:    Hypothyroidism, unspecified type  New problem.  We " discussed that her symptoms are likely not due to her thyroid.  Also question whether she actually needs the levothyroxine given that it is such a low dose.  We discussed potentially stopping in the future and rechecking labs.  Will wait until after her bariatric surgery.  We also discussed testing for sleep apnea, will revisit this after her surgery    Essential hypertension  New problem, stable, continue amlodipine and losartan    Morbid obesity with BMI of 40.0-44.9, adult (HCC)  -     Patient identified as having weight management issue.  Appropriate orders and counseling given.    S/P bariatric surgery  New problem, managed by bariatric surgery    Prediabetes  New problem, stable.  Continue to monitor    Degenerative spondylolisthesis  Primary osteoarthritis involving multiple joints  Hx of lumbosacral spine surgery  New problem, stable, managed by orthopedics    Primary insomnia  Hypnotic dependence (HCC)  New problem, stable.  She will make an appointment when she needs refills of her Ambien.  We discussed doing a controlled substance agreement/uds at that point.    Screen for colon cancer  -     COLOGUARD (FIT DNA)          Return in about 2 months (around 11/8/2020) for Med check, routine follow up.

## 2020-09-11 RX ORDER — ERGOCALCIFEROL 1.25 MG/1
CAPSULE ORAL
Qty: 5 CAP | OUTPATIENT
Start: 2020-09-11

## 2020-09-16 ENCOUNTER — PATIENT MESSAGE (OUTPATIENT)
Dept: MEDICAL GROUP | Facility: MEDICAL CENTER | Age: 55
End: 2020-09-16

## 2020-09-18 RX ORDER — AMLODIPINE BESYLATE 10 MG/1
10 TABLET ORAL DAILY
Qty: 10 TAB | Refills: 0 | Status: SHIPPED | OUTPATIENT
Start: 2020-09-18 | End: 2020-09-28

## 2020-09-30 ENCOUNTER — HOSPITAL ENCOUNTER (OUTPATIENT)
Dept: RADIOLOGY | Facility: MEDICAL CENTER | Age: 55
DRG: 327 | End: 2020-09-30
Attending: COLON & RECTAL SURGERY
Payer: MEDICARE

## 2020-09-30 ENCOUNTER — PRE-ADMISSION TESTING (OUTPATIENT)
Dept: ADMISSIONS | Facility: MEDICAL CENTER | Age: 55
DRG: 327 | End: 2020-09-30
Attending: COLON & RECTAL SURGERY
Payer: MEDICARE

## 2020-09-30 DIAGNOSIS — Z01.812 PRE-OPERATIVE LABORATORY EXAMINATION: ICD-10-CM

## 2020-09-30 DIAGNOSIS — Z01.811 PRE-OPERATIVE RESPIRATORY EXAMINATION: ICD-10-CM

## 2020-09-30 LAB
ALBUMIN SERPL BCP-MCNC: 4.6 G/DL (ref 3.2–4.9)
ALBUMIN/GLOB SERPL: 1.4 G/DL
ALP SERPL-CCNC: 96 U/L (ref 30–99)
ALT SERPL-CCNC: 29 U/L (ref 2–50)
ANION GAP SERPL CALC-SCNC: 16 MMOL/L (ref 7–16)
AST SERPL-CCNC: 16 U/L (ref 12–45)
BASOPHILS # BLD AUTO: 0.8 % (ref 0–1.8)
BASOPHILS # BLD: 0.08 K/UL (ref 0–0.12)
BILIRUB SERPL-MCNC: 0.9 MG/DL (ref 0.1–1.5)
BUN SERPL-MCNC: 11 MG/DL (ref 8–22)
CALCIUM SERPL-MCNC: 9.8 MG/DL (ref 8.5–10.5)
CHLORIDE SERPL-SCNC: 98 MMOL/L (ref 96–112)
CO2 SERPL-SCNC: 23 MMOL/L (ref 20–33)
COVID ORDER STATUS COVID19: NORMAL
CREAT SERPL-MCNC: 0.46 MG/DL (ref 0.5–1.4)
EOSINOPHIL # BLD AUTO: 0.47 K/UL (ref 0–0.51)
EOSINOPHIL NFR BLD: 4.5 % (ref 0–6.9)
ERYTHROCYTE [DISTWIDTH] IN BLOOD BY AUTOMATED COUNT: 42.3 FL (ref 35.9–50)
GLOBULIN SER CALC-MCNC: 3.3 G/DL (ref 1.9–3.5)
GLUCOSE SERPL-MCNC: 99 MG/DL (ref 65–99)
HCT VFR BLD AUTO: 50.4 % (ref 37–47)
HGB BLD-MCNC: 16.2 G/DL (ref 12–16)
IMM GRANULOCYTES # BLD AUTO: 0.04 K/UL (ref 0–0.11)
IMM GRANULOCYTES NFR BLD AUTO: 0.4 % (ref 0–0.9)
INR PPP: 0.9 (ref 0.87–1.13)
LYMPHOCYTES # BLD AUTO: 3.23 K/UL (ref 1–4.8)
LYMPHOCYTES NFR BLD: 31 % (ref 22–41)
MCH RBC QN AUTO: 28.5 PG (ref 27–33)
MCHC RBC AUTO-ENTMCNC: 32.1 G/DL (ref 33.6–35)
MCV RBC AUTO: 88.7 FL (ref 81.4–97.8)
MONOCYTES # BLD AUTO: 0.66 K/UL (ref 0–0.85)
MONOCYTES NFR BLD AUTO: 6.3 % (ref 0–13.4)
NEUTROPHILS # BLD AUTO: 5.94 K/UL (ref 2–7.15)
NEUTROPHILS NFR BLD: 57 % (ref 44–72)
NRBC # BLD AUTO: 0 K/UL
NRBC BLD-RTO: 0 /100 WBC
PLATELET # BLD AUTO: 345 K/UL (ref 164–446)
PMV BLD AUTO: 8.8 FL (ref 9–12.9)
POTASSIUM SERPL-SCNC: 4.4 MMOL/L (ref 3.6–5.5)
PROT SERPL-MCNC: 7.9 G/DL (ref 6–8.2)
PROTHROMBIN TIME: 12.5 SEC (ref 12–14.6)
RBC # BLD AUTO: 5.68 M/UL (ref 4.2–5.4)
SARS-COV-2 RNA RESP QL NAA+PROBE: NOTDETECTED
SODIUM SERPL-SCNC: 137 MMOL/L (ref 135–145)
SPECIMEN SOURCE: NORMAL
WBC # BLD AUTO: 10.4 K/UL (ref 4.8–10.8)

## 2020-09-30 PROCEDURE — 85025 COMPLETE CBC W/AUTO DIFF WBC: CPT

## 2020-09-30 PROCEDURE — 71045 X-RAY EXAM CHEST 1 VIEW: CPT

## 2020-09-30 PROCEDURE — 85610 PROTHROMBIN TIME: CPT

## 2020-09-30 PROCEDURE — 80053 COMPREHEN METABOLIC PANEL: CPT

## 2020-09-30 PROCEDURE — 36415 COLL VENOUS BLD VENIPUNCTURE: CPT

## 2020-09-30 PROCEDURE — U0003 INFECTIOUS AGENT DETECTION BY NUCLEIC ACID (DNA OR RNA); SEVERE ACUTE RESPIRATORY SYNDROME CORONAVIRUS 2 (SARS-COV-2) (CORONAVIRUS DISEASE [COVID-19]), AMPLIFIED PROBE TECHNIQUE, MAKING USE OF HIGH THROUGHPUT TECHNOLOGIES AS DESCRIBED BY CMS-2020-01-R: HCPCS

## 2020-09-30 ASSESSMENT — FIBROSIS 4 INDEX: FIB4 SCORE: 0.72

## 2020-10-02 ENCOUNTER — APPOINTMENT (OUTPATIENT)
Dept: RADIOLOGY | Facility: MEDICAL CENTER | Age: 55
DRG: 327 | End: 2020-10-02
Attending: COLON & RECTAL SURGERY
Payer: MEDICARE

## 2020-10-02 ENCOUNTER — HOSPITAL ENCOUNTER (INPATIENT)
Facility: MEDICAL CENTER | Age: 55
LOS: 1 days | DRG: 327 | End: 2020-10-03
Attending: COLON & RECTAL SURGERY | Admitting: COLON & RECTAL SURGERY
Payer: MEDICARE

## 2020-10-02 ENCOUNTER — ANESTHESIA EVENT (OUTPATIENT)
Dept: SURGERY | Facility: MEDICAL CENTER | Age: 55
DRG: 327 | End: 2020-10-02
Payer: MEDICARE

## 2020-10-02 ENCOUNTER — ANESTHESIA (OUTPATIENT)
Dept: SURGERY | Facility: MEDICAL CENTER | Age: 55
DRG: 327 | End: 2020-10-02
Payer: MEDICARE

## 2020-10-02 DIAGNOSIS — G89.18 POSTOPERATIVE PAIN: ICD-10-CM

## 2020-10-02 PROCEDURE — 0DQV4ZZ REPAIR MESENTERY, PERCUTANEOUS ENDOSCOPIC APPROACH: ICD-10-PCS | Performed by: COLON & RECTAL SURGERY

## 2020-10-02 PROCEDURE — A9270 NON-COVERED ITEM OR SERVICE: HCPCS | Performed by: PHYSICIAN ASSISTANT

## 2020-10-02 PROCEDURE — 160048 HCHG OR STATISTICAL LEVEL 1-5: Performed by: COLON & RECTAL SURGERY

## 2020-10-02 PROCEDURE — 0D164ZA BYPASS STOMACH TO JEJUNUM, PERCUTANEOUS ENDOSCOPIC APPROACH: ICD-10-PCS | Performed by: COLON & RECTAL SURGERY

## 2020-10-02 PROCEDURE — 500522 HCHG ENDOSTITCH SUTURING DEVICE: Performed by: COLON & RECTAL SURGERY

## 2020-10-02 PROCEDURE — A9270 NON-COVERED ITEM OR SERVICE: HCPCS | Performed by: ANESTHESIOLOGY

## 2020-10-02 PROCEDURE — 94760 N-INVAS EAR/PLS OXIMETRY 1: CPT

## 2020-10-02 PROCEDURE — 700105 HCHG RX REV CODE 258: Performed by: PHYSICIAN ASSISTANT

## 2020-10-02 PROCEDURE — 502652 HCHG STAPLES, ETHICON ECR60: Performed by: COLON & RECTAL SURGERY

## 2020-10-02 PROCEDURE — 160035 HCHG PACU - 1ST 60 MINS PHASE I: Performed by: COLON & RECTAL SURGERY

## 2020-10-02 PROCEDURE — 501571 HCHG TROCAR, SEPARATOR 12X100: Performed by: COLON & RECTAL SURGERY

## 2020-10-02 PROCEDURE — 160009 HCHG ANES TIME/MIN: Performed by: COLON & RECTAL SURGERY

## 2020-10-02 PROCEDURE — 700101 HCHG RX REV CODE 250: Performed by: ANESTHESIOLOGY

## 2020-10-02 PROCEDURE — 160039 HCHG SURGERY MINUTES - EA ADDL 1 MIN LEVEL 3: Performed by: COLON & RECTAL SURGERY

## 2020-10-02 PROCEDURE — 700111 HCHG RX REV CODE 636 W/ 250 OVERRIDE (IP): Performed by: ANESTHESIOLOGY

## 2020-10-02 PROCEDURE — 700102 HCHG RX REV CODE 250 W/ 637 OVERRIDE(OP): Performed by: ANESTHESIOLOGY

## 2020-10-02 PROCEDURE — 502570 HCHG PACK, GASTRIC BANDING: Performed by: COLON & RECTAL SURGERY

## 2020-10-02 PROCEDURE — 501570 HCHG TROCAR, SEPARATOR: Performed by: COLON & RECTAL SURGERY

## 2020-10-02 PROCEDURE — 501576 HCHG TROCAR, SMTH CAN&SEAL12: Performed by: COLON & RECTAL SURGERY

## 2020-10-02 PROCEDURE — 700111 HCHG RX REV CODE 636 W/ 250 OVERRIDE (IP): Performed by: COLON & RECTAL SURGERY

## 2020-10-02 PROCEDURE — 501838 HCHG SUTURE GENERAL: Performed by: COLON & RECTAL SURGERY

## 2020-10-02 PROCEDURE — 160028 HCHG SURGERY MINUTES - 1ST 30 MINS LEVEL 3: Performed by: COLON & RECTAL SURGERY

## 2020-10-02 PROCEDURE — 501497 HCHG SURGICLIP: Performed by: COLON & RECTAL SURGERY

## 2020-10-02 PROCEDURE — 700102 HCHG RX REV CODE 250 W/ 637 OVERRIDE(OP): Performed by: PHYSICIAN ASSISTANT

## 2020-10-02 PROCEDURE — 501338 HCHG SHEARS, ENDO: Performed by: COLON & RECTAL SURGERY

## 2020-10-02 PROCEDURE — 700105 HCHG RX REV CODE 258: Performed by: ANESTHESIOLOGY

## 2020-10-02 PROCEDURE — 160036 HCHG PACU - EA ADDL 30 MINS PHASE I: Performed by: COLON & RECTAL SURGERY

## 2020-10-02 PROCEDURE — 700111 HCHG RX REV CODE 636 W/ 250 OVERRIDE (IP): Performed by: PHYSICIAN ASSISTANT

## 2020-10-02 PROCEDURE — 500521 HCHG ENDOSTITCH LOAD UNIT: Performed by: COLON & RECTAL SURGERY

## 2020-10-02 PROCEDURE — A9270 NON-COVERED ITEM OR SERVICE: HCPCS | Performed by: COLON & RECTAL SURGERY

## 2020-10-02 PROCEDURE — 160002 HCHG RECOVERY MINUTES (STAT): Performed by: COLON & RECTAL SURGERY

## 2020-10-02 PROCEDURE — 700101 HCHG RX REV CODE 250: Performed by: COLON & RECTAL SURGERY

## 2020-10-02 PROCEDURE — 501583 HCHG TROCAR, THRD CAN&SEAL 5X100: Performed by: COLON & RECTAL SURGERY

## 2020-10-02 PROCEDURE — 700102 HCHG RX REV CODE 250 W/ 637 OVERRIDE(OP): Performed by: COLON & RECTAL SURGERY

## 2020-10-02 PROCEDURE — 770006 HCHG ROOM/CARE - MED/SURG/GYN SEMI*

## 2020-10-02 RX ORDER — HYDROMORPHONE HYDROCHLORIDE 1 MG/ML
0.2 INJECTION, SOLUTION INTRAMUSCULAR; INTRAVENOUS; SUBCUTANEOUS
Status: DISCONTINUED | OUTPATIENT
Start: 2020-10-02 | End: 2020-10-02 | Stop reason: HOSPADM

## 2020-10-02 RX ORDER — ACETAMINOPHEN 500 MG
1000 TABLET ORAL EVERY 6 HOURS
Status: DISCONTINUED | OUTPATIENT
Start: 2020-10-03 | End: 2020-10-03 | Stop reason: HOSPADM

## 2020-10-02 RX ORDER — HYDROMORPHONE HYDROCHLORIDE 1 MG/ML
0.1 INJECTION, SOLUTION INTRAMUSCULAR; INTRAVENOUS; SUBCUTANEOUS
Status: DISCONTINUED | OUTPATIENT
Start: 2020-10-02 | End: 2020-10-02 | Stop reason: HOSPADM

## 2020-10-02 RX ORDER — ONDANSETRON 2 MG/ML
INJECTION INTRAMUSCULAR; INTRAVENOUS PRN
Status: DISCONTINUED | OUTPATIENT
Start: 2020-10-02 | End: 2020-10-02 | Stop reason: SURG

## 2020-10-02 RX ORDER — GABAPENTIN 300 MG/1
300 CAPSULE ORAL
Status: COMPLETED | OUTPATIENT
Start: 2020-10-02 | End: 2020-10-02

## 2020-10-02 RX ORDER — SIMETHICONE 80 MG
80 TABLET,CHEWABLE ORAL 3 TIMES DAILY PRN
Status: DISCONTINUED | OUTPATIENT
Start: 2020-10-02 | End: 2020-10-03 | Stop reason: HOSPADM

## 2020-10-02 RX ORDER — SCOLOPAMINE TRANSDERMAL SYSTEM 1 MG/1
1 PATCH, EXTENDED RELEASE TRANSDERMAL
Status: DISCONTINUED | OUTPATIENT
Start: 2020-10-02 | End: 2020-10-03 | Stop reason: HOSPADM

## 2020-10-02 RX ORDER — OXYCODONE HCL 10 MG/1
10 TABLET, FILM COATED, EXTENDED RELEASE ORAL
Status: COMPLETED | OUTPATIENT
Start: 2020-10-02 | End: 2020-10-02

## 2020-10-02 RX ORDER — DIPHENHYDRAMINE HYDROCHLORIDE 50 MG/ML
12.5 INJECTION INTRAMUSCULAR; INTRAVENOUS
Status: DISCONTINUED | OUTPATIENT
Start: 2020-10-02 | End: 2020-10-02 | Stop reason: HOSPADM

## 2020-10-02 RX ORDER — HYDROMORPHONE HYDROCHLORIDE 1 MG/ML
0.5 INJECTION, SOLUTION INTRAMUSCULAR; INTRAVENOUS; SUBCUTANEOUS
Status: DISCONTINUED | OUTPATIENT
Start: 2020-10-02 | End: 2020-10-03 | Stop reason: HOSPADM

## 2020-10-02 RX ORDER — OXYCODONE HCL 5 MG/5 ML
5 SOLUTION, ORAL ORAL
Status: COMPLETED | OUTPATIENT
Start: 2020-10-02 | End: 2020-10-02

## 2020-10-02 RX ORDER — OXYCODONE HCL 5 MG/5 ML
5 SOLUTION, ORAL ORAL
Status: DISCONTINUED | OUTPATIENT
Start: 2020-10-02 | End: 2020-10-03 | Stop reason: HOSPADM

## 2020-10-02 RX ORDER — OXYCODONE HCL 5 MG/5 ML
10 SOLUTION, ORAL ORAL
Status: COMPLETED | OUTPATIENT
Start: 2020-10-02 | End: 2020-10-02

## 2020-10-02 RX ORDER — MEPERIDINE HYDROCHLORIDE 25 MG/ML
6.25 INJECTION INTRAMUSCULAR; INTRAVENOUS; SUBCUTANEOUS
Status: DISCONTINUED | OUTPATIENT
Start: 2020-10-02 | End: 2020-10-02 | Stop reason: HOSPADM

## 2020-10-02 RX ORDER — ENALAPRILAT 1.25 MG/ML
2.5 INJECTION INTRAVENOUS EVERY 6 HOURS PRN
Status: DISCONTINUED | OUTPATIENT
Start: 2020-10-02 | End: 2020-10-03 | Stop reason: HOSPADM

## 2020-10-02 RX ORDER — BUPIVACAINE HYDROCHLORIDE AND EPINEPHRINE 5; 5 MG/ML; UG/ML
INJECTION, SOLUTION PERINEURAL
Status: DISCONTINUED | OUTPATIENT
Start: 2020-10-02 | End: 2020-10-02 | Stop reason: HOSPADM

## 2020-10-02 RX ORDER — SODIUM CHLORIDE, SODIUM LACTATE, POTASSIUM CHLORIDE, CALCIUM CHLORIDE 600; 310; 30; 20 MG/100ML; MG/100ML; MG/100ML; MG/100ML
INJECTION, SOLUTION INTRAVENOUS CONTINUOUS
Status: DISCONTINUED | OUTPATIENT
Start: 2020-10-02 | End: 2020-10-02 | Stop reason: HOSPADM

## 2020-10-02 RX ORDER — DEXAMETHASONE SODIUM PHOSPHATE 4 MG/ML
INJECTION, SOLUTION INTRA-ARTICULAR; INTRALESIONAL; INTRAMUSCULAR; INTRAVENOUS; SOFT TISSUE PRN
Status: DISCONTINUED | OUTPATIENT
Start: 2020-10-02 | End: 2020-10-02 | Stop reason: SURG

## 2020-10-02 RX ORDER — AMLODIPINE BESYLATE 10 MG/1
10 TABLET ORAL
Status: DISCONTINUED | OUTPATIENT
Start: 2020-10-03 | End: 2020-10-03 | Stop reason: HOSPADM

## 2020-10-02 RX ORDER — LIDOCAINE HYDROCHLORIDE 20 MG/ML
INJECTION, SOLUTION EPIDURAL; INFILTRATION; INTRACAUDAL; PERINEURAL PRN
Status: DISCONTINUED | OUTPATIENT
Start: 2020-10-02 | End: 2020-10-02 | Stop reason: SURG

## 2020-10-02 RX ORDER — SODIUM CHLORIDE, SODIUM LACTATE, POTASSIUM CHLORIDE, AND CALCIUM CHLORIDE .6; .31; .03; .02 G/100ML; G/100ML; G/100ML; G/100ML
500 INJECTION, SOLUTION INTRAVENOUS
Status: DISCONTINUED | OUTPATIENT
Start: 2020-10-02 | End: 2020-10-03 | Stop reason: HOSPADM

## 2020-10-02 RX ORDER — CEFAZOLIN SODIUM 1 G/3ML
INJECTION, POWDER, FOR SOLUTION INTRAMUSCULAR; INTRAVENOUS PRN
Status: DISCONTINUED | OUTPATIENT
Start: 2020-10-02 | End: 2020-10-02 | Stop reason: SURG

## 2020-10-02 RX ORDER — ACETAMINOPHEN 10 MG/ML
1 INJECTION, SOLUTION INTRAVENOUS ONCE
Status: DISCONTINUED | OUTPATIENT
Start: 2020-10-02 | End: 2020-10-02 | Stop reason: HOSPADM

## 2020-10-02 RX ORDER — DIPHENHYDRAMINE HYDROCHLORIDE 50 MG/ML
12.5 INJECTION INTRAMUSCULAR; INTRAVENOUS EVERY 6 HOURS PRN
Status: DISCONTINUED | OUTPATIENT
Start: 2020-10-02 | End: 2020-10-03 | Stop reason: HOSPADM

## 2020-10-02 RX ORDER — ROCURONIUM BROMIDE 10 MG/ML
INJECTION, SOLUTION INTRAVENOUS PRN
Status: DISCONTINUED | OUTPATIENT
Start: 2020-10-02 | End: 2020-10-02 | Stop reason: SURG

## 2020-10-02 RX ORDER — GABAPENTIN 300 MG/1
300 CAPSULE ORAL 3 TIMES DAILY
Status: DISCONTINUED | OUTPATIENT
Start: 2020-10-02 | End: 2020-10-03 | Stop reason: HOSPADM

## 2020-10-02 RX ORDER — SODIUM CHLORIDE, SODIUM LACTATE, POTASSIUM CHLORIDE, CALCIUM CHLORIDE 600; 310; 30; 20 MG/100ML; MG/100ML; MG/100ML; MG/100ML
INJECTION, SOLUTION INTRAVENOUS CONTINUOUS
Status: DISCONTINUED | OUTPATIENT
Start: 2020-10-02 | End: 2020-10-02

## 2020-10-02 RX ORDER — PROMETHAZINE HYDROCHLORIDE 25 MG/1
25 SUPPOSITORY RECTAL EVERY 4 HOURS PRN
Status: DISCONTINUED | OUTPATIENT
Start: 2020-10-02 | End: 2020-10-03 | Stop reason: HOSPADM

## 2020-10-02 RX ORDER — ONDANSETRON 2 MG/ML
4 INJECTION INTRAMUSCULAR; INTRAVENOUS
Status: DISCONTINUED | OUTPATIENT
Start: 2020-10-02 | End: 2020-10-02 | Stop reason: HOSPADM

## 2020-10-02 RX ORDER — HALOPERIDOL 5 MG/ML
1 INJECTION INTRAMUSCULAR EVERY 6 HOURS PRN
Status: DISCONTINUED | OUTPATIENT
Start: 2020-10-02 | End: 2020-10-03 | Stop reason: HOSPADM

## 2020-10-02 RX ORDER — ONDANSETRON 2 MG/ML
4 INJECTION INTRAMUSCULAR; INTRAVENOUS EVERY 6 HOURS PRN
Status: DISCONTINUED | OUTPATIENT
Start: 2020-10-02 | End: 2020-10-03 | Stop reason: HOSPADM

## 2020-10-02 RX ORDER — HYDROMORPHONE HYDROCHLORIDE 1 MG/ML
0.4 INJECTION, SOLUTION INTRAMUSCULAR; INTRAVENOUS; SUBCUTANEOUS
Status: DISCONTINUED | OUTPATIENT
Start: 2020-10-02 | End: 2020-10-02 | Stop reason: HOSPADM

## 2020-10-02 RX ORDER — CALCIUM CARBONATE 500 MG/1
500 TABLET, CHEWABLE ORAL
Status: DISCONTINUED | OUTPATIENT
Start: 2020-10-02 | End: 2020-10-03 | Stop reason: HOSPADM

## 2020-10-02 RX ORDER — FAMOTIDINE 20 MG/1
20 TABLET, FILM COATED ORAL 2 TIMES DAILY
Status: DISCONTINUED | OUTPATIENT
Start: 2020-10-03 | End: 2020-10-03 | Stop reason: HOSPADM

## 2020-10-02 RX ORDER — LOSARTAN POTASSIUM 50 MG/1
50 TABLET ORAL DAILY
Status: DISCONTINUED | OUTPATIENT
Start: 2020-10-02 | End: 2020-10-03 | Stop reason: HOSPADM

## 2020-10-02 RX ORDER — HALOPERIDOL 5 MG/ML
1 INJECTION INTRAMUSCULAR
Status: DISCONTINUED | OUTPATIENT
Start: 2020-10-02 | End: 2020-10-02 | Stop reason: HOSPADM

## 2020-10-02 RX ORDER — ACETAMINOPHEN 10 MG/ML
1000 INJECTION, SOLUTION INTRAVENOUS EVERY 6 HOURS
Status: COMPLETED | OUTPATIENT
Start: 2020-10-02 | End: 2020-10-02

## 2020-10-02 RX ADMIN — FENTANYL CITRATE 250 MCG: 50 INJECTION, SOLUTION INTRAMUSCULAR; INTRAVENOUS at 10:10

## 2020-10-02 RX ADMIN — HYDROMORPHONE HYDROCHLORIDE 0.2 MG: 1 INJECTION, SOLUTION INTRAMUSCULAR; INTRAVENOUS; SUBCUTANEOUS at 12:14

## 2020-10-02 RX ADMIN — ROCURONIUM BROMIDE 50 MG: 10 INJECTION, SOLUTION INTRAVENOUS at 10:03

## 2020-10-02 RX ADMIN — OXYCODONE HYDROCHLORIDE 10 MG: 5 SOLUTION ORAL at 12:02

## 2020-10-02 RX ADMIN — POTASSIUM CHLORIDE: 149 INJECTION, SOLUTION, CONCENTRATE INTRAVENOUS at 14:23

## 2020-10-02 RX ADMIN — HYDROMORPHONE HYDROCHLORIDE 0.2 MG: 1 INJECTION, SOLUTION INTRAMUSCULAR; INTRAVENOUS; SUBCUTANEOUS at 12:37

## 2020-10-02 RX ADMIN — GABAPENTIN 300 MG: 300 CAPSULE ORAL at 15:55

## 2020-10-02 RX ADMIN — GABAPENTIN 300 MG: 300 CAPSULE ORAL at 08:08

## 2020-10-02 RX ADMIN — SUGAMMADEX 200 MG: 100 INJECTION, SOLUTION INTRAVENOUS at 11:22

## 2020-10-02 RX ADMIN — MIDAZOLAM 2 MG: 1 INJECTION INTRAMUSCULAR; INTRAVENOUS at 09:56

## 2020-10-02 RX ADMIN — DEXAMETHASONE SODIUM PHOSPHATE 4 MG: 4 INJECTION, SOLUTION INTRA-ARTICULAR; INTRALESIONAL; INTRAMUSCULAR; INTRAVENOUS; SOFT TISSUE at 10:10

## 2020-10-02 RX ADMIN — HYDROMORPHONE HYDROCHLORIDE 0.2 MG: 1 INJECTION, SOLUTION INTRAMUSCULAR; INTRAVENOUS; SUBCUTANEOUS at 12:30

## 2020-10-02 RX ADMIN — ACETAMINOPHEN 1000 MG: 10 INJECTION, SOLUTION INTRAVENOUS at 14:23

## 2020-10-02 RX ADMIN — OXYCODONE HYDROCHLORIDE 10 MG: 10 TABLET, FILM COATED, EXTENDED RELEASE ORAL at 08:08

## 2020-10-02 RX ADMIN — LOSARTAN POTASSIUM 50 MG: 50 TABLET, FILM COATED ORAL at 15:55

## 2020-10-02 RX ADMIN — FENTANYL CITRATE 50 MCG: 50 INJECTION INTRAMUSCULAR; INTRAVENOUS at 11:50

## 2020-10-02 RX ADMIN — CEFAZOLIN 2 G: 330 INJECTION, POWDER, FOR SOLUTION INTRAMUSCULAR; INTRAVENOUS at 09:56

## 2020-10-02 RX ADMIN — GABAPENTIN 300 MG: 300 CAPSULE ORAL at 17:35

## 2020-10-02 RX ADMIN — FENTANYL CITRATE 50 MCG: 50 INJECTION INTRAMUSCULAR; INTRAVENOUS at 12:05

## 2020-10-02 RX ADMIN — ACETAMINOPHEN 1000 MG: 10 INJECTION, SOLUTION INTRAVENOUS at 17:34

## 2020-10-02 RX ADMIN — FAMOTIDINE 20 MG: 10 INJECTION INTRAVENOUS at 17:35

## 2020-10-02 RX ADMIN — SODIUM CHLORIDE, POTASSIUM CHLORIDE, SODIUM LACTATE AND CALCIUM CHLORIDE 50 ML: 600; 310; 30; 20 INJECTION, SOLUTION INTRAVENOUS at 12:10

## 2020-10-02 RX ADMIN — OXYCODONE HYDROCHLORIDE 5 MG: 5 SOLUTION ORAL at 19:51

## 2020-10-02 RX ADMIN — Medication 100 MG: at 10:03

## 2020-10-02 RX ADMIN — PROPOFOL 200 MG: 10 INJECTION, EMULSION INTRAVENOUS at 10:03

## 2020-10-02 RX ADMIN — LIDOCAINE HYDROCHLORIDE 100 MG: 20 INJECTION, SOLUTION EPIDURAL; INFILTRATION; INTRACAUDAL at 10:03

## 2020-10-02 RX ADMIN — SCOLOPAMINE TRANSDERMAL SYSTEM 1 PATCH: 1 PATCH, EXTENDED RELEASE TRANSDERMAL at 08:07

## 2020-10-02 RX ADMIN — POVIDONE IODINE 15 ML: 100 SOLUTION TOPICAL at 08:08

## 2020-10-02 RX ADMIN — HYDROMORPHONE HYDROCHLORIDE 0.2 MG: 1 INJECTION, SOLUTION INTRAMUSCULAR; INTRAVENOUS; SUBCUTANEOUS at 12:45

## 2020-10-02 RX ADMIN — POTASSIUM CHLORIDE: 149 INJECTION, SOLUTION, CONCENTRATE INTRAVENOUS at 21:51

## 2020-10-02 RX ADMIN — ONDANSETRON 4 MG: 2 INJECTION INTRAMUSCULAR; INTRAVENOUS at 10:10

## 2020-10-02 ASSESSMENT — LIFESTYLE VARIABLES
TOTAL SCORE: 0
ALCOHOL_USE: NO
EVER FELT BAD OR GUILTY ABOUT YOUR DRINKING: NO
CONSUMPTION TOTAL: NEGATIVE
TOTAL SCORE: 0
HAVE PEOPLE ANNOYED YOU BY CRITICIZING YOUR DRINKING: NO
HAVE YOU EVER FELT YOU SHOULD CUT DOWN ON YOUR DRINKING: NO
EVER HAD A DRINK FIRST THING IN THE MORNING TO STEADY YOUR NERVES TO GET RID OF A HANGOVER: NO
HOW MANY TIMES IN THE PAST YEAR HAVE YOU HAD 5 OR MORE DRINKS IN A DAY: 0
AVERAGE NUMBER OF DAYS PER WEEK YOU HAVE A DRINK CONTAINING ALCOHOL: 0
DOES PATIENT WANT TO STOP DRINKING: NO
TOTAL SCORE: 0
ON A TYPICAL DAY WHEN YOU DRINK ALCOHOL HOW MANY DRINKS DO YOU HAVE: 0

## 2020-10-02 ASSESSMENT — PAIN DESCRIPTION - PAIN TYPE
TYPE: SURGICAL PAIN

## 2020-10-02 ASSESSMENT — COGNITIVE AND FUNCTIONAL STATUS - GENERAL
DAILY ACTIVITIY SCORE: 24
SUGGESTED CMS G CODE MODIFIER MOBILITY: CH
MOBILITY SCORE: 24
SUGGESTED CMS G CODE MODIFIER DAILY ACTIVITY: CH

## 2020-10-02 ASSESSMENT — COPD QUESTIONNAIRES
COPD SCREENING SCORE: 3
DO YOU EVER COUGH UP ANY MUCUS OR PHLEGM?: YES, A FEW DAYS A WEEK OR MONTH
DURING THE PAST 4 WEEKS HOW MUCH DID YOU FEEL SHORT OF BREATH: NONE/LITTLE OF THE TIME
HAVE YOU SMOKED AT LEAST 100 CIGARETTES IN YOUR ENTIRE LIFE: NO/DON'T KNOW

## 2020-10-02 ASSESSMENT — PATIENT HEALTH QUESTIONNAIRE - PHQ9
1. LITTLE INTEREST OR PLEASURE IN DOING THINGS: NOT AT ALL
SUM OF ALL RESPONSES TO PHQ9 QUESTIONS 1 AND 2: 0
2. FEELING DOWN, DEPRESSED, IRRITABLE, OR HOPELESS: NOT AT ALL

## 2020-10-02 ASSESSMENT — FIBROSIS 4 INDEX: FIB4 SCORE: 0.49

## 2020-10-02 NOTE — ANESTHESIA PREPROCEDURE EVALUATION
Relevant Problems   CARDIAC   (+) Essential hypertension   (+) Pulmonary HTN (HCC)      ENDO   (+) Hypothyroidism       Physical Exam    Airway   Mallampati: II  TM distance: >3 FB  Neck ROM: full       Cardiovascular - normal exam  Rhythm: regular  Rate: normal  (-) murmur     Dental - normal exam           Pulmonary - normal exam  Breath sounds clear to auscultation     Abdominal    Neurological - normal exam                 Anesthesia Plan    ASA 3   ASA physical status 3 criteria: morbid obesity - BMI greater than or equal to 40    Plan - general       Airway plan will be ETT        Induction: intravenous    Postoperative Plan: Postoperative administration of opioids is intended.    Pertinent diagnostic labs and testing reviewed    Informed Consent:    Anesthetic plan and risks discussed with patient.    Use of blood products discussed with: patient whom consented to blood products.

## 2020-10-02 NOTE — CARE PLAN
Problem: Pain Management  Goal: Pain level will decrease to patient's comfort goal  Outcome: PROGRESSING AS EXPECTED  Note: Medicate per MAR prn

## 2020-10-02 NOTE — OP REPORT
NAME:  Carolynn Casey  MRN:  [id number]  :  1965      DATE OF OPERATION: 10/2/2020    PREOPERATIVE DIAGNOSIS: Refractory GERD    POSTOPERATIVE DIAGNOSIS: Refractory GERD    OPERATION PERFORMED: 1.  Laparoscopic Jourdan-en-Y Gastroenterostomy  2.   Methylene blue insufflation leak test    SURGEON: Gadiel Cox MD    ASSISTANT:  GERI Spann PA-C    ANESTHESIOLOGIST:  Anesthesiologist: Kyrie Alcazar M.D.    ANESTHESIA: General endotracheal anesthesia.     SPECIMEN: None    ESTIMATED BLOOD LOSS: <10cc.     INDICATIONS: The patient is a 55 y.o. female with a diagnosis of refractory GERD and past surgical history of sleeve gastrectomy for morbid obesity with medical sequelae. She is taken to the operating room today for laparoscopic Jourdan-en-Y Gastroenterostomy.     PROCEDURE: Following informed consent, the patient was properly identified, taken to the operating room, and placed in the supine position where general endotracheal anesthesia was administered. Intravenous antibiotics were administered by the anesthesiologist in the correct time interval. Sequential compression devices were employed. The abdomen was prepped and draped into a sterile field.     A bladeless optical entry trocar was carefully inserted into the abdomen and a pneumoperitoneum was established in the usual fashion.  A bladeless 5 mm separator trocar was introduced. A 5 mm lens/camera was passed into the peritoneal cavity.  Three additional separator trocars were placed under direct vision.  A 5 mm Kisha-type liver retractor was placed into position.  This was used to elevate the left sided segment of the liver.  It was secured to the patients right side with a robot arm.  Careful inspection revealed no untoward events with placement of the first trocar.    Careful examination of the abdomen and pelvis was performed.  An antecolic antegastric Jourdan-en-Y gastric procedure was then performed.  The greater omentum was elevated.  The ligament of  treitz was identified.  Approximately 100 cm downstream from the ligament of Treitz, the small bowel was divided with the endo mechanical stapler .  A second firing of the endomechanical stapler was used to divide a portion of the mesentery to give mobility on boris limb.  A 100 cm boris limb was carefully measured downstream.  At this position, the small bowel was approximated to the proximal stapled jejunal end with multiple interrupted endo stitches.  The enterotomes were made.  The jejunojejunal anastomosis was created with two firings of the endo mechanical stapler, one in each direction.  The anastomosis was inspected and found to be nicely patent and excellent blood supply and hemostasis.  The enterotomy defect was closed with the endomechanical stapler and reinforced with imbricating seromuscular endostitches with care taken to avoid liminal narrowing.  The mesentery defect was then closed with running and interrupted suture.    The lesser curvature of the stomach was then dissected just adjacent to the stomach wall with care taken to avoid the vagus nerve.  The first firing of the endo mechanical stapler on the stomach was then performed using a 3.5 mm staple depth.  A sight was chosen on the stomach so as to create a comfortable tension-free gastric pouch. A second stapler completed the transection across the sleeve. A 40 Salvadorean blunt-tipped bougie was passed down into the gastric pouch. Careful inspection of the staple line demonstrated excellent hemostasis.    The boris limb was brought up and comfortably approximated to the gastric pouch with multiple interrupted endostitches.  The gastrostomy and enterostomy were made, the gastrojejunal anastomosis was then created with the endomechanical stapler using a 45 mm cartridge and 3.5 mm staple depth.  The bougie was passed down across the anastomosis and well down into the boris limb.  The gastrotomy/enterotomy defect was then closed with the endomechanical  stapler and reinforced with imbricating seromuscular endostitches.  After completion of the healthy, intact, tension free and well-perfused anastomosis, the bougie was removed.      A methylene blue insufflation leak test was performed.  Cricoid pressure was held.  The boris limb was occluded.  The gastric pouch and proximal boris limb were then tautly distended with the blue dye.  A careful examination of the anastomosis and gastric pouch showed no extravasation.  After successful test, the dye was aspirated and the orogastric tube was removed.    The ports were removed under direct vision and the pneumoperitoneum was allowed to escape. The port sites were then irrigated well.  The port site skin incisions were closed with interrupted 4-0 Vicryl subcuticular sutures.  Steri-Strips and Benzoin were applied beneath sterile Band-Aids.     The patient tolerated the procedure well and there were no apparent complications. All sponge, needle, and instrument counts were correct on 2 separate occasions. She was awakened, extubated, and transferred to the recovery room in satisfactory condition.       ____________________________________   Gadiel Cox MD  DD: 10/2/2020  11:14 AM    CC:  [facility];

## 2020-10-02 NOTE — ANESTHESIA PROCEDURE NOTES
Airway    Date/Time: 10/2/2020 10:07 AM  Performed by: Kyrie Alcazar M.D.  Authorized by: Kyrie Alcazar M.D.     Location:  OR  Urgency:  Elective  Indications for Airway Management:  Anesthesia      Spontaneous Ventilation: absent    Sedation Level:  Deep  Preoxygenated: Yes    Patient Position:  Sniffing  Final Airway Type:  Endotracheal airway  Final Endotracheal Airway:  ETT  Cuffed: Yes    Technique Used for Successful ETT Placement:  Direct laryngoscopy    Insertion Site:  Oral  Blade Type:  Ashli  Laryngoscope Blade/Videolaryngoscope Blade Size:  3  ETT Size (mm):  7.0  Measured from:  Teeth  ETT to Teeth (cm):  22  Placement Verified by: auscultation and capnometry    Cormack-Lehane Classification:  Grade I - full view of glottis  Number of Attempts at Approach:  1

## 2020-10-02 NOTE — OR NURSING
Patient arrived to PACU in stable condition. Medicated with IV and oral pain medications. Denies nausea at this time. Belongings are at bedside.     Patients  was called, did not answer .I will try again soon. Pain is tolerable, VSS and denies nausea.

## 2020-10-02 NOTE — ANESTHESIA POSTPROCEDURE EVALUATION
Patient: Carolynn Casey    Procedure Summary     Date: 10/02/20 Room / Location: [place] [address] / SURGERY [facility]    Anesthesia Start: 0956 Anesthesia Stop: 1123    Procedures:       GASTROENTEROSTOMY, LAPAROSCOPIC (N/A Abdomen)      REPAIR, HERNIA, HIATAL, LAPAROSCOPIC (N/A Abdomen) Diagnosis: (GASTRIC STENOSIS)    Surgeon: Gadiel Cox M.D. Responsible Provider: Kyrie Alcazar M.D.    Anesthesia Type: general ASA Status: 3          Final Anesthesia Type: general  Last vitals  BP   Blood Pressure: 118/77    Temp   36.7 °C (98 °F)    Pulse   Pulse: 81   Resp   16    SpO2   95 %      Anesthesia Post Evaluation    Patient location during evaluation: PACU  Patient participation: complete - patient participated  Level of consciousness: awake and alert    Airway patency: patent  Anesthetic complications: no  Cardiovascular status: hemodynamically stable  Respiratory status: acceptable  Hydration status: euvolemic    PONV: none           Nurse Pain Score: 3 (NPRS)

## 2020-10-02 NOTE — ANESTHESIA TIME REPORT
Anesthesia Start and Stop Event Times     Date Time Event    10/2/2020 0949 Ready for Procedure     0956 Anesthesia Start     1130 Anesthesia Stop        Responsible Staff  10/02/20    Name Role Begin End    Kyrie Alcazar M.D. Anesth 0956 1130        Preop Diagnosis (Free Text):  Pre-op Diagnosis     GASTRIC STENOSIS        Preop Diagnosis (Codes):    Post op Diagnosis  Morbid obesity (HCC)      Premium Reason  Non-Premium    Comments:

## 2020-10-02 NOTE — PROGRESS NOTES
"Assumed care of patient from night shift RN.  Patient is alert and oriented times 4, states pain of 3/10, declines intervention at this time.  VSS /62   Pulse 79   Temp 36.4 °C (97.5 °F) (Temporal)   Resp 16   Ht 1.651 m (5' 5\")   Wt 120 kg (264 lb 8.8 oz)   LMP  (LMP Unknown)   SpO2 92%   BMI 44.02 kg/m²   PIV in the RFA, patent and running LR with 20K at 150mL/hr.  On 2L oxygen via NC, with saturations in the mid 90s.   in use.  Pulling 1000 on IS, encouraged frequent use.  Last BM PTA, urinating without difficulty.  Gastric clear liquid diet to start this evening.  4 lap sites to the abdomen with gauze and tegaderm.  CDI.  ELLE drain to the L abdomen, self compressed to suction.  Sanguinous output noted.  Patient is a SBA, demonstrates steady gait, minimal assistance needed.  POC discussed for the day, bed is locked and in the lowest position, call light is within reach.  All needs are met at this time, hourly rounding is in place.    "

## 2020-10-03 VITALS
OXYGEN SATURATION: 97 % | HEIGHT: 65 IN | TEMPERATURE: 97.1 F | SYSTOLIC BLOOD PRESSURE: 135 MMHG | DIASTOLIC BLOOD PRESSURE: 78 MMHG | BODY MASS INDEX: 44.63 KG/M2 | HEART RATE: 65 BPM | RESPIRATION RATE: 18 BRPM | WEIGHT: 267.86 LBS

## 2020-10-03 LAB
ALBUMIN SERPL BCP-MCNC: 3.9 G/DL (ref 3.2–4.9)
ALBUMIN/GLOB SERPL: 1.6 G/DL
ALP SERPL-CCNC: 75 U/L (ref 30–99)
ALT SERPL-CCNC: 81 U/L (ref 2–50)
ANION GAP SERPL CALC-SCNC: 10 MMOL/L (ref 7–16)
AST SERPL-CCNC: 43 U/L (ref 12–45)
BILIRUB SERPL-MCNC: 1.1 MG/DL (ref 0.1–1.5)
BUN SERPL-MCNC: 10 MG/DL (ref 8–22)
CALCIUM SERPL-MCNC: 9.2 MG/DL (ref 8.5–10.5)
CHLORIDE SERPL-SCNC: 102 MMOL/L (ref 96–112)
CO2 SERPL-SCNC: 25 MMOL/L (ref 20–33)
CREAT SERPL-MCNC: 0.51 MG/DL (ref 0.5–1.4)
ERYTHROCYTE [DISTWIDTH] IN BLOOD BY AUTOMATED COUNT: 44.8 FL (ref 35.9–50)
GLOBULIN SER CALC-MCNC: 2.5 G/DL (ref 1.9–3.5)
GLUCOSE SERPL-MCNC: 106 MG/DL (ref 65–99)
HCT VFR BLD AUTO: 42.5 % (ref 37–47)
HGB BLD-MCNC: 13.5 G/DL (ref 12–16)
MCH RBC QN AUTO: 29.3 PG (ref 27–33)
MCHC RBC AUTO-ENTMCNC: 31.8 G/DL (ref 33.6–35)
MCV RBC AUTO: 92.2 FL (ref 81.4–97.8)
PLATELET # BLD AUTO: 287 K/UL (ref 164–446)
PMV BLD AUTO: 8.5 FL (ref 9–12.9)
POTASSIUM SERPL-SCNC: 4.5 MMOL/L (ref 3.6–5.5)
PROT SERPL-MCNC: 6.4 G/DL (ref 6–8.2)
RBC # BLD AUTO: 4.61 M/UL (ref 4.2–5.4)
SODIUM SERPL-SCNC: 137 MMOL/L (ref 135–145)
WBC # BLD AUTO: 11.9 K/UL (ref 4.8–10.8)

## 2020-10-03 PROCEDURE — 700102 HCHG RX REV CODE 250 W/ 637 OVERRIDE(OP): Performed by: PHYSICIAN ASSISTANT

## 2020-10-03 PROCEDURE — 700105 HCHG RX REV CODE 258: Performed by: PHYSICIAN ASSISTANT

## 2020-10-03 PROCEDURE — 700111 HCHG RX REV CODE 636 W/ 250 OVERRIDE (IP): Performed by: PHYSICIAN ASSISTANT

## 2020-10-03 PROCEDURE — A9270 NON-COVERED ITEM OR SERVICE: HCPCS | Performed by: PHYSICIAN ASSISTANT

## 2020-10-03 PROCEDURE — 80053 COMPREHEN METABOLIC PANEL: CPT

## 2020-10-03 PROCEDURE — 85027 COMPLETE CBC AUTOMATED: CPT

## 2020-10-03 PROCEDURE — 36415 COLL VENOUS BLD VENIPUNCTURE: CPT

## 2020-10-03 RX ORDER — OXYCODONE HYDROCHLORIDE 5 MG/1
5 TABLET ORAL EVERY 6 HOURS PRN
Qty: 20 TAB | Refills: 0 | Status: SHIPPED | OUTPATIENT
Start: 2020-10-03 | End: 2020-10-08

## 2020-10-03 RX ADMIN — OXYCODONE HYDROCHLORIDE 5 MG: 5 SOLUTION ORAL at 05:04

## 2020-10-03 RX ADMIN — GABAPENTIN 300 MG: 300 CAPSULE ORAL at 04:58

## 2020-10-03 RX ADMIN — GABAPENTIN 300 MG: 300 CAPSULE ORAL at 12:25

## 2020-10-03 RX ADMIN — ACETAMINOPHEN 1000 MG: 500 TABLET ORAL at 00:03

## 2020-10-03 RX ADMIN — ACETAMINOPHEN 1000 MG: 500 TABLET ORAL at 04:57

## 2020-10-03 RX ADMIN — OXYCODONE HYDROCHLORIDE 5 MG: 5 SOLUTION ORAL at 11:19

## 2020-10-03 RX ADMIN — POTASSIUM CHLORIDE: 149 INJECTION, SOLUTION, CONCENTRATE INTRAVENOUS at 04:17

## 2020-10-03 RX ADMIN — AMLODIPINE BESYLATE 10 MG: 10 TABLET ORAL at 04:57

## 2020-10-03 ASSESSMENT — ENCOUNTER SYMPTOMS
DIZZINESS: 0
ABDOMINAL PAIN: 1
COUGH: 0
NAUSEA: 0
FEVER: 0
CHILLS: 0
VOMITING: 0

## 2020-10-03 ASSESSMENT — PAIN DESCRIPTION - PAIN TYPE
TYPE: ACUTE PAIN;SURGICAL PAIN
TYPE: ACUTE PAIN;SURGICAL PAIN
TYPE: SURGICAL PAIN

## 2020-10-03 ASSESSMENT — FIBROSIS 4 INDEX: FIB4 SCORE: 0.95

## 2020-10-03 NOTE — PROGRESS NOTES
Bedside report received. Assessment completed.  Pt is A&O x4. Pt on room air.     Medicating for pain PRN per MAR Pain 6/10  Denies nausea.   - numbness, - tingling.  Lap sitesx4 with gauze/teg, LLQ ELLE with sanguineous output  Last BM PTA 10/1/20 . -flatus,   +void.  Clear liquid diet. Tolerates well.   Pt up self.  Call light and belongings within reach. All needs met at this time. Fall Precautions and hourly rounding in place.

## 2020-10-03 NOTE — CARE PLAN
Problem: Pain Management  Goal: Pain level will decrease to patient's comfort goal  Outcome: PROGRESSING AS EXPECTED  Note: Medicate per MAR prn     Problem: Safety  Goal: Will remain free from injury  Outcome: PROGRESSING AS EXPECTED  Note: Educated to dangle at edge of bed prior to standing

## 2020-10-03 NOTE — PROGRESS NOTES
Surgical Progress Note    Author: Alex Spann P.A.-C. Date & Time created: 10/3/2020   7:52 AM     Interval Events:  POD1 Gastroenterostomy. Tolerating clears without nausea/vomiting. Admits to typical incisional abdominal pain, controlled with medication. Pt is ambulating and voiding. Patient is interested in going home today and has no current concerns. Labs still pending.     Review of Systems   Constitutional: Negative for chills and fever.   Respiratory: Negative for cough.    Cardiovascular: Negative for chest pain.   Gastrointestinal: Positive for abdominal pain (typical incisional pain). Negative for nausea and vomiting.   Skin: Negative for rash.   Neurological: Negative for dizziness.     Hemodynamics:  Temp (24hrs), Av.7 °C (98 °F), Min:36.4 °C (97.5 °F), Max:36.8 °C (98.3 °F)  Temperature: 36.4 °C (97.6 °F)  Pulse  Av.6  Min: 56  Max: 81   Blood Pressure: 149/79     Respiratory:    Respiration: 16, Pulse Oximetry: 96 %     Work Of Breathing / Effort: Mild  RUL Breath Sounds: Clear, RML Breath Sounds: Clear, RLL Breath Sounds: Clear, REZA Breath Sounds: Clear, LLL Breath Sounds: Clear  Neuro:  GCS       Fluids:    Intake/Output Summary (Last 24 hours) at 10/3/2020 0752  Last data filed at 10/3/2020 0400  Gross per 24 hour   Intake 360 ml   Output 130 ml   Net 230 ml        Current Diet Order   Procedures   • Diet Order Clear Liquid     Physical Exam  Constitutional:       Appearance: Normal appearance. She is obese.   HENT:      Head: Normocephalic and atraumatic.      Mouth/Throat:      Mouth: Mucous membranes are moist.   Eyes:      Extraocular Movements: Extraocular movements intact.   Neck:      Musculoskeletal: Normal range of motion.   Cardiovascular:      Rate and Rhythm: Normal rate and regular rhythm.   Pulmonary:      Effort: Pulmonary effort is normal.   Abdominal:      Palpations: Abdomen is soft. There is no mass.      Tenderness: There is abdominal tenderness.      Hernia: No  hernia is present.      Comments: wali drain with serosanguinous fluids   Skin:     General: Skin is warm and dry.   Neurological:      General: No focal deficit present.      Mental Status: She is alert.       Labs:  No results found for this or any previous visit (from the past 24 hour(s)).  Medical Decision Making, by Problem:  There are no active hospital problems to display for this patient.    Plan:  Patient was seen and discussed with Dr. Cox. Pt is ok for discharge home today after 2pm if tolerating PO with good control of nausea, pain controlled, oxygen weaned, ambulating, and voiding. Please call if the patient needs to stay another night. Patient will follow up with out office in 1-2 weeks.    Quality Measures:  Quality-Core Measures   Reviewed items::  Labs reviewed  Howe catheter::  No Howe  DVT prophylaxis pharmacological::  Enoxaparin (Lovenox)  DVT prophylaxis - mechanical:  SCDs  Ulcer Prophylaxis::  Yes      Discussed patient condition with Dr. Cox and Patient.

## 2020-10-03 NOTE — CARE PLAN
Problem: Pain Management  Goal: Pain level will decrease to patient's comfort goal  Outcome: PROGRESSING AS EXPECTED  Note: Pain managed with current regimen.     Problem: Safety  Goal: Will remain free from falls  Outcome: PROGRESSING AS EXPECTED  Note: Call light and personal belongings within reach. Pt calls appropriately. Safety education provided.

## 2020-10-03 NOTE — PROGRESS NOTES
Discharging Patient home per physician order.  Discharged with family.  Demonstrated understanding of discharge instructions, follow up appointments, home medications, prescriptions, home care for surgical wound, and nursing care instructions for boris-en-y.  Ambulating without assistance, voiding without difficulty, pain well controlled, tolerating oral medications, oxygen saturation greater than 90% , tolerating diet.   Educational handouts given and discussed.  Verbalized understanding of discharge instructions and educational handouts.  All questions answered.  Belongings with patient at time of discharge.

## 2020-10-03 NOTE — RESPIRATORY CARE
COPD EDUCATION by COPD CLINICAL EDUCATOR  10/3/2020 at 7:45 AM by Luz Cohen, [place]     Patient reviewed by COPD education team. Patient does not have a history or diagnosis of COPD and is a non-smoker.  Therefore does not qualify for the COPD program.

## 2020-10-03 NOTE — PROGRESS NOTES
"Assumed care of patient from night shift RN.  Patient is alert and oriented times 4, states pain of 4/10, declines intervention at this time.  VSS /79   Pulse 62   Temp 36.4 °C (97.6 °F) (Temporal)   Resp 16   Ht 1.651 m (5' 5\")   Wt 120 kg (264 lb 8.8 oz)   LMP  (LMP Unknown)   SpO2 96%   BMI 44.02 kg/m²   PIV in the RFA, patent and running LR with 20K at 150mL/hr.  On RA with saturations in the mid 90s.  Last BM PTA, urinating without difficulty.  Gastric clear liquid diet, tolerating well.  Denies nausea or vomiting.  4 lap sites to the abdomen with gauze and tegaderm, small old drainage noted.  ELLE drain to the LLQ, self compressed to suction.  Dressing with old drainage noted.  Patient is up self, demonstrates steady gait, no assistance needed.  POC discussed for the day, possible discharge home this afternoon.  Bed is locked and in the lowest position, call light is within reach.  All needs are met at this time, hourly rounding is in place.  "

## 2020-10-03 NOTE — DIETARY
NUTRITION SERVICES: BMI - Pt with BMI >40 (=Body mass index is 44.02 kg/m².), morbid obesity. Weight loss counseling not appropriate in acute care setting. Pt s/p laparoscopic Jourdan-en-Y gastroenterostomy anticipate follow-up planned with MD/RD post discharge. RECOMMEND - Referral to outpatient nutrition services for post-op counseling or follow up with MD/RD after discharge.

## 2020-10-03 NOTE — DISCHARGE INSTRUCTIONS
DIET: Follow the diet progression detailed in your post-op booklet.  Progressing as instructed will make for a smooth transition after surgery and prevent any pain associated with eating foods before your stomach is ready or eating too much.  Water and hydration is much more important than food intake the first week or two after surgery.  Drink enough water to keep your urine pale yellow.  Increase water intake if your urine is a darker yellow or if have burning with urination.  Nausea and vomiting once or twice after you leave the hospital is normal.  Sip fluids continually and stay hydrated.     2.  SUPPLEMENTS: Start your supplements 1 week after surgery.  You may need to cut some supplements in half initially.  Follow the guidelines from your supplement handout from your pre-op class.     3. ACTIVITIES: After discharge from the hospital, you may resume full routine activities. However, there should be no heavy lifting (greater than 15 pounds) and no strenuous activities until after your follow-up visit. Otherwise, routine activities of daily living are acceptable.  More movement and walking after surgery the better.     4. DRIVING: You may drive whenever you are off pain medications and are able to perform the activities needed to drive, i.e. turning, bending, twisting, etc.     5. BATHING AND WOUND CARE: You may get the wound wet 2 days after surgery. You may shower, but do not submerge in a bath for at least a week. Dressings may come off after 48 hours. Please leave the steri-strips in place, they will fall off over 5-7 days. You may notice some clear or slightly bloody drainage from your wounds and there may be some mild redness or bruising around your incision sites.  This is normal.     6. BOWEL FUNCTION: Constipation is common after an operation, especially with pain medications. The combination of pain medication and decreased activity level can cause constipation in otherwise normal patients. If you  feel this is occurring, take a laxative (Milk of Magnesia, Miralax, etc.) until the problem has resolved.  Diarrhea the first few days after surgery can also be normal.  You may notice that it is dark in color or see blood, which is also normal and should subside in the first week post-op.     7. PAIN MEDICATION: You have been given a prescription for pain medication preoperatively.  Please take these as directed. It is important to remember not to take medications on an empty stomach as this may cause nausea.  For minimal discomfort you may use liquid Tylenol 650 mg every 4 hours.  DO NOT exceed 4,000 mg of Tylenol in 24 hours.  DO NOT use non steroidal anti-inflamatory medication such as: Asprin, Ibuprofen, Advil, Motrin, Aleve, or steroids.  These medication can cause ulcers after weight loss surgery.     8.CALL IF YOU HAVE: (1) Fevers to more than 101.5 F, (2) leg pain or swelling (3) Drainage or fluid from incision that may be foul smelling, increased tenderness or soreness at the wound or the wound edges are no longer together, redness or swelling at the incision site. (4) Night Sweats (5) Shaking, Chills (6) Persistent Nausea or Vomiting for over 24 hours.  Use your anti nausea medication as prescribed. (7) Call 911 if sudden onset of chest pain or shortness of breath that does not improve with 5-10 min of rest.     9. APPOINTMENT: Contact our office at [phone] for a follow-up appointment in 1-2 weeks following your procedure.  Our office hours are Monday-Friday, 8am-5pm.  Please try to call during these hours when we are better able to assist you.     If you have any additional questions, please do not hesitate to call the office and speak to either myself or the physician on call.     Office address:   \[facility].   [address] [place] [address]   [address]   [facility] [place] [address]    Discharge Instructions    Discharged to home by car with relative. Discharged via wheelchair, hospital escort:  Yes.  Special equipment needed: Not Applicable    Be sure to schedule a follow-up appointment with your primary care doctor or any specialists as instructed.     Discharge Plan:   Influenza Vaccine Indication: Not indicated: Previously immunized this influenza season and > 8 years of age    I understand that a diet low in cholesterol, fat, and sodium is recommended for good health. Unless I have been given specific instructions below for another diet, I accept this instruction as my diet prescription.   Other diet: gastric clears    Special Instructions: None    · Is patient discharged on Warfarin / Coumadin?   No     Depression / Suicide Risk    As you are discharged from this [facility] facility, it is important to learn how to keep safe from harming yourself.    Recognize the warning signs:  · Abrupt changes in personality, positive or negative- including increase in energy   · Giving away possessions  · Change in eating patterns- significant weight changes-  positive or negative  · Change in sleeping patterns- unable to sleep or sleeping all the time   · Unwillingness or inability to communicate  · Depression  · Unusual sadness, discouragement and loneliness  · Talk of wanting to die  · Neglect of personal appearance   · Rebelliousness- reckless behavior  · Withdrawal from people/activities they love  · Confusion- inability to concentrate     If you or a loved one observes any of these behaviors or has concerns about self-harm, here's what you can do:  · Talk about it- your feelings and reasons for harming yourself  · Remove any means that you might use to hurt yourself (examples: pills, rope, extension cords, firearm)  · Get professional help from the community (Mental Health, Substance Abuse, psychological counseling)  · Do not be alone:Call your Safe Contact- someone whom you trust who will be there for you.  · Call your local CRISIS HOTLINE 890-9044 or 997-068-1287  · Call your local Children's Mobile  Crisis Response Team [place] (912) 227-9473 or www.[employer]  · Call the toll free National Suicide Prevention Hotlines   · National Suicide Prevention Lifeline 892-743-UPPC (5816)  · [facility] Line Network 800-SUICIDE (274-3351)      Laparoscopic Gastric Bypass Surgery  Gastric bypass, also called Jourdan-en-Y gastric bypass, is a type of weight loss surgery (bariatric surgery). This procedure helps a person lose weight by making the stomach much smaller. This limits the amount of nutrients the body can absorb. You may need this procedure if you are dangerously overweight (morbidly obese). It may also help reduce your risk of developing heart disease, high blood pressure, and type 2 diabetes.  This procedure is done though small incisions in your abdomen (laparoscopic surgery). In some cases, a laparoscopic procedure may be turned into an open surgery. This is a surgery that is done through one large incision.  Tell a health care provider about:  · Any allergies you have.  · All medicines you are taking, including vitamins, herbs, eye drops, creams, and over-the-counter medicines.  · Any problems you or family members have had with anesthetic medicines.  · Any blood disorders you have.  · Any surgeries you have had.  · Any medical conditions you have.  · Whether you are pregnant or may be pregnant.  What are the risks?  Generally, this is a safe procedure. However, problems may occur, including:  · Infection.  · Bleeding.  · Allergic reactions to medicines or dyes.  · Damage to other structures or organs.  · A blood clot that forms in the leg and travels to the heart or lungs.  · Leaking of digestive juices into the abdomen.  · Pneumonia.  Long-term risks and complications may occur, including:  · Lack (deficiency) of vitamins and minerals.  · Loss of fluid in the body (dehydration).  · Lack of nutrients (malnutrition).  · Low blood sugar (hypoglycemia).  · Bowel obstruction.  · Tissue that bulges at  the incision area (incisional hernia).  · Gallstones.  · Narrowing of the digestive tract (stricture or stenosis).  · Ulcers.  · Diarrhea, nausea, or vomiting after eating (dumping syndrome).  · Needing to have the surgery repeated if complications develop (revision surgery).  What happens before the procedure?  Staying hydrated  Follow instructions from your health care provider about hydration, which may include:  · Up to 2 hours before the procedure - you may continue to drink clear liquids, such as water, clear fruit juice, black coffee, and plain tea.    Eating and eating restrictions  Follow instructions from your health care provider about eating and drinking, which may include:  · 8 hours before the procedure - stop eating heavy meals or foods, such as meat, fried foods, or fatty foods.  · 6 hours before the procedure - stop eating light meals or foods, such as toast or cereal.  · 6 hours before the procedure - stop drinking milk or drinks that contain milk.  · 2 hours before the procedure - stop drinking clear liquids.  Medicines  Ask your health care provider about:  · Changing or stopping your regular medicines. This is especially important if you are taking diabetes medicines or blood thinners.  · Taking medicines such as aspirin and ibuprofen. These medicines can thin your blood. Do not take these medicines unless your health care provider tells you to take them.  · Taking over-the-counter medicines, vitamins, herbs, and supplements.  General instructions  · You will have a complete exam and assessment of your nutrition status. You will also meet with a nutrition specialist (dietitian) to learn about nutrition after surgery. In some cases, you may meet with a mental health specialist (psychologist).  · Plan to have someone take you home from the hospital or clinic.  · Plan to have a responsible adult care for you for at least 24 hours after you leave the hospital or clinic. This is important.  · Ask  your health care provider how your surgical site will be marked or identified.  · Ask your health care provider what steps will be taken to help prevent infection. These may include:  ? Washing skin with a germ-killing soap.  ? Taking antibiotic medicine.  What happens during the procedure?    · An IV will be inserted into one of your veins.  · You will be given one or both of the following:  ? A medicine to help you relax (sedative).  ? A medicine to make you fall asleep (general anesthetic).  · Several small incisions will be made in your abdomen.  · The laparoscope will be placed through one incision. The laparoscope will send images to a video screen in the room.  · Laparoscopic surgical instruments will be placed through the other incisions.  · The top part of your stomach will be  from the rest of your stomach. The open ends will be closed with a stapling tool.  · The upper part of your small intestine will be divided:  ? The lower end will be brought up and attached to your new, smaller stomach.  ? The upper end will be attached lower down on your small intestine. This end will remain attached to the rest of your stomach.  ? All of the attachments will be stapled together.  · The laparoscope and surgical instruments will be removed.  · Your incisions will be closed with stitches (sutures), staples, or adhesive strips.  · Light bandages (dressings) will be placed over your incisions.  The procedure may vary among health care providers and hospitals.  What happens after the procedure?  · Your blood pressure, heart rate, breathing rate, and blood oxygen level will be monitored until you leave the hospital or clinic.  · You will be given medicine to help relieve pain.  · You will continue to get fluids and nutrition through an IV. You will begin a liquid diet after the first day.  · You will be encouraged to get up and walk around to prevent blood clots.  · You may have to wear compression stockings.  These stockings help to prevent blood clots and reduce swelling in your legs.  · You will be encouraged to breathe deeply and cough. This helps to prevent pneumonia.  · You may meet with a dietitian to discuss your diet at home.  Summary  · Laparoscopic gastric bypass surgery is a surgery to help you lose weight.  · This is a safe procedure, but there are some risks, including infection, bleeding, deficiency of minerals and vitamins, hernias, and ulcers.  · First, a small pouch is created by dividing the top and bottom half of the stomach. Second, the first portion of the small intestine is divided and the bottom end is brought up and connected to the small stomach pouch. The top portion of the small intestine is brought down and connected to the small intestine.  This information is not intended to replace advice given to you by your health care provider. Make sure you discuss any questions you have with your health care provider.  Document Released: 01/13/2017 Document Revised: 07/02/2019 Document Reviewed: 07/02/2019  [email or web address] Patient Education © 2020 [employer]      Incision Care, Adult  An incision is a surgical cut that is made through your skin. Most incisions are closed after surgery. Your incision may be closed with stitches (sutures), staples, skin glue, or adhesive strips. You may need to return to your health care provider to have sutures or staples removed. This may occur several days to several weeks after your surgery. The incision needs to be cared for properly to prevent infection.  How to care for your incision  Incision care    · Follow instructions from your health care provider about how to take care of your incision. Make sure you:  ? Wash your hands with soap and water before you change the bandage (dressing). If soap and water are not available, use hand .  ? Change your dressing as told by your health care provider.  ? Leave sutures, skin glue, or adhesive strips in place. These  skin closures may need to stay in place for 2 weeks or longer. If adhesive strip edges start to loosen and curl up, you may trim the loose edges. Do not remove adhesive strips completely unless your health care provider tells you to do that.  · Check your incision area every day for signs of infection. Check for:  ? More redness, swelling, or pain.  ? More fluid or blood.  ? Warmth.  ? Pus or a bad smell.  · Ask your health care provider how to clean the incision. This may include:  ? Using mild soap and water.  ? Using a clean towel to pat the incision dry after cleaning it.  ? Applying a cream or ointment. Do this only as told by your health care provider.  ? Covering the incision with a clean dressing.  · Ask your health care provider when you can leave the incision uncovered.  · Do not take baths, swim, or use a hot tub until your health care provider approves. Ask your health care provider if you can take showers. You may only be allowed to take sponge baths for bathing.  Medicines  · If you were prescribed an antibiotic medicine, cream, or ointment, take or apply the antibiotic as told by your health care provider. Do not stop taking or applying the antibiotic even if your condition improves.  · Take over-the-counter and prescription medicines only as told by your health care provider.  General instructions  · Limit movement around your incision to improve healing.  ? Avoid straining, lifting, or exercise for the first month, or for as long as told by your health care provider.  ? Follow instructions from your health care provider about returning to your normal activities.  ? Ask your health care provider what activities are safe.  · Protect your incision from the sun when you are outside for the first 6 months, or for as long as told by your health care provider. Apply sunscreen around the scar or cover it up.  · Keep all follow-up visits as told by your health care provider. This is important.  Contact a  health care provider if:  · Your have more redness, swelling, or pain around the incision.  · You have more fluid or blood coming from the incision.  · Your incision feels warm to the touch.  · You have pus or a bad smell coming from the incision.  · You have a fever or shaking chills.  · You are nauseous or you vomit.  · You are dizzy.  · Your sutures or staples come undone.  Get help right away if:  · You have a red streak coming from your incision.  · Your incision bleeds through the dressing and the bleeding does not stop with gentle pressure.  · The edges of your incision open up and separate.  · You have severe pain.  · You have a rash.  · You are confused.  · You faint.  · You have trouble breathing and a fast heartbeat.  This information is not intended to replace advice given to you by your health care provider. Make sure you discuss any questions you have with your health care provider.  Document Released: 07/07/2006 Document Revised: 12/20/2019 Document Reviewed: 07/05/2017  [employer]vier Patient Education © 2020 [employer]

## 2020-10-22 ENCOUNTER — PATIENT MESSAGE (OUTPATIENT)
Dept: MEDICAL GROUP | Facility: MEDICAL CENTER | Age: 55
End: 2020-10-22

## 2020-10-22 DIAGNOSIS — E03.9 HYPOTHYROIDISM, UNSPECIFIED TYPE: ICD-10-CM

## 2020-10-28 ENCOUNTER — HOSPITAL ENCOUNTER (OUTPATIENT)
Dept: LAB | Facility: MEDICAL CENTER | Age: 55
End: 2020-10-28
Attending: FAMILY MEDICINE
Payer: MEDICARE

## 2020-10-28 DIAGNOSIS — E03.9 HYPOTHYROIDISM, UNSPECIFIED TYPE: ICD-10-CM

## 2020-10-28 PROCEDURE — 36415 COLL VENOUS BLD VENIPUNCTURE: CPT

## 2020-10-28 PROCEDURE — 84443 ASSAY THYROID STIM HORMONE: CPT

## 2020-10-29 LAB — TSH SERPL DL<=0.005 MIU/L-ACNC: 2.84 UIU/ML (ref 0.38–5.33)

## 2020-11-10 ENCOUNTER — OFFICE VISIT (OUTPATIENT)
Dept: MEDICAL GROUP | Facility: MEDICAL CENTER | Age: 55
End: 2020-11-10
Payer: MEDICARE

## 2020-11-10 VITALS
WEIGHT: 245 LBS | HEART RATE: 74 BPM | OXYGEN SATURATION: 96 % | DIASTOLIC BLOOD PRESSURE: 72 MMHG | HEIGHT: 62 IN | RESPIRATION RATE: 16 BRPM | TEMPERATURE: 97.6 F | BODY MASS INDEX: 45.08 KG/M2 | SYSTOLIC BLOOD PRESSURE: 126 MMHG

## 2020-11-10 DIAGNOSIS — Z98.84 S/P BARIATRIC SURGERY: ICD-10-CM

## 2020-11-10 DIAGNOSIS — Z12.31 ENCOUNTER FOR SCREENING MAMMOGRAM FOR MALIGNANT NEOPLASM OF BREAST: ICD-10-CM

## 2020-11-10 DIAGNOSIS — I10 ESSENTIAL HYPERTENSION: ICD-10-CM

## 2020-11-10 DIAGNOSIS — F13.20 HYPNOTIC DEPENDENCE (HCC): ICD-10-CM

## 2020-11-10 DIAGNOSIS — F51.01 PRIMARY INSOMNIA: ICD-10-CM

## 2020-11-10 DIAGNOSIS — E03.9 HYPOTHYROIDISM, UNSPECIFIED TYPE: ICD-10-CM

## 2020-11-10 PROBLEM — E66.01 MORBID OBESITY WITH BMI OF 40.0-44.9, ADULT (HCC): Status: RESOLVED | Noted: 2018-02-05 | Resolved: 2020-11-10

## 2020-11-10 PROCEDURE — 99214 OFFICE O/P EST MOD 30 MIN: CPT | Performed by: FAMILY MEDICINE

## 2020-11-10 RX ORDER — PROMETHAZINE HYDROCHLORIDE 12.5 MG/1
TABLET ORAL
COMMUNITY
Start: 2020-09-28 | End: 2020-11-10

## 2020-11-10 RX ORDER — ZOLPIDEM TARTRATE 10 MG/1
TABLET ORAL
COMMUNITY
End: 2020-11-10

## 2020-11-10 RX ORDER — PANTOPRAZOLE SODIUM 20 MG/1
TABLET, DELAYED RELEASE ORAL
COMMUNITY
Start: 2020-09-28 | End: 2020-11-10

## 2020-11-10 RX ORDER — ZOLPIDEM TARTRATE 10 MG/1
10 TABLET ORAL
COMMUNITY
Start: 2020-10-10 | End: 2020-11-10

## 2020-11-10 RX ORDER — ZOLPIDEM TARTRATE 10 MG/1
10 TABLET ORAL NIGHTLY PRN
Qty: 30 TAB | Refills: 2 | Status: SHIPPED | OUTPATIENT
Start: 2020-11-10 | End: 2020-12-07

## 2020-11-10 ASSESSMENT — PATIENT HEALTH QUESTIONNAIRE - PHQ9
SUM OF ALL RESPONSES TO PHQ QUESTIONS 1-9: 10
CLINICAL INTERPRETATION OF PHQ2 SCORE: 2
5. POOR APPETITE OR OVEREATING: 1 - SEVERAL DAYS

## 2020-11-10 ASSESSMENT — FIBROSIS 4 INDEX: FIB4 SCORE: 0.95

## 2020-11-10 NOTE — PROGRESS NOTES
cc: Bariatric surgery    Subjective:     Carolynn Casey is a 55 y.o. female presenting for:    1.  Bariatric surgery: She recently underwent revision of her bariatric surgery.  She has already lost about 15 pounds over the past month.  She is quite pleased with her progress.  Her heartburn symptoms have been significantly improving, is only taking Tums about once a week.    2.  Hypothyroidism: She has been off the levothyroxine for several months now.  She overall feels quite well.  Her recent TSH was normal.    3.  Hypertension: Has been stable, she continues on the amlodipine and losartan.    4.  Insomnia: Has a history of insomnia for years, has been stable.  Is needing refills.  She finds that the Ambien seems to be working better with the weight loss.  Has tried trazodone in the past.    Is the medication improving the patient’s symptoms: Yes  Any adverse effects: No    Alcohol or illicit drug use:   She  reports previous alcohol use.  She  reports previous drug use.     History of controlled substance used in a way other than prescribed? No  Any early refills of a controlled substance: No  History of lost or stolen controlled substance prescription: No  Any aberrant behavior or intoxication while on a controlled substance: No  Has the patient self-modified their dose or frequency of the medication :No  Compliant with treatment recommendations and plan: Yes  Any major health change to the patient: Yes see above  Concerns for misuse, abuse or addiction: No    /NarxCheck report reviewed: Yes  History of abnormal drug screening: No    Opioid Risk Score: 4 (hx of preadol abuse, depression)    Interpretation of Opioid Risk Score   Score 0-3 = Low risk of abuse. Do UDS at least once per year.  Score 4-7 = Moderate risk of abuse. Do UDS 1-4 times per year.  Score 8+ = High risk of abuse. Refer to specialist.        Review of systems:  See above.       Current Outpatient Medications:   •  zolpidem (AMBIEN) 10 MG  "Tab, Take 1 Tab by mouth at bedtime as needed for Sleep for up to 30 days., Disp: 30 Tab, Rfl: 2  •  Pseudoephedrine-DM-GG (SUDAFED COUGH PO), Take 1 Tab by mouth as needed., Disp: , Rfl:   •  losartan (COZAAR) 50 MG Tab, Take 1 Tab by mouth every day., Disp: 90 Tab, Rfl: 0  •  tizanidine (ZANAFLEX) 4 MG capsule, Take 1 Cap by mouth at bedtime as needed., Disp: 90 Cap, Rfl: 1  •  cyclobenzaprine (FLEXERIL) 10 MG Tab, Take 1 Tab by mouth 2 times a day as needed for Mild Pain or Muscle Spasms., Disp: 180 Tab, Rfl: 1  •  amLODIPine (NORVASC) 10 MG Tab, Take 1 tablet by mouth once daily, Disp: 90 Tab, Rfl: 1  •  acetaminophen (TYLENOL) 500 MG Tab, Take 2,000 mg by mouth 2 Times a Day., Disp: , Rfl:     Allergies, past medical history, past surgical history, family history, social history reviewed and updated    Objective:     Vitals: /72   Pulse 74   Temp 36.4 °C (97.6 °F)   Resp 16   Ht 1.575 m (5' 2\")   Wt 111.1 kg (245 lb)   LMP  (LMP Unknown)   SpO2 96%   BMI 44.81 kg/m²   General: Alert, pleasant, NAD  HEENT: Normocephalic.    Psych:  Affect/mood is normal, judgement is good, memory is intact, grooming is appropriate.    Assessment/Plan:     Carolynn was seen today for follow-up.    Diagnoses and all orders for this visit:    S/P bariatric surgery  Stable, managed by surgery.    Hypothyroidism, unspecified type  Stable, will continue to monitor off the levothyroxine.  We will recheck a TSH in a couple months.    Essential hypertension  Stable, we discussed cutting the amlodipine in half if she loses another 10 to 15 pounds.  Follow-up in 3 months    Primary insomnia  Hypnotic dependence (HCC)  Stable.  I have reviewed the medical records and have determined that a controlled substance treatment is medically indicated.  Alternatives considered and discussed with patient.   Discussed risks of Ambien use.  ORT is 4.  Controlled substance agreement/informed consent on file. [place] and [place]  checked, " appropriate.  Script for three months sent electronically. F/u in 3 months.   -     zolpidem (AMBIEN) 10 MG Tab; Take 1 Tab by mouth at bedtime as needed for Sleep for up to 30 days. #30 with 2 refills  -     Controlled Substance Treatment Agreement    Encounter for screening mammogram for malignant neoplasm of breast  -     MA-SCREENING MAMMO BILAT W/TOMOSYNTHESIS W/CAD; Future          Return in about 3 months (around 2/10/2021) for controlled substance management.

## 2020-11-17 ENCOUNTER — APPOINTMENT (OUTPATIENT)
Dept: RADIOLOGY | Facility: MEDICAL CENTER | Age: 55
End: 2020-11-17
Attending: EMERGENCY MEDICINE
Payer: MEDICARE

## 2020-11-17 ENCOUNTER — HOSPITAL ENCOUNTER (EMERGENCY)
Facility: MEDICAL CENTER | Age: 55
End: 2020-11-17
Attending: EMERGENCY MEDICINE
Payer: MEDICARE

## 2020-11-17 VITALS
WEIGHT: 245 LBS | OXYGEN SATURATION: 99 % | DIASTOLIC BLOOD PRESSURE: 75 MMHG | HEART RATE: 98 BPM | TEMPERATURE: 98 F | RESPIRATION RATE: 18 BRPM | HEIGHT: 65 IN | BODY MASS INDEX: 40.82 KG/M2 | SYSTOLIC BLOOD PRESSURE: 124 MMHG

## 2020-11-17 DIAGNOSIS — S63.259A DISLOCATION OF FINGER, INITIAL ENCOUNTER: ICD-10-CM

## 2020-11-17 DIAGNOSIS — S01.81XA FACIAL LACERATION, INITIAL ENCOUNTER: ICD-10-CM

## 2020-11-17 DIAGNOSIS — S63.501A WRIST SPRAIN, RIGHT, INITIAL ENCOUNTER: ICD-10-CM
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
## 2020-11-17 DIAGNOSIS — S02.2XXA CLOSED FRACTURE OF NASAL BONE, INITIAL ENCOUNTER: ICD-10-CM

## 2020-11-17 PROCEDURE — 303353 HCHG DERMABOND SKIN ADHESIVE

## 2020-11-17 PROCEDURE — 26770 TREAT FINGER DISLOCATION: CPT

## 2020-11-17 PROCEDURE — 99283 EMERGENCY DEPT VISIT LOW MDM: CPT

## 2020-11-17 PROCEDURE — 73140 X-RAY EXAM OF FINGER(S): CPT | Mod: RT

## 2020-11-17 PROCEDURE — 73110 X-RAY EXAM OF WRIST: CPT | Mod: RT

## 2020-11-17 PROCEDURE — 700101 HCHG RX REV CODE 250: Performed by: EMERGENCY MEDICINE

## 2020-11-17 PROCEDURE — 304999 HCHG REPAIR-SIMPLE/INTERMED LEVEL 1

## 2020-11-17 PROCEDURE — 70160 X-RAY EXAM OF NASAL BONES: CPT

## 2020-11-17 RX ORDER — LIDOCAINE HYDROCHLORIDE 10 MG/ML
20 INJECTION, SOLUTION INFILTRATION; PERINEURAL ONCE
Status: COMPLETED | OUTPATIENT
Start: 2020-11-17 | End: 2020-11-17

## 2020-11-17 RX ADMIN — LIDOCAINE HYDROCHLORIDE 20 ML: 10 INJECTION, SOLUTION INFILTRATION; PERINEURAL at 14:45

## 2020-11-17 ASSESSMENT — FIBROSIS 4 INDEX: FIB4 SCORE: 0.95

## 2020-11-17 NOTE — ED PROVIDER NOTES
ED Provider Note    Scribed for Mary Pierre M.D. by Franko Connor. 11/17/2020, 2:15 PM.    Primary care provider: Kandace Dent M.D.  Means of arrival: walk-in  History obtained from: patient  History limited by: none    CHIEF COMPLAINT  Chief Complaint   Patient presents with   • T-5000 GLF     pt reports running to the bus stop when she tripped and fell. -LOC. pain to bridge of nose and RT hand, pinky finger. appears dislocated.    • Facial Injury   • Digit Pain       HPI  Carolynn Casey is a 55 y.o. female who presents to the Emergency Department with complaints of mild-moderate facial pain and right hand pain acute onset shortly prior to arrival. She reports she was running to the bus stop when she fell and hit her face. She did not lose consciousness. She has complaints of pain and deformity to her nose. She denies any new neck pain, back pain, headache, vomiting, or dental pain. She is also complaining of pain to her right 4th and 5th digits. Patient is right hand dominant. She also has an abrasion on her right eyelid. She does report having minimal right knee pain. She is not anticoagulated currently. Her last TDAP was 3 years ago.     REVIEW OF SYSTEMS  Pertinent positives include facial pain, right 4th/5th digit pain, abrasion over right eye, minimal right knee pain. Pertinent negatives include no neck pain, back pain, headache, vomiting, or dental pain. All other systems reviewed and negative.     PAST MEDICAL HISTORY   has a past medical history of Allergy, Anesthesia, Anxiety, Arthritis, ASTHMA, Bronchitis (2014), Constipation, Diabetes ([facility]) (08/31/2020), Elevated glucose, GERD (gastroesophageal reflux disease), Heart burn, Heart murmur, Hiatus hernia syndrome, Hypertension, Left breast mass (8/3/2017), Major depressive disorder (2/27/2018), Migraine, Pain (08/31/2020), Primary osteoarthritis involving multiple joints, Pulmonary emboli ([facility]) (2017), Sacroiliitis ([facility]) (8/2/2017), Type 2  diabetes mellitus without complication (HCC) (7/20/2018), Unspecified disorder of thyroid (2020), Unspecified urinary incontinence, and Upper GI bleed.    SURGICAL HISTORY   has a past surgical history that includes primary c section (1983, 1990); abdominal hysterectomy total (1995); extreme lateral interbody fusion (2/17/2016); lumbar decompression (2/17/2016); s i joint fusion (Right, 8/2/2017); laminotomy (2006); lumbar fusion anterior (2013); spinal cord stimulator (8/16/2018); lap, margie restrict proc, longitudinal gas* (5/15/2019); shoulder arthroscopy w/ rotator cuff repair (2001); knee revision total (Right, 10/8/2015); open reduction (2001, 1990); knee arthroplasty total (Left, 2003); knee arthroplasty total (Right, 2003); shoulder arthroscopy (Right, 2001); carpal tunnel release (Right, 1983); spinal cord stimulator (07/2020); appendectomy (1998); other abdominal surgery (2018); gastroscopy (9/2/2020); and gastroenterostomy, laparoscopic (N/A, 10/2/2020).    SOCIAL HISTORY  Social History     Tobacco Use   • Smoking status: Never Smoker   • Smokeless tobacco: Never Used   • Tobacco comment: continue to avoid   Substance Use Topics   • Alcohol use: Not Currently     Alcohol/week: 0.0 oz     Frequency: Never     Binge frequency: Never   • Drug use: Not Currently      Social History     Substance and Sexual Activity   Drug Use Not Currently       FAMILY HISTORY  Family History   Problem Relation Age of Onset   • Hypertension Mother    • Cancer Mother 81        breast DCIS   • Heart Disease Father    • Heart Attack Father    • Hypertension Father    • Lung Disease Sister         asthma   • Hypertension Sister    • Arthritis Sister         knee   • Arthritis Brother    • Hypertension Brother    • Diabetes Brother    • Heart Disease Maternal Aunt    • Hypertension Maternal Aunt    • Cancer Maternal Aunt 80        breast   • Stroke Maternal Uncle    • Heart Disease Maternal Uncle    • Hypertension Maternal Uncle   "  • Heart Disease Paternal Grandmother    • Psychiatric Illness Paternal Grandfather         Suicide   • Alcohol/Drug Paternal Grandfather    • Arthritis Maternal Grandmother    • Arthritis Maternal Grandfather    • Arthritis Brother    • Hyperlipidemia Neg Hx        CURRENT MEDICATIONS  Home Medications     Reviewed by Rodrigo Lagunas R.N. (Registered Nurse) on 11/17/20 at 1306  Med List Status: Partial   Medication Last Dose Status   acetaminophen (TYLENOL) 500 MG Tab  Active   amLODIPine (NORVASC) 10 MG Tab  Active   cyclobenzaprine (FLEXERIL) 10 MG Tab  Active   losartan (COZAAR) 50 MG Tab  Active   Pseudoephedrine-DM-GG (SUDAFED COUGH PO)  Active   tizanidine (ZANAFLEX) 4 MG capsule  Active   zolpidem (AMBIEN) 10 MG Tab  Active                ALLERGIES  Allergies   Allergen Reactions   • Cortisone Anaphylaxis     RXN=since age 12   • Food Unspecified     \"citrus juice\" =burn and throat swelling.    ALL FISH - nausea  RXN=whole life   • Naprosyn [Naproxen] Unspecified     \"GI bleed\"  RXN=whole life   • Penicillins Anaphylaxis     RXN=since age 1   • Fish Vomiting and Nausea   • Keflex Diarrhea and Nausea     RXN=20 years ago   • Latex Rash and Itching     Rash, itching  RXN=20 years ago   • Shellfish Allergy Vomiting and Nausea     Betadine/iodine for surgery is ok   • Tape Rash     Plastic tape \"bubble up the skin\", reports tegaderm OK  RXN=ongoing       PHYSICAL EXAM  VITAL SIGNS: /83   Pulse (!) 105   Temp 36.9 °C (98.4 °F) (Temporal)   Resp 16   Ht 1.651 m (5' 5\")   Wt 111.1 kg (245 lb)   LMP  (LMP Unknown)   SpO2 91%   BMI 40.77 kg/m²     Constitutional:  Well developed, No acute distress, Laying down in bed. Non-toxic appearance.   HENT: Normocephalic, 2 cm laceration to the right eyelid, obvious swelling and deformity to the bridge fof the nose. No blood in nares or septal hematoma. No evidence of oral trauma. Bilateral external ears normal, Oropharynx moist.  Eyes: PERRL, EOMI, Conjunctiva " normal, No discharge.   Neck: Normal range of motion, No tenderness, Supple, No stridor.   Cardiovascular: Tachycardic heart rate, Normal rhythm.  Thorax & Lungs: Normal breath sounds, No respiratory distress, No chest tenderness.   Abdomen: Benign abdominal exam, no tenderness, no distention  Skin: Warm, Dry, No erythema, No rash. Abrasion to the right wrist   Back: No tenderness, No CVA tenderness.   Extremities: Intact distal pulses, No edema, Tenderness to the right wrist. Deformity to the right pinky. Slight tenderness to palpation of the right 4th digit. Good capillary refill. Sensation intact. Slight tenderness to the lateal aspect of the right knee but normal range of motion. Able to weight bare.   Neurologic: Alert & oriented x 3, Normal motor function, Normal sensory function, No focal deficits noted.   Psychiatric: Appropriate                                                     DIAGNOSTIC STUDIES / PROCEDURES\    RADIOLOGY  DX-FINGER(S) 2+ RIGHT   Final Result      Interval reduction of previously seen dislocated fifth PIP joint. There are likely small avulsion fragments from the volar plate.      DX-FINGER(S) 2+ RIGHT   Final Result      Dorsal subluxation/incomplete dislocation of the fifth PIP joint.      DX-WRIST-COMPLETE 3+ RIGHT   Final Result      No evidence of acute fracture or dislocation.         DX-NASAL BONES 3+   Final Result      Minimally displaced fracture involving the tip of the nasal bone.        The radiologist's interpretation of all radiological studies have been reviewed by me.    Joint Reduction Procedure Note    Indication: Joint dislocation    Consent: The patient was counseled regarding the procedure, it's indications, risks, potential complications and alternatives and any questions were answered. Consent was obtained.    Procedure: The pre-reduction exam showed distal perfusion & neurologic function to be normal. The patient was placed in the appropriate position.  Anesthesia/pain control was obtained using a digital block of the right short (pinkie) finger using 1% Lidocaine without epinephrine. Reduction of the right short (pinkie) finger PIP joint was performed by direct traction. Post reduction films were obtained and revealed satisfactory reduction. A post-reduction exam revealed distal perfusion & neurologic function to be normal. The affected area was immobilized with a finger tip splint.    The patient tolerated the procedure well.    Complications: None        Laceration Repair Procedure Note    Indication: Laceration    Procedure: The patient was placed in the appropriate position.  The laceration was closed with Dermabond. There were no additional lacerations requiring repair. The wound area was then dressed with bacitracin.      Total repaired wound length: 2 cm.     Other Items: None    The patient tolerated the procedure well.    Complications: None      COURSE & MEDICAL DECISION MAKING  Nursing notes, VS, PMSFHx reviewed in chart.     2:15 PM Patient seen and examined at bedside. The patient presents with complaints of facial pain and right digit pain following a fall earlier today and the differential diagnosis includes but is not limited to contusions vs. fracture. Patient did not suffer a loss of consciousness, she is not anticoagulated, and she does not have a headache. Discussed CT scan of the head, but we will defer at this time. Ordered for xrays of the right wrist, right fingers, and nasal bones to evaluate. Patient was offered pain medications, but she declined at this time.    3:51 PM Discussed patient's radiology results which showed evidence of an incomplete dislocation of the fifth PIP joint. Advised completing a joint reduction, and the patient agreed. This procedure was completed as noted above. Patient's nasal bone xray also showed evidence of a minimally displaced fracture at the tip of the nasal bone.     5:03 PM - Laceration was repaired as  noted above. Patient tolerated this well. Discussed the plan for discharge and follow-up. ED return precautions were reviewed. Patient verbalizes understanding and agreement to this plan of care.      The patient will return for new or worsening symptoms and is stable at the time of discharge. The patient is referred to a primary physician for blood pressure management, diabetic screening, and for all other preventative health concerns.    DISPOSITION:  Patient will be discharged home in stable condition.    FOLLOW UP:  Kandace Dent M.D.  [address] [place] [address]  [facility] [address]  [phone]          FINAL IMPRESSION  1. Dislocation of finger, initial encounter    2. Facial laceration, initial encounter    3. Closed fracture of nasal bone, initial encounter    4. Fall, initial encounter    5. Wrist sprain, right, initial encounter    Joint reduction procedure.   Laceration repair procedure.     Franko FLORES (Scribe), am scribing for, and in the presence of, Mary Pierre M.D..    Electronically signed by: Franko Connor (Bessyibrichard), 11/17/2020    Mary FLORES M.D. personally performed the services described in this documentation, as scribed by Franko Connor in my presence, and it is both accurate and complete.    The note accurately reflects work and decisions made by me.  Mary Pierre M.D.  11/17/2020  7:15 PM

## 2020-11-17 NOTE — ED TRIAGE NOTES
"Chief Complaint   Patient presents with   • T-5000 GLF     pt reports running to the bus stop when she tripped and fell. -LOC. pain to bridge of nose and RT hand, pinky finger. appears dislocated.    • Facial Injury   • Digit Pain     Pt to triage for above via w/c. Denies blood thinners and/or asa.     Denies covid s/sx, denies travel or contact with it.     /83   Pulse (!) 125   Temp 36.9 °C (98.4 °F) (Temporal)   Resp 16   Ht 1.651 m (5' 5\")   Wt 111.1 kg (245 lb)   SpO2 91%     "

## 2020-11-17 NOTE — ED NOTES
Patient denies anticoagulant/antiplatelet use. Patient GCS 15, A&Ox4. Patient denies head, neck, back pain. No step offs noted throughout. CMS intact throughout.

## 2020-11-18 NOTE — ED NOTES
This RN reviewed AVS with patient, patient verbalized understanding. Patient stable upon discharge.  Patient verbalized wound care.

## 2020-11-18 NOTE — DISCHARGE INSTRUCTIONS
Wear the finger splint until seen by the specialist.  Ice your sore areas.  You likely have a big bruise on your nose and eye.  Any new or different symptoms, headache vomiting or concerns return.

## 2020-12-04 ENCOUNTER — PATIENT MESSAGE (OUTPATIENT)
Dept: MEDICAL GROUP | Facility: MEDICAL CENTER | Age: 55
End: 2020-12-04

## 2020-12-04 DIAGNOSIS — F51.01 PRIMARY INSOMNIA: ICD-10-CM

## 2020-12-07 RX ORDER — ZOLPIDEM TARTRATE 5 MG/1
5 TABLET ORAL NIGHTLY PRN
Qty: 30 TAB | Refills: 1 | Status: SHIPPED | OUTPATIENT
Start: 2020-12-07 | End: 2021-02-05 | Stop reason: SDUPTHER

## 2020-12-28 ENCOUNTER — HOSPITAL ENCOUNTER (OUTPATIENT)
Dept: LAB | Facility: MEDICAL CENTER | Age: 55
End: 2020-12-28
Attending: PHYSICIAN ASSISTANT
Payer: MEDICARE

## 2020-12-28 LAB
25(OH)D3 SERPL-MCNC: 21 NG/ML (ref 30–100)
ALBUMIN SERPL BCP-MCNC: 4.4 G/DL (ref 3.2–4.9)
ALBUMIN/GLOB SERPL: 1.5 G/DL
ALP SERPL-CCNC: 110 U/L (ref 30–99)
ALT SERPL-CCNC: 28 U/L (ref 2–50)
ANION GAP SERPL CALC-SCNC: 11 MMOL/L (ref 7–16)
AST SERPL-CCNC: 20 U/L (ref 12–45)
BASOPHILS # BLD AUTO: 0.4 % (ref 0–1.8)
BASOPHILS # BLD: 0.04 K/UL (ref 0–0.12)
BILIRUB SERPL-MCNC: 0.8 MG/DL (ref 0.1–1.5)
BUN SERPL-MCNC: 15 MG/DL (ref 8–22)
CALCIUM SERPL-MCNC: 9.6 MG/DL (ref 8.5–10.5)
CHLORIDE SERPL-SCNC: 99 MMOL/L (ref 96–112)
CHOLEST SERPL-MCNC: 172 MG/DL (ref 100–199)
CO2 SERPL-SCNC: 24 MMOL/L (ref 20–33)
CREAT SERPL-MCNC: 0.62 MG/DL (ref 0.5–1.4)
EOSINOPHIL # BLD AUTO: 0.18 K/UL (ref 0–0.51)
EOSINOPHIL NFR BLD: 1.8 % (ref 0–6.9)
ERYTHROCYTE [DISTWIDTH] IN BLOOD BY AUTOMATED COUNT: 45 FL (ref 35.9–50)
FERRITIN SERPL-MCNC: 112 NG/ML (ref 10–291)
FOLATE SERPL-MCNC: 5.4 NG/ML
GLOBULIN SER CALC-MCNC: 3 G/DL (ref 1.9–3.5)
GLUCOSE SERPL-MCNC: 163 MG/DL (ref 65–99)
HCT VFR BLD AUTO: 48.6 % (ref 37–47)
HDLC SERPL-MCNC: 47 MG/DL
HGB BLD-MCNC: 15.5 G/DL (ref 12–16)
IMM GRANULOCYTES # BLD AUTO: 0.03 K/UL (ref 0–0.11)
IMM GRANULOCYTES NFR BLD AUTO: 0.3 % (ref 0–0.9)
IRON SATN MFR SERPL: 16 % (ref 15–55)
IRON SERPL-MCNC: 55 UG/DL (ref 40–170)
LDLC SERPL CALC-MCNC: 87 MG/DL
LYMPHOCYTES # BLD AUTO: 2.45 K/UL (ref 1–4.8)
LYMPHOCYTES NFR BLD: 24.5 % (ref 22–41)
MCH RBC QN AUTO: 29.1 PG (ref 27–33)
MCHC RBC AUTO-ENTMCNC: 31.9 G/DL (ref 33.6–35)
MCV RBC AUTO: 91.2 FL (ref 81.4–97.8)
MONOCYTES # BLD AUTO: 0.43 K/UL (ref 0–0.85)
MONOCYTES NFR BLD AUTO: 4.3 % (ref 0–13.4)
NEUTROPHILS # BLD AUTO: 6.88 K/UL (ref 2–7.15)
NEUTROPHILS NFR BLD: 68.7 % (ref 44–72)
NRBC # BLD AUTO: 0 K/UL
NRBC BLD-RTO: 0 /100 WBC
PLATELET # BLD AUTO: 338 K/UL (ref 164–446)
PMV BLD AUTO: 9.1 FL (ref 9–12.9)
POTASSIUM SERPL-SCNC: 3.5 MMOL/L (ref 3.6–5.5)
PREALB SERPL-MCNC: 22.6 MG/DL (ref 18–38)
PROT SERPL-MCNC: 7.4 G/DL (ref 6–8.2)
RBC # BLD AUTO: 5.33 M/UL (ref 4.2–5.4)
SODIUM SERPL-SCNC: 134 MMOL/L (ref 135–145)
TIBC SERPL-MCNC: 345 UG/DL (ref 250–450)
TRANSFERRIN SERPL-MCNC: 283 MG/DL (ref 200–370)
TRIGL SERPL-MCNC: 188 MG/DL (ref 0–149)
UIBC SERPL-MCNC: 290 UG/DL (ref 110–370)
VIT B12 SERPL-MCNC: 443 PG/ML (ref 211–911)
WBC # BLD AUTO: 10 K/UL (ref 4.8–10.8)

## 2020-12-28 PROCEDURE — 80053 COMPREHEN METABOLIC PANEL: CPT

## 2020-12-28 PROCEDURE — 84134 ASSAY OF PREALBUMIN: CPT

## 2020-12-28 PROCEDURE — 84425 ASSAY OF VITAMIN B-1: CPT

## 2020-12-28 PROCEDURE — 85025 COMPLETE CBC W/AUTO DIFF WBC: CPT

## 2020-12-28 PROCEDURE — 83540 ASSAY OF IRON: CPT

## 2020-12-28 PROCEDURE — 84207 ASSAY OF VITAMIN B-6: CPT

## 2020-12-28 PROCEDURE — 83550 IRON BINDING TEST: CPT

## 2020-12-28 PROCEDURE — 82728 ASSAY OF FERRITIN: CPT

## 2020-12-28 PROCEDURE — 84466 ASSAY OF TRANSFERRIN: CPT

## 2020-12-28 PROCEDURE — 84252 ASSAY OF VITAMIN B-2: CPT

## 2020-12-28 PROCEDURE — 82607 VITAMIN B-12: CPT

## 2020-12-28 PROCEDURE — 84630 ASSAY OF ZINC: CPT

## 2020-12-28 PROCEDURE — 80061 LIPID PANEL: CPT

## 2020-12-28 PROCEDURE — 82746 ASSAY OF FOLIC ACID SERUM: CPT

## 2020-12-28 PROCEDURE — 82306 VITAMIN D 25 HYDROXY: CPT

## 2020-12-28 PROCEDURE — 36415 COLL VENOUS BLD VENIPUNCTURE: CPT

## 2020-12-30 LAB — ZINC SERPL-MCNC: 65.1 UG/DL (ref 60–120)

## 2020-12-31 LAB
VIT B1 BLD-MCNC: 133 NMOL/L (ref 70–180)
VIT B2 SERPL-SCNC: 4 NMOL/L (ref 5–50)

## 2021-01-01 LAB — VIT B6 SERPL-MCNC: 30.8 NMOL/L (ref 20–125)

## 2021-01-06 ENCOUNTER — HOSPITAL ENCOUNTER (OUTPATIENT)
Dept: RADIOLOGY | Facility: MEDICAL CENTER | Age: 56
End: 2021-01-06
Attending: NURSE PRACTITIONER
Payer: MEDICARE

## 2021-01-06 DIAGNOSIS — R10.11 RUQ ABDOMINAL PAIN: ICD-10-CM

## 2021-01-06 DIAGNOSIS — E66.9 OBESITY, UNSPECIFIED CLASSIFICATION, UNSPECIFIED OBESITY TYPE, UNSPECIFIED WHETHER SERIOUS COMORBIDITY PRESENT: ICD-10-CM

## 2021-01-06 DIAGNOSIS — Z13.89 ENCOUNTER FOR SCREENING FOR OTHER DISORDER: ICD-10-CM

## 2021-01-06 DIAGNOSIS — Z98.84 S/P LAPAROSCOPIC SLEEVE GASTRECTOMY: ICD-10-CM

## 2021-01-06 PROCEDURE — 76700 US EXAM ABDOM COMPLETE: CPT

## 2021-01-12 ENCOUNTER — PRE-ADMISSION TESTING (OUTPATIENT)
Dept: ADMISSIONS | Facility: MEDICAL CENTER | Age: 56
End: 2021-01-12
Attending: COLON & RECTAL SURGERY
Payer: MEDICARE

## 2021-01-12 DIAGNOSIS — Z01.812 PRE-OPERATIVE LABORATORY EXAMINATION: ICD-10-CM

## 2021-01-12 LAB
COVID ORDER STATUS COVID19: NORMAL
SARS-COV-2 RDRP RESP QL NAA+PROBE: NOTDETECTED
SPECIMEN SOURCE: NORMAL

## 2021-01-12 PROCEDURE — U0005 INFEC AGEN DETEC AMPLI PROBE: HCPCS

## 2021-01-12 PROCEDURE — U0004 COV-19 TEST NON-CDC HGH THRU: HCPCS

## 2021-01-12 RX ORDER — ZOLPIDEM TARTRATE 5 MG/1
5 TABLET ORAL NIGHTLY PRN
COMMUNITY
End: 2021-02-05

## 2021-01-12 ASSESSMENT — FIBROSIS 4 INDEX: FIB4 SCORE: 0.64

## 2021-01-12 NOTE — PREPROCEDURE INSTRUCTIONS
Pt instructed to take flexeril and amlodipine day of surgery.     Pt instructed to hold all vitamins/supplements from now until surgery per anesthesia protocol.

## 2021-01-13 ENCOUNTER — ANESTHESIA EVENT (OUTPATIENT)
Dept: SURGERY | Facility: MEDICAL CENTER | Age: 56
End: 2021-01-13
Payer: MEDICARE

## 2021-01-13 ENCOUNTER — ANESTHESIA (OUTPATIENT)
Dept: SURGERY | Facility: MEDICAL CENTER | Age: 56
End: 2021-01-13
Payer: MEDICARE

## 2021-01-13 ENCOUNTER — HOSPITAL ENCOUNTER (OUTPATIENT)
Facility: MEDICAL CENTER | Age: 56
End: 2021-01-13
Attending: COLON & RECTAL SURGERY | Admitting: COLON & RECTAL SURGERY
Payer: MEDICARE

## 2021-01-13 VITALS
HEART RATE: 94 BPM | WEIGHT: 239.2 LBS | DIASTOLIC BLOOD PRESSURE: 56 MMHG | OXYGEN SATURATION: 93 % | HEIGHT: 65 IN | SYSTOLIC BLOOD PRESSURE: 109 MMHG | BODY MASS INDEX: 39.85 KG/M2 | TEMPERATURE: 98 F | RESPIRATION RATE: 16 BRPM

## 2021-01-13 DIAGNOSIS — G89.18 POSTOPERATIVE PAIN: ICD-10-CM

## 2021-01-13 LAB
GLUCOSE BLD-MCNC: 83 MG/DL (ref 65–99)
PATHOLOGY CONSULT NOTE: NORMAL

## 2021-01-13 PROCEDURE — 501399 HCHG SPECIMAN BAG, ENDO CATC: Performed by: COLON & RECTAL SURGERY

## 2021-01-13 PROCEDURE — 501338 HCHG SHEARS, ENDO: Performed by: COLON & RECTAL SURGERY

## 2021-01-13 PROCEDURE — 160002 HCHG RECOVERY MINUTES (STAT): Performed by: COLON & RECTAL SURGERY

## 2021-01-13 PROCEDURE — 88304 TISSUE EXAM BY PATHOLOGIST: CPT

## 2021-01-13 PROCEDURE — 160046 HCHG PACU - 1ST 60 MINS PHASE II: Performed by: COLON & RECTAL SURGERY

## 2021-01-13 PROCEDURE — 700111 HCHG RX REV CODE 636 W/ 250 OVERRIDE (IP): Performed by: ANESTHESIOLOGY

## 2021-01-13 PROCEDURE — 160036 HCHG PACU - EA ADDL 30 MINS PHASE I: Performed by: COLON & RECTAL SURGERY

## 2021-01-13 PROCEDURE — 160025 RECOVERY II MINUTES (STATS): Performed by: COLON & RECTAL SURGERY

## 2021-01-13 PROCEDURE — 160048 HCHG OR STATISTICAL LEVEL 1-5: Performed by: COLON & RECTAL SURGERY

## 2021-01-13 PROCEDURE — 160041 HCHG SURGERY MINUTES - EA ADDL 1 MIN LEVEL 4: Performed by: COLON & RECTAL SURGERY

## 2021-01-13 PROCEDURE — 160035 HCHG PACU - 1ST 60 MINS PHASE I: Performed by: COLON & RECTAL SURGERY

## 2021-01-13 PROCEDURE — 700101 HCHG RX REV CODE 250: Performed by: ANESTHESIOLOGY

## 2021-01-13 PROCEDURE — 160009 HCHG ANES TIME/MIN: Performed by: COLON & RECTAL SURGERY

## 2021-01-13 PROCEDURE — A9270 NON-COVERED ITEM OR SERVICE: HCPCS | Performed by: ANESTHESIOLOGY

## 2021-01-13 PROCEDURE — 501571 HCHG TROCAR, SEPARATOR 12X100: Performed by: COLON & RECTAL SURGERY

## 2021-01-13 PROCEDURE — 700105 HCHG RX REV CODE 258: Performed by: COLON & RECTAL SURGERY

## 2021-01-13 PROCEDURE — 82962 GLUCOSE BLOOD TEST: CPT

## 2021-01-13 PROCEDURE — 501583 HCHG TROCAR, THRD CAN&SEAL 5X100: Performed by: COLON & RECTAL SURGERY

## 2021-01-13 PROCEDURE — 700102 HCHG RX REV CODE 250 W/ 637 OVERRIDE(OP): Performed by: ANESTHESIOLOGY

## 2021-01-13 PROCEDURE — 501570 HCHG TROCAR, SEPARATOR: Performed by: COLON & RECTAL SURGERY

## 2021-01-13 PROCEDURE — 700101 HCHG RX REV CODE 250: Performed by: COLON & RECTAL SURGERY

## 2021-01-13 PROCEDURE — 502571 HCHG PACK, LAP CHOLE: Performed by: COLON & RECTAL SURGERY

## 2021-01-13 PROCEDURE — 160029 HCHG SURGERY MINUTES - 1ST 30 MINS LEVEL 4: Performed by: COLON & RECTAL SURGERY

## 2021-01-13 PROCEDURE — 501497 HCHG SURGICLIP: Performed by: COLON & RECTAL SURGERY

## 2021-01-13 PROCEDURE — 501838 HCHG SUTURE GENERAL: Performed by: COLON & RECTAL SURGERY

## 2021-01-13 RX ORDER — HYDROCODONE BITARTRATE AND ACETAMINOPHEN 5; 325 MG/1; MG/1
1 TABLET ORAL EVERY 4 HOURS PRN
Qty: 18 TAB | Refills: 0 | Status: SHIPPED | OUTPATIENT
Start: 2021-01-13 | End: 2021-01-16

## 2021-01-13 RX ORDER — HYDROMORPHONE HYDROCHLORIDE 1 MG/ML
0.4 INJECTION, SOLUTION INTRAMUSCULAR; INTRAVENOUS; SUBCUTANEOUS
Status: DISCONTINUED | OUTPATIENT
Start: 2021-01-13 | End: 2021-01-13 | Stop reason: HOSPADM

## 2021-01-13 RX ORDER — DIPHENHYDRAMINE HYDROCHLORIDE 50 MG/ML
12.5 INJECTION INTRAMUSCULAR; INTRAVENOUS
Status: DISCONTINUED | OUTPATIENT
Start: 2021-01-13 | End: 2021-01-13 | Stop reason: HOSPADM

## 2021-01-13 RX ORDER — DEXAMETHASONE SODIUM PHOSPHATE 4 MG/ML
INJECTION, SOLUTION INTRA-ARTICULAR; INTRALESIONAL; INTRAMUSCULAR; INTRAVENOUS; SOFT TISSUE PRN
Status: DISCONTINUED | OUTPATIENT
Start: 2021-01-13 | End: 2021-01-13 | Stop reason: SURG

## 2021-01-13 RX ORDER — ONDANSETRON 2 MG/ML
4 INJECTION INTRAMUSCULAR; INTRAVENOUS
Status: DISCONTINUED | OUTPATIENT
Start: 2021-01-13 | End: 2021-01-13 | Stop reason: HOSPADM

## 2021-01-13 RX ORDER — HYDRALAZINE HYDROCHLORIDE 20 MG/ML
5 INJECTION INTRAMUSCULAR; INTRAVENOUS
Status: DISCONTINUED | OUTPATIENT
Start: 2021-01-13 | End: 2021-01-13 | Stop reason: HOSPADM

## 2021-01-13 RX ORDER — SODIUM CHLORIDE, SODIUM LACTATE, POTASSIUM CHLORIDE, CALCIUM CHLORIDE 600; 310; 30; 20 MG/100ML; MG/100ML; MG/100ML; MG/100ML
INJECTION, SOLUTION INTRAVENOUS CONTINUOUS
Status: DISCONTINUED | OUTPATIENT
Start: 2021-01-13 | End: 2021-01-13 | Stop reason: HOSPADM

## 2021-01-13 RX ORDER — MEPERIDINE HYDROCHLORIDE 25 MG/ML
6.25 INJECTION INTRAMUSCULAR; INTRAVENOUS; SUBCUTANEOUS
Status: DISCONTINUED | OUTPATIENT
Start: 2021-01-13 | End: 2021-01-13 | Stop reason: HOSPADM

## 2021-01-13 RX ORDER — CEFAZOLIN SODIUM 1 G/3ML
INJECTION, POWDER, FOR SOLUTION INTRAMUSCULAR; INTRAVENOUS PRN
Status: DISCONTINUED | OUTPATIENT
Start: 2021-01-13 | End: 2021-01-13 | Stop reason: SURG

## 2021-01-13 RX ORDER — HYDROMORPHONE HYDROCHLORIDE 1 MG/ML
0.2 INJECTION, SOLUTION INTRAMUSCULAR; INTRAVENOUS; SUBCUTANEOUS
Status: DISCONTINUED | OUTPATIENT
Start: 2021-01-13 | End: 2021-01-13 | Stop reason: HOSPADM

## 2021-01-13 RX ORDER — ONDANSETRON 2 MG/ML
INJECTION INTRAMUSCULAR; INTRAVENOUS PRN
Status: DISCONTINUED | OUTPATIENT
Start: 2021-01-13 | End: 2021-01-13 | Stop reason: SURG

## 2021-01-13 RX ORDER — LIDOCAINE HYDROCHLORIDE 20 MG/ML
INJECTION, SOLUTION EPIDURAL; INFILTRATION; INTRACAUDAL; PERINEURAL PRN
Status: DISCONTINUED | OUTPATIENT
Start: 2021-01-13 | End: 2021-01-13 | Stop reason: SURG

## 2021-01-13 RX ORDER — OXYCODONE HCL 5 MG/5 ML
5 SOLUTION, ORAL ORAL
Status: COMPLETED | OUTPATIENT
Start: 2021-01-13 | End: 2021-01-13

## 2021-01-13 RX ORDER — OXYCODONE HCL 5 MG/5 ML
10 SOLUTION, ORAL ORAL
Status: COMPLETED | OUTPATIENT
Start: 2021-01-13 | End: 2021-01-13

## 2021-01-13 RX ORDER — PHENYLEPHRINE HCL IN 0.9% NACL 0.5 MG/5ML
SYRINGE (ML) INTRAVENOUS PRN
Status: DISCONTINUED | OUTPATIENT
Start: 2021-01-13 | End: 2021-01-13 | Stop reason: SURG

## 2021-01-13 RX ORDER — HALOPERIDOL 5 MG/ML
1 INJECTION INTRAMUSCULAR
Status: DISCONTINUED | OUTPATIENT
Start: 2021-01-13 | End: 2021-01-13 | Stop reason: HOSPADM

## 2021-01-13 RX ORDER — ROCURONIUM BROMIDE 10 MG/ML
INJECTION, SOLUTION INTRAVENOUS PRN
Status: DISCONTINUED | OUTPATIENT
Start: 2021-01-13 | End: 2021-01-13 | Stop reason: SURG

## 2021-01-13 RX ORDER — BUPIVACAINE HYDROCHLORIDE AND EPINEPHRINE 5; 5 MG/ML; UG/ML
INJECTION, SOLUTION EPIDURAL; INTRACAUDAL; PERINEURAL
Status: DISCONTINUED | OUTPATIENT
Start: 2021-01-13 | End: 2021-01-13 | Stop reason: HOSPADM

## 2021-01-13 RX ORDER — HYDROMORPHONE HYDROCHLORIDE 1 MG/ML
0.1 INJECTION, SOLUTION INTRAMUSCULAR; INTRAVENOUS; SUBCUTANEOUS
Status: DISCONTINUED | OUTPATIENT
Start: 2021-01-13 | End: 2021-01-13 | Stop reason: HOSPADM

## 2021-01-13 RX ORDER — LABETALOL HYDROCHLORIDE 5 MG/ML
5 INJECTION, SOLUTION INTRAVENOUS
Status: DISCONTINUED | OUTPATIENT
Start: 2021-01-13 | End: 2021-01-13 | Stop reason: HOSPADM

## 2021-01-13 RX ADMIN — ONDANSETRON 4 MG: 2 INJECTION INTRAMUSCULAR; INTRAVENOUS at 13:27

## 2021-01-13 RX ADMIN — SUGAMMADEX 200 MG: 100 INJECTION, SOLUTION INTRAVENOUS at 13:53

## 2021-01-13 RX ADMIN — FENTANYL CITRATE 50 MCG: 50 INJECTION, SOLUTION INTRAMUSCULAR; INTRAVENOUS at 13:55

## 2021-01-13 RX ADMIN — CEFAZOLIN 2 G: 330 INJECTION, POWDER, FOR SOLUTION INTRAMUSCULAR; INTRAVENOUS at 13:26

## 2021-01-13 RX ADMIN — PROPOFOL 200 MG: 10 INJECTION, EMULSION INTRAVENOUS at 13:26

## 2021-01-13 RX ADMIN — FENTANYL CITRATE 100 MCG: 50 INJECTION, SOLUTION INTRAMUSCULAR; INTRAVENOUS at 13:26

## 2021-01-13 RX ADMIN — DEXAMETHASONE SODIUM PHOSPHATE 8 MG: 4 INJECTION, SOLUTION INTRA-ARTICULAR; INTRALESIONAL; INTRAMUSCULAR; INTRAVENOUS; SOFT TISSUE at 13:27

## 2021-01-13 RX ADMIN — FENTANYL CITRATE 50 MCG: 50 INJECTION, SOLUTION INTRAMUSCULAR; INTRAVENOUS at 13:53

## 2021-01-13 RX ADMIN — ROCURONIUM BROMIDE 50 MG: 10 INJECTION, SOLUTION INTRAVENOUS at 13:26

## 2021-01-13 RX ADMIN — SODIUM CHLORIDE, POTASSIUM CHLORIDE, SODIUM LACTATE AND CALCIUM CHLORIDE: 600; 310; 30; 20 INJECTION, SOLUTION INTRAVENOUS at 11:47

## 2021-01-13 RX ADMIN — FENTANYL CITRATE 50 MCG: 50 INJECTION, SOLUTION INTRAMUSCULAR; INTRAVENOUS at 13:35

## 2021-01-13 RX ADMIN — FENTANYL CITRATE 50 MCG: 50 INJECTION, SOLUTION INTRAMUSCULAR; INTRAVENOUS at 14:24

## 2021-01-13 RX ADMIN — LIDOCAINE HYDROCHLORIDE 50 MG: 20 INJECTION, SOLUTION EPIDURAL; INFILTRATION; INTRACAUDAL at 13:26

## 2021-01-13 RX ADMIN — OXYCODONE HYDROCHLORIDE 10 MG: 5 SOLUTION ORAL at 14:24

## 2021-01-13 RX ADMIN — HALOPERIDOL LACTATE 1 MG: 5 INJECTION, SOLUTION INTRAMUSCULAR at 14:14

## 2021-01-13 RX ADMIN — Medication 100 MCG: at 13:45

## 2021-01-13 ASSESSMENT — PAIN DESCRIPTION - PAIN TYPE
TYPE: SURGICAL PAIN

## 2021-01-13 ASSESSMENT — FIBROSIS 4 INDEX: FIB4 SCORE: 0.64

## 2021-01-13 ASSESSMENT — PAIN SCALES - GENERAL: PAIN_LEVEL: 4

## 2021-01-13 NOTE — ANESTHESIA TIME REPORT
Anesthesia Start and Stop Event Times     Date Time Event    1/13/2021 1319 Ready for Procedure     1322 Anesthesia Start     1407 Anesthesia Stop        Responsible Staff  01/13/21    Name Role Begin End    Nima Galvan M.D. Anesth 1322 1407        Preop Diagnosis (Free Text):  Pre-op Diagnosis     biliary colic        Preop Diagnosis (Codes):    Post op Diagnosis  Biliary colic      Premium Reason  Non-Premium    Comments:

## 2021-01-13 NOTE — OP REPORT
NAME:  Carolynn Casey  MRN:  [id number]  :  1965      DATE OF OPERATION: 2021    PREOPERATIVE DIAGNOSIS: Biliary colic    POSTOPERATIVE DIAGNOSIS: Chronic cholecystitis    OPERATION PERFORMED: Laparoscopic cholecystectomy    SURGEON: Gadiel Cox MD    ASSISTANT:  Flory Guo PA-C, PA-C    ANESTHESIOLOGIST:  Anesthesiologist: Nima Galvan M.D.    ANESTHESIA: General endotracheal anesthesia.     FINDINGS: The gallbladder showed signs of chronic cholecystitis.     SPECIMEN: Gallbladder.    ESTIMATED BLOOD LOSS: <5 cc.     INDICATIONS: The patient is a 55 y.o. female with a history of symptomatic biliary colic. She is taken to the operating room today for laparoscopic cholecystectomy.     PROCEDURE: Following informed consent, the patient was properly identified, taken to the operating room, and placed in the supine position where general endotracheal anesthesia was administered. Intravenous antibiotics were administered by the anesthesiologist in the correct time interval. Sequential compression devices were employed. The abdomen was prepped and draped into a sterile field.     A bladeless optical entry trocar was carefully inserted into the abdomen and a pneumoperitoneum was established in the usual fashion.  A 5 mm separator port was introduced. A 5 mm lens/camera was passed into the peritoneal cavity.  An 11 mm port was placed in the epigastric midline under direct vision. Two 5 mm right subcostal ports were placed under direct vision.     Careful examination of the gallbladder and liver were performed.  The gallbladder was distended and exhibited adhesions to the serosal surface which were carefully lysed. 80cc of bile were aspirated. The gallbladder was then grasped and elevated upward toward the right shoulder.  Dissection was carried out in hepatocystic triangle definitively identifying the cystic duct and cystic   artery. These structures were multiply clipped proximally, once distally and    divided. The gallbladder was dissected free from the undersurface of the   liver using electrocautery and placed within an EndoCatch bag. It was delivered intact from the abdominal cavity through the epigastric port site. The gallbladder fossa was irrigated. All excess irrigant was evacuated from the abdominal cavity. Hemostasis was satisfactory.     The ports were removed and the abdomen desufflated. The enlarged epigastric port site fascia was approximated with a 0 Vicryl suture. The port site skin incisions were closed with interrupted 4-0 Vicryl subcuticular sutures.  Steri-Strips and Benzoin were applied beneath sterile Band-Aids.     The patient tolerated the procedure well and there were no apparent complications. All sponge, needle, and instrument counts were correct on 2 separate occasions. She was awakened, extubated, and transferred to the recovery room in satisfactory condition.       ____________________________________   Gadiel Cox MD  DD: 1/13/2021  2:05 PM    CC:  [facility];

## 2021-01-13 NOTE — ANESTHESIA POSTPROCEDURE EVALUATION
Patient: Carolynn Casey    Procedure Summary     Date: 01/13/21 Room / Location: [place] [id number] / SURGERY [facility]    Anesthesia Start: 1322 Anesthesia Stop: 1407    Procedure: CHOLECYSTECTOMY, LAPAROSCOPIC (Abdomen) Diagnosis: (biliary colic)    Surgeons: Gadiel Cox M.D. Responsible Provider: Nima Galvan M.D.    Anesthesia Type: general ASA Status: 3          Final Anesthesia Type: general  Last vitals  BP   Blood Pressure: 102/45    Temp   36.2 °C (97.1 °F)    Pulse   Pulse: 60   Resp   12    SpO2   93 %      Anesthesia Post Evaluation    Patient location during evaluation: PACU  Patient participation: complete - patient participated  Level of consciousness: awake and alert  Pain score: 4    Airway patency: patent  Anesthetic complications: no  Cardiovascular status: hemodynamically stable  Respiratory status: acceptable  Hydration status: euvolemic    PONV: none                  [Sclera] : the sclera and conjunctiva were normal [No Focal Deficits] : no focal deficits [Normal] : oriented to person, place and time, the affect was normal, the mood was normal and not anxious [Nl Inspection] : the anus was normal on inspection. [Nl Sphincter Tone] : normal sphincter tone

## 2021-01-13 NOTE — ANESTHESIA PROCEDURE NOTES
Airway    Date/Time: 1/13/2021 1:26 PM  Performed by: Nima Galvan M.D.  Authorized by: Nima Galvan M.D.     Location:  OR  Urgency:  Elective  Indications for Airway Management:  Anesthesia      Spontaneous Ventilation: absent    Sedation Level:  Deep  Preoxygenated: Yes    Patient Position:  Sniffing  Final Airway Type:  Endotracheal airway  Final Endotracheal Airway:  ETT  Cuffed: Yes    Technique Used for Successful ETT Placement:  Direct laryngoscopy    Insertion Site:  Oral  Blade Type:  Glide  Laryngoscope Blade/Videolaryngoscope Blade Size:  3  ETT Size (mm):  7.0  Measured from:  Teeth  ETT to Teeth (cm):  22  Placement Verified by: auscultation and capnometry    Cormack-Lehane Classification:  Grade I - full view of glottis  Number of Attempts at Approach:  1

## 2021-01-13 NOTE — ANESTHESIA PREPROCEDURE EVALUATION
Relevant Problems   ANESTHESIA (within normal limits)      PULMONARY (within normal limits)      NEURO (within normal limits)      CARDIAC   (+) Essential hypertension   (+) Pulmonary HTN (HCC)      GI (within normal limits)       (within normal limits)      ENDO   (+) Hypothyroidism      Other   (+) Degenerative spondylolisthesis   (+) Prediabetes   (+) S/P bariatric surgery   (+) Vitamin D deficiency       Physical Exam    Airway   Mallampati: II  TM distance: >3 FB  Neck ROM: full       Cardiovascular - normal exam  Rhythm: regular  Rate: normal  (-) murmur     Dental - normal exam           Pulmonary - normal exam  Breath sounds clear to auscultation     Abdominal    Neurological - normal exam                 Anesthesia Plan    ASA 3   ASA physical status 3 criteria: morbid obesity - BMI greater than or equal to 40    Plan - general       Airway plan will be ETT        Induction: intravenous    Postoperative Plan: Postoperative administration of opioids is intended.    Pertinent diagnostic labs and testing reviewed    Informed Consent:    Anesthetic plan and risks discussed with patient.    Use of blood products discussed with: patient whom consented to blood products.

## 2021-01-14 NOTE — OR NURSING
1643- Pt arrives from PACU, report received.  Pt placed on room air.  Pt using IS.    1650- Spoke with pt's BALA via phone, d/c instructions provided, all questions answered.  Pt sats between 80-92% on room air.    1705- Pt ambulated around unit, able to void without difficulty.  Pt instructed at home to use IS at least 10x an hour, take frequent walks, cough/deep breath.  Pt verbalized understanding.    1719- Pt sts ready to go.  Pt's ride called.  IV removed. Pt taken out via w/c.

## 2021-01-14 NOTE — DISCHARGE INSTRUCTIONS
ACTIVITY: Rest and take it easy for the first 24 hours.  A responsible adult is recommended to remain with you during that time.  It is normal to feel sleepy.  We encourage you to not do anything that requires balance, judgment or coordination.    MILD FLU-LIKE SYMPTOMS ARE NORMAL. YOU MAY EXPERIENCE GENERALIZED MUSCLE ACHES, THROAT IRRITATION, HEADACHE AND/OR SOME NAUSEA.    FOR 24 HOURS DO NOT:  Drive, operate machinery or run household appliances.  Drink beer or alcoholic beverages.   Make important decisions or sign legal documents.    SPECIAL INSTRUCTIONS:     1. DIET: Upon discharge from the hospital you may resume your normal preoperative diet. Depending on how you are feeling and whether you have nausea or not, you may wish to stay with a bland diet for the first few days. However, you can advance this as quickly as you feel ready.    2. ACTIVITIES: After discharge from the hospital, you may resume full routine activities. However, there should be no heavy lifting (greater than 15 pounds) and no strenuous activities for 4-6 weeks after surgery.  Otherwise, routine activities of daily living are acceptable.    3. DRIVING: You may drive whenever you are off pain medications and are able to perform the activities needed to drive, i.e. turning, bending, twisting, etc.    4. BATHING: You may get the wound wet 2 days after surgery. You may shower, but do not submerge in a bath for at least a week. Dressings may come off after 48 hours. You have steri-strips under your dressings. These steri-strips should stay in place. They will fall off over 5-7 days.     5. BOWEL FUNCTION: Constipation is common after an operation, especially with pain medications. The combination of pain medication and decreased activity level can cause constipation in otherwise normal patients. If you feel this is occurring, take a laxative (Miralax, Milk of Magnesia, Ex-Lax, Senokot, etc.) until the problem has resolved.    6. PAIN  MEDICATION: You will be given a prescription for pain medication at discharge. This will be sent electronically to your preferred pharmacy. Please take these as directed. It is important to remember not to take medications on an empty stomach as this may cause nausea. If you opt not to take narcotic/opioid pain medication, you may take Tylenol 1000 mg every 6 hours.     7.CALL IF YOU HAVE: (1) Fevers to more than 101.0 F, (2) Unusual chest or leg pain, (3) Drainage or fluid from incision that may be foul smelling, increased tenderness or soreness at the wound or the wound edges are no longer together, redness or swelling at the incision site. Please do not hesitate to call with any other questions.     8. APPOINTMENT: Contact our office at [phone] for a follow-up appointment in 1-2 weeks following your procedure.    If you have any additional questions, please do not hesitate to call the office and speak to either myself or the physician on call.    Office address:  [facility].  [address] [place] [id number]    DIET: To avoid nausea, slowly advance diet as tolerated, avoiding spicy or greasy foods for the first day.  Add more substantial food to your diet according to your physician's instructions.  Babies can be fed formula or breast milk as soon as they are hungry.  INCREASE FLUIDS AND FIBER TO AVOID CONSTIPATION.    SURGICAL DRESSING/BATHING: see above    FOLLOW-UP APPOINTMENT:  A follow-up appointment should be arranged with your doctor; call to schedule.    You should CALL YOUR PHYSICIAN if you develop:  Fever greater than 101 degrees F.  Pain not relieved by medication, or persistent nausea or vomiting.  Excessive bleeding (blood soaking through dressing) or unexpected drainage from the wound.  Extreme redness or swelling around the incision site, drainage of pus or foul smelling drainage.  Inability to urinate or empty your bladder within 8 hours.  Problems with breathing  or chest pain.    You should call 911 if you develop problems with breathing or chest pain.  If you are unable to contact your doctor or surgical center, you should go to the nearest emergency room or urgent care center.  Physician's telephone #: *Dr Cox [phone]    If any questions arise, call your doctor.  If your doctor is not available, please feel free to call the Surgical Center at [phone]. The Contact Center is open Monday through Friday 7AM to 5PM and may speak to a nurse at [phone], or toll free at [phone].     A registered nurse may call you a few days after your surgery to see how you are doing after your procedure.    MEDICATIONS: Resume taking daily medication.  Take prescribed pain medication with food.  If no medication is prescribed, you may take non-aspirin pain medication if needed.  PAIN MEDICATION CAN BE VERY CONSTIPATING.  Take a stool softener or laxative such as senokot, pericolace, or milk of magnesia if needed.    Prescription given for Norco.  Last pain medication given at 2:25pm.    If your physician has prescribed pain medication that includes Acetaminophen (Tylenol), do not take additional Acetaminophen (Tylenol) while taking the prescribed medication.    Depression / Suicide Risk    As you are discharged from this [facility] facility, it is important to learn how to keep safe from harming yourself.    Recognize the warning signs:  · Abrupt changes in personality, positive or negative- including increase in energy   · Giving away possessions  · Change in eating patterns- significant weight changes-  positive or negative  · Change in sleeping patterns- unable to sleep or sleeping all the time   · Unwillingness or inability to communicate  · Depression  · Unusual sadness, discouragement and loneliness  · Talk of wanting to die  · Neglect of personal appearance   · Rebelliousness- reckless behavior  · Withdrawal from people/activities they love  · Confusion-  inability to concentrate     If you or a loved one observes any of these behaviors or has concerns about self-harm, here's what you can do:  · Talk about it- your feelings and reasons for harming yourself  · Remove any means that you might use to hurt yourself (examples: pills, rope, extension cords, firearm)  · Get professional help from the community (Mental Health, Substance Abuse, psychological counseling)  · Do not be alone:Call your Safe Contact- someone whom you trust who will be there for you.  · Call your local CRISIS HOTLINE 269-2034 or 607-011-8405  · Call your local Children's Mobile Crisis Response Team [place] (122) 952-5219 or www.[facility]  · Call the toll free National Suicide Prevention Hotlines   · National Suicide Prevention Lifeline 563-137-FQNE (1914)  · National Hope Line Network 800-SUICIDE (857-5774)

## 2021-01-14 NOTE — OR NURSING
Patient awake and alert and tolerating sips of water. Nausea has subsided and pt is calm and cooperative. Pt pain is at a tolerable level and pt VSS. Dressings to abd are intact and pt is working on incentive spirometer. Pt O2sats low 90s/high 80s. Pt on 1L oxygen. Education provided to pt on deep breaths and that we need to work on more IS so she can go home. Pt is very motivated to go home. Agreed to go to discharge so she can sit up in a chair vs on gurney to improve oxygen SATs. Pt is in good spirits and making jokes.     Pt  and son in law called and updated on pt status and plan of care.     Will continue to monitor.

## 2021-01-28 RX ORDER — AMLODIPINE BESYLATE 10 MG/1
10 TABLET ORAL DAILY
Qty: 90 TAB | Refills: 3 | Status: SHIPPED | OUTPATIENT
Start: 2021-01-28 | End: 2022-01-26 | Stop reason: SDUPTHER

## 2021-02-01 ENCOUNTER — PATIENT MESSAGE (OUTPATIENT)
Dept: MEDICAL GROUP | Facility: MEDICAL CENTER | Age: 56
End: 2021-02-01

## 2021-02-01 DIAGNOSIS — E03.9 HYPOTHYROIDISM, UNSPECIFIED TYPE: ICD-10-CM

## 2021-02-01 DIAGNOSIS — N28.89 RENAL MASS: ICD-10-CM

## 2021-02-05 ENCOUNTER — PATIENT MESSAGE (OUTPATIENT)
Dept: MEDICAL GROUP | Facility: MEDICAL CENTER | Age: 56
End: 2021-02-05

## 2021-02-05 DIAGNOSIS — F51.01 PRIMARY INSOMNIA: ICD-10-CM

## 2021-02-05 RX ORDER — ZOLPIDEM TARTRATE 5 MG/1
5 TABLET ORAL NIGHTLY PRN
Qty: 30 TAB | Status: CANCELLED | OUTPATIENT
Start: 2021-02-05

## 2021-02-05 RX ORDER — ZOLPIDEM TARTRATE 5 MG/1
5 TABLET ORAL NIGHTLY PRN
Qty: 30 TAB | Refills: 2 | Status: SHIPPED | OUTPATIENT
Start: 2021-02-05 | End: 2021-03-04

## 2021-02-05 NOTE — PATIENT COMMUNICATION
Received request via: Patient    Was the patient seen in the last year in this department? Yes    Does the patient have an active prescription (recently filled or refills available) for medication(s) requested? No     Requested Prescriptions     Pending Prescriptions Disp Refills   • zolpidem (AMBIEN) 5 MG Tab 30 Tab      Sig: Take 1 Tab by mouth at bedtime as needed for Sleep.

## 2021-02-05 NOTE — TELEPHONE ENCOUNTER
From: Carolynn Casey  To: Physician Kandace Dent  Sent: 2/5/2021 8:37 AM PST  Subject: Prescription Question    Hi Dr ALSTON,  I was wondering if you would ok a refill for my Ambien? I have three left but don't see you until next Friday.  Still using [facility] on [address].  Thanks,  Carolynn

## 2021-02-11 ENCOUNTER — HOSPITAL ENCOUNTER (INPATIENT)
Facility: MEDICAL CENTER | Age: 56
LOS: 7 days | DRG: 853 | End: 2021-02-18
Attending: EMERGENCY MEDICINE | Admitting: COLON & RECTAL SURGERY
Payer: MEDICARE

## 2021-02-11 ENCOUNTER — HOSPITAL ENCOUNTER (OUTPATIENT)
Dept: RADIOLOGY | Facility: MEDICAL CENTER | Age: 56
DRG: 853 | End: 2021-02-11
Attending: NURSE PRACTITIONER
Payer: MEDICARE

## 2021-02-11 ENCOUNTER — ANESTHESIA (OUTPATIENT)
Dept: SURGERY | Facility: MEDICAL CENTER | Age: 56
DRG: 853 | End: 2021-02-11
Payer: MEDICARE

## 2021-02-11 ENCOUNTER — ANESTHESIA EVENT (OUTPATIENT)
Dept: SURGERY | Facility: MEDICAL CENTER | Age: 56
DRG: 853 | End: 2021-02-11
Payer: MEDICARE

## 2021-02-11 DIAGNOSIS — Z90.49 S/P CHOLECYSTECTOMY: ICD-10-CM

## 2021-02-11 DIAGNOSIS — B95.61 MSSA BACTEREMIA: ICD-10-CM

## 2021-02-11 DIAGNOSIS — R10.11 RIGHT UPPER QUADRANT PAIN: ICD-10-CM

## 2021-02-11 DIAGNOSIS — R19.8 PERFORATED VISCUS: ICD-10-CM

## 2021-02-11 DIAGNOSIS — R78.81 MSSA BACTEREMIA: ICD-10-CM

## 2021-02-11 DIAGNOSIS — K66.8 INTRA-ABDOMINAL FREE AIR OF UNKNOWN ETIOLOGY: ICD-10-CM

## 2021-02-11 LAB
ALBUMIN SERPL BCP-MCNC: 4.1 G/DL (ref 3.2–4.9)
ALBUMIN/GLOB SERPL: 1.3 G/DL
ALP SERPL-CCNC: 114 U/L (ref 30–99)
ALT SERPL-CCNC: 52 U/L (ref 2–50)
ANION GAP SERPL CALC-SCNC: 15 MMOL/L (ref 7–16)
AST SERPL-CCNC: 46 U/L (ref 12–45)
BASOPHILS # BLD AUTO: 0.1 % (ref 0–1.8)
BASOPHILS # BLD: 0.02 K/UL (ref 0–0.12)
BILIRUB SERPL-MCNC: 2.2 MG/DL (ref 0.1–1.5)
BUN SERPL-MCNC: 17 MG/DL (ref 8–22)
CALCIUM SERPL-MCNC: 9.9 MG/DL (ref 8.5–10.5)
CHLORIDE SERPL-SCNC: 99 MMOL/L (ref 96–112)
CO2 SERPL-SCNC: 22 MMOL/L (ref 20–33)
CREAT SERPL-MCNC: 0.8 MG/DL (ref 0.5–1.4)
EOSINOPHIL # BLD AUTO: 0 K/UL (ref 0–0.51)
EOSINOPHIL NFR BLD: 0 % (ref 0–6.9)
ERYTHROCYTE [DISTWIDTH] IN BLOOD BY AUTOMATED COUNT: 43.9 FL (ref 35.9–50)
GLOBULIN SER CALC-MCNC: 3.2 G/DL (ref 1.9–3.5)
GLUCOSE SERPL-MCNC: 150 MG/DL (ref 65–99)
HCT VFR BLD AUTO: 48 % (ref 37–47)
HGB BLD-MCNC: 15.5 G/DL (ref 12–16)
IMM GRANULOCYTES # BLD AUTO: 0.11 K/UL (ref 0–0.11)
IMM GRANULOCYTES NFR BLD AUTO: 0.6 % (ref 0–0.9)
LACTATE BLD-SCNC: 3 MMOL/L (ref 0.5–2)
LYMPHOCYTES # BLD AUTO: 1 K/UL (ref 1–4.8)
LYMPHOCYTES NFR BLD: 5.4 % (ref 22–41)
MCH RBC QN AUTO: 29.1 PG (ref 27–33)
MCHC RBC AUTO-ENTMCNC: 32.3 G/DL (ref 33.6–35)
MCV RBC AUTO: 90.1 FL (ref 81.4–97.8)
MONOCYTES # BLD AUTO: 1.09 K/UL (ref 0–0.85)
MONOCYTES NFR BLD AUTO: 5.9 % (ref 0–13.4)
NEUTROPHILS # BLD AUTO: 16.14 K/UL (ref 2–7.15)
NEUTROPHILS NFR BLD: 88 % (ref 44–72)
NRBC # BLD AUTO: 0 K/UL
NRBC BLD-RTO: 0 /100 WBC
PLATELET # BLD AUTO: 386 K/UL (ref 164–446)
PMV BLD AUTO: 8.7 FL (ref 9–12.9)
POTASSIUM SERPL-SCNC: 4.3 MMOL/L (ref 3.6–5.5)
PROT SERPL-MCNC: 7.3 G/DL (ref 6–8.2)
RBC # BLD AUTO: 5.33 M/UL (ref 4.2–5.4)
SARS-COV+SARS-COV-2 AG RESP QL IA.RAPID: NOTDETECTED
SARS-COV-2 RNA RESP QL NAA+PROBE: NOTDETECTED
SODIUM SERPL-SCNC: 136 MMOL/L (ref 135–145)
SPECIMEN SOURCE: NORMAL
SPECIMEN SOURCE: NORMAL
WBC # BLD AUTO: 18.4 K/UL (ref 4.8–10.8)

## 2021-02-11 PROCEDURE — 500002 HCHG ADHESIVE, DERMABOND: Performed by: COLON & RECTAL SURGERY

## 2021-02-11 PROCEDURE — 700101 HCHG RX REV CODE 250: Performed by: ANESTHESIOLOGY

## 2021-02-11 PROCEDURE — 96374 THER/PROPH/DIAG INJ IV PUSH: CPT

## 2021-02-11 PROCEDURE — A6402 STERILE GAUZE <= 16 SQ IN: HCPCS | Performed by: COLON & RECTAL SURGERY

## 2021-02-11 PROCEDURE — 99291 CRITICAL CARE FIRST HOUR: CPT

## 2021-02-11 PROCEDURE — 700111 HCHG RX REV CODE 636 W/ 250 OVERRIDE (IP): Performed by: ANESTHESIOLOGY

## 2021-02-11 PROCEDURE — 500042 HCHG BAG, URINARY DRAINAGE (CLOSED): Performed by: COLON & RECTAL SURGERY

## 2021-02-11 PROCEDURE — 85025 COMPLETE CBC W/AUTO DIFF WBC: CPT

## 2021-02-11 PROCEDURE — 87075 CULTR BACTERIA EXCEPT BLOOD: CPT

## 2021-02-11 PROCEDURE — 80053 COMPREHEN METABOLIC PANEL: CPT

## 2021-02-11 PROCEDURE — 160002 HCHG RECOVERY MINUTES (STAT): Performed by: COLON & RECTAL SURGERY

## 2021-02-11 PROCEDURE — 87040 BLOOD CULTURE FOR BACTERIA: CPT

## 2021-02-11 PROCEDURE — 160039 HCHG SURGERY MINUTES - EA ADDL 1 MIN LEVEL 3: Performed by: COLON & RECTAL SURGERY

## 2021-02-11 PROCEDURE — 83605 ASSAY OF LACTIC ACID: CPT

## 2021-02-11 PROCEDURE — 700101 HCHG RX REV CODE 250: Performed by: COLON & RECTAL SURGERY

## 2021-02-11 PROCEDURE — 500868 HCHG NEEDLE, SURGI(VARES): Performed by: COLON & RECTAL SURGERY

## 2021-02-11 PROCEDURE — 160028 HCHG SURGERY MINUTES - 1ST 30 MINS LEVEL 3: Performed by: COLON & RECTAL SURGERY

## 2021-02-11 PROCEDURE — 87150 DNA/RNA AMPLIFIED PROBE: CPT

## 2021-02-11 PROCEDURE — U0005 INFEC AGEN DETEC AMPLI PROBE: HCPCS

## 2021-02-11 PROCEDURE — 700111 HCHG RX REV CODE 636 W/ 250 OVERRIDE (IP): Performed by: EMERGENCY MEDICINE

## 2021-02-11 PROCEDURE — 700117 HCHG RX CONTRAST REV CODE 255: Performed by: NURSE PRACTITIONER

## 2021-02-11 PROCEDURE — 96375 TX/PRO/DX INJ NEW DRUG ADDON: CPT

## 2021-02-11 PROCEDURE — 501570 HCHG TROCAR, SEPARATOR: Performed by: COLON & RECTAL SURGERY

## 2021-02-11 PROCEDURE — 700105 HCHG RX REV CODE 258: Performed by: ANESTHESIOLOGY

## 2021-02-11 PROCEDURE — C9803 HOPD COVID-19 SPEC COLLECT: HCPCS | Performed by: EMERGENCY MEDICINE

## 2021-02-11 PROCEDURE — 0DUB47Z SUPPLEMENT ILEUM WITH AUTOLOGOUS TISSUE SUBSTITUTE, PERCUTANEOUS ENDOSCOPIC APPROACH: ICD-10-PCS | Performed by: COLON & RECTAL SURGERY

## 2021-02-11 PROCEDURE — 700105 HCHG RX REV CODE 258: Performed by: EMERGENCY MEDICINE

## 2021-02-11 PROCEDURE — 160009 HCHG ANES TIME/MIN: Performed by: COLON & RECTAL SURGERY

## 2021-02-11 PROCEDURE — 500522 HCHG ENDOSTITCH SUTURING DEVICE: Performed by: COLON & RECTAL SURGERY

## 2021-02-11 PROCEDURE — 87426 SARSCOV CORONAVIRUS AG IA: CPT

## 2021-02-11 PROCEDURE — 501583 HCHG TROCAR, THRD CAN&SEAL 5X100: Performed by: COLON & RECTAL SURGERY

## 2021-02-11 PROCEDURE — 87186 SC STD MICRODIL/AGAR DIL: CPT | Mod: 91

## 2021-02-11 PROCEDURE — 36415 COLL VENOUS BLD VENIPUNCTURE: CPT

## 2021-02-11 PROCEDURE — 87077 CULTURE AEROBIC IDENTIFY: CPT | Mod: 91

## 2021-02-11 PROCEDURE — 770006 HCHG ROOM/CARE - MED/SURG/GYN SEMI*

## 2021-02-11 PROCEDURE — 502571 HCHG PACK, LAP CHOLE: Performed by: COLON & RECTAL SURGERY

## 2021-02-11 PROCEDURE — 501571 HCHG TROCAR, SEPARATOR 12X100: Performed by: COLON & RECTAL SURGERY

## 2021-02-11 PROCEDURE — 501338 HCHG SHEARS, ENDO: Performed by: COLON & RECTAL SURGERY

## 2021-02-11 PROCEDURE — 87102 FUNGUS ISOLATION CULTURE: CPT

## 2021-02-11 PROCEDURE — 87147 CULTURE TYPE IMMUNOLOGIC: CPT

## 2021-02-11 PROCEDURE — 160035 HCHG PACU - 1ST 60 MINS PHASE I: Performed by: COLON & RECTAL SURGERY

## 2021-02-11 PROCEDURE — U0003 INFECTIOUS AGENT DETECTION BY NUCLEIC ACID (DNA OR RNA); SEVERE ACUTE RESPIRATORY SYNDROME CORONAVIRUS 2 (SARS-COV-2) (CORONAVIRUS DISEASE [COVID-19]), AMPLIFIED PROBE TECHNIQUE, MAKING USE OF HIGH THROUGHPUT TECHNOLOGIES AS DESCRIBED BY CMS-2020-01-R: HCPCS

## 2021-02-11 PROCEDURE — 501838 HCHG SUTURE GENERAL: Performed by: COLON & RECTAL SURGERY

## 2021-02-11 PROCEDURE — 500521 HCHG ENDOSTITCH LOAD UNIT: Performed by: COLON & RECTAL SURGERY

## 2021-02-11 PROCEDURE — 87070 CULTURE OTHR SPECIMN AEROBIC: CPT

## 2021-02-11 PROCEDURE — 160048 HCHG OR STATISTICAL LEVEL 1-5: Performed by: COLON & RECTAL SURGERY

## 2021-02-11 PROCEDURE — 500371 HCHG DRAIN, BLAKE 10MM: Performed by: COLON & RECTAL SURGERY

## 2021-02-11 PROCEDURE — 87205 SMEAR GRAM STAIN: CPT

## 2021-02-11 PROCEDURE — 74177 CT ABD & PELVIS W/CONTRAST: CPT | Mod: MH

## 2021-02-11 RX ORDER — BUPIVACAINE HYDROCHLORIDE AND EPINEPHRINE 5; 5 MG/ML; UG/ML
INJECTION, SOLUTION EPIDURAL; INTRACAUDAL; PERINEURAL
Status: DISCONTINUED | OUTPATIENT
Start: 2021-02-11 | End: 2021-02-11 | Stop reason: HOSPADM

## 2021-02-11 RX ORDER — ONDANSETRON 2 MG/ML
INJECTION INTRAMUSCULAR; INTRAVENOUS PRN
Status: DISCONTINUED | OUTPATIENT
Start: 2021-02-11 | End: 2021-02-11 | Stop reason: SURG

## 2021-02-11 RX ORDER — ACETAMINOPHEN 500 MG
1000 TABLET ORAL EVERY 6 HOURS
Status: DISPENSED | OUTPATIENT
Start: 2021-02-12 | End: 2021-02-16

## 2021-02-11 RX ORDER — ONDANSETRON 2 MG/ML
4 INJECTION INTRAMUSCULAR; INTRAVENOUS EVERY 4 HOURS PRN
Status: DISCONTINUED | OUTPATIENT
Start: 2021-02-11 | End: 2021-02-18 | Stop reason: HOSPADM

## 2021-02-11 RX ORDER — ROCURONIUM BROMIDE 10 MG/ML
INJECTION, SOLUTION INTRAVENOUS PRN
Status: DISCONTINUED | OUTPATIENT
Start: 2021-02-11 | End: 2021-02-11 | Stop reason: SURG

## 2021-02-11 RX ORDER — ENALAPRILAT 1.25 MG/ML
2.5 INJECTION INTRAVENOUS EVERY 6 HOURS PRN
Status: DISCONTINUED | OUTPATIENT
Start: 2021-02-11 | End: 2021-02-18 | Stop reason: HOSPADM

## 2021-02-11 RX ORDER — HYDROMORPHONE HYDROCHLORIDE 1 MG/ML
0.4 INJECTION, SOLUTION INTRAMUSCULAR; INTRAVENOUS; SUBCUTANEOUS
Status: DISCONTINUED | OUTPATIENT
Start: 2021-02-11 | End: 2021-02-11 | Stop reason: HOSPADM

## 2021-02-11 RX ORDER — SODIUM CHLORIDE, SODIUM LACTATE, POTASSIUM CHLORIDE, AND CALCIUM CHLORIDE .6; .31; .03; .02 G/100ML; G/100ML; G/100ML; G/100ML
500 INJECTION, SOLUTION INTRAVENOUS
Status: DISCONTINUED | OUTPATIENT
Start: 2021-02-11 | End: 2021-02-18 | Stop reason: HOSPADM

## 2021-02-11 RX ORDER — ACETAMINOPHEN 500 MG
2000 TABLET ORAL 3 TIMES DAILY PRN
COMMUNITY
End: 2021-12-07

## 2021-02-11 RX ORDER — DEXAMETHASONE SODIUM PHOSPHATE 4 MG/ML
INJECTION, SOLUTION INTRA-ARTICULAR; INTRALESIONAL; INTRAMUSCULAR; INTRAVENOUS; SOFT TISSUE PRN
Status: DISCONTINUED | OUTPATIENT
Start: 2021-02-11 | End: 2021-02-11 | Stop reason: SURG

## 2021-02-11 RX ORDER — SUCCINYLCHOLINE/SOD CL,ISO/PF 200MG/10ML
SYRINGE (ML) INTRAVENOUS PRN
Status: DISCONTINUED | OUTPATIENT
Start: 2021-02-11 | End: 2021-02-11 | Stop reason: SURG

## 2021-02-11 RX ORDER — HALOPERIDOL 5 MG/ML
1 INJECTION INTRAMUSCULAR
Status: DISCONTINUED | OUTPATIENT
Start: 2021-02-11 | End: 2021-02-11 | Stop reason: HOSPADM

## 2021-02-11 RX ORDER — HYDROMORPHONE HYDROCHLORIDE 1 MG/ML
0.2 INJECTION, SOLUTION INTRAMUSCULAR; INTRAVENOUS; SUBCUTANEOUS
Status: DISCONTINUED | OUTPATIENT
Start: 2021-02-11 | End: 2021-02-11 | Stop reason: HOSPADM

## 2021-02-11 RX ORDER — PHENYLEPHRINE HYDROCHLORIDE 10 MG/ML
INJECTION, SOLUTION INTRAMUSCULAR; INTRAVENOUS; SUBCUTANEOUS PRN
Status: DISCONTINUED | OUTPATIENT
Start: 2021-02-11 | End: 2021-02-11 | Stop reason: SURG

## 2021-02-11 RX ORDER — PROMETHAZINE HYDROCHLORIDE 25 MG/1
25 SUPPOSITORY RECTAL EVERY 4 HOURS PRN
Status: DISCONTINUED | OUTPATIENT
Start: 2021-02-11 | End: 2021-02-18 | Stop reason: HOSPADM

## 2021-02-11 RX ORDER — ONDANSETRON 2 MG/ML
4 INJECTION INTRAMUSCULAR; INTRAVENOUS ONCE
Status: COMPLETED | OUTPATIENT
Start: 2021-02-11 | End: 2021-02-11

## 2021-02-11 RX ORDER — HYDROMORPHONE HYDROCHLORIDE 2 MG/ML
INJECTION, SOLUTION INTRAMUSCULAR; INTRAVENOUS; SUBCUTANEOUS PRN
Status: DISCONTINUED | OUTPATIENT
Start: 2021-02-11 | End: 2021-02-11 | Stop reason: SURG

## 2021-02-11 RX ORDER — SODIUM CHLORIDE, SODIUM LACTATE, POTASSIUM CHLORIDE, AND CALCIUM CHLORIDE .6; .31; .03; .02 G/100ML; G/100ML; G/100ML; G/100ML
30 INJECTION, SOLUTION INTRAVENOUS
Status: COMPLETED | OUTPATIENT
Start: 2021-02-11 | End: 2021-02-12

## 2021-02-11 RX ORDER — LIDOCAINE HYDROCHLORIDE 20 MG/ML
INJECTION, SOLUTION EPIDURAL; INFILTRATION; INTRACAUDAL; PERINEURAL PRN
Status: DISCONTINUED | OUTPATIENT
Start: 2021-02-11 | End: 2021-02-11 | Stop reason: SURG

## 2021-02-11 RX ORDER — DIPHENHYDRAMINE HYDROCHLORIDE 50 MG/ML
12.5 INJECTION INTRAMUSCULAR; INTRAVENOUS
Status: DISCONTINUED | OUTPATIENT
Start: 2021-02-11 | End: 2021-02-11 | Stop reason: HOSPADM

## 2021-02-11 RX ORDER — DIPHENHYDRAMINE HYDROCHLORIDE 50 MG/ML
12.5 INJECTION INTRAMUSCULAR; INTRAVENOUS EVERY 6 HOURS PRN
Status: DISCONTINUED | OUTPATIENT
Start: 2021-02-11 | End: 2021-02-18 | Stop reason: HOSPADM

## 2021-02-11 RX ORDER — ONDANSETRON 2 MG/ML
4 INJECTION INTRAMUSCULAR; INTRAVENOUS
Status: DISCONTINUED | OUTPATIENT
Start: 2021-02-11 | End: 2021-02-11 | Stop reason: HOSPADM

## 2021-02-11 RX ORDER — DIPHENHYDRAMINE HYDROCHLORIDE 50 MG/ML
INJECTION INTRAMUSCULAR; INTRAVENOUS PRN
Status: DISCONTINUED | OUTPATIENT
Start: 2021-02-11 | End: 2021-02-11 | Stop reason: SURG

## 2021-02-11 RX ORDER — ACETAMINOPHEN 10 MG/ML
1000 INJECTION, SOLUTION INTRAVENOUS EVERY 6 HOURS
Status: COMPLETED | OUTPATIENT
Start: 2021-02-12 | End: 2021-02-12

## 2021-02-11 RX ORDER — HALOPERIDOL 5 MG/ML
1 INJECTION INTRAMUSCULAR EVERY 6 HOURS PRN
Status: DISCONTINUED | OUTPATIENT
Start: 2021-02-11 | End: 2021-02-18 | Stop reason: HOSPADM

## 2021-02-11 RX ORDER — ACETAMINOPHEN 500 MG
1000 TABLET ORAL EVERY 6 HOURS PRN
Status: DISCONTINUED | OUTPATIENT
Start: 2021-02-16 | End: 2021-02-18 | Stop reason: HOSPADM

## 2021-02-11 RX ORDER — CEFOTETAN DISODIUM 2 G/20ML
INJECTION, POWDER, FOR SOLUTION INTRAMUSCULAR; INTRAVENOUS PRN
Status: DISCONTINUED | OUTPATIENT
Start: 2021-02-11 | End: 2021-02-11 | Stop reason: SURG

## 2021-02-11 RX ORDER — MIDAZOLAM HYDROCHLORIDE 1 MG/ML
INJECTION INTRAMUSCULAR; INTRAVENOUS PRN
Status: DISCONTINUED | OUTPATIENT
Start: 2021-02-11 | End: 2021-02-11 | Stop reason: SURG

## 2021-02-11 RX ORDER — SODIUM CHLORIDE, SODIUM GLUCONATE, SODIUM ACETATE, POTASSIUM CHLORIDE AND MAGNESIUM CHLORIDE 526; 502; 368; 37; 30 MG/100ML; MG/100ML; MG/100ML; MG/100ML; MG/100ML
500 INJECTION, SOLUTION INTRAVENOUS CONTINUOUS
Status: DISCONTINUED | OUTPATIENT
Start: 2021-02-11 | End: 2021-02-11 | Stop reason: HOSPADM

## 2021-02-11 RX ORDER — SODIUM CHLORIDE, SODIUM GLUCONATE, SODIUM ACETATE, POTASSIUM CHLORIDE AND MAGNESIUM CHLORIDE 526; 502; 368; 37; 30 MG/100ML; MG/100ML; MG/100ML; MG/100ML; MG/100ML
INJECTION, SOLUTION INTRAVENOUS
Status: DISCONTINUED | OUTPATIENT
Start: 2021-02-11 | End: 2021-02-11 | Stop reason: SURG

## 2021-02-11 RX ORDER — HYDROMORPHONE HYDROCHLORIDE 1 MG/ML
0.5 INJECTION, SOLUTION INTRAMUSCULAR; INTRAVENOUS; SUBCUTANEOUS
Status: DISCONTINUED | OUTPATIENT
Start: 2021-02-11 | End: 2021-02-11 | Stop reason: HOSPADM

## 2021-02-11 RX ORDER — MORPHINE SULFATE 4 MG/ML
4 INJECTION, SOLUTION INTRAMUSCULAR; INTRAVENOUS ONCE
Status: COMPLETED | OUTPATIENT
Start: 2021-02-11 | End: 2021-02-11

## 2021-02-11 RX ORDER — LEVOFLOXACIN 5 MG/ML
750 INJECTION, SOLUTION INTRAVENOUS ONCE
Status: DISCONTINUED | OUTPATIENT
Start: 2021-02-11 | End: 2021-02-11

## 2021-02-11 RX ORDER — MEPERIDINE HYDROCHLORIDE 25 MG/ML
12.5 INJECTION INTRAMUSCULAR; INTRAVENOUS; SUBCUTANEOUS
Status: DISCONTINUED | OUTPATIENT
Start: 2021-02-11 | End: 2021-02-11 | Stop reason: HOSPADM

## 2021-02-11 RX ORDER — SODIUM CHLORIDE, SODIUM LACTATE, POTASSIUM CHLORIDE, CALCIUM CHLORIDE 600; 310; 30; 20 MG/100ML; MG/100ML; MG/100ML; MG/100ML
INJECTION, SOLUTION INTRAVENOUS
Status: DISCONTINUED | OUTPATIENT
Start: 2021-02-11 | End: 2021-02-11 | Stop reason: SURG

## 2021-02-11 RX ADMIN — IOHEXOL 100 ML: 350 INJECTION, SOLUTION INTRAVENOUS at 16:44

## 2021-02-11 RX ADMIN — HYDROMORPHONE HYDROCHLORIDE 0.5 MG: 2 INJECTION, SOLUTION INTRAMUSCULAR; INTRAVENOUS; SUBCUTANEOUS at 20:15

## 2021-02-11 RX ADMIN — ONDANSETRON 4 MG: 2 INJECTION INTRAMUSCULAR; INTRAVENOUS at 20:56

## 2021-02-11 RX ADMIN — SODIUM CHLORIDE, POTASSIUM CHLORIDE, SODIUM LACTATE AND CALCIUM CHLORIDE: 600; 310; 30; 20 INJECTION, SOLUTION INTRAVENOUS at 20:10

## 2021-02-11 RX ADMIN — SUGAMMADEX 200 MG: 100 INJECTION, SOLUTION INTRAVENOUS at 21:00

## 2021-02-11 RX ADMIN — HYDROMORPHONE HYDROCHLORIDE 0.5 MG: 2 INJECTION, SOLUTION INTRAMUSCULAR; INTRAVENOUS; SUBCUTANEOUS at 20:20

## 2021-02-11 RX ADMIN — Medication 160 MG: at 20:15

## 2021-02-11 RX ADMIN — DEXAMETHASONE SODIUM PHOSPHATE 4 MG: 4 INJECTION, SOLUTION INTRA-ARTICULAR; INTRALESIONAL; INTRAMUSCULAR; INTRAVENOUS; SOFT TISSUE at 20:19

## 2021-02-11 RX ADMIN — SODIUM CHLORIDE, SODIUM GLUCONATE, SODIUM ACETATE, POTASSIUM CHLORIDE AND MAGNESIUM CHLORIDE: 526; 502; 368; 37; 30 INJECTION, SOLUTION INTRAVENOUS at 20:29

## 2021-02-11 RX ADMIN — PROPOFOL 120 MG: 10 INJECTION, EMULSION INTRAVENOUS at 20:15

## 2021-02-11 RX ADMIN — CEFOTETAN DISODIUM 2 G: 2 INJECTION, POWDER, FOR SOLUTION INTRAMUSCULAR; INTRAVENOUS at 20:10

## 2021-02-11 RX ADMIN — SODIUM CHLORIDE, SODIUM GLUCONATE, SODIUM ACETATE, POTASSIUM CHLORIDE AND MAGNESIUM CHLORIDE: 526; 502; 368; 37; 30 INJECTION, SOLUTION INTRAVENOUS at 20:57

## 2021-02-11 RX ADMIN — ROCURONIUM BROMIDE 50 MG: 10 INJECTION, SOLUTION INTRAVENOUS at 20:23

## 2021-02-11 RX ADMIN — LIDOCAINE HYDROCHLORIDE 100 MG: 20 INJECTION, SOLUTION EPIDURAL; INFILTRATION; INTRACAUDAL at 20:15

## 2021-02-11 RX ADMIN — MORPHINE SULFATE 4 MG: 4 INJECTION INTRAVENOUS at 19:27

## 2021-02-11 RX ADMIN — PHENYLEPHRINE HYDROCHLORIDE 100 MCG: 10 INJECTION INTRAVENOUS at 20:28

## 2021-02-11 RX ADMIN — ONDANSETRON 4 MG: 2 INJECTION INTRAMUSCULAR; INTRAVENOUS at 19:26

## 2021-02-11 RX ADMIN — DIPHENHYDRAMINE HYDROCHLORIDE 12.5 MG: 50 INJECTION, SOLUTION INTRAMUSCULAR; INTRAVENOUS at 20:19

## 2021-02-11 RX ADMIN — PHENYLEPHRINE HYDROCHLORIDE 100 MCG: 10 INJECTION INTRAVENOUS at 20:31

## 2021-02-11 RX ADMIN — IOHEXOL 25 ML: 240 INJECTION, SOLUTION INTRATHECAL; INTRAVASCULAR; INTRAVENOUS; ORAL at 16:44

## 2021-02-11 RX ADMIN — MIDAZOLAM HYDROCHLORIDE 2 MG: 1 INJECTION, SOLUTION INTRAMUSCULAR; INTRAVENOUS at 20:15

## 2021-02-11 RX ADMIN — SODIUM CHLORIDE, POTASSIUM CHLORIDE, SODIUM LACTATE AND CALCIUM CHLORIDE 3240 ML: 600; 310; 30; 20 INJECTION, SOLUTION INTRAVENOUS at 19:25

## 2021-02-11 ASSESSMENT — FIBROSIS 4 INDEX: FIB4 SCORE: 0.65

## 2021-02-11 ASSESSMENT — PAIN DESCRIPTION - PAIN TYPE: TYPE: ACUTE PAIN

## 2021-02-11 ASSESSMENT — PAIN SCALES - GENERAL: PAIN_LEVEL: 2

## 2021-02-12 ENCOUNTER — PATIENT OUTREACH (OUTPATIENT)
Dept: HEALTH INFORMATION MANAGEMENT | Facility: OTHER | Age: 56
End: 2021-02-12

## 2021-02-12 PROBLEM — R78.81 MSSA BACTEREMIA: Status: ACTIVE | Noted: 2021-02-12

## 2021-02-12 PROBLEM — R10.9 ABDOMINAL PAIN: Status: ACTIVE | Noted: 2021-02-12

## 2021-02-12 PROBLEM — R19.8 PERFORATED VISCUS: Status: ACTIVE | Noted: 2021-02-12

## 2021-02-12 PROBLEM — B95.61 MSSA BACTEREMIA: Status: ACTIVE | Noted: 2021-02-12

## 2021-02-12 PROBLEM — A41.9 SEPSIS (HCC): Status: ACTIVE | Noted: 2021-02-12

## 2021-02-12 LAB
ALBUMIN SERPL BCP-MCNC: 3.4 G/DL (ref 3.2–4.9)
ALBUMIN/GLOB SERPL: 1.1 G/DL
ALP SERPL-CCNC: 112 U/L (ref 30–99)
ALT SERPL-CCNC: 97 U/L (ref 2–50)
ANION GAP SERPL CALC-SCNC: 10 MMOL/L (ref 7–16)
AST SERPL-CCNC: 92 U/L (ref 12–45)
BILIRUB SERPL-MCNC: 1.6 MG/DL (ref 0.1–1.5)
BUN SERPL-MCNC: 15 MG/DL (ref 8–22)
CALCIUM SERPL-MCNC: 9.2 MG/DL (ref 8.5–10.5)
CHLORIDE SERPL-SCNC: 101 MMOL/L (ref 96–112)
CO2 SERPL-SCNC: 24 MMOL/L (ref 20–33)
CREAT SERPL-MCNC: 0.67 MG/DL (ref 0.5–1.4)
ERYTHROCYTE [DISTWIDTH] IN BLOOD BY AUTOMATED COUNT: 44.7 FL (ref 35.9–50)
EST. AVERAGE GLUCOSE BLD GHB EST-MCNC: 120 MG/DL
GLOBULIN SER CALC-MCNC: 3.2 G/DL (ref 1.9–3.5)
GLUCOSE SERPL-MCNC: 159 MG/DL (ref 65–99)
GRAM STN SPEC: NORMAL
HBA1C MFR BLD: 5.8 % (ref 0–5.6)
HCT VFR BLD AUTO: 44.8 % (ref 37–47)
HGB BLD-MCNC: 14.2 G/DL (ref 12–16)
LACTATE BLD-SCNC: 1.3 MMOL/L (ref 0.5–2)
LACTATE BLD-SCNC: 1.6 MMOL/L (ref 0.5–2)
MCH RBC QN AUTO: 28.9 PG (ref 27–33)
MCHC RBC AUTO-ENTMCNC: 31.7 G/DL (ref 33.6–35)
MCV RBC AUTO: 91.1 FL (ref 81.4–97.8)
PLATELET # BLD AUTO: 340 K/UL (ref 164–446)
PMV BLD AUTO: 8.7 FL (ref 9–12.9)
POTASSIUM SERPL-SCNC: 4.4 MMOL/L (ref 3.6–5.5)
PROT SERPL-MCNC: 6.6 G/DL (ref 6–8.2)
RBC # BLD AUTO: 4.92 M/UL (ref 4.2–5.4)
SIGNIFICANT IND 70042: NORMAL
SITE SITE: NORMAL
SODIUM SERPL-SCNC: 135 MMOL/L (ref 135–145)
SOURCE SOURCE: NORMAL
WBC # BLD AUTO: 16.5 K/UL (ref 4.8–10.8)

## 2021-02-12 PROCEDURE — 36415 COLL VENOUS BLD VENIPUNCTURE: CPT

## 2021-02-12 PROCEDURE — 83605 ASSAY OF LACTIC ACID: CPT | Mod: 91

## 2021-02-12 PROCEDURE — 700102 HCHG RX REV CODE 250 W/ 637 OVERRIDE(OP): Performed by: COLON & RECTAL SURGERY

## 2021-02-12 PROCEDURE — 700111 HCHG RX REV CODE 636 W/ 250 OVERRIDE (IP): Performed by: STUDENT IN AN ORGANIZED HEALTH CARE EDUCATION/TRAINING PROGRAM

## 2021-02-12 PROCEDURE — A9270 NON-COVERED ITEM OR SERVICE: HCPCS | Performed by: COLON & RECTAL SURGERY

## 2021-02-12 PROCEDURE — 99222 1ST HOSP IP/OBS MODERATE 55: CPT | Performed by: STUDENT IN AN ORGANIZED HEALTH CARE EDUCATION/TRAINING PROGRAM

## 2021-02-12 PROCEDURE — 99232 SBSQ HOSP IP/OBS MODERATE 35: CPT | Performed by: STUDENT IN AN ORGANIZED HEALTH CARE EDUCATION/TRAINING PROGRAM

## 2021-02-12 PROCEDURE — 80053 COMPREHEN METABOLIC PANEL: CPT

## 2021-02-12 PROCEDURE — 700101 HCHG RX REV CODE 250: Performed by: EMERGENCY MEDICINE

## 2021-02-12 PROCEDURE — 87040 BLOOD CULTURE FOR BACTERIA: CPT | Mod: 91

## 2021-02-12 PROCEDURE — 700101 HCHG RX REV CODE 250: Performed by: STUDENT IN AN ORGANIZED HEALTH CARE EDUCATION/TRAINING PROGRAM

## 2021-02-12 PROCEDURE — 700105 HCHG RX REV CODE 258: Performed by: EMERGENCY MEDICINE

## 2021-02-12 PROCEDURE — 700111 HCHG RX REV CODE 636 W/ 250 OVERRIDE (IP): Performed by: PHYSICIAN ASSISTANT

## 2021-02-12 PROCEDURE — 94760 N-INVAS EAR/PLS OXIMETRY 1: CPT

## 2021-02-12 PROCEDURE — 700105 HCHG RX REV CODE 258: Performed by: PHYSICIAN ASSISTANT

## 2021-02-12 PROCEDURE — 700111 HCHG RX REV CODE 636 W/ 250 OVERRIDE (IP): Performed by: EMERGENCY MEDICINE

## 2021-02-12 PROCEDURE — 83036 HEMOGLOBIN GLYCOSYLATED A1C: CPT

## 2021-02-12 PROCEDURE — 700105 HCHG RX REV CODE 258: Performed by: COLON & RECTAL SURGERY

## 2021-02-12 PROCEDURE — 700111 HCHG RX REV CODE 636 W/ 250 OVERRIDE (IP): Performed by: COLON & RECTAL SURGERY

## 2021-02-12 PROCEDURE — C9113 INJ PANTOPRAZOLE SODIUM, VIA: HCPCS | Performed by: COLON & RECTAL SURGERY

## 2021-02-12 PROCEDURE — 770006 HCHG ROOM/CARE - MED/SURG/GYN SEMI*

## 2021-02-12 PROCEDURE — 85027 COMPLETE CBC AUTOMATED: CPT

## 2021-02-12 RX ORDER — KETOROLAC TROMETHAMINE 30 MG/ML
30 INJECTION, SOLUTION INTRAMUSCULAR; INTRAVENOUS EVERY 6 HOURS
Status: DISCONTINUED | OUTPATIENT
Start: 2021-02-12 | End: 2021-02-12

## 2021-02-12 RX ORDER — SODIUM CHLORIDE 9 MG/ML
INJECTION, SOLUTION INTRAVENOUS CONTINUOUS
Status: DISCONTINUED | OUTPATIENT
Start: 2021-02-12 | End: 2021-02-15

## 2021-02-12 RX ORDER — HYDROMORPHONE HYDROCHLORIDE 1 MG/ML
INJECTION, SOLUTION INTRAMUSCULAR; INTRAVENOUS; SUBCUTANEOUS
Status: DISCONTINUED
Start: 2021-02-12 | End: 2021-02-12

## 2021-02-12 RX ORDER — HYDROMORPHONE HYDROCHLORIDE 1 MG/ML
1 INJECTION, SOLUTION INTRAMUSCULAR; INTRAVENOUS; SUBCUTANEOUS
Status: DISCONTINUED | OUTPATIENT
Start: 2021-02-12 | End: 2021-02-18 | Stop reason: HOSPADM

## 2021-02-12 RX ORDER — CEFAZOLIN SODIUM 2 G/100ML
2 INJECTION, SOLUTION INTRAVENOUS EVERY 8 HOURS
Status: DISCONTINUED | OUTPATIENT
Start: 2021-02-12 | End: 2021-02-13

## 2021-02-12 RX ORDER — FLUCONAZOLE 2 MG/ML
400 INJECTION, SOLUTION INTRAVENOUS EVERY 24 HOURS
Status: DISCONTINUED | OUTPATIENT
Start: 2021-02-12 | End: 2021-02-12

## 2021-02-12 RX ADMIN — METRONIDAZOLE 500 MG: 500 INJECTION, SOLUTION INTRAVENOUS at 02:29

## 2021-02-12 RX ADMIN — CEFTRIAXONE SODIUM 2 G: 2 INJECTION, POWDER, FOR SOLUTION INTRAMUSCULAR; INTRAVENOUS at 00:34

## 2021-02-12 RX ADMIN — ACETAMINOPHEN 1000 MG: 10 INJECTION, SOLUTION INTRAVENOUS at 05:46

## 2021-02-12 RX ADMIN — CEFTRIAXONE SODIUM 2 G: 2 INJECTION, POWDER, FOR SOLUTION INTRAMUSCULAR; INTRAVENOUS at 09:01

## 2021-02-12 RX ADMIN — HYDROMORPHONE HYDROCHLORIDE 1 MG: 1 INJECTION, SOLUTION INTRAMUSCULAR; INTRAVENOUS; SUBCUTANEOUS at 14:24

## 2021-02-12 RX ADMIN — SODIUM CHLORIDE: 9 INJECTION, SOLUTION INTRAVENOUS at 21:16

## 2021-02-12 RX ADMIN — METRONIDAZOLE 500 MG: 500 INJECTION, SOLUTION INTRAVENOUS at 21:56

## 2021-02-12 RX ADMIN — SODIUM CHLORIDE: 9 INJECTION, SOLUTION INTRAVENOUS at 09:01

## 2021-02-12 RX ADMIN — POTASSIUM CHLORIDE: 149 INJECTION, SOLUTION, CONCENTRATE INTRAVENOUS at 05:46

## 2021-02-12 RX ADMIN — PANTOPRAZOLE SODIUM 8 MG/HR: 40 INJECTION, POWDER, FOR SOLUTION INTRAVENOUS at 21:15

## 2021-02-12 RX ADMIN — METRONIDAZOLE 500 MG: 500 INJECTION, SOLUTION INTRAVENOUS at 15:24

## 2021-02-12 RX ADMIN — PANTOPRAZOLE SODIUM 8 MG/HR: 40 INJECTION, POWDER, FOR SOLUTION INTRAVENOUS at 00:38

## 2021-02-12 RX ADMIN — ENOXAPARIN SODIUM 40 MG: 40 INJECTION SUBCUTANEOUS at 05:46

## 2021-02-12 RX ADMIN — CEFAZOLIN SODIUM 2 G: 2 INJECTION, SOLUTION INTRAVENOUS at 21:16

## 2021-02-12 RX ADMIN — HYDROMORPHONE HYDROCHLORIDE 1 MG: 1 INJECTION, SOLUTION INTRAMUSCULAR; INTRAVENOUS; SUBCUTANEOUS at 23:11

## 2021-02-12 RX ADMIN — FLUCONAZOLE 400 MG: 2 INJECTION, SOLUTION INTRAVENOUS at 10:43

## 2021-02-12 RX ADMIN — CEFAZOLIN SODIUM 2 G: 2 INJECTION, SOLUTION INTRAVENOUS at 14:24

## 2021-02-12 RX ADMIN — HYDROMORPHONE HYDROCHLORIDE 1 MG: 1 INJECTION, SOLUTION INTRAMUSCULAR; INTRAVENOUS; SUBCUTANEOUS at 05:46

## 2021-02-12 RX ADMIN — ACETAMINOPHEN 1000 MG: 10 INJECTION, SOLUTION INTRAVENOUS at 01:37

## 2021-02-12 RX ADMIN — HYDROMORPHONE HYDROCHLORIDE 1 MG: 1 INJECTION, SOLUTION INTRAMUSCULAR; INTRAVENOUS; SUBCUTANEOUS at 09:01

## 2021-02-12 RX ADMIN — PANTOPRAZOLE SODIUM 8 MG/HR: 40 INJECTION, POWDER, FOR SOLUTION INTRAVENOUS at 09:59

## 2021-02-12 RX ADMIN — HYDROMORPHONE HYDROCHLORIDE 1 MG: 1 INJECTION, SOLUTION INTRAMUSCULAR; INTRAVENOUS; SUBCUTANEOUS at 01:37

## 2021-02-12 RX ADMIN — HYDROMORPHONE HYDROCHLORIDE 1 MG: 1 INJECTION, SOLUTION INTRAMUSCULAR; INTRAVENOUS; SUBCUTANEOUS at 19:09

## 2021-02-12 ASSESSMENT — LIFESTYLE VARIABLES
ON A TYPICAL DAY WHEN YOU DRINK ALCOHOL HOW MANY DRINKS DO YOU HAVE: 0
TOTAL SCORE: 0
HOW MANY TIMES IN THE PAST YEAR HAVE YOU HAD 5 OR MORE DRINKS IN A DAY: 0
TOTAL SCORE: 0
AVERAGE NUMBER OF DAYS PER WEEK YOU HAVE A DRINK CONTAINING ALCOHOL: 0
CONSUMPTION TOTAL: NEGATIVE
HAVE YOU EVER FELT YOU SHOULD CUT DOWN ON YOUR DRINKING: NO
ALCOHOL_USE: NO
EVER FELT BAD OR GUILTY ABOUT YOUR DRINKING: NO
EVER HAD A DRINK FIRST THING IN THE MORNING TO STEADY YOUR NERVES TO GET RID OF A HANGOVER: NO
HAVE PEOPLE ANNOYED YOU BY CRITICIZING YOUR DRINKING: NO
TOTAL SCORE: 0

## 2021-02-12 ASSESSMENT — ENCOUNTER SYMPTOMS
RESPIRATORY NEGATIVE: 1
COUGH: 0
ABDOMINAL PAIN: 1
NAUSEA: 1
FEVER: 0
HEARTBURN: 0
CARDIOVASCULAR NEGATIVE: 1
VOMITING: 0
EYES NEGATIVE: 1
CHILLS: 0
CONSTITUTIONAL NEGATIVE: 1
NAUSEA: 0
PALPITATIONS: 0
DIARRHEA: 0
NEUROLOGICAL NEGATIVE: 1
MUSCULOSKELETAL NEGATIVE: 1
VOMITING: 1
SHORTNESS OF BREATH: 0

## 2021-02-12 ASSESSMENT — COGNITIVE AND FUNCTIONAL STATUS - GENERAL
MOVING FROM LYING ON BACK TO SITTING ON SIDE OF FLAT BED: A LITTLE
SUGGESTED CMS G CODE MODIFIER MOBILITY: CK
DAILY ACTIVITIY SCORE: 23
WALKING IN HOSPITAL ROOM: A LITTLE
CLIMB 3 TO 5 STEPS WITH RAILING: A LITTLE
MOBILITY SCORE: 19
MOVING TO AND FROM BED TO CHAIR: A LITTLE
STANDING UP FROM CHAIR USING ARMS: A LITTLE
SUGGESTED CMS G CODE MODIFIER DAILY ACTIVITY: CI
DRESSING REGULAR LOWER BODY CLOTHING: A LITTLE

## 2021-02-12 ASSESSMENT — PAIN DESCRIPTION - PAIN TYPE
TYPE: ACUTE PAIN;SURGICAL PAIN

## 2021-02-12 ASSESSMENT — PATIENT HEALTH QUESTIONNAIRE - PHQ9
2. FEELING DOWN, DEPRESSED, IRRITABLE, OR HOPELESS: NOT AT ALL
SUM OF ALL RESPONSES TO PHQ9 QUESTIONS 1 AND 2: 0
1. LITTLE INTEREST OR PLEASURE IN DOING THINGS: NOT AT ALL

## 2021-02-12 NOTE — PROGRESS NOTES
Community Health Worker Intake  • Social determinates of health intake complete.   • Identified barriers to transportation.   • Contact information provided to Carolynn Stover Carmela  • Has PCP appointment scheduled for 3/1/2021  • Accepted Meds-To-Beds.   • Inpatient assessment completed.    Community Health Worker introduced Community Care Management and [facility]. Patient states that he car recently broke down and she could use some transportation assistance. Patient is scheduled for a hospital follow up on 3/1/2021 with Kandace Dent M.D. Patient states that she has no housing or food needs at this time. Patient will need a follow up with Surgery, Dr. Cox. Patient prefers text message communication and has no needs or questions at this time. Patient accepted [facility].     Plan:KATY Trevino has arranged [facility] for the appointment on 3/1/2021 with Kandace Dent M.D. FRANCISCO J Trevino provided CCM contact information and the phone number for her [facility] . CHW will send a referral to [facility]. CHW will follow up with the patient after her hospitalization.

## 2021-02-12 NOTE — CONSULTS
Hospital Medicine Consultation    Date of Service  2/12/2021    Referring Physician  Gadiel Cox M.D.    Consulting Physician  Pastor Khan M.D.    Reason for Consultation  Sepsis    History of Presenting Illness  56 Patient seen examined at bedside in PACU s/p ulcer repair. Patient not in any distress, mild pain at surgical site but calm and comfortable otherwise, not diaphoretic, vitals on monitor wnl, no hypotension, not on any pressors. Midnight labs demonstrating improved wbc from 18.4>>16.5. Blood cultures already collected, patient receiving and tolerating Rocephin/Flagyl. Abdomen soft, surgical site clean and dry in RUQ. Patient appears to have tolerated the procedure well and improving on current antibiotics. Recommend continuing the course of therapy, monitor, and follow up blood cultures at this time.     Review of Systems  ROS  All systems reviewed and negative except as noted in HPI.    Past Medical History   has a past medical history of Allergy, Anesthesia, Anxiety, Arthritis, ASTHMA, Bronchitis (2014), Constipation, Diabetes ([facility]) (08/31/2020), Elevated glucose, GERD (gastroesophageal reflux disease), Heart burn, Heart murmur, Hiatus hernia syndrome, Hypertension, Left breast mass (8/3/2017), Major depressive disorder (2/27/2018), Migraine, Pain (08/31/2020), Primary osteoarthritis involving multiple joints, Pulmonary emboli ([facility]) (2017), Sacroiliitis ([facility]) (8/2/2017), Type 2 diabetes mellitus without complication ([facility]) (7/20/2018), Unspecified disorder of thyroid (2020), Unspecified urinary incontinence, and Upper GI bleed.    Surgical History   has a past surgical history that includes primary c section (1983, 1990); abdominal hysterectomy total (1995); extreme lateral interbody fusion (2/17/2016); lumbar decompression (2/17/2016); s i joint fusion (Right, 8/2/2017); laminotomy (2006); lumbar fusion anterior (2013); spinal cord stimulator (8/16/2018); pr lap, margie restrict proc, longitudinal  gas* (5/15/2019); shoulder arthroscopy w/ rotator cuff repair (2001); knee revision total (Right, 10/8/2015); open reduction (2001, 1990); knee arthroplasty total (Left, 2003); knee arthroplasty total (Right, 2003); shoulder arthroscopy (Right, 2001); carpal tunnel release (Right, 1983); spinal cord stimulator (07/2020); appendectomy (1998); other abdominal surgery (2018); gastroscopy (9/2/2020); gastroenterostomy, laparoscopic (N/A, 10/2/2020); and jamila by laparoscopy (1/13/2021).    Family History  family history includes Alcohol/Drug in her paternal grandfather; Arthritis in her brother, brother, maternal grandfather, maternal grandmother, and sister; Cancer (age of onset: 80) in her maternal aunt; Cancer (age of onset: 81) in her mother; Diabetes in her brother; Heart Attack in her father; Heart Disease in her father, maternal aunt, maternal uncle, and paternal grandmother; Hypertension in her brother, father, maternal aunt, maternal uncle, mother, and sister; Lung Disease in her sister; Psychiatric Illness in her paternal grandfather; Stroke in her maternal uncle.    Social History   reports that she has never smoked. She has never used smokeless tobacco. She reports previous alcohol use. She reports previous drug use.    Medications  Prior to Admission Medications   Prescriptions Last Dose Informant Patient Reported? Taking?   Pseudoephedrine-DM-GG (SUDAFED COUGH PO) 2/11/2021 at AM Patient Yes No   Sig: Take 1 Tab by mouth 3 times a day as needed.   acetaminophen (TYLENOL) 500 MG Tab 2/11/2021 at 0300 Patient Yes Yes   Sig: Take 2,000 mg by mouth 3 times a day as needed.   amLODIPine (NORVASC) 10 MG Tab 2/11/2021 at AM Patient No No   Sig: Take 1 Tab by mouth every day.   cyclobenzaprine (FLEXERIL) 10 MG Tab 2/11/2021 at AM Patient No No   Sig: Take 1 Tab by mouth 2 times a day as needed for Mild Pain or Muscle Spasms.   losartan (COZAAR) 50 MG Tab 2/10/2021 at PM Patient No No   Sig: Take 1 Tab by mouth  "every day.   Patient taking differently: Take 50 mg by mouth every bedtime.   multivitamin (THERAGRAN) Tab 2/10/2021 at AM Patient Yes No   Sig: Take 1 Tab by mouth every morning.   tizanidine (ZANAFLEX) 4 MG capsule 2/10/2021 at PM Patient No No   Sig: Take 1 Cap by mouth at bedtime as needed.   zolpidem (AMBIEN) 5 MG Tab 2/10/2021 at PM Patient No No   Sig: Take 1 Tab by mouth at bedtime as needed for Sleep for up to 30 days.      Facility-Administered Medications: None       Allergies  Allergies   Allergen Reactions   • Cortisone Anaphylaxis     RXN=since age 12   • Food Unspecified     \"citrus juice\" =burn and throat swelling.    ALL FISH - nausea  RXN=whole life   • Naprosyn [Naproxen] Unspecified     \"GI bleed\"  RXN=whole life   • Penicillins Anaphylaxis     RXN=since age 1   • Fish Vomiting and Nausea   • Keflex Diarrhea and Nausea     RXN=20 years ago   • Latex Rash and Itching     Rash, itching  RXN=20 years ago   • Shellfish Allergy Vomiting and Nausea     Betadine/iodine for surgery is ok   • Tape Rash     Plastic tape \"bubble up the skin\", reports tegaderm OK  RXN=ongoing       Physical Exam  Temp:  [36.2 °C (97.1 °F)-37.1 °C (98.7 °F)] 36.2 °C (97.1 °F)  Pulse:  [] 95  Resp:  [8-24] 18  BP: (112-152)/(60-90) 151/90  SpO2:  [90 %-100 %] 93 %    Physical Exam  Physical Exam  Vitals and nursing note reviewed.  Constitutional:     General: Alert in no acute distress.    Appearance: She is not ill-appearing.     Comments:   HENT: No signs of trauma, Bilateral external ears normal, Nose normal.      Head: Normocephalic.     Mouth/Throat: Unremarkable. Moist Mucosa     Comments:   Eyes:      Pupils: Pupils are equal round and reactive to light, Conjunctiva normal, Non-icteric.   Neck: Normal range of motion, No tenderness, Supple, No stridor.   Cardiovascular:      Rate and Rhythm: Regular Rate and Rhythm     Heart sounds: No murmur, rubs, or gallops appreciated.  Pulmonary/Thorax & Lungs:      Effort: " No respiratory distress.     Breath sounds: No stridor. No wheezing, No Rhonchi, no palpable chest tenderness     Comments:    Abdomen:     General: There is no distension. Soft. RUQ surgical dressing clean dry, intact s/p surgery.     Palpation/Auscultation: Soft, No tenderness, No masses, No pulsatile masses. Bowel sounds normal     Tenderness: There is abdominal tenderness. There is no guarding or rebound.      Hernia: No hernia is appreciated at this time.   Skin: Warm, Dry, No erythema, No rash.       Coloration: Skin is not jaundiced or pale.   Back: No bony tenderness, No CVA tenderness.   Musculoskeletal:         General: No swelling or tenderness.   Extremities:       General: Intact distal pulses, No edema, No tenderness, No cyanosis      Vascular:   Neurologic/Psych: Alert, No focal deficits noted.       Comments:         Fluids      Laboratory  Recent Labs     02/11/21 1849 02/12/21  0035   WBC 18.4* 16.5*   RBC 5.33 4.92   HEMOGLOBIN 15.5 14.2   HEMATOCRIT 48.0* 44.8   MCV 90.1 91.1   MCH 29.1 28.9   MCHC 32.3* 31.7*   RDW 43.9 44.7   PLATELETCT 386 340   MPV 8.7* 8.7*     Recent Labs     02/11/21  1849 02/12/21  0035   SODIUM 136 135   POTASSIUM 4.3 4.4   CHLORIDE 99 101   CO2 22 24   GLUCOSE 150* 159*   BUN 17 15   CREATININE 0.80 0.67   CALCIUM 9.9 9.2                     Imaging  No orders to display       Assessment/Plan  Sepsis (HCC)  Assessment & Plan  This is Sepsis Present on admission  SIRS criteria identified on my evaluation include: Tachycardia, with heart rate greater than 90 BPM and Leukocytosis, with WBC greater than 12,000  Source is Perforated Viscuous  Sepsis protocol initiated  Fluid resuscitation ordered per protocol  IV antibiotics as appropriate for source of sepsis  While organ dysfunction may be noted elsewhere in this problem list or in the chart, degree of organ dysfunction does not meet CMS criteria for severe sepsis    BCx, Rocephin/Flagyl, WBC count improving on  regimen.        Abdominal pain- (present on admission)  Assessment & Plan  In setting of perf viscuous s/p repair by surgery  Dilaudid 1mg q3hrs prn IV  Transition to orals once patient cleared of NPO by surgery.    Perforated viscus- (present on admission)  Assessment & Plan  In setting of hx: NSAID use, Abdominal pain, Peritonitis, Previous Jourdan-en-Y gastric bypass, Pulmonary HTN, DM, Hypothyroidism, Chronic pain syndrome.  S/P Perf viscus repair by surgery  Rocephin/Flagyl  Avoid NSAID Use  Perforation management per Surg  WBC count improving  Blood cultures Pending.    Diabetes (HCC)- (present on admission)  Assessment & Plan  A1c  Glucose 159, trend.

## 2021-02-12 NOTE — OP REPORT
DATE OF SERVICE:  02/11/2021        PREOPERATIVE DIAGNOSES:  Pneumoperitoneum, peritonitis.     POSTOPERATIVE DIAGNOSIS:  Perforated gastrojejunal marginal ulcer.     OPERATION PERFORMED:  Laparoscopic washout, repair and omental patch of   gastrojejunal marginal ulcer.     SURGEON:  Gadiel Cox MD     ANESTHESIOLOGIST:  MD Berny     INDICATIONS:  The patient is a 56-year-old female with a history of Jourdan-en-Y   gastric bypass surgery with chronic pain and has been taking daily   nonsteroidal anti-inflammatories.  She developed severe abdominal pain this   morning and was found to have pneumoperitoneum and peritonitis.  She comes to   the operating room for surgical repair.     DESCRIPTION OF PROCEDURE:  After an extensive informed consent discussion   process, the patient was brought to the operating room.  She was placed in a   supine position on the operating table.  After induction of general anesthesia   and placement of an endotracheal tube, the abdomen was prepped and draped in   the usual sterile fashion.  After administration of intravenous antibiotics, a   local anesthetic mixture of bupivacaine with epinephrine was used to   infiltrate the skin and subcutaneous tissues.  A bladeless optical entry   trocar was carefully introduced and pneumoperitoneum was established in the   usual fashion.  Two additional bladeless trocars were carefully placed under   direct vision.  Careful examination demonstrated somewhat dark fairly thin   fluid across the upper abdomen and the right and left upper quadrants and a   mild to moderate amount of focal fibrinous exudate around the gastrojejunal   anastomotic area.     A Kisha type liver retractor was placed into position and secured at the   patient's right side with a robot arm.  We then irrigated and aspirated   copious amount of the fluid.  A collection of the fluid was sent for   microbiology analysis.  The ulcer was clearly evident as a perforated ulcer  on   the anterior anastomotic area of the gastrojejunal anastomosis.  A   nasogastric tube was passed and this passed directly below this perforated   area and into the Jourdan limb without difficulty.  We then repaired the ulcer   with 2-0 Polysorb sutures.  A wide broad tongue of omentum was brought up and   further secured to the repair site using handsewn 2-0 Vicryl sutures.     Several liters of irrigation were then used to wash out all the quadrants and   abdomen and pelvis.  We continued to irrigate until we had clear irrigant in   the pelvis, abdomen and all the gutters and upper quadrants.     A #10 flat fluted Tee drain was placed adjacent to the repair site.  It was   secured at the skin level with 2-0 nylon suture.  After final inspections, the   pneumoperitoneum was allowed to escape.  The liver retractor and trocars were   removed under direct vision.  Port sites were irrigated and closed with   Vicryl suture.  Dermabond and Tegaderm dressings were then applied.     Needle, sponge and instrument counts were correct.     ESTIMATED BLOOD LOSS:  Minimal.     COMPLICATIONS:  None.              ______________________________  SEAN MONTGOMERY MD    KCS/SUP    DD:  02/11/2021 21:27  DT:  02/11/2021 22:06    Job#:  [id number]

## 2021-02-12 NOTE — ASSESSMENT & PLAN NOTE
Blood cultures growing MSSA.    ID following and recommending 4 weeks of IV antibiotics Ancef and p.o. rifampin and 2 weeks of p.o. Flagyl.  Needs a midline placed once blood cultures remain negative for at least 48 hours  ID following.  Repeat blood cultures.  2D echo with no findings of valvular vegetations.

## 2021-02-12 NOTE — ASSESSMENT & PLAN NOTE
This is Sepsis Present on admission  SIRS criteria identified on my evaluation include: Tachycardia, with heart rate greater than 90 BPM and Leukocytosis, with WBC greater than 12,000  Source is Perforated Viscuous  Sepsis protocol initiated  Fluid resuscitation ordered per protocol  IV antibiotics as appropriate for source of sepsis  While organ dysfunction may be noted elsewhere in this problem list or in the chart, degree of organ dysfunction does not meet CMS criteria for severe sepsis    Blood cultures growing MSSA bacteremia.  On IV antibiotics.

## 2021-02-12 NOTE — ANESTHESIA POSTPROCEDURE EVALUATION
Patient: Carolynn Casey    Procedure Summary     Date: 02/11/21 Room / Location: [place] [address] / SURGERY [facility]    Anesthesia Start: 2010 Anesthesia Stop: 2136    Procedure: LAPAROSCOPY - DIAGNOSTIC (N/A Abdomen) Diagnosis: (PERFORATED GASTRIC ULCER)    Surgeons: Gadiel Cox M.D. Responsible Provider: David Arrieta M.D.    Anesthesia Type: general ASA Status: 3 - Emergent          Final Anesthesia Type: general  Last vitals  BP   Blood Pressure: 143/69    Temp   37.1 °C (98.7 °F)    Pulse   93   Resp   16    SpO2   92 %      Anesthesia Post Evaluation    Patient location during evaluation: PACU  Patient participation: complete - patient participated  Level of consciousness: awake and alert  Pain score: 2    Airway patency: patent  Anesthetic complications: no  Cardiovascular status: hemodynamically stable  Respiratory status: acceptable  Hydration status: euvolemic    PONV: none          No complications documented.     Nurse Pain Score: 0 (NPRS)

## 2021-02-12 NOTE — ANESTHESIA PREPROCEDURE EVALUATION
Underwent lap jamila 3 weeks ago, now presenting with severe abdominal pain and free air on CT imaging.    Relevant Problems   CARDIAC   (+) Essential hypertension   (+) Pulmonary HTN (HCC)      ENDO   (+) Hypothyroidism       Physical Exam    Airway   Mallampati: II  TM distance: >3 FB  Neck ROM: full       Cardiovascular - normal exam  Rhythm: regular  Rate: normal  (-) murmur     Dental - normal exam           Pulmonary - normal exam  Breath sounds clear to auscultation     Abdominal    Neurological - normal exam                 Anesthesia Plan    ASA 3 (pulmonary hypertension)- EMERGENT   ASA physical status 3 criteria: other (comment)ASA physical status emergent criteria: acute peritonitis    Plan - general       Airway plan will be ETT    (Possible TAP vs rectus sheath block if converts to laparotomy. Possible divya, large PIV vs central line.)      Induction: intravenous    Postoperative Plan: Postoperative administration of opioids is intended.    Pertinent diagnostic labs and testing reviewed    Informed Consent:    Anesthetic plan and risks discussed with patient.    Use of blood products discussed with: patient whom consented to blood products.

## 2021-02-12 NOTE — ED PROVIDER NOTES
"ED Provider Note    CHIEF COMPLAINT  Chief Complaint   Patient presents with    Sent by MD     sent by Dr. Junior. had CT done today.     Post-op Problem     result read, \"Extraluminal air and free air are noted throughout the upper abdomen. Extraluminal air could be related to recent surgery although perforated viscus is also a possibility. Small amount of free fluid is noted in the pelvis.\"        HPI  Carolynn Casey is a 56 y.o. female who presents to the emergency department with complaint of abdominal pain. Past medical history is documented below. She sees Dr. junior for her surgical care. She notes remote history of initial gastric bypass which was then revised in October roughly 4 months ago. Three weeks ago she had a laparoscopic cholecystectomy which she had been recovering well from. Yesterday however she started with upper abdominal discomfort which continued to worsen throughout the day. She is a muscle relaxing and Tylenol initially this morning which did not alleviate her symptoms. This was then escalated to Norco which also didn't help. Ultimately she was able to get outpatient CT which showed free air within the abdomen concerning for possible perforated viscous. She was therefore directed to the ER for further evaluation. Currently she is in moderate pain. Worse with any movement of palpation of the abdomen. Slight nausea which comes in waves. No vomiting. No diarrhea.    REVIEW OF SYSTEMS  See HPI for further details. All other systems are negative.     PAST MEDICAL HISTORY   has a past medical history of Allergy, Anesthesia, Anxiety, Arthritis, ASTHMA, Bronchitis (2014), Constipation, Diabetes ([facility]) (08/31/2020), Elevated glucose, GERD (gastroesophageal reflux disease), Heart burn, Heart murmur, Hiatus hernia syndrome, Hypertension, Left breast mass (8/3/2017), Major depressive disorder (2/27/2018), Migraine, Pain (08/31/2020), Primary osteoarthritis involving multiple joints, Pulmonary emboli " (HCC) (2017), Sacroiliitis (HCC) (8/2/2017), Type 2 diabetes mellitus without complication (HCC) (7/20/2018), Unspecified disorder of thyroid (2020), Unspecified urinary incontinence, and Upper GI bleed.    SOCIAL HISTORY  Social History     Tobacco Use    Smoking status: Never Smoker    Smokeless tobacco: Never Used    Tobacco comment: continue to avoid   Substance and Sexual Activity    Alcohol use: Not Currently     Alcohol/week: 0.0 oz    Drug use: Not Currently    Sexual activity: Yes     Partners: Male     Birth control/protection: Surgical       SURGICAL HISTORY   has a past surgical history that includes primary c section (1983, 1990); abdominal hysterectomy total (1995); extreme lateral interbody fusion (2/17/2016); lumbar decompression (2/17/2016); s i joint fusion (Right, 8/2/2017); laminotomy (2006); lumbar fusion anterior (2013); spinal cord stimulator (8/16/2018); lap, margie restrict proc, longitudinal gas* (5/15/2019); shoulder arthroscopy w/ rotator cuff repair (2001); knee revision total (Right, 10/8/2015); open reduction (2001, 1990); knee arthroplasty total (Left, 2003); knee arthroplasty total (Right, 2003); shoulder arthroscopy (Right, 2001); carpal tunnel release (Right, 1983); spinal cord stimulator (07/2020); appendectomy (1998); other abdominal surgery (2018); gastroscopy (9/2/2020); gastroenterostomy, laparoscopic (N/A, 10/2/2020); and jamila by laparoscopy (1/13/2021).    CURRENT MEDICATIONS  Home Medications       Reviewed by Eliezer Oswald R.N. (Registered Nurse) on 02/11/21 at 2142  Med List Status: Complete     Medication Last Dose Status   acetaminophen (TYLENOL) 500 MG Tab 2/11/2021 Active   amLODIPine (NORVASC) 10 MG Tab 2/11/2021 Active   cyclobenzaprine (FLEXERIL) 10 MG Tab 2/11/2021 Active   losartan (COZAAR) 50 MG Tab 2/10/2021 Active   multivitamin (THERAGRAN) Tab 2/10/2021 Active   Pseudoephedrine-DM-GG (SUDAFED COUGH PO) 2/11/2021 Active   tizanidine (ZANAFLEX) 4 MG capsule  "2/10/2021 Active   zolpidem (AMBIEN) 5 MG Tab 2/10/2021 Active                    ALLERGIES  Allergies   Allergen Reactions    Cortisone Anaphylaxis     RXN=since age 12    Food Unspecified     \"citrus juice\" =burn and throat swelling.    ALL FISH - nausea  RXN=whole life    Naprosyn [Naproxen] Unspecified     \"GI bleed\"  RXN=whole life    Penicillins Anaphylaxis     RXN=since age 1    Fish Vomiting and Nausea    Keflex Diarrhea and Nausea     RXN=20 years ago    Latex Rash and Itching     Rash, itching  RXN=20 years ago    Shellfish Allergy Vomiting and Nausea     Betadine/iodine for surgery is ok    Tape Rash     Plastic tape \"bubble up the skin\", reports tegaderm OK  RXN=ongoing       PHYSICAL EXAM  VITAL SIGNS: /90   Pulse 95   Temp 36.2 °C (97.1 °F) (Temporal)   Resp 18   Ht 1.651 m (5' 5\")   Wt 108 kg (238 lb 1.6 oz)   LMP  (LMP Unknown)   SpO2 93%   BMI 39.62 kg/m²  @CHRIS[[id number]::@   Pulse ox interpretation: I interpret this pulse ox as normal.  Constitutional: Alert in no apparent distress.  HENT: No signs of trauma, Bilateral external ears normal, Nose normal.   Eyes: Pupils are equal and reactive  Neck: Normal range of motion, No tenderness, Supple  Cardiovascular: Regular rate and rhythm, no murmurs.   Thorax & Lungs: Normal breath sounds, No respiratory distress, No wheezing, No chest tenderness.   Abdomen: Bowel sounds normal, Soft, diffuse tenderness, rebound, and guarding.  Skin: Warm, Dry, No erythema, No rash.   Back: No bony tenderness, No CVA tenderness.   Extremities: Intact distal pulses, No edema, No tenderness  Musculoskeletal: Good range of motion in all major joints. No tenderness to palpation or major deformities noted.   Neurologic: Alert , Normal motor function, Normal sensory function, No focal deficits noted.   Psychiatric: Affect normal, Judgment normal, Mood normal.       DIAGNOSTIC STUDIES / PROCEDURES    LABS  Results for orders placed or performed during the hospital " encounter of 02/11/21   CBC WITH DIFFERENTIAL   Result Value Ref Range    WBC 18.4 (H) 4.8 - 10.8 K/uL    RBC 5.33 4.20 - 5.40 M/uL    Hemoglobin 15.5 12.0 - 16.0 g/dL    Hematocrit 48.0 (H) 37.0 - 47.0 %    MCV 90.1 81.4 - 97.8 fL    MCH 29.1 27.0 - 33.0 pg    MCHC 32.3 (L) 33.6 - 35.0 g/dL    RDW 43.9 35.9 - 50.0 fL    Platelet Count 386 164 - 446 K/uL    MPV 8.7 (L) 9.0 - 12.9 fL    Neutrophils-Polys 88.00 (H) 44.00 - 72.00 %    Lymphocytes 5.40 (L) 22.00 - 41.00 %    Monocytes 5.90 0.00 - 13.40 %    Eosinophils 0.00 0.00 - 6.90 %    Basophils 0.10 0.00 - 1.80 %    Immature Granulocytes 0.60 0.00 - 0.90 %    Nucleated RBC 0.00 /100 WBC    Neutrophils (Absolute) 16.14 (H) 2.00 - 7.15 K/uL    Lymphs (Absolute) 1.00 1.00 - 4.80 K/uL    Monos (Absolute) 1.09 (H) 0.00 - 0.85 K/uL    Eos (Absolute) 0.00 0.00 - 0.51 K/uL    Baso (Absolute) 0.02 0.00 - 0.12 K/uL    Immature Granulocytes (abs) 0.11 0.00 - 0.11 K/uL    NRBC (Absolute) 0.00 K/uL   COMP METABOLIC PANEL   Result Value Ref Range    Sodium 136 135 - 145 mmol/L    Potassium 4.3 3.6 - 5.5 mmol/L    Chloride 99 96 - 112 mmol/L    Co2 22 20 - 33 mmol/L    Anion Gap 15.0 7.0 - 16.0    Glucose 150 (H) 65 - 99 mg/dL    Bun 17 8 - 22 mg/dL    Creatinine 0.80 0.50 - 1.40 mg/dL    Calcium 9.9 8.5 - 10.5 mg/dL    AST(SGOT) 46 (H) 12 - 45 U/L    ALT(SGPT) 52 (H) 2 - 50 U/L    Alkaline Phosphatase 114 (H) 30 - 99 U/L    Total Bilirubin 2.2 (H) 0.1 - 1.5 mg/dL    Albumin 4.1 3.2 - 4.9 g/dL    Total Protein 7.3 6.0 - 8.2 g/dL    Globulin 3.2 1.9 - 3.5 g/dL    A-G Ratio 1.3 g/dL   LACTIC ACID   Result Value Ref Range    Lactic Acid 3.0 (H) 0.5 - 2.0 mmol/L   SARS-COV Antigen JERICA: Collect dry nasal swab AND NP swab in VTM   Result Value Ref Range    SARS-CoV-2 Source Nasal Swab     SARS-COV ANTIGEN JERICA NotDetected Not-Detected   SARS-CoV-2 PCR (24 hour In-House): Collect NP swab in VTM    Specimen: Nasopharyngeal; Respirate   Result Value Ref Range    SARS-CoV-2 Source NP Swab      SARS-CoV-2 by PCR NotDetected    ESTIMATED GFR   Result Value Ref Range    GFR If African American >60 >60 mL/min/1.73 m 2    GFR If Non African American >60 >60 mL/min/1.73 m 2   CBC without Differential (blood)   Result Value Ref Range    WBC 16.5 (H) 4.8 - 10.8 K/uL    RBC 4.92 4.20 - 5.40 M/uL    Hemoglobin 14.2 12.0 - 16.0 g/dL    Hematocrit 44.8 37.0 - 47.0 %    MCV 91.1 81.4 - 97.8 fL    MCH 28.9 27.0 - 33.0 pg    MCHC 31.7 (L) 33.6 - 35.0 g/dL    RDW 44.7 35.9 - 50.0 fL    Platelet Count 340 164 - 446 K/uL    MPV 8.7 (L) 9.0 - 12.9 fL   Comp Metabolic Panel (CMP)   Result Value Ref Range    Sodium 135 135 - 145 mmol/L    Potassium 4.4 3.6 - 5.5 mmol/L    Chloride 101 96 - 112 mmol/L    Co2 24 20 - 33 mmol/L    Anion Gap 10.0 7.0 - 16.0    Glucose 159 (H) 65 - 99 mg/dL    Bun 15 8 - 22 mg/dL    Creatinine 0.67 0.50 - 1.40 mg/dL    Calcium 9.2 8.5 - 10.5 mg/dL    AST(SGOT) 92 (H) 12 - 45 U/L    ALT(SGPT) 97 (H) 2 - 50 U/L    Alkaline Phosphatase 112 (H) 30 - 99 U/L    Total Bilirubin 1.6 (H) 0.1 - 1.5 mg/dL    Albumin 3.4 3.2 - 4.9 g/dL    Total Protein 6.6 6.0 - 8.2 g/dL    Globulin 3.2 1.9 - 3.5 g/dL    A-G Ratio 1.1 g/dL   LACTIC ACID   Result Value Ref Range    Lactic Acid 1.6 0.5 - 2.0 mmol/L   LACTIC ACID   Result Value Ref Range    Lactic Acid 1.3 0.5 - 2.0 mmol/L   ESTIMATED GFR   Result Value Ref Range    GFR If African American >60 >60 mL/min/1.73 m 2    GFR If Non African American >60 >60 mL/min/1.73 m 2   CULTURE WOUND W/ GRAM STAIN    Specimen: Wound   Result Value Ref Range    Significant Indicator NEG     Source WND     Site Gastric Ulcer Fluid     Culture Result -     Gram Stain Result -    Anaerobic Culture    Specimen: Wound   Result Value Ref Range    Significant Indicator NEG     Source WND     Site Gastric Ulcer Fluid     Culture Result -    Fungal Culture    Specimen: Wound   Result Value Ref Range    Significant Indicator NEG     Source WND     Site Gastric Ulcer Fluid     Culture  Result -        RADIOLOGY  No orders to display         1915: discussed case with Dr. Cox. He will schedule surgery at this time. Patient will be kept NPO.    COURSE & MEDICAL DECISION MAKING  Pertinent Labs & Imaging studies reviewed. (See chart for details)  patient presented emergency department with increasing abdominal pain with known abnormal outpatient CT is completed prior to arrival. Patient presents with tachycardia. White count is elevated. Lactic acid not elevated. Blood cultures obtained. Antibiotics were started empirically. I discussed the case with the surgeon which is agreeable to ongoing inpatient care at this time which will likely include visit back to the OR.    FINAL IMPRESSION  1. Intra-abdominal free air of unknown etiology            Electronically signed by: Scooter Ford M.D., 2/11/2021 7:07 PM

## 2021-02-12 NOTE — H&P
DATE OF ADMISSION:  02/11/2021     CHIEF COMPLAINT:  Abdominal pain.     HISTORY OF PRESENT ILLNESS:  The patient is a 56-year-old female who is well   known to me with a history of Jourdan-en-Y gastric bypass in 10/20/2020 for   morbid obesity, diabetes and other sequelae related to morbid obesity.  She   has done well and required uncomplicated cholecystectomy approximately four   weeks ago.  She has chronic pain and has been taking daily nonsteroidal   anti-inflammatory medication.  This morning, she developed severe upper   abdominal pain and was advised to obtain an urgent CT scan, which demonstrated   pneumoperitoneum.  She was then directed to the emergency room where she was   found to be tachycardic and complaining of severe upper abdominal pain.     PAST MEDICAL HISTORY:  Morbid obesity, diabetes mellitus, pulmonary   hypertension, hypothyroidism, chronic back pain.     PAST SURGICAL HISTORY:  Lumbosacral spine surgery, Jourdan-en-Y gastric bypass   10/2020, laparoscopic cholecystectomy 01/2021.     MEDICATIONS:  Include Norvasc, Flexeril, Cozaar, Theragran, Zanaflex, Ambien,   Tylenol and also taking nonsteroidal anti-inflammatory, believed to be Motrin.     SOCIAL HISTORY:  She is .  She denies tobacco or alcohol abuse.     FAMILY HISTORY:  Unchanged.     REVIEW OF SYSTEMS:  NEUROLOGIC:  Denies strokes or seizures.  CARDIAC:  Denies chest pain, palpitations.  LUNGS:  Denies asthma, wheezing, shortness of breath, has pulmonary   hypertension with PA pressure thought to be 45.  GENITOURINARY:  Denies dysuria or hematuria.  MUSCULOSKELETAL:  Has chronic back pain.  EYES:  No recent visual changes.  EARS:  No hearing aids, hearing loss, balance troubles.  ENDOCRINE:  Has hypothyroidism, had diabetes, which resolved after her gastric   bypass.     PHYSICAL EXAMINATION:  GENERAL:  Alert and oriented, in moderate amount of upper abdominal pain.  VITAL SIGNS:  Heart rate 120, afebrile.  Respiratory rate is  approximately 14.  HEENT:  Eyes anicteric.  Extraocular movements intact.  Nose and ears   externally normal.  Hearing grossly normal.  Oropharynx clear.  NECK:  Supple.  Good range of motion.  CHEST AND RESPIRATORY:  Unlabored breathing, normal excursions.  CARDIOVASCULAR:  Tachycardic, regular rate and rhythm.  ABDOMEN:  Slightly distended, very tender in the upper abdomen with rebound   and guarding.  Healing and healed laparoscopy scars.  Some degree of   generalized peritonitis.  EXTREMITIES:  Warm, acyanotic, does have some peripheral edema and adipose   morphology in the arms and legs.  NEUROLOGIC:  Nonfocal.     LABORATORY STUDIES:  Reviewed.  Leukocytosis noted.     IMAGING: Also reviewed CT scan demonstrating pneumoperitoneum and free fluid.     IMPRESSION:  1.  Abdominal pain.  2.  Peritonitis.  3.  Perforated viscus, suspected gastric ulcer, gastrojejunal marginal ulcer.  4.  Nonsteroidal anti-inflammatory use.  5.  Previous Jourdan-en-Y gastric bypass.  6.  Pulmonary hypertension.  7.  Diabetes.  8.  Hypothyroidism.  9.  Chronic pain syndrome.     PLAN:  I talked at length with the patient about the nature of her symptoms,   nature of the findings. We discussed the rationale, pros and cons, risks and   alternatives of laparoscopic and possible open exploration, washout, repair of   perforated viscus and the potential for additional surgery and the potential   for serious complications including infection, fistula reoperation, sepsis,   organ failure and death.  Her questions were answered in full.  She   understands and wished to proceed ahead.        ______________________________  MD SASHA MARY/OSMANY/CARTER    DD:  02/11/2021 21:23  DT:  02/11/2021 21:48    Job#:  [id number]

## 2021-02-12 NOTE — ANESTHESIA TIME REPORT
Anesthesia Start and Stop Event Times     Date Time Event    2/11/2021 1939 Ready for Procedure     2010 Anesthesia Start     2136 Anesthesia Stop        Responsible Staff  02/11/21    Name Role Begin End    David Arrieta M.D. Anesth 2010 2136        Preop Diagnosis (Free Text):  Pre-op Diagnosis             Preop Diagnosis (Codes):    Post op Diagnosis  Perforated gastric ulcer (HCC)      Premium Reason  B. 1st Call    Comments:

## 2021-02-12 NOTE — ASSESSMENT & PLAN NOTE
In setting of perf viscuous s/p repair by surgery  Dilaudid 1mg q3hrs prn IV  Transition to orals once patient cleared of NPO by surgery.

## 2021-02-12 NOTE — H&P
DATE OF ADMISSION:  02/11/2021     CHIEF COMPLAINT:  Abdominal pain.     HISTORY OF PRESENT ILLNESS:  The patient is a 56-year-old female well known to   me who has had Jourdan-en-Y gastric bypass in October of 2020.  She also more   recently underwent an uncomplicated laparoscopic cholecystectomy approximately   four weeks ago.  She has been taking daily nonsteroidal anti-inflammatories   for chronic pain and today developed severe upper abdominal pain abruptly.    She called the office and was sent for a CT scan.     DICTATION ENDS HERE        ______________________________  MD SASHA MARY/SUSANNE    DD:  02/11/2021 21:16  DT:  02/11/2021 21:33    Job#:  [id number]

## 2021-02-12 NOTE — ED NOTES
"Pt states she has abdominal pain. States she had gallbladder removed 3 weeks ago. States she had CT this afternoon and was told she had \"air and fluid where it did not belong\" and was also told to come to ED to be evaluated. Ambulatory with stable, steady gait. Speaking full and complete sentences without signs of distress. Alert and oriented x4.   "

## 2021-02-12 NOTE — OR NURSING
2135 Pt from OR, awake, with O2 support of 6 L/min via mask, respirations regular and spontaneous, vital signs within normal limits. Pt with NG tube attached to low intermittent suction, scant output noted. Pt with 4 lap sites, site and dressings are clean-dry and intact, 1 lap site with ELLE drain, secured, small serosang output noted. Pt with fuentes attached to urine bag, secured using stat lock, small amount of clear orange output noted. SCDs ON, gurney set to lowest point and secured.    2145 Pt denies pain and nausea.    2200 O2 support shifted to 10 L/min via oxymask x 6 hrs per ERAS protocol.    2205 Updated Viraj () thru telephone call.    2230 Dr. Herring (Hospitalist) at bedside.    2240 Gave report to Pastor RN thru telephone call.    2256 Pt to room with transport and O2 support of 10 L/min via oxymask. 2 bags of belongings and 1 big purse at the [place].

## 2021-02-12 NOTE — ED NOTES
Med rec complete via interview with pt at bedside. Allergies reviewed. Pt denies antibiotic use in past 14 days.  Pt states that she takes 4 500 mg tylenol tabs 2-3 times a day, last dose was this morning.

## 2021-02-12 NOTE — ASSESSMENT & PLAN NOTE
S/p Laparoscopic washout, repair and omental patch of gastrojejunal marginal ulcer.    General surgery following.  PPI.   NG tube removed.  Advance diet as tolerated.  Avoid NSAID Use  Pain control.  ID following and recommending 4 weeks of IV antibiotics Ancef and p.o. rifampin and 2 weeks of p.o. Flagyl. Needs a midline placed once blood cultures remain negative for at least 48 hours

## 2021-02-12 NOTE — PROGRESS NOTES
[facility] Medicine Daily Progress Note    Date of Service  2/12/2021    Chief Complaint  56 y.o. female admitted 2/11/2021 with perforated viscus from gastric ulcer.    Hospital Course  56 y.o. female admitted 2/11/2021 with perforated viscus from gastric ulcer.  She is S/p Laparoscopic washout, repair and omental patch of gastrojejunal marginal ulcer.  Blood cultures growing MSSA.    Interval Problem Update  Patient seen and examined at bedside this morning.  No acute events overnight.   She is S/p Laparoscopic washout, repair and omental patch of gastrojejunal marginal ulcer.   NG tube in place to suction. On IV PPI.  Blood cultures growing MSSA. Antibiotics adjusted. Repeat blood cultures ordered.  We will get ID consulted in the morning.    Consultants/Specialty  General surgery    Code Status  Full Code    Disposition  Pending.    Review of Systems  Review of Systems   Constitutional: Negative.    HENT: Negative.    Eyes: Negative.    Respiratory: Negative.    Cardiovascular: Negative.    Gastrointestinal: Positive for abdominal pain, nausea and vomiting.   Genitourinary: Negative.    Musculoskeletal: Negative.    Skin: Negative.    Neurological: Negative.         Physical Exam  Temp:  [36.2 °C (97.1 °F)-37.1 °C (98.7 °F)] 36.5 °C (97.7 °F)  Pulse:  [] 70  Resp:  [8-24] 18  BP: (112-152)/(60-90) 146/73  SpO2:  [90 %-100 %] 91 %    Physical Exam  Constitutional:       Appearance: She is obese.   HENT:      Head: Normocephalic.      Nose:      Comments: NG tube in place.  Eyes:      Extraocular Movements: Extraocular movements intact.      Conjunctiva/sclera: Conjunctivae normal.   Cardiovascular:      Rate and Rhythm: Normal rate.      Heart sounds: Normal heart sounds.   Pulmonary:      Effort: Pulmonary effort is normal.      Breath sounds: Normal breath sounds.   Abdominal:      General: There is distension.      Palpations: Abdomen is soft.      Tenderness: There is abdominal tenderness. There is no  guarding or rebound.      Comments: Abdomen mildly distended and tender on palpation.  Hypoactive bowel sounds noted.   ELLE drain in place. Incisions with dermabond.    Musculoskeletal:         General: No swelling or tenderness.      Cervical back: Neck supple.   Skin:     General: Skin is warm.      Findings: Rash present.   Neurological:      General: No focal deficit present.      Mental Status: She is alert and oriented to person, place, and time.   Psychiatric:         Mood and Affect: Mood normal.         Behavior: Behavior normal.         Fluids    Intake/Output Summary (Last 24 hours) at 2/12/2021 1351  Last data filed at 2/12/2021 0731  Gross per 24 hour   Intake 2750 ml   Output 895 ml   Net 1855 ml       Laboratory  Recent Labs     02/11/21  1849 02/12/21  0035   WBC 18.4* 16.5*   RBC 5.33 4.92   HEMOGLOBIN 15.5 14.2   HEMATOCRIT 48.0* 44.8   MCV 90.1 91.1   MCH 29.1 28.9   MCHC 32.3* 31.7*   RDW 43.9 44.7   PLATELETCT 386 340   MPV 8.7* 8.7*     Recent Labs     02/11/21  1849 02/12/21  0035   SODIUM 136 135   POTASSIUM 4.3 4.4   CHLORIDE 99 101   CO2 22 24   GLUCOSE 150* 159*   BUN 17 15   CREATININE 0.80 0.67   CALCIUM 9.9 9.2                   Imaging  No orders to display        Assessment/Plan  * Perforated viscus- (present on admission)  Assessment & Plan  S/p Laparoscopic washout, repair and omental patch of gastrojejunal marginal ulcer.      General surgery following.  PPI drip.  On fluconazole per general surgery.  NG tube to suction.Closely monitor output.  Avoid NSAID Use  Pain control.    MSSA bacteremia  Assessment & Plan  Blood cultures growing MSSA.  Unclear source at this time.    Started on IV antibiotics-Ancef.  ID to be consulted.  Repeat blood cultures.    Sepsis (HCC)  Assessment & Plan  This is Sepsis Present on admission  SIRS criteria identified on my evaluation include: Tachycardia, with heart rate greater than 90 BPM and Leukocytosis, with WBC greater than 12,000  Source is  Perforated Viscuous  Sepsis protocol initiated  Fluid resuscitation ordered per protocol  IV antibiotics as appropriate for source of sepsis  While organ dysfunction may be noted elsewhere in this problem list or in the chart, degree of organ dysfunction does not meet CMS criteria for severe sepsis    Blood cultures growing MSSA bacteremia.  On IV antibiotics.    Type II diabetes (HCC)- (present on admission)  Assessment & Plan  History of type 2 diabetes.  Diet controlled.  Insulin sliding scale as needed.       VTE prophylaxis: Lovenox

## 2021-02-12 NOTE — PROGRESS NOTES
Received pt from PACU. No visible signs of distress. VSS. Pt oriented to unit, call light, and unit policies. ELLE drain patent and draining serosang, NGT to L nare to low continuous suction, scant output. Bed locked and in low position. Will continue to monitor.

## 2021-02-12 NOTE — PROGRESS NOTES
Surgical Progress Note    Author: Flory Guo P.A.-C. Date & Time created: 2021   8:10 AM     Interval Events:  POD1 Laparoscopic washout, repair and omental patch of gastrojejunal marginal ulcer. Pt feeling better today than when she came into the hospital prior to surgery. NG to left nare, to low suction. Admits to incisional abdominal pain, controlled with medications. She has not ambulated yet. Discussed clamping NG for walks. Howe in place. ELLE X1 serosanguinous.      Review of Systems   Constitutional: Negative for chills and fever.   HENT:        NG left nare to suction   Respiratory: Negative for cough and shortness of breath.    Cardiovascular: Negative for chest pain and palpitations.   Gastrointestinal: Positive for abdominal pain (incisional). Negative for diarrhea, heartburn, nausea and vomiting.   Genitourinary:        Howe catheter in place.     Hemodynamics:  Temp (24hrs), Av.5 °C (97.7 °F), Min:36.2 °C (97.1 °F), Max:37.1 °C (98.7 °F)  Temperature: 36.2 °C (97.1 °F)  Pulse  Av.4  Min: 74  Max: 126   Blood Pressure: 131/78     Respiratory:    Respiration: 20, Pulse Oximetry: 94 %        RUL Breath Sounds: Clear, RML Breath Sounds: Diminished, RLL Breath Sounds: Diminished, REZA Breath Sounds: Clear, LLL Breath Sounds: Diminished  Neuro:  GCS       Fluids:    Intake/Output Summary (Last 24 hours) at 2021 0810  Last data filed at 2021 0330  Gross per 24 hour   Intake 2750 ml   Output 755 ml   Net 1995 ml     Weight: 108 kg (238 lb 1.6 oz)  Current Diet Order   Procedures   • Diet NPO     Physical Exam  Constitutional:       Appearance: She is obese.   HENT:      Head: Normocephalic and atraumatic.      Nose:      Comments: NG left nare to suction     Mouth/Throat:      Pharynx: Oropharynx is clear.   Eyes:      Conjunctiva/sclera: Conjunctivae normal.   Cardiovascular:      Rate and Rhythm: Normal rate and regular rhythm.   Pulmonary:      Effort: Pulmonary effort is normal.    Abdominal:      General: There is distension (mild).      Palpations: Abdomen is soft. There is no mass.      Tenderness: There is abdominal tenderness (incisional). There is no guarding or rebound.      Comments: ELLE serosanguinous.   Musculoskeletal:         General: Normal range of motion.      Cervical back: Normal range of motion.   Skin:     General: Skin is warm and dry.      Findings: No erythema or rash.      Comments: Incisions with dermabond   Neurological:      Mental Status: She is alert and oriented to person, place, and time.   Psychiatric:         Mood and Affect: Mood normal.       Labs:  Recent Results (from the past 24 hour(s))   BLOOD CULTURE    Collection Time: 02/11/21  6:00 PM    Specimen: Peripheral; Blood   Result Value Ref Range    Significant Indicator NEG     Source BLD     Site PERIPHERAL     Culture Result       No Growth  Note: Blood cultures are incubated for 5 days and  are monitored continuously.Positive blood cultures  are called to the RN and reported as soon as  they are identified.     BLOOD CULTURE    Collection Time: 02/11/21  6:10 PM    Specimen: Peripheral; Blood   Result Value Ref Range    Significant Indicator NEG     Source BLD     Site PERIPHERAL     Culture Result       No Growth  Note: Blood cultures are incubated for 5 days and  are monitored continuously.Positive blood cultures  are called to the RN and reported as soon as  they are identified.     CBC WITH DIFFERENTIAL    Collection Time: 02/11/21  6:49 PM   Result Value Ref Range    WBC 18.4 (H) 4.8 - 10.8 K/uL    RBC 5.33 4.20 - 5.40 M/uL    Hemoglobin 15.5 12.0 - 16.0 g/dL    Hematocrit 48.0 (H) 37.0 - 47.0 %    MCV 90.1 81.4 - 97.8 fL    MCH 29.1 27.0 - 33.0 pg    MCHC 32.3 (L) 33.6 - 35.0 g/dL    RDW 43.9 35.9 - 50.0 fL    Platelet Count 386 164 - 446 K/uL    MPV 8.7 (L) 9.0 - 12.9 fL    Neutrophils-Polys 88.00 (H) 44.00 - 72.00 %    Lymphocytes 5.40 (L) 22.00 - 41.00 %    Monocytes 5.90 0.00 - 13.40 %     Eosinophils 0.00 0.00 - 6.90 %    Basophils 0.10 0.00 - 1.80 %    Immature Granulocytes 0.60 0.00 - 0.90 %    Nucleated RBC 0.00 /100 WBC    Neutrophils (Absolute) 16.14 (H) 2.00 - 7.15 K/uL    Lymphs (Absolute) 1.00 1.00 - 4.80 K/uL    Monos (Absolute) 1.09 (H) 0.00 - 0.85 K/uL    Eos (Absolute) 0.00 0.00 - 0.51 K/uL    Baso (Absolute) 0.02 0.00 - 0.12 K/uL    Immature Granulocytes (abs) 0.11 0.00 - 0.11 K/uL    NRBC (Absolute) 0.00 K/uL   COMP METABOLIC PANEL    Collection Time: 02/11/21  6:49 PM   Result Value Ref Range    Sodium 136 135 - 145 mmol/L    Potassium 4.3 3.6 - 5.5 mmol/L    Chloride 99 96 - 112 mmol/L    Co2 22 20 - 33 mmol/L    Anion Gap 15.0 7.0 - 16.0    Glucose 150 (H) 65 - 99 mg/dL    Bun 17 8 - 22 mg/dL    Creatinine 0.80 0.50 - 1.40 mg/dL    Calcium 9.9 8.5 - 10.5 mg/dL    AST(SGOT) 46 (H) 12 - 45 U/L    ALT(SGPT) 52 (H) 2 - 50 U/L    Alkaline Phosphatase 114 (H) 30 - 99 U/L    Total Bilirubin 2.2 (H) 0.1 - 1.5 mg/dL    Albumin 4.1 3.2 - 4.9 g/dL    Total Protein 7.3 6.0 - 8.2 g/dL    Globulin 3.2 1.9 - 3.5 g/dL    A-G Ratio 1.3 g/dL   LACTIC ACID    Collection Time: 02/11/21  6:49 PM   Result Value Ref Range    Lactic Acid 3.0 (H) 0.5 - 2.0 mmol/L   ESTIMATED GFR    Collection Time: 02/11/21  6:49 PM   Result Value Ref Range    GFR If African American >60 >60 mL/min/1.73 m 2    GFR If Non African American >60 >60 mL/min/1.73 m 2   SARS-COV Antigen JERICA: Collect dry nasal swab AND NP swab in VTM    Collection Time: 02/11/21  7:35 PM   Result Value Ref Range    SARS-CoV-2 Source Nasal Swab     SARS-COV ANTIGEN JERICA NotDetected Not-Detected   SARS-CoV-2 PCR (24 hour In-House): Collect NP swab in VTM    Collection Time: 02/11/21  7:35 PM    Specimen: Nasopharyngeal; Respirate   Result Value Ref Range    SARS-CoV-2 Source NP Swab     SARS-CoV-2 by PCR NotDetected    CULTURE WOUND W/ GRAM STAIN    Collection Time: 02/11/21  8:42 PM    Specimen: Wound   Result Value Ref Range    Significant Indicator  NEG     Source WND     Site Gastric Ulcer Fluid     Culture Result -     Gram Stain Result       Many WBCs.  Few Gram positive cocci.  Rare Gram negative rods.     Anaerobic Culture    Collection Time: 02/11/21  8:42 PM    Specimen: Wound   Result Value Ref Range    Significant Indicator NEG     Source WND     Site Gastric Ulcer Fluid     Culture Result -    Fungal Culture    Collection Time: 02/11/21  8:42 PM    Specimen: Wound   Result Value Ref Range    Significant Indicator NEG     Source WND     Site Gastric Ulcer Fluid     Culture Result -    GRAM STAIN    Collection Time: 02/11/21  8:42 PM    Specimen: Wound   Result Value Ref Range    Significant Indicator .     Source WND     Site Gastric Ulcer Fluid     Gram Stain Result       Many WBCs.  Few Gram positive cocci.  Rare Gram negative rods.     CBC without Differential (blood)    Collection Time: 02/12/21 12:35 AM   Result Value Ref Range    WBC 16.5 (H) 4.8 - 10.8 K/uL    RBC 4.92 4.20 - 5.40 M/uL    Hemoglobin 14.2 12.0 - 16.0 g/dL    Hematocrit 44.8 37.0 - 47.0 %    MCV 91.1 81.4 - 97.8 fL    MCH 28.9 27.0 - 33.0 pg    MCHC 31.7 (L) 33.6 - 35.0 g/dL    RDW 44.7 35.9 - 50.0 fL    Platelet Count 340 164 - 446 K/uL    MPV 8.7 (L) 9.0 - 12.9 fL   Comp Metabolic Panel (CMP)    Collection Time: 02/12/21 12:35 AM   Result Value Ref Range    Sodium 135 135 - 145 mmol/L    Potassium 4.4 3.6 - 5.5 mmol/L    Chloride 101 96 - 112 mmol/L    Co2 24 20 - 33 mmol/L    Anion Gap 10.0 7.0 - 16.0    Glucose 159 (H) 65 - 99 mg/dL    Bun 15 8 - 22 mg/dL    Creatinine 0.67 0.50 - 1.40 mg/dL    Calcium 9.2 8.5 - 10.5 mg/dL    AST(SGOT) 92 (H) 12 - 45 U/L    ALT(SGPT) 97 (H) 2 - 50 U/L    Alkaline Phosphatase 112 (H) 30 - 99 U/L    Total Bilirubin 1.6 (H) 0.1 - 1.5 mg/dL    Albumin 3.4 3.2 - 4.9 g/dL    Total Protein 6.6 6.0 - 8.2 g/dL    Globulin 3.2 1.9 - 3.5 g/dL    A-G Ratio 1.1 g/dL   LACTIC ACID    Collection Time: 02/12/21 12:35 AM   Result Value Ref Range    Lactic Acid  1.6 0.5 - 2.0 mmol/L   ESTIMATED GFR    Collection Time: 02/12/21 12:35 AM   Result Value Ref Range    GFR If African American >60 >60 mL/min/1.73 m 2    GFR If Non African American >60 >60 mL/min/1.73 m 2   LACTIC ACID    Collection Time: 02/12/21  2:05 AM   Result Value Ref Range    Lactic Acid 1.3 0.5 - 2.0 mmol/L   HEMOGLOBIN A1C    Collection Time: 02/12/21  2:05 AM   Result Value Ref Range    Glycohemoglobin 5.8 (H) 0.0 - 5.6 %    Est Avg Glucose 120 mg/dL     Medical Decision Making, by Problem:  Active Hospital Problems    Diagnosis    • Perforated viscus [R19.8]    • Abdominal pain [R10.9]    • Sepsis (HCC) [A41.9]    • Diabetes (HCC) [E11.9]      Plan:  Pt is alert and oriented, NAD. Breathing unlabored. Tolerating PO.  Incisions ok. ELLE serosanguinous. VS stable. Labs reviewed.  Leukocytosis, trending down. Abx restarted, Rocephin and Fluconazole. LFTs minimaly elevated. T.bili trending down.  Encouraged ambulation and incentive spirometry.  Continue NG for a few days to low suction, ice chips ok. Ok to clamp NG for walking cai. Abx restarted X 7 days. Continue PPI. Continue fuentes, d'c tomorrow. Continue ELLE.  Appreciate Medicine consulting. Discussed with Dr. Cox.      Quality Measures:  Quality-Core Measures   Reviewed items::  Labs reviewed  DVT prophylaxis pharmacological::  Enoxaparin (Lovenox)  DVT prophylaxis - mechanical:  SCDs      Discussed patient condition with RN, Patient and Dr. Cox

## 2021-02-12 NOTE — ANESTHESIA PROCEDURE NOTES
Airway    Date/Time: 2/11/2021 8:16 PM  Performed by: David Arrieta M.D.  Authorized by: David Arrieta M.D.     Location:  OR  Urgency:  Elective  Difficult Airway: No    Indications for Airway Management:  Anesthesia      Spontaneous Ventilation: absent    Sedation Level:  Deep  Preoxygenated: Yes    Patient Position:  Sniffing  Mask Difficulty Assessment:  0 - not attempted  Final Airway Type:  Endotracheal airway  Final Endotracheal Airway:  ETT  Cuffed: Yes    Technique Used for Successful ETT Placement:  Direct laryngoscopy    Insertion Site:  Oral  Blade Type:  Oates  Laryngoscope Blade/Videolaryngoscope Blade Size:  2  ETT Size (mm):  7.5  Measured from:  Lips  ETT to Lips (cm):  21  Placement Verified by: auscultation and capnometry    Cormack-Lehane Classification:  Grade I - full view of glottis  Number of Attempts at Approach:  1

## 2021-02-12 NOTE — PROGRESS NOTES
Patient well known to me, RYGB in November, lap jamila 3-4 weeks ago, now with severe upper abdominal pain and tahcycardia. CT demonstrates pneumoperitoneum. Plan laparoscopic possible ope washout, exploration and repair.

## 2021-02-13 LAB
ALBUMIN SERPL BCP-MCNC: 3.1 G/DL (ref 3.2–4.9)
ALBUMIN/GLOB SERPL: 0.9 G/DL
ALP SERPL-CCNC: 102 U/L (ref 30–99)
ALT SERPL-CCNC: 63 U/L (ref 2–50)
ANION GAP SERPL CALC-SCNC: 11 MMOL/L (ref 7–16)
AST SERPL-CCNC: 33 U/L (ref 12–45)
BILIRUB SERPL-MCNC: 0.9 MG/DL (ref 0.1–1.5)
BUN SERPL-MCNC: 11 MG/DL (ref 8–22)
CALCIUM SERPL-MCNC: 9.2 MG/DL (ref 8.5–10.5)
CHLORIDE SERPL-SCNC: 105 MMOL/L (ref 96–112)
CO2 SERPL-SCNC: 22 MMOL/L (ref 20–33)
CREAT SERPL-MCNC: 0.48 MG/DL (ref 0.5–1.4)
ERYTHROCYTE [DISTWIDTH] IN BLOOD BY AUTOMATED COUNT: 47.5 FL (ref 35.9–50)
GLOBULIN SER CALC-MCNC: 3.5 G/DL (ref 1.9–3.5)
GLUCOSE SERPL-MCNC: 91 MG/DL (ref 65–99)
HCT VFR BLD AUTO: 43.1 % (ref 37–47)
HGB BLD-MCNC: 13 G/DL (ref 12–16)
MCH RBC QN AUTO: 28.4 PG (ref 27–33)
MCHC RBC AUTO-ENTMCNC: 30.2 G/DL (ref 33.6–35)
MCV RBC AUTO: 94.3 FL (ref 81.4–97.8)
PLATELET # BLD AUTO: 281 K/UL (ref 164–446)
PMV BLD AUTO: 9.6 FL (ref 9–12.9)
POTASSIUM SERPL-SCNC: 4.3 MMOL/L (ref 3.6–5.5)
PROT SERPL-MCNC: 6.6 G/DL (ref 6–8.2)
RBC # BLD AUTO: 4.57 M/UL (ref 4.2–5.4)
SODIUM SERPL-SCNC: 138 MMOL/L (ref 135–145)
WBC # BLD AUTO: 15.7 K/UL (ref 4.8–10.8)

## 2021-02-13 PROCEDURE — 99223 1ST HOSP IP/OBS HIGH 75: CPT | Performed by: INTERNAL MEDICINE

## 2021-02-13 PROCEDURE — 80053 COMPREHEN METABOLIC PANEL: CPT

## 2021-02-13 PROCEDURE — 700102 HCHG RX REV CODE 250 W/ 637 OVERRIDE(OP): Performed by: INTERNAL MEDICINE

## 2021-02-13 PROCEDURE — 99232 SBSQ HOSP IP/OBS MODERATE 35: CPT | Performed by: STUDENT IN AN ORGANIZED HEALTH CARE EDUCATION/TRAINING PROGRAM

## 2021-02-13 PROCEDURE — 700102 HCHG RX REV CODE 250 W/ 637 OVERRIDE(OP): Performed by: COLON & RECTAL SURGERY

## 2021-02-13 PROCEDURE — 770006 HCHG ROOM/CARE - MED/SURG/GYN SEMI*

## 2021-02-13 PROCEDURE — A9270 NON-COVERED ITEM OR SERVICE: HCPCS | Performed by: COLON & RECTAL SURGERY

## 2021-02-13 PROCEDURE — 700105 HCHG RX REV CODE 258: Performed by: PHYSICIAN ASSISTANT

## 2021-02-13 PROCEDURE — 700111 HCHG RX REV CODE 636 W/ 250 OVERRIDE (IP): Performed by: STUDENT IN AN ORGANIZED HEALTH CARE EDUCATION/TRAINING PROGRAM

## 2021-02-13 PROCEDURE — 700111 HCHG RX REV CODE 636 W/ 250 OVERRIDE (IP): Performed by: COLON & RECTAL SURGERY

## 2021-02-13 PROCEDURE — 700101 HCHG RX REV CODE 250: Performed by: STUDENT IN AN ORGANIZED HEALTH CARE EDUCATION/TRAINING PROGRAM

## 2021-02-13 PROCEDURE — 36415 COLL VENOUS BLD VENIPUNCTURE: CPT

## 2021-02-13 PROCEDURE — 700105 HCHG RX REV CODE 258: Performed by: INTERNAL MEDICINE

## 2021-02-13 PROCEDURE — 700111 HCHG RX REV CODE 636 W/ 250 OVERRIDE (IP): Performed by: INTERNAL MEDICINE

## 2021-02-13 PROCEDURE — 700105 HCHG RX REV CODE 258: Performed by: COLON & RECTAL SURGERY

## 2021-02-13 PROCEDURE — C9113 INJ PANTOPRAZOLE SODIUM, VIA: HCPCS | Performed by: COLON & RECTAL SURGERY

## 2021-02-13 PROCEDURE — 85027 COMPLETE CBC AUTOMATED: CPT

## 2021-02-13 PROCEDURE — A9270 NON-COVERED ITEM OR SERVICE: HCPCS | Performed by: INTERNAL MEDICINE

## 2021-02-13 RX ORDER — FLUCONAZOLE 200 MG/1
400 TABLET ORAL DAILY
Status: DISCONTINUED | OUTPATIENT
Start: 2021-02-13 | End: 2021-02-15

## 2021-02-13 RX ORDER — RIFAMPIN 300 MG/1
300 CAPSULE ORAL 2 TIMES DAILY
Status: DISCONTINUED | OUTPATIENT
Start: 2021-02-13 | End: 2021-02-18 | Stop reason: HOSPADM

## 2021-02-13 RX ADMIN — ENOXAPARIN SODIUM 40 MG: 40 INJECTION SUBCUTANEOUS at 05:43

## 2021-02-13 RX ADMIN — HYDROMORPHONE HYDROCHLORIDE 1 MG: 1 INJECTION, SOLUTION INTRAMUSCULAR; INTRAVENOUS; SUBCUTANEOUS at 12:52

## 2021-02-13 RX ADMIN — CEFAZOLIN SODIUM 2 G: 2 INJECTION, SOLUTION INTRAVENOUS at 05:42

## 2021-02-13 RX ADMIN — RIFAMPIN 300 MG: 300 CAPSULE ORAL at 17:54

## 2021-02-13 RX ADMIN — HYDROMORPHONE HYDROCHLORIDE 1 MG: 1 INJECTION, SOLUTION INTRAMUSCULAR; INTRAVENOUS; SUBCUTANEOUS at 03:08

## 2021-02-13 RX ADMIN — PANTOPRAZOLE SODIUM 8 MG/HR: 40 INJECTION, POWDER, FOR SOLUTION INTRAVENOUS at 06:32

## 2021-02-13 RX ADMIN — METRONIDAZOLE 500 MG: 500 INJECTION, SOLUTION INTRAVENOUS at 06:25

## 2021-02-13 RX ADMIN — HYDROMORPHONE HYDROCHLORIDE 1 MG: 1 INJECTION, SOLUTION INTRAMUSCULAR; INTRAVENOUS; SUBCUTANEOUS at 06:25

## 2021-02-13 RX ADMIN — CEFEPIME 2 G: 2 INJECTION, POWDER, FOR SOLUTION INTRAVENOUS at 21:07

## 2021-02-13 RX ADMIN — CEFAZOLIN SODIUM 2 G: 2 INJECTION, SOLUTION INTRAVENOUS at 14:31

## 2021-02-13 RX ADMIN — METRONIDAZOLE 500 MG: 500 INJECTION, SOLUTION INTRAVENOUS at 15:21

## 2021-02-13 RX ADMIN — SODIUM CHLORIDE: 9 INJECTION, SOLUTION INTRAVENOUS at 17:21

## 2021-02-13 RX ADMIN — HYDROMORPHONE HYDROCHLORIDE 1 MG: 1 INJECTION, SOLUTION INTRAMUSCULAR; INTRAVENOUS; SUBCUTANEOUS at 17:54

## 2021-02-13 RX ADMIN — CEFEPIME 2 G: 2 INJECTION, POWDER, FOR SOLUTION INTRAVENOUS at 16:03

## 2021-02-13 RX ADMIN — HYDROMORPHONE HYDROCHLORIDE 1 MG: 1 INJECTION, SOLUTION INTRAMUSCULAR; INTRAVENOUS; SUBCUTANEOUS at 22:17

## 2021-02-13 RX ADMIN — METRONIDAZOLE 500 MG: 500 INJECTION, SOLUTION INTRAVENOUS at 21:38

## 2021-02-13 RX ADMIN — SODIUM CHLORIDE: 9 INJECTION, SOLUTION INTRAVENOUS at 06:25

## 2021-02-13 RX ADMIN — PANTOPRAZOLE SODIUM 8 MG/HR: 40 INJECTION, POWDER, FOR SOLUTION INTRAVENOUS at 16:02

## 2021-02-13 RX ADMIN — ACETAMINOPHEN 1000 MG: 500 TABLET ORAL at 17:54

## 2021-02-13 RX ADMIN — FLUCONAZOLE 400 MG: 200 TABLET ORAL at 16:03

## 2021-02-13 ASSESSMENT — ENCOUNTER SYMPTOMS
DOUBLE VISION: 0
RESPIRATORY NEGATIVE: 1
CHILLS: 0
COUGH: 0
WEAKNESS: 0
VOMITING: 1
MYALGIAS: 1
ABDOMINAL PAIN: 1
HEARTBURN: 0
NECK PAIN: 0
MUSCULOSKELETAL NEGATIVE: 1
BLURRED VISION: 0
FEVER: 0
CONSTIPATION: 1
NEUROLOGICAL NEGATIVE: 1
NERVOUS/ANXIOUS: 0
NAUSEA: 1
CARDIOVASCULAR NEGATIVE: 1
SPUTUM PRODUCTION: 0
HEADACHES: 0
DIARRHEA: 0
VOMITING: 0
NAUSEA: 0
CONSTITUTIONAL NEGATIVE: 1
SHORTNESS OF BREATH: 0
BACK PAIN: 1
EYES NEGATIVE: 1
PALPITATIONS: 0

## 2021-02-13 ASSESSMENT — PAIN DESCRIPTION - PAIN TYPE
TYPE: ACUTE PAIN;SURGICAL PAIN
TYPE: ACUTE PAIN;SURGICAL PAIN
TYPE: ACUTE PAIN
TYPE: ACUTE PAIN;SURGICAL PAIN

## 2021-02-13 NOTE — PROGRESS NOTES
Assumed care of patient at 0645. Bedside report received. Assessment complete.  AA&Ox4. Denies CP/SOB. Patient maintaining saturations greater than 90% with 2.5L O2 via NC. Tachycardic.   Reporting 8-9/10 pain. Medicated per MAR.   Educated patient regarding pharmacologic and non pharmacologic modalities for pain management.  Lap sites x4 noted, CDI. NG to R nare with tape, patent, dark output, CDI.  NPO. Denies N/V.  Howe to gravity. - BM.   Pt ambulates x1 assist.  All needs met at this time. Call light within reach. Pt calls appropriately. Bed low and locked, non skid socks in place. Hourly rounding in place.

## 2021-02-13 NOTE — CONSULTS
"Consults   INFECTIOUS DISEASES INPATIENT CONSULT NOTE     Date of Service: 2/13/2021    Consult Requested By: Gadiel Cox M.D.    Reason for Consultation: MSSA bacteremia, peritonitis    History of Present Illness:   Carolynn Casey is a 56 y.o.  admitted 2/11/2021. Pt has a past medical history of prior Jourdan-en-Y gastric bypass, pulmonary hypertension, diabetes mellitus and biliary colic.  She underwent laparoscopic cholecystectomy on 1/13/2021.  She has chronic abdominal pain and had been taking NSAIDs thought to result in ulceration.  She developed severe abdominal pain on 2/11/2020 and presented to the ER.  CT was done with finding of pneumoperitoneum and peritonitis.  She went back to the OR on 2/11/2021 for laparoscopic washout for perforated gastrojejunal marginal ulcer with omental patch placed.  Blood cultures were obtained and are positive for MSSA.  Patient has been treated with Flagyl fluconazole and ceftriaxone.  Today she states that her abdominal pain has improved.    Review Of Systems:  Review of Systems   Constitutional: Positive for malaise/fatigue. Negative for chills and fever.   HENT: Negative for hearing loss.    Eyes: Negative for blurred vision and double vision.   Respiratory: Negative for cough, sputum production and shortness of breath.    Cardiovascular: Positive for leg swelling. Negative for chest pain.   Gastrointestinal: Positive for abdominal pain and constipation. Negative for diarrhea, nausea and vomiting.   Genitourinary: Negative for dysuria.   Musculoskeletal: Positive for back pain and myalgias. Negative for joint pain and neck pain.   Skin: Negative for rash.   Neurological: Negative for weakness and headaches.   Psychiatric/Behavioral: The patient is not nervous/anxious.          PMH:   Past Medical History:   Diagnosis Date   • Allergy    • Anesthesia     PONV   • Anxiety    • Arthritis     \"allover\"   • ASTHMA     mild no inhalers   • Bronchitis 2014   • Constipation  "    constipation   • Diabetes (HCC) 08/31/2020    no meds currently since gastric bypass   • Elevated glucose     per epic lab results   • GERD (gastroesophageal reflux disease)    • Heart burn    • Heart murmur     took PhenPhen, no longer problem   • Hiatus hernia syndrome     possible   • Hypertension     pt states well controlled on meds   • Left breast mass 8/3/2017   • Major depressive disorder 2/27/2018   • Migraine    • Pain 08/31/2020    chronic lower back, knees, shoulders, 6/10   • Primary osteoarthritis involving multiple joints    • Pulmonary emboli (HCC) 2017    no longer on blood thinners   • Sacroiliitis (HCC) 8/2/2017   • Type 2 diabetes mellitus without complication ([facility]) 7/20/2018   • Unspecified disorder of thyroid 2020    hypo/ not on any medications   • Unspecified urinary incontinence     mild/ wears pads   • Upper GI bleed        PSH:  Past Surgical History:   Procedure Laterality Date   • PB LAP,DIAGNOSTIC ABDOMEN N/A 2/11/2021    Procedure: LAPAROSCOPY - DIAGNOSTIC;  Surgeon: Gadiel Cox M.D.;  Location: [facility];  Service: General   • GINGER BY LAPAROSCOPY  1/13/2021    Procedure: CHOLECYSTECTOMY, LAPAROSCOPIC;  Surgeon: Gadiel Cox M.D.;  Location: [facility];  Service: General   • GASTROENTEROSTOMY, LAPAROSCOPIC N/A 10/2/2020    Procedure: GASTROENTEROSTOMY, LAPAROSCOPIC;  Surgeon: Gadiel Cox M.D.;  Location: [facility];  Service: General   • GASTROSCOPY  9/2/2020    Procedure: GASTROSCOPY-WITH WATS BIOPSY;  Surgeon: Gadiel Cox M.D.;  Location: [facility];  Service: General   • SPINAL CORD STIMULATOR  07/2020   • PB LAP, ESLENE RESTRICT PROC, LONGITUDINAL GAS*  5/15/2019    Procedure: GASTRECTOMY, SLEEVE, LAPAROSCOPIC;  Surgeon: Gadiel Cox M.D.;  Location: [facility];  Service: General   • SPINAL CORD STIMULATOR  8/16/2018    Procedure: SPINAL CORD STIMULATOR- PERMANENT;  Surgeon: Dominique Saha M.D.;  Location: [facility];  Service: Pain Management   • OTHER ABDOMINAL SURGERY  2018    gastric sleeve   • S I JOINT FUSION Right 8/2/2017    Procedure: S I JOINT FUSION;  Surgeon: Alfredo Belcher M.D.;  Location: SUR[facility]ERY [facility];  Service:    • EXTREME LATERAL INTERBODY FUSION  2/17/2016    Procedure: EXTREME LATERAL INTERBODY FUSION far lateral interbody  L3-4 ;  Surgeon: Alfredo Belcher M.D.;  Location: SURGERY [facility];  Service:    • LUMBAR DECOMPRESSION  2/17/2016    Procedure: LUMBAR DECOMPRESSION L3-4;  Surgeon: Alfredo Belcher M.D.;  Location: SURGERY [facility];  Service:    • KNEE REVISION TOTAL Right 10/8/2015    Procedure: KNEE REVISION TOTAL KNEE ARTHROPLASTY;  Surgeon: Ihsan Hawthorne M.D.;  Location: SURGERY [facility];  Service:    • LUMBAR FUSION ANTERIOR  2013   • LAMINOTOMY  2006    Dr. [facility]   • KNEE ARTHROPLASTY TOTAL Left 2003   • KNEE ARTHROPLASTY TOTAL Right 2003   • SHOULDER ARTHROSCOPY W/ ROTATOR CUFF REPAIR  2001    LEFT   • SHOULDER ARTHROSCOPY Right 2001   • APPENDECTOMY  1998 1997   • ABDOMINAL HYSTERECTOMY TOTAL  1995   • CARPAL TUNNEL RELEASE Right 1983   • OPEN REDUCTION  2001, 1990    CLAVICLE BILAT   • PRIMARY C SECTION  1983, 1990    x 2       FAMILY HX:  Family History   Problem Relation Age of Onset   • Hypertension Mother    • Cancer Mother 81        breast DCIS   • Heart Disease Father    • Heart Attack Father    • Hypertension Father    • Lung Disease Sister         asthma   • Hypertension Sister    • Arthritis Sister         knee   • Arthritis Brother    • Hypertension Brother    • Diabetes Brother    • Heart Disease Maternal Aunt    • Hypertension Maternal Aunt    • Cancer Maternal Aunt 80        breast   • Stroke Maternal Uncle    • Heart Disease Maternal Uncle    • Hypertension Maternal Uncle    • Heart Disease Paternal Grandmother    • Psychiatric Illness Paternal Grandfather         Suicide   • Alcohol/Drug Paternal Grandfather    •  Arthritis Maternal Grandmother    • Arthritis Maternal Grandfather    • Arthritis Brother    • Hyperlipidemia Neg Hx        SOCIAL HX:  Social History     Socioeconomic History   • Marital status:      Spouse name: Not on file   • Number of children: Not on file   • Years of education: Not on file   • Highest education level: Associate degree: occupational, technical, or vocational program   Occupational History   • Occupation: Disabled     Employer: DISABLED   Tobacco Use   • Smoking status: Never Smoker   • Smokeless tobacco: Never Used   • Tobacco comment: continue to avoid   Substance and Sexual Activity   • Alcohol use: Not Currently     Alcohol/week: 0.0 oz   • Drug use: Not Currently   • Sexual activity: Yes     Partners: Male     Birth control/protection: Surgical   Other Topics Concern   • Not on file   Social History Narrative   • Not on file     Social Determinants of Health     Financial Resource Strain: Medium Risk   • Difficulty of Paying Living Expenses: Somewhat hard   Food Insecurity: No Food Insecurity   • Worried About Running Out of Food in the Last Year: Never true   • Ran Out of Food in the Last Year: Never true   Transportation Needs: Unmet Transportation Needs   • Lack of Transportation (Medical): Yes   • Lack of Transportation (Non-Medical): Yes   Physical Activity: Insufficiently Active   • Days of Exercise per Week: 1 day   • Minutes of Exercise per Session: 20 min   Stress: Stress Concern Present   • Feeling of Stress : Very much   Social Connections: Somewhat Isolated   • Frequency of Communication with Friends and Family: More than three times a week   • Frequency of Social Gatherings with Friends and Family: Three times a week   • Attends Sikhism Services: Never   • Active Member of Clubs or Organizations: No   • Attends Club or Organization Meetings: Never   • Marital Status:    Intimate Partner Violence:    • Fear of Current or Ex-Partner:    • Emotionally Abused:   "  • Physically Abused:    • Sexually Abused:      Social History     Tobacco Use   Smoking Status Never Smoker   Smokeless Tobacco Never Used   Tobacco Comment    continue to avoid     Social History     Substance and Sexual Activity   Alcohol Use Not Currently   • Alcohol/week: 0.0 oz       Allergies/Intolerances:  Allergies   Allergen Reactions   • Cortisone Anaphylaxis     RXN=since age 12   • Food Unspecified     \"citrus juice\" =burn and throat swelling.    ALL FISH - nausea  RXN=whole life   • Naprosyn [Naproxen] Unspecified     \"GI bleed\"  RXN=whole life   • Penicillins Anaphylaxis     RXN=since age 1   • Fish Vomiting and Nausea   • Keflex Diarrhea and Nausea     RXN=20 years ago   • Latex Rash and Itching     Rash, itching  RXN=20 years ago   • Shellfish Allergy Vomiting and Nausea     Betadine/iodine for surgery is ok   • Tape Rash     Plastic tape \"bubble up the skin\", reports tegaderm OK  RXN=ongoing       History reviewed with the patient    Other Current Medications:    Current Facility-Administered Medications:   •  Pharmacy Consult Request ...Pain Management Review 1 Each, 1 Each, Other, PHARMACY TO DOSE, Pastor Khan M.D.  •  HYDROmorphone pf (DILAUDID) injection 1 mg, 1 mg, Intravenous, Q3HRS PRN, Pastor Khan M.D., 1 mg at 02/13/21 0625  •  NS infusion, , Intravenous, Continuous, Flory Guo P.A.-C., Last Rate: 125 mL/hr at 02/13/21 0625, New Bag at 02/13/21 0625  •  ceFAZolin in dextrose (ANCEF) IVPB premix 2 g, 2 g, Intravenous, Q8HRS, Fortune Laurag-Georger, D.O., Stopped at 02/13/21 0612  •  metroNIDAZOLE (FLAGYL) IVPB 500 mg, 500 mg, Intravenous, Q8HRS, Fortune Aig-Estefanianor, D.O., Stopped at 02/13/21 0725  •  lactated ringers infusion (BOLUS), 500 mL, Intravenous, Once PRN, Gadiel Cox M.D.  •  ondansetron (ZOFRAN) syringe/vial injection 4 mg, 4 mg, Intravenous, Q4HRS PRN, Gadiel Cox M.D.  •  haloperidol lactate (HALDOL) injection 1 mg, 1 mg, Intravenous, Q6HRS PRN, Gadiel Cox, " "M.D.  •  promethazine (PHENERGAN) suppository 25 mg, 25 mg, Rectal, Q4HRS PRN, Gadiel Cox M.D.  •  diphenhydrAMINE (BENADRYL) injection 12.5 mg, 12.5 mg, Intravenous, Q6HRS PRN, Gadiel Cox M.D.  •  enoxaparin (LOVENOX) inj 40 mg, 40 mg, Subcutaneous, DAILY, Gadiel Cox M.D., 40 mg at 21 0543  •  [COMPLETED] acetaminophen (OFIRMEV) injection 1,000 mg, 1,000 mg, Intravenous, Q6HRS, 1,000 mg at 21 0546 **FOLLOWED BY** acetaminophen (TYLENOL) tablet 1,000 mg, 1,000 mg, Oral, Q6HR, Stopped at 21 1201 **FOLLOWED BY** [START ON 2021] acetaminophen (TYLENOL) tablet 1,000 mg, 1,000 mg, Oral, Q6HRS PRN, Gadiel Cox M.D.  •  enalaprilat (VASOTEC) injection 2.5 mg, 2.5 mg, Intravenous, Q6HRS PRN, Gadiel Cox M.D.  •  pantoprazole (PROTONIX) 80 mg in  mL Infusion, 8 mg/hr, Intravenous, Continuous, Gadiel Cox M.D., Last Rate: 25 mL/hr at 21 0632, 8 mg/hr at 21 0632  [unfilled]    Most Recent Vital Signs:  /63   Pulse 88   Temp 37.2 °C (98.9 °F) (Temporal)   Resp 16   Ht 1.651 m (5' 5\")   Wt 108 kg (238 lb 1.6 oz)   LMP  (LMP Unknown)   SpO2 94%   BMI 39.62 kg/m²   Temp  Av.6 °C (97.9 °F)  Min: 36.2 °C (97.1 °F)  Max: 37.2 °C (98.9 °F)    Physical Exam:  Physical Exam   Constitutional: She is oriented to person, place, and time and well-developed, well-nourished, and in no distress. No distress.   HENT:   Head: Normocephalic and atraumatic.   Right Ear: External ear normal.   Left Ear: External ear normal.   Nose: Nose normal.   Eyes: Pupils are equal, round, and reactive to light. Conjunctivae and EOM are normal.   Cardiovascular: Normal rate, regular rhythm and normal heart sounds.   Pulmonary/Chest: Effort normal and breath sounds normal.   Abdominal: Soft. She exhibits distension. There is abdominal tenderness. There is no rebound and no guarding.   Mid abdomen drain in place with serosanguineous fluid   Musculoskeletal:         General: Edema " "present. Normal range of motion.      Cervical back: Normal range of motion and neck supple.   Neurological: She is alert and oriented to person, place, and time.   Skin: Skin is warm and dry. She is not diaphoretic.   Psychiatric: Affect and judgment normal.           Pertinent Lab Results:  Recent Labs     02/11/21 1849 02/12/21 0035 02/13/21  0326   WBC 18.4* 16.5* 15.7*      Recent Labs     02/11/21 1849 02/12/21 0035 02/13/21  0326   HEMOGLOBIN 15.5 14.2 13.0   HEMATOCRIT 48.0* 44.8 43.1   MCV 90.1 91.1 94.3   MCH 29.1 28.9 28.4   PLATELETCT 386 340 281         Recent Labs     02/11/21 1849 02/12/21 0035 02/13/21  0326   SODIUM 136 135 138   POTASSIUM 4.3 4.4 4.3   CHLORIDE 99 101 105   CO2 22 24 22   CREATININE 0.80 0.67 0.48*        Recent Labs     02/11/21 1849 02/12/21 0035 02/13/21  0326   ALBUMIN 4.1 3.4 3.1*        Pertinent Micro:  Results     Procedure Component Value Units Date/Time    BLOOD CULTURE [[id number]]  (Abnormal) Collected: 02/11/21 1800    Order Status: Completed Specimen: Blood from Peripheral Updated: 02/13/21 1045     Significant Indicator POS     Source BLD     Site PERIPHERAL     Culture Result Growth detected by Bactec instrument. 02/12/2021  11:05  Gram Stain: Gram positive cocci: Possible Staphylococcus sp.  Staphylococcus aureus (methicillin sensitive)  detected by PCR.  Susceptibility to follow.        Staphylococcus aureus    Narrative:      CALL  [facility]  141 tel. [phone],  CALLED  141 tel. [phone] 02/12/2021, 11:09, RB PERF. RESULTS CALLED TO:  [id number] RN and [phone] Areli  1 of 2 for Blood Culture x 2 sites order. Per Hospital  Policy: Only change Specimen Src: to \"Line\" if specified by  physician order.  Right AC    BLOOD CULTURE [[id number]]  (Abnormal) Collected: 02/11/21 1810    Order Status: Completed Specimen: Blood from Peripheral Updated: 02/13/21 0841     Significant Indicator POS     Source BLD     Site PERIPHERAL     Culture Result Growth detected by Bactec " "instrument. 02/12/2021  11:05  Gram Stain: Gram positive cocci: Possible Staphylococcus sp.        Staphylococcus aureus    Narrative:      CALL  [facility]  141 tel. [phone],  CALLED  141 tel. [phone] 02/12/2021, 11:09, RB PERF. RESULTS CALLED TO:  [id number] RN  2 of 2 blood culture x2  Sites order. Per Hospital Policy:  Only change Specimen Src: to \"Line\" if specified by physician  order.  Right AC    BLOOD CULTURE [[id number]] Collected: 02/12/21 1718    Order Status: Completed Specimen: Blood from Peripheral Updated: 02/13/21 0634     Significant Indicator NEG     Source BLD     Site PERIPHERAL     Culture Result No Growth  Note: Blood cultures are incubated for 5 days and  are monitored continuously.Positive blood cultures  are called to the RN and reported as soon as  they are identified.      Narrative:      Per Hospital Policy: Only change Specimen Src: to \"Line\" if  specified by physician order.  Left Forearm/Arm    BLOOD CULTURE [[id number]] Collected: 02/12/21 1718    Order Status: Completed Specimen: Blood from Peripheral Updated: 02/13/21 0634     Significant Indicator NEG     Source BLD     Site PERIPHERAL     Culture Result No Growth  Note: Blood cultures are incubated for 5 days and  are monitored continuously.Positive blood cultures  are called to the RN and reported as soon as  they are identified.      Narrative:      Per Hospital Policy: Only change Specimen Src: to \"Line\" if  specified by physician order.  Left Hand    Anaerobic Culture [[id number]] Collected: 02/11/21 2042    Order Status: Completed Specimen: Wound Updated: 02/12/21 0604     Significant Indicator NEG     Source WND     Site Gastric Ulcer Fluid     Culture Result -    Narrative:      Surgery Specimen    Fungal Culture [[id number]] Collected: 02/11/21 2042    Order Status: Completed Specimen: Wound Updated: 02/12/21 0604     Significant Indicator NEG     Source WND     Site Gastric Ulcer Fluid     Culture Result -    Narrative:      " "Surgery Specimen    GRAM STAIN [[id number]] Collected: 02/11/21 2042    Order Status: Completed Specimen: Wound Updated: 02/12/21 0604     Significant Indicator .     Source WND     Site Gastric Ulcer Fluid     Gram Stain Result Many WBCs.  Few Gram positive cocci.  Rare Gram negative rods.      Narrative:      Surgery Specimen    CULTURE WOUND W/ GRAM STAIN [[id number]] Collected: 02/11/21 2042    Order Status: Completed Specimen: Wound Updated: 02/12/21 0604     Significant Indicator NEG     Source WND     Site Gastric Ulcer Fluid     Culture Result -     Gram Stain Result Many WBCs.  Few Gram positive cocci.  Rare Gram negative rods.      Narrative:      Surgery Specimen    SARS-CoV-2 PCR (24 hour In-House): Collect NP swab in VTM [[id number]] Collected: 02/11/21 1935    Order Status: Completed Specimen: Respirate from Nasopharyngeal Updated: 02/11/21 2249     SARS-CoV-2 Source NP Swab     SARS-CoV-2 by PCR NotDetected     Comment: PATIENTS: Important information regarding your results and instructions can  be found at [email or web address]   \"After your  Covid-19 Test\"  [facility] providers: PLEASE REFER TO DE-ESCALATION AND RETESTING PROTOCOL  on [email or web address]  **The TaqPath COVID-19 SARS-CoV-2 test has been made available for use under  the Emergency Use Authorization (EUA) only.         Narrative:      Have you been in close contact with a person who is suspected  or known to be positive for COVID-19 within the last 30 days  (e.g. last seen that person < 30 days ago)->Unknown        No results found for: BLOODCULTU, BLDCULT, BCHOLD     Studies:  CT-ABDOMEN-PELVIS WITH    Result Date: 2/11/2021 2/11/2021 4:21 PM HISTORY/REASON FOR EXAM:  Abdominal pain nausea TECHNIQUE/EXAM DESCRIPTION: CT scan of the abdomen and pelvis with contrast. Contrast-enhanced helical scanning was obtained from the diaphragmatic domes through the pubic symphysis following the bolus administration of 100 mL of " Optiray 350 nonionic contrast without complication. Low dose optimization technique was utilized for this CT exam including automated exposure control and adjustment of the mA and/or kV according to patient size. COMPARISON: No prior studies available. FINDINGS: CT Abdomen: Several areas of linear and poorly defined opacification are noted in each lung base. No dependent free air and multiple sites of extraluminal air are noted scattered throughout the upper abdomen. Postcholecystectomy changes are noted. Postoperative changes are noted in the stomach. Gastrojejunostomy is noted. No focal extravasation of oral contrast is appreciated. Some air is noted in the gallbladder fossa but volume of air is greater in the left upper quadrant. The liver is normal in appearance. The spleen is normal. The kidneys are normal. The adrenal glands are normal. The pancreas is normal. The aorta is normal in caliber.  No evidence of retroperitoneal adenopathy. CT Pelvis: There is no evidence of bowel obstruction or inflammatory change. Small amount of pelvic free peritoneal fluid is identified. Post hysterectomy changes appear to be present.     1.  Extraluminal air and free air are noted throughout the upper abdomen. Extraluminal air could be related to recent surgery although perforated viscus is also a possibility. 2.  Post cholecystectomy. No fluid collection noted in the gallbladder fossa. Small amount of air is noted in the gallbladder fossa. 3.  Postsurgical changes are noted in the stomach. 4.  Postsurgical changes are noted in the lumbar spine. 5.  Small amount of free fluid is noted in the pelvis. These findings were discussed with MOHAN NAVA on 02/11/2021.       ASSESSMENT/PLAN:     56 y.o.  admitted 2/11/2021. Pt has a past medical history of prior Jourdan-en-Y gastric bypass, pulmonary hypertension, diabetes mellitus and biliary colic.  She underwent laparoscopic cholecystectomy on 1/13/2021.  She has chronic abdominal  pain and had been taking NSAIDs thought to result in ulceration.  She developed severe abdominal pain on 2/11/2020 and presented to the ER.  CT was done with finding of pneumoperitoneum and peritonitis.  She went back to the OR on 2/11/2021 for laparoscopic washout for perforated gastrojejunal marginal ulcer with omental patch placed.  Blood cultures were obtained and are positive for MSSA.  Patient has been treated with Flagyl fluconazole and ceftriaxone.     Problem List     MSSA - bacteremia potentially due to ulceration  -Blood cultures on 2/11 2/2 + MSSA  -Blood cultures on 2/12 are so far no growth  -Hardware in spine, both knees have been replaced  Perforated viscus (gastric ulcer) , ulcer thought due to NSAIDs  -OR on 2/11 for repair  -Or cultures were obtained of wood in the gastric ulcer +GPC's and rare GNRs  Peritonitis with pneumoperitoneum, 2/2 above  Leukocytosis, ongoing  Antibiotic allergies: Penicillin allergy at age 1 reported as anaphylaxis.  Patient has tolerated cephalosporins.  Unlikely that a reaction in childhood is still present in adulthood so may challenge if pt agreeable  Diabetes mellitus  History of gastric bypass    Plan     --- Stop cefazolin and transition to cefepime as prefer broader coverage for now given perforated bowel and peritonitis, continue Flagyl and fluconazole -to presence of hardware will also add rifampin 300 mg twice daily -final antibiotic selection duration to be determined  --- Follow-up gastric ulcer fluid cultures from OR  --- Follow-up repeat blood cultures, no growth to date  --- Obtain echocardiogram  --- Monitor for any new neck back or joint pain and image appropriately  --- Monitor labs  --- Any worsening abdominal pain, new fevers or significant elevation white count would reimage abdomen  --- Monitor and control blood sugars    Plan of care discussed with ARACELY Cox M.D.. Will continue to follow    Emily Zarate M.D.

## 2021-02-13 NOTE — PROGRESS NOTES
[facility] Medicine Daily Progress Note    Date of Service  2/13/2021    Chief Complaint  56 y.o. female admitted 2/11/2021 with perforated viscus from gastric ulcer.    Hospital Course  56 y.o. female admitted 2/11/2021 with perforated viscus from gastric ulcer.  She is S/p Laparoscopic washout, repair and omental patch of gastrojejunal marginal ulcer.  Blood cultures growing MSSA.    Interval Problem Update  Patient seen and examined at bedside this morning.  No acute events overnight.     - She is S/p Laparoscopic washout, repair and omental patch of gastrojejunal marginal ulcer.  NG tube clamped this morning.  Remains on PPI drip. General surgery primary.  - Blood cultures growing MSSA. Antibiotics adjusted. Repeat blood cultures ordered. ID consulted this morning.    Consultants/Specialty  General surgery    Code Status  Full Code    Disposition  Pending.    Review of Systems  Review of Systems   Constitutional: Negative.    HENT: Negative.    Eyes: Negative.    Respiratory: Negative.    Cardiovascular: Negative.    Gastrointestinal: Positive for abdominal pain, nausea and vomiting.   Genitourinary: Negative.    Musculoskeletal: Negative.    Skin: Negative.    Neurological: Negative.         Physical Exam  Temp:  [36.6 °C (97.8 °F)-37.2 °C (98.9 °F)] 37.2 °C (98.9 °F)  Pulse:  [67-88] 88  Resp:  [16-18] 16  BP: (120-135)/(63-83) 131/63  SpO2:  [90 %-94 %] 94 %    Physical Exam  Constitutional:       Appearance: She is obese.   HENT:      Head: Normocephalic.      Nose:      Comments: NG tube in place.  Eyes:      Extraocular Movements: Extraocular movements intact.      Conjunctiva/sclera: Conjunctivae normal.   Cardiovascular:      Rate and Rhythm: Normal rate.      Heart sounds: Normal heart sounds.   Pulmonary:      Effort: Pulmonary effort is normal.      Breath sounds: Normal breath sounds.   Abdominal:      General: There is distension.      Palpations: Abdomen is soft.      Tenderness: There is abdominal  tenderness. There is no guarding or rebound.      Comments: Abdomen mildly distended and tender on palpation.  Hypoactive bowel sounds noted.   ELLE drain in place. Incisions with dermabond.    Musculoskeletal:         General: No swelling or tenderness.      Cervical back: Neck supple.   Skin:     General: Skin is warm.      Findings: Rash present.   Neurological:      General: No focal deficit present.      Mental Status: She is alert and oriented to person, place, and time.   Psychiatric:         Mood and Affect: Mood normal.         Behavior: Behavior normal.         Fluids    Intake/Output Summary (Last 24 hours) at 2/13/2021 1050  Last data filed at 2/13/2021 0743  Gross per 24 hour   Intake 2372.92 ml   Output 935 ml   Net 1437.92 ml       Laboratory  Recent Labs     02/11/21 1849 02/12/21  0035 02/13/21  0326   WBC 18.4* 16.5* 15.7*   RBC 5.33 4.92 4.57   HEMOGLOBIN 15.5 14.2 13.0   HEMATOCRIT 48.0* 44.8 43.1   MCV 90.1 91.1 94.3   MCH 29.1 28.9 28.4   MCHC 32.3* 31.7* 30.2*   RDW 43.9 44.7 47.5   PLATELETCT 386 340 281   MPV 8.7* 8.7* 9.6     Recent Labs     02/11/21 1849 02/12/21 0035 02/13/21  0326   SODIUM 136 135 138   POTASSIUM 4.3 4.4 4.3   CHLORIDE 99 101 105   CO2 22 24 22   GLUCOSE 150* 159* 91   BUN 17 15 11   CREATININE 0.80 0.67 0.48*   CALCIUM 9.9 9.2 9.2                   Imaging  No orders to display        Assessment/Plan  * Perforated viscus- (present on admission)  Assessment & Plan  S/p Laparoscopic washout, repair and omental patch of gastrojejunal marginal ulcer.      General surgery following.  PPI drip.  On IV antibiotics.  NG tube clamped  Advance diet as tolerated.  Avoid NSAID Use  Pain control.    MSSA bacteremia  Assessment & Plan  Blood cultures growing MSSA.  Unclear source at this time.    Started on IV antibiotics- Ancef.  Also on Flagyl for recent bowel perf.  ID has be consulted.  Repeat blood cultures.    Sepsis (HCC)  Assessment & Plan  This is Sepsis Present on  admission  SIRS criteria identified on my evaluation include: Tachycardia, with heart rate greater than 90 BPM and Leukocytosis, with WBC greater than 12,000  Source is Perforated Viscuous  Sepsis protocol initiated  Fluid resuscitation ordered per protocol  IV antibiotics as appropriate for source of sepsis  While organ dysfunction may be noted elsewhere in this problem list or in the chart, degree of organ dysfunction does not meet CMS criteria for severe sepsis    Blood cultures growing MSSA bacteremia.  On IV antibiotics.    Type II diabetes (HCC)- (present on admission)  Assessment & Plan  History of type 2 diabetes.  Diet controlled.  Insulin sliding scale as needed.       VTE prophylaxis: Lovenox

## 2021-02-13 NOTE — PROGRESS NOTES
Surgical Progress Note    Author: MINGO Mcknight Date & Time created: 2021   9:24 AM     Interval Events:  POD #2 Laparoscopic washout, repair and omental patch of gastrojejunal marginal ulcer    Feeling subjectively better today.  Pain is improved and controlled today.  NG tube to wall suction.  2.5LNC.  Howe in place.  ELLE drain with mostly serous output at this point.  Blood cultures growing MSSA.  She has been up ambulating the unit yesterday with the NG tube clamped during that time.      Review of Systems   Constitutional: Negative for chills and fever.   Respiratory: Negative for cough and shortness of breath.    Cardiovascular: Negative for chest pain and palpitations.   Gastrointestinal: Positive for abdominal pain and nausea. Negative for diarrhea, heartburn and vomiting.   Genitourinary: Negative for dysuria.     Hemodynamics:  Temp (24hrs), Av.7 °C (98.1 °F), Min:36.6 °C (97.8 °F), Max:36.9 °C (98.4 °F)  Temperature: 36.7 °C (98.1 °F)  Pulse  Av.3  Min: 67  Max: 126   Blood Pressure: 135/83     Respiratory:    Respiration: 18, Pulse Oximetry: 93 %           Neuro:  GCS       Fluids:    Intake/Output Summary (Last 24 hours) at 2021 0924  Last data filed at 2021 0400  Gross per 24 hour   Intake 2372.92 ml   Output 750 ml   Net 1622.92 ml        Current Diet Order   Procedures   • Diet NPO     Physical Exam  Constitutional:       General: She is not in acute distress.     Appearance: She is obese.   HENT:      Head: Normocephalic and atraumatic.      Nose:      Comments: NG tube taped in place     Mouth/Throat:      Mouth: Mucous membranes are moist.   Eyes:      Conjunctiva/sclera: Conjunctivae normal.   Cardiovascular:      Rate and Rhythm: Normal rate and regular rhythm.   Pulmonary:      Effort: Pulmonary effort is normal.      Comments: 2.5L NC  Abdominal:      General: There is no distension.      Palpations: There is no mass.      Tenderness: There is abdominal  tenderness ( incisional). There is no guarding or rebound.   Musculoskeletal:      Cervical back: Normal range of motion and neck supple.   Skin:     General: Skin is warm and dry.   Neurological:      Mental Status: She is alert and oriented to person, place, and time.   Psychiatric:         Mood and Affect: Mood normal.         Behavior: Behavior normal.         Judgment: Judgment normal.       Labs:  Recent Results (from the past 24 hour(s))   BLOOD CULTURE    Collection Time: 02/12/21  5:18 PM    Specimen: Peripheral; Blood   Result Value Ref Range    Significant Indicator NEG     Source BLD     Site PERIPHERAL     Culture Result       No Growth  Note: Blood cultures are incubated for 5 days and  are monitored continuously.Positive blood cultures  are called to the RN and reported as soon as  they are identified.     BLOOD CULTURE    Collection Time: 02/12/21  5:18 PM    Specimen: Peripheral; Blood   Result Value Ref Range    Significant Indicator NEG     Source BLD     Site PERIPHERAL     Culture Result       No Growth  Note: Blood cultures are incubated for 5 days and  are monitored continuously.Positive blood cultures  are called to the RN and reported as soon as  they are identified.     CBC without Differential (blood)    Collection Time: 02/13/21  3:26 AM   Result Value Ref Range    WBC 15.7 (H) 4.8 - 10.8 K/uL    RBC 4.57 4.20 - 5.40 M/uL    Hemoglobin 13.0 12.0 - 16.0 g/dL    Hematocrit 43.1 37.0 - 47.0 %    MCV 94.3 81.4 - 97.8 fL    MCH 28.4 27.0 - 33.0 pg    MCHC 30.2 (L) 33.6 - 35.0 g/dL    RDW 47.5 35.9 - 50.0 fL    Platelet Count 281 164 - 446 K/uL    MPV 9.6 9.0 - 12.9 fL   Comp Metabolic Panel (CMP)    Collection Time: 02/13/21  3:26 AM   Result Value Ref Range    Sodium 138 135 - 145 mmol/L    Potassium 4.3 3.6 - 5.5 mmol/L    Chloride 105 96 - 112 mmol/L    Co2 22 20 - 33 mmol/L    Anion Gap 11.0 7.0 - 16.0    Glucose 91 65 - 99 mg/dL    Bun 11 8 - 22 mg/dL    Creatinine 0.48 (L) 0.50 - 1.40  mg/dL    Calcium 9.2 8.5 - 10.5 mg/dL    AST(SGOT) 33 12 - 45 U/L    ALT(SGPT) 63 (H) 2 - 50 U/L    Alkaline Phosphatase 102 (H) 30 - 99 U/L    Total Bilirubin 0.9 0.1 - 1.5 mg/dL    Albumin 3.1 (L) 3.2 - 4.9 g/dL    Total Protein 6.6 6.0 - 8.2 g/dL    Globulin 3.5 1.9 - 3.5 g/dL    A-G Ratio 0.9 g/dL   ESTIMATED GFR    Collection Time: 02/13/21  3:26 AM   Result Value Ref Range    GFR If African American >60 >60 mL/min/1.73 m 2    GFR If Non African American >60 >60 mL/min/1.73 m 2     Medical Decision Making, by Problem:  Active Hospital Problems    Diagnosis    • Perforated viscus [R19.8]      Priority: High   • MSSA bacteremia [R78.81, B95.61]      Priority: High   • Sepsis (HCC) [A41.9]    • Type II diabetes (HCC) [E11.9]      Plan:  Continue IV abx per medicine and ID recommendations.  Discontinue Howe today, bladder scans as needed.  VSS.  Leukocytosis but trending down.  Educated to use IS and ambulate halls today with NG clamped.  Continue IV PPI and hydration.  LFT still mildly elevated but trending down.  Keep NG in place.  Keep ELLE in place, mild amount of serous output present.  Will discuss advancing her diet to clears possibly if ok with Dr Cox.  Appreciate medicines help with management.      Quality Measures:  Quality-Core Measures   Reviewed items::  Labs reviewed and Medications reviewed  Howe catheter::  One or Two Days Post Surgery (Day of Surgery being Day 0)  DVT prophylaxis pharmacological::  Enoxaparin (Lovenox)  DVT prophylaxis - mechanical:  SCDs  Ulcer Prophylaxis::  Yes  Antibiotics:  Treating active infection/contamination beyond 24 hours perioperative coverage      Discussed patient condition with Patient and Dr Cox

## 2021-02-13 NOTE — PROGRESS NOTES
Assumed care of patient at 0645. Bedside report received. Assessment complete.  AA&Ox4. Denies CP/SOB.  Reporting 0/10 pain.   Educated patient regarding pharmacologic and non pharmacologic modalities for pain management.  Lap sites x4 noted,  CDI. NG to R nare with tape, patent, dark output, CDI.  Tolerating ice chips . Denies N/V.  + urine via fuentes catheter to gravity. Last BM PTA  Pt ambulates SBA.  All needs met at this time. Call light within reach. Pt calls appropriately. Bed low and locked, non skid socks in place. Hourly rounding in place.

## 2021-02-14 ENCOUNTER — APPOINTMENT (OUTPATIENT)
Dept: CARDIOLOGY | Facility: MEDICAL CENTER | Age: 56
DRG: 853 | End: 2021-02-14
Attending: STUDENT IN AN ORGANIZED HEALTH CARE EDUCATION/TRAINING PROGRAM
Payer: MEDICARE

## 2021-02-14 ENCOUNTER — APPOINTMENT (OUTPATIENT)
Dept: RADIOLOGY | Facility: MEDICAL CENTER | Age: 56
DRG: 853 | End: 2021-02-14
Attending: COLON & RECTAL SURGERY
Payer: MEDICARE

## 2021-02-14 ENCOUNTER — APPOINTMENT (OUTPATIENT)
Dept: RADIOLOGY | Facility: MEDICAL CENTER | Age: 56
DRG: 853 | End: 2021-02-14
Attending: NURSE PRACTITIONER
Payer: MEDICARE

## 2021-02-14 LAB
ALBUMIN SERPL BCP-MCNC: 2.9 G/DL (ref 3.2–4.9)
ALBUMIN/GLOB SERPL: 0.9 G/DL
ALP SERPL-CCNC: 107 U/L (ref 30–99)
ALT SERPL-CCNC: 47 U/L (ref 2–50)
ANION GAP SERPL CALC-SCNC: 11 MMOL/L (ref 7–16)
ANION GAP SERPL CALC-SCNC: 12 MMOL/L (ref 7–16)
AST SERPL-CCNC: 31 U/L (ref 12–45)
BACTERIA BLD CULT: ABNORMAL
BASOPHILS # BLD AUTO: 0.3 % (ref 0–1.8)
BASOPHILS # BLD: 0.03 K/UL (ref 0–0.12)
BILIRUB SERPL-MCNC: 1.6 MG/DL (ref 0.1–1.5)
BUN SERPL-MCNC: 8 MG/DL (ref 8–22)
BUN SERPL-MCNC: 8 MG/DL (ref 8–22)
CALCIUM SERPL-MCNC: 8.6 MG/DL (ref 8.5–10.5)
CALCIUM SERPL-MCNC: 8.7 MG/DL (ref 8.5–10.5)
CHLORIDE SERPL-SCNC: 101 MMOL/L (ref 96–112)
CHLORIDE SERPL-SCNC: 103 MMOL/L (ref 96–112)
CO2 SERPL-SCNC: 21 MMOL/L (ref 20–33)
CO2 SERPL-SCNC: 21 MMOL/L (ref 20–33)
CREAT SERPL-MCNC: 0.32 MG/DL (ref 0.5–1.4)
CREAT SERPL-MCNC: 0.35 MG/DL (ref 0.5–1.4)
EOSINOPHIL # BLD AUTO: 0.2 K/UL (ref 0–0.51)
EOSINOPHIL NFR BLD: 1.7 % (ref 0–6.9)
ERYTHROCYTE [DISTWIDTH] IN BLOOD BY AUTOMATED COUNT: 44.5 FL (ref 35.9–50)
GLOBULIN SER CALC-MCNC: 3.1 G/DL (ref 1.9–3.5)
GLUCOSE SERPL-MCNC: 69 MG/DL (ref 65–99)
GLUCOSE SERPL-MCNC: 87 MG/DL (ref 65–99)
HCT VFR BLD AUTO: 40 % (ref 37–47)
HGB BLD-MCNC: 12.4 G/DL (ref 12–16)
IMM GRANULOCYTES # BLD AUTO: 0.07 K/UL (ref 0–0.11)
IMM GRANULOCYTES NFR BLD AUTO: 0.6 % (ref 0–0.9)
LYMPHOCYTES # BLD AUTO: 1.57 K/UL (ref 1–4.8)
LYMPHOCYTES NFR BLD: 13.3 % (ref 22–41)
MAGNESIUM SERPL-MCNC: 1.9 MG/DL (ref 1.5–2.5)
MCH RBC QN AUTO: 29 PG (ref 27–33)
MCHC RBC AUTO-ENTMCNC: 31 G/DL (ref 33.6–35)
MCV RBC AUTO: 93.5 FL (ref 81.4–97.8)
MONOCYTES # BLD AUTO: 0.91 K/UL (ref 0–0.85)
MONOCYTES NFR BLD AUTO: 7.7 % (ref 0–13.4)
NEUTROPHILS # BLD AUTO: 9.06 K/UL (ref 2–7.15)
NEUTROPHILS NFR BLD: 76.4 % (ref 44–72)
NRBC # BLD AUTO: 0 K/UL
NRBC BLD-RTO: 0 /100 WBC
PHOSPHATE SERPL-MCNC: 2.2 MG/DL (ref 2.5–4.5)
PLATELET # BLD AUTO: 299 K/UL (ref 164–446)
PMV BLD AUTO: 8.7 FL (ref 9–12.9)
POTASSIUM SERPL-SCNC: 3.2 MMOL/L (ref 3.6–5.5)
POTASSIUM SERPL-SCNC: 3.9 MMOL/L (ref 3.6–5.5)
PROT SERPL-MCNC: 6 G/DL (ref 6–8.2)
RBC # BLD AUTO: 4.28 M/UL (ref 4.2–5.4)
SIGNIFICANT IND 70042: ABNORMAL
SIGNIFICANT IND 70042: ABNORMAL
SITE SITE: ABNORMAL
SITE SITE: ABNORMAL
SODIUM SERPL-SCNC: 134 MMOL/L (ref 135–145)
SODIUM SERPL-SCNC: 135 MMOL/L (ref 135–145)
SOURCE SOURCE: ABNORMAL
SOURCE SOURCE: ABNORMAL
WBC # BLD AUTO: 11.8 K/UL (ref 4.8–10.8)

## 2021-02-14 PROCEDURE — 770006 HCHG ROOM/CARE - MED/SURG/GYN SEMI*

## 2021-02-14 PROCEDURE — 83735 ASSAY OF MAGNESIUM: CPT

## 2021-02-14 PROCEDURE — 36415 COLL VENOUS BLD VENIPUNCTURE: CPT

## 2021-02-14 PROCEDURE — A9270 NON-COVERED ITEM OR SERVICE: HCPCS | Performed by: STUDENT IN AN ORGANIZED HEALTH CARE EDUCATION/TRAINING PROGRAM

## 2021-02-14 PROCEDURE — 80048 BASIC METABOLIC PNL TOTAL CA: CPT

## 2021-02-14 PROCEDURE — 85025 COMPLETE CBC W/AUTO DIFF WBC: CPT

## 2021-02-14 PROCEDURE — 700102 HCHG RX REV CODE 250 W/ 637 OVERRIDE(OP): Performed by: NURSE PRACTITIONER

## 2021-02-14 PROCEDURE — 700102 HCHG RX REV CODE 250 W/ 637 OVERRIDE(OP): Performed by: STUDENT IN AN ORGANIZED HEALTH CARE EDUCATION/TRAINING PROGRAM

## 2021-02-14 PROCEDURE — 99233 SBSQ HOSP IP/OBS HIGH 50: CPT | Performed by: INTERNAL MEDICINE

## 2021-02-14 PROCEDURE — 74240 X-RAY XM UPR GI TRC 1CNTRST: CPT

## 2021-02-14 PROCEDURE — 80053 COMPREHEN METABOLIC PANEL: CPT

## 2021-02-14 PROCEDURE — 700102 HCHG RX REV CODE 250 W/ 637 OVERRIDE(OP): Performed by: COLON & RECTAL SURGERY

## 2021-02-14 PROCEDURE — 700105 HCHG RX REV CODE 258: Performed by: COLON & RECTAL SURGERY

## 2021-02-14 PROCEDURE — A9270 NON-COVERED ITEM OR SERVICE: HCPCS | Performed by: INTERNAL MEDICINE

## 2021-02-14 PROCEDURE — 700111 HCHG RX REV CODE 636 W/ 250 OVERRIDE (IP): Performed by: COLON & RECTAL SURGERY

## 2021-02-14 PROCEDURE — 93306 TTE W/DOPPLER COMPLETE: CPT

## 2021-02-14 PROCEDURE — 700102 HCHG RX REV CODE 250 W/ 637 OVERRIDE(OP): Performed by: INTERNAL MEDICINE

## 2021-02-14 PROCEDURE — C9113 INJ PANTOPRAZOLE SODIUM, VIA: HCPCS | Performed by: COLON & RECTAL SURGERY

## 2021-02-14 PROCEDURE — 700105 HCHG RX REV CODE 258: Performed by: INTERNAL MEDICINE

## 2021-02-14 PROCEDURE — A9270 NON-COVERED ITEM OR SERVICE: HCPCS | Performed by: NURSE PRACTITIONER

## 2021-02-14 PROCEDURE — 700111 HCHG RX REV CODE 636 W/ 250 OVERRIDE (IP): Performed by: STUDENT IN AN ORGANIZED HEALTH CARE EDUCATION/TRAINING PROGRAM

## 2021-02-14 PROCEDURE — 700101 HCHG RX REV CODE 250: Performed by: STUDENT IN AN ORGANIZED HEALTH CARE EDUCATION/TRAINING PROGRAM

## 2021-02-14 PROCEDURE — 99232 SBSQ HOSP IP/OBS MODERATE 35: CPT | Performed by: STUDENT IN AN ORGANIZED HEALTH CARE EDUCATION/TRAINING PROGRAM

## 2021-02-14 PROCEDURE — 700117 HCHG RX CONTRAST REV CODE 255: Performed by: NURSE PRACTITIONER

## 2021-02-14 PROCEDURE — 84100 ASSAY OF PHOSPHORUS: CPT

## 2021-02-14 PROCEDURE — 700111 HCHG RX REV CODE 636 W/ 250 OVERRIDE (IP): Performed by: INTERNAL MEDICINE

## 2021-02-14 PROCEDURE — 700105 HCHG RX REV CODE 258: Performed by: PHYSICIAN ASSISTANT

## 2021-02-14 PROCEDURE — A9270 NON-COVERED ITEM OR SERVICE: HCPCS | Performed by: COLON & RECTAL SURGERY

## 2021-02-14 RX ORDER — AMLODIPINE BESYLATE 10 MG/1
10 TABLET ORAL
Status: DISCONTINUED | OUTPATIENT
Start: 2021-02-14 | End: 2021-02-15

## 2021-02-14 RX ORDER — POTASSIUM CHLORIDE 20 MEQ/1
40 TABLET, EXTENDED RELEASE ORAL
Status: DISCONTINUED | OUTPATIENT
Start: 2021-02-14 | End: 2021-02-14

## 2021-02-14 RX ORDER — POTASSIUM CHLORIDE 7.45 MG/ML
10 INJECTION INTRAVENOUS
Status: COMPLETED | OUTPATIENT
Start: 2021-02-14 | End: 2021-02-14

## 2021-02-14 RX ORDER — POTASSIUM CHLORIDE 20 MEQ/1
40 TABLET, EXTENDED RELEASE ORAL ONCE
Status: COMPLETED | OUTPATIENT
Start: 2021-02-14 | End: 2021-02-14

## 2021-02-14 RX ORDER — MAGNESIUM SULFATE HEPTAHYDRATE 40 MG/ML
2 INJECTION, SOLUTION INTRAVENOUS ONCE
Status: COMPLETED | OUTPATIENT
Start: 2021-02-14 | End: 2021-02-14

## 2021-02-14 RX ADMIN — ENALAPRILAT 2.5 MG: 1.25 INJECTION INTRAVENOUS at 15:43

## 2021-02-14 RX ADMIN — CEFEPIME 2 G: 2 INJECTION, POWDER, FOR SOLUTION INTRAVENOUS at 21:52

## 2021-02-14 RX ADMIN — ACETAMINOPHEN 1000 MG: 500 TABLET ORAL at 05:31

## 2021-02-14 RX ADMIN — FLUCONAZOLE 400 MG: 200 TABLET ORAL at 05:32

## 2021-02-14 RX ADMIN — POTASSIUM CHLORIDE 10 MEQ: 7.46 INJECTION, SOLUTION INTRAVENOUS at 09:42

## 2021-02-14 RX ADMIN — METRONIDAZOLE 500 MG: 500 INJECTION, SOLUTION INTRAVENOUS at 13:57

## 2021-02-14 RX ADMIN — POTASSIUM CHLORIDE 40 MEQ: 1500 TABLET, EXTENDED RELEASE ORAL at 09:41

## 2021-02-14 RX ADMIN — ENOXAPARIN SODIUM 40 MG: 40 INJECTION SUBCUTANEOUS at 05:32

## 2021-02-14 RX ADMIN — CEFEPIME 2 G: 2 INJECTION, POWDER, FOR SOLUTION INTRAVENOUS at 05:31

## 2021-02-14 RX ADMIN — IOHEXOL 40 ML: 350 INJECTION, SOLUTION INTRAVENOUS at 15:00

## 2021-02-14 RX ADMIN — SODIUM CHLORIDE: 9 INJECTION, SOLUTION INTRAVENOUS at 00:30

## 2021-02-14 RX ADMIN — PANTOPRAZOLE SODIUM 8 MG/HR: 40 INJECTION, POWDER, FOR SOLUTION INTRAVENOUS at 06:10

## 2021-02-14 RX ADMIN — PANTOPRAZOLE SODIUM 8 MG/HR: 40 INJECTION, POWDER, FOR SOLUTION INTRAVENOUS at 20:11

## 2021-02-14 RX ADMIN — POTASSIUM CHLORIDE 10 MEQ: 7.46 INJECTION, SOLUTION INTRAVENOUS at 12:09

## 2021-02-14 RX ADMIN — RIFAMPIN 300 MG: 300 CAPSULE ORAL at 16:53

## 2021-02-14 RX ADMIN — METRONIDAZOLE 500 MG: 500 INJECTION, SOLUTION INTRAVENOUS at 05:32

## 2021-02-14 RX ADMIN — RIFAMPIN 300 MG: 300 CAPSULE ORAL at 05:31

## 2021-02-14 RX ADMIN — HYDROMORPHONE HYDROCHLORIDE 1 MG: 1 INJECTION, SOLUTION INTRAMUSCULAR; INTRAVENOUS; SUBCUTANEOUS at 16:53

## 2021-02-14 RX ADMIN — ACETAMINOPHEN 1000 MG: 500 TABLET ORAL at 16:53

## 2021-02-14 RX ADMIN — POTASSIUM CHLORIDE 10 MEQ: 7.46 INJECTION, SOLUTION INTRAVENOUS at 11:06

## 2021-02-14 RX ADMIN — AMLODIPINE BESYLATE 10 MG: 10 TABLET ORAL at 19:15

## 2021-02-14 RX ADMIN — CEFEPIME 2 G: 2 INJECTION, POWDER, FOR SOLUTION INTRAVENOUS at 13:57

## 2021-02-14 RX ADMIN — METRONIDAZOLE 500 MG: 500 INJECTION, SOLUTION INTRAVENOUS at 22:26

## 2021-02-14 RX ADMIN — MAGNESIUM SULFATE 2 G: 2 INJECTION INTRAVENOUS at 13:08

## 2021-02-14 ASSESSMENT — ENCOUNTER SYMPTOMS
MUSCULOSKELETAL NEGATIVE: 1
CONSTITUTIONAL NEGATIVE: 1
BACK PAIN: 0
PALPITATIONS: 0
RESPIRATORY NEGATIVE: 1
NAUSEA: 0
CHILLS: 0
EYES NEGATIVE: 1
FEVER: 0
NECK PAIN: 0
HEADACHES: 0
NEUROLOGICAL NEGATIVE: 1
ABDOMINAL PAIN: 0
CARDIOVASCULAR NEGATIVE: 1
VOMITING: 0
SHORTNESS OF BREATH: 0
HEARTBURN: 0
DIARRHEA: 0
ABDOMINAL PAIN: 1
COUGH: 0
CONSTIPATION: 0

## 2021-02-14 ASSESSMENT — PAIN DESCRIPTION - PAIN TYPE
TYPE: ACUTE PAIN

## 2021-02-14 NOTE — PROGRESS NOTES
[facility] Medicine Daily Progress Note    Date of Service  2/14/2021    Chief Complaint  56 y.o. female admitted 2/11/2021 with perforated viscus from gastric ulcer.    Hospital Course  56 y.o. female admitted 2/11/2021 with perforated viscus from gastric ulcer.  She is S/p Laparoscopic washout, repair and omental patch of gastrojejunal marginal ulcer.  Blood cultures growing MSSA.    Interval Problem Update  Patient seen and examined at bedside this morning.  No acute events overnight.     - She is S/p Laparoscopic washout, repair and omental patch of gastrojejunal marginal ulcer. NG tube clamped this morning. Remains on PPI drip. General surgery primary.  - Blood cultures growing MSSA. Antibiotics adjusted. Repeat blood cultures ordered. ID consulted. 2D echo pending.    Consultants/Specialty  General surgery  ID    Code Status  Full Code    Disposition  Pending.    Review of Systems  Review of Systems   Constitutional: Negative.    HENT: Negative.    Eyes: Negative.    Respiratory: Negative.    Cardiovascular: Negative.    Gastrointestinal: Positive for abdominal pain. Negative for nausea and vomiting.   Genitourinary: Negative.    Musculoskeletal: Negative.    Skin: Negative.    Neurological: Negative.         Physical Exam  Temp:  [36.1 °C (97 °F)-37.6 °C (99.7 °F)] 37 °C (98.6 °F)  Pulse:  [] 93  Resp:  [18-19] 18  BP: (126-150)/(72-94) 150/94  SpO2:  [93 %-96 %] 93 %    Physical Exam  Constitutional:       Appearance: She is obese.   HENT:      Head: Normocephalic.      Nose:      Comments: NG tube in place.  Eyes:      Extraocular Movements: Extraocular movements intact.      Conjunctiva/sclera: Conjunctivae normal.   Cardiovascular:      Rate and Rhythm: Normal rate.      Heart sounds: Normal heart sounds.   Pulmonary:      Effort: Pulmonary effort is normal.      Breath sounds: Normal breath sounds.   Abdominal:      General: There is distension.      Palpations: Abdomen is soft.      Tenderness:  There is abdominal tenderness. There is no guarding or rebound.      Comments: Abdomen mildly distended and tender on palpation.  Hypoactive bowel sounds noted.   ELLE drain in place. Incisions with dermabond.    Musculoskeletal:         General: No swelling or tenderness.      Cervical back: Neck supple.   Skin:     General: Skin is warm.      Findings: Rash present.   Neurological:      General: No focal deficit present.      Mental Status: She is alert and oriented to person, place, and time.   Psychiatric:         Mood and Affect: Mood normal.         Behavior: Behavior normal.         Fluids    Intake/Output Summary (Last 24 hours) at 2/14/2021 1102  Last data filed at 2/14/2021 1042  Gross per 24 hour   Intake 1500 ml   Output 1740 ml   Net -240 ml       Laboratory  Recent Labs     02/12/21  0035 02/13/21  0326 02/14/21  0732   WBC 16.5* 15.7* 11.8*   RBC 4.92 4.57 4.28   HEMOGLOBIN 14.2 13.0 12.4   HEMATOCRIT 44.8 43.1 40.0   MCV 91.1 94.3 93.5   MCH 28.9 28.4 29.0   MCHC 31.7* 30.2* 31.0*   RDW 44.7 47.5 44.5   PLATELETCT 340 281 299   MPV 8.7* 9.6 8.7*     Recent Labs     02/13/21  0326 02/14/21  0455 02/14/21  0732   SODIUM 138 135 134*   POTASSIUM 4.3 3.9 3.2*   CHLORIDE 105 103 101   CO2 22 21 21   GLUCOSE 91 69 87   BUN 11 8 8   CREATININE 0.48* 0.32* 0.35*   CALCIUM 9.2 8.6 8.7                   Imaging  EC-ECHOCARDIOGRAM COMPLETE W/O CONT    (Results Pending)   IR-US GUIDED PIV    (Results Pending)        Assessment/Plan  * Perforated viscus- (present on admission)  Assessment & Plan  S/p Laparoscopic washout, repair and omental patch of gastrojejunal marginal ulcer.      General surgery following.  PPI drip.  On IV antibiotics-Flagyl, fluconazole, cefepime and rifampin.  NG tube clamped  Advance diet as tolerated.  Avoid NSAID Use  Pain control.    MSSA bacteremia  Assessment & Plan  Blood cultures growing MSSA.  Unclear source at this time.    IV antibiotics- cefepime.  Also on  Flagyl/rifampin/fluconazole for recent bowel perf.  ID following.  Repeat blood cultures.  2D echo pending.    Sepsis ([facility])  Assessment & Plan  This is Sepsis Present on admission  SIRS criteria identified on my evaluation include: Tachycardia, with heart rate greater than 90 BPM and Leukocytosis, with WBC greater than 12,000  Source is Perforated Viscuous  Sepsis protocol initiated  Fluid resuscitation ordered per protocol  IV antibiotics as appropriate for source of sepsis  While organ dysfunction may be noted elsewhere in this problem list or in the chart, degree of organ dysfunction does not meet CMS criteria for severe sepsis    Blood cultures growing MSSA bacteremia.  On IV antibiotics.    Type II diabetes ([facility])- (present on admission)  Assessment & Plan  History of type 2 diabetes.  Diet controlled.  Insulin sliding scale as needed.       VTE prophylaxis: Lovenox

## 2021-02-14 NOTE — PROGRESS NOTES
Report received. Assessment complete.  AAOx4. Patient calls appropriately.  Pt denies pain. No interventions needed at this time. Will continue to monitor.   Pt denies N/V, SOB, or chest pain.  CHASE, independently ambulatory.  + void, LBM PTA  Pt is tolerating NPO diet.   NG tube in place to L nare.  Abd ELLE in place, draining serosanguineous fluid  x4 abd lap sites, PATY dermabonded.       Plan of care discussed with patient. Fall precautions in place. Belongings and call light within reach. Educated patient to call for assistance as needed. All needs met at this time.

## 2021-02-14 NOTE — PROGRESS NOTES
Surgical Progress Note    Author: MINGO Mcknight Date & Time created: 2021   8:01 AM     Interval Events:  POD#3 Laparoscopic washout, repair and omental patch of gastrojejunal marginal ulcer     Feeling subjectively better today.  Pain controlled today.  NG tube to wall suction.  2.5LNC.  Howe removed yesterday, voiding well.  ELLE drain with mostly serous output at this point.  Blood cultures growing MSSA, Abx changed by ID.  She has been up ambulating the unit with the NG tube clamped during that time.  Tolerating water well without issue, no gastric contents in ELLE output.     Review of Systems   Constitutional: Negative for chills and fever.   Respiratory: Negative for cough and shortness of breath.    Cardiovascular: Negative for chest pain and palpitations.   Gastrointestinal: Positive for abdominal pain. Negative for diarrhea, heartburn, nausea and vomiting.   Genitourinary: Negative for dysuria.     Hemodynamics:  Temp (24hrs), Av.8 °C (98.2 °F), Min:36.1 °C (97 °F), Max:37.6 °C (99.7 °F)  Temperature: 36.1 °C (97 °F)  Pulse  Av.4  Min: 67  Max: 126   Blood Pressure: 141/79     Respiratory:    Respiration: 19, Pulse Oximetry: 93 %        RUL Breath Sounds: Clear, RML Breath Sounds: Clear, RLL Breath Sounds: Diminished, REZA Breath Sounds: Clear, LLL Breath Sounds: Diminished  Neuro:  GCS       Fluids:    Intake/Output Summary (Last 24 hours) at 2021 0801  Last data filed at 2021 0601  Gross per 24 hour   Intake 1400 ml   Output 1410 ml   Net -10 ml        Current Diet Order   Procedures   • Diet NPO     Physical Exam  Constitutional:       General: She is not in acute distress.     Appearance: She is obese.   HENT:      Head: Normocephalic and atraumatic.      Mouth/Throat:      Mouth: Mucous membranes are moist.   Eyes:      Conjunctiva/sclera: Conjunctivae normal.   Cardiovascular:      Rate and Rhythm: Normal rate and regular rhythm.   Pulmonary:      Effort: Pulmonary  effort is normal.   Abdominal:      General: There is no distension.      Palpations: There is no mass.      Tenderness: There is abdominal tenderness ( incisional, epigastric area). There is no guarding or rebound.   Musculoskeletal:      Cervical back: Normal range of motion and neck supple.   Skin:     General: Skin is warm and dry.   Neurological:      Mental Status: She is alert and oriented to person, place, and time.   Psychiatric:         Mood and Affect: Mood normal.         Behavior: Behavior normal.         Judgment: Judgment normal.       Labs:  Recent Results (from the past 24 hour(s))   Basic Metabolic Panel    Collection Time: 02/14/21  4:55 AM   Result Value Ref Range    Sodium 135 135 - 145 mmol/L    Potassium 3.9 3.6 - 5.5 mmol/L    Chloride 103 96 - 112 mmol/L    Co2 21 20 - 33 mmol/L    Glucose 69 65 - 99 mg/dL    Bun 8 8 - 22 mg/dL    Creatinine 0.32 (L) 0.50 - 1.40 mg/dL    Calcium 8.6 8.5 - 10.5 mg/dL    Anion Gap 11.0 7.0 - 16.0   MAGNESIUM    Collection Time: 02/14/21  4:55 AM   Result Value Ref Range    Magnesium 1.9 1.5 - 2.5 mg/dL   PHOSPHORUS    Collection Time: 02/14/21  4:55 AM   Result Value Ref Range    Phosphorus 2.2 (L) 2.5 - 4.5 mg/dL   ESTIMATED GFR    Collection Time: 02/14/21  4:55 AM   Result Value Ref Range    GFR If African American >60 >60 mL/min/1.73 m 2    GFR If Non African American >60 >60 mL/min/1.73 m 2     Medical Decision Making, by Problem:  Active Hospital Problems    Diagnosis    • Perforated viscus [R19.8]      Priority: High   • MSSA bacteremia [R78.81, B95.61]      Priority: High   • Sepsis (HCC) [A41.9]    • Type II diabetes (HCC) [E11.9]      Plan:  Continue IV abx per medicine and ID recommendations. VSS.  BMP unremarkable, CBC pending this AM, will review later when available. Educated to use IS and ambulate halls today with NG clamped.  Continue IV PPI and hydration will decrease rate due to positive net fluid balance. Keep NG in place.  Keep ELLE in place,  primarily serous output.  Ok to drink water and popsicles as tolerated. Appreciate medicine and ID help with management.        Quality Measures:  Quality-Core Measures   Reviewed items::  Labs reviewed and Medications reviewed  Howe catheter::  No Howe  DVT prophylaxis pharmacological::  Enoxaparin (Lovenox)  DVT prophylaxis - mechanical:  SCDs  Ulcer Prophylaxis::  Yes  Antibiotics:  Treating active infection/contamination beyond 24 hours perioperative coverage      Discussed patient condition with RN, Patient and Dr Cox

## 2021-02-14 NOTE — PROGRESS NOTES
Infectious Disease Progress Note    Author: Emily Zarate M.D. Date & Time of service: 2021  11:35 AM    Chief Complaint:  MSSA bacteremia, peritonitis    Interval History:      Review of Systems:  Review of Systems   Constitutional: Positive for malaise/fatigue. Negative for chills and fever.   Respiratory: Negative for cough and shortness of breath.    Gastrointestinal: Negative for abdominal pain, constipation, diarrhea, nausea and vomiting.   Musculoskeletal: Positive for joint pain. Negative for back pain and neck pain.   Neurological: Negative for headaches.       Hemodynamics:  Temp (24hrs), Av.8 °C (98.3 °F), Min:36.1 °C (97 °F), Max:37.6 °C (99.7 °F)  Temperature: 37 °C (98.6 °F)  Pulse  Av.4  Min: 67  Max: 126   Blood Pressure: 150/94       Physical Exam:  Physical Exam  Constitutional:       Appearance: Normal appearance.   Cardiovascular:      Rate and Rhythm: Normal rate and regular rhythm.      Heart sounds: Normal heart sounds.   Pulmonary:      Effort: Pulmonary effort is normal.      Breath sounds: Normal breath sounds.   Abdominal:      General: Abdomen is flat. There is no distension.      Palpations: Abdomen is soft.      Tenderness: There is no abdominal tenderness. There is no guarding or rebound.      Comments: Abdominal drain in place with serosanguineous fluid   Musculoskeletal:         General: Normal range of motion.      Right lower leg: Edema present.      Left lower leg: Edema present.   Skin:     General: Skin is warm and dry.   Neurological:      General: No focal deficit present.      Mental Status: She is alert and oriented to person, place, and time.   Psychiatric:         Mood and Affect: Mood normal.         Behavior: Behavior normal.         Meds:    Current Facility-Administered Medications:   •  PEDS potassium chloride (KCL-PERIPHERAL) IV  •  magnesium sulfate  •  cefepime  •  fluconazole  •  riFAMPin  •  Pharmacy Consult Request  •  HYDROmorphone  •   NS  •  metroNIDAZOLE (FLAGYL) IV  •  LR  •  ondansetron  •  haloperidol lactate  •  promethazine  •  diphenhydrAMINE  •  enoxaparin  •  [COMPLETED] acetaminophen **FOLLOWED BY** acetaminophen **FOLLOWED BY** [START ON 2/16/2021] acetaminophen  •  enalaprilat  •  pantoprazole (PROTONIX) infusion    Labs:  Recent Labs     02/11/21  1849 02/12/21 0035 02/13/21 0326 02/14/21  0732   WBC 18.4* 16.5* 15.7* 11.8*   RBC 5.33 4.92 4.57 4.28   HEMOGLOBIN 15.5 14.2 13.0 12.4   HEMATOCRIT 48.0* 44.8 43.1 40.0   MCV 90.1 91.1 94.3 93.5   MCH 29.1 28.9 28.4 29.0   RDW 43.9 44.7 47.5 44.5   PLATELETCT 386 340 281 299   MPV 8.7* 8.7* 9.6 8.7*   NEUTSPOLYS 88.00*  --   --  76.40*   LYMPHOCYTES 5.40*  --   --  13.30*   MONOCYTES 5.90  --   --  7.70   EOSINOPHILS 0.00  --   --  1.70   BASOPHILS 0.10  --   --  0.30     Recent Labs     02/13/21 0326 02/14/21  0455 02/14/21  0732   SODIUM 138 135 134*   POTASSIUM 4.3 3.9 3.2*   CHLORIDE 105 103 101   CO2 22 21 21   GLUCOSE 91 69 87   BUN 11 8 8     Recent Labs     02/12/21 0035 02/13/21 0326 02/14/21  0455 02/14/21  0732   ALBUMIN 3.4 3.1*  --  2.9*   TBILIRUBIN 1.6* 0.9  --  1.6*   ALKPHOSPHAT 112* 102*  --  107*   TOTPROTEIN 6.6 6.6  --  6.0   ALTSGPT 97* 63*  --  47   ASTSGOT 92* 33  --  31   CREATININE 0.67 0.48* 0.32* 0.35*       Imaging:  CT-ABDOMEN-PELVIS WITH    Result Date: 2/11/2021 2/11/2021 4:21 PM HISTORY/REASON FOR EXAM:  Abdominal pain nausea TECHNIQUE/EXAM DESCRIPTION: CT scan of the abdomen and pelvis with contrast. Contrast-enhanced helical scanning was obtained from the diaphragmatic domes through the pubic symphysis following the bolus administration of 100 mL of Optiray 350 nonionic contrast without complication. Low dose optimization technique was utilized for this CT exam including automated exposure control and adjustment of the mA and/or kV according to patient size. COMPARISON: No prior studies available. FINDINGS: CT Abdomen: Several areas of linear and  poorly defined opacification are noted in each lung base. No dependent free air and multiple sites of extraluminal air are noted scattered throughout the upper abdomen. Postcholecystectomy changes are noted. Postoperative changes are noted in the stomach. Gastrojejunostomy is noted. No focal extravasation of oral contrast is appreciated. Some air is noted in the gallbladder fossa but volume of air is greater in the left upper quadrant. The liver is normal in appearance. The spleen is normal. The kidneys are normal. The adrenal glands are normal. The pancreas is normal. The aorta is normal in caliber.  No evidence of retroperitoneal adenopathy. CT Pelvis: There is no evidence of bowel obstruction or inflammatory change. Small amount of pelvic free peritoneal fluid is identified. Post hysterectomy changes appear to be present.     1.  Extraluminal air and free air are noted throughout the upper abdomen. Extraluminal air could be related to recent surgery although perforated viscus is also a possibility. 2.  Post cholecystectomy. No fluid collection noted in the gallbladder fossa. Small amount of air is noted in the gallbladder fossa. 3.  Postsurgical changes are noted in the stomach. 4.  Postsurgical changes are noted in the lumbar spine. 5.  Small amount of free fluid is noted in the pelvis. These findings were discussed with MOHAN NAVA on 02/11/2021.     IR-US GUIDED PIV    Result Date: 2/14/2021  EXAMINATION:                                                                    HISTORY/REASON FOR EXAM:  Ultrasound Guided PIV.  TECHNIQUE/EXAM DESCRIPTION AND NUMBER OF VIEWS:  Peripheral IV insertion with ultrasound guidance.  The procedure was prepared using maximal sterile barrier technique including sterile gown, mask, cap, and donning of sterile gloves following appropriate hand hygiene and/or sterile scrub. Patient skin site was prepped with 2% Chlorhexidine solution.   FINDINGS: Peripheral IV insertion with  Ultrasound Guidance was performed by qualified imaging nursing staff without the assistance of a Radiologist.      Ultrasound-guided PERIPHERAL IV INSERTION performed by qualified nursing staff as above.      Micro:  Results     Procedure Component Value Units Date/Time    CULTURE WOUND W/ GRAM STAIN [[id number]]  (Abnormal)  (Susceptibility) Collected: 02/11/21 2042    Order Status: Completed Specimen: Wound Updated: 02/14/21 1114     Significant Indicator POS     Source WND     Site Gastric Ulcer Fluid     Culture Result -     Gram Stain Result Many WBCs.  Few Gram positive cocci.  Rare Gram negative rods.       Culture Result Proteus mirabilis  Rare growth      Narrative:      Surgery Specimen    Susceptibility     Proteus mirabilis (1)     Antibiotic Interpretation Microscan Method Status    Ampicillin Sensitive <=8 mcg/mL PETRONA Preliminary    Ceftriaxone Sensitive <=1 mcg/mL PETRONA Preliminary    Ceftazidime Sensitive <=1 mcg/mL PETRONA Preliminary    Cefotaxime Sensitive <=2 mcg/mL PETRONA Preliminary    Cefazolin Sensitive <=2 mcg/mL PETRONA Preliminary    Ciprofloxacin Sensitive <=0.25 mcg/mL PETRONA Preliminary    Cefepime Sensitive <=2 mcg/mL PETRONA Preliminary    Tobramycin Sensitive <=2 mcg/mL PETRONA Preliminary    Cefuroxime Sensitive <=4 mcg/mL PETRONA Preliminary    Ertapenem Sensitive <=0.5 mcg/mL PETRONA Preliminary    Gentamicin Sensitive <=2 mcg/mL PETRONA Preliminary    Moxifloxacin Sensitive <=2 mcg/mL PETRONA Preliminary    Pip/Tazobactam Sensitive <=8 mcg/mL PETRONA Preliminary    Trimeth/Sulfa Sensitive <=0.5/9.5 mcg/mL PETRONA Preliminary                   Anaerobic Culture [[id number]] Collected: 02/11/21 2042    Order Status: Completed Specimen: Wound Updated: 02/14/21 1114     Significant Indicator NEG     Source WND     Site Gastric Ulcer Fluid     Culture Result Culture in progress.    Narrative:      Surgery Specimen    Fungal Culture [[id number]] Collected: 02/11/21 2042    Order Status: Completed Specimen: Wound Updated: 02/14/21 1114  "    Significant Indicator NEG     Source WND     Site Gastric Ulcer Fluid     Culture Result No fungal growth to date.    Narrative:      Surgery Specimen    BLOOD CULTURE [[id number]]  (Abnormal) Collected: 02/11/21 1810    Order Status: Completed Specimen: Blood from Peripheral Updated: 02/14/21 0732     Significant Indicator POS     Source BLD     Site PERIPHERAL     Culture Result Growth detected by Bactec instrument. 02/12/2021  11:05      Staphylococcus aureus  See previous culture for sensitivity report.      Narrative:      CALL  [facility]  141 tel. [phone],  CALLED  141 tel. [phone] 02/12/2021, 11:09, RB PERF. RESULTS CALLED TO:  [id number] RN  2 of 2 blood culture x2  Sites order. Per Hospital Policy:  Only change Specimen Src: to \"Line\" if specified by physician  order.  Right AC    BLOOD CULTURE [[id number]]  (Abnormal)  (Susceptibility) Collected: 02/11/21 1800    Order Status: Completed Specimen: Blood from Peripheral Updated: 02/14/21 0731     Significant Indicator POS     Source BLD     Site PERIPHERAL     Culture Result Growth detected by Bactec instrument. 02/12/2021  11:05  Staphylococcus aureus (methicillin sensitive)  detected by PCR.        Staphylococcus aureus    Narrative:      CALL  [facility]  141 tel. [phone],  CALLED  141 tel. [phone] 02/12/2021, 11:09, RB PERF. RESULTS CALLED TO:  [id number] RN and Pancho Singlteon  1 of 2 for Blood Culture x 2 sites order. Per Hospital  Policy: Only change Specimen Src: to \"Line\" if specified by  physician order.  Right AC    Susceptibility     Staphylococcus aureus (1)     Antibiotic Interpretation Microscan Method Status    Azithromycin Sensitive <=2 mcg/mL PETRONA Final    Clindamycin Sensitive 0.5 mcg/mL PETRONA Final    Cefotaxime Sensitive <=8 mcg/mL PETRONA Final    Cefazolin Sensitive <=8 mcg/mL PETRONA Final    Cefepime Sensitive <=4 mcg/mL PETRONA Final    Ceftaroline Sensitive <=0.5 mcg/mL PETRONA Final    Daptomycin Sensitive <=0.5 mcg/mL PETRONA Final    Ampicillin/sulbactam " "Sensitive <=8/4 mcg/mL PETRONA Final    Erythromycin Sensitive 0.5 mcg/mL PETRONA Final    Vancomycin Sensitive 1 mcg/mL PETRONA Final    Oxacillin Sensitive <=0.25 mcg/mL PETRONA Final    Penicillin Resistant >2 mcg/mL PETRONA Final    Pip/Tazobactam Sensitive <=8 mcg/mL PETRONA Final    Trimeth/Sulfa Sensitive <=0.5/9.5 mcg/mL PETRONA Final    Tetracycline Sensitive <=4 mcg/mL PETRONA Final                   BLOOD CULTURE [[id number]] Collected: 02/12/21 1718    Order Status: Completed Specimen: Blood from Peripheral Updated: 02/13/21 0634     Significant Indicator NEG     Source BLD     Site PERIPHERAL     Culture Result No Growth  Note: Blood cultures are incubated for 5 days and  are monitored continuously.Positive blood cultures  are called to the RN and reported as soon as  they are identified.      Narrative:      Per Hospital Policy: Only change Specimen Src: to \"Line\" if  specified by physician order.  Left Forearm/Arm    BLOOD CULTURE [[id number]] Collected: 02/12/21 1718    Order Status: Completed Specimen: Blood from Peripheral Updated: 02/13/21 0634     Significant Indicator NEG     Source BLD     Site PERIPHERAL     Culture Result No Growth  Note: Blood cultures are incubated for 5 days and  are monitored continuously.Positive blood cultures  are called to the RN and reported as soon as  they are identified.      Narrative:      Per Hospital Policy: Only change Specimen Src: to \"Line\" if  specified by physician order.  Left Hand    GRAM STAIN [[id number]] Collected: 02/11/21 2042    Order Status: Completed Specimen: Wound Updated: 02/12/21 0604     Significant Indicator .     Source WND     Site Gastric Ulcer Fluid     Gram Stain Result Many WBCs.  Few Gram positive cocci.  Rare Gram negative rods.      Narrative:      Surgery Specimen    SARS-CoV-2 PCR (24 hour In-House): Collect NP swab in [facility] [[id number]] Collected: 02/11/21 1935    Order Status: Completed Specimen: Respirate from Nasopharyngeal Updated: 02/11/21 2249     SARS-CoV-2 " "Source NP Swab     SARS-CoV-2 by PCR NotDetected     Comment: PATIENTS: Important information regarding your results and instructions can  be found at [email or web address]   \"After your  Covid-19 Test\"  [facility] providers: PLEASE REFER TO DE-ESCALATION AND RETESTING PROTOCOL  on [email or web address]  **The TaqPath COVID-19 SARS-CoV-2 test has been made available for use under  the Emergency Use Authorization (EUA) only.         Narrative:      Have you been in close contact with a person who is suspected  or known to be positive for COVID-19 within the last 30 days  (e.g. last seen that person < 30 days ago)->Unknown          Assessment:  Active Hospital Problems    Diagnosis    • *Perforated viscus [R19.8]    • MSSA bacteremia [R78.81, B95.61]    • Sepsis (HCC) [A41.9]    • Type II diabetes (HCC) [E11.9]      Interval 24 hours:      AF, O2 1 L NC, improved   Labs reviewed  Micro reviewed    Patient with no abdominal pain, still n.p.o. with NG tube in place.  No new neck back or joint pain.  She does have some occasional pain in her left shoulder which has a prior repair.  No change from her usual.  Patient continued on antibiotics as below.    Assessment:  56 y.o.  admitted 2/11/2021. Pt has a past medical history of prior Jourdan-en-Y gastric bypass, pulmonary hypertension, diabetes mellitus and biliary colic.  She underwent laparoscopic cholecystectomy on 1/13/2021.  She has chronic abdominal pain and had been taking NSAIDs thought to result in ulceration.  She developed severe abdominal pain on 2/11/2020 and presented to the ER.  CT was done with finding of pneumoperitoneum and peritonitis.  She went back to the OR on 2/11/2021 for laparoscopic washout for perforated gastrojejunal marginal ulcer with omental patch placed.  Blood cultures were obtained and are positive for MSSA.  Patient has been treated with Flagyl fluconazole and ceftriaxone.      Problem List      MSSA - bacteremia " potentially due to ulceration  -Blood cultures on 2/11 2/2 + MSSA  -Blood cultures on 2/12 are so far no growth  -Hardware in spine, both knees have been replaced  Perforated viscus (gastric ulcer) , ulcer thought due to NSAIDs  -OR on 2/11 for repair  -Or cultures were obtained of wood in the gastric ulcer +GPC's,GNRs- Proteus mirabilis   Peritonitis with pneumoperitoneum, 2/2 above  Leukocytosis, ongoing, improved   Antibiotic allergies: Penicillin allergy at age 1 reported as anaphylaxis.  Patient has tolerated cephalosporins.  Unlikely that a reaction in childhood is still present in adulthood so may challenge if pt agreeable  Diabetes mellitus  History of gastric bypass  Antibiotic allergies, penicillin allergy as a child -she states she recently had some amoxicillin on her fingers after giving her grandson a dose that she licked off and had resulting abdominal cramping and rash so was unwilling to challenge her penicillin allergy     Plan      ---  Continue cefepime as prefer broader coverage for now given perforated bowel and peritonitis (tolerating cephalosporins and penicillin allergy limiting ability to de-escalate) continue Flagyl and fluconazole - due to presence of hardware will also add rifampin 300 mg twice daily -final antibiotic selection duration to be determined.  The Proteus is broadly susceptible so may build to transition back to cefazolin soon.  --- Follow-up gastric ulcer fluid cultures from OR to final   --- Follow-up repeat blood cultures, no growth to date  ---  Awaiting echocardiogram   --- Patient states the plan is for repeat EGD in the next few days to ensure no ongoing bowel leakage.  Upper GI series ordered for today.  --- Monitor for any new neck back or joint pain and image appropriately  --- Monitor labs  --- Any worsening abdominal pain, new fevers or significant elevation white count would reimage abdomen  --- Monitor and control blood sugars     Will continue to follow

## 2021-02-14 NOTE — PROGRESS NOTES
Lab notified RN that blood specimen received was clotted before lab could process. Lab will re-draw blood from pt.

## 2021-02-14 NOTE — PROGRESS NOTES
Assumed care of patient at 0645. Bedside report received. Assessment complete.  AA&Ox4. Denies CP/SOB.  Reporting moderate pain well controlled per MAR.  Educated patient regarding pharmacologic and non pharmacologic modalities for pain management.  ELLE, CDI, to bulb suction. Lap sites x4 well approximated with dermabond. NG to L nare to suction.  Tolerating sips and ice chips. Denies N/V.  + void. Last BM PTA. + flatus  Pt ambulates independently after set up.  All needs met at this time. Call light within reach. Pt calls appropriately. Bed low and locked, non skid socks in place. Hourly rounding in place.

## 2021-02-14 NOTE — CARE PLAN
Problem: Communication  Goal: The ability to communicate needs accurately and effectively will improve  Outcome: PROGRESSING AS EXPECTED  Note: Reviewed POC with patient. All questions and concerns addressed at this time.       Problem: Safety  Goal: Will remain free from injury  Outcome: PROGRESSING AS EXPECTED  Note: Patient reoriented to fall precautions. Using call light appropriately. Bed low/locked, non skid socks in place. Patient remains free from injury      Problem: Bowel/Gastric:  Goal: Normal bowel function is maintained or improved  Outcome: PROGRESSING AS EXPECTED  Note: Ambulating frequently. Multiple BM's

## 2021-02-15 LAB
ALBUMIN SERPL BCP-MCNC: 3.4 G/DL (ref 3.2–4.9)
ALBUMIN/GLOB SERPL: 1 G/DL
ALP SERPL-CCNC: 118 U/L (ref 30–99)
ALT SERPL-CCNC: 32 U/L (ref 2–50)
ANION GAP SERPL CALC-SCNC: 18 MMOL/L (ref 7–16)
AST SERPL-CCNC: 18 U/L (ref 12–45)
BACTERIA SPEC ANAEROBE CULT: NORMAL
BACTERIA WND AEROBE CULT: ABNORMAL
BASOPHILS # BLD AUTO: 0.2 % (ref 0–1.8)
BASOPHILS # BLD: 0.03 K/UL (ref 0–0.12)
BILIRUB SERPL-MCNC: 2.4 MG/DL (ref 0.1–1.5)
BUN SERPL-MCNC: 5 MG/DL (ref 8–22)
CALCIUM SERPL-MCNC: 9 MG/DL (ref 8.5–10.5)
CHLORIDE SERPL-SCNC: 94 MMOL/L (ref 96–112)
CO2 SERPL-SCNC: 23 MMOL/L (ref 20–33)
CREAT SERPL-MCNC: 0.39 MG/DL (ref 0.5–1.4)
EOSINOPHIL # BLD AUTO: 0.15 K/UL (ref 0–0.51)
EOSINOPHIL NFR BLD: 1.2 % (ref 0–6.9)
ERYTHROCYTE [DISTWIDTH] IN BLOOD BY AUTOMATED COUNT: 41.7 FL (ref 35.9–50)
GLOBULIN SER CALC-MCNC: 3.4 G/DL (ref 1.9–3.5)
GLUCOSE SERPL-MCNC: 90 MG/DL (ref 65–99)
GRAM STN SPEC: ABNORMAL
HCT VFR BLD AUTO: 43.5 % (ref 37–47)
HGB BLD-MCNC: 14.2 G/DL (ref 12–16)
IMM GRANULOCYTES # BLD AUTO: 0.1 K/UL (ref 0–0.11)
IMM GRANULOCYTES NFR BLD AUTO: 0.8 % (ref 0–0.9)
LV EJECT FRACT  99904: 70
LV EJECT FRACT MOD 2C 99903: 68.1
LV EJECT FRACT MOD 4C 99902: 71.39
LV EJECT FRACT MOD BP 99901: 57.68
LYMPHOCYTES # BLD AUTO: 1.67 K/UL (ref 1–4.8)
LYMPHOCYTES NFR BLD: 13.9 % (ref 22–41)
MAGNESIUM SERPL-MCNC: 1.9 MG/DL (ref 1.5–2.5)
MCH RBC QN AUTO: 29 PG (ref 27–33)
MCHC RBC AUTO-ENTMCNC: 32.6 G/DL (ref 33.6–35)
MCV RBC AUTO: 89 FL (ref 81.4–97.8)
MONOCYTES # BLD AUTO: 1.13 K/UL (ref 0–0.85)
MONOCYTES NFR BLD AUTO: 9.4 % (ref 0–13.4)
NEUTROPHILS # BLD AUTO: 8.93 K/UL (ref 2–7.15)
NEUTROPHILS NFR BLD: 74.5 % (ref 44–72)
NRBC # BLD AUTO: 0 K/UL
NRBC BLD-RTO: 0 /100 WBC
PLATELET # BLD AUTO: 377 K/UL (ref 164–446)
PMV BLD AUTO: 8.6 FL (ref 9–12.9)
POTASSIUM SERPL-SCNC: 2.9 MMOL/L (ref 3.6–5.5)
PROT SERPL-MCNC: 6.8 G/DL (ref 6–8.2)
RBC # BLD AUTO: 4.89 M/UL (ref 4.2–5.4)
SIGNIFICANT IND 70042: ABNORMAL
SIGNIFICANT IND 70042: NORMAL
SITE SITE: ABNORMAL
SITE SITE: NORMAL
SODIUM SERPL-SCNC: 135 MMOL/L (ref 135–145)
SOURCE SOURCE: ABNORMAL
SOURCE SOURCE: NORMAL
WBC # BLD AUTO: 12 K/UL (ref 4.8–10.8)

## 2021-02-15 PROCEDURE — 700101 HCHG RX REV CODE 250: Performed by: STUDENT IN AN ORGANIZED HEALTH CARE EDUCATION/TRAINING PROGRAM

## 2021-02-15 PROCEDURE — 700102 HCHG RX REV CODE 250 W/ 637 OVERRIDE(OP): Performed by: COLON & RECTAL SURGERY

## 2021-02-15 PROCEDURE — A9270 NON-COVERED ITEM OR SERVICE: HCPCS | Performed by: COLON & RECTAL SURGERY

## 2021-02-15 PROCEDURE — 85025 COMPLETE CBC W/AUTO DIFF WBC: CPT

## 2021-02-15 PROCEDURE — 700111 HCHG RX REV CODE 636 W/ 250 OVERRIDE (IP): Performed by: STUDENT IN AN ORGANIZED HEALTH CARE EDUCATION/TRAINING PROGRAM

## 2021-02-15 PROCEDURE — 700102 HCHG RX REV CODE 250 W/ 637 OVERRIDE(OP): Performed by: NURSE PRACTITIONER

## 2021-02-15 PROCEDURE — A9270 NON-COVERED ITEM OR SERVICE: HCPCS | Performed by: STUDENT IN AN ORGANIZED HEALTH CARE EDUCATION/TRAINING PROGRAM

## 2021-02-15 PROCEDURE — 700102 HCHG RX REV CODE 250 W/ 637 OVERRIDE(OP): Performed by: STUDENT IN AN ORGANIZED HEALTH CARE EDUCATION/TRAINING PROGRAM

## 2021-02-15 PROCEDURE — 700102 HCHG RX REV CODE 250 W/ 637 OVERRIDE(OP): Performed by: INTERNAL MEDICINE

## 2021-02-15 PROCEDURE — 700105 HCHG RX REV CODE 258: Performed by: INTERNAL MEDICINE

## 2021-02-15 PROCEDURE — A9270 NON-COVERED ITEM OR SERVICE: HCPCS | Performed by: INTERNAL MEDICINE

## 2021-02-15 PROCEDURE — 83735 ASSAY OF MAGNESIUM: CPT

## 2021-02-15 PROCEDURE — 99233 SBSQ HOSP IP/OBS HIGH 50: CPT | Performed by: INTERNAL MEDICINE

## 2021-02-15 PROCEDURE — 770006 HCHG ROOM/CARE - MED/SURG/GYN SEMI*

## 2021-02-15 PROCEDURE — 99232 SBSQ HOSP IP/OBS MODERATE 35: CPT | Performed by: STUDENT IN AN ORGANIZED HEALTH CARE EDUCATION/TRAINING PROGRAM

## 2021-02-15 PROCEDURE — 700111 HCHG RX REV CODE 636 W/ 250 OVERRIDE (IP): Performed by: COLON & RECTAL SURGERY

## 2021-02-15 PROCEDURE — 93306 TTE W/DOPPLER COMPLETE: CPT | Mod: 26 | Performed by: INTERNAL MEDICINE

## 2021-02-15 PROCEDURE — A9270 NON-COVERED ITEM OR SERVICE: HCPCS | Performed by: NURSE PRACTITIONER

## 2021-02-15 PROCEDURE — C9113 INJ PANTOPRAZOLE SODIUM, VIA: HCPCS | Performed by: COLON & RECTAL SURGERY

## 2021-02-15 PROCEDURE — 36415 COLL VENOUS BLD VENIPUNCTURE: CPT

## 2021-02-15 PROCEDURE — 700111 HCHG RX REV CODE 636 W/ 250 OVERRIDE (IP): Performed by: INTERNAL MEDICINE

## 2021-02-15 PROCEDURE — 80053 COMPREHEN METABOLIC PANEL: CPT

## 2021-02-15 PROCEDURE — 700105 HCHG RX REV CODE 258: Performed by: COLON & RECTAL SURGERY

## 2021-02-15 RX ORDER — CEFAZOLIN SODIUM 2 G/100ML
2 INJECTION, SOLUTION INTRAVENOUS EVERY 8 HOURS
Status: DISCONTINUED | OUTPATIENT
Start: 2021-02-15 | End: 2021-02-18 | Stop reason: HOSPADM

## 2021-02-15 RX ORDER — OMEPRAZOLE 20 MG/1
20 CAPSULE, DELAYED RELEASE ORAL 2 TIMES DAILY
Status: DISCONTINUED | OUTPATIENT
Start: 2021-02-15 | End: 2021-02-18 | Stop reason: HOSPADM

## 2021-02-15 RX ORDER — AMLODIPINE BESYLATE 10 MG/1
10 TABLET ORAL
Status: DISCONTINUED | OUTPATIENT
Start: 2021-02-15 | End: 2021-02-18 | Stop reason: HOSPADM

## 2021-02-15 RX ORDER — LOSARTAN POTASSIUM 50 MG/1
50 TABLET ORAL
Status: DISCONTINUED | OUTPATIENT
Start: 2021-02-15 | End: 2021-02-18 | Stop reason: HOSPADM

## 2021-02-15 RX ORDER — POTASSIUM CHLORIDE 20 MEQ/1
40 TABLET, EXTENDED RELEASE ORAL
Status: COMPLETED | OUTPATIENT
Start: 2021-02-15 | End: 2021-02-15

## 2021-02-15 RX ORDER — MAGNESIUM SULFATE HEPTAHYDRATE 40 MG/ML
2 INJECTION, SOLUTION INTRAVENOUS ONCE
Status: COMPLETED | OUTPATIENT
Start: 2021-02-15 | End: 2021-02-15

## 2021-02-15 RX ADMIN — ENOXAPARIN SODIUM 40 MG: 40 INJECTION SUBCUTANEOUS at 05:13

## 2021-02-15 RX ADMIN — MAGNESIUM SULFATE 2 G: 2 INJECTION INTRAVENOUS at 11:51

## 2021-02-15 RX ADMIN — CEFEPIME 2 G: 2 INJECTION, POWDER, FOR SOLUTION INTRAVENOUS at 05:14

## 2021-02-15 RX ADMIN — OMEPRAZOLE 20 MG: 20 CAPSULE, DELAYED RELEASE ORAL at 17:30

## 2021-02-15 RX ADMIN — CEFAZOLIN SODIUM 2 G: 2 INJECTION, SOLUTION INTRAVENOUS at 13:53

## 2021-02-15 RX ADMIN — RIFAMPIN 300 MG: 300 CAPSULE ORAL at 17:30

## 2021-02-15 RX ADMIN — POTASSIUM CHLORIDE 40 MEQ: 1500 TABLET, EXTENDED RELEASE ORAL at 11:51

## 2021-02-15 RX ADMIN — ACETAMINOPHEN 1000 MG: 500 TABLET ORAL at 11:51

## 2021-02-15 RX ADMIN — ACETAMINOPHEN 1000 MG: 500 TABLET ORAL at 17:30

## 2021-02-15 RX ADMIN — FLUCONAZOLE 400 MG: 200 TABLET ORAL at 05:13

## 2021-02-15 RX ADMIN — AMLODIPINE BESYLATE 10 MG: 10 TABLET ORAL at 09:14

## 2021-02-15 RX ADMIN — CEFAZOLIN SODIUM 2 G: 2 INJECTION, SOLUTION INTRAVENOUS at 23:14

## 2021-02-15 RX ADMIN — POTASSIUM CHLORIDE 40 MEQ: 1500 TABLET, EXTENDED RELEASE ORAL at 13:53

## 2021-02-15 RX ADMIN — OMEPRAZOLE 20 MG: 20 CAPSULE, DELAYED RELEASE ORAL at 09:14

## 2021-02-15 RX ADMIN — PANTOPRAZOLE SODIUM 8 MG/HR: 40 INJECTION, POWDER, FOR SOLUTION INTRAVENOUS at 05:45

## 2021-02-15 RX ADMIN — METRONIDAZOLE 500 MG: 500 INJECTION, SOLUTION INTRAVENOUS at 14:46

## 2021-02-15 RX ADMIN — METRONIDAZOLE 500 MG: 500 INJECTION, SOLUTION INTRAVENOUS at 05:45

## 2021-02-15 RX ADMIN — ACETAMINOPHEN 1000 MG: 500 TABLET ORAL at 23:14

## 2021-02-15 RX ADMIN — ACETAMINOPHEN 1000 MG: 500 TABLET ORAL at 05:13

## 2021-02-15 RX ADMIN — RIFAMPIN 300 MG: 300 CAPSULE ORAL at 05:13

## 2021-02-15 RX ADMIN — LOSARTAN POTASSIUM 50 MG: 50 TABLET, FILM COATED ORAL at 17:30

## 2021-02-15 RX ADMIN — METRONIDAZOLE 500 MG: 500 INJECTION, SOLUTION INTRAVENOUS at 23:14

## 2021-02-15 ASSESSMENT — ENCOUNTER SYMPTOMS
HEADACHES: 0
DIARRHEA: 0
NEUROLOGICAL NEGATIVE: 1
CONSTIPATION: 0
COUGH: 0
EYES NEGATIVE: 1
PALPITATIONS: 0
HEARTBURN: 0
NAUSEA: 0
ABDOMINAL PAIN: 0
SHORTNESS OF BREATH: 0
MUSCULOSKELETAL NEGATIVE: 1
CONSTITUTIONAL NEGATIVE: 1
CARDIOVASCULAR NEGATIVE: 1
VOMITING: 0
RESPIRATORY NEGATIVE: 1
ABDOMINAL PAIN: 1
FEVER: 0
BACK PAIN: 0
NECK PAIN: 0
CHILLS: 0

## 2021-02-15 ASSESSMENT — PAIN DESCRIPTION - PAIN TYPE
TYPE: ACUTE PAIN
TYPE: ACUTE PAIN;SURGICAL PAIN

## 2021-02-15 NOTE — PROGRESS NOTES
[facility] Medicine Daily Progress Note    Date of Service  2/15/2021    Chief Complaint  56 y.o. female admitted 2/11/2021 with perforated viscus from gastric ulcer.    Hospital Course  56 y.o. female admitted 2/11/2021 with perforated viscus from gastric ulcer.  She is S/p Laparoscopic washout, repair and omental patch of gastrojejunal marginal ulcer.  Blood cultures growing MSSA. Currently on broad-spectrum antibiotics. ID following for antibiotics management.    Interval Problem Update  Patient seen and examined at bedside this morning.  No acute events overnight.     - She is S/p Laparoscopic washout, repair and omental patch of gastrojejunal marginal ulcer. NG tube removed. ADAT. General surgery is primary.  - Blood cultures growing MSSA. Antibiotics adjusted. Repeat blood cultures ordered. 2D echo with no findings of valvular vegetations.   -ID following and recommending 4 weeks of IV antibiotics Ancef and p.o. rifampin and 2 weeks of p.o. Flagyl.  Needs a midline placed once blood cultures remain negative for at least 48 hours.    Consultants/Specialty  General surgery  ID    Code Status  Full Code    Disposition  Pending.    Review of Systems  Review of Systems   Constitutional: Negative.    HENT: Negative.    Eyes: Negative.    Respiratory: Negative.    Cardiovascular: Negative.    Gastrointestinal: Positive for abdominal pain. Negative for nausea and vomiting.   Genitourinary: Negative.    Musculoskeletal: Negative.    Skin: Negative.    Neurological: Negative.         Physical Exam  Temp:  [36 °C (96.8 °F)-36.9 °C (98.4 °F)] 36 °C (96.8 °F)  Pulse:  [] 114  Resp:  [18-19] 18  BP: (145-177)/() 160/98  SpO2:  [92 %-94 %] 94 %    Physical Exam  Constitutional:       Appearance: She is obese.   HENT:      Head: Normocephalic.   Eyes:      Extraocular Movements: Extraocular movements intact.      Conjunctiva/sclera: Conjunctivae normal.   Cardiovascular:      Rate and Rhythm: Normal rate.      Heart  sounds: Normal heart sounds.   Pulmonary:      Effort: Pulmonary effort is normal.      Breath sounds: Normal breath sounds.   Abdominal:      General: There is distension.      Palpations: Abdomen is soft.      Tenderness: There is no abdominal tenderness. There is no guarding or rebound.      Comments: Abdomen mildly distended and tender on palpation.  Hypoactive bowel sounds noted.   ELLE drain in place. Incisions with dermabond.    Musculoskeletal:         General: No swelling or tenderness.      Cervical back: Neck supple.   Skin:     General: Skin is warm.      Findings: Rash present.   Neurological:      General: No focal deficit present.      Mental Status: She is alert and oriented to person, place, and time.   Psychiatric:         Mood and Affect: Mood normal.         Behavior: Behavior normal.         Fluids    Intake/Output Summary (Last 24 hours) at 2/15/2021 1333  Last data filed at 2/15/2021 1152  Gross per 24 hour   Intake 1190 ml   Output 40 ml   Net 1150 ml       Laboratory  Recent Labs     02/13/21  0326 02/14/21  0732 02/15/21  0655   WBC 15.7* 11.8* 12.0*   RBC 4.57 4.28 4.89   HEMOGLOBIN 13.0 12.4 14.2   HEMATOCRIT 43.1 40.0 43.5   MCV 94.3 93.5 89.0   MCH 28.4 29.0 29.0   MCHC 30.2* 31.0* 32.6*   RDW 47.5 44.5 41.7   PLATELETCT 281 299 377   MPV 9.6 8.7* 8.6*     Recent Labs     02/14/21  0455 02/14/21  0732 02/15/21  0655   SODIUM 135 134* 135   POTASSIUM 3.9 3.2* 2.9*   CHLORIDE 103 101 94*   CO2 21 21 23   GLUCOSE 69 87 90   BUN 8 8 5*   CREATININE 0.32* 0.35* 0.39*   CALCIUM 8.6 8.7 9.0                   Imaging  EC-ECHOCARDIOGRAM COMPLETE W/O CONT   Final Result      DX-UPPER GI-SERIES WITH KUB   Final Result      No evidence for leakage from the gastrojejunostomy      IR-US GUIDED PIV   Final Result    Ultrasound-guided PERIPHERAL IV INSERTION performed by    qualified nursing staff as above.           Assessment/Plan  * Perforated viscus- (present on admission)  Assessment & Plan  S/p  Laparoscopic washout, repair and omental patch of gastrojejunal marginal ulcer.    General surgery following.  PPI.   NG tube removed.  Advance diet as tolerated.  Avoid NSAID Use  Pain control.  ID following and recommending 4 weeks of IV antibiotics Ancef and p.o. rifampin and 2 weeks of p.o. Flagyl. Needs a midline placed once blood cultures remain negative for at least 48 hours    MSSA bacteremia  Assessment & Plan  Blood cultures growing MSSA.    ID following and recommending 4 weeks of IV antibiotics Ancef and p.o. rifampin and 2 weeks of p.o. Flagyl.  Needs a midline placed once blood cultures remain negative for at least 48 hours  ID following.  Repeat blood cultures.  2D echo with no findings of valvular vegetations.    Sepsis ([facility])  Assessment & Plan  This is Sepsis Present on admission  SIRS criteria identified on my evaluation include: Tachycardia, with heart rate greater than 90 BPM and Leukocytosis, with WBC greater than 12,000  Source is Perforated Viscuous  Sepsis protocol initiated  Fluid resuscitation ordered per protocol  IV antibiotics as appropriate for source of sepsis  While organ dysfunction may be noted elsewhere in this problem list or in the chart, degree of organ dysfunction does not meet CMS criteria for severe sepsis    Blood cultures growing MSSA bacteremia.  On IV antibiotics.    Type II diabetes (HCC)- (present on admission)  Assessment & Plan  History of type 2 diabetes.  Diet controlled.  Insulin sliding scale as needed.       VTE prophylaxis: Lovenox

## 2021-02-15 NOTE — PROGRESS NOTES
Report received. Assessment complete.  AAOx4. Patient calls appropriately.  Pt reports pain 2/10. No interventions needed at this time. Will continue to monitor.   Pt denies N/V, SOB, or chest pain.  CHASE, independently ambulatory.  + void, LBM 02/14  Pt is tolerating clear liquid diet.   Abd ELLE in place, draining serosanguineous fluid, dressing changed, CDI  x4 abd lap sites, PATY dermabonded.         Plan of care discussed with patient. Fall precautions in place. Belongings and call light within reach. Educated patient to call for assistance as needed. All needs met at this time.

## 2021-02-15 NOTE — PROGRESS NOTES
Infectious Disease Progress Note    Author: Lui Blunt M.D. Date & Time of service: 2/15/2021  9:41 AM    Chief Complaint:  MSSA bacteremia, peritonitis    Interval History:  2/15 patient remains afebrile, leukocytosis continues to trend down, 12,000 this morning.  Tolerating cefepime with no new issues.  Abdominal pain well controlled.    Review of Systems:  Review of Systems   Constitutional: Positive for malaise/fatigue. Negative for chills and fever.   Respiratory: Negative for cough and shortness of breath.    Gastrointestinal: Negative for abdominal pain, constipation, diarrhea, nausea and vomiting.   Musculoskeletal: Negative for back pain, joint pain and neck pain.   Neurological: Negative for headaches.       Hemodynamics:  Temp (24hrs), Av.7 °C (98 °F), Min:36.3 °C (97.3 °F), Max:36.9 °C (98.4 °F)  Temperature: 36.9 °C (98.4 °F)  Pulse  Av.4  Min: 67  Max: 126   Blood Pressure: 155/93       Physical Exam:  Physical Exam  Vitals and nursing note reviewed.   Constitutional:       Appearance: Normal appearance. She is not toxic-appearing.   HENT:      Mouth/Throat:      Comments: Poor dentition  Eyes:      General:         Right eye: No discharge.         Left eye: No discharge.   Cardiovascular:      Rate and Rhythm: Normal rate and regular rhythm.      Heart sounds: Normal heart sounds.   Pulmonary:      Effort: Pulmonary effort is normal.      Breath sounds: Normal breath sounds.   Abdominal:      General: Abdomen is flat. There is no distension.      Palpations: Abdomen is soft.      Tenderness: There is no abdominal tenderness.      Comments: Abdominal drain in place with minimal serous appearing output.     Musculoskeletal:         General: Normal range of motion.      Right lower leg: Edema present.      Left lower leg: Edema present.   Skin:     General: Skin is warm and dry.   Neurological:      General: No focal deficit present.      Mental Status: She is alert and oriented to person,  place, and time.   Psychiatric:         Mood and Affect: Mood normal.         Behavior: Behavior normal.      Comments: Pleasant       Meds:    Current Facility-Administered Medications:   •  amLODIPine  •  losartan  •  omeprazole  •  cefepime  •  fluconazole  •  riFAMPin  •  Pharmacy Consult Request  •  HYDROmorphone  •  NS  •  metroNIDAZOLE (FLAGYL) IV  •  LR  •  ondansetron  •  haloperidol lactate  •  promethazine  •  diphenhydrAMINE  •  enoxaparin  •  [COMPLETED] acetaminophen **FOLLOWED BY** acetaminophen **FOLLOWED BY** [START ON 2/16/2021] acetaminophen  •  enalaprilat    Labs:  Recent Labs     02/13/21 0326 02/14/21  0732 02/15/21  0655   WBC 15.7* 11.8* 12.0*   RBC 4.57 4.28 4.89   HEMOGLOBIN 13.0 12.4 14.2   HEMATOCRIT 43.1 40.0 43.5   MCV 94.3 93.5 89.0   MCH 28.4 29.0 29.0   RDW 47.5 44.5 41.7   PLATELETCT 281 299 377   MPV 9.6 8.7* 8.6*   NEUTSPOLYS  --  76.40* 74.50*   LYMPHOCYTES  --  13.30* 13.90*   MONOCYTES  --  7.70 9.40   EOSINOPHILS  --  1.70 1.20   BASOPHILS  --  0.30 0.20     Recent Labs     02/14/21 0455 02/14/21  0732 02/15/21  0655   SODIUM 135 134* 135   POTASSIUM 3.9 3.2* 2.9*   CHLORIDE 103 101 94*   CO2 21 21 23   GLUCOSE 69 87 90   BUN 8 8 5*     Recent Labs     02/13/21 0326 02/14/21 0455 02/14/21  0732 02/15/21  0655   ALBUMIN 3.1*  --  2.9* 3.4   TBILIRUBIN 0.9  --  1.6* 2.4*   ALKPHOSPHAT 102*  --  107* 118*   TOTPROTEIN 6.6  --  6.0 6.8   ALTSGPT 63*  --  47 32   ASTSGOT 33  --  31 18   CREATININE 0.48* 0.32* 0.35* 0.39*       Imaging:  CT-ABDOMEN-PELVIS WITH    Result Date: 2/11/2021 2/11/2021 4:21 PM HISTORY/REASON FOR EXAM:  Abdominal pain nausea TECHNIQUE/EXAM DESCRIPTION: CT scan of the abdomen and pelvis with contrast. Contrast-enhanced helical scanning was obtained from the diaphragmatic domes through the pubic symphysis following the bolus administration of 100 mL of Optiray 350 nonionic contrast without complication. Low dose optimization technique was utilized for  this CT exam including automated exposure control and adjustment of the mA and/or kV according to patient size. COMPARISON: No prior studies available. FINDINGS: CT Abdomen: Several areas of linear and poorly defined opacification are noted in each lung base. No dependent free air and multiple sites of extraluminal air are noted scattered throughout the upper abdomen. Postcholecystectomy changes are noted. Postoperative changes are noted in the stomach. Gastrojejunostomy is noted. No focal extravasation of oral contrast is appreciated. Some air is noted in the gallbladder fossa but volume of air is greater in the left upper quadrant. The liver is normal in appearance. The spleen is normal. The kidneys are normal. The adrenal glands are normal. The pancreas is normal. The aorta is normal in caliber.  No evidence of retroperitoneal adenopathy. CT Pelvis: There is no evidence of bowel obstruction or inflammatory change. Small amount of pelvic free peritoneal fluid is identified. Post hysterectomy changes appear to be present.     1.  Extraluminal air and free air are noted throughout the upper abdomen. Extraluminal air could be related to recent surgery although perforated viscus is also a possibility. 2.  Post cholecystectomy. No fluid collection noted in the gallbladder fossa. Small amount of air is noted in the gallbladder fossa. 3.  Postsurgical changes are noted in the stomach. 4.  Postsurgical changes are noted in the lumbar spine. 5.  Small amount of free fluid is noted in the pelvis. These findings were discussed with MOHAN NAVA on 02/11/2021.     IR-US GUIDED PIV    Result Date: 2/14/2021  EXAMINATION:                                                                    HISTORY/REASON FOR EXAM:  Ultrasound Guided PIV.  TECHNIQUE/EXAM DESCRIPTION AND NUMBER OF VIEWS:  Peripheral IV insertion with ultrasound guidance.  The procedure was prepared using maximal sterile barrier technique including sterile gown,  mask, cap, and donning of sterile gloves following appropriate hand hygiene and/or sterile scrub. Patient skin site was prepped with 2% Chlorhexidine solution.   FINDINGS: Peripheral IV insertion with Ultrasound Guidance was performed by qualified imaging nursing staff without the assistance of a Radiologist.      Ultrasound-guided PERIPHERAL IV INSERTION performed by qualified nursing staff as above.      Micro:  Results     Procedure Component Value Units Date/Time    CULTURE WOUND W/ GRAM STAIN [[id number]]  (Abnormal)  (Susceptibility) Collected: 02/11/21 2042    Order Status: Completed Specimen: Wound Updated: 02/14/21 1533     Significant Indicator POS     Source WND     Site Gastric Ulcer Fluid     Culture Result -     Gram Stain Result Many WBCs.  Few Gram positive cocci.  Rare Gram negative rods.       Culture Result Proteus mirabilis  Rare growth        Streptococcus salivarius  Light growth        Rothia mucilaginosa  Light growth        Staphylococcus aureus  Light growth  Susceptibilities in progress  Methicillin sensitive via screening method        Streptococcus mitis/oralis  Light growth        Streptococcus species  Light growth  Streptococcus lutetiensis      Narrative:      Surgery Specimen    Susceptibility     Proteus mirabilis (1)     Antibiotic Interpretation Microscan Method Status    Ampicillin Sensitive <=8 mcg/mL PETRONA Preliminary    Ceftriaxone Sensitive <=1 mcg/mL PETRONA Preliminary    Ceftazidime Sensitive <=1 mcg/mL PETRONA Preliminary    Cefotaxime Sensitive <=2 mcg/mL PETRONA Preliminary    Cefazolin Sensitive <=2 mcg/mL PETRONA Preliminary    Ciprofloxacin Sensitive <=0.25 mcg/mL PETRONA Preliminary    Cefepime Sensitive <=2 mcg/mL PETRONA Preliminary    Tobramycin Sensitive <=2 mcg/mL PETRONA Preliminary    Cefuroxime Sensitive <=4 mcg/mL PETRONA Preliminary    Ertapenem Sensitive <=0.5 mcg/mL PETRONA Preliminary    Gentamicin Sensitive <=2 mcg/mL PETRONA Preliminary    Moxifloxacin Sensitive <=2 mcg/mL PETRONA Preliminary     "Pip/Tazobactam Sensitive <=8 mcg/mL PETRONA Preliminary    Trimeth/Sulfa Sensitive <=0.5/9.5 mcg/mL PETRONA Preliminary                   Anaerobic Culture [[id number]] Collected: 02/11/21 2042    Order Status: Completed Specimen: Wound Updated: 02/14/21 1533     Significant Indicator NEG     Source WND     Site Gastric Ulcer Fluid     Culture Result Culture in progress.    Narrative:      Surgery Specimen    Fungal Culture [[id number]] Collected: 02/11/21 2042    Order Status: Completed Specimen: Wound Updated: 02/14/21 1533     Significant Indicator NEG     Source WND     Site Gastric Ulcer Fluid     Culture Result No fungal growth to date.    Narrative:      Surgery Specimen    BLOOD CULTURE [[id number]]  (Abnormal) Collected: 02/11/21 1810    Order Status: Completed Specimen: Blood from Peripheral Updated: 02/14/21 0732     Significant Indicator POS     Source BLD     Site PERIPHERAL     Culture Result Growth detected by Bactec instrument. 02/12/2021  11:05      Staphylococcus aureus  See previous culture for sensitivity report.      Narrative:      CALL  [facility]  141 tel. [phone],  CALLED  141 tel. [phone] 02/12/2021, 11:09, RB PERF. RESULTS CALLED TO:  [id number] RN  2 of 2 blood culture x2  Sites order. Per Hospital Policy:  Only change Specimen Src: to \"Line\" if specified by physician  order.  Right AC    BLOOD CULTURE [[id number]]  (Abnormal)  (Susceptibility) Collected: 02/11/21 1800    Order Status: Completed Specimen: Blood from Peripheral Updated: 02/14/21 0731     Significant Indicator POS     Source BLD     Site PERIPHERAL     Culture Result Growth detected by Bactec instrument. 02/12/2021  11:05  Staphylococcus aureus (methicillin sensitive)  detected by PCR.        Staphylococcus aureus    Narrative:      CALL  [facility]  141 tel. [phone],  CALLED  141 tel. [phone] 02/12/2021, 11:09, RB PERF. RESULTS CALLED TO:  [id number] RN and [phone] Areli  1 of 2 for Blood Culture x 2 sites order. Per Hospital  Policy: Only change " "Specimen Src: to \"Line\" if specified by  physician order.  Right AC    Susceptibility     Staphylococcus aureus (1)     Antibiotic Interpretation Microscan Method Status    Azithromycin Sensitive <=2 mcg/mL PETRONA Final    Clindamycin Sensitive 0.5 mcg/mL PETRONA Final    Cefotaxime Sensitive <=8 mcg/mL PETRONA Final    Cefazolin Sensitive <=8 mcg/mL PETRONA Final    Cefepime Sensitive <=4 mcg/mL PETRONA Final    Ceftaroline Sensitive <=0.5 mcg/mL PETRONA Final    Daptomycin Sensitive <=0.5 mcg/mL PETRONA Final    Ampicillin/sulbactam Sensitive <=8/4 mcg/mL PETRONA Final    Erythromycin Sensitive 0.5 mcg/mL PETRONA Final    Vancomycin Sensitive 1 mcg/mL PETRONA Final    Oxacillin Sensitive <=0.25 mcg/mL PETRONA Final    Penicillin Resistant >2 mcg/mL PETRONA Final    Pip/Tazobactam Sensitive <=8 mcg/mL PETRONA Final    Trimeth/Sulfa Sensitive <=0.5/9.5 mcg/mL PETRONA Final    Tetracycline Sensitive <=4 mcg/mL PETRONA Final                   BLOOD CULTURE [[id number]] Collected: 02/12/21 1718    Order Status: Completed Specimen: Blood from Peripheral Updated: 02/13/21 0634     Significant Indicator NEG     Source BLD     Site PERIPHERAL     Culture Result No Growth  Note: Blood cultures are incubated for 5 days and  are monitored continuously.Positive blood cultures  are called to the RN and reported as soon as  they are identified.      Narrative:      Per Hospital Policy: Only change Specimen Src: to \"Line\" if  specified by physician order.  Left Forearm/Arm    BLOOD CULTURE [[id number]] Collected: 02/12/21 1718    Order Status: Completed Specimen: Blood from Peripheral Updated: 02/13/21 0634     Significant Indicator NEG     Source BLD     Site PERIPHERAL     Culture Result No Growth  Note: Blood cultures are incubated for 5 days and  are monitored continuously.Positive blood cultures  are called to the RN and reported as soon as  they are identified.      Narrative:      Per Hospital Policy: Only change Specimen Src: to \"Line\" if  specified by physician order.  Left Hand " "   GRAM STAIN [[id number]] Collected: 02/11/21 2042    Order Status: Completed Specimen: Wound Updated: 02/12/21 0604     Significant Indicator .     Source WND     Site Gastric Ulcer Fluid     Gram Stain Result Many WBCs.  Few Gram positive cocci.  Rare Gram negative rods.      Narrative:      Surgery Specimen    SARS-CoV-2 PCR (24 hour In-House): Collect NP swab in VTM [[id number]] Collected: 02/11/21 1935    Order Status: Completed Specimen: Respirate from Nasopharyngeal Updated: 02/11/21 2249     SARS-CoV-2 Source NP Swab     SARS-CoV-2 by PCR NotDetected     Comment: PATIENTS: Important information regarding your results and instructions can  be found at [email or web address]   \"After your  Covid-19 Test\"  [facility] providers: PLEASE REFER TO DE-ESCALATION AND RETESTING PROTOCOL  on [email or web address]  **The TaqPath COVID-19 SARS-CoV-2 test has been made available for use under  the Emergency Use Authorization (EUA) only.         Narrative:      Have you been in close contact with a person who is suspected  or known to be positive for COVID-19 within the last 30 days  (e.g. last seen that person < 30 days ago)->Unknown          Assessment:  56 y.o.  admitted 2/11/2021. Pt has a past medical history of prior Jourdan-en-Y gastric bypass, pulmonary hypertension, diabetes mellitus and biliary colic.  She underwent laparoscopic cholecystectomy on 1/13/2021.  She has chronic abdominal pain and had been taking NSAIDs thought to result in ulceration.  She developed severe abdominal pain on 2/11/2020 and presented to the ER.  CT was done with finding of pneumoperitoneum and peritonitis.  She went back to the OR on 2/11/2021 for laparoscopic washout for perforated gastrojejunal marginal ulcer with omental patch placed.  Blood cultures were obtained and are positive for MSSA.  Patient has been treated with Flagyl fluconazole and ceftriaxone.      Problem List   MSSA bacteremia - bacteremia potentially due " to GI ulceration (also growing from ulcer tissue culture)  -Blood cultures on 2/11 2/2 + MSSA  -Blood cultures on 2/12 are so far no growth  -Hardware in spine and both knees, spinal stimulator in place  -Stop cefepime, fluconazole.  Will switch to IV Ancef 2 g every 8 hours and continue the Flagyl 500 mg every 8 hours to continue MSSA coverage as well as enteric organisms as below  -Patient is also on rifampin 300 mg twice daily to prevent seeding of her multiple hardware.  Continue for now  -TTE with no definitive evidence of infective endocarditis  -Patient will need IV antibiotics given Staph aureus bacteremia and multiple hardware in place.  Okay to place midline catheter  -Patient states unable to travel daily to infusion center for once daily antibiotics.  Will see if insurance will pay for home continuous infusion IV Ancef 6 g every 24 hours.  Paper orders faxed to  on 2/15  -Antibiotic plan will be 4 weeks of continuous infusion IV Ancef 6 g every 24 hours + p.o. rifampin 300 mg every 12 hours through 3/11/2021.  To cover peritonitis, she will also need a 2-week course of p.o. Flagyl 500 mg every 8 hours through 2/26/2021  -At least weekly CBC with differential, CMP while on IV antibiotics    Perforated viscus (gastric ulcer) , ulcer thought due to NSAIDs   Secondary peritonitis with pneumoperitoneum  -OR on 2/11 for repair  -OR cultures were obtained from the ulcer +Proteus mirabilis (pansensitive), MSSA, Rothia, and multiple Strep species including salivarius, mitis/oralis, lutetiensis.  Unclear significance of these multiple organisms which are expected from a GI ulcer, however given secondary peritonitis, will cover these organisms  -Upper GI series on 2/14 with no obvious leak    Reported penicillin allergy  Penicillin allergy at age 1 reported as anaphylaxis.  Patient has tolerated cephalosporins. She states she recently had some amoxicillin on her fingers after giving her grandson a dose that she  licked off and had resulting abdominal cramping and rash so was unwilling to challenge her penicillin allergy    Diabetes mellitus  History of gastric bypass      Will continue to follow.  Discussed with primary, Dr. Antonio

## 2021-02-15 NOTE — PROGRESS NOTES
Surgical Progress Note    Author: MINGO Mcknight Date & Time created: 2/15/2021   7:45 AM     Interval Events:  POD#4 Laparoscopic washout, repair and omental patch of gastrojejunal marginal ulcer     Feeling good today, better than yesterday.  Pain controlled.  NG tube removed last night, tolerated well. Intaking PO clears without nausea.  Voiding well. ELLE drain with mostly serous output at this point.  Blood cultures growing MSSA, Abx per ID.  She has been up ambulating the unit.  Doing well.  Review of Systems   Constitutional: Negative for chills and fever.   Respiratory: Negative for cough and shortness of breath.    Cardiovascular: Negative for chest pain and palpitations.   Gastrointestinal: Positive for abdominal pain. Negative for diarrhea, heartburn, nausea and vomiting.   Genitourinary: Negative for dysuria.     Hemodynamics:  Temp (24hrs), Av.7 °C (98.1 °F), Min:36.3 °C (97.3 °F), Max:37 °C (98.6 °F)  Temperature: 36.3 °C (97.3 °F)  Pulse  Av  Min: 67  Max: 126   Blood Pressure: 159/102     Respiratory:    Respiration: 19, Pulse Oximetry: 92 %        RUL Breath Sounds: Clear, RML Breath Sounds: Clear, RLL Breath Sounds: Diminished, REZA Breath Sounds: Clear, LLL Breath Sounds: Diminished  Neuro:  GCS       Fluids:    Intake/Output Summary (Last 24 hours) at 2/15/2021 0745  Last data filed at 2/15/2021 0400  Gross per 24 hour   Intake 1150 ml   Output 365 ml   Net 785 ml        Current Diet Order   Procedures   • Diet Order Diet: Clear Liquid; Miscellaneous modifications: (optional): Bariatric; Tray Modifications (optional): No Straws     Physical Exam  Constitutional:       General: She is not in acute distress.     Appearance: She is obese.   HENT:      Head: Normocephalic and atraumatic.      Mouth/Throat:      Mouth: Mucous membranes are moist.   Eyes:      Conjunctiva/sclera: Conjunctivae normal.   Cardiovascular:      Rate and Rhythm: Normal rate and regular rhythm.   Pulmonary:       Effort: Pulmonary effort is normal.   Abdominal:      General: There is no distension.      Palpations: There is no mass.      Tenderness: There is abdominal tenderness ( epigastric area). There is no guarding or rebound.   Musculoskeletal:      Cervical back: Normal range of motion and neck supple.   Skin:     General: Skin is warm and dry.   Neurological:      Mental Status: She is alert and oriented to person, place, and time.   Psychiatric:         Mood and Affect: Mood normal.         Behavior: Behavior normal.         Judgment: Judgment normal.       Labs:  No results found for this or any previous visit (from the past 24 hour(s)).  Medical Decision Making, by Problem:  Active Hospital Problems    Diagnosis    • Perforated viscus [R19.8]      Priority: High   • MSSA bacteremia [R78.81, B95.61]      Priority: High   • Sepsis (HCC) [A41.9]    • Type II diabetes (HCC) [E11.9]      Plan:  Pt is alert and oriented, NAD. Breathing unlabored. Tolerating PO.  Incisions ok. VS stable except for mild tachycardia this AM. Labs pending this AM. Echo pending read as well.  Upper GI last night shows no signs of contrast extravasation.  NG tube was discontinued.  Encouraged intake of PO clears.  Shar output still primarily serous with decreased output of 65ml.  Encouraged ambulation and incentive spirometry.  Wean O2 if appropriate. Patient was reviewed with Dr. Cox.       Quality Measures:  Quality-Core Measures   Reviewed items::  Labs reviewed and Medications reviewed  Howe catheter::  No Howe  DVT prophylaxis pharmacological::  Enoxaparin (Lovenox)  DVT prophylaxis - mechanical:  SCDs  Ulcer Prophylaxis::  Yes  Antibiotics:  Treating active infection/contamination beyond 24 hours perioperative coverage      Discussed patient condition with Patient and Dr Cox

## 2021-02-15 NOTE — PROGRESS NOTES
Assessment complete.  A&O x 4. Patient calls appropriately.  Patient ambulates with no assist.   Patient has 0/10 pain  Denies N&V. Tolerating gastric clears diet.  Surgical lap sites with dermabrafiq NEWMAN JP drain with gauze and tape CDI.  + void, + flatus, + loose BM.  Patient denies SOB.  Review plan with of care with patient. Call light and personal belongings with in reach. Hourly rounding in place. All needs met at this time.

## 2021-02-15 NOTE — PROGRESS NOTES
Report received. Assessment complete.  AAOx4. Patient calls appropriately.  Pt reports pain 2/10. No interventions needed at this time. Will continue to monitor.   Pt denies N/V, SOB, or chest pain.  CHASE, independently ambulatory.  + void, LBM PTA  Pt is tolerating clear liquid diet.   Abd ELLE in place, draining serosanguineous fluid, DIP, CDI  x4 abd lap sites, PATY dermabonded.         Plan of care discussed with patient. Fall precautions in place. Belongings and call light within reach. Educated patient to call for assistance as needed. All needs met at this time.

## 2021-02-16 ENCOUNTER — APPOINTMENT (OUTPATIENT)
Dept: RADIOLOGY | Facility: MEDICAL CENTER | Age: 56
DRG: 853 | End: 2021-02-16
Attending: STUDENT IN AN ORGANIZED HEALTH CARE EDUCATION/TRAINING PROGRAM
Payer: MEDICARE

## 2021-02-16 ENCOUNTER — HOME HEALTH ADMISSION (OUTPATIENT)
Dept: HOME HEALTH SERVICES | Facility: HOME HEALTHCARE | Age: 56
End: 2021-02-16
Payer: MEDICARE

## 2021-02-16 LAB
ALBUMIN SERPL BCP-MCNC: 3.3 G/DL (ref 3.2–4.9)
ALBUMIN/GLOB SERPL: 1 G/DL
ALP SERPL-CCNC: 111 U/L (ref 30–99)
ALT SERPL-CCNC: 25 U/L (ref 2–50)
ANION GAP SERPL CALC-SCNC: 16 MMOL/L (ref 7–16)
AST SERPL-CCNC: 12 U/L (ref 12–45)
BILIRUB SERPL-MCNC: 1.7 MG/DL (ref 0.1–1.5)
BUN SERPL-MCNC: 5 MG/DL (ref 8–22)
CALCIUM SERPL-MCNC: 8.9 MG/DL (ref 8.5–10.5)
CHLORIDE SERPL-SCNC: 96 MMOL/L (ref 96–112)
CO2 SERPL-SCNC: 21 MMOL/L (ref 20–33)
CREAT SERPL-MCNC: 0.31 MG/DL (ref 0.5–1.4)
ERYTHROCYTE [DISTWIDTH] IN BLOOD BY AUTOMATED COUNT: 41.2 FL (ref 35.9–50)
GLOBULIN SER CALC-MCNC: 3.3 G/DL (ref 1.9–3.5)
GLUCOSE SERPL-MCNC: 81 MG/DL (ref 65–99)
HCT VFR BLD AUTO: 45.3 % (ref 37–47)
HGB BLD-MCNC: 14.6 G/DL (ref 12–16)
MAGNESIUM SERPL-MCNC: 2 MG/DL (ref 1.5–2.5)
MCH RBC QN AUTO: 28.2 PG (ref 27–33)
MCHC RBC AUTO-ENTMCNC: 32.2 G/DL (ref 33.6–35)
MCV RBC AUTO: 87.5 FL (ref 81.4–97.8)
PHOSPHATE SERPL-MCNC: 3.2 MG/DL (ref 2.5–4.5)
PLATELET # BLD AUTO: 398 K/UL (ref 164–446)
PMV BLD AUTO: 8.7 FL (ref 9–12.9)
POTASSIUM SERPL-SCNC: 3.1 MMOL/L (ref 3.6–5.5)
PROT SERPL-MCNC: 6.6 G/DL (ref 6–8.2)
RBC # BLD AUTO: 5.18 M/UL (ref 4.2–5.4)
SODIUM SERPL-SCNC: 133 MMOL/L (ref 135–145)
WBC # BLD AUTO: 12.2 K/UL (ref 4.8–10.8)

## 2021-02-16 PROCEDURE — 700111 HCHG RX REV CODE 636 W/ 250 OVERRIDE (IP): Performed by: INTERNAL MEDICINE

## 2021-02-16 PROCEDURE — 80053 COMPREHEN METABOLIC PANEL: CPT

## 2021-02-16 PROCEDURE — 99232 SBSQ HOSP IP/OBS MODERATE 35: CPT | Performed by: STUDENT IN AN ORGANIZED HEALTH CARE EDUCATION/TRAINING PROGRAM

## 2021-02-16 PROCEDURE — 700102 HCHG RX REV CODE 250 W/ 637 OVERRIDE(OP): Performed by: COLON & RECTAL SURGERY

## 2021-02-16 PROCEDURE — 700102 HCHG RX REV CODE 250 W/ 637 OVERRIDE(OP): Performed by: INTERNAL MEDICINE

## 2021-02-16 PROCEDURE — A9270 NON-COVERED ITEM OR SERVICE: HCPCS | Performed by: STUDENT IN AN ORGANIZED HEALTH CARE EDUCATION/TRAINING PROGRAM

## 2021-02-16 PROCEDURE — 770006 HCHG ROOM/CARE - MED/SURG/GYN SEMI*

## 2021-02-16 PROCEDURE — 36415 COLL VENOUS BLD VENIPUNCTURE: CPT

## 2021-02-16 PROCEDURE — 99232 SBSQ HOSP IP/OBS MODERATE 35: CPT | Performed by: INTERNAL MEDICINE

## 2021-02-16 PROCEDURE — 700111 HCHG RX REV CODE 636 W/ 250 OVERRIDE (IP): Performed by: COLON & RECTAL SURGERY

## 2021-02-16 PROCEDURE — A9270 NON-COVERED ITEM OR SERVICE: HCPCS | Performed by: COLON & RECTAL SURGERY

## 2021-02-16 PROCEDURE — 85027 COMPLETE CBC AUTOMATED: CPT

## 2021-02-16 PROCEDURE — 700102 HCHG RX REV CODE 250 W/ 637 OVERRIDE(OP): Performed by: NURSE PRACTITIONER

## 2021-02-16 PROCEDURE — A9270 NON-COVERED ITEM OR SERVICE: HCPCS | Performed by: NURSE PRACTITIONER

## 2021-02-16 PROCEDURE — 700102 HCHG RX REV CODE 250 W/ 637 OVERRIDE(OP): Performed by: STUDENT IN AN ORGANIZED HEALTH CARE EDUCATION/TRAINING PROGRAM

## 2021-02-16 PROCEDURE — 700101 HCHG RX REV CODE 250: Performed by: STUDENT IN AN ORGANIZED HEALTH CARE EDUCATION/TRAINING PROGRAM

## 2021-02-16 PROCEDURE — A9270 NON-COVERED ITEM OR SERVICE: HCPCS | Performed by: INTERNAL MEDICINE

## 2021-02-16 PROCEDURE — 83735 ASSAY OF MAGNESIUM: CPT

## 2021-02-16 PROCEDURE — 84100 ASSAY OF PHOSPHORUS: CPT

## 2021-02-16 RX ORDER — METRONIDAZOLE 500 MG/1
500 TABLET ORAL EVERY 8 HOURS
Status: DISCONTINUED | OUTPATIENT
Start: 2021-02-16 | End: 2021-02-18 | Stop reason: HOSPADM

## 2021-02-16 RX ORDER — POTASSIUM CHLORIDE 20 MEQ/1
20 TABLET, EXTENDED RELEASE ORAL DAILY
Status: DISCONTINUED | OUTPATIENT
Start: 2021-02-16 | End: 2021-02-18 | Stop reason: HOSPADM

## 2021-02-16 RX ADMIN — METRONIDAZOLE 500 MG: 500 TABLET ORAL at 13:21

## 2021-02-16 RX ADMIN — METRONIDAZOLE 500 MG: 500 TABLET ORAL at 20:55

## 2021-02-16 RX ADMIN — AMLODIPINE BESYLATE 10 MG: 10 TABLET ORAL at 09:14

## 2021-02-16 RX ADMIN — RIFAMPIN 300 MG: 300 CAPSULE ORAL at 18:47

## 2021-02-16 RX ADMIN — RIFAMPIN 300 MG: 300 CAPSULE ORAL at 04:49

## 2021-02-16 RX ADMIN — CEFAZOLIN SODIUM 2 G: 2 INJECTION, SOLUTION INTRAVENOUS at 20:55

## 2021-02-16 RX ADMIN — ACETAMINOPHEN 1000 MG: 500 TABLET ORAL at 04:49

## 2021-02-16 RX ADMIN — ENOXAPARIN SODIUM 40 MG: 40 INJECTION SUBCUTANEOUS at 04:49

## 2021-02-16 RX ADMIN — CEFAZOLIN SODIUM 2 G: 2 INJECTION, SOLUTION INTRAVENOUS at 13:21

## 2021-02-16 RX ADMIN — POTASSIUM CHLORIDE 20 MEQ: 1500 TABLET, EXTENDED RELEASE ORAL at 09:15

## 2021-02-16 RX ADMIN — CEFAZOLIN SODIUM 2 G: 2 INJECTION, SOLUTION INTRAVENOUS at 04:49

## 2021-02-16 RX ADMIN — LOSARTAN POTASSIUM 50 MG: 50 TABLET, FILM COATED ORAL at 18:46

## 2021-02-16 RX ADMIN — OMEPRAZOLE 20 MG: 20 CAPSULE, DELAYED RELEASE ORAL at 04:49

## 2021-02-16 RX ADMIN — METRONIDAZOLE 500 MG: 500 INJECTION, SOLUTION INTRAVENOUS at 04:49

## 2021-02-16 RX ADMIN — OMEPRAZOLE 20 MG: 20 CAPSULE, DELAYED RELEASE ORAL at 18:47

## 2021-02-16 ASSESSMENT — ENCOUNTER SYMPTOMS
NAUSEA: 0
PALPITATIONS: 0
CHILLS: 0
ABDOMINAL PAIN: 1
NEUROLOGICAL NEGATIVE: 1
NECK PAIN: 0
SHORTNESS OF BREATH: 0
RESPIRATORY NEGATIVE: 1
BACK PAIN: 0
EYES NEGATIVE: 1
CONSTIPATION: 0
WEIGHT LOSS: 1
HEADACHES: 0
PSYCHIATRIC NEGATIVE: 1
MUSCULOSKELETAL NEGATIVE: 1
CARDIOVASCULAR NEGATIVE: 1
FEVER: 0
COUGH: 0
ABDOMINAL PAIN: 0
HEARTBURN: 0
CHILLS: 1
VOMITING: 0
DIARRHEA: 0

## 2021-02-16 ASSESSMENT — PAIN DESCRIPTION - PAIN TYPE
TYPE: ACUTE PAIN

## 2021-02-16 NOTE — PROGRESS NOTES
[facility] Medicine Daily Progress Note    Date of Service  2/16/2021    Chief Complaint  56 y.o. female admitted 2/11/2021 with a perforated viscus from gastric ulcer.    Hospital Course  56 y.o. female admitted 2/11/2021 with perforated viscus from gastric ulcer.  She is S/p Laparoscopic washout, repair and omental patch of gastrojejunal marginal ulcer.  Blood cultures growing MSSA. Currently on broad-spectrum antibiotics. ID following for antibiotics management.    Interval Problem Update  Patient seen and examined at bedside this morning.  No acute events overnight.      - She is S/p Laparoscopic washout, repair and omental patch of gastrojejunal marginal ulcer. NG tube removed. ADAT. General surgery is primary.  - Blood cultures growing MSSA. Antibiotics adjusted. Repeat blood cultures ordered. 2D echo with no findings of valvular vegetations.   -ID following and recommending 4 weeks of IV antibiotics Ancef and p.o. rifampin and 2 weeks of p.o. Flagyl.  Needs a midline placed once blood cultures remain negative for at least 48 hours.    Consultants/Specialty  Infectious Disease  General Surgery     Code Status  Full Code    Disposition  TBD    Review of Systems  Review of Systems   Constitutional: Positive for chills, malaise/fatigue and weight loss.   HENT: Negative.    Eyes: Negative.    Respiratory: Negative.    Cardiovascular: Negative.    Gastrointestinal: Positive for abdominal pain.   Genitourinary: Negative.    Musculoskeletal: Negative.    Skin: Negative.    Neurological: Negative.    Endo/Heme/Allergies: Negative.    Psychiatric/Behavioral: Negative.         Physical Exam  Temp:  [36 °C (96.8 °F)-36.8 °C (98.3 °F)] 36.8 °C (98.3 °F)  Pulse:  [] 78  Resp:  [18] 18  BP: (143-160)/(88-98) 143/88  SpO2:  [94 %-97 %] 97 %    Physical Exam  Constitutional:       Appearance: Normal appearance.   HENT:      Head: Normocephalic and atraumatic.      Right Ear: External ear normal.      Left Ear: External  ear normal.      Nose: Nose normal.      Mouth/Throat:      Mouth: Mucous membranes are moist.   Eyes:      Pupils: Pupils are equal, round, and reactive to light.   Cardiovascular:      Rate and Rhythm: Normal rate and regular rhythm.      Pulses: Normal pulses.      Heart sounds: Normal heart sounds.   Pulmonary:      Effort: Pulmonary effort is normal.      Breath sounds: Normal breath sounds.   Abdominal:      General: Abdomen is flat.   Musculoskeletal:         General: Normal range of motion.      Cervical back: Neck supple.   Skin:     General: Skin is warm.      Capillary Refill: Capillary refill takes less than 2 seconds.   Neurological:      General: No focal deficit present.   Psychiatric:         Mood and Affect: Mood normal.         Fluids    Intake/Output Summary (Last 24 hours) at 2/16/2021 0905  Last data filed at 2/16/2021 0800  Gross per 24 hour   Intake 3850 ml   Output 60 ml   Net 3790 ml       Laboratory  Recent Labs     02/14/21  0732 02/15/21  0655 02/16/21  0303   WBC 11.8* 12.0* 12.2*   RBC 4.28 4.89 5.18   HEMOGLOBIN 12.4 14.2 14.6   HEMATOCRIT 40.0 43.5 45.3   MCV 93.5 89.0 87.5   MCH 29.0 29.0 28.2   MCHC 31.0* 32.6* 32.2*   RDW 44.5 41.7 41.2   PLATELETCT 299 377 398   MPV 8.7* 8.6* 8.7*     Recent Labs     02/14/21  0732 02/15/21  0655 02/16/21  0303   SODIUM 134* 135 133*   POTASSIUM 3.2* 2.9* 3.1*   CHLORIDE 101 94* 96   CO2 21 23 21   GLUCOSE 87 90 81   BUN 8 5* 5*   CREATININE 0.35* 0.39* 0.31*   CALCIUM 8.7 9.0 8.9                   Imaging  EC-ECHOCARDIOGRAM COMPLETE W/O CONT   Final Result      DX-UPPER GI-SERIES WITH KUB   Final Result      No evidence for leakage from the gastrojejunostomy      IR-US GUIDED PIV   Final Result    Ultrasound-guided PERIPHERAL IV INSERTION performed by    qualified nursing staff as above.      IR-US GUIDED PIV    (Results Pending)        Assessment/Plan  * Perforated viscus- (present on admission)  Assessment & Plan  S/p Laparoscopic washout,  repair and omental patch of gastrojejunal marginal ulcer.    General surgery following.  PPI.   NG tube removed.  Advance diet as tolerated.  Avoid NSAID Use  Pain control.  ID following and recommending 4 weeks of IV antibiotics Ancef and p.o. rifampin and 2 weeks of p.o. Flagyl. Needs a midline placed once blood cultures remain negative for at least 48 hours    MSSA bacteremia  Assessment & Plan  Blood cultures growing MSSA.    ID following and recommending 4 weeks of IV antibiotics Ancef and p.o. rifampin and 2 weeks of p.o. Flagyl.  Needs a midline placed once blood cultures remain negative for at least 48 hours  ID following.  Repeat blood cultures.  2D echo with no findings of valvular vegetations.    Sepsis ([facility])  Assessment & Plan  This is Sepsis Present on admission  SIRS criteria identified on my evaluation include: Tachycardia, with heart rate greater than 90 BPM and Leukocytosis, with WBC greater than 12,000  Source is Perforated Viscuous  Sepsis protocol initiated  Fluid resuscitation ordered per protocol  IV antibiotics as appropriate for source of sepsis  While organ dysfunction may be noted elsewhere in this problem list or in the chart, degree of organ dysfunction does not meet CMS criteria for severe sepsis    Blood cultures growing MSSA bacteremia.  On IV antibiotics.    Type II diabetes ([facility])- (present on admission)  Assessment & Plan  History of type 2 diabetes.  Diet controlled.  Insulin sliding scale as needed.         VTE prophylaxis: Lovenox

## 2021-02-16 NOTE — PROGRESS NOTES
Infectious Disease Progress Note    Author: Lui Blunt M.D. Date & Time of service: 2021  9:11 AM    Chief Complaint:  MSSA bacteremia, peritonitis    Interval History:  2/15 patient remains afebrile, leukocytosis continues to trend down, 12,000 this morning.  Tolerating cefepime with no new issues.  Abdominal pain well controlled.   remains afebrile, white count 12.2, tolerating antibiotics no new issues.  Awaiting home IV antibiotics set up    Review of Systems:  Review of Systems   Constitutional: Positive for malaise/fatigue. Negative for chills and fever.   Respiratory: Negative for cough and shortness of breath.    Gastrointestinal: Negative for abdominal pain, constipation, diarrhea, nausea and vomiting.   Musculoskeletal: Negative for back pain, joint pain and neck pain.   Neurological: Negative for headaches.   All other systems reviewed and are negative.      Hemodynamics:  Temp (24hrs), Av.4 °C (97.6 °F), Min:36 °C (96.8 °F), Max:36.8 °C (98.3 °F)  Temperature: 36.8 °C (98.3 °F)  Pulse  Av.4  Min: 67  Max: 126   Blood Pressure: 143/88       Physical Exam:  Physical Exam  Vitals and nursing note reviewed.   Constitutional:       Appearance: Normal appearance. She is not toxic-appearing.   HENT:      Mouth/Throat:      Comments: Poor dentition  Eyes:      General:         Right eye: No discharge.         Left eye: No discharge.   Cardiovascular:      Rate and Rhythm: Normal rate and regular rhythm.      Heart sounds: Normal heart sounds.   Pulmonary:      Effort: Pulmonary effort is normal.      Breath sounds: Normal breath sounds.   Abdominal:      General: Abdomen is flat. There is no distension.      Palpations: Abdomen is soft.      Tenderness: There is no abdominal tenderness.      Comments: Abdominal drain in place with minimal serous appearing output.     Musculoskeletal:         General: Normal range of motion.      Right lower leg: Edema present.      Left lower leg: Edema  present.   Skin:     General: Skin is warm and dry.   Neurological:      General: No focal deficit present.      Mental Status: She is alert and oriented to person, place, and time.   Psychiatric:         Mood and Affect: Mood normal.         Behavior: Behavior normal.      Comments: Pleasant       Meds:    Current Facility-Administered Medications:   •  metroNIDAZOLE  •  potassium chloride SA  •  amLODIPine  •  losartan  •  omeprazole  •  ceFAZolin  •  riFAMPin  •  Pharmacy Consult Request  •  HYDROmorphone  •  LR  •  ondansetron  •  haloperidol lactate  •  promethazine  •  diphenhydrAMINE  •  enoxaparin  •  [COMPLETED] acetaminophen **FOLLOWED BY** acetaminophen **FOLLOWED BY** acetaminophen  •  enalaprilat    Labs:  Recent Labs     02/14/21  0732 02/15/21  0655 02/16/21  0303   WBC 11.8* 12.0* 12.2*   RBC 4.28 4.89 5.18   HEMOGLOBIN 12.4 14.2 14.6   HEMATOCRIT 40.0 43.5 45.3   MCV 93.5 89.0 87.5   MCH 29.0 29.0 28.2   RDW 44.5 41.7 41.2   PLATELETCT 299 377 398   MPV 8.7* 8.6* 8.7*   NEUTSPOLYS 76.40* 74.50*  --    LYMPHOCYTES 13.30* 13.90*  --    MONOCYTES 7.70 9.40  --    EOSINOPHILS 1.70 1.20  --    BASOPHILS 0.30 0.20  --      Recent Labs     02/14/21  0732 02/15/21  0655 02/16/21  0303   SODIUM 134* 135 133*   POTASSIUM 3.2* 2.9* 3.1*   CHLORIDE 101 94* 96   CO2 21 23 21   GLUCOSE 87 90 81   BUN 8 5* 5*     Recent Labs     02/14/21  0732 02/15/21  0655 02/16/21  0303   ALBUMIN 2.9* 3.4 3.3   TBILIRUBIN 1.6* 2.4* 1.7*   ALKPHOSPHAT 107* 118* 111*   TOTPROTEIN 6.0 6.8 6.6   ALTSGPT 47 32 25   ASTSGOT 31 18 12   CREATININE 0.35* 0.39* 0.31*       Imaging:  CT-ABDOMEN-PELVIS WITH    Result Date: 2/11/2021 2/11/2021 4:21 PM HISTORY/REASON FOR EXAM:  Abdominal pain nausea TECHNIQUE/EXAM DESCRIPTION: CT scan of the abdomen and pelvis with contrast. Contrast-enhanced helical scanning was obtained from the diaphragmatic domes through the pubic symphysis following the bolus administration of 100 mL of Optiray 350  nonionic contrast without complication. Low dose optimization technique was utilized for this CT exam including automated exposure control and adjustment of the mA and/or kV according to patient size. COMPARISON: No prior studies available. FINDINGS: CT Abdomen: Several areas of linear and poorly defined opacification are noted in each lung base. No dependent free air and multiple sites of extraluminal air are noted scattered throughout the upper abdomen. Postcholecystectomy changes are noted. Postoperative changes are noted in the stomach. Gastrojejunostomy is noted. No focal extravasation of oral contrast is appreciated. Some air is noted in the gallbladder fossa but volume of air is greater in the left upper quadrant. The liver is normal in appearance. The spleen is normal. The kidneys are normal. The adrenal glands are normal. The pancreas is normal. The aorta is normal in caliber.  No evidence of retroperitoneal adenopathy. CT Pelvis: There is no evidence of bowel obstruction or inflammatory change. Small amount of pelvic free peritoneal fluid is identified. Post hysterectomy changes appear to be present.     1.  Extraluminal air and free air are noted throughout the upper abdomen. Extraluminal air could be related to recent surgery although perforated viscus is also a possibility. 2.  Post cholecystectomy. No fluid collection noted in the gallbladder fossa. Small amount of air is noted in the gallbladder fossa. 3.  Postsurgical changes are noted in the stomach. 4.  Postsurgical changes are noted in the lumbar spine. 5.  Small amount of free fluid is noted in the pelvis. These findings were discussed with MOHAN NAVA on 02/11/2021.     IR-US GUIDED PIV    Result Date: 2/14/2021  EXAMINATION:                                                                    HISTORY/REASON FOR EXAM:  Ultrasound Guided PIV.  TECHNIQUE/EXAM DESCRIPTION AND NUMBER OF VIEWS:  Peripheral IV insertion with ultrasound guidance.  The  procedure was prepared using maximal sterile barrier technique including sterile gown, mask, cap, and donning of sterile gloves following appropriate hand hygiene and/or sterile scrub. Patient skin site was prepped with 2% Chlorhexidine solution.   FINDINGS: Peripheral IV insertion with Ultrasound Guidance was performed by qualified imaging nursing staff without the assistance of a Radiologist.      Ultrasound-guided PERIPHERAL IV INSERTION performed by qualified nursing staff as above.      Micro:  Results     Procedure Component Value Units Date/Time    CULTURE WOUND W/ GRAM STAIN [[id number]]  (Abnormal)  (Susceptibility) Collected: 02/11/21 2042    Order Status: Completed Specimen: Wound Updated: 02/15/21 1120     Significant Indicator POS     Source WND     Site Gastric Ulcer Fluid     Culture Result -     Gram Stain Result Many WBCs.  Few Gram positive cocci.  Rare Gram negative rods.       Culture Result Proteus mirabilis  Rare growth        Streptococcus salivarius  Light growth        Rothia mucilaginosa  Light growth        Staphylococcus aureus  Light growth  Methicillin sensitive via screening method        Streptococcus mitis/oralis  Light growth        Streptococcus species  Light growth  Streptococcus lutetiensis      Narrative:      Surgery Specimen    Susceptibility     Proteus mirabilis (1)     Antibiotic Interpretation Microscan Method Status    Ampicillin Sensitive <=8 mcg/mL PETRONA Final    Ceftriaxone Sensitive <=1 mcg/mL PETRONA Final    Ceftazidime Sensitive <=1 mcg/mL PETRONA Final    Cefotaxime Sensitive <=2 mcg/mL PETRONA Final    Cefazolin Sensitive <=2 mcg/mL PETRONA Final    Ciprofloxacin Sensitive <=0.25 mcg/mL PETRONA Final    Cefepime Sensitive <=2 mcg/mL PETRONA Final    Cefuroxime Sensitive <=4 mcg/mL PETRONA Final    Ertapenem Sensitive <=0.5 mcg/mL PETRONA Final    Gentamicin Sensitive <=2 mcg/mL PETRONA Final    Moxifloxacin Sensitive <=2 mcg/mL PETRONA Final    Pip/Tazobactam Sensitive <=8 mcg/mL PETRONA Final     Trimeth/Sulfa Sensitive <=0.5/9.5 mcg/mL PETRONA Final    Tobramycin Sensitive <=2 mcg/mL PETRONA Final          Staphylococcus aureus (4)     Antibiotic Interpretation Microscan Method Status    Cefotaxime Sensitive <=8 mcg/mL PETRONA Final    Cefazolin Sensitive <=8 mcg/mL PETRONA Final    Cefepime Sensitive <=4 mcg/mL PETRONA Final    Ampicillin/sulbactam Sensitive <=8/4 mcg/mL PETRONA Final    Pip/Tazobactam Sensitive <=8 mcg/mL PETRONA Final    Vancomycin Sensitive 1 mcg/mL PETRONA Final    Trimeth/Sulfa Sensitive <=0.5/9.5 mcg/mL PETRONA Final    Tetracycline Sensitive <=4 mcg/mL PETRONA Final    Azithromycin Sensitive <=2 mcg/mL PETRONA Final    Clindamycin Sensitive <=0.25 mcg/mL PETRONA Final    Ceftaroline Sensitive <=0.5 mcg/mL PETRONA Final    Daptomycin Sensitive <=0.5 mcg/mL PETRONA Final    Erythromycin Sensitive <=0.25 mcg/mL PETRONA Final    Oxacillin Sensitive <=0.25 mcg/mL PETRONA Final    Penicillin Resistant >2 mcg/mL PETRONA Final                   Anaerobic Culture [[id number]] Collected: 02/11/21 2042    Order Status: Completed Specimen: Wound Updated: 02/15/21 1120     Significant Indicator NEG     Source WND     Site Gastric Ulcer Fluid     Culture Result No Anaerobes isolated.    Narrative:      Surgery Specimen    Fungal Culture [[id number]] Collected: 02/11/21 2042    Order Status: Completed Specimen: Wound Updated: 02/15/21 1120     Significant Indicator NEG     Source WND     Site Gastric Ulcer Fluid     Culture Result No fungal growth to date.    Narrative:      Surgery Specimen    BLOOD CULTURE [[id number]]  (Abnormal) Collected: 02/11/21 1810    Order Status: Completed Specimen: Blood from Peripheral Updated: 02/14/21 0732     Significant Indicator POS     Source BLD     Site PERIPHERAL     Culture Result Growth detected by Bactec instrument. 02/12/2021  11:05      Staphylococcus aureus  See previous culture for sensitivity report.      Narrative:      CALL  Ambrocio  141 tel. [phone],  CALLED  141 tel. [phone] 02/12/2021, 11:09, RB PERF. RESULTS  "CALLED TO:  [id number] RN  2 of 2 blood culture x2  Sites order. Per Hospital Policy:  Only change Specimen Src: to \"Line\" if specified by physician  order.  Right AC    BLOOD CULTURE [[id number]]  (Abnormal)  (Susceptibility) Collected: 02/11/21 1800    Order Status: Completed Specimen: Blood from Peripheral Updated: 02/14/21 0731     Significant Indicator POS     Source BLD     Site PERIPHERAL     Culture Result Growth detected by Bactec instrument. 02/12/2021  11:05  Staphylococcus aureus (methicillin sensitive)  detected by PCR.        Staphylococcus aureus    Narrative:      CALL  Ambrocio  141 tel. [phone],  CALLED  141 tel. [phone] 02/12/2021, 11:09, RB PERF. RESULTS CALLED TO:  [id number] RN and Vianca3 Areli  1 of 2 for Blood Culture x 2 sites order. Per Hospital  Policy: Only change Specimen Src: to \"Line\" if specified by  physician order.  Right AC    Susceptibility     Staphylococcus aureus (1)     Antibiotic Interpretation Microscan Method Status    Azithromycin Sensitive <=2 mcg/mL PETRONA Final    Clindamycin Sensitive 0.5 mcg/mL PETRONA Final    Cefotaxime Sensitive <=8 mcg/mL PETRONA Final    Cefazolin Sensitive <=8 mcg/mL PETRONA Final    Cefepime Sensitive <=4 mcg/mL PETRONA Final    Ceftaroline Sensitive <=0.5 mcg/mL PETRONA Final    Daptomycin Sensitive <=0.5 mcg/mL PETRONA Final    Ampicillin/sulbactam Sensitive <=8/4 mcg/mL PETRONA Final    Erythromycin Sensitive 0.5 mcg/mL PETRONA Final    Vancomycin Sensitive 1 mcg/mL PETRONA Final    Oxacillin Sensitive <=0.25 mcg/mL PETRONA Final    Penicillin Resistant >2 mcg/mL PETRONA Final    Pip/Tazobactam Sensitive <=8 mcg/mL PETRONA Final    Trimeth/Sulfa Sensitive <=0.5/9.5 mcg/mL PETRONA Final    Tetracycline Sensitive <=4 mcg/mL PETRONA Final                   BLOOD CULTURE [[id number]] Collected: 02/12/21 1718    Order Status: Completed Specimen: Blood from Peripheral Updated: 02/13/21 0634     Significant Indicator NEG     Source BLD     Site PERIPHERAL     Culture Result No Growth  Note: Blood cultures are " "incubated for 5 days and  are monitored continuously.Positive blood cultures  are called to the RN and reported as soon as  they are identified.      Narrative:      Per Hospital Policy: Only change Specimen Src: to \"Line\" if  specified by physician order.  Left Forearm/Arm    BLOOD CULTURE [[id number]] Collected: 02/12/21 1718    Order Status: Completed Specimen: Blood from Peripheral Updated: 02/13/21 0634     Significant Indicator NEG     Source BLD     Site PERIPHERAL     Culture Result No Growth  Note: Blood cultures are incubated for 5 days and  are monitored continuously.Positive blood cultures  are called to the RN and reported as soon as  they are identified.      Narrative:      Per Hospital Policy: Only change Specimen Src: to \"Line\" if  specified by physician order.  Left Hand    GRAM STAIN [[id number]] Collected: 02/11/21 2042    Order Status: Completed Specimen: Wound Updated: 02/12/21 0604     Significant Indicator .     Source WND     Site Gastric Ulcer Fluid     Gram Stain Result Many WBCs.  Few Gram positive cocci.  Rare Gram negative rods.      Narrative:      Surgery Specimen    SARS-CoV-2 PCR (24 hour In-House): Collect NP swab in [facility] [[id number]] Collected: 02/11/21 1935    Order Status: Completed Specimen: Respirate from Nasopharyngeal Updated: 02/11/21 2249     SARS-CoV-2 Source NP Swab     SARS-CoV-2 by PCR NotDetected     Comment: PATIENTS: Important information regarding your results and instructions can  be found at [email or web address]   \"After your  Covid-19 Test\"  [facility] providers: PLEASE REFER TO DE-ESCALATION AND RETESTING PROTOCOL  on [email or web address]  **The TaqPath COVID-19 SARS-CoV-2 test has been made available for use under  the Emergency Use Authorization (EUA) only.         Narrative:      Have you been in close contact with a person who is suspected  or known to be positive for COVID-19 within the last 30 days  (e.g. last seen that person < 30 days " ago)->Unknown          Assessment:  Pleasant 56 y.o. female patient admitted 2/11/2021. Pt has a past medical history of prior Jourdan-en-Y gastric bypass, pulmonary hypertension, diabetes mellitus and biliary colic.  She underwent laparoscopic cholecystectomy on 1/13/2021.  She has chronic abdominal pain and had been taking NSAIDs thought to result in ulceration.  She developed severe abdominal pain on 2/11/2020 and presented to the ER.  CT was done with finding of pneumoperitoneum and peritonitis.  She went back to the OR on 2/11/2021 for laparoscopic washout for perforated gastrojejunal marginal ulcer with omental patch placed.  Blood cultures were obtained and are positive for MSSA.  Patient has been treated with Flagyl fluconazole and ceftriaxone.      Problem List   MSSA bacteremia - bacteremia potentially due to GI ulceration (also growing from ulcer tissue culture)  -Blood cultures on 2/11 2/2 + MSSA  -Blood cultures on 2/12 are so far no growth  -Hardware in spine and both knees, spinal stimulator in place  -Stop cefepime, fluconazole.  Will switch to IV Ancef 2 g every 8 hours and continue the Flagyl 500 mg every 8 hours to continue MSSA coverage as well as enteric organisms as below  -Patient is also on rifampin 300 mg twice daily to prevent seeding of her multiple hardware.  Continue for now  -TTE with no definitive evidence of infective endocarditis  -Patient will need IV antibiotics given Staph aureus bacteremia and multiple hardware in place.  Okay to place midline catheter  -Patient states unable to travel daily to infusion center for once daily antibiotics.  Will see if insurance will pay for home continuous infusion IV Ancef 6 g every 24 hours.  Paper orders faxed to  on 2/15 (which was a holiday)  -Antibiotic plan will be 4 weeks of continuous infusion IV Ancef 6 g every 24 hours + p.o. rifampin 300 mg every 12 hours through 3/11/2021.  To cover peritonitis, she will also need a 2-week course of  p.o. Flagyl 500 mg every 8 hours through 2/26/2021  -At least weekly CBC with differential, CMP while on IV antibiotics  -If insurance does not pay for home IV antibiotics, other options are transfer to facility versus daily infusion center daptomycin in place of Ancef + the other oral antibiotics mentioned above    Perforated viscus (gastric ulcer) , ulcer thought due to NSAIDs   Secondary peritonitis with pneumoperitoneum  -OR on 2/11 for repair  -OR cultures were obtained from the ulcer +Proteus mirabilis (pansensitive), MSSA, Rothia, and multiple Strep species including salivarius, mitis/oralis, lutetiensis.  Unclear significance of these multiple organisms which are expected from a GI ulcer, however given secondary peritonitis, will cover these organisms  -Upper GI series on 2/14 with no obvious leak    Reported penicillin allergy  Penicillin allergy at age 1 reported as anaphylaxis.  Patient has tolerated cephalosporins. She states she recently had some amoxicillin on her fingers after giving her grandson a dose that she licked off and had resulting abdominal cramping and rash so was unwilling to challenge her penicillin allergy    Diabetes mellitus  History of gastric bypass    From an ID standpoint, okay to discharge once IV antibiotics are set up    Discussed with Dr. Tineo. ID will sign off.  ID clinic follow-up in 3 to 4 weeks

## 2021-02-16 NOTE — CARE PLAN
Problem: Knowledge Deficit  Goal: Knowledge of disease process/condition, treatment plan, diagnostic tests, and medications will improve  Outcome: PROGRESSING AS EXPECTED  Intervention: Assess knowledge level of disease process/condition, treatment plan, diagnostic tests, and medications  Note: Pt was educated on POC, MAR, shift routine and nursing education R/T Dx. Pt was educated on POC and schedule per MAR. Questions were encouraged and answered. Pt verbalized understanding of all teaching.           Problem: Pain Management  Goal: Pain level will decrease to patient's comfort goal  Outcome: PROGRESSING AS EXPECTED  Intervention: Follow pain managment plan developed in collaboration with patient and Interdisciplinary Team  Note: Pt was educated on 0-10 pain scale, non-pharm methods of pain relief and MAR. Pt states that their pain is well controlled and declines pain medication.

## 2021-02-16 NOTE — DISCHARGE PLANNING
Care Transition Team Assessment    LSW met with this patient at bedside. The patient verified the demographic information in the Facesheet.    The patient reported she lives at home with Viraj, her . The patient stated she owns a FWW and a cane.    Based on the information provided by this patient, the anticipated discharge disposition is Home.    Information Source  Orientation : Oriented x 4  Information Given By: Patient  Informant's Name: (Carolynn)  Who is responsible for making decisions for patient? : Patient    Readmission Evaluation  Is this a readmission?: No    Elopement Risk  Legal Hold: No  Ambulatory or Self Mobile in Wheelchair: Yes  Disoriented: No  Psychiatric Symptoms: None  History of Wandering: No  Elopement this Admit: No  Vocalizing Wanting to Leave: No  Displays Behaviors, Body Language Wanting to Leave: No-Not at Risk for Elopement  Elopement Risk: Not at Risk for Elopement    Interdisciplinary Discharge Planning  Patient or legal guardian wants to designate a caregiver: No    Discharge Preparedness  What is your plan after discharge?: Home with help  What are your discharge supports?: Spouse  Prior Functional Level: Independent with Activities of Daily Living    Functional Assesment  Prior Functional Level: Independent with Activities of Daily Living    Finances  Financial Barriers to Discharge: No  Prescription Coverage: Yes    Vision / Hearing Impairment  Right Eye Vision: Impaired, Wears Glasses  Left Eye Vision: Impaired, Wears Glasses         Advance Directive  Advance Directive?: None  Advance Directive offered?: AD Booklet refused    Domestic Abuse  Have you ever been the victim of abuse or violence?: No  Physical Abuse or Sexual Abuse: No  Verbal Abuse or Emotional Abuse: No  Possible Abuse/Neglect Reported to:: Not Applicable    Psychological Assessment  History of Substance Abuse: None  History of Psychiatric Problems: No    Discharge Risks or Barriers  Discharge risks or  barriers?: No  Patient risk factors: Other (comment)    Anticipated Discharge Information  Discharge Disposition: Discharged to home/self care (01)

## 2021-02-16 NOTE — PROGRESS NOTES
Report received from Brionna PELAEZ  Assumed care of patient at 1900.    Pt is A&O x 4.  Pain reported at 3/10. Pt declined pain medication   Nausea denied   Tolerating clear liquid Diet   Surgical lap sites x 4 noted to abd. All sites are approximated and PATY with dermabond.   Mid upper ELLE drain in place. Dressing was changed. Drain is compressed to self-suction. + serous/pink tinged output noted.   + Urine output  + BM    + Flatus  Up self with steady gait.  Bed in lowest position and locked.  Pt resting comfortably now.  Review plan of care with patient  Call light within reach  Hourly rounds in place  All needs met at this time

## 2021-02-16 NOTE — DIETARY
Nutrition Services: Brief update    Pt currently on NPO/clear liquid diet for 5 days today. Discussed with General surgery PACHELSEA, okay to advance to full liquids, bariatric diet.    RD will monitor per department policy.

## 2021-02-16 NOTE — DISCHARGE PLANNING
Received Choice form at 1246  Agency/Facility Name: [facility]  Referral sent per Choice form @ [phone] -[phone]  Received Choice form at 1246  Agency/Facility Name: [facility]   Referral sent per Choice form @ 7098    Agency/Facility Name: [facility]   Spoke To: Elena   Outcome: DPA informed Elena of incoming referral    Agency/Facility Name: [facility]  Spoke To: Sylwia   Outcome: Per Sylwia, the director is reviewing referral to determine acceptance due to high census     LSW Mansi notified

## 2021-02-16 NOTE — PROGRESS NOTES
Surgical Progress Note    Author: Alex Spann P.A.-C. Date & Time created: 2021   8:30 AM     Interval Events:  POD#5 Laparoscopic washout, repair and omental patch of gastrojejunal marginal ulcer     Feeling good today.  Pain controlled.  NG tube removed, tolerated well. Intaking PO clears without nausea.  Voiding well. ELLE drain with mostly serous output at this point.  Blood cultures growing MSSA, Abx per ID.  She has been up ambulating the unit.  Doing well.     VSS this AM. Labs remains mostly unchanged. WBC 12.0 to 12.2. Discharge planning likely middle of the week to confirm midline placement once BCx negative x 48 hrs.     Review of Systems   Constitutional: Negative for chills and fever.   Respiratory: Negative for cough and shortness of breath.    Cardiovascular: Negative for chest pain and palpitations.   Gastrointestinal: Positive for abdominal pain. Negative for diarrhea, heartburn, nausea and vomiting.   Genitourinary: Negative for dysuria.     Hemodynamics:  Temp (24hrs), Av.4 °C (97.5 °F), Min:36 °C (96.8 °F), Max:36.7 °C (98.1 °F)  Temperature: 36.7 °C (98.1 °F)  Pulse  Av.9  Min: 67  Max: 126   Blood Pressure: 145/89     Respiratory:    Respiration: 18, Pulse Oximetry: 96 %        RUL Breath Sounds: Clear, RML Breath Sounds: Clear, RLL Breath Sounds: Diminished, REZA Breath Sounds: Clear, LLL Breath Sounds: Diminished  Neuro:  GCS       Fluids:    Intake/Output Summary (Last 24 hours) at 2021 0830  Last data filed at 2021 0400  Gross per 24 hour   Intake 4090 ml   Output 40 ml   Net 4050 ml        Current Diet Order   Procedures   • Diet Order Diet: Clear Liquid; Miscellaneous modifications: (optional): Bariatric; Tray Modifications (optional): No Straws     Physical Exam  Constitutional:       General: She is not in acute distress.     Appearance: She is obese.   HENT:      Head: Normocephalic and atraumatic.      Mouth/Throat:      Mouth: Mucous membranes are moist.    Eyes:      Conjunctiva/sclera: Conjunctivae normal.   Cardiovascular:      Rate and Rhythm: Normal rate and regular rhythm.   Pulmonary:      Effort: Pulmonary effort is normal.   Abdominal:      General: There is no distension.      Palpations: There is no mass.      Tenderness: There is abdominal tenderness ( epigastric area). There is no guarding or rebound.      Comments: ELLE in place. Incisions with dermabond   Musculoskeletal:      Cervical back: Normal range of motion and neck supple.   Skin:     General: Skin is warm and dry.   Neurological:      Mental Status: She is alert and oriented to person, place, and time.   Psychiatric:         Mood and Affect: Mood normal.         Behavior: Behavior normal.         Judgment: Judgment normal.       Labs:  Recent Results (from the past 24 hour(s))   Comp Metabolic Panel    Collection Time: 02/16/21  3:03 AM   Result Value Ref Range    Sodium 133 (L) 135 - 145 mmol/L    Potassium 3.1 (L) 3.6 - 5.5 mmol/L    Chloride 96 96 - 112 mmol/L    Co2 21 20 - 33 mmol/L    Anion Gap 16.0 7.0 - 16.0    Glucose 81 65 - 99 mg/dL    Bun 5 (L) 8 - 22 mg/dL    Creatinine 0.31 (L) 0.50 - 1.40 mg/dL    Calcium 8.9 8.5 - 10.5 mg/dL    AST(SGOT) 12 12 - 45 U/L    ALT(SGPT) 25 2 - 50 U/L    Alkaline Phosphatase 111 (H) 30 - 99 U/L    Total Bilirubin 1.7 (H) 0.1 - 1.5 mg/dL    Albumin 3.3 3.2 - 4.9 g/dL    Total Protein 6.6 6.0 - 8.2 g/dL    Globulin 3.3 1.9 - 3.5 g/dL    A-G Ratio 1.0 g/dL   CBC WITHOUT DIFFERENTIAL    Collection Time: 02/16/21  3:03 AM   Result Value Ref Range    WBC 12.2 (H) 4.8 - 10.8 K/uL    RBC 5.18 4.20 - 5.40 M/uL    Hemoglobin 14.6 12.0 - 16.0 g/dL    Hematocrit 45.3 37.0 - 47.0 %    MCV 87.5 81.4 - 97.8 fL    MCH 28.2 27.0 - 33.0 pg    MCHC 32.2 (L) 33.6 - 35.0 g/dL    RDW 41.2 35.9 - 50.0 fL    Platelet Count 398 164 - 446 K/uL    MPV 8.7 (L) 9.0 - 12.9 fL   MAGNESIUM    Collection Time: 02/16/21  3:03 AM   Result Value Ref Range    Magnesium 2.0 1.5 - 2.5 mg/dL    PHOSPHORUS    Collection Time: 02/16/21  3:03 AM   Result Value Ref Range    Phosphorus 3.2 2.5 - 4.5 mg/dL   ESTIMATED GFR    Collection Time: 02/16/21  3:03 AM   Result Value Ref Range    GFR If African American >60 >60 mL/min/1.73 m 2    GFR If Non African American >60 >60 mL/min/1.73 m 2     Medical Decision Making, by Problem:  Active Hospital Problems    Diagnosis    • Perforated viscus [R19.8]      Priority: High   • MSSA bacteremia [R78.81, B95.61]      Priority: High   • Sepsis (HCC) [A41.9]    • Type II diabetes (HCC) [E11.9]      Plan:    Pt is alert and oriented, NAD. Breathing unlabored. Tolerating PO.  Incisions ok. VS stable this AM. Labs show mild uptrend in WBC to 12.2. Echo showing no vegetation.  Upper GI last night shows no signs of contrast extravasation.  NG tube was discontinued.  Encouraged intake of PO clears. Shar output still primarily serous. Encouraged ambulation and incentive spirometry.  Wean O2 if appropriate. Patient was reviewed with Dr. Cox.     Perforated Viscus - PPI, advance diet as tolerated. Avoid NSAID, pain control, ID following and recommending 4 weeks of IV antibiotics Ancef and PO rifampin and 2 weeks of PO Flagyl. Needs midline placed once BCx remain negative for atleast 48 hours.     MSSA Bacteremia - ID following - see above. 2D echo with no findings of valvular vegetation.     We appreciate ID and medicine team assisting with this complex case.     Perforated viscus    Quality Measures:  Quality-Core Measures    Discussed patient condition with Patient and Dr. Cox

## 2021-02-16 NOTE — DISCHARGE PLANNING
Good afternoon,    This referral has been escalated to a Clinical Supervisor for review in order to determine Home Health appropriateness. We will also need a valid HH order placed since there is not one listed as well as patient DC date. This issue will be resolved as quickly as possible, but for any questions feel free to call us at [phone].  Thank you!

## 2021-02-16 NOTE — DISCHARGE PLANNING
Anticipated Discharge Disposition: Home with HHC and Home Infusion VS Home with Outpatient Infusion.    Action: LSW received an order for IV Infusion. LSW met with the patient at bedside to discuss the daily infusion. The patient reported she has limited transportation; therefor, her preference is home infusion; however, if her insurance doesn't cover the cost of home infusion, she will reach out to friends and family to help with the daily transportation.    The patient signed choice forms for [facility] and [facility], choice forms faxed to LARISA Anaya.    Barriers to Discharge: Medical Clearance. Infusion Therapy.    Plan: As Above. Awaiting responses from HHC and Infusion.    1:55pm - WOODYW sent a message via [employer] to MD regarding the F2F and HHC Order.

## 2021-02-16 NOTE — FACE TO FACE
Face to Face Supporting Documentation - Home Health    The encounter with this patient was in whole or in part the primary reason for home health admission.    Date of encounter:   Patient:                    MRN:                       YOB: 2023  Carolynn Casey  [id number]  1965     Home health to see patient for:  Skilled Nursing care for assessment, interventions & education    Skilled need for:  Surgical Aftercare Abscess    Skilled nursing interventions to include:  Home IV infusion therapy    Homebound status evidenced by:  Needs the assistance of another person in order to leave the home. Leaving home requires a considerable and taxing effort. There is a normal inability to leave the home.    Community Physician to provide follow up care: Kandace Dent M.D.     Optional Interventions? No      I certify the face to face encounter for this home health care referral meets the CMS requirements and the encounter/clinical assessment with the patient was, in whole, or in part, for the medical condition(s) listed above, which is the primary reason for home health care. Based on my clinical findings: the service(s) are medically necessary, support the need for home health care, and the homebound criteria are met.  I certify that this patient has had a face to face encounter by myself.  Alvin Tineo M.D. - NPI: [id number]

## 2021-02-17 ENCOUNTER — APPOINTMENT (OUTPATIENT)
Dept: RADIOLOGY | Facility: MEDICAL CENTER | Age: 56
DRG: 853 | End: 2021-02-17
Attending: STUDENT IN AN ORGANIZED HEALTH CARE EDUCATION/TRAINING PROGRAM
Payer: MEDICARE

## 2021-02-17 LAB
ALBUMIN SERPL BCP-MCNC: 3.3 G/DL (ref 3.2–4.9)
ALBUMIN/GLOB SERPL: 1.1 G/DL
ALP SERPL-CCNC: 108 U/L (ref 30–99)
ALT SERPL-CCNC: 18 U/L (ref 2–50)
ANION GAP SERPL CALC-SCNC: 14 MMOL/L (ref 7–16)
AST SERPL-CCNC: 15 U/L (ref 12–45)
BACTERIA BLD CULT: NORMAL
BACTERIA BLD CULT: NORMAL
BILIRUB SERPL-MCNC: 0.7 MG/DL (ref 0.1–1.5)
BUN SERPL-MCNC: 7 MG/DL (ref 8–22)
CALCIUM SERPL-MCNC: 8.8 MG/DL (ref 8.5–10.5)
CHLORIDE SERPL-SCNC: 98 MMOL/L (ref 96–112)
CO2 SERPL-SCNC: 23 MMOL/L (ref 20–33)
CREAT SERPL-MCNC: 0.44 MG/DL (ref 0.5–1.4)
ERYTHROCYTE [DISTWIDTH] IN BLOOD BY AUTOMATED COUNT: 41.5 FL (ref 35.9–50)
GLOBULIN SER CALC-MCNC: 3.1 G/DL (ref 1.9–3.5)
GLUCOSE SERPL-MCNC: 97 MG/DL (ref 65–99)
HCT VFR BLD AUTO: 44.7 % (ref 37–47)
HGB BLD-MCNC: 14.7 G/DL (ref 12–16)
MCH RBC QN AUTO: 28.8 PG (ref 27–33)
MCHC RBC AUTO-ENTMCNC: 32.9 G/DL (ref 33.6–35)
MCV RBC AUTO: 87.5 FL (ref 81.4–97.8)
PLATELET # BLD AUTO: 430 K/UL (ref 164–446)
PMV BLD AUTO: 8.7 FL (ref 9–12.9)
POTASSIUM SERPL-SCNC: 3.4 MMOL/L (ref 3.6–5.5)
PROT SERPL-MCNC: 6.4 G/DL (ref 6–8.2)
RBC # BLD AUTO: 5.11 M/UL (ref 4.2–5.4)
SIGNIFICANT IND 70042: NORMAL
SIGNIFICANT IND 70042: NORMAL
SITE SITE: NORMAL
SITE SITE: NORMAL
SODIUM SERPL-SCNC: 135 MMOL/L (ref 135–145)
SOURCE SOURCE: NORMAL
SOURCE SOURCE: NORMAL
WBC # BLD AUTO: 14.3 K/UL (ref 4.8–10.8)

## 2021-02-17 PROCEDURE — 770006 HCHG ROOM/CARE - MED/SURG/GYN SEMI*

## 2021-02-17 PROCEDURE — 85027 COMPLETE CBC AUTOMATED: CPT

## 2021-02-17 PROCEDURE — 700102 HCHG RX REV CODE 250 W/ 637 OVERRIDE(OP): Performed by: COLON & RECTAL SURGERY

## 2021-02-17 PROCEDURE — A9270 NON-COVERED ITEM OR SERVICE: HCPCS | Performed by: NURSE PRACTITIONER

## 2021-02-17 PROCEDURE — A9270 NON-COVERED ITEM OR SERVICE: HCPCS | Performed by: INTERNAL MEDICINE

## 2021-02-17 PROCEDURE — 80053 COMPREHEN METABOLIC PANEL: CPT

## 2021-02-17 PROCEDURE — 76937 US GUIDE VASCULAR ACCESS: CPT

## 2021-02-17 PROCEDURE — A9270 NON-COVERED ITEM OR SERVICE: HCPCS | Performed by: STUDENT IN AN ORGANIZED HEALTH CARE EDUCATION/TRAINING PROGRAM

## 2021-02-17 PROCEDURE — 99232 SBSQ HOSP IP/OBS MODERATE 35: CPT | Performed by: STUDENT IN AN ORGANIZED HEALTH CARE EDUCATION/TRAINING PROGRAM

## 2021-02-17 PROCEDURE — 05HB33Z INSERTION OF INFUSION DEVICE INTO RIGHT BASILIC VEIN, PERCUTANEOUS APPROACH: ICD-10-PCS | Performed by: INTERNAL MEDICINE

## 2021-02-17 PROCEDURE — 700102 HCHG RX REV CODE 250 W/ 637 OVERRIDE(OP): Performed by: INTERNAL MEDICINE

## 2021-02-17 PROCEDURE — 700111 HCHG RX REV CODE 636 W/ 250 OVERRIDE (IP): Performed by: COLON & RECTAL SURGERY

## 2021-02-17 PROCEDURE — 700102 HCHG RX REV CODE 250 W/ 637 OVERRIDE(OP): Performed by: NURSE PRACTITIONER

## 2021-02-17 PROCEDURE — 700102 HCHG RX REV CODE 250 W/ 637 OVERRIDE(OP): Performed by: STUDENT IN AN ORGANIZED HEALTH CARE EDUCATION/TRAINING PROGRAM

## 2021-02-17 PROCEDURE — A9270 NON-COVERED ITEM OR SERVICE: HCPCS | Performed by: COLON & RECTAL SURGERY

## 2021-02-17 PROCEDURE — 700111 HCHG RX REV CODE 636 W/ 250 OVERRIDE (IP): Performed by: STUDENT IN AN ORGANIZED HEALTH CARE EDUCATION/TRAINING PROGRAM

## 2021-02-17 PROCEDURE — 700111 HCHG RX REV CODE 636 W/ 250 OVERRIDE (IP): Performed by: INTERNAL MEDICINE

## 2021-02-17 RX ADMIN — METRONIDAZOLE 500 MG: 500 TABLET ORAL at 21:18

## 2021-02-17 RX ADMIN — LOSARTAN POTASSIUM 50 MG: 50 TABLET, FILM COATED ORAL at 17:13

## 2021-02-17 RX ADMIN — ENOXAPARIN SODIUM 40 MG: 40 INJECTION SUBCUTANEOUS at 04:43

## 2021-02-17 RX ADMIN — ENALAPRILAT 2.5 MG: 1.25 INJECTION INTRAVENOUS at 04:43

## 2021-02-17 RX ADMIN — CEFAZOLIN SODIUM 2 G: 2 INJECTION, SOLUTION INTRAVENOUS at 15:16

## 2021-02-17 RX ADMIN — OMEPRAZOLE 20 MG: 20 CAPSULE, DELAYED RELEASE ORAL at 17:13

## 2021-02-17 RX ADMIN — POTASSIUM CHLORIDE 20 MEQ: 1500 TABLET, EXTENDED RELEASE ORAL at 04:43

## 2021-02-17 RX ADMIN — METRONIDAZOLE 500 MG: 500 TABLET ORAL at 15:16

## 2021-02-17 RX ADMIN — CEFAZOLIN SODIUM 2 G: 2 INJECTION, SOLUTION INTRAVENOUS at 04:43

## 2021-02-17 RX ADMIN — HYDROMORPHONE HYDROCHLORIDE 1 MG: 1 INJECTION, SOLUTION INTRAMUSCULAR; INTRAVENOUS; SUBCUTANEOUS at 19:54

## 2021-02-17 RX ADMIN — OMEPRAZOLE 20 MG: 20 CAPSULE, DELAYED RELEASE ORAL at 04:43

## 2021-02-17 RX ADMIN — ENALAPRILAT 2.5 MG: 1.25 INJECTION INTRAVENOUS at 19:54

## 2021-02-17 RX ADMIN — AMLODIPINE BESYLATE 10 MG: 10 TABLET ORAL at 11:33

## 2021-02-17 RX ADMIN — RIFAMPIN 300 MG: 300 CAPSULE ORAL at 04:43

## 2021-02-17 RX ADMIN — RIFAMPIN 300 MG: 300 CAPSULE ORAL at 17:13

## 2021-02-17 RX ADMIN — CEFAZOLIN SODIUM 2 G: 2 INJECTION, SOLUTION INTRAVENOUS at 21:18

## 2021-02-17 RX ADMIN — METRONIDAZOLE 500 MG: 500 TABLET ORAL at 04:43

## 2021-02-17 ASSESSMENT — ENCOUNTER SYMPTOMS
FEVER: 0
CHILLS: 0
EYES NEGATIVE: 1
VOMITING: 0
RESPIRATORY NEGATIVE: 1
HEARTBURN: 0
PSYCHIATRIC NEGATIVE: 1
COUGH: 0
PALPITATIONS: 0
NAUSEA: 0
MUSCULOSKELETAL NEGATIVE: 1
SHORTNESS OF BREATH: 0
CHILLS: 1
WEIGHT LOSS: 1
DIARRHEA: 0
CARDIOVASCULAR NEGATIVE: 1
ABDOMINAL PAIN: 1
NEUROLOGICAL NEGATIVE: 1

## 2021-02-17 ASSESSMENT — PAIN DESCRIPTION - PAIN TYPE
TYPE: ACUTE PAIN

## 2021-02-17 NOTE — PROGRESS NOTES
Surgical Progress Note    Author: Flory Guo P.A.-C. Date & Time created: 2021   8:10 AM     Interval Events:  POD6 Laparoscopic washout, repair and omental patch of gastrojejunal marginal ulcer. Pt feels good. Minimal incisional abdominal pain, controlled with medications. She ambulating the cai. Tolerating fulls. Discussed changing to diabetic diet, as patient is bariatric surgical patient. Pt is voiding. +BM. ELLE serous.      Review of Systems   Constitutional: Negative for chills and fever.   Respiratory: Negative for cough and shortness of breath.    Cardiovascular: Negative for chest pain and palpitations.   Gastrointestinal: Positive for abdominal pain (minimal incisional). Negative for diarrhea, heartburn, nausea and vomiting.   Genitourinary: Negative for dysuria.     Hemodynamics:  Temp (24hrs), Av.8 °C (98.3 °F), Min:36.4 °C (97.6 °F), Max:37.1 °C (98.8 °F)  Temperature: 36.4 °C (97.6 °F)  Pulse  Av.5  Min: 67  Max: 126   Blood Pressure: (!) 165/99(rn notified)     Respiratory:    Respiration: 16, Pulse Oximetry: 94 %        RUL Breath Sounds: Clear, RML Breath Sounds: Clear, RLL Breath Sounds: Diminished, REZA Breath Sounds: Clear, LLL Breath Sounds: Diminished  Neuro:  GCS       Fluids:    Intake/Output Summary (Last 24 hours) at 2021 0810  Last data filed at 2021 0330  Gross per 24 hour   Intake 2750 ml   Output 755 ml   Net 1995 ml        Current Diet Order   Procedures   • Diet Order Diet: Full Liquid; Miscellaneous modifications: (optional): Bariatric; Tray Modifications (optional): No Straws     Physical Exam  Constitutional:       Appearance: She is obese.   HENT:      Head: Normocephalic and atraumatic.      Mouth/Throat:      Pharynx: Oropharynx is clear.   Eyes:      Conjunctiva/sclera: Conjunctivae normal.   Cardiovascular:      Rate and Rhythm: Normal rate and regular rhythm.   Pulmonary:      Effort: Pulmonary effort is normal.   Abdominal:      General: There is no  distension.      Palpations: Abdomen is soft. There is no mass.      Tenderness: There is abdominal tenderness (incisional). There is no guarding or rebound.      Comments: ELLE serous   Musculoskeletal:         General: Normal range of motion.      Cervical back: Normal range of motion.   Skin:     General: Skin is warm and dry.      Findings: No erythema or rash.      Comments: Incisions with dermabond   Neurological:      Mental Status: She is alert and oriented to person, place, and time.   Psychiatric:         Mood and Affect: Mood normal.       Labs:  Recent Results (from the past 24 hour(s))   Comp Metabolic Panel    Collection Time: 02/17/21  6:19 AM   Result Value Ref Range    Sodium 135 135 - 145 mmol/L    Potassium 3.4 (L) 3.6 - 5.5 mmol/L    Chloride 98 96 - 112 mmol/L    Co2 23 20 - 33 mmol/L    Anion Gap 14.0 7.0 - 16.0    Glucose 97 65 - 99 mg/dL    Bun 7 (L) 8 - 22 mg/dL    Creatinine 0.44 (L) 0.50 - 1.40 mg/dL    Calcium 8.8 8.5 - 10.5 mg/dL    AST(SGOT) 15 12 - 45 U/L    ALT(SGPT) 18 2 - 50 U/L    Alkaline Phosphatase 108 (H) 30 - 99 U/L    Total Bilirubin 0.7 0.1 - 1.5 mg/dL    Albumin 3.3 3.2 - 4.9 g/dL    Total Protein 6.4 6.0 - 8.2 g/dL    Globulin 3.1 1.9 - 3.5 g/dL    A-G Ratio 1.1 g/dL   CBC WITHOUT DIFFERENTIAL    Collection Time: 02/17/21  6:19 AM   Result Value Ref Range    WBC 14.3 (H) 4.8 - 10.8 K/uL    RBC 5.11 4.20 - 5.40 M/uL    Hemoglobin 14.7 12.0 - 16.0 g/dL    Hematocrit 44.7 37.0 - 47.0 %    MCV 87.5 81.4 - 97.8 fL    MCH 28.8 27.0 - 33.0 pg    MCHC 32.9 (L) 33.6 - 35.0 g/dL    RDW 41.5 35.9 - 50.0 fL    Platelet Count 430 164 - 446 K/uL    MPV 8.7 (L) 9.0 - 12.9 fL   ESTIMATED GFR    Collection Time: 02/17/21  6:19 AM   Result Value Ref Range    GFR If African American >60 >60 mL/min/1.73 m 2    GFR If Non African American >60 >60 mL/min/1.73 m 2     Medical Decision Making, by Problem:  Active Hospital Problems    Diagnosis    • Perforated viscus [R19.8]      Priority: High   •  MSSA bacteremia [R78.81, B95.61]      Priority: High   • Sepsis (HCC) [A41.9]    • Type II diabetes (HCC) [E11.9]      Plan:  Pt is alert and oriented, NAD. Breathing unlabored. Tolerating PO.  Incisions ok. ELLE serous. VS reviewed.  Labs reviewed.  Leukocytosis trended up a little to 14.3. Abx changed by ID yesterday to Ancef and flagyl along with rifampin.  CMP pending today.  Encouraged ambulation and incentive spirometry.  Continue PPI.  Continue ELLE.  Advance to full liquid diet, no concentrated sweets as patient is bariatric surgical patient. Add boost supplement. Await Home health approval for home abx recommended by ID. Appreciate Medicine and ID consulting. Discussed with Dr. Cox.      Quality Measures:  Quality-Core Measures   Reviewed items::  Labs reviewed  DVT prophylaxis pharmacological::  Enoxaparin (Lovenox)  DVT prophylaxis - mechanical:  SCDs      Discussed patient condition with RN, Patient and Dr. Cox

## 2021-02-17 NOTE — FACE TO FACE
Face to Face Supporting Documentation - Home Health    The encounter with this patient was in whole or in part the primary reason for home health admission.    Date of encounter:   Patient:                    MRN:                       YOB: 2023  Carolynn Casey  [id number]  1965     Home health to see patient for:  Home health aide   ELLE Drain Care    Skilled need for:  Surgical Aftercare Perforated Viscous Ulcer   ELLE Drain Care    Skilled nursing interventions to include:  Home IV infusion therapy   ELLE Drain Care    Homebound status evidenced by:  Needs the assistance of another person in order to leave the home. Leaving home requires a considerable and taxing effort. There is a normal inability to leave the home.    Community Physician to provide follow up care: Kandace Dent M.D.     Optional Interventions? No      I certify the face to face encounter for this home health care referral meets the CMS requirements and the encounter/clinical assessment with the patient was, in whole, or in part, for the medical condition(s) listed above, which is the primary reason for home health care. Based on my clinical findings: the service(s) are medically necessary, support the need for home health care, and the homebound criteria are met.  I certify that this patient has had a face to face encounter by myself.  Alvin Tineo M.D. - NPI: [id number]

## 2021-02-17 NOTE — DISCHARGE PLANNING
Thank you for sending this referral to [facility] . Referral has been reviewed by a clinical supervisor and is wanting to know if [facility] can come in and hook the patient up the day of dc and we will go out the next day for a HH visit. Also, the HH order needs to include ELLE drain Care.     Referral still on hold.     Thanks,   [facility]

## 2021-02-17 NOTE — CARE PLAN
Problem: Knowledge Deficit  Goal: Knowledge of disease process/condition, treatment plan, diagnostic tests, and medications will improve  Outcome: PROGRESSING AS EXPECTED  Intervention: Assess knowledge level of disease process/condition, treatment plan, diagnostic tests, and medications  Note: Pt was educated on POC, MAR, shift routine and nursing education R/T Dx. Pt was educated on discharge planning and plan for midline IV. Questions were encouraged and answered. Pt verbalized understanding of all teaching.           Problem: Pain Management  Goal: Pain level will decrease to patient's comfort goal  Outcome: PROGRESSING AS EXPECTED  Intervention: Follow pain managment plan developed in collaboration with patient and Interdisciplinary Team  Note: Pt was educated on 0-10 pain scale, non-pharm methods of pain relief and MAR. Pt demonstrates understanding by rating pain as a 2 on 0-10 pain scale. Pt states that their pain is well controlled and declines pain medication.

## 2021-02-17 NOTE — DISCHARGE PLANNING
Anticipated Discharge Disposition: Home with HHC and Home Infusion.    Action: Elena with [facility] informed LSW, this patient is accepted as long as [facility] accepts this referral, the Initial Teaching will be tomorrow at 2:00pm, DPA notified Cordell with [facility].     Barriers to Discharge: None.    Plan: As Above. Home with HHC and Home Infusion, pending confirmation of acceptance from [facility].

## 2021-02-17 NOTE — PROGRESS NOTES
Report received from Iraj PELAEZ  Assumed care of patient at 1900.    Pt is A&O x 4.  Pain reported at 2/10. Pt declined PRN pain medication   Nausea denied   Tolerating full liquid Diet   4 abd lap sites are still approximated with dermabond and PATY.   ELLE to mid upper abd. Dressing is CDI with scant serous drainage noted to dressing. Pt declined to have dressing changed. Drain is compressed to self-suction. + serous output noted.   + Urine output  + BM    + Flatus  Up self with steady gait.  Bed in lowest position and locked.  Pt resting comfortably now.  Review plan of care with patient  Call light within reach  Hourly rounds in place  All needs met at this time

## 2021-02-17 NOTE — PROGRESS NOTES
[facility] Medicine Daily Progress Note    Date of Service  2/17/2021    Chief Complaint  56 y.o. female admitted 2/11/2021 with a perforated viscus from gastric ulcer.    Hospital Course  56 y.o. female admitted 2/11/2021 with perforated viscus from gastric ulcer.  She is S/p Laparoscopic washout, repair and omental patch of gastrojejunal marginal ulcer.  Blood cultures growing MSSA. Currently on broad-spectrum antibiotics. ID following for antibiotics management.    Interval Problem Update  Patient seen and examined at bedside this morning.  No acute events overnight.      - She is S/p Laparoscopic washout, repair and omental patch of gastrojejunal marginal ulcer. NG tube removed. ADAT. General surgery is primary.  - Blood cultures growing MSSA. Antibiotics adjusted. Repeat blood cultures ordered. 2D echo with no findings of valvular vegetations.   -ID following and recommending 4 weeks of IV antibiotics Ancef and p.o. rifampin and 2 weeks of p.o. Flagyl.  Needs a midline placed once blood cultures remain negative for at least 48 hours.    Awaiting Home Health Approval for Home Abx Set Up  Dr. Blunt faxed infusion order to continuous home infusion to . If insurance does not cover Abx infusion patient will either have to be transferred to a facility that can accomodate daily abx infusion or she needs to go to a daily infusion center for abx.         Consultants/Specialty  Infectious Disease  General Surgery     Code Status  Full Code    Disposition  TBD    Review of Systems  Review of Systems   Constitutional: Positive for chills, malaise/fatigue and weight loss.   HENT: Negative.    Eyes: Negative.    Respiratory: Negative.    Cardiovascular: Negative.    Gastrointestinal: Positive for abdominal pain.   Genitourinary: Negative.    Musculoskeletal: Negative.    Skin: Negative.    Neurological: Negative.    Endo/Heme/Allergies: Negative.    Psychiatric/Behavioral: Negative.         Physical Exam  Temp:  [36.4 °C  (97.6 °F)-37.1 °C (98.8 °F)] 36.4 °C (97.6 °F)  Pulse:  [] 84  Resp:  [16-18] 16  BP: (153-168)/(92-99) 165/99  SpO2:  [94 %-95 %] 94 %    Physical Exam  Constitutional:       Appearance: Normal appearance.   HENT:      Head: Normocephalic and atraumatic.      Right Ear: External ear normal.      Left Ear: External ear normal.      Nose: Nose normal.      Mouth/Throat:      Mouth: Mucous membranes are moist.   Eyes:      Pupils: Pupils are equal, round, and reactive to light.   Cardiovascular:      Rate and Rhythm: Normal rate and regular rhythm.      Pulses: Normal pulses.      Heart sounds: Normal heart sounds.   Pulmonary:      Effort: Pulmonary effort is normal.      Breath sounds: Normal breath sounds.   Abdominal:      General: Abdomen is flat.   Musculoskeletal:         General: Normal range of motion.      Cervical back: Neck supple.   Skin:     General: Skin is warm.      Capillary Refill: Capillary refill takes less than 2 seconds.   Neurological:      General: No focal deficit present.   Psychiatric:         Mood and Affect: Mood normal.         Fluids    Intake/Output Summary (Last 24 hours) at 2/17/2021 0833  Last data filed at 2/17/2021 0417  Gross per 24 hour   Intake 1280 ml   Output 30 ml   Net 1250 ml       Laboratory  Recent Labs     02/15/21  0655 02/16/21  0303 02/17/21  0619   WBC 12.0* 12.2* 14.3*   RBC 4.89 5.18 5.11   HEMOGLOBIN 14.2 14.6 14.7   HEMATOCRIT 43.5 45.3 44.7   MCV 89.0 87.5 87.5   MCH 29.0 28.2 28.8   MCHC 32.6* 32.2* 32.9*   RDW 41.7 41.2 41.5   PLATELETCT 377 398 430   MPV 8.6* 8.7* 8.7*     Recent Labs     02/15/21  0655 02/16/21  0303 02/17/21  0619   SODIUM 135 133* 135   POTASSIUM 2.9* 3.1* 3.4*   CHLORIDE 94* 96 98   CO2 23 21 23   GLUCOSE 90 81 97   BUN 5* 5* 7*   CREATININE 0.39* 0.31* 0.44*   CALCIUM 9.0 8.9 8.8                   Imaging  IR-US GUIDED PIV   Final Result    Ultrasound-guided PERIPHERAL IV INSERTION performed by    qualified nursing staff as above.       EC-ECHOCARDIOGRAM COMPLETE W/O CONT   Final Result      DX-UPPER GI-SERIES WITH KUB   Final Result      No evidence for leakage from the gastrojejunostomy      IR-US GUIDED PIV   Final Result    Ultrasound-guided PERIPHERAL IV INSERTION performed by    qualified nursing staff as above.           Assessment/Plan  * Perforated viscus- (present on admission)  Assessment & Plan  S/p Laparoscopic washout, repair and omental patch of gastrojejunal marginal ulcer.    General surgery following.  PPI.   NG tube removed.  Advance diet as tolerated.  Avoid NSAID Use  Pain control.  ID following and recommending 4 weeks of IV antibiotics Ancef and p.o. rifampin and 2 weeks of p.o. Flagyl. Needs a midline placed once blood cultures remain negative for at least 48 hours    MSSA bacteremia  Assessment & Plan  Blood cultures growing MSSA.    ID following and recommending 4 weeks of IV antibiotics Ancef and p.o. rifampin and 2 weeks of p.o. Flagyl.  Needs a midline placed once blood cultures remain negative for at least 48 hours  ID following.  Repeat blood cultures.  2D echo with no findings of valvular vegetations.    Sepsis ([facility])  Assessment & Plan  This is Sepsis Present on admission  SIRS criteria identified on my evaluation include: Tachycardia, with heart rate greater than 90 BPM and Leukocytosis, with WBC greater than 12,000  Source is Perforated Viscuous  Sepsis protocol initiated  Fluid resuscitation ordered per protocol  IV antibiotics as appropriate for source of sepsis  While organ dysfunction may be noted elsewhere in this problem list or in the chart, degree of organ dysfunction does not meet CMS criteria for severe sepsis    Blood cultures growing MSSA bacteremia.  On IV antibiotics.    Type II diabetes (HCC)- (present on admission)  Assessment & Plan  History of type 2 diabetes.  Diet controlled.  Insulin sliding scale as needed.       VTE prophylaxis: Lovenox

## 2021-02-17 NOTE — PROCEDURES
Vascular Access Team    Date of Insertion: 2/17/21  Arm Circumference: 47  Internal length: 14  External Length: 0  Vein Occupancy %: 30  Reason for Midline: antibiotic therapy   Labs: WBC 14.3, , BUN 7, Cr 0.44, GFR >60, INR na    Orders confirmed, vessel patency confirmed with ultrasound. Risks and benefits of procedure explained to patient and education regarding line associated bloodstream infections provided. Questions answered.     Power Midline placed in RUE per licensed provider order with ultrasound guidance. 4 Fr, single lumen Power Midline placed in basilic vein after 2 attempt(s). 2 mL of 1% lidocaine injected intradermally, 21 gauge microintroducer needle and modified Seldinger technique used. 14 cm catheter inserted with good blood return. Secured at 0 cm marker. Each lumen flushed without resistance with 10 mL 0.9% normal saline. Midline secured with Biopatch and Tegaderm.     Midline placement is confirmed by nurse using ultrasound and ability to flush and draw blood. Midline is appropriate for use at this time.  Patient experienced some pain, resolved post procedure, without complications.  No X-ray is needed for placement confirmation.  Patient condition relayed to unit RN or ordering physician via this post procedure note in the EMR.     Ultrasound images uploaded to PACS and viewable in the EMR - yes  Ultrasound imaged printed and placed in paper chart - no     BARD Power Midline ref # [id number], Lot # [id number], Expiration Date 05/31/2021

## 2021-02-17 NOTE — PROGRESS NOTES
Bedside report received.  Assessment complete.  A&O x 4. Patient calls appropriately.  Patient ambulates with out assist. Bed alarm n/a.   Patient has 2/10 pain. Pt declines pain meds  Denies N&V. Tolerating diet.  Surgical Shar to mid upper abd. Scant serous drainage noted, bulb to suction.  x4 lap sites CDI, dermabond, PATY.   + void, + flatus, + BM.  Patient denies SOB.  SCD's on.  Review plan with of care with patient. Call light and personal belongings with in reach. Hourly rounding in place. All needs met at this time.

## 2021-02-17 NOTE — CARE PLAN
Problem: Communication  Goal: The ability to communicate needs accurately and effectively will improve  Outcome: PROGRESSING AS EXPECTED   Pt communicates needs with staff.   Problem: Knowledge Deficit  Goal: Knowledge of disease process/condition, treatment plan, diagnostic tests, and medications will improve  Outcome: PROGRESSING AS EXPECTED  Pt updated on POC, pt verbalizes understanding.

## 2021-02-18 ENCOUNTER — PHARMACY VISIT (OUTPATIENT)
Dept: PHARMACY | Facility: MEDICAL CENTER | Age: 56
End: 2021-02-18
Payer: COMMERCIAL

## 2021-02-18 VITALS
DIASTOLIC BLOOD PRESSURE: 75 MMHG | RESPIRATION RATE: 16 BRPM | SYSTOLIC BLOOD PRESSURE: 153 MMHG | HEART RATE: 74 BPM | WEIGHT: 238.1 LBS | BODY MASS INDEX: 39.67 KG/M2 | OXYGEN SATURATION: 92 % | TEMPERATURE: 98.1 F | HEIGHT: 65 IN

## 2021-02-18 PROBLEM — R78.81 MSSA BACTEREMIA: Status: RESOLVED | Noted: 2021-02-12 | Resolved: 2021-02-18

## 2021-02-18 PROBLEM — B95.61 MSSA BACTEREMIA: Status: RESOLVED | Noted: 2021-02-12 | Resolved: 2021-02-18

## 2021-02-18 PROBLEM — A41.9 SEPSIS (HCC): Status: RESOLVED | Noted: 2021-02-12 | Resolved: 2021-02-18

## 2021-02-18 PROBLEM — R19.8 PERFORATED VISCUS: Status: RESOLVED | Noted: 2021-02-12 | Resolved: 2021-02-18

## 2021-02-18 PROCEDURE — 700111 HCHG RX REV CODE 636 W/ 250 OVERRIDE (IP): Performed by: COLON & RECTAL SURGERY

## 2021-02-18 PROCEDURE — A9270 NON-COVERED ITEM OR SERVICE: HCPCS | Performed by: COLON & RECTAL SURGERY

## 2021-02-18 PROCEDURE — 700102 HCHG RX REV CODE 250 W/ 637 OVERRIDE(OP): Performed by: COLON & RECTAL SURGERY

## 2021-02-18 PROCEDURE — 700102 HCHG RX REV CODE 250 W/ 637 OVERRIDE(OP): Performed by: STUDENT IN AN ORGANIZED HEALTH CARE EDUCATION/TRAINING PROGRAM

## 2021-02-18 PROCEDURE — 700102 HCHG RX REV CODE 250 W/ 637 OVERRIDE(OP): Performed by: NURSE PRACTITIONER

## 2021-02-18 PROCEDURE — A9270 NON-COVERED ITEM OR SERVICE: HCPCS | Performed by: NURSE PRACTITIONER

## 2021-02-18 PROCEDURE — 700111 HCHG RX REV CODE 636 W/ 250 OVERRIDE (IP): Performed by: STUDENT IN AN ORGANIZED HEALTH CARE EDUCATION/TRAINING PROGRAM

## 2021-02-18 PROCEDURE — 700111 HCHG RX REV CODE 636 W/ 250 OVERRIDE (IP): Performed by: INTERNAL MEDICINE

## 2021-02-18 PROCEDURE — 99239 HOSP IP/OBS DSCHRG MGMT >30: CPT | Performed by: STUDENT IN AN ORGANIZED HEALTH CARE EDUCATION/TRAINING PROGRAM

## 2021-02-18 PROCEDURE — RXMED WILLOW AMBULATORY MEDICATION CHARGE: Performed by: STUDENT IN AN ORGANIZED HEALTH CARE EDUCATION/TRAINING PROGRAM

## 2021-02-18 PROCEDURE — 700102 HCHG RX REV CODE 250 W/ 637 OVERRIDE(OP): Performed by: INTERNAL MEDICINE

## 2021-02-18 PROCEDURE — A9270 NON-COVERED ITEM OR SERVICE: HCPCS | Performed by: INTERNAL MEDICINE

## 2021-02-18 PROCEDURE — A9270 NON-COVERED ITEM OR SERVICE: HCPCS | Performed by: STUDENT IN AN ORGANIZED HEALTH CARE EDUCATION/TRAINING PROGRAM

## 2021-02-18 RX ORDER — RIFAMPIN 300 MG/1
300 CAPSULE ORAL 2 TIMES DAILY
Qty: 42 CAPSULE | Refills: 0 | Status: SHIPPED | OUTPATIENT
Start: 2021-02-18 | End: 2021-02-23

## 2021-02-18 RX ORDER — METRONIDAZOLE 500 MG/1
500 TABLET ORAL 3 TIMES DAILY
Qty: 24 TABLET | Refills: 0 | Status: SHIPPED | OUTPATIENT
Start: 2021-02-18 | End: 2021-02-22

## 2021-02-18 RX ADMIN — RIFAMPIN 300 MG: 300 CAPSULE ORAL at 04:34

## 2021-02-18 RX ADMIN — CEFAZOLIN SODIUM 2 G: 2 INJECTION, SOLUTION INTRAVENOUS at 04:34

## 2021-02-18 RX ADMIN — METRONIDAZOLE 500 MG: 500 TABLET ORAL at 04:34

## 2021-02-18 RX ADMIN — METRONIDAZOLE 500 MG: 500 TABLET ORAL at 15:45

## 2021-02-18 RX ADMIN — HYDROMORPHONE HYDROCHLORIDE 1 MG: 1 INJECTION, SOLUTION INTRAMUSCULAR; INTRAVENOUS; SUBCUTANEOUS at 04:34

## 2021-02-18 RX ADMIN — AMLODIPINE BESYLATE 10 MG: 10 TABLET ORAL at 09:06

## 2021-02-18 RX ADMIN — OMEPRAZOLE 20 MG: 20 CAPSULE, DELAYED RELEASE ORAL at 04:34

## 2021-02-18 RX ADMIN — ENOXAPARIN SODIUM 40 MG: 40 INJECTION SUBCUTANEOUS at 04:34

## 2021-02-18 RX ADMIN — POTASSIUM CHLORIDE 20 MEQ: 1500 TABLET, EXTENDED RELEASE ORAL at 04:34

## 2021-02-18 ASSESSMENT — ENCOUNTER SYMPTOMS
SHORTNESS OF BREATH: 0
FEVER: 0
DIARRHEA: 0
NAUSEA: 0
ABDOMINAL PAIN: 0
VOMITING: 0
HEARTBURN: 0
PALPITATIONS: 0
CHILLS: 0
COUGH: 0

## 2021-02-18 ASSESSMENT — PAIN DESCRIPTION - PAIN TYPE: TYPE: CHRONIC PAIN

## 2021-02-18 NOTE — PROGRESS NOTES
Bedside report received.  Assessment complete.  A&O x 4. Patient calls appropriately.  Patient ambulates with out assist. Bed alarm n/a.   Patient has 4/10 pain. Pt declines pain meds  Denies N&V. Tolerating diet.  Surgical Shar to mid upper abd. Scant serous drainage noted, bulb to suction.  x4 lap sites CDI, dermabond, PATY.   + void, + flatus, + BM.  Patient denies SOB.  SCD's on.  Review plan with of care with patient. Call light and personal belongings with in reach. Hourly rounding in place. All needs met at this time.

## 2021-02-18 NOTE — DISCHARGE SUMMARY
"Discharge Summary    CHIEF COMPLAINT ON ADMISSION  Chief Complaint   Patient presents with   • Sent by MD     sent by Dr. Cox. had CT done today.    • Post-op Problem     result read, \"Extraluminal air and free air are noted throughout the upper abdomen. Extraluminal air could be related to recent surgery although perforated viscus is also a possibility. Small amount of free fluid is noted in the pelvis.\"        Reason for Admission  Sent by MD     Admission Date  2/11/2021    CODE STATUS  Full Code    HPI & HOSPITAL COURSE  56 y.o. female admitted 2/11/2021 with perforated viscus from gastric ulcer.  She is S/p Laparoscopic washout, repair and omental patch of gastrojejunal marginal ulcer.  Blood cultures growing MSSA. Currently on broad-spectrum antibiotics. ID following for antibiotics management.  Patient seen and examined at bedside this morning.  No acute events overnight.      - She is S/p Laparoscopic washout, repair and omental patch of gastrojejunal marginal ulcer. NG tube removed. ADAT. General surgery is primary.  - Blood cultures growing MSSA. Antibiotics adjusted. Repeat blood cultures ordered. 2D echo with no findings of valvular vegetations.   -ID following and recommending 4 weeks of IV antibiotics Ancef and p.o. rifampin and 2 weeks of p.o. Flagyl.  Needs a midline placed once blood cultures remain negative for at least 48 hours.     Awaiting Home Health Approval for Home Abx Set Up  Dr. Blunt faxed infusion order to continuous home infusion to . If insurance does not cover Abx infusion patient will either have to be transferred to a facility that can accomodate daily abx infusion or she needs to go to a daily infusion center for abx.     Patient Accepted for Home Health Infusion     Will complete 6 weeks of IV Ancef 6g Daily Continuous Infusion Over 23 hours and Rifampin 300mg Oral every 12 hours for 14 days (End Date 3/11/21)  along with Flagyl 500mg every 8 hours for 14 days (End Date " 2/26/11)    Therefore, she is discharged in good and stable condition to home with close outpatient follow-up.    The patient met 2-midnight criteria for an inpatient stay at the time of discharge.    Discharge Date  2/18/21    FOLLOW UP ITEMS POST DISCHARGE  Follow up with PCP in a week    DISCHARGE DIAGNOSES  Principal Problem (Resolved):    Perforated viscus POA: Yes  Active Problems:    Type II diabetes (HCC) POA: Yes  Resolved Problems:    MSSA bacteremia POA: Unknown    Sepsis (HCC) POA: Unknown      FOLLOW UP  Future Appointments   Date Time Provider Department Center   2/25/2021 11:45 AM [facility] None   3/1/2021 11:30 AM Kandace Dent M.D. [address]   3/3/2021  1:30 PM [facility] [address]- [address] [address]  [phone]  Call  Please call [facility] for assistance with transportation and scheduling appointments.    [facility]   [phone]  Call  Call to ronny rodriguez with [employer] [facility]. You have already been scheuled for your appointment on 3/1/2021 with Knadace Dent M.D.      MEDICATIONS ON DISCHARGE     Medication List      START taking these medications      Instructions   metroNIDAZOLE 500 MG Tabs  Commonly known as: FLAGYL   Take 1 tablet by mouth 3 times a day for 8 days.  Dose: 500 mg     riFAMPin 300 MG Caps  Commonly known as: RIFADINE   Take 1 capsule by mouth 2 times a day for 21 days.  Dose: 300 mg        CHANGE how you take these medications      Instructions   losartan 50 MG Tabs  What changed: when to take this  Commonly known as: COZAAR   Take 1 Tab by mouth every day.  Dose: 50 mg        CONTINUE taking these medications      Instructions   amLODIPine 10 MG Tabs  Commonly known as: NORVASC   Take 1 Tab by mouth every day.  Dose: 10 mg     cyclobenzaprine 10 mg Tabs  Commonly known as: Flexeril   Take 1 Tab by mouth 2 times a day as needed for Mild Pain or Muscle Spasms.  Dose: 10 mg    "  multivitamin Tabs   Take 1 Tab by mouth every morning.  Dose: 1 tablet     SUDAFED COUGH PO   Take 1 Tab by mouth 3 times a day as needed.  Dose: 1 tablet     tizanidine 4 MG capsule  Commonly known as: Zanaflex   Take 1 Cap by mouth at bedtime as needed.  Dose: 4 mg     zolpidem 5 MG Tabs  Commonly known as: AMBIEN   Take 1 Tab by mouth at bedtime as needed for Sleep for up to 30 days.  Dose: 5 mg        ASK your doctor about these medications      Instructions   acetaminophen 500 MG Tabs  Commonly known as: TYLENOL   Take 2,000 mg by mouth 3 times a day as needed.  Dose: 2,000 mg            Allergies  Allergies   Allergen Reactions   • Cortisone Anaphylaxis     RXN=since age 12   • Naprosyn [Naproxen] Unspecified     \"GI bleed\"  RXN=whole life   • Penicillins Anaphylaxis     RXN=since age 1   • Fish Vomiting and Nausea   • Keflex Diarrhea and Nausea     RXN=20 years ago  Tolerated Ancef 02/2021   • Latex Rash and Itching     Rash, itching  RXN=20 years ago   • Shellfish Allergy Vomiting and Nausea     Betadine/iodine for surgery is ok   • Tape Rash     Plastic tape \"bubble up the skin\", reports tegaderm OK  RXN=ongoing       DIET  Orders Placed This Encounter   Procedures   • Diet Order Diet: Full Liquid (no concentrated sweets); Miscellaneous modifications: (optional): Bariatric; Tray Modifications (optional): No Straws     Standing Status:   Standing     Number of Occurrences:   1     Order Specific Question:   Diet:     Answer:   Full Liquid [11]     Comments:   no concentrated sweets     Order Specific Question:   Miscellaneous modifications: (optional)     Answer:   Bariatric [3]     Order Specific Question:   Tray Modifications (optional)     Answer:   No Straws       ACTIVITY  As tolerated.  Weight bearing as tolerated    CONSULTATIONS  Infectious Disease  General Surgery    PROCEDURES  Exploratory Laparotomy with Washout    LABORATORY  Lab Results   Component Value Date    SODIUM 135 02/17/2021    " POTASSIUM 3.4 (L) 02/17/2021    CHLORIDE 98 02/17/2021    CO2 23 02/17/2021    GLUCOSE 97 02/17/2021    BUN 7 (L) 02/17/2021    CREATININE 0.44 (L) 02/17/2021        Lab Results   Component Value Date    WBC 14.3 (H) 02/17/2021    HEMOGLOBIN 14.7 02/17/2021    HEMATOCRIT 44.7 02/17/2021    PLATELETCT 430 02/17/2021        Total time of the discharge process exceeds 37 minutes.

## 2021-02-18 NOTE — DISCHARGE PLANNING
Agency/Facility Name: [facility]  Outcome: Per EPIC response, Pt accepted and can be seem within 48 hours    WOODYW Mansi notified     Agency/Facility Name: [facility]   Spoke To: Elena   Outcome: DPA informed Elena of HH acceptance

## 2021-02-18 NOTE — DISCHARGE PLANNING
ATTN: Case Management  RE: Referral for Home Health    As of 02/18/2021, we have accepted the Home Health referral for the patient listed above. Please inform us when the patient discharges so we can coordinate care.    A [facility] clinician will be out to see the patient within 48 hours. If you have any questions or concerns regarding the patient's transition to Home Health, please do not hesitate to contact us at [phone].      We look forward to collaborating with you,  [facility] Home Health Team

## 2021-02-18 NOTE — PROGRESS NOTES
Surgical Progress Note    Author: MINGO Mcknight Date & Time created: 2021   8:37 AM     Interval Events:  POD7 Laparoscopic washout, repair and omental patch of gastrojejunal marginal ulcer    Subjectively feeling great.  Has minimal incisional abdominal pain, controlled with medications.  Ambulating the halls. Tolerating full liquids without nausea. Positive flatus and BM.  Voiding well.  Minimal serous output from ELLE drain.    Review of Systems   Constitutional: Negative for chills and fever.   Respiratory: Negative for cough and shortness of breath.    Cardiovascular: Negative for chest pain and palpitations.   Gastrointestinal: Negative for abdominal pain, diarrhea, heartburn, nausea and vomiting.   Genitourinary: Negative for dysuria.     Hemodynamics:  Temp (24hrs), Av °C (98.6 °F), Min:36.8 °C (98.3 °F), Max:37.3 °C (99.2 °F)  Temperature: 36.8 °C (98.3 °F)  Pulse  Av.6  Min: 67  Max: 126   Blood Pressure: 142/88     Respiratory:    Respiration: 16, Pulse Oximetry: 93 %        RUL Breath Sounds: Clear, RML Breath Sounds: Clear, RLL Breath Sounds: Diminished, REZA Breath Sounds: Clear, LLL Breath Sounds: Diminished  Neuro:  GCS       Fluids:    Intake/Output Summary (Last 24 hours) at 2021 0837  Last data filed at 2021 0500  Gross per 24 hour   Intake 580 ml   Output 20 ml   Net 560 ml        Current Diet Order   Procedures   • Diet Order Diet: Full Liquid (no concentrated sweets); Miscellaneous modifications: (optional): Bariatric; Tray Modifications (optional): No Straws     Physical Exam  Constitutional:       General: She is not in acute distress.     Appearance: She is obese.   HENT:      Head: Normocephalic and atraumatic.      Mouth/Throat:      Mouth: Mucous membranes are moist.   Eyes:      Conjunctiva/sclera: Conjunctivae normal.   Cardiovascular:      Rate and Rhythm: Normal rate and regular rhythm.   Pulmonary:      Effort: Pulmonary effort is normal.   Abdominal:       General: There is no distension.      Palpations: There is no mass.      Tenderness: There is abdominal tenderness ( incisional). There is no guarding or rebound.      Comments: Lower quadrant ELLE drain, mild serous output.   Musculoskeletal:      Cervical back: Normal range of motion and neck supple.   Skin:     General: Skin is warm and dry.   Neurological:      Mental Status: She is alert and oriented to person, place, and time.   Psychiatric:         Mood and Affect: Mood normal.         Behavior: Behavior normal.         Judgment: Judgment normal.       Labs:  No results found for this or any previous visit (from the past 24 hour(s)).  Medical Decision Making, by Problem:  Active Hospital Problems    Diagnosis    • Type II diabetes (HCC) [E11.9]      Plan:  Pt is alert and oriented, NAD. Breathing unlabored. Tolerating PO.  Incisions ok. VS stable. Labs pending this AM. Encouraged ambulation and incentive spirometry.   Plan to DC with home health today with IV antibiotic infusions.  Has some questions about rate of antibiotic infusion as the person who consulted with her yesterday told her she needs infusions 23 hours a day.  Educated to follow up with her primary care physician about alternative pain management for her chronic back pain without the use of NSAIDS.  Appreciate medicine and ID's help with management of this patient.  Pt also seen and examined by Dr. Cox.      Quality Measures:  Quality-Core Measures   Reviewed items::  Labs reviewed and Medications reviewed  Howe catheter::  No Howe  DVT prophylaxis pharmacological::  Enoxaparin (Lovenox)  DVT prophylaxis - mechanical:  SCDs  Ulcer Prophylaxis::  Yes  Antibiotics:  Treating active infection/contamination beyond 24 hours perioperative coverage      Discussed patient condition with Patient and Dr Cox

## 2021-02-18 NOTE — CARE PLAN
Problem: Knowledge Deficit  Goal: Knowledge of disease process/condition, treatment plan, diagnostic tests, and medications will improve  Outcome: PROGRESSING AS EXPECTED     Problem: Fluid Volume:  Goal: Will maintain balanced intake and output  Outcome: PROGRESSING AS EXPECTED  Pt adequate PO intake

## 2021-02-18 NOTE — DISCHARGE PLANNING
Anticipated Discharge Disposition: Home with HHC and Home Infusion.    Action: Per LARISA Anaya, [facility] accepted, BENIGNO Villagomez notified via Doug Parker with [facility] to do the Innitial Teaching today at 2:00pm.    Barriers to Discharge: None.    Plan: Home with HHC and Home Infusion, pending Initial Teaching.

## 2021-02-18 NOTE — PROGRESS NOTES
Patient alert and oriented laying in bed. Is aware of plan of care and eager to go home. ELLE drain in place and dressing C/D/I

## 2021-02-18 NOTE — DISCHARGE INSTRUCTIONS
Discharge Instructions    Discharged to home by car with friend. Discharged via wheelchair, hospital escort: Yes.  Special equipment needed: Not Applicable    Be sure to schedule a follow-up appointment with your primary care doctor or any specialists as instructed.     Discharge Plan:   Diet Plan: Discussed  Activity Level: Discussed  Confirmed Follow up Appointment: Patient to Call and Schedule Appointment  Confirmed Symptoms Management: Discussed  Medication Reconciliation Updated: Yes  Influenza Vaccine Indication: Not indicated: Previously immunized this influenza season and > 8 years of age    I understand that a diet low in cholesterol, fat, and sodium is recommended for good health. Unless I have been given specific instructions below for another diet, I accept this instruction as my diet prescription.   Other diet: Gastric diabetic clears.     Special Instructions:     Peptic Ulcer    A peptic ulcer is a painful sore in the lining of your stomach or the first part of your small intestine.  What are the causes?  Common causes of this condition include:  · An infection.  · Using certain pain medicines too often or too much.  What increases the risk?  You are more likely to get this condition if you:  · Smoke.  · Have a family history of ulcer disease.  · Drink alcohol.  · Have been hospitalized in an intensive care unit (ICU).  What are the signs or symptoms?  Symptoms include:  · Burning pain in the area between the chest and the belly button. The pain may:  ? Not go away (be persistent).  ? Be worse when your stomach is empty.  ? Be worse at night.  · Heartburn.  · Feeling sick to your stomach (nauseous) and throwing up (vomiting).  · Bloating.  If the ulcer results in bleeding, it can cause you to:  · Have poop (stool) that is black and looks like tar.  · Throw up bright red blood.  · Throw up material that looks like coffee grounds.  How is this treated?  Treatment for this condition may  include:  · Stopping things that can cause the ulcer, such as:  ? Smoking.  ? Using pain medicines.  · Medicines to reduce stomach acid.  · Antibiotic medicines if the ulcer is caused by an infection.  · A procedure that is done using a small, flexible tube that has a camera at the end (upper endoscopy). This may be done if you have a bleeding ulcer.  · Surgery. This may be needed if:  ? You have a lot of bleeding.  ? The ulcer caused a hole somewhere in the digestive system.  Follow these instructions at home:  · Do not drink alcohol if your doctor tells you not to drink.  · Limit how much caffeine you take in.  · Do not use any products that contain nicotine or tobacco, such as cigarettes, e-cigarettes, and chewing tobacco. If you need help quitting, ask your doctor.  · Take over-the-counter and prescription medicines only as told by your doctor.  ? Do not stop or change your medicines unless you talk with your doctor about it first.  ? Do not take aspirin, ibuprofen, or other NSAIDs unless your doctor told you to do so.  · Keep all follow-up visits as told by your doctor. This is important.  Contact a doctor if:  · You do not get better in 7 days after you start treatment.  · You keep having an upset stomach (indigestion) or heartburn.  Get help right away if:  · You have sudden, sharp pain in your belly (abdomen).  · You have belly pain that does not go away.  · You have bloody poop (stool) or black, tarry poop.  · You throw up blood. It may look like coffee grounds.  · You feel light-headed or feel like you may pass out (faint).  · You get weak.  · You get sweaty or feel sticky and cold to the touch (clammy).  Summary  · Symptoms of a peptic ulcer include burning pain in the area between the chest and the belly button.  · Take medicines only as told by your doctor.  · Limit how much alcohol and caffeine you have.  · Keep all follow-up visits as told by your doctor.  This information is not intended to replace  advice given to you by your health care provider. Make sure you discuss any questions you have with your health care provider.  Document Released: 03/14/2011 Document Revised: 06/25/2019 Document Reviewed: 06/25/2019  Elsevier Patient Education © 2020 Elsevier Inc.      Understanding Perforated Ulcers     A peptic ulcer is an open sore in the stomach lining or the upper part of the small intestine (duodenum). An ulcer can go through all the layers of the digestive tract and form a hole (perforation). This is called a perforated ulcer. A perforated ulcer lets food and digestive juices leak out of the digestive tract. This is a serious health problem that needs urgent medical attention.   What causes perforated ulcers?  A hole will form if peptic ulcers go untreated. To find the cause of your ulcer, your healthcare provider will give you an exam and review your health history. He or she may also order tests. The main causes of peptic ulcers include:   · Infection with the H. pylori (Helicobacter pylori) bacteria. This damages the stomach lining. Digestive juices can then harm the digestive tract.   · Long-term use of some over-the-counter pain medicines such as NSAIDs (non-steroidal anti-inflammatory drugs). These include ibuprofen, naproxen, and aspirin. This makes stomach or intestinal damage more likely.   Symptoms of a perforated ulcer  Symptoms of a perforated ulcer may include:  · Sudden, severe pain in the belly (abdomen), usually in the upper abdomen  · Pain spreading to the back or shoulder  · Upset stomach (nausea) or vomiting  · Lack of appetite or feeling full  · Swollen belly or feeling bloated  Treatment for a perforated ulcer  Treatment for a perforated ulcer starts with fixing the hole in your digestive tract. This may be done with surgery.   Other treatments are aimed at easing pain and removing the cause of the ulcer. Prescription medicines may help with the following:   · Reducing the amount of acid  the stomach makes  · Coating the lining of the stomach and the duodenum  · Treating infections  Your provider may also give you different medicines if your ulcer was caused by over-the-counter pain medicines. In some cases, these medicines can’t be avoided. Check with your healthcare provider to see what's best for you.    Possible complications of a perforated ulcer  Perforated ulcers can have serious complications. These include:  · Infection of the lining of the abdomen  · Bloodstream infection  · Death  When to call your healthcare provider  Call your healthcare provider right away if you have any of these:  · Vomiting blood, or vomit that looks like coffee grounds  · Bloody, black, or tarry-looking stools  · Fever of 100.4°F (38°C) or higher, or as directed by your provider  · Chills  · Pain that gets worse  · Symptoms that don’t get better with treatment, or symptoms that get worse  · New symptoms    © 6290-2446 [employer]. All rights reserved. This information is not intended as a substitute for professional medical care. Always follow your healthcare professional's instructions.         Antibiotic-Associated Diarrhea  You or your child's diarrhea is due to taking an antibiotic medicine. This is a common reaction to these drugs. It does not mean you are allergic to the medicine. The diarrhea is usually not severe. The stools will return to normal several days after completing the antibiotic treatment. If a diaper rash occurs, you can wash the irritated area carefully. Then apply a cream or an ointment to protect the skin.  Normally it is best to complete the antibiotic treatment. To reduce symptoms avoid:  · Apple and grape fruit juices.   · Dairy products.   · Beans.   Encourage plenty of clear fluids, such as water or sports drinks. Cultured dairy products such as yogurt may help restore normal intestinal bacteria. Medicines to control the diarrhea may help reduce symptoms. But these should  not be used if the stool is bloody.   SEEK IMMEDIATE MEDICAL CARE IF:   · Diarrhea does not improve after completing the antibiotic medicine.   · You notice a fever, bloody stools, increased pain, or vomiting.   Document Released: 01/25/2006 Document Revised: 03/11/2013 Document Reviewed: 07/23/2009  ExitCare® Patient Information ©2013 [employer].  · Is patient discharged on Warfarin / Coumadin?   No     Depression / Suicide Risk    As you are discharged from this [facility] facility, it is important to learn how to keep safe from harming yourself.    Recognize the warning signs:  · Abrupt changes in personality, positive or negative- including increase in energy   · Giving away possessions  · Change in eating patterns- significant weight changes-  positive or negative  · Change in sleeping patterns- unable to sleep or sleeping all the time   · Unwillingness or inability to communicate  · Depression  · Unusual sadness, discouragement and loneliness  · Talk of wanting to die  · Neglect of personal appearance   · Rebelliousness- reckless behavior  · Withdrawal from people/activities they love  · Confusion- inability to concentrate     If you or a loved one observes any of these behaviors or has concerns about self-harm, here's what you can do:  · Talk about it- your feelings and reasons for harming yourself  · Remove any means that you might use to hurt yourself (examples: pills, rope, extension cords, firearm)  · Get professional help from the community (Mental Health, Substance Abuse, psychological counseling)  · Do not be alone:Call your Safe Contact- someone whom you trust who will be there for you.  · Call your local CRISIS HOTLINE 600-9333 or 086-246-2720  · Call your local Children's Mobile Crisis Response Team [place] (751) 378-7100 or www.[place]  · Call the toll free National Suicide Prevention Hotlines   · National Suicide Prevention Lifeline 272-293-JUWC (3757)  · National Hope Line  Network 800-SUICIDE (673-2448)

## 2021-02-19 ENCOUNTER — HOME CARE VISIT (OUTPATIENT)
Dept: HOME HEALTH SERVICES | Facility: HOME HEALTHCARE | Age: 56
End: 2021-02-19
Payer: MEDICARE

## 2021-02-19 PROCEDURE — 665001 SOC-HOME HEALTH

## 2021-02-19 PROCEDURE — G0493 RN CARE EA 15 MIN HH/HOSPICE: HCPCS

## 2021-02-19 ASSESSMENT — FIBROSIS 4 INDEX: FIB4 SCORE: 0.48

## 2021-02-19 NOTE — DISCHARGE PLANNING
Meds-to-Beds: Discharge prescription orders listed below delivered to patient's bedside. RN notified. Patient counseled.  Patient elected to have co-payment billed to patient account.        Carolynn Casey   Home Medication Instructions ROSE:[id number]    Printed on:02/18/21 1620   Medication Information                      metroNIDAZOLE (FLAGYL) 500 MG Tab  Take 1 tablet by mouth 3 times a day for 8 days.             riFAMPin (RIFADINE) 300 MG Cap  Take 1 capsule by mouth 2 times a day for 21 days.                 Litzy Feldman, Pharmacy Intern

## 2021-02-19 NOTE — PROGRESS NOTES
Discharge paperwork discussed with the patient, signed copy placed in the chart. All questions answered at this time. meds to beds delivery completed to pt in d/c lounge. Pt assisted to care by CNA for discharge.

## 2021-02-20 VITALS
TEMPERATURE: 98.5 F | BODY MASS INDEX: 39.65 KG/M2 | WEIGHT: 238 LBS | DIASTOLIC BLOOD PRESSURE: 84 MMHG | SYSTOLIC BLOOD PRESSURE: 140 MMHG | RESPIRATION RATE: 16 BRPM | HEART RATE: 85 BPM | HEIGHT: 65 IN | OXYGEN SATURATION: 94 %

## 2021-02-20 ASSESSMENT — PATIENT HEALTH QUESTIONNAIRE - PHQ9
CLINICAL INTERPRETATION OF PHQ2 SCORE: 0
1. LITTLE INTEREST OR PLEASURE IN DOING THINGS: 00
2. FEELING DOWN, DEPRESSED, IRRITABLE, OR HOPELESS: 00

## 2021-02-20 ASSESSMENT — ENCOUNTER SYMPTOMS
NAUSEA: DENIES
VOMITING: DENIES

## 2021-02-20 ASSESSMENT — ACTIVITIES OF DAILY LIVING (ADL): OASIS_M1830: 00

## 2021-02-20 NOTE — PROGRESS NOTES
Primary dx/Skilled need:2/11/21 Perforated viscus from gastric ulcer. She is S/P laparoscopic washout, repair and omental patch of gastrojejunal marginal ulcer. Blood cultures growing MSSA.   Skilled care for: Post op, wound care management/instruction.  Mid-Line and IV antibiotic care and maintenance.  SN frequency. 1wk8 and 3 prn    Zip code. [id number]  Disciplines ordered. SN  Insurance and authorization. SCP (Disability)  Certification period. 02/19/2021 to 04/19/2021  Special considerations. none

## 2021-02-21 ENCOUNTER — HOME CARE VISIT (OUTPATIENT)
Dept: HOME HEALTH SERVICES | Facility: HOME HEALTHCARE | Age: 56
End: 2021-02-21
Payer: MEDICARE

## 2021-02-21 VITALS
TEMPERATURE: 98.3 F | OXYGEN SATURATION: 96 % | RESPIRATION RATE: 18 BRPM | HEART RATE: 84 BPM | DIASTOLIC BLOOD PRESSURE: 84 MMHG | SYSTOLIC BLOOD PRESSURE: 128 MMHG

## 2021-02-21 PROCEDURE — G0299 HHS/HOSPICE OF RN EA 15 MIN: HCPCS

## 2021-02-22 ENCOUNTER — TELEPHONE (OUTPATIENT)
Dept: INFECTIOUS DISEASES | Facility: MEDICAL CENTER | Age: 56
End: 2021-02-22

## 2021-02-22 ENCOUNTER — ANTICOAGULATION MONITORING (OUTPATIENT)
Dept: MEDICAL GROUP | Facility: PHYSICIAN GROUP | Age: 56
End: 2021-02-22

## 2021-02-22 ENCOUNTER — HOME CARE VISIT (OUTPATIENT)
Dept: HOME HEALTH SERVICES | Facility: HOME HEALTHCARE | Age: 56
End: 2021-02-22
Payer: MEDICARE

## 2021-02-22 NOTE — TELEPHONE ENCOUNTER
I received a call from [facility]. Patient contacted them today and told them that she will no longer continue the IV abx tx she had problems with the midline. I called patient and she said that yesterday her midline blew -it infiltrated. It was not painful she did not go to hospital. She contacted home health and they came to her house and changed it and fixed it. Several hours later it was leaking blood and medication as well. Home care came this morning and removed it. She contacted Dr Cox and he put her on Keflex. Patient stated that she will not continue our IVabx treatment and if we have further questions we can speak directly to Dr Cox's office. [facility] would like for you to give an ok if they can discharge pt and stop providing the meds

## 2021-02-22 NOTE — PROGRESS NOTES
"Medication chart review for [facility] services    PCP:  Kandace Dent M.D.  [address] [address]  [place] [phone]  Fax: [phone]    Current medication list     Current Outpatient Medications:   •  metroNIDAZOLE, 500 mg, Oral, TID  •  cefazolin-metronidazole (CEFAZOMET) IVPB, 6 g, Intravenous, DAILY AT 1800  •  HYDROcodone-acetaminophen, 1 tablet, Oral, Q4HRS PRN  •  NS, 10 mL, Intravenous, BID  •  Heparin & NaCl Lock Flush, 10 Units/mL, Intravenous, DAILY AT 1800  •  riFAMPin, 300 mg, Oral, BID  •  acetaminophen, 2,000 mg, Oral, TID PRN  •  zolpidem, 5 mg, Oral, HS PRN  •  amLODIPine, 10 mg, Oral, DAILY  •  multivitamin, 1 tablet, Oral, QAM  •  Pseudoephedrine-DM-GG (SUDAFED COUGH PO), 1 tablet, Oral, TID PRN  •  losartan, 50 mg, Oral, DAILY (Patient taking differently: 50 mg, Oral, EVERY BEDTIME)  •  tizanidine, 4 mg, Oral, HS PRN  •  cyclobenzaprine, 10 mg, Oral, BID PRN    Allergies   Allergen Reactions   • Cortisone Anaphylaxis     RXN=since age 12   • Naprosyn [Naproxen] Unspecified     \"GI bleed\"  RXN=whole life   • Penicillins Anaphylaxis     RXN=since age 1   • Fish Vomiting and Nausea   • Keflex Diarrhea and Nausea     RXN=20 years ago  Tolerated Ancef 02/2021   • Latex Rash and Itching     Rash, itching  RXN=20 years ago   • Shellfish Allergy Vomiting and Nausea     Betadine/iodine for surgery is ok   • Tape Rash     Plastic tape \"bubble up the skin\", reports tegaderm OK  RXN=ongoing       Medications on discharge summary that are not on their home medication list (or the dosing interval differs from the discharge summary)     None    Medications on home medication list not listed on their discharge summary     She had metronidazole and rifampin duplicated on her med list, I will delete them    -She has metronidazole and cefazolin IV on her med list list not on her discharge summary.    Labs / Images Reviewed:     Lab Results   Component Value Date/Time    SODIUM 135 02/17/2021 06:19 " AM    POTASSIUM 3.4 (L) 02/17/2021 06:19 AM    CHLORIDE 98 02/17/2021 06:19 AM    CO2 23 02/17/2021 06:19 AM    GLUCOSE 97 02/17/2021 06:19 AM    BUN 7 (L) 02/17/2021 06:19 AM    CREATININE 0.44 (L) 02/17/2021 06:19 AM      Lab Results   Component Value Date/Time    ALKPHOSPHAT 108 (H) 02/17/2021 06:19 AM    ASTSGOT 15 02/17/2021 06:19 AM    ALTSGPT 18 02/17/2021 06:19 AM    TBILIRUBIN 0.7 02/17/2021 06:19 AM    ALBUMIN 3.3 02/17/2021 06:19 AM    INR 0.90 09/30/2020 01:21 PM          Potential drug interactions:     Duplicate Therapy: cyclobenzaprine, tizanidine      Assessment and Plan:   -ID recommending 4 weeks of IV antibiotics Ancef and p.o. rifampin and 2 weeks of p.o. Flagyl.     -Continue to work with provider in regards to muscle relaxants, as noted above          Mike Appiah, PharmD, MS, BCACP, LCC  [facility]  Phone [phone] fax [phone]    This note was created using voice recognition software (Dragon). The accuracy of the dictation is limited by the abilities of the software. I have reviewed the note prior to signing, however some errors in grammar and context are still possible. If you have any questions related to this note please do not hesitate to contact our office.

## 2021-02-23 ENCOUNTER — HOME CARE VISIT (OUTPATIENT)
Dept: HOME HEALTH SERVICES | Facility: HOME HEALTHCARE | Age: 56
End: 2021-02-23
Payer: MEDICARE

## 2021-02-23 ENCOUNTER — DOCUMENTATION (OUTPATIENT)
Dept: INFECTIOUS DISEASES | Facility: MEDICAL CENTER | Age: 56
End: 2021-02-23

## 2021-02-23 VITALS
HEART RATE: 78 BPM | DIASTOLIC BLOOD PRESSURE: 60 MMHG | RESPIRATION RATE: 18 BRPM | SYSTOLIC BLOOD PRESSURE: 110 MMHG | TEMPERATURE: 97.9 F | OXYGEN SATURATION: 96 %

## 2021-02-23 DIAGNOSIS — R78.81 MSSA BACTEREMIA: ICD-10-CM

## 2021-02-23 DIAGNOSIS — B95.61 MSSA BACTEREMIA: ICD-10-CM

## 2021-02-23 PROCEDURE — G0495 RN CARE TRAIN/EDU IN HH: HCPCS

## 2021-02-23 RX ORDER — SULFAMETHOXAZOLE AND TRIMETHOPRIM 800; 160 MG/1; MG/1
2 TABLET ORAL 2 TIMES DAILY
Qty: 64 TABLET | Refills: 0 | Status: SHIPPED | OUTPATIENT
Start: 2021-02-23 | End: 2021-03-04

## 2021-02-23 RX ORDER — METRONIDAZOLE 500 MG/1
500 TABLET ORAL 3 TIMES DAILY
Qty: 30 TABLET | Refills: 0 | Status: SHIPPED | OUTPATIENT
Start: 2021-02-23 | End: 2021-03-04

## 2021-02-23 ASSESSMENT — ENCOUNTER SYMPTOMS
VOMITING: DENIED
NAUSEA: DENIED

## 2021-02-23 ASSESSMENT — ACTIVITIES OF DAILY LIVING (ADL): TRANSPORTATION COMMENTS: YES

## 2021-02-23 NOTE — PROGRESS NOTES
Brief ID note:    Notified by clinic that patient had issues with her midline catheter infiltrating and leaking. Spoke with Ms. Casey this afternoon. She had multiple issues with the midline catheter on 2/22, now removed. She received 11 days of IV Ancef + p.o. rifampin counting from day of negative blood cultures 2/12/2021.    -Dr. Cox's office started p.o. Keflex which patient is taking at 500 mg every 6 hours. This will cover multiple enteric organisms that grew from the gastric ulcer biopsy  -Recommend continuing p.o. Flagyl 500 mg every 8 hours for anaerobic coverage  -To complete coverage for MSSA bacteremia, will also call in p.o. Bactrim 2 DS tabs twice daily which has significantly more bioavailability than Keflex (unable to use Zyvox due to interaction with Flexeril which patient takes every day)  -Stop date: 3/11/2021  -Repeat CBC with differential, CMP in 1 week (ordered)  
[facility] notified  
36.8

## 2021-02-24 ENCOUNTER — PATIENT OUTREACH (OUTPATIENT)
Dept: HEALTH INFORMATION MANAGEMENT | Facility: OTHER | Age: 56
End: 2021-02-24

## 2021-02-24 NOTE — PROGRESS NOTES
PT. AWAKE,A&OX4. DENIED ANY PAIN. VSS. LUNGS CLEAR T/O. SKIN ASSESSMENT DONE. NO REDNESS/RASH NOTED UNDER BREASTS/PANNUS. TEACHING DONE ON NEED TO KEEP AREAS C/D, MAY APPLY LIGHT DUSTING OF CORNSTARCH TO AREAS OR APPLY SOFT ROLL OF CLOTH, TO WICK AWAY MOISTURE. INSTRUCTED TO NOTIFY HHRN/MD IF ANY S/SX BREAKDOWN DEVELOP. ALL ABD INCISIONS ALL CLOSED, DRY, WITHOUT S/SX INFECTION. INSTRUCTED PT. TO NOTIFY HHRN/MD IF ANY REDNESS/DRAINAGE FROM INCISION SITES, ANY FEVER>100.4.  OLD MIDLINE SITE CLEAN, WITHOUT S/SX INFECTION. INSTRUCTED PT. TO NOTIFY HHRN/MD IF ANY REDNESS/SWELLING AT SITE. TEACHING DONE ON NEED TO WASH HANDS BEFORE MEALS, AFTER PETTING ANIMALS, USING BATHROOM. TO USE SOAP/WATER AFTER USING BR.MED RECONCILIATION DONE WITH PT. NEW ABO ADDED. TEACHING DONE ON ACTIONS/S/E OF KEFLEX.

## 2021-02-25 ENCOUNTER — HOME CARE VISIT (OUTPATIENT)
Dept: HOME HEALTH SERVICES | Facility: HOME HEALTHCARE | Age: 56
End: 2021-02-25
Payer: MEDICARE

## 2021-02-25 ENCOUNTER — APPOINTMENT (OUTPATIENT)
Dept: LAB | Facility: MEDICAL CENTER | Age: 56
End: 2021-02-25
Attending: INTERNAL MEDICINE
Payer: MEDICARE

## 2021-02-25 ENCOUNTER — APPOINTMENT (OUTPATIENT)
Dept: LAB | Facility: MEDICAL CENTER | Age: 56
End: 2021-02-25
Attending: FAMILY MEDICINE
Payer: MEDICARE

## 2021-02-25 ASSESSMENT — ACTIVITIES OF DAILY LIVING (ADL)
OASIS_M1830: 00
HOME_HEALTH_OASIS: 00

## 2021-02-25 ASSESSMENT — PATIENT HEALTH QUESTIONNAIRE - PHQ9: CLINICAL INTERPRETATION OF PHQ2 SCORE: 0

## 2021-02-25 NOTE — PROGRESS NOTES
Community Health Worker Intake  • Social determinates of health intake complete.   • Identified no barriers.  • Contact information provided to Carolynn Stover Carmela  • Has PCP appointment scheduled for 3/4/2021  • Outpatient assessment completed.  • Did the patient receive medications post discharge: Yes    Community Health Worker called the patient to follow up. Patient states that she rescheduled her PCP appointment from 3/1/2021 to 3/4/2021 with Kandace Dent M.D. Patient also states that she does not need [facility] for this appointment. Patient has received all medications. Patient states that [facility] not visit her. Patient is curious as to why [facility] is still following her. Patient has no needs for CHW at this time.     Plan: CHW [facility] has contacted  and asked for a call to the patient. CHW will remove the patient from [facility] caseload.

## 2021-02-26 ENCOUNTER — HOSPITAL ENCOUNTER (OUTPATIENT)
Dept: LAB | Facility: MEDICAL CENTER | Age: 56
End: 2021-02-26
Attending: INTERNAL MEDICINE
Payer: MEDICARE

## 2021-02-26 ENCOUNTER — HOSPITAL ENCOUNTER (OUTPATIENT)
Dept: LAB | Facility: MEDICAL CENTER | Age: 56
End: 2021-02-26
Attending: NURSE PRACTITIONER
Payer: MEDICARE

## 2021-02-26 ENCOUNTER — HOME CARE VISIT (OUTPATIENT)
Dept: HOME HEALTH SERVICES | Facility: HOME HEALTHCARE | Age: 56
End: 2021-02-26
Payer: MEDICARE

## 2021-02-26 ENCOUNTER — HOSPITAL ENCOUNTER (OUTPATIENT)
Dept: LAB | Facility: MEDICAL CENTER | Age: 56
End: 2021-02-26
Attending: FAMILY MEDICINE
Payer: MEDICARE

## 2021-02-26 DIAGNOSIS — E03.9 HYPOTHYROIDISM, UNSPECIFIED TYPE: ICD-10-CM

## 2021-02-26 DIAGNOSIS — R78.81 MSSA BACTEREMIA: ICD-10-CM

## 2021-02-26 DIAGNOSIS — B95.61 MSSA BACTEREMIA: ICD-10-CM

## 2021-02-26 DIAGNOSIS — N28.89 RENAL MASS: ICD-10-CM

## 2021-02-26 LAB
25(OH)D3 SERPL-MCNC: 17 NG/ML (ref 30–100)
ALBUMIN SERPL BCP-MCNC: 4 G/DL (ref 3.2–4.9)
ALBUMIN SERPL BCP-MCNC: 4 G/DL (ref 3.2–4.9)
ALBUMIN/GLOB SERPL: 1.2 G/DL
ALBUMIN/GLOB SERPL: 1.2 G/DL
ALP SERPL-CCNC: 105 U/L (ref 30–99)
ALP SERPL-CCNC: 105 U/L (ref 30–99)
ALT SERPL-CCNC: 11 U/L (ref 2–50)
ALT SERPL-CCNC: 13 U/L (ref 2–50)
ANION GAP SERPL CALC-SCNC: 11 MMOL/L (ref 7–16)
APPEARANCE UR: CLEAR
AST SERPL-CCNC: 21 U/L (ref 12–45)
AST SERPL-CCNC: 21 U/L (ref 12–45)
BACTERIA #/AREA URNS HPF: NEGATIVE /HPF
BASOPHILS # BLD AUTO: 0.4 % (ref 0–1.8)
BASOPHILS # BLD AUTO: 0.6 % (ref 0–1.8)
BASOPHILS # BLD: 0.03 K/UL (ref 0–0.12)
BASOPHILS # BLD: 0.05 K/UL (ref 0–0.12)
BILIRUB SERPL-MCNC: 0.6 MG/DL (ref 0.1–1.5)
BILIRUB SERPL-MCNC: 0.6 MG/DL (ref 0.1–1.5)
BILIRUB UR QL STRIP.AUTO: ABNORMAL
BUN SERPL-MCNC: 6 MG/DL (ref 8–22)
BUN SERPL-MCNC: 6 MG/DL (ref 8–22)
BUN SERPL-MCNC: 7 MG/DL (ref 8–22)
CALCIUM SERPL-MCNC: 9.6 MG/DL (ref 8.5–10.5)
CALCIUM SERPL-MCNC: 9.6 MG/DL (ref 8.5–10.5)
CALCIUM SERPL-MCNC: 9.7 MG/DL (ref 8.5–10.5)
CAOX CRY #/AREA URNS HPF: NORMAL /HPF
CHLORIDE SERPL-SCNC: 100 MMOL/L (ref 96–112)
CHLORIDE SERPL-SCNC: 101 MMOL/L (ref 96–112)
CHLORIDE SERPL-SCNC: 102 MMOL/L (ref 96–112)
CHOLEST SERPL-MCNC: 188 MG/DL (ref 100–199)
CO2 SERPL-SCNC: 25 MMOL/L (ref 20–33)
COLOR UR: ABNORMAL
CREAT SERPL-MCNC: 0.49 MG/DL (ref 0.5–1.4)
CREAT SERPL-MCNC: 0.52 MG/DL (ref 0.5–1.4)
CREAT SERPL-MCNC: 0.52 MG/DL (ref 0.5–1.4)
EOSINOPHIL # BLD AUTO: 0.19 K/UL (ref 0–0.51)
EOSINOPHIL # BLD AUTO: 0.21 K/UL (ref 0–0.51)
EOSINOPHIL NFR BLD: 2.3 % (ref 0–6.9)
EOSINOPHIL NFR BLD: 2.6 % (ref 0–6.9)
EPI CELLS #/AREA URNS HPF: NEGATIVE /HPF
ERYTHROCYTE [DISTWIDTH] IN BLOOD BY AUTOMATED COUNT: 43.8 FL (ref 35.9–50)
ERYTHROCYTE [DISTWIDTH] IN BLOOD BY AUTOMATED COUNT: 44 FL (ref 35.9–50)
ERYTHROCYTE [DISTWIDTH] IN BLOOD BY AUTOMATED COUNT: 44.2 FL (ref 35.9–50)
EST. AVERAGE GLUCOSE BLD GHB EST-MCNC: 117 MG/DL
FERRITIN SERPL-MCNC: 141 NG/ML (ref 10–291)
FOLATE SERPL-MCNC: 5.6 NG/ML
GLOBULIN SER CALC-MCNC: 3.3 G/DL (ref 1.9–3.5)
GLOBULIN SER CALC-MCNC: 3.4 G/DL (ref 1.9–3.5)
GLUCOSE SERPL-MCNC: 91 MG/DL (ref 65–99)
GLUCOSE SERPL-MCNC: 95 MG/DL (ref 65–99)
GLUCOSE SERPL-MCNC: 96 MG/DL (ref 65–99)
GLUCOSE UR STRIP.AUTO-MCNC: NEGATIVE MG/DL
HBA1C MFR BLD: 5.7 % (ref 4–5.6)
HCT VFR BLD AUTO: 45.5 % (ref 37–47)
HCT VFR BLD AUTO: 46.2 % (ref 37–47)
HCT VFR BLD AUTO: 46.5 % (ref 37–47)
HDLC SERPL-MCNC: 48 MG/DL
HGB BLD-MCNC: 14.3 G/DL (ref 12–16)
HGB BLD-MCNC: 14.4 G/DL (ref 12–16)
HGB BLD-MCNC: 14.4 G/DL (ref 12–16)
HYALINE CASTS #/AREA URNS LPF: NORMAL /LPF
IMM GRANULOCYTES # BLD AUTO: 0.03 K/UL (ref 0–0.11)
IMM GRANULOCYTES # BLD AUTO: 0.04 K/UL (ref 0–0.11)
IMM GRANULOCYTES NFR BLD AUTO: 0.4 % (ref 0–0.9)
IMM GRANULOCYTES NFR BLD AUTO: 0.5 % (ref 0–0.9)
IRON SERPL-MCNC: 58 UG/DL (ref 40–170)
KETONES UR STRIP.AUTO-MCNC: NEGATIVE MG/DL
LDLC SERPL CALC-MCNC: 96 MG/DL
LEUKOCYTE ESTERASE UR QL STRIP.AUTO: ABNORMAL
LYMPHOCYTES # BLD AUTO: 2.21 K/UL (ref 1–4.8)
LYMPHOCYTES # BLD AUTO: 2.22 K/UL (ref 1–4.8)
LYMPHOCYTES NFR BLD: 27.2 % (ref 22–41)
LYMPHOCYTES NFR BLD: 27.2 % (ref 22–41)
MCH RBC QN AUTO: 28.6 PG (ref 27–33)
MCH RBC QN AUTO: 28.7 PG (ref 27–33)
MCH RBC QN AUTO: 28.9 PG (ref 27–33)
MCHC RBC AUTO-ENTMCNC: 31 G/DL (ref 33.6–35)
MCHC RBC AUTO-ENTMCNC: 31.2 G/DL (ref 33.6–35)
MCHC RBC AUTO-ENTMCNC: 31.4 G/DL (ref 33.6–35)
MCV RBC AUTO: 91.9 FL (ref 81.4–97.8)
MCV RBC AUTO: 92 FL (ref 81.4–97.8)
MCV RBC AUTO: 92.3 FL (ref 81.4–97.8)
MICRO URNS: ABNORMAL
MONOCYTES # BLD AUTO: 0.64 K/UL (ref 0–0.85)
MONOCYTES # BLD AUTO: 0.69 K/UL (ref 0–0.85)
MONOCYTES NFR BLD AUTO: 7.9 % (ref 0–13.4)
MONOCYTES NFR BLD AUTO: 8.4 % (ref 0–13.4)
NEUTROPHILS # BLD AUTO: 4.98 K/UL (ref 2–7.15)
NEUTROPHILS # BLD AUTO: 5 K/UL (ref 2–7.15)
NEUTROPHILS NFR BLD: 61.2 % (ref 44–72)
NEUTROPHILS NFR BLD: 61.3 % (ref 44–72)
NITRITE UR QL STRIP.AUTO: NEGATIVE
NRBC # BLD AUTO: 0 K/UL
NRBC # BLD AUTO: 0 K/UL
NRBC BLD-RTO: 0 /100 WBC
NRBC BLD-RTO: 0 /100 WBC
PH UR STRIP.AUTO: 5.5 [PH] (ref 5–8)
PLATELET # BLD AUTO: 444 K/UL (ref 164–446)
PLATELET # BLD AUTO: 461 K/UL (ref 164–446)
PLATELET # BLD AUTO: 464 K/UL (ref 164–446)
PMV BLD AUTO: 8.7 FL (ref 9–12.9)
PMV BLD AUTO: 8.9 FL (ref 9–12.9)
PMV BLD AUTO: 9 FL (ref 9–12.9)
POTASSIUM SERPL-SCNC: 3.6 MMOL/L (ref 3.6–5.5)
POTASSIUM SERPL-SCNC: 3.7 MMOL/L (ref 3.6–5.5)
POTASSIUM SERPL-SCNC: 3.8 MMOL/L (ref 3.6–5.5)
PREALB SERPL-MCNC: 22.2 MG/DL (ref 18–38)
PROT SERPL-MCNC: 7.3 G/DL (ref 6–8.2)
PROT SERPL-MCNC: 7.4 G/DL (ref 6–8.2)
PROT UR QL STRIP: NEGATIVE MG/DL
RBC # BLD AUTO: 4.95 M/UL (ref 4.2–5.4)
RBC # BLD AUTO: 5.02 M/UL (ref 4.2–5.4)
RBC # BLD AUTO: 5.04 M/UL (ref 4.2–5.4)
RBC # URNS HPF: NORMAL /HPF
RBC UR QL AUTO: NEGATIVE
SODIUM SERPL-SCNC: 136 MMOL/L (ref 135–145)
SODIUM SERPL-SCNC: 137 MMOL/L (ref 135–145)
SODIUM SERPL-SCNC: 138 MMOL/L (ref 135–145)
SP GR UR STRIP.AUTO: 1.02
TRANSFERRIN SERPL-MCNC: 217 MG/DL (ref 200–370)
TRIGL SERPL-MCNC: 221 MG/DL (ref 0–149)
TSH SERPL DL<=0.005 MIU/L-ACNC: 2.71 UIU/ML (ref 0.38–5.33)
TSH SERPL DL<=0.005 MIU/L-ACNC: 2.71 UIU/ML (ref 0.38–5.33)
UROBILINOGEN UR STRIP.AUTO-MCNC: 0.2 MG/DL
VIT B12 SERPL-MCNC: 469 PG/ML (ref 211–911)
WBC # BLD AUTO: 8.1 K/UL (ref 4.8–10.8)
WBC # BLD AUTO: 8.2 K/UL (ref 4.8–10.8)
WBC # BLD AUTO: 8.6 K/UL (ref 4.8–10.8)
WBC #/AREA URNS HPF: NORMAL /HPF

## 2021-02-26 PROCEDURE — 83540 ASSAY OF IRON: CPT

## 2021-02-26 PROCEDURE — 85027 COMPLETE CBC AUTOMATED: CPT

## 2021-02-26 PROCEDURE — 80061 LIPID PANEL: CPT

## 2021-02-26 PROCEDURE — 82306 VITAMIN D 25 HYDROXY: CPT

## 2021-02-26 PROCEDURE — 84134 ASSAY OF PREALBUMIN: CPT

## 2021-02-26 PROCEDURE — 84252 ASSAY OF VITAMIN B-2: CPT

## 2021-02-26 PROCEDURE — 84443 ASSAY THYROID STIM HORMONE: CPT

## 2021-02-26 PROCEDURE — 80053 COMPREHEN METABOLIC PANEL: CPT

## 2021-02-26 PROCEDURE — 85025 COMPLETE CBC W/AUTO DIFF WBC: CPT

## 2021-02-26 PROCEDURE — 84630 ASSAY OF ZINC: CPT

## 2021-02-26 PROCEDURE — 85025 COMPLETE CBC W/AUTO DIFF WBC: CPT | Mod: 91

## 2021-02-26 PROCEDURE — 36415 COLL VENOUS BLD VENIPUNCTURE: CPT

## 2021-02-26 PROCEDURE — 82746 ASSAY OF FOLIC ACID SERUM: CPT

## 2021-02-26 PROCEDURE — 84425 ASSAY OF VITAMIN B-1: CPT

## 2021-02-26 PROCEDURE — 84207 ASSAY OF VITAMIN B-6: CPT

## 2021-02-26 PROCEDURE — 84443 ASSAY THYROID STIM HORMONE: CPT | Mod: 91

## 2021-02-26 PROCEDURE — 82728 ASSAY OF FERRITIN: CPT

## 2021-02-26 PROCEDURE — 81001 URINALYSIS AUTO W/SCOPE: CPT

## 2021-02-26 PROCEDURE — 83036 HEMOGLOBIN GLYCOSYLATED A1C: CPT

## 2021-02-26 PROCEDURE — 80048 BASIC METABOLIC PNL TOTAL CA: CPT

## 2021-02-26 PROCEDURE — 80053 COMPREHEN METABOLIC PANEL: CPT | Mod: 91

## 2021-02-26 PROCEDURE — 84466 ASSAY OF TRANSFERRIN: CPT

## 2021-02-26 PROCEDURE — 82607 VITAMIN B-12: CPT

## 2021-02-26 NOTE — Clinical Note
I agree with all quality review actions performed per this POT    ----- Message -----  From: Monica Lomeli R.N.  Sent: 2/26/2021   9:48 AM PST  To: Jaz Tan R.N.      Quality Review for 2/19/21 [facility]  OASIS by SANTHOSH Lomeli RN on  February 25, 2021    Edits for Jaz to complete: None    Edits completed by SANTHOSH Lomeli RN:  1.  is 0  2. Completed F2F information

## 2021-02-26 NOTE — PROGRESS NOTES
I agree with these changes.    ----- Message -----  From: Monica Lomeli R.N.  Sent: 2/25/2021  11:08 AM PST  To: Flory Ha R.N.      CDI test

## 2021-02-26 NOTE — PROGRESS NOTES
Quality Review for 2/19/21 [facility]  OASIS by SANTHOSH Lomeli RN on  February 25, 2021    Edits for Jaz to complete: None    Edits completed by SANTHOSH Lomeli RN:  1.  is 0  2. Completed F2F information

## 2021-02-28 ENCOUNTER — PATIENT MESSAGE (OUTPATIENT)
Dept: MEDICAL GROUP | Facility: MEDICAL CENTER | Age: 56
End: 2021-02-28

## 2021-02-28 DIAGNOSIS — M51.36 DDD (DEGENERATIVE DISC DISEASE), LUMBAR: ICD-10-CM

## 2021-03-01 LAB — VIT B6 SERPL-MCNC: 25.2 NMOL/L (ref 20–125)

## 2021-03-01 PROCEDURE — G0180 MD CERTIFICATION HHA PATIENT: HCPCS | Performed by: FAMILY MEDICINE

## 2021-03-01 RX ORDER — TIZANIDINE HYDROCHLORIDE 4 MG/1
4 CAPSULE, GELATIN COATED ORAL NIGHTLY PRN
Qty: 90 CAPSULE | Refills: 1 | Status: SHIPPED | OUTPATIENT
Start: 2021-03-01 | End: 2021-08-25

## 2021-03-01 NOTE — TELEPHONE ENCOUNTER
From: Carolynn Casey  To: Physician Kandace Dent  Sent: 2/28/2021 7:26 PM PST  Subject: Prescription Question    Hi Dr ALSTON,  I need to get my tizanadine refilled, I don't think I have enough until I see you.  Thanks,  Carolynn

## 2021-03-01 NOTE — PATIENT COMMUNICATION
Received request via: Patient    Was the patient seen in the last year in this department? Yes    Does the patient have an active prescription (recently filled or refills available) for medication(s) requested? No     Requested Prescriptions     Pending Prescriptions Disp Refills   • tizanidine (ZANAFLEX) 4 MG capsule 90 capsule 1     Sig: Take 1 capsule by mouth at bedtime as needed.

## 2021-03-03 LAB
VIT B1 BLD-MCNC: 130 NMOL/L (ref 70–180)
ZINC BLD-MCNC: 547.6 UG/DL (ref 440–860)

## 2021-03-03 NOTE — PROGRESS NOTES
I agree with these changes    ----- Message -----  From: Monica Lomeli R.N.  Sent: 3/1/2021   9:14 AM PST  To: Jaz Tan R.N.  Subject: RE:                                                Natan Sebastian,  Thank you for responding, but you also need to check the box to save this to the cart.  Thanks!  ----- Message -----  From: Jaz Tan R.N.  Sent: 3/1/2021   8:16 AM PST  To: Monica Lomeli R.N.  Subject: RE:                                                I agree with all quality review actions performed per this POT    ----- Message -----  From: Monica Lomeli R.N.  Sent: 2/26/2021   9:48 AM PST  To: Jaz Tan R.N.      Quality Review for 2/19/21 [facility]  OASIS by SANTHOSH Lomeli RN on  February 25, 2021    Edits for Jaz to complete: None    Edits completed by SANTHOSH Lomeli RN:  1.  is 0  2. Completed F2F information

## 2021-03-04 ENCOUNTER — HOME CARE VISIT (OUTPATIENT)
Dept: HOME HEALTH SERVICES | Facility: HOME HEALTHCARE | Age: 56
End: 2021-03-04
Payer: MEDICARE

## 2021-03-04 ENCOUNTER — OFFICE VISIT (OUTPATIENT)
Dept: MEDICAL GROUP | Facility: MEDICAL CENTER | Age: 56
End: 2021-03-04
Payer: MEDICARE

## 2021-03-04 VITALS
BODY MASS INDEX: 42.35 KG/M2 | OXYGEN SATURATION: 94 % | SYSTOLIC BLOOD PRESSURE: 126 MMHG | WEIGHT: 230.16 LBS | DIASTOLIC BLOOD PRESSURE: 79 MMHG | HEART RATE: 99 BPM | TEMPERATURE: 98.4 F | HEIGHT: 62 IN

## 2021-03-04 DIAGNOSIS — B95.61 MSSA BACTEREMIA: ICD-10-CM

## 2021-03-04 DIAGNOSIS — F51.01 PRIMARY INSOMNIA: ICD-10-CM

## 2021-03-04 DIAGNOSIS — Z98.84 S/P BARIATRIC SURGERY: ICD-10-CM

## 2021-03-04 DIAGNOSIS — R73.03 PREDIABETES: ICD-10-CM

## 2021-03-04 DIAGNOSIS — M15.9 PRIMARY OSTEOARTHRITIS INVOLVING MULTIPLE JOINTS: ICD-10-CM

## 2021-03-04 DIAGNOSIS — Z98.890 HX OF LUMBOSACRAL SPINE SURGERY: ICD-10-CM

## 2021-03-04 DIAGNOSIS — R78.81 MSSA BACTEREMIA: ICD-10-CM

## 2021-03-04 DIAGNOSIS — M43.10 DEGENERATIVE SPONDYLOLISTHESIS: ICD-10-CM

## 2021-03-04 DIAGNOSIS — I10 ESSENTIAL HYPERTENSION: ICD-10-CM

## 2021-03-04 PROBLEM — F13.20 HYPNOTIC DEPENDENCE (HCC): Status: RESOLVED | Noted: 2019-03-05 | Resolved: 2021-03-04

## 2021-03-04 PROBLEM — E11.9 DIABETES (HCC): Status: RESOLVED | Noted: 2018-07-20 | Resolved: 2021-03-04

## 2021-03-04 PROBLEM — E03.9 HYPOTHYROIDISM: Status: RESOLVED | Noted: 2020-09-08 | Resolved: 2021-03-04

## 2021-03-04 LAB — VIT B2 SERPL-SCNC: 3 NMOL/L (ref 5–50)

## 2021-03-04 PROCEDURE — 99214 OFFICE O/P EST MOD 30 MIN: CPT | Performed by: FAMILY MEDICINE

## 2021-03-04 RX ORDER — LOSARTAN POTASSIUM 50 MG/1
50 TABLET ORAL DAILY
Qty: 90 TABLET | Refills: 3 | Status: SHIPPED | OUTPATIENT
Start: 2021-03-04 | End: 2022-04-13

## 2021-03-04 RX ORDER — TIZANIDINE 4 MG/1
TABLET ORAL
COMMUNITY
Start: 2021-03-01 | End: 2021-03-04

## 2021-03-04 RX ORDER — CYCLOBENZAPRINE HCL 10 MG
10 TABLET ORAL 3 TIMES DAILY PRN
Qty: 270 TABLET | Refills: 3 | Status: SHIPPED | OUTPATIENT
Start: 2021-03-04 | End: 2022-02-25 | Stop reason: SDUPTHER

## 2021-03-04 RX ORDER — GABAPENTIN 300 MG/1
300 CAPSULE ORAL 3 TIMES DAILY
Qty: 270 CAPSULE | Refills: 1 | Status: SHIPPED | OUTPATIENT
Start: 2021-03-04 | End: 2021-06-18 | Stop reason: SDUPTHER

## 2021-03-04 ASSESSMENT — FIBROSIS 4 INDEX: FIB4 SCORE: 0.79

## 2021-03-04 NOTE — Clinical Note
I AGREE WITH THESE CHANGES.   ----- Message -----  From: My Hudson R.N.  Sent: 3/4/2021   2:48 PM PST  To: Stefania Biswas R.N.      Quality Review for 2.25.21 [place] OASIS performed on by TOOTIE Hudson RN on 3.4.21, 2021:    Edits completed by TOOTIE Hudson RN:  1. Changed  to yes it is flu season and  to 3 per Epic immunizations  2. Changed  C, D to yes per care plan

## 2021-03-04 NOTE — PROGRESS NOTES
cc:  Bariatric surgery    Subjective:     Carolynn Casey is a 56 y.o. female presenting for:      1.  Bariatric surgery: She recently underwent revision of her bariatric surgery and developed complications from this.  She has been recovering quite well.  She was taking Advil once a day for years, which likely complicated her issues.  She continues on the Keflex and rifampin, has several more days left.  Denies any fevers, abdominal pain, nausea, vomiting.  She has lost some more weight since her last visit, is quite pleased with her progress.    2.  Insomnia: Has been asymptomatic for the past several days.  She has been off the Ambien and has been sleeping well.  She has tried trazodone in the past.  She is hoping that she does not need the Ambien anymore.     3.  Hypertension: Has been stable, she continues on the amlodipine and losartan.     4.    Back pain: Has been well controlled so far.  Is taking Tylenol at 1000 mg twice a day, cyclobenzaprine 3 times a day, tizanidine at night.  She does have Norco at home, but rarely uses it.  She has tried amitriptyline in the past, which did not help.  She continues to have a spinal cord stimulator on her right side, is planning to see her pain specialist to see if this needs to be reprogrammed.  She would like to avoid opioid medications if possible.  Is wondering about gabapentin if needed in the future.    Review of systems:  See above.       Current Outpatient Medications:   •  RIFAMPIN PO, Take  by mouth., Disp: , Rfl:   •  gabapentin (NEURONTIN) 300 MG Cap, Take 1 capsule by mouth 3 times a day., Disp: 270 capsule, Rfl: 1  •  losartan (COZAAR) 50 MG Tab, Take 1 tablet by mouth every day., Disp: 90 tablet, Rfl: 3  •  cyclobenzaprine (FLEXERIL) 10 mg Tab, Take 1 tablet by mouth 3 times a day as needed for Mild Pain or Muscle Spasms., Disp: 270 tablet, Rfl: 3  •  tizanidine (ZANAFLEX) 4 MG capsule, Take 1 capsule by mouth at bedtime as needed (muscle spasms).,  "Disp: 90 capsule, Rfl: 1  •  cephALEXin (KEFLEX) 500 MG Cap, Take 500 mg by mouth 2 times a day., Disp: , Rfl:   •  acetaminophen (TYLENOL) 500 MG Tab, Take 2,000 mg by mouth 3 times a day as needed., Disp: , Rfl:   •  amLODIPine (NORVASC) 10 MG Tab, Take 1 Tab by mouth every day., Disp: 90 Tab, Rfl: 3  •  multivitamin (THERAGRAN) Tab, Take 1 Tab by mouth every morning., Disp: , Rfl:   •  Pseudoephedrine-DM-GG (SUDAFED COUGH PO), Take 1 Tab by mouth 3 times a day as needed., Disp: , Rfl:     Allergies, past medical history, past surgical history, family history, social history reviewed and updated    Objective:     Vitals: /79 (BP Location: Right arm, Patient Position: Sitting, BP Cuff Size: Adult long)   Pulse 99   Temp 36.9 °C (98.4 °F)   Ht 1.575 m (5' 2.01\")   Wt 104 kg (230 lb 2.6 oz)   LMP  (LMP Unknown)   SpO2 94%   BMI 42.09 kg/m²   General: Alert, pleasant, NAD  HEENT: Normocephalic.   Heart: Regular rate and rhythm.  S1 and S2 normal.  No murmurs appreciated.  Respiratory: Normal respiratory effort.  Clear to auscultation bilaterally.  Abdomen: Non-distended, soft  Skin: Warm, dry, no rashes.  Musculoskeletal: Gait is normal.  Moves all extremities well.  Extremities: No leg edema.  Psych:  Affect/mood is normal, judgement is good, memory is intact, grooming is appropriate.    Assessment/Plan:     Carolynn was seen today for follow-up.    Diagnoses and all orders for this visit:    S/P bariatric surgery  MSSA bacteremia  Chronic, stable, being managed by surgery, ID.  Hospitalization records reviewed.    Primary insomnia  Chronic, seems to have resolved.  We will continue to monitor    Essential hypertension  Chronic, stable, continue losartan, amlodipine  -     losartan (COZAAR) 50 MG Tab; Take 1 tablet by mouth every day.    Prediabetes  Chronic, stable, continue to monitor    Degenerative spondylolisthesis  Hx of lumbosacral spine surgery  Primary osteoarthritis involving multiple " joints  Chronic, stable, will continue with the Tylenol, Flexeril, tizanidine.  We discussed gabapentin, prescription sent.  Follow-up in 3 months  -     gabapentin (NEURONTIN) 300 MG Cap; Take 1 capsule by mouth 3 times a day.  -     cyclobenzaprine (FLEXERIL) 10 mg Tab; Take 1 tablet by mouth 3 times a day as needed for Mild Pain or Muscle Spasms.        Return in about 3 months (around 6/4/2021) for routine follow up.

## 2021-03-04 NOTE — PROGRESS NOTES
Quality Review for 2.25.21 [place] [facility] performed on by TOOTIE Hudson RN on 3.4.21, 2021:    Edits completed by TOOTIE Hudson RN:  1. Changed  to yes it is flu season and  to 3 per Epic immunizations  2. Changed  C, D to yes per care plan

## 2021-03-05 NOTE — PROGRESS NOTES
I AGREE WITH THESE CHANGES.  ----- Message -----  From: My Hudson R.N.  Sent: 3/4/2021   2:48 PM PST  To: Stefania Biswas R.N.      Quality Review for 2.25.21 [place] [facility] performed on by TOOTIE Hudson RN on 3.4.21, 2021:    Edits completed by TOOTIE Hudson RN:  1. Changed  to yes it is flu season and  to 3 per Epic immunizations  2. Changed  C, D to yes per care plan

## 2021-03-10 LAB
FUNGUS SPEC CULT: NORMAL
SIGNIFICANT IND 70042: NORMAL
SITE SITE: NORMAL
SOURCE SOURCE: NORMAL

## 2021-03-15 DIAGNOSIS — Z23 NEED FOR VACCINATION: ICD-10-CM

## 2021-03-25 ENCOUNTER — HOSPITAL ENCOUNTER (OUTPATIENT)
Dept: LAB | Facility: MEDICAL CENTER | Age: 56
End: 2021-03-25
Attending: STUDENT IN AN ORGANIZED HEALTH CARE EDUCATION/TRAINING PROGRAM
Payer: MEDICARE

## 2021-03-25 LAB
ALBUMIN SERPL BCP-MCNC: 4 G/DL (ref 3.2–4.9)
ALBUMIN/GLOB SERPL: 1.3 G/DL
ALP SERPL-CCNC: 112 U/L (ref 30–99)
ALT SERPL-CCNC: 22 U/L (ref 2–50)
ANION GAP SERPL CALC-SCNC: 9 MMOL/L (ref 7–16)
AST SERPL-CCNC: 17 U/L (ref 12–45)
BASOPHILS # BLD AUTO: 0.5 % (ref 0–1.8)
BASOPHILS # BLD: 0.04 K/UL (ref 0–0.12)
BILIRUB SERPL-MCNC: 0.6 MG/DL (ref 0.1–1.5)
BUN SERPL-MCNC: 15 MG/DL (ref 8–22)
CALCIUM SERPL-MCNC: 9.4 MG/DL (ref 8.5–10.5)
CHLORIDE SERPL-SCNC: 101 MMOL/L (ref 96–112)
CHOLEST SERPL-MCNC: 193 MG/DL (ref 100–199)
CO2 SERPL-SCNC: 25 MMOL/L (ref 20–33)
CREAT SERPL-MCNC: 0.73 MG/DL (ref 0.5–1.4)
EOSINOPHIL # BLD AUTO: 0.23 K/UL (ref 0–0.51)
EOSINOPHIL NFR BLD: 2.8 % (ref 0–6.9)
ERYTHROCYTE [DISTWIDTH] IN BLOOD BY AUTOMATED COUNT: 42.7 FL (ref 35.9–50)
EST. AVERAGE GLUCOSE BLD GHB EST-MCNC: 111 MG/DL
GLOBULIN SER CALC-MCNC: 3.1 G/DL (ref 1.9–3.5)
GLUCOSE SERPL-MCNC: 107 MG/DL (ref 65–99)
HBA1C MFR BLD: 5.5 % (ref 4–5.6)
HCT VFR BLD AUTO: 46.6 % (ref 37–47)
HDLC SERPL-MCNC: 49 MG/DL
HGB BLD-MCNC: 14.6 G/DL (ref 12–16)
IMM GRANULOCYTES # BLD AUTO: 0.01 K/UL (ref 0–0.11)
IMM GRANULOCYTES NFR BLD AUTO: 0.1 % (ref 0–0.9)
IRON SATN MFR SERPL: 19 % (ref 15–55)
IRON SERPL-MCNC: 64 UG/DL (ref 40–170)
LDLC SERPL CALC-MCNC: 106 MG/DL
LYMPHOCYTES # BLD AUTO: 2.66 K/UL (ref 1–4.8)
LYMPHOCYTES NFR BLD: 32.6 % (ref 22–41)
MCH RBC QN AUTO: 28.4 PG (ref 27–33)
MCHC RBC AUTO-ENTMCNC: 31.3 G/DL (ref 33.6–35)
MCV RBC AUTO: 90.7 FL (ref 81.4–97.8)
MONOCYTES # BLD AUTO: 0.51 K/UL (ref 0–0.85)
MONOCYTES NFR BLD AUTO: 6.2 % (ref 0–13.4)
NEUTROPHILS # BLD AUTO: 4.72 K/UL (ref 2–7.15)
NEUTROPHILS NFR BLD: 57.8 % (ref 44–72)
NRBC # BLD AUTO: 0 K/UL
NRBC BLD-RTO: 0 /100 WBC
PLATELET # BLD AUTO: 362 K/UL (ref 164–446)
PMV BLD AUTO: 8.7 FL (ref 9–12.9)
POTASSIUM SERPL-SCNC: 3.8 MMOL/L (ref 3.6–5.5)
PROT SERPL-MCNC: 7.1 G/DL (ref 6–8.2)
RBC # BLD AUTO: 5.14 M/UL (ref 4.2–5.4)
SODIUM SERPL-SCNC: 135 MMOL/L (ref 135–145)
TIBC SERPL-MCNC: 333 UG/DL (ref 250–450)
TRANSFERRIN SERPL-MCNC: 296 MG/DL (ref 200–370)
TRIGL SERPL-MCNC: 188 MG/DL (ref 0–149)
UIBC SERPL-MCNC: 269 UG/DL (ref 110–370)
WBC # BLD AUTO: 8.2 K/UL (ref 4.8–10.8)

## 2021-03-25 PROCEDURE — 82306 VITAMIN D 25 HYDROXY: CPT

## 2021-03-25 PROCEDURE — 82746 ASSAY OF FOLIC ACID SERUM: CPT

## 2021-03-25 PROCEDURE — 84466 ASSAY OF TRANSFERRIN: CPT

## 2021-03-25 PROCEDURE — 83540 ASSAY OF IRON: CPT

## 2021-03-25 PROCEDURE — 83550 IRON BINDING TEST: CPT

## 2021-03-25 PROCEDURE — 36415 COLL VENOUS BLD VENIPUNCTURE: CPT

## 2021-03-25 PROCEDURE — 84252 ASSAY OF VITAMIN B-2: CPT

## 2021-03-25 PROCEDURE — 84425 ASSAY OF VITAMIN B-1: CPT

## 2021-03-25 PROCEDURE — 85025 COMPLETE CBC W/AUTO DIFF WBC: CPT

## 2021-03-25 PROCEDURE — 84207 ASSAY OF VITAMIN B-6: CPT

## 2021-03-25 PROCEDURE — 84443 ASSAY THYROID STIM HORMONE: CPT

## 2021-03-25 PROCEDURE — 80053 COMPREHEN METABOLIC PANEL: CPT

## 2021-03-25 PROCEDURE — 82607 VITAMIN B-12: CPT

## 2021-03-25 PROCEDURE — 84630 ASSAY OF ZINC: CPT

## 2021-03-25 PROCEDURE — 80061 LIPID PANEL: CPT

## 2021-03-25 PROCEDURE — 83036 HEMOGLOBIN GLYCOSYLATED A1C: CPT

## 2021-03-25 PROCEDURE — 84134 ASSAY OF PREALBUMIN: CPT

## 2021-03-25 PROCEDURE — 82728 ASSAY OF FERRITIN: CPT

## 2021-03-26 LAB
FERRITIN SERPL-MCNC: 57.9 NG/ML (ref 10–291)
FOLATE SERPL-MCNC: 6.5 NG/ML
PREALB SERPL-MCNC: 23.1 MG/DL (ref 18–38)
TSH SERPL DL<=0.005 MIU/L-ACNC: 2.2 UIU/ML (ref 0.38–5.33)

## 2021-03-28 LAB — VIT B6 SERPL-MCNC: 26.8 NMOL/L (ref 20–125)

## 2021-03-29 LAB
VIT B1 BLD-MCNC: 157 NMOL/L (ref 70–180)
VIT B2 SERPL-SCNC: 8 NMOL/L (ref 5–50)
ZINC BLD-MCNC: 487.4 UG/DL (ref 440–860)

## 2021-03-30 LAB — 25(OH)D3 SERPL-MCNC: 18 NG/ML (ref 30–100)

## 2021-03-31 ENCOUNTER — IMMUNIZATION (OUTPATIENT)
Dept: FAMILY PLANNING/WOMEN'S HEALTH CLINIC | Facility: IMMUNIZATION CENTER | Age: 56
End: 2021-03-31
Attending: INTERNAL MEDICINE
Payer: MEDICARE

## 2021-03-31 DIAGNOSIS — Z23 NEED FOR VACCINATION: ICD-10-CM

## 2021-03-31 DIAGNOSIS — Z23 ENCOUNTER FOR VACCINATION: Primary | ICD-10-CM

## 2021-03-31 PROCEDURE — 91300 PFIZER SARS-COV-2 VACCINE: CPT

## 2021-03-31 PROCEDURE — 0001A PFIZER SARS-COV-2 VACCINE: CPT

## 2021-04-06 ENCOUNTER — PATIENT MESSAGE (OUTPATIENT)
Dept: HEALTH INFORMATION MANAGEMENT | Facility: OTHER | Age: 56
End: 2021-04-06

## 2021-04-14 ENCOUNTER — PATIENT MESSAGE (OUTPATIENT)
Dept: HEALTH INFORMATION MANAGEMENT | Facility: OTHER | Age: 56
End: 2021-04-14

## 2021-04-14 ENCOUNTER — PATIENT OUTREACH (OUTPATIENT)
Dept: HEALTH INFORMATION MANAGEMENT | Facility: OTHER | Age: 56
End: 2021-04-14

## 2021-04-14 NOTE — PROGRESS NOTES
Outcome: SCHEDULED. LUPE    Please transfer to Patient Outreach Team at [phone] when patient returns call.    WebIZ Checked & Epic Updated:  no    [facility] Verified: no    Attempt # 1

## 2021-04-23 ENCOUNTER — IMMUNIZATION (OUTPATIENT)
Dept: FAMILY PLANNING/WOMEN'S HEALTH CLINIC | Facility: IMMUNIZATION CENTER | Age: 56
End: 2021-04-23
Payer: MEDICARE

## 2021-04-23 DIAGNOSIS — Z23 ENCOUNTER FOR VACCINATION: Primary | ICD-10-CM

## 2021-04-23 PROCEDURE — 0002A PFIZER SARS-COV-2 VACCINE: CPT | Performed by: INTERNAL MEDICINE

## 2021-04-23 PROCEDURE — 91300 PFIZER SARS-COV-2 VACCINE: CPT | Performed by: INTERNAL MEDICINE

## 2021-04-27 LAB — VIT B12 SERPL-MCNC: NORMAL PG/ML (ref 211–911)

## 2021-05-28 ENCOUNTER — TELEPHONE (OUTPATIENT)
Dept: MEDICAL GROUP | Facility: MEDICAL CENTER | Age: 56
End: 2021-05-28

## 2021-05-28 NOTE — TELEPHONE ENCOUNTER
ESTABLISHED PATIENT PRE-VISIT PLANNING     Patient was NOT contacted to complete PVP.     Note: Patient will not be contacted if there is no indication to call.     1.  Reviewed notes from the last few office visits within the medical group: Yes    2.  If any orders were placed at last visit or intended to be done for this visit (i.e. 6 mos follow-up), do we have Results/Consult Notes?         •  Labs - Labs were not ordered at last office visit.  Note: If patient appointment is for lab review and patient did not complete labs, check with provider if OK to reschedule patient until labs completed.       •  Imaging - Imaging was not ordered at last office visit.       •  Referrals - No referrals were ordered at last office visit.    3. Is this appointment scheduled as a Hospital Follow-Up? No    4.  Immunizations were updated in Epic using Reconcile Outside Information activity? Yes    5.  Patient is due for the following Health Maintenance Topics:   Health Maintenance Due   Topic Date Due   • COLOGUARD STOOL DNA  Never done   • MAMMOGRAM  10/18/2020   • URINE ACR / MICROALBUMIN  04/21/2021         6.  AHA (Pulse8) form printed for Provider? Yes

## 2021-05-30 NOTE — CARE PLAN
Problem: Communication  Goal: The ability to communicate needs accurately and effectively will improve  Outcome: PROGRESSING AS EXPECTED     Problem: Safety  Goal: Will remain free from injury  Outcome: PROGRESSING AS EXPECTED  Goal: Will remain free from falls  Outcome: PROGRESSING AS EXPECTED     Problem: Infection  Goal: Will remain free from infection  Outcome: PROGRESSING AS EXPECTED      No

## 2021-06-01 ENCOUNTER — HOSPITAL ENCOUNTER (OUTPATIENT)
Dept: LAB | Facility: MEDICAL CENTER | Age: 56
End: 2021-06-01
Attending: PHYSICIAN ASSISTANT
Payer: MEDICARE

## 2021-06-01 LAB
ALBUMIN SERPL BCP-MCNC: 4 G/DL (ref 3.2–4.9)
ALBUMIN/GLOB SERPL: 1.3 G/DL
ALP SERPL-CCNC: 114 U/L (ref 30–99)
ALT SERPL-CCNC: 17 U/L (ref 2–50)
ANION GAP SERPL CALC-SCNC: 12 MMOL/L (ref 7–16)
AST SERPL-CCNC: 17 U/L (ref 12–45)
BASOPHILS # BLD AUTO: 0.7 % (ref 0–1.8)
BASOPHILS # BLD: 0.04 K/UL (ref 0–0.12)
BILIRUB SERPL-MCNC: 1 MG/DL (ref 0.1–1.5)
BUN SERPL-MCNC: 13 MG/DL (ref 8–22)
CALCIUM SERPL-MCNC: 9.4 MG/DL (ref 8.5–10.5)
CHLORIDE SERPL-SCNC: 105 MMOL/L (ref 96–112)
CHOLEST SERPL-MCNC: 165 MG/DL (ref 100–199)
CO2 SERPL-SCNC: 23 MMOL/L (ref 20–33)
CREAT SERPL-MCNC: 0.55 MG/DL (ref 0.5–1.4)
EOSINOPHIL # BLD AUTO: 0.5 K/UL (ref 0–0.51)
EOSINOPHIL NFR BLD: 8.8 % (ref 0–6.9)
ERYTHROCYTE [DISTWIDTH] IN BLOOD BY AUTOMATED COUNT: 43.8 FL (ref 35.9–50)
FASTING STATUS PATIENT QL REPORTED: NORMAL
FERRITIN SERPL-MCNC: 28.4 NG/ML (ref 10–291)
FOLATE SERPL-MCNC: 7.4 NG/ML
GLOBULIN SER CALC-MCNC: 3 G/DL (ref 1.9–3.5)
GLUCOSE SERPL-MCNC: 105 MG/DL (ref 65–99)
HCT VFR BLD AUTO: 48 % (ref 37–47)
HDLC SERPL-MCNC: 48 MG/DL
HGB BLD-MCNC: 14.8 G/DL (ref 12–16)
IMM GRANULOCYTES # BLD AUTO: 0.01 K/UL (ref 0–0.11)
IMM GRANULOCYTES NFR BLD AUTO: 0.2 % (ref 0–0.9)
IRON SERPL-MCNC: 65 UG/DL (ref 40–170)
LDLC SERPL CALC-MCNC: 89 MG/DL
LYMPHOCYTES # BLD AUTO: 2.47 K/UL (ref 1–4.8)
LYMPHOCYTES NFR BLD: 43.7 % (ref 22–41)
MCH RBC QN AUTO: 27.6 PG (ref 27–33)
MCHC RBC AUTO-ENTMCNC: 30.8 G/DL (ref 33.6–35)
MCV RBC AUTO: 89.4 FL (ref 81.4–97.8)
MONOCYTES # BLD AUTO: 0.36 K/UL (ref 0–0.85)
MONOCYTES NFR BLD AUTO: 6.4 % (ref 0–13.4)
NEUTROPHILS # BLD AUTO: 2.27 K/UL (ref 2–7.15)
NEUTROPHILS NFR BLD: 40.2 % (ref 44–72)
NRBC # BLD AUTO: 0 K/UL
NRBC BLD-RTO: 0 /100 WBC
PLATELET # BLD AUTO: 315 K/UL (ref 164–446)
PMV BLD AUTO: 9.4 FL (ref 9–12.9)
POTASSIUM SERPL-SCNC: 3.9 MMOL/L (ref 3.6–5.5)
PREALB SERPL-MCNC: 26.2 MG/DL (ref 18–38)
PROT SERPL-MCNC: 7 G/DL (ref 6–8.2)
RBC # BLD AUTO: 5.37 M/UL (ref 4.2–5.4)
SODIUM SERPL-SCNC: 140 MMOL/L (ref 135–145)
TRANSFERRIN SERPL-MCNC: 314 MG/DL (ref 200–370)
TRIGL SERPL-MCNC: 142 MG/DL (ref 0–149)
VIT B12 SERPL-MCNC: 353 PG/ML (ref 211–911)
WBC # BLD AUTO: 5.7 K/UL (ref 4.8–10.8)

## 2021-06-01 PROCEDURE — 84252 ASSAY OF VITAMIN B-2: CPT

## 2021-06-01 PROCEDURE — 82306 VITAMIN D 25 HYDROXY: CPT

## 2021-06-01 PROCEDURE — 84207 ASSAY OF VITAMIN B-6: CPT

## 2021-06-01 PROCEDURE — 84425 ASSAY OF VITAMIN B-1: CPT

## 2021-06-01 PROCEDURE — 82607 VITAMIN B-12: CPT

## 2021-06-01 PROCEDURE — 84466 ASSAY OF TRANSFERRIN: CPT

## 2021-06-01 PROCEDURE — 84630 ASSAY OF ZINC: CPT

## 2021-06-01 PROCEDURE — 83540 ASSAY OF IRON: CPT

## 2021-06-01 PROCEDURE — 80053 COMPREHEN METABOLIC PANEL: CPT

## 2021-06-01 PROCEDURE — 82728 ASSAY OF FERRITIN: CPT

## 2021-06-01 PROCEDURE — 84134 ASSAY OF PREALBUMIN: CPT

## 2021-06-01 PROCEDURE — 82746 ASSAY OF FOLIC ACID SERUM: CPT

## 2021-06-01 PROCEDURE — 80061 LIPID PANEL: CPT

## 2021-06-01 PROCEDURE — 85025 COMPLETE CBC W/AUTO DIFF WBC: CPT

## 2021-06-01 PROCEDURE — 36415 COLL VENOUS BLD VENIPUNCTURE: CPT

## 2021-06-03 LAB — 25(OH)D3 SERPL-MCNC: 37 NG/ML (ref 30–80)

## 2021-06-04 LAB
VIT B1 BLD-MCNC: 124 NMOL/L (ref 70–180)
VIT B6 SERPL-MCNC: 41.4 NMOL/L (ref 20–125)
ZINC BLD-MCNC: 503.2 UG/DL (ref 440–860)

## 2021-06-05 LAB — VIT B2 SERPL-SCNC: 5 NMOL/L (ref 5–50)

## 2021-06-11 ENCOUNTER — TELEPHONE (OUTPATIENT)
Dept: MEDICAL GROUP | Facility: MEDICAL CENTER | Age: 56
End: 2021-06-11

## 2021-06-18 ENCOUNTER — OFFICE VISIT (OUTPATIENT)
Dept: MEDICAL GROUP | Facility: MEDICAL CENTER | Age: 56
End: 2021-06-18
Payer: MEDICARE

## 2021-06-18 VITALS
TEMPERATURE: 98.7 F | DIASTOLIC BLOOD PRESSURE: 68 MMHG | RESPIRATION RATE: 16 BRPM | BODY MASS INDEX: 43.79 KG/M2 | HEIGHT: 62 IN | WEIGHT: 238 LBS | SYSTOLIC BLOOD PRESSURE: 124 MMHG | HEART RATE: 99 BPM | OXYGEN SATURATION: 93 %

## 2021-06-18 DIAGNOSIS — Z98.84 S/P BARIATRIC SURGERY: ICD-10-CM

## 2021-06-18 DIAGNOSIS — I10 ESSENTIAL HYPERTENSION: ICD-10-CM

## 2021-06-18 DIAGNOSIS — E55.9 VITAMIN D DEFICIENCY: ICD-10-CM

## 2021-06-18 DIAGNOSIS — I27.20 PULMONARY HTN (HCC): ICD-10-CM

## 2021-06-18 DIAGNOSIS — M43.10 DEGENERATIVE SPONDYLOLISTHESIS: ICD-10-CM

## 2021-06-18 DIAGNOSIS — Z98.890 HX OF LUMBOSACRAL SPINE SURGERY: ICD-10-CM

## 2021-06-18 DIAGNOSIS — E66.01 MORBID OBESITY (HCC): ICD-10-CM

## 2021-06-18 DIAGNOSIS — M15.9 PRIMARY OSTEOARTHRITIS INVOLVING MULTIPLE JOINTS: ICD-10-CM

## 2021-06-18 PROCEDURE — 99214 OFFICE O/P EST MOD 30 MIN: CPT | Performed by: FAMILY MEDICINE

## 2021-06-18 RX ORDER — ERGOCALCIFEROL 1.25 MG/1
CAPSULE ORAL
COMMUNITY
Start: 2021-04-05 | End: 2021-12-07

## 2021-06-18 RX ORDER — SUCRALFATE 1 G/1
TABLET ORAL
COMMUNITY
Start: 2021-05-24 | End: 2021-12-07

## 2021-06-18 RX ORDER — GABAPENTIN 300 MG/1
600 CAPSULE ORAL 3 TIMES DAILY
Qty: 540 CAPSULE | Refills: 1 | Status: SHIPPED | OUTPATIENT
Start: 2021-06-18 | End: 2022-06-07

## 2021-06-18 RX ORDER — OMEPRAZOLE 20 MG/1
CAPSULE, DELAYED RELEASE ORAL
COMMUNITY
Start: 2021-04-05 | End: 2021-12-07

## 2021-06-18 ASSESSMENT — FIBROSIS 4 INDEX: FIB4 SCORE: 0.76

## 2021-06-18 NOTE — PROGRESS NOTES
"  Subjective:     Carolynn Casey is a 56 y.o. female presenting for a follow-up.  She continues to have persistent back pain.,  Was having some neuropathic pain to her thighs, but states this seems to have worsened.  She is started gabapentin 300 mg 3 times a day, has not noticed a benefit yet.  She does have an appointment with a back surgeon soon.  She continues on the Tylenol, cyclobenzaprine, tizanidine.  She has Norco at home, rarely uses it.  She has tried amitriptyline in the past, did not help.  She has a spinal cord stimulator right side.        Current Outpatient Medications:   •  sucralfate (CARAFATE) 1 GM Tab, TAKE 1 TABLET BY MOUTH 4 TIMES DAILY ON AN EMPTY STOMACH BEFORE MEAL(S) AND AT BEDTIME, Disp: , Rfl:   •  omeprazole (PRILOSEC) 20 MG delayed-release capsule, TAKE 1 CAPSULE BY MOUTH ONCE DAILY FOR 90 DAYS, Disp: , Rfl:   •  vitamin D, Ergocalciferol, (DRISDOL) 1.25 MG (24927 UT) Cap capsule, Take  by mouth., Disp: , Rfl:   •  gabapentin (NEURONTIN) 300 MG Cap, Take 2 Capsules by mouth 3 times a day., Disp: 540 capsule, Rfl: 1  •  losartan (COZAAR) 50 MG Tab, Take 1 tablet by mouth every day., Disp: 90 tablet, Rfl: 3  •  cyclobenzaprine (FLEXERIL) 10 mg Tab, Take 1 tablet by mouth 3 times a day as needed for Mild Pain or Muscle Spasms., Disp: 270 tablet, Rfl: 3  •  tizanidine (ZANAFLEX) 4 MG capsule, Take 1 capsule by mouth at bedtime as needed (muscle spasms)., Disp: 90 capsule, Rfl: 1  •  acetaminophen (TYLENOL) 500 MG Tab, Take 2,000 mg by mouth 3 times a day as needed., Disp: , Rfl:   •  amLODIPine (NORVASC) 10 MG Tab, Take 1 Tab by mouth every day., Disp: 90 Tab, Rfl: 3  •  multivitamin (THERAGRAN) Tab, Take 1 Tab by mouth every morning., Disp: , Rfl:   •  Pseudoephedrine-DM-GG (SUDAFED COUGH PO), Take 1 Tab by mouth 3 times a day as needed., Disp: , Rfl:     Objective:     Vitals: /68 (BP Location: Left arm)   Pulse 99   Temp 37.1 °C (98.7 °F)   Resp 16   Ht 1.575 m (5' 2\")   Wt " 108 kg (238 lb)   LMP  (LMP Unknown)   SpO2 93%   BMI 43.53 kg/m²   General: Alert  HEENT: Normocephalic.  Heart: Regular rate and rhythm.  S1 and S2 normal.  No murmurs appreciated.  Respiratory: Normal respiratory effort.  Clear to auscultation bilaterally.  Abdomen: Non-distended, soft  Extremities: No leg edema.    Assessment/Plan:     Carolynn was seen today for medication refill.    Diagnoses and all orders for this visit:    Degenerative spondylolisthesis  Hx of lumbosacral spine surgery  Primary osteoarthritis involving multiple joints  Chronic, progressing.  We discussed increasing the gabapentin.  We will try 600 mg 3 times a day.  -     gabapentin (NEURONTIN) 300 MG Cap; Take 2 Capsules by mouth 3 times a day.    S/P bariatric surgery  BMI 40.0-44.9, adult (HCC)  Morbid obesity  (HCC)  Chronic, stable, managed by surgery    Vitamin D deficiency  Chronic, stable, continue supplementation    Essential hypertension  Pulmonary HTN (HCC)  Chronic, stable, continue amlodipine, losartan        Return in about 6 months (around 12/18/2021) for routine follow up.      Annual Health Assessment Questions:     1.  Are you currently engaging in any exercise or physical activity? Yes     2.  How would you describe your mood or emotional well-being today? fair     3.  Have you had any falls in the last year? Yes     4.  Have you noticed any problems with your balance or had difficulty walking? Yes     5.  In the last six months have you experienced any leakage of urine? Yes     6. DPA/Advanced Directive: Patient does not have an Advanced Directive.  A packet and workshop information was given on Advanced Directives.

## 2021-06-18 NOTE — PROGRESS NOTES
Annual Health Assessment Questions:    1.  Are you currently engaging in any exercise or physical activity? Yes    2.  How would you describe your mood or emotional well-being today? fair    3.  Have you had any falls in the last year? Yes    4.  Have you noticed any problems with your balance or had difficulty walking? Yes    5.  In the last six months have you experienced any leakage of urine? Yes    6. DPA/Advanced Directive: Patient does not have an Advanced Directive.  A packet and workshop information was given on Advanced Directives.

## 2021-07-16 NOTE — NON-PROVIDER
Outcome:rescheduled vero  7/20/2021  Please transfer to Patient Outreach Team at [phone] when patient returns call.        Attempt #1

## 2021-08-04 ENCOUNTER — HOSPITAL ENCOUNTER (OUTPATIENT)
Dept: LAB | Facility: MEDICAL CENTER | Age: 56
End: 2021-08-04
Attending: PHYSICIAN ASSISTANT
Payer: MEDICARE

## 2021-08-04 LAB
ALBUMIN SERPL BCP-MCNC: 4.3 G/DL (ref 3.2–4.9)
ALBUMIN/GLOB SERPL: 1.3 G/DL
ALP SERPL-CCNC: 115 U/L (ref 30–99)
ALT SERPL-CCNC: 14 U/L (ref 2–50)
ANION GAP SERPL CALC-SCNC: 15 MMOL/L (ref 7–16)
AST SERPL-CCNC: 11 U/L (ref 12–45)
BASOPHILS # BLD AUTO: 0.9 % (ref 0–1.8)
BASOPHILS # BLD: 0.06 K/UL (ref 0–0.12)
BILIRUB SERPL-MCNC: 1 MG/DL (ref 0.1–1.5)
BUN SERPL-MCNC: 14 MG/DL (ref 8–22)
CALCIUM SERPL-MCNC: 9.5 MG/DL (ref 8.5–10.5)
CHLORIDE SERPL-SCNC: 104 MMOL/L (ref 96–112)
CHOLEST SERPL-MCNC: 176 MG/DL (ref 100–199)
CO2 SERPL-SCNC: 20 MMOL/L (ref 20–33)
CREAT SERPL-MCNC: 0.55 MG/DL (ref 0.5–1.4)
EOSINOPHIL # BLD AUTO: 0.2 K/UL (ref 0–0.51)
EOSINOPHIL NFR BLD: 3 % (ref 0–6.9)
ERYTHROCYTE [DISTWIDTH] IN BLOOD BY AUTOMATED COUNT: 49.6 FL (ref 35.9–50)
FASTING STATUS PATIENT QL REPORTED: NORMAL
FERRITIN SERPL-MCNC: 32.7 NG/ML (ref 10–291)
FOLATE SERPL-MCNC: 9.6 NG/ML
GLOBULIN SER CALC-MCNC: 3.2 G/DL (ref 1.9–3.5)
GLUCOSE SERPL-MCNC: 115 MG/DL (ref 65–99)
HCT VFR BLD AUTO: 48.8 % (ref 37–47)
HDLC SERPL-MCNC: 47 MG/DL
HGB BLD-MCNC: 15.3 G/DL (ref 12–16)
IMM GRANULOCYTES # BLD AUTO: 0.02 K/UL (ref 0–0.11)
IMM GRANULOCYTES NFR BLD AUTO: 0.3 % (ref 0–0.9)
IRON SERPL-MCNC: 76 UG/DL (ref 40–170)
LDLC SERPL CALC-MCNC: 88 MG/DL
LYMPHOCYTES # BLD AUTO: 2.27 K/UL (ref 1–4.8)
LYMPHOCYTES NFR BLD: 33.9 % (ref 22–41)
MCH RBC QN AUTO: 28.5 PG (ref 27–33)
MCHC RBC AUTO-ENTMCNC: 31.4 G/DL (ref 33.6–35)
MCV RBC AUTO: 91 FL (ref 81.4–97.8)
MONOCYTES # BLD AUTO: 0.44 K/UL (ref 0–0.85)
MONOCYTES NFR BLD AUTO: 6.6 % (ref 0–13.4)
NEUTROPHILS # BLD AUTO: 3.71 K/UL (ref 2–7.15)
NEUTROPHILS NFR BLD: 55.3 % (ref 44–72)
NRBC # BLD AUTO: 0 K/UL
NRBC BLD-RTO: 0 /100 WBC
PLATELET # BLD AUTO: 287 K/UL (ref 164–446)
PMV BLD AUTO: 9.1 FL (ref 9–12.9)
POTASSIUM SERPL-SCNC: 3.5 MMOL/L (ref 3.6–5.5)
PREALB SERPL-MCNC: 24.2 MG/DL (ref 18–38)
PROT SERPL-MCNC: 7.5 G/DL (ref 6–8.2)
RBC # BLD AUTO: 5.36 M/UL (ref 4.2–5.4)
SODIUM SERPL-SCNC: 139 MMOL/L (ref 135–145)
TRANSFERRIN SERPL-MCNC: 324 MG/DL (ref 200–370)
TRIGL SERPL-MCNC: 206 MG/DL (ref 0–149)
VIT B12 SERPL-MCNC: 326 PG/ML (ref 211–911)
WBC # BLD AUTO: 6.7 K/UL (ref 4.8–10.8)

## 2021-08-04 PROCEDURE — 83540 ASSAY OF IRON: CPT

## 2021-08-04 PROCEDURE — 84134 ASSAY OF PREALBUMIN: CPT

## 2021-08-04 PROCEDURE — 36415 COLL VENOUS BLD VENIPUNCTURE: CPT

## 2021-08-04 PROCEDURE — 82607 VITAMIN B-12: CPT

## 2021-08-04 PROCEDURE — 84207 ASSAY OF VITAMIN B-6: CPT

## 2021-08-04 PROCEDURE — 84425 ASSAY OF VITAMIN B-1: CPT

## 2021-08-04 PROCEDURE — 85025 COMPLETE CBC W/AUTO DIFF WBC: CPT

## 2021-08-04 PROCEDURE — 84252 ASSAY OF VITAMIN B-2: CPT

## 2021-08-04 PROCEDURE — 80061 LIPID PANEL: CPT

## 2021-08-04 PROCEDURE — 82746 ASSAY OF FOLIC ACID SERUM: CPT

## 2021-08-04 PROCEDURE — 84630 ASSAY OF ZINC: CPT

## 2021-08-04 PROCEDURE — 80053 COMPREHEN METABOLIC PANEL: CPT

## 2021-08-04 PROCEDURE — 82728 ASSAY OF FERRITIN: CPT

## 2021-08-04 PROCEDURE — 84466 ASSAY OF TRANSFERRIN: CPT

## 2021-08-04 PROCEDURE — 82652 VIT D 1 25-DIHYDROXY: CPT

## 2021-08-06 LAB — 1,25(OH)2D3 SERPL-MCNC: 76.4 PG/ML (ref 19.9–79.3)

## 2021-08-07 LAB — ZINC SERPL-MCNC: 79.6 UG/DL (ref 60–120)

## 2021-08-08 LAB
VIT B1 BLD-MCNC: 148 NMOL/L (ref 70–180)
VIT B2 SERPL-SCNC: 7 NMOL/L (ref 5–50)

## 2021-08-10 LAB — VIT B6 SERPL-MCNC: 38.6 NMOL/L (ref 20–125)

## 2021-08-17 ENCOUNTER — PATIENT MESSAGE (OUTPATIENT)
Dept: MEDICAL GROUP | Facility: MEDICAL CENTER | Age: 56
End: 2021-08-17

## 2021-08-17 DIAGNOSIS — N64.59 BREAST SYMPTOM: ICD-10-CM

## 2021-08-25 DIAGNOSIS — M51.36 DDD (DEGENERATIVE DISC DISEASE), LUMBAR: ICD-10-CM

## 2021-08-25 RX ORDER — TIZANIDINE 4 MG/1
TABLET ORAL
Qty: 90 TABLET | Refills: 1 | Status: SHIPPED | OUTPATIENT
Start: 2021-08-25 | End: 2021-12-07 | Stop reason: SDUPTHER

## 2021-09-03 DIAGNOSIS — F41.9 ANXIETY: ICD-10-CM

## 2021-09-03 RX ORDER — DIAZEPAM 10 MG/1
10 TABLET ORAL ONCE
Qty: 1 TABLET | Refills: 0 | Status: SHIPPED | OUTPATIENT
Start: 2021-09-03 | End: 2021-09-03

## 2021-09-13 ENCOUNTER — HOSPITAL ENCOUNTER (OUTPATIENT)
Dept: RADIOLOGY | Facility: MEDICAL CENTER | Age: 56
End: 2021-09-13
Attending: FAMILY MEDICINE
Payer: MEDICARE

## 2021-09-13 DIAGNOSIS — N64.59 BREAST SYMPTOM: ICD-10-CM

## 2021-09-13 PROCEDURE — 76642 ULTRASOUND BREAST LIMITED: CPT | Mod: LT

## 2021-09-13 PROCEDURE — G0279 TOMOSYNTHESIS, MAMMO: HCPCS

## 2021-09-15 ENCOUNTER — PATIENT MESSAGE (OUTPATIENT)
Dept: MEDICAL GROUP | Facility: MEDICAL CENTER | Age: 56
End: 2021-09-15

## 2021-09-15 DIAGNOSIS — N64.4 BREAST PAIN: ICD-10-CM

## 2021-09-16 NOTE — TELEPHONE ENCOUNTER
Diabetes mellitus is very well controlled.  Hemoglobin A1c was reviewed with her.  She checks her fasting blood sugar morning.  Was about 112 this morning.  Patient controls with diet alone.   Lisa- Controlled med in queue. Please refill as you see fit.

## 2021-09-18 ENCOUNTER — PATIENT MESSAGE (OUTPATIENT)
Dept: MEDICAL GROUP | Facility: MEDICAL CENTER | Age: 56
End: 2021-09-18

## 2021-09-18 DIAGNOSIS — F41.9 ANXIETY: ICD-10-CM

## 2021-09-20 RX ORDER — DIAZEPAM 10 MG/1
10 TABLET ORAL ONCE
Qty: 1 TABLET | Refills: 0 | Status: SHIPPED | OUTPATIENT
Start: 2021-09-20 | End: 2022-07-18 | Stop reason: SDUPTHER

## 2021-09-21 ENCOUNTER — PATIENT MESSAGE (OUTPATIENT)
Dept: MEDICAL GROUP | Facility: MEDICAL CENTER | Age: 56
End: 2021-09-21

## 2021-09-21 DIAGNOSIS — N64.4 BREAST PAIN: ICD-10-CM

## 2021-09-28 ENCOUNTER — APPOINTMENT (OUTPATIENT)
Dept: RADIOLOGY | Facility: MEDICAL CENTER | Age: 56
End: 2021-09-28
Attending: ORTHOPAEDIC SURGERY
Payer: MEDICARE

## 2021-10-06 ENCOUNTER — HOSPITAL ENCOUNTER (OUTPATIENT)
Dept: RADIOLOGY | Facility: MEDICAL CENTER | Age: 56
End: 2021-10-06
Attending: FAMILY MEDICINE
Payer: MEDICARE

## 2021-10-06 DIAGNOSIS — N64.4 BREAST PAIN: ICD-10-CM

## 2021-10-06 PROCEDURE — 700117 HCHG RX CONTRAST REV CODE 255: Performed by: FAMILY MEDICINE

## 2021-10-06 PROCEDURE — 71250 CT THORAX DX C-: CPT | Mod: MH

## 2021-10-06 RX ADMIN — IOHEXOL 75 ML: 350 INJECTION, SOLUTION INTRAVENOUS at 14:58

## 2021-10-06 NOTE — PROGRESS NOTES
Pt presents to CT requiring US PIV for contrast. PIV access attempted x3 by 2 VAT RNs. 1st attempt with successful access and verified blood return to right brachial. PIV became infiltrated with administration of contrast. PIV x2 attempted in left arm. Accessed left brachial but unable to fully advance catheter. Attempted acces of left basilic. Observed access of vessel with US with minimal flash and then visualization of vessel was lost possible d/t vasospasm. PIV attempts stopped at this time. Pt to follow-up with her ordering physician.

## 2021-10-06 NOTE — PROGRESS NOTES
IV infiltrate warm compress applied patient tolerating treatment well.  Informed ***RN notified and pt. Observed. New access attempted but unable to get.

## 2021-10-06 NOTE — PROGRESS NOTES
IV infiltrate warm compress applied patient tolerating treatment well.  Informed Rn. Was notified and pt. Observed. New iv was attempted x 2 under ultrasound unable to get access. Exam aborted with contrast. Exam changed to w/o and sent to pacs.

## 2021-10-15 ENCOUNTER — HOSPITAL ENCOUNTER (OUTPATIENT)
Dept: RADIOLOGY | Facility: MEDICAL CENTER | Age: 56
End: 2021-10-15
Attending: ORTHOPAEDIC SURGERY
Payer: MEDICARE

## 2021-10-15 DIAGNOSIS — M54.16 LUMBAR RADICULOPATHY: ICD-10-CM

## 2021-10-28 ENCOUNTER — HOSPITAL ENCOUNTER (OUTPATIENT)
Dept: RADIOLOGY | Facility: MEDICAL CENTER | Age: 56
End: 2021-10-28
Attending: ORTHOPAEDIC SURGERY
Payer: MEDICARE

## 2021-10-28 PROCEDURE — 72148 MRI LUMBAR SPINE W/O DYE: CPT | Mod: MH

## 2021-12-07 ENCOUNTER — HOSPITAL ENCOUNTER (OUTPATIENT)
Dept: LAB | Facility: MEDICAL CENTER | Age: 56
End: 2021-12-07
Attending: FAMILY MEDICINE
Payer: MEDICARE

## 2021-12-07 ENCOUNTER — TELEPHONE (OUTPATIENT)
Dept: MEDICAL GROUP | Facility: MEDICAL CENTER | Age: 56
End: 2021-12-07

## 2021-12-07 ENCOUNTER — OFFICE VISIT (OUTPATIENT)
Dept: MEDICAL GROUP | Facility: MEDICAL CENTER | Age: 56
End: 2021-12-07
Payer: MEDICARE

## 2021-12-07 VITALS
HEART RATE: 88 BPM | BODY MASS INDEX: 41.32 KG/M2 | RESPIRATION RATE: 16 BRPM | TEMPERATURE: 97.6 F | DIASTOLIC BLOOD PRESSURE: 74 MMHG | HEIGHT: 65 IN | OXYGEN SATURATION: 98 % | WEIGHT: 248 LBS | SYSTOLIC BLOOD PRESSURE: 128 MMHG

## 2021-12-07 DIAGNOSIS — I10 ESSENTIAL HYPERTENSION: ICD-10-CM

## 2021-12-07 DIAGNOSIS — Z01.818 PRE-OP EVALUATION: ICD-10-CM

## 2021-12-07 DIAGNOSIS — I27.20 PULMONARY HTN (HCC): ICD-10-CM

## 2021-12-07 DIAGNOSIS — I45.10 RBBB: ICD-10-CM

## 2021-12-07 DIAGNOSIS — E66.01 MORBID OBESITY WITH BMI OF 40.0-44.9, ADULT (HCC): ICD-10-CM

## 2021-12-07 DIAGNOSIS — R73.03 PREDIABETES: ICD-10-CM

## 2021-12-07 DIAGNOSIS — Z12.11 SCREEN FOR COLON CANCER: ICD-10-CM

## 2021-12-07 DIAGNOSIS — M51.36 DDD (DEGENERATIVE DISC DISEASE), LUMBAR: ICD-10-CM

## 2021-12-07 LAB
ANION GAP SERPL CALC-SCNC: 13 MMOL/L (ref 7–16)
BUN SERPL-MCNC: 11 MG/DL (ref 8–22)
CALCIUM SERPL-MCNC: 9.4 MG/DL (ref 8.5–10.5)
CHLORIDE SERPL-SCNC: 102 MMOL/L (ref 96–112)
CO2 SERPL-SCNC: 24 MMOL/L (ref 20–33)
CREAT SERPL-MCNC: 0.7 MG/DL (ref 0.5–1.4)
ERYTHROCYTE [DISTWIDTH] IN BLOOD BY AUTOMATED COUNT: 43 FL (ref 35.9–50)
EST. AVERAGE GLUCOSE BLD GHB EST-MCNC: 117 MG/DL
GLUCOSE SERPL-MCNC: 117 MG/DL (ref 65–99)
HBA1C MFR BLD: 5.7 % (ref 4–5.6)
HCT VFR BLD AUTO: 47.4 % (ref 37–47)
HGB BLD-MCNC: 15 G/DL (ref 12–16)
MCH RBC QN AUTO: 28.2 PG (ref 27–33)
MCHC RBC AUTO-ENTMCNC: 31.6 G/DL (ref 33.6–35)
MCV RBC AUTO: 89.3 FL (ref 81.4–97.8)
PLATELET # BLD AUTO: 378 K/UL (ref 164–446)
PMV BLD AUTO: 8.5 FL (ref 9–12.9)
POTASSIUM SERPL-SCNC: 4 MMOL/L (ref 3.6–5.5)
RBC # BLD AUTO: 5.31 M/UL (ref 4.2–5.4)
SODIUM SERPL-SCNC: 139 MMOL/L (ref 135–145)
WBC # BLD AUTO: 10 K/UL (ref 4.8–10.8)

## 2021-12-07 PROCEDURE — 36415 COLL VENOUS BLD VENIPUNCTURE: CPT

## 2021-12-07 PROCEDURE — 99214 OFFICE O/P EST MOD 30 MIN: CPT | Mod: 25 | Performed by: FAMILY MEDICINE

## 2021-12-07 PROCEDURE — 80048 BASIC METABOLIC PNL TOTAL CA: CPT

## 2021-12-07 PROCEDURE — 93000 ELECTROCARDIOGRAM COMPLETE: CPT | Performed by: FAMILY MEDICINE

## 2021-12-07 PROCEDURE — 85027 COMPLETE CBC AUTOMATED: CPT

## 2021-12-07 PROCEDURE — 83036 HEMOGLOBIN GLYCOSYLATED A1C: CPT

## 2021-12-07 RX ORDER — ZOLPIDEM TARTRATE 10 MG/1
TABLET ORAL
COMMUNITY
End: 2021-12-07

## 2021-12-07 RX ORDER — PHENTERMINE HYDROCHLORIDE 15 MG/1
CAPSULE ORAL
COMMUNITY
Start: 2021-07-27 | End: 2021-12-07

## 2021-12-07 RX ORDER — TIZANIDINE 4 MG/1
4 TABLET ORAL
Qty: 90 TABLET | Refills: 3 | Status: SHIPPED | OUTPATIENT
Start: 2021-12-07 | End: 2023-02-21

## 2021-12-07 RX ORDER — LEVOTHYROXINE SODIUM 0.03 MG/1
TABLET ORAL
COMMUNITY
End: 2021-12-07

## 2021-12-07 RX ORDER — TRAMADOL HYDROCHLORIDE 50 MG/1
TABLET ORAL
COMMUNITY
Start: 2021-11-24 | End: 2022-09-06

## 2021-12-07 ASSESSMENT — FIBROSIS 4 INDEX: FIB4 SCORE: 0.59

## 2021-12-07 NOTE — TELEPHONE ENCOUNTER
ESTABLISHED PATIENT PRE-VISIT PLANNING     Patient was NOT contacted to complete PVP.     Note: Patient will not be contacted if there is no indication to call.     1.  Reviewed notes from the last few office visits within the medical group: Yes    2.  If any orders were placed at last visit or intended to be done for this visit (i.e. 6 mos follow-up), do we have Results/Consult Notes?         •  Labs - Labs were not ordered at last office visit. Last office visit with PCP  was on 06/18/2021.  Note: If patient appointment is for lab review and patient did not complete labs, check with provider if OK to reschedule patient until labs completed.       •  Imaging - Imaging ordered, completed and results are in chart.       •  Referrals - No referrals were ordered at last office visit.    3. Is this appointment scheduled as a Hospital Follow-Up? No    4.  Immunizations were updated in Epic using Reconcile Outside Information activity? Yes    5.  Patient is due for the following Health Maintenance Topics:   Health Maintenance Due   Topic Date Due   • COLORECTAL CANCER SCREENING  Never done   • IMM ZOSTER VACCINES (1 of 2) Never done   • A1C SCREENING  09/25/2021   • COVID-19 Vaccine (3 - Booster for Pfizer series) 10/23/2021     6.  AHA (Pulse8) form printed for Provider? No, patient does not have any open alerts

## 2021-12-07 NOTE — PROGRESS NOTES
"PREOPERATIVE EVALUATION    Name of surgeon requesting this evaluation: Dr. Arevalo   Planned surgery: back surgery  Planned date of surgery: 12/21/2021  [facility]: [facility]    Patient Active Problem List    Diagnosis Date Noted   • MSSA bacteremia 02/12/2021   • Essential hypertension 09/08/2020   • Hx of lumbosacral spine surgery 09/08/2020   • Prediabetes 01/06/2020   • S/P bariatric surgery 05/20/2019   • Left ear hearing loss 02/27/2018   • Morbid obesity with BMI of 40.0-44.9, adult ([facility]) 02/05/2018   • Vitamin D deficiency 02/05/2018   • Pulmonary HTN ([facility]) 08/31/2017   • Primary osteoarthritis involving multiple joints 08/03/2017   • Degenerative spondylolisthesis 02/17/2016       Past Medical History:   Diagnosis Date   • Allergy    • Anesthesia     PONV   • Anxiety    • Arthritis     \"allover\"   • ASTHMA     mild no inhalers   • Bronchitis 2014   • Constipation     constipation   • Diabetes ([facility]) 08/31/2020    no meds currently since gastric bypass   • Elevated glucose     per epic lab results   • GERD (gastroesophageal reflux disease)    • Heart burn    • Heart murmur     took PhenPhen, no longer problem   • Hiatus hernia syndrome     possible   • Hypertension     pt states well controlled on meds   • Left breast mass 8/3/2017   • Major depressive disorder 2/27/2018   • Migraine    • Pain 08/31/2020    chronic lower back, knees, shoulders, 6/10   • Primary osteoarthritis involving multiple joints    • Pulmonary emboli ([facility]) 2017    no longer on blood thinners   • Sacroiliitis ([facility]) 8/2/2017   • Type 2 diabetes mellitus without complication ([facility]) 7/20/2018   • Unspecified disorder of thyroid 2020    hypo/ not on any medications   • Unspecified urinary incontinence     mild/ wears pads   • Upper GI bleed        Past Surgical History:   Procedure Laterality Date   • PB LAP,DIAGNOSTIC ABDOMEN N/A 2/11/2021    Procedure: LAPAROSCOPY - DIAGNOSTIC;  Surgeon: Gadiel Cox M.D.;  Location: [facility];  " Service: General   • GINGER BY LAPAROSCOPY  1/13/2021    Procedure: CHOLECYSTECTOMY, LAPAROSCOPIC;  Surgeon: Gadiel Cox M.D.;  Location: [facility];  Service: General   • GASTROENTEROSTOMY, LAPAROSCOPIC N/A 10/2/2020    Procedure: GASTROENTEROSTOMY, LAPAROSCOPIC;  Surgeon: Gadiel Cox M.D.;  Location: [facility];  Service: General   • GASTROSCOPY  9/2/2020    Procedure: GASTROSCOPY-WITH WATS BIOPSY;  Surgeon: Gadiel Cox M.D.;  Location: [facility];  Service: General   • SPINAL CORD STIMULATOR  07/2020   • PB LAP, SELENE RESTRICT PROC, LONGITUDINAL GAS*  5/15/2019    Procedure: GASTRECTOMY, SLEEVE, LAPAROSCOPIC;  Surgeon: Gadiel Cox M.D.;  Location: [facility];  Service: General   • SPINAL CORD STIMULATOR  8/16/2018    Procedure: SPINAL CORD STIMULATOR- PERMANENT;  Surgeon: Dominique Saha M.D.;  Location: [facility];  Service: Pain Management   • OTHER ABDOMINAL SURGERY  2018    gastric sleeve   • S I JOINT FUSION Right 8/2/2017    Procedure: S I JOINT FUSION;  Surgeon: Alfredo Belcher M.D.;  Location: [facility];  Service:    • EXTREME LATERAL INTERBODY FUSION  2/17/2016    Procedure: EXTREME LATERAL INTERBODY FUSION far lateral interbody  L3-4 ;  Surgeon: Alfredo Belcher M.D.;  Location: [facility];  Service:    • LUMBAR DECOMPRESSION  2/17/2016    Procedure: LUMBAR DECOMPRESSION L3-4;  Surgeon: Alfredo Belcher M.D.;  Location: [facility];  Service:    • KNEE REVISION TOTAL Right 10/8/2015    Procedure: KNEE REVISION TOTAL KNEE ARTHROPLASTY;  Surgeon: Ihsan Hawthorne M.D.;  Location: [facility];  Service:    • LUMBAR FUSION ANTERIOR  2013   • LAMINOTOMY  2006    Dr. [facility]   • KNEE ARTHROPLASTY TOTAL Left 2003   • KNEE ARTHROPLASTY TOTAL Right 2003   • SHOULDER ARTHROSCOPY W/ ROTATOR CUFF REPAIR  2001    LEFT   • SHOULDER ARTHROSCOPY Right 2001   • APPENDECTOMY  1998 1997   •  "ABDOMINAL HYSTERECTOMY TOTAL  1995   • CARPAL TUNNEL RELEASE Right 1983   • OPEN REDUCTION  2001, 1990    CLAVICLE BILAT   • PRIMARY C SECTION  1983, 1990    x 2       Family History   Problem Relation Age of Onset   • Hypertension Mother    • Cancer Mother 81        breast DCIS   • Heart Disease Father    • Heart Attack Father    • Hypertension Father    • Lung Disease Sister         asthma   • Hypertension Sister    • Arthritis Sister         knee   • Arthritis Brother    • Hypertension Brother    • Diabetes Brother    • Heart Disease Maternal Aunt    • Hypertension Maternal Aunt    • Cancer Maternal Aunt 80        breast   • Stroke Maternal Uncle    • Heart Disease Maternal Uncle    • Hypertension Maternal Uncle    • Heart Disease Paternal Grandmother    • Psychiatric Illness Paternal Grandfather         Suicide   • Alcohol/Drug Paternal Grandfather    • Arthritis Maternal Grandmother    • Arthritis Maternal Grandfather    • Arthritis Brother    • Hyperlipidemia Neg Hx        Social History     Tobacco Use   • Smoking status: Never Smoker   • Smokeless tobacco: Never Used   • Tobacco comment: continue to avoid   Vaping Use   • Vaping Use: Never used   Substance Use Topics   • Alcohol use: Not Currently     Alcohol/week: 0.0 oz   • Drug use: Not Currently       Allergies   Allergen Reactions   • Cortisone Anaphylaxis     RXN=since age 12   • Naprosyn [Naproxen] Unspecified     \"GI bleed\"  RXN=whole life   • Penicillins Anaphylaxis     RXN=since age 1   • Fish Vomiting and Nausea   • Latex Rash and Itching     Rash, itching  RXN=20 years ago   • Shellfish Allergy Vomiting and Nausea     Betadine/iodine for surgery is ok   • Tape Rash     Plastic tape \"bubble up the skin\", reports tegaderm OK  RXN=ongoing   • Nsaids      Allergy to latex  Allergy to tape adhesive  No known allergy to iodine      Current Outpatient Medications:   •  traMADol (ULTRAM) 50 MG Tab, , Disp: , Rfl:   •  tizanidine (ZANAFLEX) 4 MG Tab, Take " 1 Tablet by mouth at bedtime as needed (muscle spasms)., Disp: 90 Tablet, Rfl: 3  •  gabapentin (NEURONTIN) 300 MG Cap, Take 2 Capsules by mouth 3 times a day., Disp: 540 capsule, Rfl: 1  •  losartan (COZAAR) 50 MG Tab, Take 1 tablet by mouth every day., Disp: 90 tablet, Rfl: 3  •  cyclobenzaprine (FLEXERIL) 10 mg Tab, Take 1 tablet by mouth 3 times a day as needed for Mild Pain or Muscle Spasms., Disp: 270 tablet, Rfl: 3  •  amLODIPine (NORVASC) 10 MG Tab, Take 1 Tab by mouth every day., Disp: 90 Tab, Rfl: 3      REVIEW OF SYSTEMS:    ANESTHESIA ISSUES:  past general anesthesia without complications    BLEEDING RISK: no recent abnormal bleeding, no remote history of abnormal bleeding    CONSTITUTIONAL: Denies fatigue, weight loss, weight gain, fever, chills, night sweats, any constitutional problems    NEUROLOGIC: Denies headaches, dizziness, syncope, tremors, any neurological problems    OPHTHALMIC: Denies any vision problems    ENT: Denies any ear nose or throat problems    RESPIRATORY: Denies dyspnea at rest, cough, wheeze, any respiratory problems   Snore loudly? no   Tired or sleepy in daytime? yes   Apnea or choking/gasping observed during sleep? no   Hypertension? yes   BMI over 35? yes   Age over 50? yes   Neck circumference? 37cm    Male? no     High risk for DANIELLE       CARDIOVASCULAR: Denies chest pain, palpitations, peripheral edema, any cardiovascular problems.  Is unable to perform greater than 4 METs.  Does have HTN.    GI: Denies nausea, vomiting, abdominal pain, diarrhea, constipation    GENITOURINARY: Denies any genito-urinary problems    INTEGUMENTARY: Denies any skin problems    RHEUMATOLOGIC/MSK: Denies joint swelling.  Does have, arthralgias in the back, knees, neck, shoulders    ENDOCRINE: Denies polyuria, polydipsia, heat intolerance, cold intolerance, any endocrine problems    Any objection to receiving blood products if needed? no    ADLs:    Mobility: independent, not a falls  "risk   Personal hygiene: independent   Showering/bathing: independent   Toileting: independent   Dressing: independent   Self feeding: independent  IADLs:   Shopping: independent   Cooking: independent, no food insecurity   Medication management: independent   Housework: independent   Laundry: independent   Finances: independent   Driving: dependent   Use telephone: independent    Family support:     Decision making capacity: no concerns    DASI score: 13.4    PHYSICAL EXAM:  /74   Pulse 88   Temp 36.4 °C (97.6 °F)   Resp 16   Ht 1.651 m (5' 5\")   Wt 112 kg (248 lb)   LMP  (LMP Unknown)   SpO2 98%   BMI 41.27 kg/m²     General: healthy, alert, no distress  Skin: Skin color, texture, turgor normal. No rashes or lesions.  Head: Normocephalic. No masses, lesions, tenderness or abnormalities  Eyes: conjunctivae/corneas clear. PERRL, EOM's intact.   Ears: External ears normal. Canals clear. TM's normal.  Oropharynx: Deferred  Neck: Neck supple. No adenopathy. Thyroid symmetric, normal size, and without nodularity  Lungs: normal and clear to auscultation  Heart: normal, regular rate and rhythm and no murmurs, clicks, or gallops  Abd: Abdomen soft, non-tender. BS normal. No masses,  No organomegaly  Ext: Extremities normal. No deformities, edema, or skin discoloration    EKG: Sinus rhythm.  New RBBB      ASSESSMENT/PLAN:    Carolynn was seen today for medical clearance.    Diagnoses and all orders for this visit:    Pre-op evaluation  Is stable for surgery, is very low risk.  EKG with new RBBB, stress test ordered.  Labs are stable.  Nuclear stress test is normal.  -     CBC WITHOUT DIFFERENTIAL; Future  -     Basic Metabolic Panel; Future  -     EKG  -     NM-CARDIAC STRESS TEST; Future    Essential hypertension  Chronic, stable, continue current amlodipine (takes in the morning) and losartan (takes at night)  -     CBC WITHOUT DIFFERENTIAL; Future  -     Basic Metabolic Panel; Future  -     NM-CARDIAC " STRESS TEST; Future    RBBB  Undiagnosed new problem with uncertain prognosis.  Stress test ordered, was normal  -     NM-CARDIAC STRESS TEST; Future    Prediabetes  Chronic, stable, continue to monitor  -     Basic Metabolic Panel; Future  -     HEMOGLOBIN A1C; Future  -     NM-CARDIAC STRESS TEST; Future    Morbid obesity with BMI of 40.0-44.9, adult (HCC)  -     Patient identified as having weight management issue.  Appropriate orders and counseling given.  -     NM-CARDIAC STRESS TEST; Future    Pulmonary HTN (HCC)  Chronic, stable, last echocardiogram was 2/2021  -     NM-CARDIAC STRESS TEST; Future    Screen for colon cancer  -     COLOGUARD (FIT DNA)    DDD (degenerative disc disease), lumbar  Chronic, stable, continue current medication  -     tizanidine (ZANAFLEX) 4 MG Tab; Take 1 Tablet by mouth at bedtime as needed (muscle spasms).      Trevor Index for assessing perioperative cardiovascular risk [Circulation 1999;100:1047]:   (one point each for * high-risk surgery [ intrathoracic, suprainguinal vascular, intraperitoneal ],* Hx ischemic heart dz, * Hx CHF, *Hx TIA or CVA, *IDDM, *Serum Cr >2.0)   Interpretation of risk: (Complication = MI, Pulm edema, v-fib or cardiac arrest, complete heart block)  Very Low: 0 points = 0.4 % complication. Low: 1 point = 0.9 %, Moderate: 2 points = 6.6 %, High: 3+ points = 11%.

## 2021-12-14 ENCOUNTER — HOSPITAL ENCOUNTER (OUTPATIENT)
Dept: LAB | Facility: MEDICAL CENTER | Age: 56
End: 2021-12-14
Attending: PHYSICIAN ASSISTANT
Payer: MEDICARE

## 2021-12-14 LAB
ALBUMIN SERPL BCP-MCNC: 4.4 G/DL (ref 3.2–4.9)
ALBUMIN/GLOB SERPL: 1.6 G/DL
ALP SERPL-CCNC: 113 U/L (ref 30–99)
ALT SERPL-CCNC: 13 U/L (ref 2–50)
ANION GAP SERPL CALC-SCNC: 11 MMOL/L (ref 7–16)
AST SERPL-CCNC: 11 U/L (ref 12–45)
BASOPHILS # BLD AUTO: 0.6 % (ref 0–1.8)
BASOPHILS # BLD: 0.05 K/UL (ref 0–0.12)
BILIRUB SERPL-MCNC: 0.9 MG/DL (ref 0.1–1.5)
BUN SERPL-MCNC: 12 MG/DL (ref 8–22)
CALCIUM SERPL-MCNC: 9.4 MG/DL (ref 8.5–10.5)
CHLORIDE SERPL-SCNC: 103 MMOL/L (ref 96–112)
CHOLEST SERPL-MCNC: 155 MG/DL (ref 100–199)
CO2 SERPL-SCNC: 26 MMOL/L (ref 20–33)
CREAT SERPL-MCNC: 0.48 MG/DL (ref 0.5–1.4)
EOSINOPHIL # BLD AUTO: 0.22 K/UL (ref 0–0.51)
EOSINOPHIL NFR BLD: 2.5 % (ref 0–6.9)
ERYTHROCYTE [DISTWIDTH] IN BLOOD BY AUTOMATED COUNT: 41.9 FL (ref 35.9–50)
FERRITIN SERPL-MCNC: 48.3 NG/ML (ref 10–291)
FOLATE SERPL-MCNC: 10.8 NG/ML
GLOBULIN SER CALC-MCNC: 2.8 G/DL (ref 1.9–3.5)
GLUCOSE SERPL-MCNC: 96 MG/DL (ref 65–99)
HCT VFR BLD AUTO: 46 % (ref 37–47)
HDLC SERPL-MCNC: 54 MG/DL
HGB BLD-MCNC: 14.8 G/DL (ref 12–16)
IMM GRANULOCYTES # BLD AUTO: 0.04 K/UL (ref 0–0.11)
IMM GRANULOCYTES NFR BLD AUTO: 0.5 % (ref 0–0.9)
IRON SERPL-MCNC: 50 UG/DL (ref 40–170)
LDLC SERPL CALC-MCNC: 73 MG/DL
LYMPHOCYTES # BLD AUTO: 2.69 K/UL (ref 1–4.8)
LYMPHOCYTES NFR BLD: 30.5 % (ref 22–41)
MCH RBC QN AUTO: 28.7 PG (ref 27–33)
MCHC RBC AUTO-ENTMCNC: 32.2 G/DL (ref 33.6–35)
MCV RBC AUTO: 89.1 FL (ref 81.4–97.8)
MONOCYTES # BLD AUTO: 0.61 K/UL (ref 0–0.85)
MONOCYTES NFR BLD AUTO: 6.9 % (ref 0–13.4)
NEUTROPHILS # BLD AUTO: 5.2 K/UL (ref 2–7.15)
NEUTROPHILS NFR BLD: 59 % (ref 44–72)
NRBC # BLD AUTO: 0 K/UL
NRBC BLD-RTO: 0 /100 WBC
PLATELET # BLD AUTO: 348 K/UL (ref 164–446)
PMV BLD AUTO: 8.8 FL (ref 9–12.9)
POTASSIUM SERPL-SCNC: 4.2 MMOL/L (ref 3.6–5.5)
PREALB SERPL-MCNC: 20 MG/DL (ref 18–38)
PROT SERPL-MCNC: 7.2 G/DL (ref 6–8.2)
RBC # BLD AUTO: 5.16 M/UL (ref 4.2–5.4)
SODIUM SERPL-SCNC: 140 MMOL/L (ref 135–145)
TRANSFERRIN SERPL-MCNC: 318 MG/DL (ref 200–370)
TRIGL SERPL-MCNC: 138 MG/DL (ref 0–149)
VIT B12 SERPL-MCNC: 307 PG/ML (ref 211–911)
WBC # BLD AUTO: 8.8 K/UL (ref 4.8–10.8)

## 2021-12-14 PROCEDURE — 82607 VITAMIN B-12: CPT

## 2021-12-14 PROCEDURE — 80053 COMPREHEN METABOLIC PANEL: CPT

## 2021-12-14 PROCEDURE — 36415 COLL VENOUS BLD VENIPUNCTURE: CPT

## 2021-12-14 PROCEDURE — 84630 ASSAY OF ZINC: CPT

## 2021-12-14 PROCEDURE — 84207 ASSAY OF VITAMIN B-6: CPT

## 2021-12-14 PROCEDURE — 82746 ASSAY OF FOLIC ACID SERUM: CPT

## 2021-12-14 PROCEDURE — 84466 ASSAY OF TRANSFERRIN: CPT

## 2021-12-14 PROCEDURE — 85025 COMPLETE CBC W/AUTO DIFF WBC: CPT

## 2021-12-14 PROCEDURE — 82728 ASSAY OF FERRITIN: CPT

## 2021-12-14 PROCEDURE — 84425 ASSAY OF VITAMIN B-1: CPT

## 2021-12-14 PROCEDURE — 84134 ASSAY OF PREALBUMIN: CPT

## 2021-12-14 PROCEDURE — 82306 VITAMIN D 25 HYDROXY: CPT

## 2021-12-14 PROCEDURE — 83540 ASSAY OF IRON: CPT

## 2021-12-14 PROCEDURE — 80061 LIPID PANEL: CPT

## 2021-12-14 PROCEDURE — 84252 ASSAY OF VITAMIN B-2: CPT

## 2021-12-16 ENCOUNTER — HOSPITAL ENCOUNTER (OUTPATIENT)
Dept: RADIOLOGY | Facility: MEDICAL CENTER | Age: 56
End: 2021-12-16
Attending: FAMILY MEDICINE
Payer: MEDICARE

## 2021-12-16 DIAGNOSIS — E66.01 MORBID OBESITY WITH BMI OF 40.0-44.9, ADULT (HCC): ICD-10-CM

## 2021-12-16 DIAGNOSIS — I45.10 RBBB: ICD-10-CM

## 2021-12-16 DIAGNOSIS — R73.03 PREDIABETES: ICD-10-CM

## 2021-12-16 DIAGNOSIS — Z01.818 PRE-OP EVALUATION: ICD-10-CM

## 2021-12-16 DIAGNOSIS — I27.20 PULMONARY HTN (HCC): ICD-10-CM

## 2021-12-16 DIAGNOSIS — I10 ESSENTIAL HYPERTENSION: ICD-10-CM

## 2021-12-16 PROCEDURE — 700111 HCHG RX REV CODE 636 W/ 250 OVERRIDE (IP)

## 2021-12-16 PROCEDURE — A9502 TC99M TETROFOSMIN: HCPCS

## 2021-12-16 RX ORDER — REGADENOSON 0.08 MG/ML
INJECTION, SOLUTION INTRAVENOUS
Status: COMPLETED
Start: 2021-12-16 | End: 2021-12-16

## 2021-12-16 RX ORDER — AMINOPHYLLINE 25 MG/ML
100 INJECTION, SOLUTION INTRAVENOUS
Status: DISCONTINUED | OUTPATIENT
Start: 2021-12-16 | End: 2021-12-17 | Stop reason: HOSPADM

## 2021-12-16 RX ORDER — REGADENOSON 0.08 MG/ML
0.4 INJECTION, SOLUTION INTRAVENOUS ONCE
Status: COMPLETED | OUTPATIENT
Start: 2021-12-16 | End: 2021-12-16

## 2021-12-16 RX ADMIN — REGADENOSON 0.4 MG: 0.08 INJECTION, SOLUTION INTRAVENOUS at 13:40

## 2021-12-17 LAB — 25(OH)D3 SERPL-MCNC: 16 NG/ML (ref 30–80)

## 2021-12-18 LAB — ZINC SERPL-MCNC: 75.9 UG/DL (ref 60–120)

## 2021-12-19 LAB
VIT B2 SERPL-SCNC: 4 NMOL/L (ref 5–50)
VIT B6 SERPL-MCNC: 25 NMOL/L (ref 20–125)

## 2021-12-20 LAB — VIT B1 BLD-MCNC: 145 NMOL/L (ref 70–180)

## 2022-01-05 NOTE — ED NOTES
Labs drawn and sent   DATE OF SERVICE: 01-05-22 @ 14:12  CHIEF COMPLAINT:Patient is a 83y old  Female who presents with a chief complaint of   	        PAST MEDICAL & SURGICAL HISTORY:  No pertinent past medical history    No significant past surgical history            REVIEW OF SYSTEMS:  feels ok   RESPIRATORY: No cough, wheezing, chills or hemoptysis; No Shortness of Breath  CARDIOVASCULAR: No chest pain, palpitations, passing out, d  GASTROINTESTINAL: No abdominal or epigastric pain. No nausea, vomiting, or hematemesis;   GENITOURINARY: No dysuria, frequency, hematuria, or incontinence  NEUROLOGICAL: No headaches,    Medications:  MEDICATIONS  (STANDING):  amLODIPine   Tablet 5 milliGRAM(s) Oral daily  budesonide 160 MICROgram(s)/formoterol 4.5 MICROgram(s) Inhaler 2 Puff(s) Inhalation two times a day  dexAMETHasone  Injectable 6 milliGRAM(s) IV Push daily  enoxaparin Injectable 40 milliGRAM(s) SubCutaneous every 12 hours  guaiFENesin ER 1200 milliGRAM(s) Oral every 12 hours  influenza  Vaccine (HIGH DOSE) 0.7 milliLiter(s) IntraMuscular once  tiotropium 18 MICROgram(s) Capsule 1 Capsule(s) Inhalation daily    MEDICATIONS  (PRN):    	    PHYSICAL EXAM:  T(C): 36.8 (01-05-22 @ 08:35), Max: 36.8 (01-05-22 @ 08:35)  HR: 56 (01-05-22 @ 08:35) (56 - 66)  BP: 145/76 (01-05-22 @ 08:35) (124/80 - 157/84)  RR: 18 (01-05-22 @ 08:38) (18 - 18)  SpO2: 96% (01-05-22 @ 08:38) (93% - 96%)  Wt(kg): --  I&O's Summary    04 Jan 2022 07:01  -  05 Jan 2022 07:00  --------------------------------------------------------  IN: 530 mL / OUT: 900 mL / NET: -370 mL        Appearance: Normal	  HEENT:   Normal oral mucosa, PERRL, EOMI	  Lymphatic: No lymphadenopathy  Cardiovascular: Normal S1 S2, No JVD, No murmurs, No edema  Respiratory:dec bs   Gastrointestinal:  Soft, Non-tender, + BS	  Skin: No rashes, No ecchymoses, No cyanosis	  Neurologic: Non-focal  Extremities: dec rom    TELEMETRY: 	    ECG:  	  RADIOLOGY:  OTHER: 	  	  LABS:	 	    CARDIAC MARKERS:            01-05    x   |  x   |  x   ----------------------------<  x   x    |  x   |  0.74    Ca    8.6      04 Jan 2022 09:24    TPro  6.0  /  Alb  2.9<L>  /  TBili  0.3  /  DBili  0.1  /  AST  21  /  ALT  18  /  AlkPhos  70  01-05    proBNP:   Lipid Profile:   HgA1c:   TSH:

## 2022-01-20 ENCOUNTER — TELEPHONE (OUTPATIENT)
Dept: MEDICAL GROUP | Facility: MEDICAL CENTER | Age: 57
End: 2022-01-20

## 2022-01-20 ENCOUNTER — OFFICE VISIT (OUTPATIENT)
Dept: MEDICAL GROUP | Facility: MEDICAL CENTER | Age: 57
End: 2022-01-20
Payer: MEDICARE

## 2022-01-20 VITALS
DIASTOLIC BLOOD PRESSURE: 78 MMHG | SYSTOLIC BLOOD PRESSURE: 126 MMHG | WEIGHT: 245 LBS | HEIGHT: 65 IN | RESPIRATION RATE: 16 BRPM | BODY MASS INDEX: 40.82 KG/M2 | OXYGEN SATURATION: 96 % | TEMPERATURE: 97.4 F | HEART RATE: 74 BPM

## 2022-01-20 DIAGNOSIS — E66.01 MORBID OBESITY WITH BMI OF 40.0-44.9, ADULT (HCC): ICD-10-CM

## 2022-01-20 DIAGNOSIS — J30.2 SEASONAL ALLERGIES: ICD-10-CM

## 2022-01-20 DIAGNOSIS — I70.0 ATHEROSCLEROSIS OF AORTA (HCC): ICD-10-CM

## 2022-01-20 DIAGNOSIS — I27.20 PULMONARY HTN (HCC): ICD-10-CM

## 2022-01-20 DIAGNOSIS — H91.92 DECREASED HEARING OF LEFT EAR: ICD-10-CM

## 2022-01-20 DIAGNOSIS — H65.112 NON-RECURRENT ACUTE ALLERGIC OTITIS MEDIA OF LEFT EAR: ICD-10-CM

## 2022-01-20 PROCEDURE — 99213 OFFICE O/P EST LOW 20 MIN: CPT | Performed by: FAMILY MEDICINE

## 2022-01-20 RX ORDER — ERGOCALCIFEROL 1.25 MG/1
50000 CAPSULE ORAL
COMMUNITY
Start: 2021-12-29 | End: 2022-09-06

## 2022-01-20 RX ORDER — TRIAMCINOLONE ACETONIDE 55 UG/1
2 SPRAY, METERED NASAL DAILY
Qty: 17 ML | Refills: 1 | Status: SHIPPED | OUTPATIENT
Start: 2022-01-20 | End: 2022-09-06

## 2022-01-20 RX ORDER — CETIRIZINE HYDROCHLORIDE 10 MG/1
10 TABLET ORAL DAILY
Qty: 90 TABLET | Refills: 1 | Status: SHIPPED | OUTPATIENT
Start: 2022-01-20 | End: 2022-09-06

## 2022-01-20 RX ORDER — ACETAMINOPHEN 325 MG/1
2000 TABLET ORAL
COMMUNITY
End: 2022-01-20

## 2022-01-20 ASSESSMENT — PATIENT HEALTH QUESTIONNAIRE - PHQ9
CLINICAL INTERPRETATION OF PHQ2 SCORE: 2
5. POOR APPETITE OR OVEREATING: 1 - SEVERAL DAYS
SUM OF ALL RESPONSES TO PHQ QUESTIONS 1-9: 9

## 2022-01-20 ASSESSMENT — FIBROSIS 4 INDEX: FIB4 SCORE: 0.51

## 2022-01-20 NOTE — PROGRESS NOTES
"  Subjective:     Carolynn Casey is a 56 y.o. female presenting with left hearing changes.  About 3 days ago, has noticed muffled hearing in her left ear.  Denies any pain, drainage.  She does have nasal congestion chronically.  Has been using Sudafed intermittently.  She tried Claritin with minimal improvement.        Current Outpatient Medications:   •  vitamin D2, Ergocalciferol, (DRISDOL) 1.25 MG (01599 UT) Cap capsule, Take 50,000 Units by mouth every 7 days., Disp: , Rfl:   •  cetirizine (ZYRTEC) 10 MG Tab, Take 1 Tablet by mouth every day., Disp: 90 Tablet, Rfl: 1  •  triamcinolone (NASACORT) 55 MCG/ACT nasal inhaler, Administer 2 Sprays into affected nostril(S) every day., Disp: 17 mL, Rfl: 1  •  traMADol (ULTRAM) 50 MG Tab, , Disp: , Rfl:   •  tizanidine (ZANAFLEX) 4 MG Tab, Take 1 Tablet by mouth at bedtime as needed (muscle spasms)., Disp: 90 Tablet, Rfl: 3  •  gabapentin (NEURONTIN) 300 MG Cap, Take 2 Capsules by mouth 3 times a day., Disp: 540 capsule, Rfl: 1  •  losartan (COZAAR) 50 MG Tab, Take 1 tablet by mouth every day., Disp: 90 tablet, Rfl: 3  •  cyclobenzaprine (FLEXERIL) 10 mg Tab, Take 1 tablet by mouth 3 times a day as needed for Mild Pain or Muscle Spasms., Disp: 270 tablet, Rfl: 3  •  amLODIPine (NORVASC) 10 MG Tab, Take 1 Tab by mouth every day., Disp: 90 Tab, Rfl: 3    Objective:     Vitals: /78   Pulse 74   Temp 36.3 °C (97.4 °F)   Resp 16   Ht 1.651 m (5' 5\")   Wt 111 kg (245 lb)   LMP  (LMP Unknown)   SpO2 96%   BMI 40.77 kg/m²   General: Alert  HEENT: Normocephalic. Tympanic membranes pearly, translucent bilaterally.      Assessment/Plan:     Carolynn was seen today for follow-up.    Diagnoses and all orders for this visit:    Decreased hearing of left ear  Seasonal allergies  Non-recurrent acute allergic otitis media of left ear  Acute, uncomplicated issue.  We will try Zyrtec and Nasacort.  We will also refer to audiology in the meantime for a hearing evaluation  -     " cetirizine (ZYRTEC) 10 MG Tab; Take 1 Tablet by mouth every day.  -     triamcinolone (NASACORT) 55 MCG/ACT nasal inhaler; Administer 2 Sprays into affected nostril(S) every day.  -     Referral to Audiology    Morbid obesity with BMI of 40.0-44.9, adult (HCC)  Pulmonary HTN (HCC)  Atherosclerosis of aorta (HCC)  Chronic, stable, continue to monitor        Return if symptoms worsen or fail to improve.        Annual Health Assessment Questions:     1.  Are you currently engaging in any exercise or physical activity? No     2.  How would you describe your mood or emotional well-being today? good     3.  Have you had any falls in the last year? No     4.  Have you noticed any problems with your balance or had difficulty walking? No     5.  In the last six months have you experienced any leakage of urine? No     6. DPA/Advanced Directive: Patient has Advanced Directive on file.

## 2022-01-20 NOTE — TELEPHONE ENCOUNTER
ESTABLISHED PATIENT PRE-VISIT PLANNING     Phone Number Called: [phone] (home) [phone] (work)  Call outcome: Spoke to patient regarding message below.  Message: Appointment scheduled with , Thursday, 01/20/2022 at 1:30 PM, [address], [id number]. Arrive 10-15 minutes early for check-in.      Patient was contacted to complete PVP.     Note: Patient will not be contacted if there is no indication to call.     1.  Reviewed notes from the last few office visits within the medical group: Yes    2.  If any orders were placed at last visit or intended to be done for this visit (i.e. 6 mos follow-up), do we have Results/Consult Notes?         •  Labs - Labs ordered, completed on 12/07/2021 and results are in chart.     Note: If patient appointment is for lab review and patient did not complete labs, check with provider if OK to reschedule patient until labs completed.       •  Imaging - Imaging ordered, NOT completed. Patient advised to complete prior to next appointment.    -MR-Breast-Unilateral-w/o Left: Ordered by , Not Done.         •  Referrals - No referrals were ordered at last office visit.    3. Is this appointment scheduled as a Hospital Follow-Up? No    4.  Immunizations were updated in Epic using Reconcile Outside Information activity? Yes    5.  Patient is due for the following Health Maintenance Topics:   Health Maintenance Due   Topic Date Due   • COLORECTAL CANCER SCREENING  Never done   • IMM ZOSTER VACCINES (1 of 2) Never done   • COVID-19 Vaccine (3 - Booster for Pfizer series) 09/23/2021       6.  AHA (Pulse8) form printed for Provider? Yes

## 2022-01-27 RX ORDER — AMLODIPINE BESYLATE 10 MG/1
10 TABLET ORAL DAILY
Qty: 90 TABLET | Refills: 3 | Status: SHIPPED | OUTPATIENT
Start: 2022-01-27 | End: 2023-01-19 | Stop reason: SDUPTHER

## 2022-02-16 ENCOUNTER — TELEPHONE (OUTPATIENT)
Dept: MEDICAL GROUP | Facility: MEDICAL CENTER | Age: 57
End: 2022-02-16
Payer: MEDICARE

## 2022-02-16 DIAGNOSIS — R19.5 POSITIVE COLORECTAL CANCER SCREENING USING COLOGUARD TEST: ICD-10-CM

## 2022-02-16 DIAGNOSIS — Z98.84 S/P BARIATRIC SURGERY: ICD-10-CM

## 2022-02-16 NOTE — TELEPHONE ENCOUNTER
Phone Number Called: [phone]    Call outcome: Unable to LVM     Message: Called to inform patient of message below. Busy signal received. Will try again.               ----- Message from Kandace Dent M.D. sent at 2/16/2022  6:36 AM PST -----  Please let pt know that her Cologuard test was abnormal.  I recommend an evaluation with GI.  Referral has been placed.

## 2022-02-17 ENCOUNTER — TELEPHONE (OUTPATIENT)
Dept: MEDICAL GROUP | Facility: MEDICAL CENTER | Age: 57
End: 2022-02-17
Payer: MEDICARE

## 2022-02-17 NOTE — TELEPHONE ENCOUNTER
Phone Number Called: [phone]    Call outcome: Spoke to patient regarding message below.    Message: Called to inform patient of message below.         ----- Message from Kandace Dent M.D. sent at 2/16/2022  6:36 AM PST -----  Please let pt know that her Cologuard test was abnormal.  I recommend an evaluation with GI.  Referral has been placed.

## 2022-02-24 ENCOUNTER — PATIENT MESSAGE (OUTPATIENT)
Dept: MEDICAL GROUP | Facility: MEDICAL CENTER | Age: 57
End: 2022-02-24
Payer: MEDICARE

## 2022-02-24 DIAGNOSIS — M15.9 PRIMARY OSTEOARTHRITIS INVOLVING MULTIPLE JOINTS: ICD-10-CM

## 2022-02-24 DIAGNOSIS — M43.10 DEGENERATIVE SPONDYLOLISTHESIS: ICD-10-CM

## 2022-02-24 DIAGNOSIS — Z98.890 HX OF LUMBOSACRAL SPINE SURGERY: ICD-10-CM

## 2022-02-25 RX ORDER — CYCLOBENZAPRINE HCL 10 MG
10 TABLET ORAL 3 TIMES DAILY PRN
Qty: 270 TABLET | Refills: 3 | Status: SHIPPED | OUTPATIENT
Start: 2022-02-25 | End: 2023-05-02 | Stop reason: SDUPTHER

## 2022-02-25 NOTE — TELEPHONE ENCOUNTER
From: Carolynn Casey  To: Physician Kandace Dent  Sent: 2/24/2022 6:00 PM PST  Subject: Refill    Hi Dr ALSTON,  I need a refill on my cyclobenzaprine.  If you have it called into [facility] on [address] that would be awesome.  Thank you,  Carolynn

## 2022-04-12 DIAGNOSIS — I10 ESSENTIAL HYPERTENSION: ICD-10-CM

## 2022-04-13 RX ORDER — LOSARTAN POTASSIUM 50 MG/1
50 TABLET ORAL DAILY
Qty: 100 TABLET | Refills: 3 | Status: SHIPPED | OUTPATIENT
Start: 2022-04-13 | End: 2022-04-18 | Stop reason: SDUPTHER

## 2022-04-18 DIAGNOSIS — I10 ESSENTIAL HYPERTENSION: ICD-10-CM

## 2022-04-19 ENCOUNTER — PATIENT MESSAGE (OUTPATIENT)
Dept: MEDICAL GROUP | Facility: MEDICAL CENTER | Age: 57
End: 2022-04-19
Payer: MEDICARE

## 2022-04-19 DIAGNOSIS — M43.10 DEGENERATIVE SPONDYLOLISTHESIS: ICD-10-CM

## 2022-04-19 DIAGNOSIS — Z98.890 HX OF LUMBOSACRAL SPINE SURGERY: ICD-10-CM

## 2022-04-19 DIAGNOSIS — M15.9 PRIMARY OSTEOARTHRITIS INVOLVING MULTIPLE JOINTS: ICD-10-CM

## 2022-04-19 RX ORDER — LOSARTAN POTASSIUM 50 MG/1
50 TABLET ORAL DAILY
Qty: 100 TABLET | Refills: 3 | Status: SHIPPED | OUTPATIENT
Start: 2022-04-19 | End: 2022-09-07 | Stop reason: SDUPTHER

## 2022-04-26 DIAGNOSIS — Z00.6 RESEARCH STUDY PATIENT: ICD-10-CM

## 2022-04-28 PROBLEM — R78.81 MSSA BACTEREMIA: Status: RESOLVED | Noted: 2021-02-12 | Resolved: 2022-04-28

## 2022-04-28 PROBLEM — B95.61 MSSA BACTEREMIA: Status: RESOLVED | Noted: 2021-02-12 | Resolved: 2022-04-28

## 2022-04-28 PROBLEM — E66.9 OBESITY (BMI 30-39.9): Status: ACTIVE | Noted: 2018-02-05

## 2022-05-04 ENCOUNTER — PATIENT MESSAGE (OUTPATIENT)
Dept: MEDICAL GROUP | Facility: MEDICAL CENTER | Age: 57
End: 2022-05-04
Payer: MEDICARE

## 2022-06-06 DIAGNOSIS — M15.9 PRIMARY OSTEOARTHRITIS INVOLVING MULTIPLE JOINTS: ICD-10-CM

## 2022-06-06 DIAGNOSIS — Z98.890 HX OF LUMBOSACRAL SPINE SURGERY: ICD-10-CM

## 2022-06-06 DIAGNOSIS — M43.10 DEGENERATIVE SPONDYLOLISTHESIS: ICD-10-CM

## 2022-06-07 RX ORDER — GABAPENTIN 300 MG/1
600 CAPSULE ORAL 3 TIMES DAILY
Qty: 540 CAPSULE | Refills: 3 | Status: SHIPPED | OUTPATIENT
Start: 2022-06-07 | End: 2022-06-30

## 2022-06-26 ENCOUNTER — PATIENT MESSAGE (OUTPATIENT)
Dept: MEDICAL GROUP | Facility: MEDICAL CENTER | Age: 57
End: 2022-06-26
Payer: MEDICARE

## 2022-06-29 ENCOUNTER — PATIENT MESSAGE (OUTPATIENT)
Dept: MEDICAL GROUP | Facility: MEDICAL CENTER | Age: 57
End: 2022-06-29
Payer: MEDICARE

## 2022-06-29 DIAGNOSIS — Z98.890 HX OF LUMBOSACRAL SPINE SURGERY: ICD-10-CM

## 2022-06-29 DIAGNOSIS — M43.10 DEGENERATIVE SPONDYLOLISTHESIS: ICD-10-CM

## 2022-06-29 DIAGNOSIS — M15.9 PRIMARY OSTEOARTHRITIS INVOLVING MULTIPLE JOINTS: ICD-10-CM

## 2022-06-30 ENCOUNTER — PATIENT MESSAGE (OUTPATIENT)
Dept: MEDICAL GROUP | Facility: MEDICAL CENTER | Age: 57
End: 2022-06-30
Payer: MEDICARE

## 2022-06-30 DIAGNOSIS — Z98.890 HX OF LUMBOSACRAL SPINE SURGERY: ICD-10-CM

## 2022-06-30 DIAGNOSIS — M54.50 CHRONIC BILATERAL LOW BACK PAIN, UNSPECIFIED WHETHER SCIATICA PRESENT: Primary | ICD-10-CM

## 2022-06-30 DIAGNOSIS — M43.10 DEGENERATIVE SPONDYLOLISTHESIS: ICD-10-CM

## 2022-06-30 DIAGNOSIS — G89.29 CHRONIC BILATERAL LOW BACK PAIN, UNSPECIFIED WHETHER SCIATICA PRESENT: Primary | ICD-10-CM

## 2022-06-30 RX ORDER — GABAPENTIN 300 MG/1
900 CAPSULE ORAL 3 TIMES DAILY
Qty: 810 CAPSULE | Refills: 1 | Status: SHIPPED | OUTPATIENT
Start: 2022-06-30 | End: 2022-12-28 | Stop reason: SDUPTHER

## 2022-07-14 ENCOUNTER — PATIENT MESSAGE (OUTPATIENT)
Dept: MEDICAL GROUP | Facility: MEDICAL CENTER | Age: 57
End: 2022-07-14
Payer: MEDICARE

## 2022-07-14 DIAGNOSIS — N64.4 BREAST PAIN: ICD-10-CM

## 2022-07-15 ENCOUNTER — PATIENT MESSAGE (OUTPATIENT)
Dept: MEDICAL GROUP | Facility: MEDICAL CENTER | Age: 57
End: 2022-07-15
Payer: MEDICARE

## 2022-07-15 DIAGNOSIS — F41.9 ANXIETY: ICD-10-CM

## 2022-07-18 RX ORDER — DIAZEPAM 10 MG/1
10 TABLET ORAL ONCE
Qty: 1 TABLET | Refills: 0 | Status: SHIPPED | OUTPATIENT
Start: 2022-07-18 | End: 2022-07-18

## 2022-07-19 ENCOUNTER — PATIENT MESSAGE (OUTPATIENT)
Dept: MEDICAL GROUP | Facility: MEDICAL CENTER | Age: 57
End: 2022-07-19
Payer: MEDICARE

## 2022-07-19 ENCOUNTER — APPOINTMENT (OUTPATIENT)
Dept: RADIOLOGY | Facility: MEDICAL CENTER | Age: 57
End: 2022-07-19
Attending: FAMILY MEDICINE
Payer: MEDICARE

## 2022-07-19 DIAGNOSIS — N64.4 BREAST PAIN: ICD-10-CM

## 2022-07-22 ENCOUNTER — PATIENT MESSAGE (OUTPATIENT)
Dept: MEDICAL GROUP | Facility: MEDICAL CENTER | Age: 57
End: 2022-07-22
Payer: MEDICARE

## 2022-07-22 DIAGNOSIS — N64.4 BREAST PAIN: ICD-10-CM

## 2022-07-28 ENCOUNTER — PATIENT MESSAGE (OUTPATIENT)
Dept: MEDICAL GROUP | Facility: MEDICAL CENTER | Age: 57
End: 2022-07-28
Payer: MEDICARE

## 2022-07-28 DIAGNOSIS — N64.4 BREAST PAIN: ICD-10-CM

## 2022-07-31 ENCOUNTER — PATIENT MESSAGE (OUTPATIENT)
Dept: MEDICAL GROUP | Facility: MEDICAL CENTER | Age: 57
End: 2022-07-31
Payer: MEDICARE

## 2022-08-15 ENCOUNTER — PATIENT MESSAGE (OUTPATIENT)
Dept: MEDICAL GROUP | Facility: MEDICAL CENTER | Age: 57
End: 2022-08-15
Payer: MEDICARE

## 2022-08-15 DIAGNOSIS — F41.9 ANXIETY: ICD-10-CM

## 2022-08-17 RX ORDER — DIAZEPAM 10 MG/1
10 TABLET ORAL DAILY
Qty: 2 TABLET | Refills: 0 | Status: SHIPPED | OUTPATIENT
Start: 2022-08-17 | End: 2022-09-06 | Stop reason: SDUPTHER

## 2022-09-06 ENCOUNTER — OFFICE VISIT (OUTPATIENT)
Dept: MEDICAL GROUP | Facility: MEDICAL CENTER | Age: 57
End: 2022-09-06
Payer: MEDICARE

## 2022-09-06 VITALS
OXYGEN SATURATION: 96 % | BODY MASS INDEX: 44.98 KG/M2 | TEMPERATURE: 97.8 F | DIASTOLIC BLOOD PRESSURE: 74 MMHG | HEART RATE: 64 BPM | HEIGHT: 65 IN | WEIGHT: 270 LBS | RESPIRATION RATE: 16 BRPM | SYSTOLIC BLOOD PRESSURE: 124 MMHG

## 2022-09-06 DIAGNOSIS — N63.20 LEFT BREAST MASS: ICD-10-CM

## 2022-09-06 DIAGNOSIS — F41.9 ANXIETY: ICD-10-CM

## 2022-09-06 DIAGNOSIS — R19.5 POSITIVE COLORECTAL CANCER SCREENING USING COLOGUARD TEST: ICD-10-CM

## 2022-09-06 DIAGNOSIS — N64.4 BREAST PAIN, LEFT: ICD-10-CM

## 2022-09-06 DIAGNOSIS — N64.4 BREAST PAIN, RIGHT: ICD-10-CM

## 2022-09-06 PROCEDURE — 99213 OFFICE O/P EST LOW 20 MIN: CPT | Performed by: FAMILY MEDICINE

## 2022-09-06 RX ORDER — DIAZEPAM 10 MG/1
10 TABLET ORAL DAILY
Qty: 1 TABLET | Refills: 0 | Status: SHIPPED | OUTPATIENT
Start: 2022-09-06 | End: 2022-09-07

## 2022-09-06 RX ORDER — SULFAMETHOXAZOLE AND TRIMETHOPRIM 800; 160 MG/1; MG/1
1 TABLET ORAL 2 TIMES DAILY
Qty: 14 TABLET | Refills: 0 | Status: CANCELLED | OUTPATIENT
Start: 2022-09-06 | End: 2022-09-13

## 2022-09-06 ASSESSMENT — FIBROSIS 4 INDEX: FIB4 SCORE: 0.52

## 2022-09-06 NOTE — PROGRESS NOTES
"  Subjective:     Carolynn Casey is a 57 y.o. female presenting with her  for follow-up.  Describes bilateral breast pain for the past 5 to 6 months.  She notices some nodules in the left side that seem to be growing.  They are quite tender.  Her skin is very sensitive to light touch.  She has noticed no obvious skin changes, but does notice more prominent pores in the left breast.  Denies any redness, swelling, rashes, nipple discharge.  Has tried nothing for this yet.  She had an MRI of the breast that showed likely benign subareolar and nodular parenchymal enhancement bilaterally.  Her mother has a history of breast cancer.        Current Outpatient Medications:     diazepam (VALIUM) 10 MG tablet, Take 1 Tablet by mouth every day for 1 day., Disp: 1 Tablet, Rfl: 0    sucralfate (CARAFATE) 1 GM/10ML Suspension, , Disp: , Rfl:     gabapentin (NEURONTIN) 300 MG Cap, Take 3 Capsules by mouth 3 times a day., Disp: 810 Capsule, Rfl: 1    losartan (COZAAR) 50 MG Tab, Take 1 Tablet by mouth every day., Disp: 100 Tablet, Rfl: 3    cyclobenzaprine (FLEXERIL) 10 mg Tab, Take 1 Tablet by mouth 3 times a day as needed for Mild Pain or Muscle Spasms., Disp: 270 Tablet, Rfl: 3    amLODIPine (NORVASC) 10 MG Tab, Take 1 Tablet by mouth every day., Disp: 90 Tablet, Rfl: 3    tizanidine (ZANAFLEX) 4 MG Tab, Take 1 Tablet by mouth at bedtime as needed (muscle spasms)., Disp: 90 Tablet, Rfl: 3    Objective:     Vitals: /74   Pulse 64   Temp 36.6 °C (97.8 °F)   Resp 16   Ht 1.651 m (5' 5\")   Wt 122 kg (270 lb)   LMP  (LMP Unknown)   SpO2 96%   BMI 44.93 kg/m²   General: Alert  HEENT: Normocephalic.  Breasts:  Symmetrical breasts, no nipple discharge.  Tender to palpation over the left medial breast near the areola, approx 1cm nodular area noted next to the left nipple.  Right breast is tender to palpation over the right medial breast near the areola.  No masses noted.      A chaperone was offered to the patient " during today's exam. Chaperone name: her  was present.        Assessment/Plan:     Carolynn was seen today for follow-up.    Diagnoses and all orders for this visit:    Breast pain, left  Left breast mass  Undiagnosed new problem with uncertain prognosis.  We discussed checking an ultrasound and diagnostic mammogram.  We also discussed a referral to surgery if the mammogram and ultrasounds are normal.  -     US-BREAST LIMITED-LEFT; Future  -     MA-DIAGNOSTIC MAMMO BILAT W/TOMOSYNTHESIS W/CAD; Future    Breast pain, right  -     MA-DIAGNOSTIC MAMMO BILAT W/TOMOSYNTHESIS W/CAD; Future  -     US-BREAST LIMITED-RIGHT; Future    Anxiety  Self-limited issue, will be undergoing an MRI for her low back in the near future.  -     diazepam (VALIUM) 10 MG tablet; Take 1 Tablet by mouth every day for 1 day.    Positive colorectal cancer screening using Cologuard test  Undiagnosed new problem with uncertain prognosis.  We discussed scheduling an appointment with GI.        Return in about 6 months (around 3/6/2023) for routine follow up.

## 2022-09-07 DIAGNOSIS — I10 ESSENTIAL HYPERTENSION: ICD-10-CM

## 2022-09-07 RX ORDER — LOSARTAN POTASSIUM 50 MG/1
50 TABLET ORAL DAILY
Qty: 100 TABLET | Refills: 3 | Status: SHIPPED | OUTPATIENT
Start: 2022-09-07 | End: 2023-04-20

## 2022-09-13 DIAGNOSIS — Z00.6 RESEARCH STUDY PATIENT: ICD-10-CM

## 2022-09-15 ENCOUNTER — HOSPITAL ENCOUNTER (OUTPATIENT)
Dept: RADIOLOGY | Facility: MEDICAL CENTER | Age: 57
End: 2022-09-15
Attending: FAMILY MEDICINE
Payer: MEDICARE

## 2022-09-15 DIAGNOSIS — N64.4 BREAST PAIN, LEFT: ICD-10-CM

## 2022-09-15 DIAGNOSIS — N64.4 BREAST PAIN, RIGHT: ICD-10-CM

## 2022-09-15 DIAGNOSIS — N63.20 LEFT BREAST MASS: ICD-10-CM

## 2022-09-15 PROCEDURE — 76642 ULTRASOUND BREAST LIMITED: CPT | Mod: RT

## 2022-09-15 PROCEDURE — G0279 TOMOSYNTHESIS, MAMMO: HCPCS

## 2022-09-20 ENCOUNTER — PATIENT MESSAGE (OUTPATIENT)
Dept: MEDICAL GROUP | Facility: MEDICAL CENTER | Age: 57
End: 2022-09-20
Payer: MEDICARE

## 2022-09-21 RX ORDER — NYSTATIN 100000 U/G
1 CREAM TOPICAL 2 TIMES DAILY
Qty: 30 G | Refills: 1 | Status: SHIPPED | OUTPATIENT
Start: 2022-09-21 | End: 2023-01-19

## 2022-09-26 ENCOUNTER — HOSPITAL ENCOUNTER (OUTPATIENT)
Dept: RADIOLOGY | Facility: MEDICAL CENTER | Age: 57
End: 2022-09-26
Attending: PHYSICIAN ASSISTANT
Payer: MEDICARE

## 2022-09-26 DIAGNOSIS — M54.41 CHRONIC MIDLINE LOW BACK PAIN WITH BILATERAL SCIATICA: ICD-10-CM

## 2022-09-26 DIAGNOSIS — Z98.1 STATUS POST SPINAL ARTHRODESIS: ICD-10-CM

## 2022-09-26 DIAGNOSIS — M54.16 LUMBAR RADICULOPATHY: ICD-10-CM

## 2022-09-26 DIAGNOSIS — G89.29 CHRONIC MIDLINE LOW BACK PAIN WITH BILATERAL SCIATICA: ICD-10-CM

## 2022-09-26 DIAGNOSIS — M54.50 LUMBAR PAIN: ICD-10-CM

## 2022-09-26 DIAGNOSIS — M54.42 CHRONIC MIDLINE LOW BACK PAIN WITH BILATERAL SCIATICA: ICD-10-CM

## 2022-09-26 PROCEDURE — 72148 MRI LUMBAR SPINE W/O DYE: CPT

## 2022-11-01 ENCOUNTER — HOSPITAL ENCOUNTER (OUTPATIENT)
Facility: MEDICAL CENTER | Age: 57
End: 2022-11-01
Attending: FAMILY MEDICINE
Payer: MEDICARE

## 2022-11-01 ENCOUNTER — PATIENT MESSAGE (OUTPATIENT)
Dept: MEDICAL GROUP | Facility: MEDICAL CENTER | Age: 57
End: 2022-11-01

## 2022-11-01 ENCOUNTER — OFFICE VISIT (OUTPATIENT)
Dept: MEDICAL GROUP | Facility: MEDICAL CENTER | Age: 57
End: 2022-11-01
Payer: MEDICARE

## 2022-11-01 VITALS
WEIGHT: 266 LBS | DIASTOLIC BLOOD PRESSURE: 76 MMHG | TEMPERATURE: 97.9 F | HEART RATE: 74 BPM | OXYGEN SATURATION: 92 % | RESPIRATION RATE: 16 BRPM | BODY MASS INDEX: 44.32 KG/M2 | SYSTOLIC BLOOD PRESSURE: 122 MMHG | HEIGHT: 65 IN

## 2022-11-01 DIAGNOSIS — H66.003 NON-RECURRENT ACUTE SUPPURATIVE OTITIS MEDIA OF BOTH EARS WITHOUT SPONTANEOUS RUPTURE OF TYMPANIC MEMBRANES: ICD-10-CM

## 2022-11-01 DIAGNOSIS — R05.9 COUGH, UNSPECIFIED TYPE: ICD-10-CM

## 2022-11-01 DIAGNOSIS — R50.9 FEVER, UNSPECIFIED FEVER CAUSE: ICD-10-CM

## 2022-11-01 LAB
FLUAV+FLUBV AG SPEC QL IA: NORMAL
INT CON NEG: NEGATIVE
INT CON POS: POSITIVE

## 2022-11-01 PROCEDURE — U0005 INFEC AGEN DETEC AMPLI PROBE: HCPCS

## 2022-11-01 PROCEDURE — U0003 INFECTIOUS AGENT DETECTION BY NUCLEIC ACID (DNA OR RNA); SEVERE ACUTE RESPIRATORY SYNDROME CORONAVIRUS 2 (SARS-COV-2) (CORONAVIRUS DISEASE [COVID-19]), AMPLIFIED PROBE TECHNIQUE, MAKING USE OF HIGH THROUGHPUT TECHNOLOGIES AS DESCRIBED BY CMS-2020-01-R: HCPCS

## 2022-11-01 PROCEDURE — 87804 INFLUENZA ASSAY W/OPTIC: CPT | Performed by: FAMILY MEDICINE

## 2022-11-01 PROCEDURE — 99214 OFFICE O/P EST MOD 30 MIN: CPT | Performed by: FAMILY MEDICINE

## 2022-11-01 RX ORDER — DOXYCYCLINE HYCLATE 100 MG
100 TABLET ORAL 2 TIMES DAILY
Qty: 14 TABLET | Refills: 0 | Status: SHIPPED | OUTPATIENT
Start: 2022-11-01 | End: 2022-11-08

## 2022-11-01 ASSESSMENT — FIBROSIS 4 INDEX: FIB4 SCORE: 0.52

## 2022-11-01 NOTE — PROGRESS NOTES
"  Subjective:     Carolynn Casey is a 57 y.o. female presenting with her significant other with a fever.  Associated with cough, chills, sore throat, ear pain, shortness of breath with laying down, poor sleep that started 3 days ago.  Symptoms have been worsening.  She has had several family members with similar symptoms.  She took a home COVID test, was negative.  Denies any chest pain, abdominal pain, diarrhea, rashes, recent travel.  Has tried nothing for this yet.  She has had her flu shot.        Current Outpatient Medications:     doxycycline (VIBRAMYCIN) 100 MG Tab, Take 1 Tablet by mouth 2 times a day for 7 days., Disp: 14 Tablet, Rfl: 0    nystatin (MYCOSTATIN) 833305 UNIT/GM Cream topical cream, Apply 1 g topically 2 times a day., Disp: 30 g, Rfl: 1    sucralfate (CARAFATE) 1 GM/10ML Suspension, , Disp: , Rfl:     gabapentin (NEURONTIN) 300 MG Cap, Take 3 Capsules by mouth 3 times a day., Disp: 810 Capsule, Rfl: 1    cyclobenzaprine (FLEXERIL) 10 mg Tab, Take 1 Tablet by mouth 3 times a day as needed for Mild Pain or Muscle Spasms., Disp: 270 Tablet, Rfl: 3    amLODIPine (NORVASC) 10 MG Tab, Take 1 Tablet by mouth every day., Disp: 90 Tablet, Rfl: 3    tizanidine (ZANAFLEX) 4 MG Tab, Take 1 Tablet by mouth at bedtime as needed (muscle spasms)., Disp: 90 Tablet, Rfl: 3    losartan (COZAAR) 50 MG Tab, Take 1 Tablet by mouth every day., Disp: 100 Tablet, Rfl: 3    Objective:     Vitals: /76   Pulse 74   Temp 36.6 °C (97.9 °F)   Resp 16   Ht 1.651 m (5' 5\")   Wt 121 kg (266 lb)   LMP  (LMP Unknown)   SpO2 92%   BMI 44.26 kg/m²   General: Alert  HEENT: Normocephalic. Tympanic membranes erythematous bilaterally.   Neck supple.  No thyromegaly or masses palpated. No cervical or supraclavicular lymphadenopathy.  Heart: Regular rate and rhythm.  S1 and S2 normal.  No murmurs appreciated.  Respiratory: Normal respiratory effort.  Clear to auscultation on the right, faint rales on the left.  Abdomen: " Non-distended, soft  Extremities: No leg edema.    Point-of-care influenza: Negative    Assessment/Plan:     Carolynn was seen today for other.    Diagnoses and all orders for this visit:    Non-recurrent acute suppurative otitis media of both ears without spontaneous rupture of tympanic membranes  Undiagnosed new problem with uncertain prognosis.  We discussed likely otitis media given her exam, symptoms.  We will try a course of doxycycline.  Rapid flu was negative.  COVID PCR collected as a precaution.  Also discussed checking a chest x-ray given the faint rales heard on exam today  -     doxycycline (VIBRAMYCIN) 100 MG Tab; Take 1 Tablet by mouth 2 times a day for 7 days.    Fever, unspecified fever cause  -     POCT Influenza A/B  -     SARS-CoV-2 PCR (24 hour In-House): Collect NP swab in VTM; Future  -     DX-CHEST-2 VIEWS; Future    Cough, unspecified type  -     DX-CHEST-2 VIEWS; Future        Return in about 2 months (around 1/1/2023) for routine follow up.

## 2022-11-02 DIAGNOSIS — R50.9 FEVER, UNSPECIFIED FEVER CAUSE: ICD-10-CM

## 2022-11-02 LAB
SARS-COV-2 RNA RESP QL NAA+PROBE: NOTDETECTED
SPECIMEN SOURCE: NORMAL

## 2022-11-02 RX ORDER — BENZONATATE 100 MG/1
100 CAPSULE ORAL 3 TIMES DAILY PRN
Qty: 90 CAPSULE | Refills: 0 | Status: SHIPPED
Start: 2022-11-02 | End: 2023-01-19

## 2022-11-04 ENCOUNTER — PATIENT MESSAGE (OUTPATIENT)
Dept: MEDICAL GROUP | Facility: MEDICAL CENTER | Age: 57
End: 2022-11-04

## 2022-11-04 ENCOUNTER — APPOINTMENT (OUTPATIENT)
Dept: RADIOLOGY | Facility: IMAGING CENTER | Age: 57
End: 2022-11-04
Attending: FAMILY MEDICINE
Payer: MEDICARE

## 2022-11-04 DIAGNOSIS — R05.9 COUGH, UNSPECIFIED TYPE: ICD-10-CM

## 2022-11-04 DIAGNOSIS — R50.9 FEVER, UNSPECIFIED FEVER CAUSE: ICD-10-CM

## 2022-11-04 PROCEDURE — 71046 X-RAY EXAM CHEST 2 VIEWS: CPT | Mod: TC | Performed by: FAMILY MEDICINE

## 2022-11-04 RX ORDER — LEVOFLOXACIN 750 MG/1
750 TABLET, FILM COATED ORAL DAILY
Qty: 5 TABLET | Refills: 0 | Status: SHIPPED | OUTPATIENT
Start: 2022-11-04 | End: 2022-11-09

## 2022-11-04 RX ORDER — PREDNISONE 20 MG/1
20 TABLET ORAL DAILY
Qty: 5 TABLET | Refills: 0 | Status: SHIPPED | OUTPATIENT
Start: 2022-11-04 | End: 2022-11-09

## 2022-11-09 ENCOUNTER — DOCUMENTATION (OUTPATIENT)
Dept: HEALTH INFORMATION MANAGEMENT | Facility: OTHER | Age: 57
End: 2022-11-09
Payer: MEDICARE

## 2022-11-11 NOTE — PROGRESS NOTES
Ashley Casey    I am covering Dr. Dent inbox. I would recommend coming in for re-evaluation if you do not feel like your symptoms are improving with treatment.     Thank you  Dr. Jacobo

## 2022-11-15 ENCOUNTER — PATIENT MESSAGE (OUTPATIENT)
Dept: MEDICAL GROUP | Facility: MEDICAL CENTER | Age: 57
End: 2022-11-15
Payer: MEDICARE

## 2022-11-15 DIAGNOSIS — R05.9 COUGH, UNSPECIFIED TYPE: ICD-10-CM

## 2022-11-15 DIAGNOSIS — J18.9 PNEUMONIA OF LEFT LOWER LOBE DUE TO INFECTIOUS ORGANISM: ICD-10-CM

## 2022-11-16 RX ORDER — PREDNISONE 10 MG/1
TABLET ORAL
Qty: 32 TABLET | Refills: 0 | Status: SHIPPED
Start: 2022-11-16 | End: 2022-11-23

## 2022-11-16 RX ORDER — ALBUTEROL SULFATE 90 UG/1
1-2 AEROSOL, METERED RESPIRATORY (INHALATION) EVERY 4 HOURS PRN
Qty: 1 EACH | Refills: 1 | Status: SHIPPED
Start: 2022-11-16 | End: 2023-05-25

## 2022-11-21 ENCOUNTER — HOSPITAL ENCOUNTER (OUTPATIENT)
Dept: RADIOLOGY | Facility: MEDICAL CENTER | Age: 57
End: 2022-11-21
Attending: ORTHOPAEDIC SURGERY
Payer: MEDICARE

## 2022-11-21 DIAGNOSIS — M25.551 RIGHT HIP PAIN: ICD-10-CM

## 2022-11-21 PROCEDURE — 73721 MRI JNT OF LWR EXTRE W/O DYE: CPT

## 2022-11-23 PROBLEM — F32.A DEPRESSIVE DISORDER: Status: ACTIVE | Noted: 2018-02-27

## 2022-11-23 PROBLEM — E66.01 MORBID OBESITY WITH BMI OF 45.0-49.9, ADULT (HCC): Status: RESOLVED | Noted: 2017-03-17 | Resolved: 2022-11-23

## 2022-11-23 PROBLEM — E66.01 MORBID OBESITY DUE TO EXCESS CALORIES (HCC): Status: ACTIVE | Noted: 2022-11-23

## 2022-11-23 PROBLEM — I73.9 PERIPHERAL VASCULAR DISEASE, UNSPECIFIED (HCC): Status: ACTIVE | Noted: 2022-11-23

## 2022-11-26 ENCOUNTER — PATIENT MESSAGE (OUTPATIENT)
Dept: MEDICAL GROUP | Facility: MEDICAL CENTER | Age: 57
End: 2022-11-26
Payer: MEDICARE

## 2022-12-01 RX ORDER — DULOXETIN HYDROCHLORIDE 20 MG/1
20 CAPSULE, DELAYED RELEASE ORAL DAILY
Qty: 90 CAPSULE | Refills: 0 | Status: SHIPPED
Start: 2022-12-01 | End: 2022-12-08 | Stop reason: SINTOL

## 2022-12-06 ENCOUNTER — PATIENT MESSAGE (OUTPATIENT)
Dept: MEDICAL GROUP | Facility: MEDICAL CENTER | Age: 57
End: 2022-12-06
Payer: MEDICARE

## 2022-12-07 ENCOUNTER — APPOINTMENT (OUTPATIENT)
Dept: RADIOLOGY | Facility: MEDICAL CENTER | Age: 57
End: 2022-12-07
Attending: FAMILY MEDICINE
Payer: MEDICARE

## 2022-12-08 RX ORDER — BUPROPION HYDROCHLORIDE 150 MG/1
150 TABLET ORAL EVERY MORNING
Qty: 90 TABLET | Refills: 0 | Status: SHIPPED | OUTPATIENT
Start: 2022-12-08 | End: 2023-01-19 | Stop reason: SDUPTHER

## 2022-12-28 DIAGNOSIS — M43.10 DEGENERATIVE SPONDYLOLISTHESIS: ICD-10-CM

## 2022-12-28 DIAGNOSIS — M15.9 PRIMARY OSTEOARTHRITIS INVOLVING MULTIPLE JOINTS: ICD-10-CM

## 2022-12-28 DIAGNOSIS — Z98.890 HX OF LUMBOSACRAL SPINE SURGERY: ICD-10-CM

## 2022-12-28 RX ORDER — GABAPENTIN 300 MG/1
900 CAPSULE ORAL 3 TIMES DAILY
Qty: 810 CAPSULE | Refills: 1 | Status: SHIPPED | OUTPATIENT
Start: 2022-12-28 | End: 2023-03-23

## 2023-01-19 ENCOUNTER — OFFICE VISIT (OUTPATIENT)
Dept: MEDICAL GROUP | Facility: MEDICAL CENTER | Age: 58
End: 2023-01-19
Payer: MEDICARE

## 2023-01-19 VITALS
BODY MASS INDEX: 43.65 KG/M2 | OXYGEN SATURATION: 97 % | DIASTOLIC BLOOD PRESSURE: 70 MMHG | HEART RATE: 100 BPM | TEMPERATURE: 97.6 F | SYSTOLIC BLOOD PRESSURE: 126 MMHG | WEIGHT: 262 LBS | HEIGHT: 65 IN

## 2023-01-19 DIAGNOSIS — Z13.0 SCREENING FOR ENDOCRINE, METABOLIC AND IMMUNITY DISORDER: ICD-10-CM

## 2023-01-19 DIAGNOSIS — Z13.228 SCREENING FOR ENDOCRINE, METABOLIC AND IMMUNITY DISORDER: ICD-10-CM

## 2023-01-19 DIAGNOSIS — I10 ESSENTIAL HYPERTENSION: ICD-10-CM

## 2023-01-19 DIAGNOSIS — Z13.29 SCREENING FOR ENDOCRINE, METABOLIC AND IMMUNITY DISORDER: ICD-10-CM

## 2023-01-19 DIAGNOSIS — R05.2 SUBACUTE COUGH: ICD-10-CM

## 2023-01-19 DIAGNOSIS — H92.03 EAR PAIN, BILATERAL: ICD-10-CM

## 2023-01-19 DIAGNOSIS — F33.1 MODERATE EPISODE OF RECURRENT MAJOR DEPRESSIVE DISORDER (HCC): ICD-10-CM

## 2023-01-19 DIAGNOSIS — Z13.6 SCREENING FOR CARDIOVASCULAR CONDITION: ICD-10-CM

## 2023-01-19 DIAGNOSIS — R60.0 BILATERAL LOWER EXTREMITY EDEMA: ICD-10-CM

## 2023-01-19 PROBLEM — E11.9 TYPE 2 DIABETES MELLITUS WITHOUT COMPLICATION (HCC): Status: RESOLVED | Noted: 2018-07-20 | Resolved: 2023-01-19

## 2023-01-19 PROCEDURE — 99214 OFFICE O/P EST MOD 30 MIN: CPT

## 2023-01-19 RX ORDER — SUCRALFATE 1 G/1
1 TABLET ORAL 2 TIMES DAILY
COMMUNITY
Start: 2022-12-02 | End: 2023-12-11 | Stop reason: SDUPTHER

## 2023-01-19 RX ORDER — BUPROPION HYDROCHLORIDE 150 MG/1
150 TABLET ORAL EVERY MORNING
Qty: 30 TABLET | Refills: 11 | Status: SHIPPED
Start: 2023-01-19 | End: 2023-04-28

## 2023-01-19 RX ORDER — AMLODIPINE BESYLATE 10 MG/1
10 TABLET ORAL DAILY
Qty: 30 TABLET | Refills: 11 | Status: SHIPPED
Start: 2023-01-19 | End: 2023-01-23

## 2023-01-19 RX ORDER — BENZONATATE 100 MG/1
100 CAPSULE ORAL 3 TIMES DAILY PRN
Qty: 60 CAPSULE | Refills: 0 | Status: SHIPPED
Start: 2023-01-19 | End: 2023-05-25

## 2023-01-19 ASSESSMENT — FIBROSIS 4 INDEX: FIB4 SCORE: 0.52

## 2023-01-19 ASSESSMENT — ENCOUNTER SYMPTOMS
FEVER: 0
COUGH: 0
CHILLS: 0
ORTHOPNEA: 0
SHORTNESS OF BREATH: 0
PALPITATIONS: 0

## 2023-01-19 ASSESSMENT — PATIENT HEALTH QUESTIONNAIRE - PHQ9
CLINICAL INTERPRETATION OF PHQ2 SCORE: 5
SUM OF ALL RESPONSES TO PHQ QUESTIONS 1-9: 17
5. POOR APPETITE OR OVEREATING: 2 - MORE THAN HALF THE DAYS

## 2023-01-19 NOTE — PROGRESS NOTES
Subjective:     CC: Ear Pain (Started in L ear but is now Both ears x 1 week, no drainage, little itchiness )      HPI:   Carolynn is a 57 y.o. female who presents today for left ear pain for the last week.  She states that the pain has recently spread to her right ear, about 2 to 3 days ago.  She notes a lingering cough from her illness back in November of last year.  She also endorses lower extremity edema, and weight gain of approximately 20 pounds over the last several months.  She denies dyspnea at night.  While she was in the hospital last year did show that her ejection fraction was 70%    Allergies: Naprosyn [naproxen], Penicillins, Fish, Latex, Shellfish allergy, Tape, Nsaids, and Cortisone     Medications:   Current Outpatient Medications:     amLODIPine (NORVASC) 10 MG Tab, Take 1 Tablet by mouth every day., Disp: 30 Tablet, Rfl: 11    benzonatate (TESSALON) 100 MG Cap, Take 1 Capsule by mouth 3 times a day as needed for Cough., Disp: 60 Capsule, Rfl: 0    buPROPion (WELLBUTRIN XL) 150 MG XL tablet, Take 1 Tablet by mouth every morning., Disp: 30 Tablet, Rfl: 11    gabapentin (NEURONTIN) 300 MG Cap, Take 3 Capsules by mouth 3 times a day., Disp: 810 Capsule, Rfl: 1    albuterol 108 (90 Base) MCG/ACT Aero Soln inhalation aerosol, Inhale 1-2 Puffs every four hours as needed (for cough)., Disp: 1 Each, Rfl: 1    losartan (COZAAR) 50 MG Tab, Take 1 Tablet by mouth every day., Disp: 100 Tablet, Rfl: 3    cyclobenzaprine (FLEXERIL) 10 mg Tab, Take 1 Tablet by mouth 3 times a day as needed for Mild Pain or Muscle Spasms., Disp: 270 Tablet, Rfl: 3    tizanidine (ZANAFLEX) 4 MG Tab, Take 1 Tablet by mouth at bedtime as needed (muscle spasms)., Disp: 90 Tablet, Rfl: 3    sucralfate (CARAFATE) 1 GM Tab, Take 1 g by mouth 3 times a day., Disp: , Rfl:       ROS:  Review of Systems   Constitutional:  Negative for chills and fever.   Respiratory:  Negative for cough and shortness of breath.    Cardiovascular:  Positive for  "leg swelling. Negative for chest pain, palpitations and orthopnea.     Objective:     Exam:  /70 (BP Location: Left arm, Patient Position: Sitting, BP Cuff Size: Large adult)   Pulse 100   Temp 36.4 °C (97.6 °F) (Temporal)   Ht 1.651 m (5' 5\")   Wt 119 kg (262 lb)   LMP  (LMP Unknown)   SpO2 97%   BMI 43.60 kg/m²  Body mass index is 43.6 kg/m².    Physical Exam  Constitutional:       Appearance: Normal appearance. She is obese.   Eyes:      Pupils: Pupils are equal, round, and reactive to light.   Cardiovascular:      Rate and Rhythm: Normal rate and regular rhythm.      Pulses: Normal pulses.      Heart sounds: Normal heart sounds.   Pulmonary:      Effort: Pulmonary effort is normal.      Breath sounds: Normal breath sounds.   Abdominal:      General: Bowel sounds are normal.      Palpations: Abdomen is soft.   Musculoskeletal:      Right lower leg: Edema present.      Left lower leg: Edema present.   Neurological:      Mental Status: She is alert and oriented to person, place, and time.   Psychiatric:         Mood and Affect: Mood normal.         Behavior: Behavior normal.         Assessment & Plan:     Carolynn shen 57 y.o. female with the following -     1. Ear pain, bilateral  2. Subacute cough  Acute, uncomplicated  -Use Zyrtec, Flonase, Netti pot to help flush sinuses  - benzonatate (TESSALON) 100 MG Cap; Take 1 Capsule by mouth 3 times a day as needed for Cough.  Dispense: 60 Capsule; Refill: 0    3. BMI 40.0-44.9, adult (HCC)  Chronic, stable  - TSH WITH REFLEX TO FT4; Future  - Comp Metabolic Panel; Future  - CBC WITHOUT DIFFERENTIAL; Future    4. Bilateral lower extremity edema  Chronic, unstable  -She reports worsening lower extremity edema as well as a 20+ pound weight gain over the last several months.  Previous echo which was completed last year showed an EF of 70%.  - EC-ECHOCARDIOGRAM COMPLETE W/O CONT; Future    5. Essential hypertension  Chronic, stable  - amLODIPine (NORVASC) 10 MG Tab; " Take 1 Tablet by mouth every day.  Dispense: 30 Tablet; Refill: 11    6. Moderate episode of recurrent major depressive disorder (HCC)  Chronic, Stable  - buPROPion (WELLBUTRIN XL) 150 MG XL tablet; Take 1 Tablet by mouth every morning.  Dispense: 30 Tablet; Refill: 11    7. Screening for endocrine, metabolic and immunity disorder  - HEMOGLOBIN A1C; Future    8. Screening for cardiovascular condition  - Lipid Profile; Future    Other orders  - sucralfate (CARAFATE) 1 GM Tab; Take 1 g by mouth 3 times a day.        Anticipatory guidance included the following: Patient counseled about skin care, diet, supplements, smoking, drugs/alcohol use, safe sex and exercise.     Return if symptoms worsen or fail to improve.    Please note that this dictation was created using voice recognition software. I have made every reasonable attempt to correct obvious errors, but I expect that there are errors of grammar and possibly content that I did not discover before finalizing the note.

## 2023-01-19 NOTE — LETTER
January 19, 2023    To Whom It May Concern:         This is confirmation that Carolynn Stover Carmela attended her scheduled appointment with MINGO Coates on 1/19/23. Please excuse her missed work on 1/8, 1/12, 1/15.         If you have any questions please do not hesitate to call me at the phone number listed below.    Sincerely,          MARIBETH CoatesRCaliN.  [phone]

## 2023-01-23 ENCOUNTER — OFFICE VISIT (OUTPATIENT)
Dept: MEDICAL GROUP | Facility: MEDICAL CENTER | Age: 58
End: 2023-01-23
Payer: MEDICARE

## 2023-01-23 VITALS
TEMPERATURE: 97 F | SYSTOLIC BLOOD PRESSURE: 126 MMHG | HEIGHT: 64 IN | BODY MASS INDEX: 45.75 KG/M2 | DIASTOLIC BLOOD PRESSURE: 60 MMHG | OXYGEN SATURATION: 98 % | HEART RATE: 100 BPM | WEIGHT: 268 LBS

## 2023-01-23 DIAGNOSIS — E66.01 MORBID OBESITY WITH BMI OF 45.0-49.9, ADULT (HCC): ICD-10-CM

## 2023-01-23 DIAGNOSIS — H92.03 OTALGIA OF BOTH EARS: ICD-10-CM

## 2023-01-23 DIAGNOSIS — I10 ESSENTIAL HYPERTENSION: ICD-10-CM

## 2023-01-23 DIAGNOSIS — I73.9 PERIPHERAL VASCULAR DISEASE, UNSPECIFIED (HCC): ICD-10-CM

## 2023-01-23 DIAGNOSIS — R60.0 BILATERAL LOWER EXTREMITY EDEMA: ICD-10-CM

## 2023-01-23 DIAGNOSIS — I27.20 PULMONARY HTN (HCC): ICD-10-CM

## 2023-01-23 PROCEDURE — 99214 OFFICE O/P EST MOD 30 MIN: CPT

## 2023-01-23 RX ORDER — POTASSIUM CHLORIDE 20 MEQ/1
20 TABLET, EXTENDED RELEASE ORAL DAILY
Qty: 30 TABLET | Refills: 3 | Status: SHIPPED
Start: 2023-01-23 | End: 2023-05-25

## 2023-01-23 RX ORDER — HYDROCHLOROTHIAZIDE 25 MG/1
25 TABLET ORAL DAILY
Qty: 30 TABLET | Refills: 3 | Status: SHIPPED
Start: 2023-01-23 | End: 2023-05-25

## 2023-01-23 ASSESSMENT — ENCOUNTER SYMPTOMS
FEVER: 0
COUGH: 0
PALPITATIONS: 0
CHILLS: 0
ORTHOPNEA: 0
SHORTNESS OF BREATH: 0

## 2023-01-23 ASSESSMENT — FIBROSIS 4 INDEX: FIB4 SCORE: 0.52

## 2023-01-23 NOTE — PROGRESS NOTES
Subjective:     CC: Otalgia (Bilateral ear pain )      HPI:   Carolynn is a 57 y.o. female who presents today for bilateral ear pain.  Symptoms have improved slightly since her last appointment last week.  She has been using a saline rinse in her sinuses as well as an antihistamine.  She has not started using a nasal spray at this time.  Discussed with patient adding a nasal spray to see if her symptoms continue to improve.  Patient is agreeable.  She has also been experiencing lower extremity edema as well as weight gain of approximately 6 pounds since her appointment last week.  Echo was ordered at last appointment, patient has not completed this yet.  Discussed with patient that her amlodipine may be contributing to her lower extremity edema.  Discussed with patient discontinuing this medication and trialing her on HCTZ.  Patient agreeable.  Discussed with patient keeping track of her blood pressure at home to ensure that she does not go too high after discontinuing her amlodipine.    Allergies: Naprosyn [naproxen], Penicillins, Fish, Latex, Shellfish allergy, Tape, Nsaids, and Cortisone     Medications:   Current Outpatient Medications:     hydroCHLOROthiazide (HYDRODIURIL) 25 MG Tab, Take 1 Tablet by mouth every day., Disp: 30 Tablet, Rfl: 3    potassium chloride SA (KDUR) 20 MEQ Tab CR, Take 1 Tablet by mouth every day., Disp: 30 Tablet, Rfl: 3    benzonatate (TESSALON) 100 MG Cap, Take 1 Capsule by mouth 3 times a day as needed for Cough., Disp: 60 Capsule, Rfl: 0    buPROPion (WELLBUTRIN XL) 150 MG XL tablet, Take 1 Tablet by mouth every morning., Disp: 30 Tablet, Rfl: 11    sucralfate (CARAFATE) 1 GM Tab, Take 1 g by mouth 3 times a day., Disp: , Rfl:     gabapentin (NEURONTIN) 300 MG Cap, Take 3 Capsules by mouth 3 times a day., Disp: 810 Capsule, Rfl: 1    albuterol 108 (90 Base) MCG/ACT Aero Soln inhalation aerosol, Inhale 1-2 Puffs every four hours as needed (for cough)., Disp: 1 Each, Rfl: 1    losartan  "(COZAAR) 50 MG Tab, Take 1 Tablet by mouth every day., Disp: 100 Tablet, Rfl: 3    cyclobenzaprine (FLEXERIL) 10 mg Tab, Take 1 Tablet by mouth 3 times a day as needed for Mild Pain or Muscle Spasms., Disp: 270 Tablet, Rfl: 3    tizanidine (ZANAFLEX) 4 MG Tab, Take 1 Tablet by mouth at bedtime as needed (muscle spasms)., Disp: 90 Tablet, Rfl: 3      ROS:  Review of Systems   Constitutional:  Negative for chills and fever.   Respiratory:  Negative for cough and shortness of breath.    Cardiovascular:  Positive for leg swelling. Negative for chest pain, palpitations and orthopnea.     Objective:     Exam:  /60 (BP Location: Left arm, Patient Position: Sitting, BP Cuff Size: Adult)   Pulse 100   Temp 36.1 °C (97 °F) (Temporal)   Ht 1.626 m (5' 4\")   Wt 122 kg (268 lb)   LMP  (LMP Unknown)   SpO2 98%   BMI 46.00 kg/m²  Body mass index is 46 kg/m².    Physical Exam  Constitutional:       Appearance: Normal appearance. She is obese.   Eyes:      Pupils: Pupils are equal, round, and reactive to light.   Cardiovascular:      Rate and Rhythm: Normal rate and regular rhythm.      Pulses: Normal pulses.      Heart sounds: Normal heart sounds.   Pulmonary:      Effort: Pulmonary effort is normal.      Breath sounds: Normal breath sounds.   Abdominal:      General: Bowel sounds are normal.      Palpations: Abdomen is soft.   Musculoskeletal:         General: Swelling present.      Right lower leg: Edema present.      Left lower leg: Edema present.   Neurological:      Mental Status: She is alert and oriented to person, place, and time.   Psychiatric:         Mood and Affect: Mood normal.         Behavior: Behavior normal.         Assessment & Plan:     Carolynn shen 57 y.o. female with the following -     1. Otalgia of both ears  Acute, uncomplicated  -Continue saline rinse and antihistamine  -Add nasal steroid  -Light massage to the eustachian tubes and warm compress may help alleviate some of your pain as well.    2. " Essential hypertension  3. Bilateral lower extremity edema  Chronic, unstable  - Stop amlodipine to see if lower extremity edema improves  - hydroCHLOROthiazide (HYDRODIURIL) 25 MG Tab; Take 1 Tablet by mouth every day.  Dispense: 30 Tablet; Refill: 3  - potassium chloride SA (KDUR) 20 MEQ Tab CR; Take 1 Tablet by mouth every day.  Dispense: 30 Tablet; Refill: 3    4. Morbid obesity with BMI of 45.0-49.9, adult (HCC)  Chronic, stable  - Patient identified as having weight management issue.  Appropriate orders and counseling given.    5. Peripheral vascular disease, unspecified (HCC)  Chronic, unstable  - ECHO pending  - hydroCHLOROthiazide (HYDRODIURIL) 25 MG Tab; Take 1 Tablet by mouth every day.  Dispense: 30 Tablet; Refill: 3    6. Pulmonary HTN (HCC)  Chronic, stable  - ECHO pending        Anticipatory guidance included the following: Patient counseled about skin care, diet, supplements, smoking, drugs/alcohol use, safe sex and exercise.     Return in 6 weeks (on 3/6/2023), or if symptoms worsen or fail to improve.    Please note that this dictation was created using voice recognition software. I have made every reasonable attempt to correct obvious errors, but I expect that there are errors of grammar and possibly content that I did not discover before finalizing the note.

## 2023-02-13 ENCOUNTER — HOSPITAL ENCOUNTER (OUTPATIENT)
Dept: LAB | Facility: MEDICAL CENTER | Age: 58
End: 2023-02-13
Payer: MEDICARE

## 2023-02-13 LAB
25(OH)D3 SERPL-MCNC: 19 NG/ML (ref 30–100)
ALBUMIN SERPL BCP-MCNC: 3.9 G/DL (ref 3.2–4.9)
ALBUMIN SERPL BCP-MCNC: 3.9 G/DL (ref 3.2–4.9)
ALBUMIN/GLOB SERPL: 1.3 G/DL
ALBUMIN/GLOB SERPL: 1.4 G/DL
ALP SERPL-CCNC: 131 U/L (ref 30–99)
ALP SERPL-CCNC: 132 U/L (ref 30–99)
ALT SERPL-CCNC: 16 U/L (ref 2–50)
ALT SERPL-CCNC: 16 U/L (ref 2–50)
ANION GAP SERPL CALC-SCNC: 12 MMOL/L (ref 7–16)
ANION GAP SERPL CALC-SCNC: 13 MMOL/L (ref 7–16)
AST SERPL-CCNC: 14 U/L (ref 12–45)
AST SERPL-CCNC: 16 U/L (ref 12–45)
BILIRUB SERPL-MCNC: 0.8 MG/DL (ref 0.1–1.5)
BILIRUB SERPL-MCNC: 0.8 MG/DL (ref 0.1–1.5)
BUN SERPL-MCNC: 17 MG/DL (ref 8–22)
BUN SERPL-MCNC: 17 MG/DL (ref 8–22)
CALCIUM ALBUM COR SERPL-MCNC: 9 MG/DL (ref 8.5–10.5)
CALCIUM ALBUM COR SERPL-MCNC: 9.1 MG/DL (ref 8.5–10.5)
CALCIUM SERPL-MCNC: 8.9 MG/DL (ref 8.5–10.5)
CALCIUM SERPL-MCNC: 9 MG/DL (ref 8.5–10.5)
CHLORIDE SERPL-SCNC: 100 MMOL/L (ref 96–112)
CHLORIDE SERPL-SCNC: 102 MMOL/L (ref 96–112)
CHOLEST SERPL-MCNC: 164 MG/DL (ref 100–199)
CHOLEST SERPL-MCNC: 166 MG/DL (ref 100–199)
CO2 SERPL-SCNC: 22 MMOL/L (ref 20–33)
CO2 SERPL-SCNC: 22 MMOL/L (ref 20–33)
CREAT SERPL-MCNC: 0.56 MG/DL (ref 0.5–1.4)
CREAT SERPL-MCNC: 0.59 MG/DL (ref 0.5–1.4)
FASTING STATUS PATIENT QL REPORTED: NORMAL
FASTING STATUS PATIENT QL REPORTED: NORMAL
FERRITIN SERPL-MCNC: 15.7 NG/ML (ref 10–291)
FOLATE SERPL-MCNC: 7.1 NG/ML
GFR SERPLBLD CREATININE-BSD FMLA CKD-EPI: 103 ML/MIN/1.73 M 2
GFR SERPLBLD CREATININE-BSD FMLA CKD-EPI: 104 ML/MIN/1.73 M 2
GLOBULIN SER CALC-MCNC: 2.8 G/DL (ref 1.9–3.5)
GLOBULIN SER CALC-MCNC: 2.9 G/DL (ref 1.9–3.5)
GLUCOSE SERPL-MCNC: 75 MG/DL (ref 65–99)
GLUCOSE SERPL-MCNC: 77 MG/DL (ref 65–99)
HDLC SERPL-MCNC: 49 MG/DL
HDLC SERPL-MCNC: 49 MG/DL
IRON SATN MFR SERPL: 9 % (ref 15–55)
IRON SERPL-MCNC: 36 UG/DL (ref 40–170)
LDLC SERPL CALC-MCNC: 80 MG/DL
LDLC SERPL CALC-MCNC: 83 MG/DL
POTASSIUM SERPL-SCNC: 3.9 MMOL/L (ref 3.6–5.5)
POTASSIUM SERPL-SCNC: 3.9 MMOL/L (ref 3.6–5.5)
PREALB SERPL-MCNC: 19.9 MG/DL (ref 18–38)
PROT SERPL-MCNC: 6.7 G/DL (ref 6–8.2)
PROT SERPL-MCNC: 6.8 G/DL (ref 6–8.2)
SODIUM SERPL-SCNC: 134 MMOL/L (ref 135–145)
SODIUM SERPL-SCNC: 137 MMOL/L (ref 135–145)
TIBC SERPL-MCNC: 408 UG/DL (ref 250–450)
TRANSFERRIN SERPL-MCNC: 370 MG/DL (ref 200–370)
TRIGL SERPL-MCNC: 172 MG/DL (ref 0–149)
TRIGL SERPL-MCNC: 173 MG/DL (ref 0–149)
TSH SERPL DL<=0.005 MIU/L-ACNC: 3.69 UIU/ML (ref 0.38–5.33)
UIBC SERPL-MCNC: 372 UG/DL (ref 110–370)
VIT B12 SERPL-MCNC: 330 PG/ML (ref 211–911)

## 2023-02-13 PROCEDURE — 80053 COMPREHEN METABOLIC PANEL: CPT

## 2023-02-13 PROCEDURE — 84466 ASSAY OF TRANSFERRIN: CPT

## 2023-02-13 PROCEDURE — 84134 ASSAY OF PREALBUMIN: CPT

## 2023-02-13 PROCEDURE — 82728 ASSAY OF FERRITIN: CPT

## 2023-02-13 PROCEDURE — 80053 COMPREHEN METABOLIC PANEL: CPT | Mod: 91

## 2023-02-13 PROCEDURE — 84252 ASSAY OF VITAMIN B-2: CPT

## 2023-02-13 PROCEDURE — 82607 VITAMIN B-12: CPT

## 2023-02-13 PROCEDURE — 84443 ASSAY THYROID STIM HORMONE: CPT

## 2023-02-13 PROCEDURE — 82306 VITAMIN D 25 HYDROXY: CPT

## 2023-02-13 PROCEDURE — 84207 ASSAY OF VITAMIN B-6: CPT

## 2023-02-13 PROCEDURE — 36415 COLL VENOUS BLD VENIPUNCTURE: CPT

## 2023-02-13 PROCEDURE — 82746 ASSAY OF FOLIC ACID SERUM: CPT

## 2023-02-13 PROCEDURE — 80061 LIPID PANEL: CPT

## 2023-02-13 PROCEDURE — 80061 LIPID PANEL: CPT | Mod: 91

## 2023-02-13 PROCEDURE — 83550 IRON BINDING TEST: CPT

## 2023-02-13 PROCEDURE — 83540 ASSAY OF IRON: CPT

## 2023-02-13 PROCEDURE — 84630 ASSAY OF ZINC: CPT

## 2023-02-16 LAB
VIT B6 SERPL-MCNC: 22.1 NMOL/L (ref 20–125)
ZINC SERPL-MCNC: 57.7 UG/DL (ref 60–120)

## 2023-02-17 LAB — VIT B2 SERPL-SCNC: 4 NMOL/L (ref 5–50)

## 2023-02-18 DIAGNOSIS — M51.36 DDD (DEGENERATIVE DISC DISEASE), LUMBAR: ICD-10-CM

## 2023-02-21 RX ORDER — TIZANIDINE 4 MG/1
TABLET ORAL
Qty: 90 TABLET | Refills: 3 | Status: SHIPPED | OUTPATIENT
Start: 2023-02-21 | End: 2024-02-21 | Stop reason: SDUPTHER

## 2023-03-03 DIAGNOSIS — Z00.6 RESEARCH STUDY PATIENT: ICD-10-CM

## 2023-03-06 ENCOUNTER — HOSPITAL ENCOUNTER (OUTPATIENT)
Dept: LAB | Facility: MEDICAL CENTER | Age: 58
End: 2023-03-06
Payer: MEDICARE

## 2023-03-06 ENCOUNTER — HOSPITAL ENCOUNTER (OUTPATIENT)
Facility: MEDICAL CENTER | Age: 58
End: 2023-03-06
Attending: PATHOLOGY
Payer: COMMERCIAL

## 2023-03-06 LAB
BASOPHILS # BLD AUTO: 0.8 % (ref 0–1.8)
BASOPHILS # BLD: 0.06 K/UL (ref 0–0.12)
EOSINOPHIL # BLD AUTO: 0.14 K/UL (ref 0–0.51)
EOSINOPHIL NFR BLD: 1.9 % (ref 0–6.9)
ERYTHROCYTE [DISTWIDTH] IN BLOOD BY AUTOMATED COUNT: 39.1 FL (ref 35.9–50)
ERYTHROCYTE [DISTWIDTH] IN BLOOD BY AUTOMATED COUNT: 39.8 FL (ref 35.9–50)
EST. AVERAGE GLUCOSE BLD GHB EST-MCNC: 134 MG/DL
HBA1C MFR BLD: 6.3 % (ref 4–5.6)
HCT VFR BLD AUTO: 46.5 % (ref 37–47)
HCT VFR BLD AUTO: 46.5 % (ref 37–47)
HGB BLD-MCNC: 14.6 G/DL (ref 12–16)
HGB BLD-MCNC: 14.7 G/DL (ref 12–16)
IMM GRANULOCYTES # BLD AUTO: 0.02 K/UL (ref 0–0.11)
IMM GRANULOCYTES NFR BLD AUTO: 0.3 % (ref 0–0.9)
LYMPHOCYTES # BLD AUTO: 2.35 K/UL (ref 1–4.8)
LYMPHOCYTES NFR BLD: 31.9 % (ref 22–41)
MCH RBC QN AUTO: 26.3 PG (ref 27–33)
MCH RBC QN AUTO: 26.3 PG (ref 27–33)
MCHC RBC AUTO-ENTMCNC: 31.4 G/DL (ref 33.6–35)
MCHC RBC AUTO-ENTMCNC: 31.6 G/DL (ref 33.6–35)
MCV RBC AUTO: 83.3 FL (ref 81.4–97.8)
MCV RBC AUTO: 83.6 FL (ref 81.4–97.8)
MONOCYTES # BLD AUTO: 0.77 K/UL (ref 0–0.85)
MONOCYTES NFR BLD AUTO: 10.4 % (ref 0–13.4)
NEUTROPHILS # BLD AUTO: 4.03 K/UL (ref 2–7.15)
NEUTROPHILS NFR BLD: 54.7 % (ref 44–72)
NRBC # BLD AUTO: 0.02 K/UL
NRBC BLD-RTO: 0.3 /100 WBC
PLATELET # BLD AUTO: 337 K/UL (ref 164–446)
PLATELET # BLD AUTO: 345 K/UL (ref 164–446)
PMV BLD AUTO: 8.6 FL (ref 9–12.9)
PMV BLD AUTO: 8.7 FL (ref 9–12.9)
RBC # BLD AUTO: 5.56 M/UL (ref 4.2–5.4)
RBC # BLD AUTO: 5.58 M/UL (ref 4.2–5.4)
WBC # BLD AUTO: 7.3 K/UL (ref 4.8–10.8)
WBC # BLD AUTO: 7.4 K/UL (ref 4.8–10.8)

## 2023-03-06 PROCEDURE — 85027 COMPLETE CBC AUTOMATED: CPT

## 2023-03-06 PROCEDURE — 84425 ASSAY OF VITAMIN B-1: CPT

## 2023-03-06 PROCEDURE — 83036 HEMOGLOBIN GLYCOSYLATED A1C: CPT

## 2023-03-06 PROCEDURE — 36415 COLL VENOUS BLD VENIPUNCTURE: CPT

## 2023-03-06 PROCEDURE — 85025 COMPLETE CBC W/AUTO DIFF WBC: CPT

## 2023-03-08 DIAGNOSIS — Z00.6 RESEARCH STUDY PATIENT: ICD-10-CM

## 2023-03-10 LAB — VIT B1 BLD-MCNC: 141 NMOL/L (ref 70–180)

## 2023-03-13 ENCOUNTER — HOSPITAL ENCOUNTER (OUTPATIENT)
Dept: LAB | Facility: MEDICAL CENTER | Age: 58
End: 2023-03-13
Payer: MEDICARE

## 2023-03-13 LAB
ERYTHROCYTE [DISTWIDTH] IN BLOOD BY AUTOMATED COUNT: 40.9 FL (ref 35.9–50)
HCT VFR BLD AUTO: 47.9 % (ref 37–47)
HGB BLD-MCNC: 14.9 G/DL (ref 12–16)
MCH RBC QN AUTO: 26.3 PG (ref 27–33)
MCHC RBC AUTO-ENTMCNC: 31.1 G/DL (ref 33.6–35)
MCV RBC AUTO: 84.5 FL (ref 81.4–97.8)
PLATELET # BLD AUTO: 366 K/UL (ref 164–446)
PMV BLD AUTO: 8.6 FL (ref 9–12.9)
RBC # BLD AUTO: 5.67 M/UL (ref 4.2–5.4)
WBC # BLD AUTO: 9.8 K/UL (ref 4.8–10.8)

## 2023-03-13 PROCEDURE — 36415 COLL VENOUS BLD VENIPUNCTURE: CPT

## 2023-03-13 PROCEDURE — 85027 COMPLETE CBC AUTOMATED: CPT

## 2023-03-17 LAB
ELF SCORE: 8.5
RELATIVE RISK: NORMAL
RISK GROUP: NORMAL
RISK: 3.3 %

## 2023-03-23 DIAGNOSIS — Z98.890 HX OF LUMBOSACRAL SPINE SURGERY: ICD-10-CM

## 2023-03-23 DIAGNOSIS — M15.9 PRIMARY OSTEOARTHRITIS INVOLVING MULTIPLE JOINTS: ICD-10-CM

## 2023-03-23 DIAGNOSIS — M43.10 DEGENERATIVE SPONDYLOLISTHESIS: ICD-10-CM

## 2023-03-23 RX ORDER — GABAPENTIN 300 MG/1
900 CAPSULE ORAL 3 TIMES DAILY
Qty: 270 CAPSULE | Refills: 11 | Status: SHIPPED
Start: 2023-03-23 | End: 2023-07-14

## 2023-04-19 DIAGNOSIS — I10 ESSENTIAL HYPERTENSION: ICD-10-CM

## 2023-04-20 ENCOUNTER — PATIENT MESSAGE (OUTPATIENT)
Dept: MEDICAL GROUP | Facility: MEDICAL CENTER | Age: 58
End: 2023-04-20
Payer: MEDICARE

## 2023-04-20 RX ORDER — LOSARTAN POTASSIUM 50 MG/1
50 TABLET ORAL DAILY
Qty: 100 TABLET | Refills: 1 | Status: SHIPPED | OUTPATIENT
Start: 2023-04-20 | End: 2023-11-07 | Stop reason: SDUPTHER

## 2023-04-28 RX ORDER — BUPROPION HYDROCHLORIDE 300 MG/1
300 TABLET ORAL EVERY MORNING
Qty: 90 TABLET | Refills: 1 | Status: SHIPPED | OUTPATIENT
Start: 2023-04-28 | End: 2023-08-25 | Stop reason: SDUPTHER

## 2023-05-02 DIAGNOSIS — Z98.890 HX OF LUMBOSACRAL SPINE SURGERY: ICD-10-CM

## 2023-05-02 DIAGNOSIS — M43.10 DEGENERATIVE SPONDYLOLISTHESIS: ICD-10-CM

## 2023-05-02 DIAGNOSIS — M15.9 PRIMARY OSTEOARTHRITIS INVOLVING MULTIPLE JOINTS: ICD-10-CM

## 2023-05-02 RX ORDER — CYCLOBENZAPRINE HCL 10 MG
10 TABLET ORAL 3 TIMES DAILY PRN
Qty: 270 TABLET | Refills: 3 | Status: ON HOLD | OUTPATIENT
Start: 2023-05-02 | End: 2023-08-18 | Stop reason: SDUPTHER

## 2023-05-12 PROBLEM — M48.062 LUMBAR STENOSIS WITH NEUROGENIC CLAUDICATION: Status: ACTIVE | Noted: 2023-05-11

## 2023-05-17 ENCOUNTER — APPOINTMENT (OUTPATIENT)
Dept: ADMISSIONS | Facility: MEDICAL CENTER | Age: 58
DRG: 460 | End: 2023-05-17
Attending: NEUROLOGICAL SURGERY
Payer: MEDICARE

## 2023-05-23 PROBLEM — F33.42 MAJOR DEPRESSIVE DISORDER, RECURRENT EPISODE, IN FULL REMISSION (HCC): Status: ACTIVE | Noted: 2018-02-27

## 2023-05-25 ENCOUNTER — HOSPITAL ENCOUNTER (OUTPATIENT)
Dept: LAB | Facility: MEDICAL CENTER | Age: 58
End: 2023-05-25
Attending: NEUROLOGICAL SURGERY
Payer: MEDICARE

## 2023-05-25 ENCOUNTER — OFFICE VISIT (OUTPATIENT)
Dept: MEDICAL GROUP | Facility: MEDICAL CENTER | Age: 58
End: 2023-05-25
Payer: MEDICARE

## 2023-05-25 ENCOUNTER — HOSPITAL ENCOUNTER (OUTPATIENT)
Dept: LAB | Facility: MEDICAL CENTER | Age: 58
End: 2023-05-25
Attending: FAMILY MEDICINE
Payer: MEDICARE

## 2023-05-25 ENCOUNTER — HOSPITAL ENCOUNTER (OUTPATIENT)
Dept: RADIOLOGY | Facility: MEDICAL CENTER | Age: 58
End: 2023-05-25
Attending: NEUROLOGICAL SURGERY
Payer: MEDICARE

## 2023-05-25 VITALS
SYSTOLIC BLOOD PRESSURE: 126 MMHG | OXYGEN SATURATION: 97 % | HEIGHT: 65 IN | WEIGHT: 259 LBS | BODY MASS INDEX: 43.15 KG/M2 | TEMPERATURE: 97.6 F | DIASTOLIC BLOOD PRESSURE: 70 MMHG | RESPIRATION RATE: 16 BRPM | HEART RATE: 74 BPM

## 2023-05-25 DIAGNOSIS — R19.5 POSITIVE COLORECTAL CANCER SCREENING USING COLOGUARD TEST: ICD-10-CM

## 2023-05-25 DIAGNOSIS — Z01.818 PRE-OP EVALUATION: ICD-10-CM

## 2023-05-25 DIAGNOSIS — M48.062 SPINAL STENOSIS OF LUMBAR REGION WITH NEUROGENIC CLAUDICATION: ICD-10-CM

## 2023-05-25 DIAGNOSIS — I10 ESSENTIAL HYPERTENSION: ICD-10-CM

## 2023-05-25 DIAGNOSIS — E66.01 MORBID OBESITY WITH BMI OF 40.0-44.9, ADULT (HCC): ICD-10-CM

## 2023-05-25 DIAGNOSIS — I27.20 PULMONARY HTN (HCC): ICD-10-CM

## 2023-05-25 DIAGNOSIS — Z98.84 S/P BARIATRIC SURGERY: ICD-10-CM

## 2023-05-25 DIAGNOSIS — R73.03 PREDIABETES: ICD-10-CM

## 2023-05-25 DIAGNOSIS — F33.42 MAJOR DEPRESSIVE DISORDER, RECURRENT EPISODE, IN FULL REMISSION (HCC): ICD-10-CM

## 2023-05-25 PROBLEM — I73.9 PERIPHERAL VASCULAR DISEASE, UNSPECIFIED (HCC): Status: RESOLVED | Noted: 2022-11-23 | Resolved: 2023-05-25

## 2023-05-25 PROBLEM — H91.92 LEFT EAR HEARING LOSS: Status: RESOLVED | Noted: 2018-02-27 | Resolved: 2023-05-25

## 2023-05-25 PROBLEM — R60.0 BILATERAL LOWER EXTREMITY EDEMA: Status: RESOLVED | Noted: 2023-01-19 | Resolved: 2023-05-25

## 2023-05-25 LAB
ANION GAP SERPL CALC-SCNC: 13 MMOL/L (ref 7–16)
BUN SERPL-MCNC: 10 MG/DL (ref 8–22)
CALCIUM SERPL-MCNC: 9.8 MG/DL (ref 8.5–10.5)
CHLORIDE SERPL-SCNC: 102 MMOL/L (ref 96–112)
CO2 SERPL-SCNC: 25 MMOL/L (ref 20–33)
CREAT SERPL-MCNC: 0.63 MG/DL (ref 0.5–1.4)
ERYTHROCYTE [DISTWIDTH] IN BLOOD BY AUTOMATED COUNT: 45.8 FL (ref 35.9–50)
GFR SERPLBLD CREATININE-BSD FMLA CKD-EPI: 101 ML/MIN/1.73 M 2
GLUCOSE SERPL-MCNC: 72 MG/DL (ref 65–99)
HCT VFR BLD AUTO: 49.4 % (ref 37–47)
HGB BLD-MCNC: 15.2 G/DL (ref 12–16)
MCH RBC QN AUTO: 25.9 PG (ref 27–33)
MCHC RBC AUTO-ENTMCNC: 30.8 G/DL (ref 32.2–35.5)
MCV RBC AUTO: 84 FL (ref 81.4–97.8)
PLATELET # BLD AUTO: 379 K/UL (ref 164–446)
PMV BLD AUTO: 8.3 FL (ref 9–12.9)
POTASSIUM SERPL-SCNC: 3.9 MMOL/L (ref 3.6–5.5)
RBC # BLD AUTO: 5.88 M/UL (ref 4.2–5.4)
SODIUM SERPL-SCNC: 140 MMOL/L (ref 135–145)
WBC # BLD AUTO: 8.7 K/UL (ref 4.8–10.8)

## 2023-05-25 PROCEDURE — 3074F SYST BP LT 130 MM HG: CPT | Performed by: FAMILY MEDICINE

## 2023-05-25 PROCEDURE — 93000 ELECTROCARDIOGRAM COMPLETE: CPT | Performed by: FAMILY MEDICINE

## 2023-05-25 PROCEDURE — 80048 BASIC METABOLIC PNL TOTAL CA: CPT

## 2023-05-25 PROCEDURE — 99214 OFFICE O/P EST MOD 30 MIN: CPT | Mod: 25 | Performed by: FAMILY MEDICINE

## 2023-05-25 PROCEDURE — 71046 X-RAY EXAM CHEST 2 VIEWS: CPT

## 2023-05-25 PROCEDURE — 3078F DIAST BP <80 MM HG: CPT | Performed by: FAMILY MEDICINE

## 2023-05-25 PROCEDURE — 85027 COMPLETE CBC AUTOMATED: CPT

## 2023-05-25 PROCEDURE — 36415 COLL VENOUS BLD VENIPUNCTURE: CPT

## 2023-05-25 RX ORDER — AMLODIPINE BESYLATE 10 MG/1
TABLET ORAL
COMMUNITY
End: 2023-05-25

## 2023-05-25 RX ORDER — ACETAMINOPHEN 500 MG
TABLET ORAL 2 TIMES DAILY
Status: ON HOLD | COMMUNITY
End: 2023-08-18

## 2023-05-25 ASSESSMENT — FIBROSIS 4 INDEX: FIB4 SCORE: 0.57

## 2023-05-25 NOTE — PROGRESS NOTES
"PREOPERATIVE EVALUATION    Name of surgeon requesting this evaluation: dr dykes  Planned surgery: left oblique lumbar fusion  Planned date of surgery: 7/26  Location: [facility]    Patient Active Problem List    Diagnosis Date Noted    Morbid obesity with BMI of 40.0-44.9, adult ([facility]) 05/25/2023    Lumbar stenosis with neurogenic claudication 05/11/2023    Positive colorectal cancer screening using Cologuard test 02/16/2022    Essential hypertension 09/08/2020    Hx of lumbosacral spine surgery 09/08/2020    Prediabetes 01/06/2020    S/P bariatric surgery 05/20/2019    Major depressive disorder, recurrent episode, in full remission ([facility]) 02/27/2018    Vitamin D deficiency 02/05/2018    Pulmonary HTN ([facility]) 08/31/2017    Primary osteoarthritis involving multiple joints 08/03/2017       Past Medical History:   Diagnosis Date    Allergy     Anesthesia     PONV    Anxiety     Arthritis     \"allover\"    ASTHMA     mild no inhalers    Bronchitis 2014    Constipation     constipation    Diabetes ([facility]) 08/31/2020    no meds currently since gastric bypass    Elevated glucose     per epic lab results    GERD (gastroesophageal reflux disease)     Heart burn     Heart murmur     took PhenPhen, no longer problem    Hiatus hernia syndrome     possible    Hypertension     pt states well controlled on meds    Left breast mass 08/03/2017    Major depressive disorder 02/27/2018    Migraine     Morbid obesity with BMI of 45.0-49.9, adult ([facility]) 3/17/2017    MSSA bacteremia 02/12/2021    Pain 08/31/2020    chronic lower back, knees, shoulders, 6/10    Primary osteoarthritis involving multiple joints     Pulmonary emboli ([facility]) 2017    no longer on blood thinners    Sacroiliitis ([facility]) 08/02/2017    Stomach ulcer     Type 2 diabetes mellitus without complication ([facility]) 07/20/2018    Unspecified disorder of thyroid 2020    hypo/ not on any medications    Unspecified urinary incontinence     mild/ wears pads    Upper GI bleed        Past Surgical " History:   Procedure Laterality Date    MT LAP,DIAGNOSTIC ABDOMEN N/A 2/11/2021    Procedure: LAPAROSCOPY - DIAGNOSTIC;  Surgeon: Gadiel Cox M.D.;  Location: [facility];  Service: General    GINGER BY LAPAROSCOPY  1/13/2021    Procedure: CHOLECYSTECTOMY, LAPAROSCOPIC;  Surgeon: Gadiel Cox M.D.;  Location: [facility];  Service: General    GASTROENTEROSTOMY, LAPAROSCOPIC N/A 10/2/2020    Procedure: GASTROENTEROSTOMY, LAPAROSCOPIC;  Surgeon: Gadiel Cox M.D.;  Location: [facility];  Service: General    GASTROSCOPY  9/2/2020    Procedure: GASTROSCOPY-WITH WATS BIOPSY;  Surgeon: Gadiel Cox M.D.;  Location: [facility];  Service: General    SPINAL CORD STIMULATOR  07/2020    MT LAP, SELENE RESTRICT PROC, LONGITUDINAL GAS*  5/15/2019    Procedure: GASTRECTOMY, SLEEVE, LAPAROSCOPIC;  Surgeon: Gadiel Cox M.D.;  Location: [facility];  Service: General    SPINAL CORD STIMULATOR  8/16/2018    Procedure: SPINAL CORD STIMULATOR- PERMANENT;  Surgeon: Dominique Saha M.D.;  Location: [facility];  Service: Pain Management    OTHER ABDOMINAL SURGERY  2018    gastric sleeve    S I JOINT FUSION Right 8/2/2017    Procedure: S I JOINT FUSION;  Surgeon: Alfredo Belcher M.D.;  Location: [facility];  Service:     FUSION, SPINE, XLIF  2/17/2016    Procedure: EXTREME LATERAL INTERBODY FUSION far lateral interbody  L3-4 ;  Surgeon: Alfredo Belcher M.D.;  Location: [facility];  Service:     LUMBAR DECOMPRESSION  2/17/2016    Procedure: LUMBAR DECOMPRESSION L3-4;  Surgeon: Alfredo Belcher M.D.;  Location: [facility];  Service:     KNEE REVISION TOTAL Right 10/8/2015    Procedure: KNEE REVISION TOTAL KNEE ARTHROPLASTY;  Surgeon: Ihsan Hawthorne M.D.;  Location: [facility];  Service:     LUMBAR FUSION ANTERIOR  2013    LAMINOTOMY  2006    Dr. [facility]    KNEE ARTHROPLASTY TOTAL Left 2003    KNEE ARTHROPLASTY  TOTAL Right 2003    SHOULDER ARTHROSCOPY W/ ROTATOR CUFF REPAIR  2001    LEFT    SHOULDER ARTHROSCOPY Right 2001    APPENDECTOMY  1998    1997    ABDOMINAL HYSTERECTOMY TOTAL  1995    CARPAL TUNNEL RELEASE Right 1983    OPEN REDUCTION  2001, 1990    CLAVICLE BILAT    PRIMARY C SECTION  1983, 1990    x 2       Family History   Problem Relation Age of Onset    Hypertension Mother     Cancer Mother 81        breast DCIS    Heart Disease Father     Heart Attack Father     Hypertension Father     Lung Disease Sister         asthma    Hypertension Sister     Arthritis Sister         knee    Arthritis Brother     Hypertension Brother     Diabetes Brother     Heart Disease Maternal Aunt     Hypertension Maternal Aunt     Cancer Maternal Aunt 80        breast    Stroke Maternal Uncle     Heart Disease Maternal Uncle     Hypertension Maternal Uncle     Heart Disease Paternal Grandmother     Psychiatric Illness Paternal Grandfather         Suicide    Alcohol/Drug Paternal Grandfather     Arthritis Maternal Grandmother     Arthritis Maternal Grandfather     Arthritis Brother     Hyperlipidemia Neg Hx        Social History     Socioeconomic History    Marital status:      Spouse name: Not on file    Number of children: Not on file    Years of education: Not on file    Highest education level: Associate degree: occupational, technical, or vocational program   Occupational History    Occupation: Disabled     Employer: DISABLED   Tobacco Use    Smoking status: Never    Smokeless tobacco: Never    Tobacco comments:     continue to avoid   Vaping Use    Vaping Use: Never used   Substance and Sexual Activity    Alcohol use: Not Currently     Alcohol/week: 0.0 oz    Drug use: Not Currently    Sexual activity: Yes     Partners: Male     Birth control/protection: Surgical   Other Topics Concern    Not on file   Social History Narrative    Not on file     Social Determinants of Health     Financial Resource Strain: Medium Risk  "(9/4/2020)    Overall Financial Resource Strain (CARDIA)     Difficulty of Paying Living Expenses: Somewhat hard   Food Insecurity: No Food Insecurity (9/4/2020)    Hunger Vital Sign     Worried About Running Out of Food in the Last Year: Never true     Ran Out of Food in the Last Year: Never true   Transportation Needs: Unmet Transportation Needs (9/4/2020)    PRAPARE - Transportation     Lack of Transportation (Medical): Yes     Lack of Transportation (Non-Medical): Yes   Physical Activity: Insufficiently Active (9/4/2020)    Exercise Vital Sign     Days of Exercise per Week: 1 day     Minutes of Exercise per Session: 20 min   Stress: Stress Concern Present (9/4/2020)    [employer] [place] [employer] - Occupational Stress Questionnaire     Feeling of Stress : Very much   Social Connections: Moderately Isolated (9/4/2020)    Social Connection and Isolation Panel [NHANES]     Frequency of Communication with Friends and Family: More than three times a week     Frequency of Social Gatherings with Friends and Family: Three times a week     Attends Zoroastrian Services: Never     Active Member of Clubs or Organizations: No     Attends Club or Organization Meetings: Never     Marital Status:    Intimate Partner Violence: Not on file   Housing Stability: Low Risk  (9/4/2020)    Housing Stability Vital Sign     Unable to Pay for Housing in the Last Year: No     Number of Places Lived in the Last Year: 1     Unstable Housing in the Last Year: No       Allergies   Allergen Reactions    Naprosyn [Naproxen] Unspecified     \"GI bleed\"  RXN=whole life    Penicillins Anaphylaxis     RXN=since age 1    Latex Rash and Itching     Rash, itching  RXN=20 years ago    Tape Rash     Plastic tape \"bubble up the skin\", reports tegaderm OK  RXN=ongoing    Nsaids     Cortisone Unspecified     migraines     Allergy to latex  Allergy to tape adhesive  No known allergy to iodine      Current Outpatient Medications:     " acetaminophen (TYLENOL) 500 MG Tab, Take 1,500-2,000 mg by mouth 2 times a day., Disp: , Rfl:     amLODIPine (NORVASC) 10 MG Tab, Take 10 mg by mouth every day., Disp: , Rfl:     cyclobenzaprine (FLEXERIL) 10 mg Tab, Take 1 Tablet by mouth 3 times a day as needed for Mild Pain or Muscle Spasms., Disp: 270 Tablet, Rfl: 3    buPROPion (WELLBUTRIN XL) 300 MG XL tablet, Take 1 Tablet by mouth every morning., Disp: 90 Tablet, Rfl: 1    losartan (COZAAR) 50 MG Tab, Take 1 Tablet by mouth every day., Disp: 100 Tablet, Rfl: 1    gabapentin (NEURONTIN) 300 MG Cap, Take 3 Capsules by mouth 3 times a day., Disp: 270 Capsule, Rfl: 11    tizanidine (ZANAFLEX) 4 MG Tab, TAKE 1 TABLET BY MOUTH AT BEDTIME AS NEEDED FOR MUSCLE SPASM, Disp: 90 Tablet, Rfl: 3    sucralfate (CARAFATE) 1 GM Tab, Take 1 g by mouth 3 times a day., Disp: , Rfl:       REVIEW OF SYSTEMS:    ANESTHESIA ISSUES:  past regional anesthesia without complications (except for nausea)    BLEEDING RISK: no recent abnormal bleeding, no remote history of abnormal bleeding    CONSTITUTIONAL: Denies fatigue, weight loss, weight gain, fever, night sweats, any constitutional problems    NEUROLOGIC: Denies headaches, dizziness, syncope, involuntary movements, any neurological problems    OPHTHALMIC: Denies any vision problems    ENT: Denies any ear nose or throat problems    RESPIRATORY: Denies cough, wheeze, any respiratory problems.  Does have pulmonary hypertension   Snore loudly? no   Tired or sleepy in daytime? no   Apnea or choking/gasping observed during sleep? no   Hypertension? yes   BMI over 35? yes   Age over 50? yes   Neck circumference? 38cm    Male? no     Intermediate risk for DANIELLE        CARDIOVASCULAR: Denies chest pain, palpitations, peripheral edema, any cardiovascular problems.  Is able to perform greater than 4 METs.  Does have hypertension that is stable on amlodipine and losartan.    GI: Denies nausea, vomiting, abdominal pain, diarrhea, constipation.   "Does have an abnormal Cologuard test but has not established with GI yet.  Has a hx of gastric bypass    GENITOURINARY: Denies dysuria, any genito-urinary problems    INTEGUMENTARY: Denies any skin problems    RHEUMATOLOGIC/MSK: Denies any new joint or arthritic problems.  Is on gabapentin, cyclobenzaprine, tizandine prn    ENDOCRINE: Denies polyuria, polydipsia, heat intolerance, cold intolerance, hot flashes.  Does have prediabetes    Any objection to receiving blood products if needed? no    ADLs:    Mobility: independent, not a falls risk   Personal hygiene: independent   Showering/bathing: independent   Toileting: independent   Dressing: independent   Self feeding: independent  IADLs:   Shopping: independent   Cooking: independent, no food insecurity   Medication management: independent   Housework: independent   Laundry: independent   Finances: independent   Driving: independent   Use telephone: independent    Family support:     Decision making capacity: no concerns      PHYSICAL EXAM:  /70   Pulse 74   Temp 36.4 °C (97.6 °F)   Resp 16   Ht 1.638 m (5' 4.5\")   Wt 117 kg (259 lb)   LMP  (LMP Unknown)   SpO2 97%   BMI 43.77 kg/m²     General: healthy, alert, no distress  Skin: Skin color, texture, turgor normal. No rashes or lesions.  Head: Normocephalic. No masses, lesions, tenderness or abnormalities  Eyes: conjunctivae/corneas clear. PERRL, EOM's intact.   Ears: External ears normal. Canals clear. TM's normal.  Nose:normal  Neck: Neck supple. No adenopathy. Thyroid symmetric, normal size, and without nodularity  Lungs: normal and clear to auscultation  Heart: normal, regular rate and rhythm, and no murmurs, clicks, or gallops  Abd: Abdomen soft, non-tender. BS normal. No masses,  No organomegaly  Ext: negative  Neuro: Normal Exam    EKG: Sinus rhythm, heart rate 76-78 bpm.  Right bundle branch block      ASSESSMENT/PLAN:    Diagnoses and all orders for this visit:    Pre-op " evaluation  Patient is stable for surgery.  EKG is stable from before.  CBC, BMP were normal.   Is very low risk.  -     CBC WITHOUT DIFFERENTIAL; Future  -     Basic Metabolic Panel; Future  -     EKG    Essential hypertension  Chronic, stable, continue amlodipine, losartan (she takes this in the evening)  -     CBC WITHOUT DIFFERENTIAL; Future  -     Basic Metabolic Panel; Future    Pulmonary HTN (HCC)  Chronic, likely stable, she has an echocardiogram scheduled    Prediabetes  Chronic, stable, her last A1c was 6.32 months ago    Major depressive disorder, recurrent episode, in full remission (HCC)  Chronic, stable, continue bupropion    Morbid obesity with BMI of 40.0-44.9, adult ([facility])  S/P bariatric surgery  Chronic, stable    Positive colorectal cancer screening using Cologuard test  Undiagnosed new problem with uncertain prognosis.  Advised patient to schedule with GI, discussed the potential for colon cancer        Trevor Index for assessing perioperative cardiovascular risk [Circulation 1999;100:1047]:   (one point each for * high-risk surgery [ intrathoracic, suprainguinal vascular, intraperitoneal ],* Hx ischemic heart dz, * Hx CHF, *Hx TIA or CVA, *IDDM, *Serum Cr >2.0)   Interpretation of risk: (Complication = MI, Pulm edema, v-fib or cardiac arrest, complete heart block)  Very Low: 0 points = 0.4 % complication. Low: 1 point = 0.9 %, Moderate: 2 points = 6.6 %, High: 3+ points = 11%.

## 2023-05-30 ENCOUNTER — DOCUMENTATION (OUTPATIENT)
Dept: OCCUPATIONAL MEDICINE | Facility: CLINIC | Age: 58
End: 2023-05-30
Payer: MEDICARE

## 2023-05-30 NOTE — PROGRESS NOTES
Pt has an appointment for Pre-Employment Physical with [facility] on 5/31/23.     Currently missing the following vaccine documentation:       MMR  Varicella     Contacted via e-mail on 5/30/23, requesting missing documentation. If unable to obtain by the appointment date, lab titers will be drawn, and/or vaccines will be given.

## 2023-05-31 ENCOUNTER — HOSPITAL ENCOUNTER (OUTPATIENT)
Facility: MEDICAL CENTER | Age: 58
End: 2023-05-31
Attending: NURSE PRACTITIONER
Payer: COMMERCIAL

## 2023-05-31 ENCOUNTER — EH NON-PROVIDER (OUTPATIENT)
Dept: OCCUPATIONAL MEDICINE | Facility: CLINIC | Age: 58
End: 2023-05-31

## 2023-05-31 DIAGNOSIS — Z02.1 PRE-EMPLOYMENT DRUG SCREENING: ICD-10-CM

## 2023-05-31 DIAGNOSIS — Z02.1 PRE-EMPLOYMENT HEALTH SCREENING EXAMINATION: ICD-10-CM

## 2023-05-31 DIAGNOSIS — Z02.89 ENCOUNTER FOR OCCUPATIONAL HEALTH ASSESSMENT: ICD-10-CM

## 2023-05-31 LAB
AMP AMPHETAMINE: NORMAL
BAR BARBITURATES: NORMAL
BZO BENZODIAZEPINES: NORMAL
COC COCAINE: NORMAL
INT CON NEG: NORMAL
INT CON POS: NORMAL
MDMA ECSTASY: NORMAL
MET METHAMPHETAMINES: NORMAL
MTD METHADONE: NORMAL
OPI OPIATES: NORMAL
OXY OXYCODONE: NORMAL
PCP PHENCYCLIDINE: NORMAL
POC URINE DRUG SCREEN OCDRS: NEGATIVE
THC: NORMAL

## 2023-05-31 PROCEDURE — 86762 RUBELLA ANTIBODY: CPT | Performed by: NURSE PRACTITIONER

## 2023-05-31 PROCEDURE — 86480 TB TEST CELL IMMUN MEASURE: CPT | Performed by: NURSE PRACTITIONER

## 2023-05-31 PROCEDURE — 8915 PR COMPREHENSIVE PHYSICAL: Performed by: NURSE PRACTITIONER

## 2023-05-31 PROCEDURE — 86765 RUBEOLA ANTIBODY: CPT | Performed by: NURSE PRACTITIONER

## 2023-05-31 PROCEDURE — 80305 DRUG TEST PRSMV DIR OPT OBS: CPT | Performed by: NURSE PRACTITIONER

## 2023-05-31 PROCEDURE — 86735 MUMPS ANTIBODY: CPT | Performed by: NURSE PRACTITIONER

## 2023-05-31 PROCEDURE — 86787 VARICELLA-ZOSTER ANTIBODY: CPT | Performed by: NURSE PRACTITIONER

## 2023-06-01 ENCOUNTER — APPOINTMENT (OUTPATIENT)
Dept: CARDIOLOGY | Facility: MEDICAL CENTER | Age: 58
End: 2023-06-01
Payer: MEDICARE

## 2023-06-02 LAB
GAMMA INTERFERON BACKGROUND BLD IA-ACNC: 0.05 IU/ML
M TB IFN-G BLD-IMP: NEGATIVE
M TB IFN-G CD4+ BCKGRND COR BLD-ACNC: -0.01 IU/ML
MEV IGG SER-ACNC: >300 AU/ML
MITOGEN IGNF BCKGRD COR BLD-ACNC: >10 IU/ML
MUV IGG SER IA-ACNC: 56.5 AU/ML
QFT TB2 - NIL TBQ2: -0.01 IU/ML
RUBV AB SER QL: 122 IU/ML
VZV IGG SER IA-ACNC: 2003 IV

## 2023-06-29 ENCOUNTER — APPOINTMENT (OUTPATIENT)
Dept: ADMISSIONS | Facility: MEDICAL CENTER | Age: 58
DRG: 460 | End: 2023-06-29
Attending: NEUROLOGICAL SURGERY
Payer: MEDICARE

## 2023-07-10 ENCOUNTER — APPOINTMENT (OUTPATIENT)
Dept: ADMISSIONS | Facility: MEDICAL CENTER | Age: 58
DRG: 460 | End: 2023-07-10
Attending: NEUROLOGICAL SURGERY
Payer: MEDICARE

## 2023-07-10 ENCOUNTER — PATIENT MESSAGE (OUTPATIENT)
Dept: MEDICAL GROUP | Facility: MEDICAL CENTER | Age: 58
End: 2023-07-10
Payer: MEDICARE

## 2023-07-10 DIAGNOSIS — M48.062 LUMBAR STENOSIS WITH NEUROGENIC CLAUDICATION: ICD-10-CM

## 2023-07-11 ENCOUNTER — PRE-ADMISSION TESTING (OUTPATIENT)
Dept: ADMISSIONS | Facility: MEDICAL CENTER | Age: 58
DRG: 460 | End: 2023-07-11
Attending: NEUROLOGICAL SURGERY
Payer: MEDICARE

## 2023-07-11 RX ORDER — ALBUTEROL SULFATE 90 UG/1
2 AEROSOL, METERED RESPIRATORY (INHALATION) PRN
COMMUNITY
End: 2023-12-11

## 2023-07-14 ENCOUNTER — PATIENT MESSAGE (OUTPATIENT)
Dept: MEDICAL GROUP | Facility: MEDICAL CENTER | Age: 58
End: 2023-07-14
Payer: MEDICARE

## 2023-07-14 DIAGNOSIS — N63.10 MASS OF RIGHT BREAST, UNSPECIFIED QUADRANT: ICD-10-CM

## 2023-07-14 DIAGNOSIS — N64.4 BREAST PAIN, LEFT: ICD-10-CM

## 2023-07-14 RX ORDER — PREGABALIN 100 MG/1
100 CAPSULE ORAL 2 TIMES DAILY
Qty: 60 CAPSULE | Refills: 2 | Status: SHIPPED | OUTPATIENT
Start: 2023-07-14 | End: 2023-08-13

## 2023-07-19 ENCOUNTER — PRE-ADMISSION TESTING (OUTPATIENT)
Dept: ADMISSIONS | Facility: MEDICAL CENTER | Age: 58
DRG: 460 | End: 2023-07-19
Attending: NEUROLOGICAL SURGERY
Payer: MEDICARE

## 2023-07-19 DIAGNOSIS — M48.062 SPINAL STENOSIS OF LUMBAR REGION WITH NEUROGENIC CLAUDICATION: ICD-10-CM

## 2023-07-19 LAB
25(OH)D3 SERPL-MCNC: 19 NG/ML (ref 30–100)
ABO + RH BLD: NORMAL
ABO GROUP BLD: ABNORMAL
ANION GAP SERPL CALC-SCNC: 11 MMOL/L (ref 7–16)
APPEARANCE UR: CLEAR
APTT PPP: 25.1 SEC (ref 24.7–36)
BASOPHILS # BLD AUTO: 0.8 % (ref 0–1.8)
BASOPHILS # BLD: 0.05 K/UL (ref 0–0.12)
BILIRUB UR QL STRIP.AUTO: NEGATIVE
BLD GP AB SCN SERPL QL: ABNORMAL
BUN SERPL-MCNC: 13 MG/DL (ref 8–22)
CALCIUM SERPL-MCNC: 9.5 MG/DL (ref 8.5–10.5)
CHLORIDE SERPL-SCNC: 102 MMOL/L (ref 96–112)
CO2 SERPL-SCNC: 23 MMOL/L (ref 20–33)
COLOR UR: YELLOW
CREAT SERPL-MCNC: 0.75 MG/DL (ref 0.5–1.4)
EKG IMPRESSION: NORMAL
EOSINOPHIL # BLD AUTO: 0.19 K/UL (ref 0–0.51)
EOSINOPHIL NFR BLD: 2.9 % (ref 0–6.9)
ERYTHROCYTE [DISTWIDTH] IN BLOOD BY AUTOMATED COUNT: 43 FL (ref 35.9–50)
EST. AVERAGE GLUCOSE BLD GHB EST-MCNC: 128 MG/DL
GFR SERPLBLD CREATININE-BSD FMLA CKD-EPI: 91 ML/MIN/1.73 M 2
GLUCOSE SERPL-MCNC: 104 MG/DL (ref 65–99)
GLUCOSE UR STRIP.AUTO-MCNC: NEGATIVE MG/DL
HBA1C MFR BLD: 6.1 % (ref 4–5.6)
HCT VFR BLD AUTO: 46.5 % (ref 37–47)
HGB BLD-MCNC: 14.1 G/DL (ref 12–16)
IMM GRANULOCYTES # BLD AUTO: 0.02 K/UL (ref 0–0.11)
IMM GRANULOCYTES NFR BLD AUTO: 0.3 % (ref 0–0.9)
INR PPP: 0.99 (ref 0.87–1.13)
KETONES UR STRIP.AUTO-MCNC: NEGATIVE MG/DL
LEUKOCYTE ESTERASE UR QL STRIP.AUTO: NEGATIVE
LYMPHOCYTES # BLD AUTO: 1.95 K/UL (ref 1–4.8)
LYMPHOCYTES NFR BLD: 30 % (ref 22–41)
MCH RBC QN AUTO: 25.5 PG (ref 27–33)
MCHC RBC AUTO-ENTMCNC: 30.3 G/DL (ref 32.2–35.5)
MCV RBC AUTO: 83.9 FL (ref 81.4–97.8)
MICRO URNS: NORMAL
MONOCYTES # BLD AUTO: 0.5 K/UL (ref 0–0.85)
MONOCYTES NFR BLD AUTO: 7.7 % (ref 0–13.4)
NEUTROPHILS # BLD AUTO: 3.79 K/UL (ref 1.82–7.42)
NEUTROPHILS NFR BLD: 58.3 % (ref 44–72)
NITRITE UR QL STRIP.AUTO: NEGATIVE
NRBC # BLD AUTO: 0 K/UL
NRBC BLD-RTO: 0 /100 WBC (ref 0–0.2)
PH UR STRIP.AUTO: 6 [PH] (ref 5–8)
PLATELET # BLD AUTO: 302 K/UL (ref 164–446)
PMV BLD AUTO: 8.5 FL (ref 9–12.9)
POTASSIUM SERPL-SCNC: 4.1 MMOL/L (ref 3.6–5.5)
PROT UR QL STRIP: NEGATIVE MG/DL
PROTHROMBIN TIME: 13 SEC (ref 12–14.6)
RBC # BLD AUTO: 5.54 M/UL (ref 4.2–5.4)
RBC UR QL AUTO: NEGATIVE
RH BLD: ABNORMAL
SODIUM SERPL-SCNC: 136 MMOL/L (ref 135–145)
SP GR UR STRIP.AUTO: 1.01
UROBILINOGEN UR STRIP.AUTO-MCNC: 1 MG/DL
WBC # BLD AUTO: 6.5 K/UL (ref 4.8–10.8)

## 2023-07-19 PROCEDURE — 82306 VITAMIN D 25 HYDROXY: CPT

## 2023-07-19 PROCEDURE — 93005 ELECTROCARDIOGRAM TRACING: CPT

## 2023-07-19 PROCEDURE — 93010 ELECTROCARDIOGRAM REPORT: CPT | Performed by: INTERNAL MEDICINE

## 2023-07-19 PROCEDURE — 85730 THROMBOPLASTIN TIME PARTIAL: CPT

## 2023-07-19 PROCEDURE — 36415 COLL VENOUS BLD VENIPUNCTURE: CPT

## 2023-07-19 PROCEDURE — 86901 BLOOD TYPING SEROLOGIC RH(D): CPT

## 2023-07-19 PROCEDURE — 83036 HEMOGLOBIN GLYCOSYLATED A1C: CPT

## 2023-07-19 PROCEDURE — 80048 BASIC METABOLIC PNL TOTAL CA: CPT

## 2023-07-19 PROCEDURE — 86900 BLOOD TYPING SEROLOGIC ABO: CPT

## 2023-07-19 PROCEDURE — 81003 URINALYSIS AUTO W/O SCOPE: CPT

## 2023-07-19 PROCEDURE — 85610 PROTHROMBIN TIME: CPT

## 2023-07-19 PROCEDURE — 86850 RBC ANTIBODY SCREEN: CPT

## 2023-07-19 PROCEDURE — 85025 COMPLETE CBC W/AUTO DIFF WBC: CPT

## 2023-07-20 ENCOUNTER — PRE-ADMISSION TESTING (OUTPATIENT)
Dept: ADMISSIONS | Facility: MEDICAL CENTER | Age: 58
DRG: 460 | End: 2023-07-20
Attending: NEUROLOGICAL SURGERY
Payer: MEDICARE

## 2023-07-20 DIAGNOSIS — Z01.812 PRE-OPERATIVE LABORATORY EXAMINATION: ICD-10-CM

## 2023-07-20 LAB
ABO GROUP BLD: ABNORMAL
BLD GP AB SCN SERPL QL: ABNORMAL
RH BLD: ABNORMAL

## 2023-07-20 PROCEDURE — 86901 BLOOD TYPING SEROLOGIC RH(D): CPT

## 2023-07-20 PROCEDURE — 86850 RBC ANTIBODY SCREEN: CPT

## 2023-07-20 PROCEDURE — 86870 RBC ANTIBODY IDENTIFICATION: CPT

## 2023-07-20 PROCEDURE — 86900 BLOOD TYPING SEROLOGIC ABO: CPT

## 2023-07-20 NOTE — OR NURSING
Received call from [facility] that this patient requires further testing in regards to her COD blood test.  Call made to patient.  Patient scheduled for testing appointment 7/21/2023.  Lab updated.  [employer] English to place orders.  Dr. Cherry's office notified 7/21/2023 @ [id number].

## 2023-07-21 ENCOUNTER — APPOINTMENT (OUTPATIENT)
Dept: ADMISSIONS | Facility: MEDICAL CENTER | Age: 58
DRG: 460 | End: 2023-07-21
Attending: NEUROLOGICAL SURGERY
Payer: MEDICARE

## 2023-07-21 PROCEDURE — 86902 BLOOD TYPE ANTIGEN DONOR EA: CPT

## 2023-07-21 PROCEDURE — 86870 RBC ANTIBODY IDENTIFICATION: CPT

## 2023-07-21 PROCEDURE — 86970 RBC PRETX INCUBATJ W/CHEMICL: CPT

## 2023-07-21 PROCEDURE — 86850 RBC ANTIBODY SCREEN: CPT

## 2023-07-21 PROCEDURE — 86880 COOMBS TEST DIRECT: CPT | Mod: 91

## 2023-07-21 PROCEDURE — 36415 COLL VENOUS BLD VENIPUNCTURE: CPT

## 2023-07-21 PROCEDURE — 86901 BLOOD TYPING SEROLOGIC RH(D): CPT

## 2023-07-21 PROCEDURE — 86978 RBC PRETREATMENT SERUM: CPT

## 2023-07-21 PROCEDURE — 86900 BLOOD TYPING SEROLOGIC ABO: CPT

## 2023-07-21 PROCEDURE — 86860 RBC ANTIBODY ELUTION: CPT

## 2023-07-25 LAB — BLD GP AB INVEST PLASRBC-IMP: ABNORMAL

## 2023-07-27 PROCEDURE — 86900 BLOOD TYPING SEROLOGIC ABO: CPT

## 2023-07-27 PROCEDURE — 86870 RBC ANTIBODY IDENTIFICATION: CPT

## 2023-07-27 PROCEDURE — 86860 RBC ANTIBODY ELUTION: CPT

## 2023-07-27 PROCEDURE — 86970 RBC PRETX INCUBATJ W/CHEMICL: CPT

## 2023-07-27 PROCEDURE — 86901 BLOOD TYPING SEROLOGIC RH(D): CPT

## 2023-07-27 PROCEDURE — 86880 COOMBS TEST DIRECT: CPT

## 2023-07-27 PROCEDURE — 86902 BLOOD TYPE ANTIGEN DONOR EA: CPT

## 2023-07-27 PROCEDURE — 86850 RBC ANTIBODY SCREEN: CPT

## 2023-07-27 PROCEDURE — 86978 RBC PRETREATMENT SERUM: CPT

## 2023-08-03 ENCOUNTER — HOSPITAL ENCOUNTER (OUTPATIENT)
Dept: RADIOLOGY | Facility: MEDICAL CENTER | Age: 58
End: 2023-08-03
Payer: MEDICARE

## 2023-08-16 ENCOUNTER — APPOINTMENT (OUTPATIENT)
Dept: RADIOLOGY | Facility: MEDICAL CENTER | Age: 58
End: 2023-08-16
Attending: FAMILY MEDICINE
Payer: MEDICARE

## 2023-08-16 RX ORDER — CLINDAMYCIN PHOSPHATE 900 MG/50ML
900 INJECTION, SOLUTION INTRAVENOUS ONCE
Status: DISCONTINUED | OUTPATIENT
Start: 2023-08-17 | End: 2023-08-17 | Stop reason: HOSPADM

## 2023-08-17 ENCOUNTER — APPOINTMENT (OUTPATIENT)
Dept: RADIOLOGY | Facility: MEDICAL CENTER | Age: 58
DRG: 460 | End: 2023-08-17
Attending: NEUROLOGICAL SURGERY
Payer: MEDICARE

## 2023-08-17 ENCOUNTER — ANESTHESIA EVENT (OUTPATIENT)
Dept: SURGERY | Facility: MEDICAL CENTER | Age: 58
DRG: 460 | End: 2023-08-17
Payer: MEDICARE

## 2023-08-17 ENCOUNTER — HOSPITAL ENCOUNTER (INPATIENT)
Facility: MEDICAL CENTER | Age: 58
LOS: 1 days | DRG: 460 | End: 2023-08-18
Attending: NEUROLOGICAL SURGERY | Admitting: NEUROLOGICAL SURGERY
Payer: MEDICARE

## 2023-08-17 ENCOUNTER — ANESTHESIA (OUTPATIENT)
Dept: SURGERY | Facility: MEDICAL CENTER | Age: 58
DRG: 460 | End: 2023-08-17
Payer: MEDICARE

## 2023-08-17 DIAGNOSIS — M15.9 PRIMARY OSTEOARTHRITIS INVOLVING MULTIPLE JOINTS: ICD-10-CM

## 2023-08-17 DIAGNOSIS — G89.18 POSTOPERATIVE PAIN AFTER SPINAL SURGERY: ICD-10-CM

## 2023-08-17 DIAGNOSIS — Z98.890 HX OF LUMBOSACRAL SPINE SURGERY: ICD-10-CM

## 2023-08-17 DIAGNOSIS — M48.062 LUMBAR STENOSIS WITH NEUROGENIC CLAUDICATION: ICD-10-CM

## 2023-08-17 DIAGNOSIS — M43.10 DEGENERATIVE SPONDYLOLISTHESIS: ICD-10-CM

## 2023-08-17 DIAGNOSIS — M48.062 SPINAL STENOSIS, LUMBAR REGION, WITH NEUROGENIC CLAUDICATION: ICD-10-CM

## 2023-08-17 PROCEDURE — 700105 HCHG RX REV CODE 258: Mod: JZ | Performed by: NEUROLOGICAL SURGERY

## 2023-08-17 PROCEDURE — A9270 NON-COVERED ITEM OR SERVICE: HCPCS | Performed by: ANESTHESIOLOGY

## 2023-08-17 PROCEDURE — 160048 HCHG OR STATISTICAL LEVEL 1-5: Performed by: NEUROLOGICAL SURGERY

## 2023-08-17 PROCEDURE — 22558 ARTHRD ANT NTRBD MIN DSC LUM: CPT | Mod: 62 | Performed by: SURGERY

## 2023-08-17 PROCEDURE — 700101 HCHG RX REV CODE 250: Performed by: PHYSICIAN ASSISTANT

## 2023-08-17 PROCEDURE — 95940 IONM IN OPERATNG ROOM 15 MIN: CPT | Performed by: NEUROLOGICAL SURGERY

## 2023-08-17 PROCEDURE — 96375 TX/PRO/DX INJ NEW DRUG ADDON: CPT

## 2023-08-17 PROCEDURE — 22558 ARTHRD ANT NTRBD MIN DSC LUM: CPT | Mod: 62ROC | Performed by: NEUROLOGICAL SURGERY

## 2023-08-17 PROCEDURE — 95937 NEUROMUSCULAR JUNCTION TEST: CPT | Performed by: NEUROLOGICAL SURGERY

## 2023-08-17 PROCEDURE — 95939 C MOTOR EVOKED UPR&LWR LIMBS: CPT | Performed by: NEUROLOGICAL SURGERY

## 2023-08-17 PROCEDURE — 700111 HCHG RX REV CODE 636 W/ 250 OVERRIDE (IP): Mod: JZ | Performed by: ANESTHESIOLOGY

## 2023-08-17 PROCEDURE — 95869 NDL EMG THRC PARASPINAL MUSC: CPT | Performed by: NEUROLOGICAL SURGERY

## 2023-08-17 PROCEDURE — 770001 HCHG ROOM/CARE - MED/SURG/GYN PRIV*

## 2023-08-17 PROCEDURE — 0SG00A0 FUSION OF LUMBAR VERTEBRAL JOINT WITH INTERBODY FUSION DEVICE, ANTERIOR APPROACH, ANTERIOR COLUMN, OPEN APPROACH: ICD-10-PCS | Performed by: NEUROLOGICAL SURGERY

## 2023-08-17 PROCEDURE — 160036 HCHG PACU - EA ADDL 30 MINS PHASE I: Performed by: NEUROLOGICAL SURGERY

## 2023-08-17 PROCEDURE — 96374 THER/PROPH/DIAG INJ IV PUSH: CPT

## 2023-08-17 PROCEDURE — 95861 NEEDLE EMG 2 EXTREMITIES: CPT | Performed by: NEUROLOGICAL SURGERY

## 2023-08-17 PROCEDURE — 700111 HCHG RX REV CODE 636 W/ 250 OVERRIDE (IP): Performed by: PHYSICIAN ASSISTANT

## 2023-08-17 PROCEDURE — 160031 HCHG SURGERY MINUTES - 1ST 30 MINS LEVEL 5: Performed by: NEUROLOGICAL SURGERY

## 2023-08-17 PROCEDURE — 700101 HCHG RX REV CODE 250: Performed by: NEUROLOGICAL SURGERY

## 2023-08-17 PROCEDURE — 700101 HCHG RX REV CODE 250: Performed by: ANESTHESIOLOGY

## 2023-08-17 PROCEDURE — A9270 NON-COVERED ITEM OR SERVICE: HCPCS | Performed by: PHYSICIAN ASSISTANT

## 2023-08-17 PROCEDURE — 8E0WXBZ COMPUTER ASSISTED PROCEDURE OF TRUNK REGION: ICD-10-PCS | Performed by: NEUROLOGICAL SURGERY

## 2023-08-17 PROCEDURE — 160009 HCHG ANES TIME/MIN: Performed by: NEUROLOGICAL SURGERY

## 2023-08-17 PROCEDURE — 700111 HCHG RX REV CODE 636 W/ 250 OVERRIDE (IP): Mod: JZ | Performed by: NEUROLOGICAL SURGERY

## 2023-08-17 PROCEDURE — 95938 SOMATOSENSORY TESTING: CPT | Performed by: NEUROLOGICAL SURGERY

## 2023-08-17 PROCEDURE — 160035 HCHG PACU - 1ST 60 MINS PHASE I: Performed by: NEUROLOGICAL SURGERY

## 2023-08-17 PROCEDURE — 110454 HCHG SHELL REV 250: Performed by: NEUROLOGICAL SURGERY

## 2023-08-17 PROCEDURE — 160002 HCHG RECOVERY MINUTES (STAT): Performed by: NEUROLOGICAL SURGERY

## 2023-08-17 PROCEDURE — 22853 INSJ BIOMECHANICAL DEVICE: CPT | Performed by: NEUROLOGICAL SURGERY

## 2023-08-17 PROCEDURE — 700102 HCHG RX REV CODE 250 W/ 637 OVERRIDE(OP): Performed by: PHYSICIAN ASSISTANT

## 2023-08-17 PROCEDURE — 22845 INSERT SPINE FIXATION DEVICE: CPT | Mod: 59 | Performed by: NEUROLOGICAL SURGERY

## 2023-08-17 PROCEDURE — C1713 ANCHOR/SCREW BN/BN,TIS/BN: HCPCS | Performed by: NEUROLOGICAL SURGERY

## 2023-08-17 PROCEDURE — 0SB20ZZ EXCISION OF LUMBAR VERTEBRAL DISC, OPEN APPROACH: ICD-10-PCS | Performed by: NEUROLOGICAL SURGERY

## 2023-08-17 PROCEDURE — 61783 SCAN PROC SPINAL: CPT | Performed by: NEUROLOGICAL SURGERY

## 2023-08-17 PROCEDURE — 700105 HCHG RX REV CODE 258: Performed by: PHYSICIAN ASSISTANT

## 2023-08-17 PROCEDURE — 20930 SP BONE ALGRFT MORSEL ADD-ON: CPT | Performed by: NEUROLOGICAL SURGERY

## 2023-08-17 PROCEDURE — 74018 RADEX ABDOMEN 1 VIEW: CPT

## 2023-08-17 PROCEDURE — 160042 HCHG SURGERY MINUTES - EA ADDL 1 MIN LEVEL 5: Performed by: NEUROLOGICAL SURGERY

## 2023-08-17 PROCEDURE — 72100 X-RAY EXAM L-S SPINE 2/3 VWS: CPT

## 2023-08-17 PROCEDURE — 86922 COMPATIBILITY TEST ANTIGLOB: CPT

## 2023-08-17 PROCEDURE — 700102 HCHG RX REV CODE 250 W/ 637 OVERRIDE(OP): Performed by: ANESTHESIOLOGY

## 2023-08-17 PROCEDURE — 502000 HCHG MISC OR IMPLANTS RC 0278: Performed by: NEUROLOGICAL SURGERY

## 2023-08-17 DEVICE — IMPLANTABLE DEVICE: Type: IMPLANTABLE DEVICE | Site: SPINE LUMBAR | Status: FUNCTIONAL

## 2023-08-17 DEVICE — GRAFT BONE PASTE GRAFTON PLUS 5CC (1EA): Type: IMPLANTABLE DEVICE | Site: SPINE LUMBAR | Status: FUNCTIONAL

## 2023-08-17 DEVICE — GRAPH BONE KIT INFUSE SMALL: Type: IMPLANTABLE DEVICE | Site: SPINE LUMBAR | Status: FUNCTIONAL

## 2023-08-17 RX ORDER — LIDOCAINE HYDROCHLORIDE 20 MG/ML
INJECTION, SOLUTION EPIDURAL; INFILTRATION; INTRACAUDAL; PERINEURAL PRN
Status: DISCONTINUED | OUTPATIENT
Start: 2023-08-17 | End: 2023-08-17 | Stop reason: SURG

## 2023-08-17 RX ORDER — SODIUM CHLORIDE AND POTASSIUM CHLORIDE 150; 900 MG/100ML; MG/100ML
INJECTION, SOLUTION INTRAVENOUS CONTINUOUS
Status: DISCONTINUED | OUTPATIENT
Start: 2023-08-17 | End: 2023-08-18

## 2023-08-17 RX ORDER — VANCOMYCIN HYDROCHLORIDE 1 G/20ML
INJECTION, POWDER, LYOPHILIZED, FOR SOLUTION INTRAVENOUS
Status: COMPLETED | OUTPATIENT
Start: 2023-08-17 | End: 2023-08-17

## 2023-08-17 RX ORDER — HYDROMORPHONE HYDROCHLORIDE 1 MG/ML
0.1 INJECTION, SOLUTION INTRAMUSCULAR; INTRAVENOUS; SUBCUTANEOUS
Status: DISCONTINUED | OUTPATIENT
Start: 2023-08-17 | End: 2023-08-17

## 2023-08-17 RX ORDER — ONDANSETRON 4 MG/1
4 TABLET, ORALLY DISINTEGRATING ORAL EVERY 4 HOURS PRN
Status: DISCONTINUED | OUTPATIENT
Start: 2023-08-17 | End: 2023-08-18 | Stop reason: HOSPADM

## 2023-08-17 RX ORDER — AMLODIPINE BESYLATE 10 MG/1
10 TABLET ORAL DAILY
Status: DISCONTINUED | OUTPATIENT
Start: 2023-08-18 | End: 2023-08-18 | Stop reason: HOSPADM

## 2023-08-17 RX ORDER — ACETAMINOPHEN 500 MG
1000 TABLET ORAL EVERY 6 HOURS PRN
Status: DISCONTINUED | OUTPATIENT
Start: 2023-08-22 | End: 2023-08-18 | Stop reason: HOSPADM

## 2023-08-17 RX ORDER — DIPHENHYDRAMINE HCL 25 MG
25 TABLET ORAL EVERY 6 HOURS PRN
Status: DISCONTINUED | OUTPATIENT
Start: 2023-08-17 | End: 2023-08-18 | Stop reason: HOSPADM

## 2023-08-17 RX ORDER — GABAPENTIN 300 MG/1
900 CAPSULE ORAL 3 TIMES DAILY
COMMUNITY

## 2023-08-17 RX ORDER — POLYETHYLENE GLYCOL 3350 17 G/17G
1 POWDER, FOR SOLUTION ORAL 2 TIMES DAILY PRN
Status: DISCONTINUED | OUTPATIENT
Start: 2023-08-17 | End: 2023-08-18 | Stop reason: HOSPADM

## 2023-08-17 RX ORDER — CYCLOBENZAPRINE HCL 10 MG
10 TABLET ORAL 3 TIMES DAILY PRN
Status: DISCONTINUED | OUTPATIENT
Start: 2023-08-17 | End: 2023-08-18 | Stop reason: HOSPADM

## 2023-08-17 RX ORDER — MEPERIDINE HYDROCHLORIDE 25 MG/ML
6.25 INJECTION INTRAMUSCULAR; INTRAVENOUS; SUBCUTANEOUS
Status: DISCONTINUED | OUTPATIENT
Start: 2023-08-17 | End: 2023-08-17 | Stop reason: HOSPADM

## 2023-08-17 RX ORDER — METOCLOPRAMIDE HYDROCHLORIDE 5 MG/ML
INJECTION INTRAMUSCULAR; INTRAVENOUS PRN
Status: DISCONTINUED | OUTPATIENT
Start: 2023-08-17 | End: 2023-08-17 | Stop reason: SURG

## 2023-08-17 RX ORDER — ENEMA 19; 7 G/133ML; G/133ML
1 ENEMA RECTAL
Status: DISCONTINUED | OUTPATIENT
Start: 2023-08-17 | End: 2023-08-18 | Stop reason: HOSPADM

## 2023-08-17 RX ORDER — LABETALOL HYDROCHLORIDE 5 MG/ML
10 INJECTION, SOLUTION INTRAVENOUS
Status: DISCONTINUED | OUTPATIENT
Start: 2023-08-17 | End: 2023-08-18 | Stop reason: HOSPADM

## 2023-08-17 RX ORDER — ROCURONIUM BROMIDE 10 MG/ML
INJECTION, SOLUTION INTRAVENOUS PRN
Status: DISCONTINUED | OUTPATIENT
Start: 2023-08-17 | End: 2023-08-17 | Stop reason: SURG

## 2023-08-17 RX ORDER — TRANEXAMIC ACID 100 MG/ML
INJECTION, SOLUTION INTRAVENOUS PRN
Status: DISCONTINUED | OUTPATIENT
Start: 2023-08-17 | End: 2023-08-17 | Stop reason: SURG

## 2023-08-17 RX ORDER — ALPRAZOLAM 0.25 MG/1
0.25 TABLET ORAL 2 TIMES DAILY PRN
Status: DISCONTINUED | OUTPATIENT
Start: 2023-08-17 | End: 2023-08-18 | Stop reason: HOSPADM

## 2023-08-17 RX ORDER — BUPROPION HYDROCHLORIDE 300 MG/1
300 TABLET ORAL EVERY MORNING
Status: DISCONTINUED | OUTPATIENT
Start: 2023-08-17 | End: 2023-08-17

## 2023-08-17 RX ORDER — BUPIVACAINE HYDROCHLORIDE AND EPINEPHRINE 5; 5 MG/ML; UG/ML
INJECTION, SOLUTION EPIDURAL; INTRACAUDAL; PERINEURAL
Status: DISCONTINUED | OUTPATIENT
Start: 2023-08-17 | End: 2023-08-17 | Stop reason: HOSPADM

## 2023-08-17 RX ORDER — DIPHENHYDRAMINE HYDROCHLORIDE 50 MG/ML
12.5 INJECTION INTRAMUSCULAR; INTRAVENOUS
Status: DISCONTINUED | OUTPATIENT
Start: 2023-08-17 | End: 2023-08-17

## 2023-08-17 RX ORDER — ONDANSETRON 2 MG/ML
4 INJECTION INTRAMUSCULAR; INTRAVENOUS EVERY 4 HOURS PRN
Status: DISCONTINUED | OUTPATIENT
Start: 2023-08-17 | End: 2023-08-18 | Stop reason: HOSPADM

## 2023-08-17 RX ORDER — SODIUM CHLORIDE, SODIUM LACTATE, POTASSIUM CHLORIDE, CALCIUM CHLORIDE 600; 310; 30; 20 MG/100ML; MG/100ML; MG/100ML; MG/100ML
INJECTION, SOLUTION INTRAVENOUS CONTINUOUS
Status: ACTIVE | OUTPATIENT
Start: 2023-08-17 | End: 2023-08-17

## 2023-08-17 RX ORDER — HYDROMORPHONE HYDROCHLORIDE 1 MG/ML
0.4 INJECTION, SOLUTION INTRAMUSCULAR; INTRAVENOUS; SUBCUTANEOUS
Status: DISCONTINUED | OUTPATIENT
Start: 2023-08-17 | End: 2023-08-17

## 2023-08-17 RX ORDER — PROMETHAZINE HYDROCHLORIDE 25 MG/1
12.5-25 TABLET ORAL EVERY 4 HOURS PRN
Status: DISCONTINUED | OUTPATIENT
Start: 2023-08-17 | End: 2023-08-18 | Stop reason: HOSPADM

## 2023-08-17 RX ORDER — PROMETHAZINE HYDROCHLORIDE 25 MG/1
12.5-25 SUPPOSITORY RECTAL EVERY 4 HOURS PRN
Status: DISCONTINUED | OUTPATIENT
Start: 2023-08-17 | End: 2023-08-18 | Stop reason: HOSPADM

## 2023-08-17 RX ORDER — SODIUM CHLORIDE, SODIUM LACTATE, POTASSIUM CHLORIDE, CALCIUM CHLORIDE 600; 310; 30; 20 MG/100ML; MG/100ML; MG/100ML; MG/100ML
INJECTION, SOLUTION INTRAVENOUS CONTINUOUS
Status: DISCONTINUED | OUTPATIENT
Start: 2023-08-17 | End: 2023-08-17 | Stop reason: HOSPADM

## 2023-08-17 RX ORDER — MIDAZOLAM HYDROCHLORIDE 1 MG/ML
INJECTION INTRAMUSCULAR; INTRAVENOUS PRN
Status: DISCONTINUED | OUTPATIENT
Start: 2023-08-17 | End: 2023-08-17 | Stop reason: SURG

## 2023-08-17 RX ORDER — DIPHENHYDRAMINE HYDROCHLORIDE 50 MG/ML
25 INJECTION INTRAMUSCULAR; INTRAVENOUS EVERY 6 HOURS PRN
Status: DISCONTINUED | OUTPATIENT
Start: 2023-08-17 | End: 2023-08-18 | Stop reason: HOSPADM

## 2023-08-17 RX ORDER — BUPROPION HYDROCHLORIDE 150 MG/1
150 TABLET, EXTENDED RELEASE ORAL 2 TIMES DAILY
Status: DISCONTINUED | OUTPATIENT
Start: 2023-08-18 | End: 2023-08-18 | Stop reason: HOSPADM

## 2023-08-17 RX ORDER — DOCUSATE SODIUM 100 MG/1
100 CAPSULE, LIQUID FILLED ORAL 2 TIMES DAILY
Status: DISCONTINUED | OUTPATIENT
Start: 2023-08-17 | End: 2023-08-18 | Stop reason: HOSPADM

## 2023-08-17 RX ORDER — OXYCODONE HCL 5 MG/5 ML
5 SOLUTION, ORAL ORAL
Status: COMPLETED | OUTPATIENT
Start: 2023-08-17 | End: 2023-08-17

## 2023-08-17 RX ORDER — CEFAZOLIN SODIUM 1 G/3ML
INJECTION, POWDER, FOR SOLUTION INTRAMUSCULAR; INTRAVENOUS
Status: DISCONTINUED | OUTPATIENT
Start: 2023-08-17 | End: 2023-08-17 | Stop reason: HOSPADM

## 2023-08-17 RX ORDER — ACETAMINOPHEN 500 MG
1000 TABLET ORAL EVERY 6 HOURS
Status: DISCONTINUED | OUTPATIENT
Start: 2023-08-17 | End: 2023-08-18 | Stop reason: HOSPADM

## 2023-08-17 RX ORDER — ONDANSETRON 2 MG/ML
4 INJECTION INTRAMUSCULAR; INTRAVENOUS
Status: COMPLETED | OUTPATIENT
Start: 2023-08-17 | End: 2023-08-17

## 2023-08-17 RX ORDER — OXYCODONE HYDROCHLORIDE 5 MG/1
5 TABLET ORAL EVERY 4 HOURS PRN
Status: DISCONTINUED | OUTPATIENT
Start: 2023-08-17 | End: 2023-08-18 | Stop reason: HOSPADM

## 2023-08-17 RX ORDER — HALOPERIDOL 5 MG/ML
1 INJECTION INTRAMUSCULAR
Status: DISCONTINUED | OUTPATIENT
Start: 2023-08-17 | End: 2023-08-17

## 2023-08-17 RX ORDER — AMOXICILLIN 250 MG
1 CAPSULE ORAL NIGHTLY
Status: DISCONTINUED | OUTPATIENT
Start: 2023-08-17 | End: 2023-08-18 | Stop reason: HOSPADM

## 2023-08-17 RX ORDER — ONDANSETRON 2 MG/ML
INJECTION INTRAMUSCULAR; INTRAVENOUS PRN
Status: DISCONTINUED | OUTPATIENT
Start: 2023-08-17 | End: 2023-08-17 | Stop reason: SURG

## 2023-08-17 RX ORDER — LOSARTAN POTASSIUM 50 MG/1
50 TABLET ORAL EVERY EVENING
Status: DISCONTINUED | OUTPATIENT
Start: 2023-08-17 | End: 2023-08-18 | Stop reason: HOSPADM

## 2023-08-17 RX ORDER — HYDROMORPHONE HYDROCHLORIDE 1 MG/ML
0.2 INJECTION, SOLUTION INTRAMUSCULAR; INTRAVENOUS; SUBCUTANEOUS
Status: DISCONTINUED | OUTPATIENT
Start: 2023-08-17 | End: 2023-08-17

## 2023-08-17 RX ORDER — ALBUTEROL SULFATE 90 UG/1
2 AEROSOL, METERED RESPIRATORY (INHALATION)
Status: DISCONTINUED | OUTPATIENT
Start: 2023-08-17 | End: 2023-08-18 | Stop reason: HOSPADM

## 2023-08-17 RX ORDER — DEXAMETHASONE SODIUM PHOSPHATE 4 MG/ML
INJECTION, SOLUTION INTRA-ARTICULAR; INTRALESIONAL; INTRAMUSCULAR; INTRAVENOUS; SOFT TISSUE PRN
Status: DISCONTINUED | OUTPATIENT
Start: 2023-08-17 | End: 2023-08-17 | Stop reason: SURG

## 2023-08-17 RX ORDER — OXYCODONE HCL 5 MG/5 ML
10 SOLUTION, ORAL ORAL
Status: COMPLETED | OUTPATIENT
Start: 2023-08-17 | End: 2023-08-17

## 2023-08-17 RX ORDER — PROCHLORPERAZINE EDISYLATE 5 MG/ML
5-10 INJECTION INTRAMUSCULAR; INTRAVENOUS EVERY 4 HOURS PRN
Status: DISCONTINUED | OUTPATIENT
Start: 2023-08-17 | End: 2023-08-18 | Stop reason: HOSPADM

## 2023-08-17 RX ORDER — ENOXAPARIN SODIUM 100 MG/ML
40 INJECTION SUBCUTANEOUS DAILY
Status: DISCONTINUED | OUTPATIENT
Start: 2023-08-18 | End: 2023-08-18 | Stop reason: HOSPADM

## 2023-08-17 RX ORDER — HYDROMORPHONE HYDROCHLORIDE 1 MG/ML
0.25 INJECTION, SOLUTION INTRAMUSCULAR; INTRAVENOUS; SUBCUTANEOUS
Status: DISCONTINUED | OUTPATIENT
Start: 2023-08-17 | End: 2023-08-18 | Stop reason: HOSPADM

## 2023-08-17 RX ORDER — CEFAZOLIN SODIUM 1 G/3ML
INJECTION, POWDER, FOR SOLUTION INTRAMUSCULAR; INTRAVENOUS PRN
Status: DISCONTINUED | OUTPATIENT
Start: 2023-08-17 | End: 2023-08-17 | Stop reason: SURG

## 2023-08-17 RX ORDER — SUCRALFATE 1 G/1
1 TABLET ORAL 2 TIMES DAILY
Status: DISCONTINUED | OUTPATIENT
Start: 2023-08-17 | End: 2023-08-18 | Stop reason: HOSPADM

## 2023-08-17 RX ORDER — OXYCODONE HYDROCHLORIDE 10 MG/1
10 TABLET ORAL EVERY 4 HOURS PRN
Status: DISCONTINUED | OUTPATIENT
Start: 2023-08-17 | End: 2023-08-18 | Stop reason: HOSPADM

## 2023-08-17 RX ORDER — AMOXICILLIN 250 MG
1 CAPSULE ORAL
Status: DISCONTINUED | OUTPATIENT
Start: 2023-08-17 | End: 2023-08-18 | Stop reason: HOSPADM

## 2023-08-17 RX ORDER — BISACODYL 10 MG
10 SUPPOSITORY, RECTAL RECTAL
Status: DISCONTINUED | OUTPATIENT
Start: 2023-08-17 | End: 2023-08-18 | Stop reason: HOSPADM

## 2023-08-17 RX ADMIN — HYDROMORPHONE HYDROCHLORIDE 0.4 MG: 1 INJECTION, SOLUTION INTRAMUSCULAR; INTRAVENOUS; SUBCUTANEOUS at 11:52

## 2023-08-17 RX ADMIN — ONDANSETRON 4 MG: 2 INJECTION INTRAMUSCULAR; INTRAVENOUS at 10:14

## 2023-08-17 RX ADMIN — SENNOSIDES AND DOCUSATE SODIUM 1 TABLET: 50; 8.6 TABLET ORAL at 20:36

## 2023-08-17 RX ADMIN — HYDROMORPHONE HYDROCHLORIDE 0.4 MG: 1 INJECTION, SOLUTION INTRAMUSCULAR; INTRAVENOUS; SUBCUTANEOUS at 14:54

## 2023-08-17 RX ADMIN — KETAMINE HYDROCHLORIDE 50 MG: 100 INJECTION INTRAMUSCULAR; INTRAVENOUS at 07:27

## 2023-08-17 RX ADMIN — OXYCODONE HYDROCHLORIDE 10 MG: 10 TABLET ORAL at 16:06

## 2023-08-17 RX ADMIN — PROPOFOL 200 MG: 10 INJECTION, EMULSION INTRAVENOUS at 07:27

## 2023-08-17 RX ADMIN — FENTANYL CITRATE 50 MCG: 50 INJECTION, SOLUTION INTRAMUSCULAR; INTRAVENOUS at 10:15

## 2023-08-17 RX ADMIN — PROPOFOL 120 MCG/KG/MIN: 10 INJECTION, EMULSION INTRAVENOUS at 07:28

## 2023-08-17 RX ADMIN — CEFAZOLIN 2 G: 1 INJECTION, POWDER, FOR SOLUTION INTRAMUSCULAR; INTRAVENOUS at 07:33

## 2023-08-17 RX ADMIN — METOCLOPRAMIDE 10 MG: 5 INJECTION, SOLUTION INTRAMUSCULAR; INTRAVENOUS at 09:14

## 2023-08-17 RX ADMIN — OXYCODONE HYDROCHLORIDE 10 MG: 10 TABLET ORAL at 20:36

## 2023-08-17 RX ADMIN — SUCRALFATE 1 G: 1 TABLET ORAL at 17:16

## 2023-08-17 RX ADMIN — DEXAMETHASONE SODIUM PHOSPHATE 10 MG: 4 INJECTION INTRA-ARTICULAR; INTRALESIONAL; INTRAMUSCULAR; INTRAVENOUS; SOFT TISSUE at 07:34

## 2023-08-17 RX ADMIN — SODIUM CHLORIDE, POTASSIUM CHLORIDE, SODIUM LACTATE AND CALCIUM CHLORIDE: 600; 310; 30; 20 INJECTION, SOLUTION INTRAVENOUS at 07:23

## 2023-08-17 RX ADMIN — FENTANYL CITRATE 100 MCG: 50 INJECTION, SOLUTION INTRAMUSCULAR; INTRAVENOUS at 07:55

## 2023-08-17 RX ADMIN — OXYCODONE HYDROCHLORIDE 10 MG: 5 SOLUTION ORAL at 11:32

## 2023-08-17 RX ADMIN — ONDANSETRON 4 MG: 2 INJECTION INTRAMUSCULAR; INTRAVENOUS at 09:14

## 2023-08-17 RX ADMIN — METHOCARBAMOL 1000 MG: 100 INJECTION, SOLUTION INTRAMUSCULAR; INTRAVENOUS at 10:51

## 2023-08-17 RX ADMIN — POTASSIUM CHLORIDE AND SODIUM CHLORIDE: 900; 150 INJECTION, SOLUTION INTRAVENOUS at 17:22

## 2023-08-17 RX ADMIN — SUGAMMADEX 200 MG: 100 INJECTION, SOLUTION INTRAVENOUS at 07:54

## 2023-08-17 RX ADMIN — MIDAZOLAM 2 MG: 1 INJECTION, SOLUTION INTRAMUSCULAR; INTRAVENOUS at 07:23

## 2023-08-17 RX ADMIN — ROCURONIUM BROMIDE 30 MG: 50 INJECTION, SOLUTION INTRAVENOUS at 07:27

## 2023-08-17 RX ADMIN — HALOPERIDOL LACTATE 1 MG: 5 INJECTION, SOLUTION INTRAMUSCULAR at 10:20

## 2023-08-17 RX ADMIN — HYDROMORPHONE HYDROCHLORIDE 0.25 MG: 1 INJECTION, SOLUTION INTRAMUSCULAR; INTRAVENOUS; SUBCUTANEOUS at 17:16

## 2023-08-17 RX ADMIN — DOCUSATE SODIUM 100 MG: 100 CAPSULE, LIQUID FILLED ORAL at 17:16

## 2023-08-17 RX ADMIN — LIDOCAINE HYDROCHLORIDE 100 MG: 20 INJECTION, SOLUTION EPIDURAL; INFILTRATION; INTRACAUDAL at 07:27

## 2023-08-17 RX ADMIN — TRANEXAMIC ACID 1000 MG: 100 INJECTION, SOLUTION INTRAVENOUS at 07:34

## 2023-08-17 RX ADMIN — HYDROMORPHONE HYDROCHLORIDE 0.2 MG: 1 INJECTION, SOLUTION INTRAMUSCULAR; INTRAVENOUS; SUBCUTANEOUS at 15:45

## 2023-08-17 RX ADMIN — FENTANYL CITRATE 100 MCG: 50 INJECTION, SOLUTION INTRAMUSCULAR; INTRAVENOUS at 07:27

## 2023-08-17 RX ADMIN — CEFAZOLIN 2 G: 2 INJECTION, POWDER, FOR SOLUTION INTRAMUSCULAR; INTRAVENOUS at 17:26

## 2023-08-17 RX ADMIN — FENTANYL CITRATE 50 MCG: 50 INJECTION, SOLUTION INTRAMUSCULAR; INTRAVENOUS at 10:18

## 2023-08-17 RX ADMIN — LOSARTAN POTASSIUM 50 MG: 50 TABLET, FILM COATED ORAL at 18:00

## 2023-08-17 ASSESSMENT — PAIN DESCRIPTION - PAIN TYPE
TYPE: ACUTE PAIN;CHRONIC PAIN
TYPE: ACUTE PAIN;SURGICAL PAIN
TYPE: ACUTE PAIN;CHRONIC PAIN
TYPE: ACUTE PAIN

## 2023-08-17 ASSESSMENT — PAIN SCALES - GENERAL: PAIN_LEVEL: 4

## 2023-08-17 ASSESSMENT — FIBROSIS 4 INDEX: FIB4 SCORE: 0.7

## 2023-08-17 NOTE — OR NURSING
Pt sleeping btwn care, however arouses easily to voice and is oriented x4. VSS on 6 L O2 via mask.   Surgical drsgs CDI. Strength in BLE 4/5. Pt denies numbness and tingling.   Pt medicated for pain as tolerated. Pt falls asleep btwn doses, however wakes up in 8-10/10 pain. Pt educated on medication safety and is agreeable to alternative pain relief methods. Pt repositioned in [facility], ice in place and pt's nerve stimulator turned on.   Waiting for inpatient room.   Pt's  called and updated.

## 2023-08-17 NOTE — OP REPORT
1.  DATE OF SURGERY:8/17/2023    2. SURGEON:  Brigette Cherry MD, PhD, BORIS, Neurosurgeon    3. CO-SURGEON:  Alexis Denny MD    4. TYPE OF ANESTHESIA:  General anesthesia with endotracheal intubation    5. 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  Moderate stenosis L2-L3  6.  Chronic pain syndrome  7.  Failed conservative therapy    6. 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  Moderate stenosis L2-L3  6.  Chronic pain syndrome  7.  Failed conservative therapy    7. HISTORY: See formal admission H and P      8/19/2022   Carolynn Casey is a 57 y.o. female seen today in a neurosurgery/spine consultation.  She had multiple family members in attendance at today's visit.  3 weeks ago she was reaching for something while sitting in a chair.  She had a pop in her low back causing severe back pain with radiation down her left leg.  Today this is primarily numbness in her left leg although the back pain persists.  She localizes this to her lumbar sacral region.  She was scheduled to have surgery in December 2021 although this was canceled on the day of surgery which sounds like difficulties with venous access.  She is taking Tylenol at this time.  She is taking gabapentin and flexeril.  She is quite miserable today and rates her pain as 10/10.  She does  get some cramping into her groin, left side greater than right, this causes severe pain, this nearly causes her to fall down.     Pertinent past medical history is somewhat complicated.  She has had 3 previous bariatric surgeries.  She does have pulmonary hypertension which is stable.  She is prediabetic.  She had multiple previous lumbar surgeries including L5-S1 ALIF.  She had L4-S1 posterior lumbar instrumented fusion as well as an L3-4 XLIF.  She did have a SI joint fusion as well this was on the right side. All of the surgeries were with Doctor Robins. She had two different spinal cord stimulators placed.     12/13/2022  Carolynn Casey returns again today for review.  She has somewhat of a constellation of symptoms.  She continues with low back pain with shooting pain down the lateral aspects of both of her legs.  The right more affected than the left.  Her symptoms are better when she is sitting or laying down but worse when she stands and walks.  She is been taking Tylenol, 2700 mg a day of gabapentin and muscle relaxers.  By her account she is been worked up with orthopedic surgery for the right hip.  She is here to further review lumbar spine imaging.  She did work with [employer] spinal cord stimulator representation 1 month ago.  This is not providing any significant relief.     2/17/2023     Patient returns.  She has back pain which is 9 out of 10 when she sitting.  She has burning pain down both legs.  When she stands she says the burning pain gets worse.  We had lengthy discussion about all her imaging.  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  There is degenerative disc disease in the lower thoracic spine as well she may have neuropathic pain.  Her BMI today was 45.  She is to complete a course of physical therapy.  She needs to get a BMI less than 40.  I discussed selective nerve root blocks which apparently given her migraines.  She is going to go ahead.  If she can get her BMI less than 40 and the  selective nerve root blocks provide temporary relief surgery is an option.  Would be 50-50 to help her.  I would offer her a left L2-3 oblique lumbar decompression and instrumented fusion.  We will regroup in 6 weeks.     5/11/2023  Patient returns.  She had L2-3 selective nerve root blocks and these gave her good relief of pain.  This lasted for couple of weeks but the pains come back.  She still has bilateral anterior thigh pain.  Her EMG showed chronic left L4-5 radiculopathies.  This is on the left.  She said PT is too expensive for her.  She wants to proceed with surgery.  No guarantees were given.  I specifically discussed left hip weakness and therapeutic failure.  She is at higher risk because of her general comorbidities.  She wishes to proceed with surgery.    8. PREOPERATIVE PHYSICAL EXAMINATION: See formal admission H and P    9. TITLE OF THE PROCEDURE:  He has history lumbar lateral discectomy with adjacent partial corpectomy (>30% of total adjacent endplates removed) using a left-sided lateral retroperitoneal approach and microscopic magnification, exposure affected by Alexis Denny MD (OLIF procedure)  L2-3 lumbar  interbody fusion Medtronic Anteralign  porous titanium interbody cage and Infuse BMP with morselized allograft  L2-3 lumbar  nonsegmental fixation using the Pivox  lateral plate and screw system  Fluoroscopic guidance for screw placement.  Microscopic magnification.  O-arm navigation for surgery    10. OPERATIVE FINDINGS:  Stenosis/instability she had a previous fusion.  She had a collapse to space with bilateral foraminal narrowing    11. OPERATED LEVELS:    12. IMPLANTS USED:  Medtronic Medtronic Anteralign  porous titanium interbody lateral cage  6 degree 12 mm x 20 mm x 45 mm cage  Medtronic Pivox titanium plate and screws small plate with 45 mm x 6 mm screws  Infuse BMP-small with morselized allograft    13. COMPLICATIONS:  Nil    14. ESTIMATED BLOOD LOSS:     50   mL      15. OPERATIVE  DETAILS:  After a fully informed consent, the patient was brought to the operating room.  General anesthesia was administered.  The patient was given intravenous antibiotic.  A Howe catheter was placed.  The patient was positioned in a right lateral decubitus position on a bean bag.  A shoulder roll was placed in the right axilla.  The patient was placed left side up.  Great care was taken to ensure that the patient was perfectly lateral.      We used AP and lateral fluoroscopy, then positioned the patient so the patient was completely orthogonal to the imaging AP and lateral axis.      Initially I localized the disk space trajectory as well as the front and back of the disk space with x-ray and marked the skin. We prepped and draped in a standard fashion.  All pressure points had been padded.      A left iliac spike was placed and the O-arm was bought in with a spin affected.     Dr. Denny then placed an oblique incision approximately 5 cm anterior to the disk space.     Dr. Denny divided the 3 muscle layers and then exposed the retroperitoneal space.  The peritoneum was pushed anteriorly.  A probe was placed to stimulate to ensure that there were no neural structures in the anterior psoas.  A window was selected between the aorta ventrally and the psoas posteriorly.  Dr. Denny stimulated this region and then we placed a needle into the disk space.      We used O-arm navigation for the case    Once the probe was into place, I took over.     Exposure was challenging for Dr. Da Silva.  The left is self-retaining retractors in place and I left this in place with a discectomy.    Under magnification and illumination under the microscope, I then visualized the disk space.  There was some psoas which was covering the disk space.  This was swept to either side after stimulating all four quadrants to ensure there were no neural structures in place.      I then performed an H-shaped annulotomy of the disk space.  I then  entered the disk space.  The procedure was such that this was an OLIF procedure and consequently I approached the disk space obliquely, but then immediately straightened up so that I did not enter the canal. We used the O-arm for this.  With various Maribell, curettes, and carolina, a subperiosteal discectomy was affected and then the posterior annulus was released.  A definite release was felt.      The disc space was cleared, the end plates curetted and shaved down with AM8 drill as needed with carolina, curettes and rasps. >30% of total adjacent endplates was removed.     I then used the trials to select the appropriate sized interbody cage to place into the disk space.  Good distraction was obtained as well as increase in foraminal height.  Once I had selected the appropriate trial, an appropriately sized cage was then packed with bone graft substitute and placed into the interspace.  This spanned from epiphyseal ring to apophyseal ring to ensure that there was no subsidence.      Once the cage was placed, the plate was placed. This was a Pivox plate. Appropriately sized screws were driven through it and the locking flange turned.  This was done using navigation and fluoroscopic guidance, with screws guided into the level above and below the cage. Great care was taken not to come too close to the segmental vessels.     Hemostasis was obtained.  All the instrumentation was tightened.  A final AP and lateral x-ray was taken.  Near physiological alignment was obtained with good lordosis.  Hemostasis was obtained.  Thorough irrigation was affected.  The wound was closed using erupted Vicryl sutures with staples for skin all counts were correct and accounted for.    The case went well.  At the end of case, the patient was moving both upper and lower extremities well.    16. PROGNOSIS:  The surgery went well.  The patient was moving both lower extremities well at the end of the case.  We will see how things progress. We  will commence lovenox tomorrow and anticipate discharge in 24-48 hours. When the patient goes home, he/she will be discharged home on narcotic analgesia, muscle relaxant and oral antibiotic, usually Keflex.  The plan will be to follow up in 2 weeks in the office.  We will call the patient next week to ensure there are not any problems.  He/she can shower in 72 hours but is instructed to keep the wound dry.  The patient has also been instructed to abstain from smoking or any anti-inflammatories. The patient will need to wear a brace when out of bed.     Brigette Cherry MD, PhD, BORIS     CC:   RODOLFO Strong MD

## 2023-08-17 NOTE — OR NURSING
Pt stood in PACU and transferred to an inpatient bed. 2 person assistance required d/t drowsiness.   Pt repositioned on her right side, ice pack reapplied.   Pt resting calmly. VSS on 4 L O2 via NC.

## 2023-08-17 NOTE — ANESTHESIA PREPROCEDURE EVALUATION
Case: [id number] Date/Time: 08/17/23 0645    Procedure: left L2-3 oblique lumbar decompression and instrumented fusion. (Spine Lumbar)    Diagnosis: Lumbar stenosis with neurogenic claudication [M48.062]    Pre-op diagnosis: Lumbar stenosis with neurogenic claudication [M48.062]    Location: [place] [address] / SURGERY [facility]    Surgeons: Brigette Cherry M.D.      59 yo female with h/o HTN, obesity, mild asthma, GERD, for spine surgery    Relevant Problems   CARDIAC   (positive) Essential hypertension   (positive) Pulmonary HTN (HCC)       Physical Exam    Airway   Mallampati: III  TM distance: >3 FB  Neck ROM: full       Cardiovascular - normal exam  Rhythm: regular  Rate: normal  (-) murmur     Dental - normal exam  Comments: Cracked front teeth, none loose    Very poor dentition   Pulmonary - normal exam  Breath sounds clear to auscultation     Abdominal    Neurological - normal exam                 Anesthesia Plan    ASA 3   ASA physical status 3 criteria: morbid obesity - BMI greater than or equal to 40    Plan - general       Airway plan will be ETT          Induction: intravenous    Postoperative Plan: Postoperative administration of opioids is intended.    Pertinent diagnostic labs and testing reviewed    Informed Consent:    Anesthetic plan and risks discussed with patient.    Use of blood products discussed with: patient whom consented to blood products.

## 2023-08-17 NOTE — ANESTHESIA POSTPROCEDURE EVALUATION
Patient: Carolynn Casey    Procedure Summary     Date: 08/17/23 Room / Location: [facility] 06 / SURGERY [facility]    Anesthesia Start: 0723 Anesthesia Stop: 1002    Procedure: left L2-3 oblique lumbar decompression and instrumented fusion. (Left: Spine Lumbar) Diagnosis:       Lumbar stenosis with neurogenic claudication      (Spinal Stenosis)    Surgeons: Brigette Cherry M.D. Responsible Provider: Annalise Mcfadden M.D.    Anesthesia Type: general ASA Status: 3          Final Anesthesia Type: general  Last vitals  BP   Blood Pressure: (!) 148/60    Temp   (!) 35.6 °C (96.1 °F)    Pulse   74   Resp   17    SpO2   91 %      Anesthesia Post Evaluation    Patient location during evaluation: PACU  Patient participation: complete - patient participated  Level of consciousness: awake and alert  Pain score: 4    Airway patency: patent  Anesthetic complications: no  Cardiovascular status: hemodynamically stable  Respiratory status: acceptable  Hydration status: euvolemic    PONV: none          No notable events documented.     Nurse Pain Score: 7 (NPRS)

## 2023-08-17 NOTE — OP REPORT
[facility] OPERATIVE NOTE    08/17/23      PRE-OPERATIVE DIAGNOSIS -     Degenerative lumbar disc disease L2-L3  Back pain refractory conservative therapy    POST-OPERATIVE DIAGNOSIS -same    PROCEDURE PERFORMED -     Oblique retroperitoneal exposure lumbar spine 1 level L2-L3  Oblique discectomy and interbody fusion 1 level L2-L3    SURGEON - Alexis Denny M.D.    CO-SURGEON -  Brigette Cherry MD    ASSISTANT - BUSTER Jha    TYPE OF ANESTHESIA -  General     ANESTHESIOLOGIST  - Anesthesiologist: Annalise Mcfadden M.D.     SPECIMENS -none    INDICATIONS - 58 y.o. [unfilled]     PROCEDURE IN DETAIL -     Patient was taken to the operating suite placed in the supine position.  After adequate endotracheal intubation the patient was repositioned in the right lateral decubitus position left side up.   placed a sacral pin and performed an intraoperative CT scan for O-arm and navigation.  The proper position was marked and an Ioban was employed after prepping and draping.  Small curvilinear incision was made just anterior to the midaxillary line the incision was carried down to the subcutaneous tissue with through a muscle-splitting technique entry was gained into the retroperitoneal space.  Using careful blunt dissection the retroperitoneal plane was dissected down to the psoas muscle and over the anterior aspect of the psoas muscle the L2-L3 level was localized and was established.  Fixer traction was established using both an Omni retractor as well as the [employer] retractor.  Once all retraction was set spine surgeon then completed the operation with performing the discectomy and interbody fusion.  He was also responsible for close and the patient.  The exposure portion of procedure was performed without complication.      _______________________  Alexis Denny M.D.

## 2023-08-17 NOTE — ANESTHESIA TIME REPORT
Anesthesia Start and Stop Event Times     Date Time Event    8/17/2023 0647 Ready for Procedure     0723 Anesthesia Start     1002 Anesthesia Stop        Responsible Staff  08/17/23    Name Role Begin End    Annalise Mcfadden M.D. Anesth 0723 1002        Overtime Reason:  no overtime (within assigned shift)    Comments:

## 2023-08-17 NOTE — ANESTHESIA PROCEDURE NOTES
Airway    Date/Time: 8/17/2023 7:28 AM    Performed by: Annalise Mcfadden M.D.  Authorized by: Annalise Mcfadden M.D.    Location:  OR  Urgency:  Elective  Difficult Airway: No    Indications for Airway Management:  Anesthesia      Spontaneous Ventilation: absent    Sedation Level:  Deep  Preoxygenated: Yes    Patient Position:  Sniffing  Mask Difficulty Assessment:  2 - vent by mask + OA or adjuvant +/- NMBA  Final Airway Type:  Endotracheal airway  Final Endotracheal Airway:  ETT  Cuffed: Yes    Technique Used for Successful ETT Placement:  Direct laryngoscopy    Insertion Site:  Oral  Blade Type:  Glide  Laryngoscope Blade/Videolaryngoscope Blade Size:  3  ETT Size (mm):  7.0  Measured from:  Teeth  ETT to Teeth (cm):  22  Placement Verified by: auscultation and capnometry    Cormack-Lehane Classification:  Grade I - full view of glottis  Number of Attempts at Approach:  1  Number of Other Approaches Attempted:  0

## 2023-08-17 NOTE — LETTER
May 15, 2023    Patient Name: Carolynn Casey  Surgeon Name: Brigette Cherry M.D.  Surgery Facility: [facility], [facility] ([address] [facility])  Surgery Date: 7/26/2023    The time of your surgery is not final and may change up to and until the day of your surgery. You will be contacted 1-2 business days prior to your surgery date with your check-in and surgery time.    BEFORE YOUR SURGERY - WITH THE FACILITY  Pre Registration and/or Lab Work/Tests must be done within 28 days of your surgery date. These will be done at [facility], with an appointment. This appointment should be completed before your pre op appointment in our office.    On 06.21.23 - if you have not already heard from [facility] Pre Admission/Registration Department, please call them at [phone] option 2, then option 1, for an appointment.      BEFORE YOUR SURGERY - WITH YOUR SURGEONS OFFICE  Pre op Appointment:   Date: 7.13.23   Time: 10:00am   Provider: Jostin Celeste PA-C   Location: [address] [place], [place] [address]    Bring a list of all medications you are currently taking including the dosing and frequency.    Please read the MEDICATION INSTRUCTIONS below completely.    DAY OF YOUR SURGERY  Nothing to eat or drink and refrain from smoking any substance after midnight the day of your surgery. Smoking may interfere with the anesthetic and frequently produces nausea during the recovery period.    Continue taking all lifesaving medications, including the morning of your surgery with small sip of water.    You will need a responsible adult to drive you to and from your surgery.    AFTER YOUR SURGERY  2 Week Post op Appointment:   Date: 8.10.23   Time: 10:00am  With: Jostin Celeste PA-C  Location: [facility] [address]    6 Week Post op Appointment:   Date: 9.7.23   Time: 10:00am   With: Jostin Celeste PA-C  Location: [facility]    MEDICATION INSTRUCTIONS   Do not take these medications prior to your  procedure:  Anti-inflammatories: stop 7 days prior, restart when advised. For fusions avoid for 12 weeks after surgery  Naproxen (Naprosyn or Alleve)  Motrin  Ibuprofen  Nabumetone (Relafen)  Meloxicam (Mobic)  Celebrex  Salsalate  Diclofenac (Arthrotec, Voltaren, Flector)  Sulindac (Clinoril  Etodolac (Lodine)  Indomethacin (Indocin)  Ketoprofen  Ketorolac  Oxaprozin (Daypro)  Piroxicam (Feldene)  Blood thinners (stop after approval from the prescribing physician)  Aspirin (any dosage): 10 days prior, restart 7 days after procedure  Any medications that contain aspirin in combination (ie: Excedrin migraine, Fiorinal, and Norgesic)  Warfarin (Coumadin): Stop 7 days prior, INR day of procedure, restart 2-3 weeks after procedure  Antiplatelet: restart 7 days after procedure  Ticlid (ticlopidine): Stop 14 days prior  Plavix (clopidogrel): Stop 7 days prior  Aggrenox or Dipyridamole: Stop 14 days prior  ReoPro (abciximab): Stop 14 days prior  Integrilin (eptifibatide): Stop 14 days prior  Aggrastat (tirofiban): Stop 14 days prior  Lovenox (Enoxaparin): Stop 24hrs before and restart 24hrs after procedure  Heparin: Stop 24hrs before   Dalteparin (Fragmin): Stop 24hrs before  Fondaparinux (Arixtra): Stop 24hrs before  Xeralto, Dabigatran (Pradaxa) Stop 5 days prior  Eliquis (apibaxan) Stop 7 days prior    Okay to take these medications as prescribed:  Muscle relaxers  Acetaminophen and pain medications that have it in addition to oxycodone and hydrocodone  Blood pressure, cholesterol and diabetes medications are ok    TIME OFF WORK  FMLA or Disability forms can be faxed directly to [phone] or you may drop them off at any [facility]. Our office charges a $35.00 fee. Forms will be completed within 5-10 business days after payment is received. For the status of your forms you may contact our disability office directly at [phone].    DENTAL PROCDURES/CLEANINGS Avoid 3 weeks before surgery and for 3  months after surgery.    QUESTIONS ABOUT COSTS  Contact our Patient Financial Services department at [phone] for more information.    If you have any questions, please contact our office.    Tiffanie    Surgery Scheduler  [email or web address]  ? [phone]   Fax: [phone]  EXT [phone]  [place] [id number]  [phone]

## 2023-08-17 NOTE — LETTER
July 20, 2023    Patient Name: Carolynn Casey  Surgeon Name: Brigette Cherry M.D.  Surgery Facility: [facility] ([facility])  Surgery Date: 8/17/2023     The time of your surgery is not final and may change up to and until the day of your surgery. You will be contacted 1-2 business days prior to your surgery date with your check-in and surgery time.    BEFORE YOUR SURGERY - WITH THE FACILITY  Pre Registration and/or Lab Work/Tests must be done within 60 days of your surgery date. These will be done at [facility], with an appointment. This appointment should be completed before your pre op appointment in our office.    On 7/31 - if you have not already heard from [facility] Pre Admission/Registration Department, please call them at [phone] option 2, then option 1, for an appointment.      BEFORE YOUR SURGERY - WITH YOUR SURGEONS OFFICE  Pre op Appointment:   Date: 07/28/23   Time: 9:30AM   Provider: Jostin Celeste PA-C    Location: [address] [place], [place] [address]    Bring a list of all medications you are currently taking including the dosing and frequency.    Please read the MEDICATION INSTRUCTIONS below completely.    DAY OF YOUR SURGERY  Nothing to eat or drink and refrain from smoking any substance after midnight the day of your surgery. Smoking may interfere with the anesthetic and frequently produces nausea during the recovery period.    Continue taking all lifesaving medications, including the morning of your surgery with small sip of water.    You will need a responsible adult to drive you to and from your surgery.    AFTER YOUR SURGERY  2 Week Post op Appointment:   Date: 08/31/23   Time: 11:30AM  With: Jostin Celeste PA-C   Location: [facility]    6 Week Post op Appointment:   Date: 09/28/23   Time: 12:00PM   With: Jostin Celeste PA-C   Location: [facility]    MEDICATION INSTRUCTIONS   Do not take these medications prior to your procedure:  Anti-inflammatories:  stop 7 days prior, restart when advised. For fusions avoid for 12 weeks after surgery  Naproxen (Naprosyn or Alleve)  Motrin  Ibuprofen  Nabumetone (Relafen)  Meloxicam (Mobic)  Celebrex  Salsalate  Diclofenac (Arthrotec, Voltaren, Flector)  Sulindac (Clinoril  Etodolac (Lodine)  Indomethacin (Indocin)  Ketoprofen  Ketorolac  Oxaprozin (Daypro)  Piroxicam (Feldene)  Blood thinners (stop after approval from the prescribing physician)  Aspirin (any dosage): 10 days prior, restart 7 days after procedure  Any medications that contain aspirin in combination (ie: Excedrin migraine, Fiorinal, and Norgesic)  Warfarin (Coumadin): Stop 7 days prior, INR day of procedure, restart 2-3 weeks after procedure  Antiplatelet: restart 7 days after procedure  Ticlid (ticlopidine): Stop 14 days prior  Plavix (clopidogrel): Stop 7 days prior  Aggrenox or Dipyridamole: Stop 14 days prior  ReoPro (abciximab): Stop 14 days prior  Integrilin (eptifibatide): Stop 14 days prior  Aggrastat (tirofiban): Stop 14 days prior  Lovenox (Enoxaparin): Stop 24hrs before and restart 24hrs after procedure  Heparin: Stop 24hrs before   Dalteparin (Fragmin): Stop 24hrs before  Fondaparinux (Arixtra): Stop 24hrs before  Xeralto, Dabigatran (Pradaxa) Stop 5 days prior  Eliquis (apibaxan) Stop 7 days prior    Okay to take these medications as prescribed:  Muscle relaxers  Acetaminophen and pain medications that have it in addition to oxycodone and hydrocodone  Blood pressure, cholesterol and diabetes medications are ok    TIME OFF WORK  FMLA or Disability forms can be faxed directly to [phone] or you may drop them off at any [facility]. Our office charges a $35.00 fee. Forms will be completed within 5-10 business days after payment is received. For the status of your forms you may contact our disability office directly at [phone].    DENTAL PROCDURES/CLEANINGS Avoid 3 weeks before surgery and for 3 months after surgery.    QUESTIONS  ABOUT COSTS  Contact our Patient Financial Services department at [phone] for more information.    If you have any questions, please contact our office.    Cindy    Surgery Scheduler  [email or web address]  ? [phone]   Fax: [phone]  EXT [phone]  [place] [address]  [phone]

## 2023-08-17 NOTE — PROGRESS NOTES
No change to my last H and P (it may be labelled a progress note or a H and P in EPIC). The note was made by either myself, or my PAs Martínez Castellanos or Jostin Celeste but is signed by me.If it was done by my physician assistant, I verify it is correct and has my co-signature.    Brigette Cherry MD PhD BORIS

## 2023-08-17 NOTE — OR SURGEON
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  Moderate stenosis L2-L3  6.  Chronic pain syndrome  7.  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  Moderate stenosis L2-L3  6.  Chronic pain syndrome  7.  Failed conservative therapy      Procedure(s):  left L2-3 oblique lumbar decompression and instrumented fusion. - Wound Class: Clean    Surgeon(s):  RODOLFO Moncada M.D.    Anesthesiologist/Type of Anesthesia:  Anesthesiologist: Annalise Mcfadden M.D./General    Surgical Staff:  Assistant: ZANA Wooten.P.RPATRICIO; Jostin Celeste P.A.-C.  Cell Saver : Smiley Milton  Circulator: Glenn Ramirez R.N.  Relief Circulator: Mitra Bowman R.N.  Scrub Person: Cindy Doty  Radiology Technologist: Cindy Oates    Specimens removed if any:  * No specimens in log *    Co-surgeon: Alexis Denny    Assistants:  Jostin Celeste PA-C  ,  Specimen: nil    Estimated Blood Loss: 50 cc    Findings: n/a    Complications: nil         8/17/2023 9:41 AM Brigette Cherry M.D.

## 2023-08-17 NOTE — LETTER
June 6, 2023    Patient Name: Carolynn Casey  Surgeon Name: Brigette Cherry M.D.  Surgery Facility: [facility] ([address] [facility])  Surgery Date: 8/9/2023    The time of your surgery is not final and may change up to and until the day of your surgery. You will be contacted 1-2 business days prior to your surgery date with your check-in and surgery time.    BEFORE YOUR SURGERY - WITH THE FACILITY  Pre Registration and/or Lab Work/Tests must be done within 28 days of your surgery date. These will be done at [facility], with an appointment. This appointment should be completed before your pre op appointment in our office.    On 7.12.23 - if you have not already heard from [facility] Pre Admission/Registration Department, please call them at [phone] option 2, then option 1, for an appointment.      BEFORE YOUR SURGERY - WITH YOUR SURGEONS OFFICE  Pre op Appointment:   Date: 7.28.23   Time: 9:30am   Provider: Jostin Celeste PA-C   Location: [address] [place], [place] [address]    Bring a list of all medications you are currently taking including the dosing and frequency.    Please read the MEDICATION INSTRUCTIONS below completely.    DAY OF YOUR SURGERY  Nothing to eat or drink and refrain from smoking any substance after midnight the day of your surgery. Smoking may interfere with the anesthetic and frequently produces nausea during the recovery period.    Continue taking all lifesaving medications, including the morning of your surgery with small sip of water.    You will need a responsible adult to drive you to and from your surgery.    AFTER YOUR SURGERY  2 Week Post op Appointment:   Date: 8.23.23   Time: 10:30am  With: Jostin Celeste PA-C  Location: [facility]    6 Week Post op Appointment:   Date: 9.21.23   Time: 10:00am   With: Jostin Celeste PA-C  Location: [facility]    MEDICATION INSTRUCTIONS   Do not take these medications prior to your  procedure:  Anti-inflammatories: stop 7 days prior, restart when advised. For fusions avoid for 12 weeks after surgery  Naproxen (Naprosyn or Alleve)  Motrin  Ibuprofen  Nabumetone (Relafen)  Meloxicam (Mobic)  Celebrex  Salsalate  Diclofenac (Arthrotec, Voltaren, Flector)  Sulindac (Clinoril  Etodolac (Lodine)  Indomethacin (Indocin)  Ketoprofen  Ketorolac  Oxaprozin (Daypro)  Piroxicam (Feldene)  Blood thinners (stop after approval from the prescribing physician)  Aspirin (any dosage): 10 days prior, restart 7 days after procedure  Any medications that contain aspirin in combination (ie: Excedrin migraine, Fiorinal, and Norgesic)  Warfarin (Coumadin): Stop 7 days prior, INR day of procedure, restart 2-3 weeks after procedure  Antiplatelet: restart 7 days after procedure  Ticlid (ticlopidine): Stop 14 days prior  Plavix (clopidogrel): Stop 7 days prior  Aggrenox or Dipyridamole: Stop 14 days prior  ReoPro (abciximab): Stop 14 days prior  Integrilin (eptifibatide): Stop 14 days prior  Aggrastat (tirofiban): Stop 14 days prior  Lovenox (Enoxaparin): Stop 24hrs before and restart 24hrs after procedure  Heparin: Stop 24hrs before   Dalteparin (Fragmin): Stop 24hrs before  Fondaparinux (Arixtra): Stop 24hrs before  Xeralto, Dabigatran (Pradaxa) Stop 5 days prior  Eliquis (apibaxan) Stop 7 days prior    Okay to take these medications as prescribed:  Muscle relaxers  Acetaminophen and pain medications that have it in addition to oxycodone and hydrocodone  Blood pressure, cholesterol and diabetes medications are ok    TIME OFF WORK  FMLA or Disability forms can be faxed directly to [phone] or you may drop them off at any [facility]. Our office charges a $35.00 fee. Forms will be completed within 5-10 business days after payment is received. For the status of your forms you may contact our disability office directly at [phone].    DENTAL PROCDURES/CLEANINGS Avoid 3 weeks before surgery and for 3  months after surgery.    QUESTIONS ABOUT COSTS  Contact our Patient Financial Services department at [phone] for more information.    If you have any questions, please contact our office.    Tiffanie    Surgery Scheduler  [email or web address]  ? [phone]   Fax: [phone]  EXT [phone]  [place] [address]  [phone]

## 2023-08-18 ENCOUNTER — PHARMACY VISIT (OUTPATIENT)
Dept: PHARMACY | Facility: MEDICAL CENTER | Age: 58
End: 2023-08-18
Payer: COMMERCIAL

## 2023-08-18 VITALS
OXYGEN SATURATION: 96 % | WEIGHT: 253 LBS | HEART RATE: 65 BPM | TEMPERATURE: 98.2 F | BODY MASS INDEX: 42.76 KG/M2 | DIASTOLIC BLOOD PRESSURE: 72 MMHG | SYSTOLIC BLOOD PRESSURE: 136 MMHG | RESPIRATION RATE: 16 BRPM

## 2023-08-18 LAB
ANION GAP SERPL CALC-SCNC: 13 MMOL/L (ref 7–16)
APTT PPP: 23.4 SEC (ref 24.7–36)
BUN SERPL-MCNC: 9 MG/DL (ref 8–22)
CALCIUM SERPL-MCNC: 8.7 MG/DL (ref 8.5–10.5)
CHLORIDE SERPL-SCNC: 101 MMOL/L (ref 96–112)
CO2 SERPL-SCNC: 21 MMOL/L (ref 20–33)
CREAT SERPL-MCNC: 0.53 MG/DL (ref 0.5–1.4)
ERYTHROCYTE [DISTWIDTH] IN BLOOD BY AUTOMATED COUNT: 43.1 FL (ref 35.9–50)
GFR SERPLBLD CREATININE-BSD FMLA CKD-EPI: 105 ML/MIN/1.73 M 2
GLUCOSE SERPL-MCNC: 128 MG/DL (ref 65–99)
HCT VFR BLD AUTO: 44.9 % (ref 37–47)
HGB BLD-MCNC: 14.4 G/DL (ref 12–16)
INR PPP: 0.99 (ref 0.87–1.13)
MCH RBC QN AUTO: 26.7 PG (ref 27–33)
MCHC RBC AUTO-ENTMCNC: 32.1 G/DL (ref 32.2–35.5)
MCV RBC AUTO: 83.3 FL (ref 81.4–97.8)
PLATELET # BLD AUTO: 274 K/UL (ref 164–446)
PMV BLD AUTO: 8.8 FL (ref 9–12.9)
POTASSIUM SERPL-SCNC: 4.8 MMOL/L (ref 3.6–5.5)
PROTHROMBIN TIME: 13 SEC (ref 12–14.6)
RBC # BLD AUTO: 5.39 M/UL (ref 4.2–5.4)
SODIUM SERPL-SCNC: 135 MMOL/L (ref 135–145)
WBC # BLD AUTO: 11.2 K/UL (ref 4.8–10.8)

## 2023-08-18 PROCEDURE — 97161 PT EVAL LOW COMPLEX 20 MIN: CPT

## 2023-08-18 PROCEDURE — 80048 BASIC METABOLIC PNL TOTAL CA: CPT

## 2023-08-18 PROCEDURE — 700111 HCHG RX REV CODE 636 W/ 250 OVERRIDE (IP): Performed by: PHYSICIAN ASSISTANT

## 2023-08-18 PROCEDURE — 85027 COMPLETE CBC AUTOMATED: CPT

## 2023-08-18 PROCEDURE — 97165 OT EVAL LOW COMPLEX 30 MIN: CPT

## 2023-08-18 PROCEDURE — 99024 POSTOP FOLLOW-UP VISIT: CPT | Performed by: PHYSICIAN ASSISTANT

## 2023-08-18 PROCEDURE — 85730 THROMBOPLASTIN TIME PARTIAL: CPT

## 2023-08-18 PROCEDURE — 97535 SELF CARE MNGMENT TRAINING: CPT

## 2023-08-18 PROCEDURE — 700102 HCHG RX REV CODE 250 W/ 637 OVERRIDE(OP): Performed by: PHYSICIAN ASSISTANT

## 2023-08-18 PROCEDURE — A9270 NON-COVERED ITEM OR SERVICE: HCPCS | Performed by: PHYSICIAN ASSISTANT

## 2023-08-18 PROCEDURE — 700105 HCHG RX REV CODE 258: Performed by: PHYSICIAN ASSISTANT

## 2023-08-18 PROCEDURE — RXMED WILLOW AMBULATORY MEDICATION CHARGE: Performed by: PHYSICIAN ASSISTANT

## 2023-08-18 PROCEDURE — 85610 PROTHROMBIN TIME: CPT

## 2023-08-18 RX ORDER — CEPHALEXIN 500 MG/1
500 CAPSULE ORAL 4 TIMES DAILY
Qty: 20 CAPSULE | Refills: 0 | Status: ACTIVE | OUTPATIENT
Start: 2023-08-18 | End: 2023-08-18

## 2023-08-18 RX ORDER — DOXYCYCLINE 100 MG/1
100 TABLET ORAL EVERY 12 HOURS
Status: DISCONTINUED | OUTPATIENT
Start: 2023-08-18 | End: 2023-08-18 | Stop reason: HOSPADM

## 2023-08-18 RX ORDER — CEPHALEXIN 500 MG/1
500 CAPSULE ORAL 4 TIMES DAILY
Status: DISCONTINUED | OUTPATIENT
Start: 2023-08-18 | End: 2023-08-18 | Stop reason: HOSPADM

## 2023-08-18 RX ORDER — CYCLOBENZAPRINE HCL 10 MG
10 TABLET ORAL 3 TIMES DAILY PRN
Qty: 90 TABLET | Refills: 3 | Status: SHIPPED | OUTPATIENT
Start: 2023-08-18 | End: 2023-09-28 | Stop reason: SDUPTHER

## 2023-08-18 RX ORDER — OXYCODONE HYDROCHLORIDE 10 MG/1
5-10 TABLET ORAL EVERY 6 HOURS PRN
Qty: 28 TABLET | Refills: 0 | Status: SHIPPED | OUTPATIENT
Start: 2023-08-18 | End: 2023-08-24 | Stop reason: SDUPTHER

## 2023-08-18 RX ORDER — ACETAMINOPHEN 500 MG
1000 TABLET ORAL EVERY 8 HOURS
Qty: 120 TABLET | Refills: 0 | Status: SHIPPED | OUTPATIENT
Start: 2023-08-18 | End: 2023-12-11 | Stop reason: SDUPTHER

## 2023-08-18 RX ORDER — DOXYCYCLINE 100 MG/1
100 TABLET ORAL EVERY 12 HOURS
Qty: 10 TABLET | Refills: 0 | Status: ACTIVE | OUTPATIENT
Start: 2023-08-18 | End: 2023-08-23

## 2023-08-18 RX ADMIN — OXYCODONE HYDROCHLORIDE 10 MG: 10 TABLET ORAL at 05:18

## 2023-08-18 RX ADMIN — CEFAZOLIN 2 G: 2 INJECTION, POWDER, FOR SOLUTION INTRAMUSCULAR; INTRAVENOUS at 00:25

## 2023-08-18 RX ADMIN — LABETALOL HYDROCHLORIDE 10 MG: 5 INJECTION INTRAVENOUS at 00:24

## 2023-08-18 RX ADMIN — ACETAMINOPHEN 1000 MG: 500 TABLET, FILM COATED ORAL at 05:19

## 2023-08-18 RX ADMIN — CYCLOBENZAPRINE 10 MG: 10 TABLET, FILM COATED ORAL at 12:32

## 2023-08-18 RX ADMIN — BUPROPION HYDROCHLORIDE 150 MG: 150 TABLET, EXTENDED RELEASE ORAL at 05:17

## 2023-08-18 RX ADMIN — SUCRALFATE 1 G: 1 TABLET ORAL at 05:18

## 2023-08-18 RX ADMIN — DOCUSATE SODIUM 100 MG: 100 CAPSULE, LIQUID FILLED ORAL at 05:17

## 2023-08-18 RX ADMIN — OXYCODONE HYDROCHLORIDE 10 MG: 10 TABLET ORAL at 10:10

## 2023-08-18 RX ADMIN — AMLODIPINE BESYLATE 10 MG: 10 TABLET ORAL at 05:17

## 2023-08-18 RX ADMIN — DOXYCYCLINE 100 MG: 100 TABLET, FILM COATED ORAL at 12:32

## 2023-08-18 RX ADMIN — ACETAMINOPHEN 1000 MG: 500 TABLET, FILM COATED ORAL at 00:24

## 2023-08-18 RX ADMIN — CYCLOBENZAPRINE 10 MG: 10 TABLET, FILM COATED ORAL at 00:24

## 2023-08-18 RX ADMIN — ACETAMINOPHEN 1000 MG: 500 TABLET, FILM COATED ORAL at 12:32

## 2023-08-18 RX ADMIN — LABETALOL HYDROCHLORIDE 10 MG: 5 INJECTION INTRAVENOUS at 05:45

## 2023-08-18 ASSESSMENT — COGNITIVE AND FUNCTIONAL STATUS - GENERAL
HELP NEEDED FOR BATHING: A LITTLE
CLIMB 3 TO 5 STEPS WITH RAILING: A LITTLE
TOILETING: A LITTLE
MOVING TO AND FROM BED TO CHAIR: A LITTLE
DRESSING REGULAR UPPER BODY CLOTHING: A LITTLE
SUGGESTED CMS G CODE MODIFIER MOBILITY: CK
STANDING UP FROM CHAIR USING ARMS: A LITTLE
MOBILITY SCORE: 19
SUGGESTED CMS G CODE MODIFIER DAILY ACTIVITY: CJ
DAILY ACTIVITIY SCORE: 20
TURNING FROM BACK TO SIDE WHILE IN FLAT BAD: A LITTLE
WALKING IN HOSPITAL ROOM: A LITTLE
DRESSING REGULAR LOWER BODY CLOTHING: A LITTLE

## 2023-08-18 ASSESSMENT — GAIT ASSESSMENTS
DEVIATION: BRADYKINETIC;SHUFFLED GAIT
DISTANCE (FEET): 200
GAIT LEVEL OF ASSIST: SUPERVISED
ASSISTIVE DEVICE: FRONT WHEEL WALKER

## 2023-08-18 ASSESSMENT — PAIN DESCRIPTION - PAIN TYPE: TYPE: SURGICAL PAIN

## 2023-08-18 ASSESSMENT — ACTIVITIES OF DAILY LIVING (ADL): TOILETING: INDEPENDENT

## 2023-08-18 NOTE — DIETARY
NUTRITION SERVICES: BMI - Pt with BMI >40 (=Body mass index is 42.76 kg/m².), Class III obesity. Weight loss counseling not appropriate in acute care setting. RECOMMEND - If appropriate at DC please refer to outpatient nutrition services for weight management.

## 2023-08-18 NOTE — DISCHARGE PLANNING
HTH/SCP TCN chart review completed. Collaborated with SEAMUS Muñoz as well as PT. The most current review of medical record, knowledge of pt's PLOF and social support, LACE+ score of 45, no 6 clicks mobility available though noted per review and pt report she is ambulatory with a FWW.    Pt seen at bedside. Introduced TCN program. Provided education regarding post acute levels of care. Discussed HTH/SCP plan benefits (Meds to Beds, medical uber and [facility] transitional care). Pt verbalizes understanding.     Pt reports she is anticipating dc to home today with outpatient follow ups as indicated. Note PT recommending outpatient per discussion though pt does have concerns with co-pays for outpatient per PT report. Pt reports that she believes she will need FWW at time of dc as well and PT confirms this per discussion with this TCN (awaiting formal recs in chart). Note OT with no further OT needs post dc per review.    No additional provider requests at this time. Choice proactively obtained for DME (FWW), faxed to DPA and sent VOALTE to SEAMUS (currently in rounds) alerting that order was placed by PT as well. TCN will continue to follow and collaborate with discharge planning team as additional post acute needs arise. Thank you.     Completed today:  PT/OT recs as above; OT recs in chart; awaiting/monitor for formal recs from PT; see above  Choice obtained:  DME (FWW)  [facility] referral not sent; SCP and RPCP; pt reports planning outpatients follow ups with surgeon as well

## 2023-08-18 NOTE — THERAPY
Physical Therapy   Initial Evaluation     Patient Name: Carolynn Casey  Age:  58 y.o., Sex:  female  Medical Record #: [id number]  Today's Date: 8/18/2023     Precautions: Fall Risk;Spinal / Back Precautions   Comments: LSO in standing    Assessment  Patient is 58 y.o. female POD #1 L2-L3 OLIF. Pt demonstrates gross BLE strength 3+/5 and chronic R foot numbness/tingling. She reports back pain has been exacerbated over past few months and therefore she has been unable to work. Today she was able to negotiate all fxnl mob with FWW and SPV. Discussed OP PT with pt as this may help mitigate back pain and return fxn however she reports concern about co-pay. No further acute PT needs at this time.     Plan    Physical Therapy Initial Treatment Plan   Duration: Evaluation only    DC Equipment Recommendations: Front-Wheel Walker (ordered)  Discharge Recommendations: Recommend outpatient physical therapy services to address higher level deficits (however pt reports she likely cannot afford co-pay)     Objective    Prior Living Situation   Prior Services None   Housing / Facility Mobile Home   Steps Into Home 5   Rail Both Rail (Steps into Home)   Equipment Owned None   Lives with - Patient's Self Care Capacity Spouse   Comments spouse home and available, pt not currently working d/t back pain   Prior Level of Functional Mobility   Bed Mobility Independent   Transfer Status Independent   Ambulation Independent   Ambulation Distance household distances   Assistive Devices Used None   Stairs Independent   Cognition    Cognition / Consciousness WDL   Level of Consciousness Alert   Comments pleasant and cooperative   Active ROM Lower Body    Active ROM Lower Body  WDL   Strength Lower Body   Lower Body Strength  X   Comments BLE strength 3+/5   Sensation Lower Body   Lower Extremity Sensation   X   Comments reports chronic numbness R foot   Balance Assessment   Sitting Balance (Static) Fair +   Sitting Balance (Dynamic) Fair +    Standing Balance (Static) Fair   Standing Balance (Dynamic) Fair   Weight Shift Sitting Fair   Weight Shift Standing Poor   Comments w/ FWW, no LOB   Bed Mobility    Comments NT - up in chair pre/post   Gait Analysis   Gait Level Of Assist Supervised   Assistive Device Front Wheel Walker   Distance (Feet) 200   # of Times Distance was Traveled 1   Deviation Bradykinetic;Shuffled Gait   # of Stairs Climbed 2   Level of Assist with Stairs Supervised  (BHR hold)   Weight Bearing Status no restrictions   Functional Mobility   Sit to Stand Supervised   Bed, Chair, Wheelchair Transfer Supervised   Transfer Method Stand Step   How much difficulty does the patient currently have...   Turning over in bed (including adjusting bedclothes, sheets and blankets)? 3   Sitting down on and standing up from a chair with arms (e.g., wheelchair, bedside commode, etc.) 4   Moving from lying on back to sitting on the side of the bed? 3   How much help from another person does the patient currently need...   Moving to and from a bed to a chair (including a wheelchair)? 3   Need to walk in a hospital room? 3   Climbing 3-5 steps with a railing? 3   6 clicks Mobility Score 19

## 2023-08-18 NOTE — THERAPY
Occupational Therapy   Initial Evaluation     Patient Name: Carolynn Casey  Age:  58 y.o., Sex:  female  Medical Record #: [id number]  Today's Date: 8/18/2023     Precautions  Precautions: Fall Risk, Spinal / Back Precautions , Lumbosacral Orthosis  Comments: LSO per order, put on while standing, off for showers, meals, and when up to the bathroom    Assessment  Patient is 58 y.o. female admitted to [facility] for elective L2-L3 lumbar decompression/fusion. PMHx of HTN, morbid obesity, and osteoarthritis involving multiple joints.     Pt greeted and agreeable to OT evaluation. Upon OT arrival, pt lying in semi fowlers position. OT instructed pt to utilize log roll technique to get OOB. Pt had difficulty and required physical demonstration. Pt rehearsed and was able to complete log roll to sit EOB. Pt participated in seated and standing ADLs (grooming, UBD, LBD) with SBA-spv. Pt returned to recliner post OT evaluation. Pt was educated on LSO don/doff, home safety/setup, DME for bathing, lumbar spinal precautions, adaptive equipment for LBD, adaptive techniques for ADL/txfs, and importance of frequent OOB activity at home. Pt verbalized understanding. Pt presented with good postural control, AROM of BUE, activity tolerance, pain management, dynamic sitting balance, dynamic standing balance, and endurance while completing ADLs. At this time, pt will not be actively followed acute IP occupational therapy services. However, may be seen if requested by physician for 1 more visit within 30 days to address any discharge or DME/AE needs.     Plan  DC Equipment Recommendations: Tub Transfer Bench, Sock Aide, Reacher, Hand Held Shower (Pt reports that she primarily utilizes walk in shower. However, pt was advised to begin utilizing tub/shower combination in order to use a TTB for bathing. OT demo'd use of TTB and how to txf on/off of it. Pt verbalized understanding.)  Discharge Recommendations: Anticipate that the patient will have  "no further occupational therapy needs after discharge from the hospital     Subjective  \"My walk in shower is very tiny so it cannot have a shower seat in it. I can barely fit in it\"     Objective   08/18/23 0835   Initial Contact Note    Initial Contact Note Order Received and Verified, Occupational Therapy Evaluation in Progress with Full Report to Follow.   Prior Living Situation   Prior Services None   Housing / Facility Mobile Home   Steps Into Home 5   Bathroom Set up Walk In Shower;Bathtub / Shower Combination  (Pt reports \"I have a tiny shower nothing fits in the shower, I barely do\". Pt informed OT that she primarily utilizes the walk in shower.)   Equipment Owned None  (Pt reports that her  owns a sock aid and reacher.)   Lives with - Patient's Self Care Capacity Spouse   Comments Pt reports that she lives with her  who is available to assist her with any needs upon d/c.   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.)   History of Falls   History of Falls No   Precautions   Precautions Fall Risk;Spinal / Back Precautions ;Lumbosacral Orthosis   Comments LSO per order, put on while standing, off for showers, meals, and when up to the bathroom   Vitals   O2 Delivery Device None - Room Air   Pain 0 - 10 Group   Therapist Pain Assessment Post Activity Pain Same as Prior to Activity;Nurse Notified  (Pt c/o of mild pain in lumbar region but did not " "report a numerical pain rating.)   Cognition    Cognition / Consciousness WDL   Level of Consciousness Alert   Comments Pt is very pleasant and cooperative   Active ROM Upper Body   Active ROM Upper Body  WDL   Dominant Hand Right   Strength Upper Body   Upper Body Strength  WDL   Neurological Concerns   Neurological Concerns No   Balance Assessment   Sitting Balance (Static) Fair +   Sitting Balance (Dynamic) Fair +   Standing Balance (Static) Fair +   Standing Balance (Dynamic) Fair +   Weight Shift Sitting Fair   Weight Shift Standing Fair   Comments w/FWW   Bed Mobility    Supine to Sit Standby Assist  (via log roll technique)   Scooting Supervised   Rolling Standby Assist   ADL Assessment   Grooming Supervision;Standing  (washed her face, brushed her teeth and hair while standing sinkside w/FWW. No LOB or SOB noted.)   Upper Body Dressing Supervision  (donned LSO and pullover shirt while standing bedside utilizing FWW for stability.)   Lower Body Dressing Supervision  (Pt had diffiiculty completing figure 4 position. OT demo'd use of reacher and sock aid for LBD. Pt rehearsed use and stated \"I know how to use these because my  has to.\" Pt was able to don pajama pants using reacher and don B socks using sock aid.)   Toileting   (Pt politely declined to participate as she did not have the urge to go.)   How much help from another person does the patient currently need...   Putting on and taking off regular lower body clothing? 3   Bathing (including washing, rinsing, and drying)? 3   Toileting, which includes using a toilet, bedpan, or urinal? 3   Putting on and taking off regular upper body clothing? 3   Taking care of personal grooming such as brushing teeth? 4   Eating meals? 4   6 Clicks Daily Activity Score 20   Functional Mobility   Sit to Stand Supervised   Bed, Chair, Wheelchair Transfer Supervised   Transfer Method Stand Step   Mobility supine>EOB>standing sinkside>recliner  (w/FWW)   Comments Pt " required min vc's for proper body mechanics and hand placement on FWW while adhering to lumbar spinal precautions.   Visual Perception   Comments wears glasses   Activity Tolerance   Sitting in Chair +5 mins  (Pt left sitting in recliner post OT evaluation)   Sitting Edge of Bed +5 mins   Standing +5 mins   Education Group   Education Provided Spinal Precautions;Brace Wear and Care;Role of Occupational Therapist;Activities of Daily Living;Home Safety;Adaptive Equipment   Role of Occupational Therapist Patient Response Patient;Acceptance;Explanation;* * Response * *;Verbal Demonstration   Spinal Precautions Patient Response Verbal Demonstration   Brace Wear & Care Patient Response Patient;Acceptance;Explanation;Demonstration;Handout;Verbal Demonstration;Action Demonstration   Home Safety Patient Response Patient;Acceptance;Explanation;Verbal Demonstration   ADL Patient Response Patient;Acceptance;Explanation;Verbal Demonstration;Demonstration;Handout   Adaptive Equipment Patient Response Patient;Acceptance;Explanation;Demonstration;Verbal Demonstration;Action Demonstration   Additional Comments Pt received extensive education regarding home setup/safety, DME for bathing, lumbar spinal precautions, and use of AE for LBD. Pt verbalized understanding. Pt received equipment resource guide handout to assist her with purchasing OT recommendations. OT recommends pt to utilize a TTB, long handled sponge, reacher, and sock aid to promote increased independence w/ADLs. Pt also received lumbar spinal precautions handout.   Anticipated Discharge Equipment and Recommendations   DC Equipment Recommendations Tub Transfer Bench;Sock Aide;Reacher;Hand Held Shower  (Pt reports that she primarily utilizes walk in shower. However, pt was advised to begin utilizing tub/shower combination in order to use a TTB for bathing. OT demo'd use of TTB and how to txf on/off of it. Pt verbalized understanding.)   Discharge Recommendations  Anticipate that the patient will have no further occupational therapy needs after discharge from the hospital   Interdisciplinary Plan of Care Collaboration   IDT Collaboration with  Nursing   Patient Position at End of Therapy Seated;Tray Table within Reach;Phone within Reach;Call Light within Reach   Collaboration Comments RN updated regarding pt's functional status   Session Information   Date / Session Number  8/18 #1 (d/c needs only)

## 2023-08-18 NOTE — DISCHARGE SUMMARY
Discharge Summary    CHIEF COMPLAINT ON ADMISSION  No chief complaint on file.      Reason for Admission  Spinal stenosis, lumbar region, wi*     Admission Date  8/17/2023    CODE STATUS  Full Code    HPI & HOSPITAL COURSE  8/19/2022   Carolynn Casey is a 57 y.o. female seen today in a neurosurgery/spine consultation.  She had multiple family members in attendance at today's visit.  3 weeks ago she was reaching for something while sitting in a chair.  She had a pop in her low back causing severe back pain with radiation down her left leg.  Today this is primarily numbness in her left leg although the back pain persists.  She localizes this to her lumbar sacral region.  She was scheduled to have surgery in December 2021 although this was canceled on the day of surgery which sounds like difficulties with venous access.  She is taking Tylenol at this time.  She is taking gabapentin and flexeril.  She is quite miserable today and rates her pain as 10/10.  She does get some cramping into her groin, left side greater than right, this causes severe pain, this nearly causes her to fall down.     Pertinent past medical history is somewhat complicated.  She has had 3 previous bariatric surgeries.  She does have pulmonary hypertension which is stable.  She is prediabetic.  She had multiple previous lumbar surgeries including L5-S1 ALIF.  She had L4-S1 posterior lumbar instrumented fusion as well as an L3-4 XLIF.  She did have a SI joint fusion as well this was on the right side. All of the surgeries were with Doctor Robins. She had two different spinal cord stimulators placed.     12/13/2022  Carolynn Casey returns again today for review.  She has somewhat of a constellation of symptoms.  She continues with low back pain with shooting pain down the lateral aspects of both of her legs.  The right more affected than the left.  Her symptoms are better when she is sitting or laying down but worse when she stands and walks.  She  is been taking Tylenol, 2700 mg a day of gabapentin and muscle relaxers.  By her account she is been worked up with orthopedic surgery for the right hip.  She is here to further review lumbar spine imaging.  She did work with [employer] spinal cord stimulator representation 1 month ago.  This is not providing any significant relief.     2/17/2023     Patient returns.  She has back pain which is 9 out of 10 when she sitting.  She has burning pain down both legs.  When she stands she says the burning pain gets worse.  We had lengthy discussion about all her imaging.  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  There is degenerative disc disease in the lower thoracic spine as well she may have neuropathic pain.  Her BMI today was 45.  She is to complete a course of physical therapy.  She needs to get a BMI less than 40.  I discussed selective nerve root blocks which apparently given her migraines.  She is going to go ahead.  If she can get her BMI less than 40 and the selective nerve root blocks provide temporary relief surgery is an option.  Would be 50-50 to help her.  I would offer her a left L2-3 oblique lumbar decompression and instrumented fusion.  We will regroup in 6 weeks.     5/11/2023  Patient returns.  She had L2-3 selective nerve root blocks and these gave her good relief of pain.  This lasted for couple of weeks but the pains come back.  She still has bilateral anterior thigh pain.  Her EMG showed chronic left L4-5 radiculopathies.  This is on the left.  She said PT is too expensive for her.  She wants to proceed with surgery.  No guarantees were given.  I specifically discussed left hip weakness and therapeutic failure.  She is at higher risk because of her general comorbidities.  She wishes to proceed with surgery.    8/18/2023  On postoperative day #1 the patient is doing quite well.  She reports improvement of her preoperative back pain.  She does have some continued pain into her thighs which is  unchanged.  She has mild left hip weakness which is expected due to the nature of her surgery.  She is hemodynamically stable.  She is eating and drinking without complication.  She has mobilized well.  Based upon the above information she was deemed safe for discharge to home in stable condition.       No notes on file    Therefore, she is discharged in good and stable condition to home with close outpatient follow-up.    The patient recovered much more quickly than anticipated on admission.    Discharge Date  8/18/2023     FOLLOW UP ITEMS POST DISCHARGE  Follow up with our office in 2, 6, and 12 weeks.  Report to ER with any complications.  Call our office with any questions or concerns.  No anti-inflammatories for 3 months.  Start Lovenox injections today, 40 mg subcutaneous daily x2 weeks.  This has already been prescribed by our office.  Clear to shower Sumfsy, pat incision dry, no special creams or ointments.   Avoid bending, lifting and twisting.   Walk 4-6 times per day as tolerated to help prevent blood clots.  LSO brace when out of bed and active.    DISCHARGE DIAGNOSES  Principal Problem:    Lumbar stenosis with neurogenic claudication (POA: Unknown)  Active Problems:    Spinal stenosis, lumbar region, with neurogenic claudication (POA: Yes)  Resolved Problems:    * No resolved hospital problems. *      FOLLOW UP  Future Appointments   Date Time Provider Department Center   8/31/2023 11:30 AM [facility]   9/28/2023 12:00 PM [facility]     No follow-up provider specified.    MEDICATIONS ON DISCHARGE     Medication List        START taking these medications        Instructions   doxycycline monohydrate 100 MG tablet  Commonly known as: Adoxa   Take 1 Tablet by mouth every 12 hours for 5 days.  Dose: 100 mg     oxyCODONE immediate release 10 MG immediate release tablet  Commonly known as: Roxicodone   Take 0.5-1 Tablets by mouth every 6 hours  "as needed for Severe Pain for up to 7 days.  Dose: 5-10 mg            CHANGE how you take these medications        Instructions   acetaminophen 500 MG Tabs  What changed:   how much to take  when to take this  Commonly known as: Tylenol   Take 2 Tablets by mouth every 8 hours.  Dose: 1,000 mg     cyclobenzaprine 10 mg Tabs  What changed: reasons to take this  Commonly known as: Flexeril   Take 1 Tablet by mouth 3 times a day as needed for Muscle Spasms.  Dose: 10 mg            CONTINUE taking these medications        Instructions   albuterol 108 (90 Base) MCG/ACT Aers inhalation aerosol   Inhale 2 Puffs as needed for Shortness of Breath.  Dose: 2 Puff     amLODIPine 10 MG Tabs  Commonly known as: Norvasc   Take 10 mg by mouth every day.  Dose: 10 mg     buPROPion 300 MG XL tablet  Commonly known as: Wellbutrin XL   Take 1 Tablet by mouth every morning.  Dose: 300 mg     enoxaparin 40 MG/0.4ML Sosy inj  Commonly known as: Lovenox   Inject 40 mg under the skin every day at 6 PM for 14 days. To begin on 8/19/2023.  Dose: 40 mg     gabapentin 300 MG Caps  Commonly known as: Neurontin   Take 900 mg by mouth 3 times a day.  Dose: 900 mg     losartan 50 MG Tabs  Commonly known as: Cozaar   Take 1 Tablet by mouth every day.  Dose: 50 mg     multivitamin Tabs   Take 1 Tablet by mouth every day.  Dose: 1 Tablet     sucralfate 1 GM Tabs  Commonly known as: Carafate   Take 1 g by mouth 2 times a day.  Dose: 1 g     tizanidine 4 MG Tabs  Commonly known as: Zanaflex   TAKE 1 TABLET BY MOUTH AT BEDTIME AS NEEDED FOR MUSCLE SPASM              Allergies  Allergies   Allergen Reactions    Naprosyn [Naproxen] Unspecified     \"GI bleed\"  RXN=whole life    Penicillins Anaphylaxis     RXN=since age 1    Latex Rash and Itching     Rash, itching  RXN=20 years ago    Tape Rash     Plastic tape \"bubble up the skin\", reports tegaderm OK  RXN=ongoing    Nsaids     Cortisone Unspecified     migraines       DIET  Orders Placed This Encounter "   Procedures    Diet Order Diet: Regular     Standing Status:   Standing     Number of Occurrences:   1     Order Specific Question:   Diet:     Answer:   Regular [1]       ACTIVITY  As tolerated.  Weight bearing as tolerated    CONSULTATIONS  None    PROCEDURES  TITLE OF THE PROCEDURE:  He has history lumbar lateral discectomy with adjacent partial corpectomy (>30% of total adjacent endplates removed) using a left-sided lateral retroperitoneal approach and microscopic magnification, exposure affected by Alexis Denny MD (OLIF procedure)  L2-3 lumbar  interbody fusion Medtronic Anteralign  porous titanium interbody cage and Infuse BMP with morselized allograft  L2-3 lumbar  nonsegmental fixation using the Pivox  lateral plate and screw system  Fluoroscopic guidance for screw placement.  Microscopic magnification.  O-arm navigation for surgery    LABORATORY  Lab Results   Component Value Date    SODIUM 135 08/18/2023    POTASSIUM 4.8 08/18/2023    CHLORIDE 101 08/18/2023    CO2 21 08/18/2023    GLUCOSE 128 (H) 08/18/2023    BUN 9 08/18/2023    CREATININE 0.53 08/18/2023        Lab Results   Component Value Date    WBC 11.2 (H) 08/18/2023    HEMOGLOBIN 14.4 08/18/2023    HEMATOCRIT 44.9 08/18/2023    PLATELETCT 274 08/18/2023        Total time of the discharge process exceeds 30 minutes.

## 2023-08-18 NOTE — DISCHARGE PLANNING
Case Management Discharge Planning    Admission Date: 8/17/2023  GMLOS: 2.8  ALOS: 1    6-Clicks ADL Score:    6-Clicks Mobility Score:        Anticipated Discharge Dispo:      DME Needed: Pendng    Action(s) Taken: Updated Provider/Nurse on Discharge Plan    Escalations Completed: None    Medically Clear: Yes    Next Steps: f/u P.T. recommendations    Barriers to Discharge: None    Is the patient up for discharge today   Yes    Is transport arranged for discharge disposition: Yes      Patient is s/p left L2-3 oblique lumbar decompression and instrumented fusion. - Wound Class: Clean  performed on 08/17/2023.  Per Jostin with the HOME team. The patient is for discharge today. P.T. recommendations are pending.   CM will f/u for any discharge needs.

## 2023-08-18 NOTE — DISCHARGE PLANNING
Received choice form at: 1140  Agency/Facility name: [facility]  Referral sent per choice form at:  1159

## 2023-08-18 NOTE — CARE PLAN
The patient is Stable - Low risk of patient condition declining or worsening    Shift Goals  Clinical Goals: q4 neuro, pain control, mobility  Patient Goals: walk    Progress made toward(s) clinical / shift goals:    Pt pain will decrease from 7/10 to 3/10.      Problem: Pain - Standard  Goal: Alleviation of pain or a reduction in pain to the patient’s comfort goal  Outcome: Progressing     Problem: Knowledge Deficit - Standard  Goal: Patient and family/care givers will demonstrate understanding of plan of care, disease process/condition, diagnostic tests and medications  Outcome: Progressing

## 2023-08-18 NOTE — PROGRESS NOTES
Neurosurgery  POD# 1  Seen with Dr. Cherry  Ambulating  Voiding  Tolerating oral medications  Denies nausea, vomiting  Pain controlled on current medication regimen  Leg symptoms unchanged    Objective:  BP (!) 148/78   Pulse 69   Temp 36.7 °C (98 °F) (Temporal)   Resp 16   Wt 115 kg (253 lb)   SpO2 96%     Intake/Output Summary (Last 24 hours) at 8/18/2023 0826  Last data filed at 8/17/2023 1002  Gross per 24 hour   Intake 1800 ml   Output 500 ml   Net 1300 ml       Recent Labs     08/18/23  0024   WBC 11.2*   RBC 5.39   HEMOGLOBIN 14.4   HEMATOCRIT 44.9   MCV 83.3   MCH 26.7*   MCHC 32.1*   RDW 43.1   PLATELETCT 274   MPV 8.8*     Recent Labs     08/18/23  0024   SODIUM 135   POTASSIUM 4.8   CHLORIDE 101   CO2 21   GLUCOSE 128*   BUN 9     Recent Labs     08/18/23 0024   APTT 23.4*   INR 0.99     VSS  LS  Surgical incision clean, dry, intact, no evidence of infection  Strength:  Trace weakness in left hip with flexion, expected, otherwise lower extremities are 5/5 grossly  Otherwise neurologically intact    Assessment:  Active Hospital Problems    Diagnosis     Spinal stenosis, lumbar region, with neurogenic claudication [M48.062]     Lumbar stenosis with neurogenic claudication [M48.062]      POD#1 S/p L2-3 OLIF  Chemoprophylaxis: Lovenox 40 mg QD starting today    Plan:  1. Ambulate with PT/OT as tolerated - LSO when OOB and active  2. Advance diet as tolerated  3. D/c IV fluids, D/c home later this morning after PT/OT assess  4. Meds to beds for d/c scripts, call with any issues

## 2023-08-18 NOTE — PROGRESS NOTES
Assumed care 1900.  A&Ox4.  PRN given for elevated BP x2 with good effect.  Pain managed with PRN. Surgical site dressing cdi.  Bed locked in lowest position. SCD on at bedside.  Q4 neuros unchanged this shift.  All interventions completed this shift as ordered.  Calls appropriately. Hourly rounding in place.  Safety maintained.

## 2023-08-19 LAB
BARCODED ABORH UBTYP: 6200
BARCODED ABORH UBTYP: 6200
BARCODED PRD CODE UBPRD: NORMAL
BARCODED PRD CODE UBPRD: NORMAL
BARCODED UNIT NUM UBUNT: NORMAL
BARCODED UNIT NUM UBUNT: NORMAL
COMPONENT R 8504R: NORMAL
COMPONENT R 8504R: NORMAL
PRODUCT TYPE UPROD: NORMAL
PRODUCT TYPE UPROD: NORMAL
UNIT STATUS USTAT: NORMAL
UNIT STATUS USTAT: NORMAL

## 2023-08-21 ENCOUNTER — TELEPHONE (OUTPATIENT)
Dept: NEUROSURGERY | Facility: MEDICAL CENTER | Age: 58
End: 2023-08-21
Payer: MEDICARE

## 2023-08-22 NOTE — TELEPHONE ENCOUNTER
Cramping in right leg, but pain is improved. Left leg improved as well. Mobilizing well. Surgical site pain is significant.  Postop instructions were reviewed. She will call with any further questions or concerns.

## 2023-08-28 RX ORDER — BUPROPION HYDROCHLORIDE 300 MG/1
300 TABLET ORAL EVERY MORNING
Qty: 90 TABLET | Refills: 3 | Status: SHIPPED | OUTPATIENT
Start: 2023-08-28 | End: 2024-02-26

## 2023-10-11 ENCOUNTER — DOCUMENTATION (OUTPATIENT)
Dept: HEALTH INFORMATION MANAGEMENT | Facility: OTHER | Age: 58
End: 2023-10-11
Payer: MEDICARE

## 2023-11-07 DIAGNOSIS — I10 ESSENTIAL HYPERTENSION: ICD-10-CM

## 2023-11-07 RX ORDER — LOSARTAN POTASSIUM 50 MG/1
50 TABLET ORAL DAILY
Qty: 100 TABLET | Refills: 1 | Status: SHIPPED | OUTPATIENT
Start: 2023-11-07

## 2023-11-09 DIAGNOSIS — N64.4 BREAST PAIN, LEFT: ICD-10-CM

## 2023-11-16 ENCOUNTER — HOSPITAL ENCOUNTER (OUTPATIENT)
Dept: RADIOLOGY | Facility: MEDICAL CENTER | Age: 58
End: 2023-11-16
Attending: FAMILY MEDICINE
Payer: MEDICARE

## 2023-11-16 DIAGNOSIS — N63.10 MASS OF RIGHT BREAST, UNSPECIFIED QUADRANT: ICD-10-CM

## 2023-11-16 DIAGNOSIS — N64.4 BREAST PAIN, LEFT: ICD-10-CM

## 2023-11-16 PROCEDURE — 76642 ULTRASOUND BREAST LIMITED: CPT | Mod: RT

## 2023-11-16 PROCEDURE — G0279 TOMOSYNTHESIS, MAMMO: HCPCS

## 2023-12-05 PROBLEM — I70.0 ATHEROSCLEROSIS OF AORTA (HCC): Status: ACTIVE | Noted: 2023-12-05

## 2023-12-08 SDOH — ECONOMIC STABILITY: HOUSING INSECURITY: IN THE LAST 12 MONTHS, HOW MANY PLACES HAVE YOU LIVED?: 1

## 2023-12-08 SDOH — ECONOMIC STABILITY: INCOME INSECURITY: HOW HARD IS IT FOR YOU TO PAY FOR THE VERY BASICS LIKE FOOD, HOUSING, MEDICAL CARE, AND HEATING?: SOMEWHAT HARD

## 2023-12-08 SDOH — ECONOMIC STABILITY: TRANSPORTATION INSECURITY
IN THE PAST 12 MONTHS, HAS LACK OF RELIABLE TRANSPORTATION KEPT YOU FROM MEDICAL APPOINTMENTS, MEETINGS, WORK OR FROM GETTING THINGS NEEDED FOR DAILY LIVING?: NO

## 2023-12-08 SDOH — HEALTH STABILITY: PHYSICAL HEALTH
ON AVERAGE, HOW MANY DAYS PER WEEK DO YOU ENGAGE IN MODERATE TO STRENUOUS EXERCISE (LIKE A BRISK WALK)?: PATIENT DECLINED

## 2023-12-08 SDOH — ECONOMIC STABILITY: TRANSPORTATION INSECURITY
IN THE PAST 12 MONTHS, HAS THE LACK OF TRANSPORTATION KEPT YOU FROM MEDICAL APPOINTMENTS OR FROM GETTING MEDICATIONS?: NO

## 2023-12-08 SDOH — HEALTH STABILITY: MENTAL HEALTH
STRESS IS WHEN SOMEONE FEELS TENSE, NERVOUS, ANXIOUS, OR CAN'T SLEEP AT NIGHT BECAUSE THEIR MIND IS TROUBLED. HOW STRESSED ARE YOU?: RATHER MUCH

## 2023-12-08 SDOH — HEALTH STABILITY: PHYSICAL HEALTH: ON AVERAGE, HOW MANY MINUTES DO YOU ENGAGE IN EXERCISE AT THIS LEVEL?: PATIENT DECLINED

## 2023-12-08 SDOH — ECONOMIC STABILITY: FOOD INSECURITY: WITHIN THE PAST 12 MONTHS, THE FOOD YOU BOUGHT JUST DIDN'T LAST AND YOU DIDN'T HAVE MONEY TO GET MORE.: SOMETIMES TRUE

## 2023-12-08 SDOH — ECONOMIC STABILITY: INCOME INSECURITY: IN THE LAST 12 MONTHS, WAS THERE A TIME WHEN YOU WERE NOT ABLE TO PAY THE MORTGAGE OR RENT ON TIME?: NO

## 2023-12-08 SDOH — ECONOMIC STABILITY: FOOD INSECURITY: WITHIN THE PAST 12 MONTHS, YOU WORRIED THAT YOUR FOOD WOULD RUN OUT BEFORE YOU GOT MONEY TO BUY MORE.: SOMETIMES TRUE

## 2023-12-08 SDOH — ECONOMIC STABILITY: TRANSPORTATION INSECURITY
IN THE PAST 12 MONTHS, HAS LACK OF TRANSPORTATION KEPT YOU FROM MEETINGS, WORK, OR FROM GETTING THINGS NEEDED FOR DAILY LIVING?: NO

## 2023-12-08 ASSESSMENT — SOCIAL DETERMINANTS OF HEALTH (SDOH)
HOW OFTEN DO YOU ATTENT MEETINGS OF THE CLUB OR ORGANIZATION YOU BELONG TO?: NEVER
HOW MANY DRINKS CONTAINING ALCOHOL DO YOU HAVE ON A TYPICAL DAY WHEN YOU ARE DRINKING: PATIENT DOES NOT DRINK
DO YOU BELONG TO ANY CLUBS OR ORGANIZATIONS SUCH AS CHURCH GROUPS UNIONS, FRATERNAL OR ATHLETIC GROUPS, OR SCHOOL GROUPS?: NO
HOW OFTEN DO YOU HAVE SIX OR MORE DRINKS ON ONE OCCASION: NEVER
HOW OFTEN DO YOU ATTEND CHURCH OR RELIGIOUS SERVICES?: PATIENT DECLINED
IN A TYPICAL WEEK, HOW MANY TIMES DO YOU TALK ON THE PHONE WITH FAMILY, FRIENDS, OR NEIGHBORS?: MORE THAN THREE TIMES A WEEK
IN A TYPICAL WEEK, HOW MANY TIMES DO YOU TALK ON THE PHONE WITH FAMILY, FRIENDS, OR NEIGHBORS?: MORE THAN THREE TIMES A WEEK
HOW OFTEN DO YOU ATTEND CHURCH OR RELIGIOUS SERVICES?: PATIENT DECLINED
HOW OFTEN DO YOU ATTENT MEETINGS OF THE CLUB OR ORGANIZATION YOU BELONG TO?: NEVER
HOW OFTEN DO YOU GET TOGETHER WITH FRIENDS OR RELATIVES?: ONCE A WEEK
HOW OFTEN DO YOU GET TOGETHER WITH FRIENDS OR RELATIVES?: ONCE A WEEK
WITHIN THE PAST 12 MONTHS, YOU WORRIED THAT YOUR FOOD WOULD RUN OUT BEFORE YOU GOT THE MONEY TO BUY MORE: SOMETIMES TRUE
HOW OFTEN DO YOU HAVE A DRINK CONTAINING ALCOHOL: NEVER
HOW HARD IS IT FOR YOU TO PAY FOR THE VERY BASICS LIKE FOOD, HOUSING, MEDICAL CARE, AND HEATING?: SOMEWHAT HARD
DO YOU BELONG TO ANY CLUBS OR ORGANIZATIONS SUCH AS CHURCH GROUPS UNIONS, FRATERNAL OR ATHLETIC GROUPS, OR SCHOOL GROUPS?: NO

## 2023-12-08 ASSESSMENT — LIFESTYLE VARIABLES
HOW OFTEN DO YOU HAVE SIX OR MORE DRINKS ON ONE OCCASION: NEVER
AUDIT-C TOTAL SCORE: 0
HOW MANY STANDARD DRINKS CONTAINING ALCOHOL DO YOU HAVE ON A TYPICAL DAY: PATIENT DOES NOT DRINK
HOW OFTEN DO YOU HAVE A DRINK CONTAINING ALCOHOL: NEVER
SKIP TO QUESTIONS 9-10: 1

## 2023-12-11 ENCOUNTER — OFFICE VISIT (OUTPATIENT)
Dept: MEDICAL GROUP | Facility: MEDICAL CENTER | Age: 58
End: 2023-12-11
Payer: MEDICARE

## 2023-12-11 VITALS
HEIGHT: 65 IN | BODY MASS INDEX: 42.88 KG/M2 | TEMPERATURE: 97.3 F | DIASTOLIC BLOOD PRESSURE: 74 MMHG | SYSTOLIC BLOOD PRESSURE: 126 MMHG | OXYGEN SATURATION: 96 % | WEIGHT: 257.4 LBS | HEART RATE: 95 BPM

## 2023-12-11 DIAGNOSIS — Z98.890 HX OF LUMBOSACRAL SPINE SURGERY: ICD-10-CM

## 2023-12-11 DIAGNOSIS — E66.01 MORBID OBESITY WITH BMI OF 40.0-44.9, ADULT (HCC): ICD-10-CM

## 2023-12-11 DIAGNOSIS — F33.42 MAJOR DEPRESSIVE DISORDER, RECURRENT EPISODE, IN FULL REMISSION (HCC): ICD-10-CM

## 2023-12-11 PROCEDURE — 3078F DIAST BP <80 MM HG: CPT

## 2023-12-11 PROCEDURE — 99214 OFFICE O/P EST MOD 30 MIN: CPT

## 2023-12-11 PROCEDURE — 3074F SYST BP LT 130 MM HG: CPT

## 2023-12-11 RX ORDER — AMLODIPINE BESYLATE 10 MG/1
10 TABLET ORAL DAILY
Qty: 100 TABLET | Refills: 3 | Status: SHIPPED | OUTPATIENT
Start: 2023-12-11

## 2023-12-11 RX ORDER — LEVOTHYROXINE SODIUM 0.03 MG/1
TABLET ORAL
COMMUNITY
End: 2023-12-11 | Stop reason: SDUPTHER

## 2023-12-11 RX ORDER — SUCRALFATE 1 G/1
1 TABLET ORAL 2 TIMES DAILY
Qty: 120 TABLET | Refills: 3 | Status: SHIPPED | OUTPATIENT
Start: 2023-12-11

## 2023-12-11 RX ORDER — LEVOTHYROXINE SODIUM 0.03 MG/1
TABLET ORAL
Qty: 100 TABLET | Refills: 3 | Status: SHIPPED | OUTPATIENT
Start: 2023-12-11

## 2023-12-11 RX ORDER — DULOXETIN HYDROCHLORIDE 20 MG/1
20 CAPSULE, DELAYED RELEASE ORAL DAILY
Qty: 100 CAPSULE | Refills: 3 | Status: SHIPPED | OUTPATIENT
Start: 2023-12-11 | End: 2024-02-26

## 2023-12-11 RX ORDER — CEPHALEXIN 500 MG/1
CAPSULE ORAL
COMMUNITY
End: 2023-12-11

## 2023-12-11 RX ORDER — ACETAMINOPHEN 500 MG
1000 TABLET ORAL EVERY 8 HOURS
Qty: 120 TABLET | Refills: 0 | Status: SHIPPED | OUTPATIENT
Start: 2023-12-11

## 2023-12-11 ASSESSMENT — FIBROSIS 4 INDEX: FIB4 SCORE: 0.77

## 2023-12-11 ASSESSMENT — ENCOUNTER SYMPTOMS
CHILLS: 0
ORTHOPNEA: 0
COUGH: 0
PALPITATIONS: 0
FEVER: 0
SHORTNESS OF BREATH: 0

## 2023-12-11 NOTE — PROGRESS NOTES
Subjective:     CC: Establish Care      HPI:   Carolynn is a 58 y.o. female who presents today for:     Depression: she states she has been on her bupropion for about a year now.  She states that it was helping in the beginning, but she is uncertain if it is still helping.  She has noticed increased stress with her daughters recent health concerns, as well as her own recent surgeries which she believes may be making her depression worse.    Back pain: Recently had surgery on 8/17 with Dr. Cherry.  She had an L2-3 oblique lumbar decompression with instrumented fusion.  She states that her pain has improved since surgery, but is still present..    Obesity: She states that her weight has plateaued.  She has been trying to eat less, and avoid sugary foods.  She has been using protein shakes.  She states that she does not like to eat fruits and vegetables.  She has only been able to tolerate light walking since her recent back surgery, and has not been able to start working out.    Allergies: Naprosyn [naproxen], Penicillins, Latex, Tape, Nsaids, and Cortisone     Medications:   Current Outpatient Medications:     DULoxetine (CYMBALTA) 20 MG Cap DR Particles, Take 1 Capsule by mouth every day., Disp: 100 Capsule, Rfl: 3    acetaminophen (TYLENOL) 500 MG Tab, Take 2 Tablets by mouth every 8 hours., Disp: 120 Tablet, Rfl: 0    amLODIPine (NORVASC) 10 MG Tab, Take 1 Tablet by mouth every day., Disp: 100 Tablet, Rfl: 3    levothyroxine (EUTHYROX) 25 MCG Tab, TAKE 1 TABLET BY MOUTH ONCE DAILY IN THE MORNING ON AN EMPTY STOMACH. OK TO CRUSH, Disp: 100 Tablet, Rfl: 3    sucralfate (CARAFATE) 1 GM Tab, Take 1 Tablet by mouth 2 times a day., Disp: 120 Tablet, Rfl: 3    losartan (COZAAR) 50 MG Tab, Take 1 Tablet by mouth every day., Disp: 100 Tablet, Rfl: 1    cyclobenzaprine (FLEXERIL) 10 mg Tab, Take 1 Tablet by mouth 3 times a day as needed for Muscle Spasms., Disp: 90 Tablet, Rfl: 3    buPROPion (WELLBUTRIN XL) 300 MG XL tablet,  "Take 1 Tablet by mouth every morning., Disp: 90 Tablet, Rfl: 3    gabapentin (NEURONTIN) 300 MG Cap, Take 900 mg by mouth 3 times a day., Disp: , Rfl:     multivitamin Tab, Take 1 Tablet by mouth every day., Disp: , Rfl:     tizanidine (ZANAFLEX) 4 MG Tab, TAKE 1 TABLET BY MOUTH AT BEDTIME AS NEEDED FOR MUSCLE SPASM, Disp: 90 Tablet, Rfl: 3      ROS:  Review of Systems   Constitutional:  Negative for chills and fever.   Respiratory:  Negative for cough and shortness of breath.    Cardiovascular:  Negative for chest pain, palpitations, orthopnea and leg swelling.       Objective:     Exam:  /74   Pulse 95   Temp 36.3 °C (97.3 °F) (Temporal)   Ht 1.651 m (5' 5\")   Wt 117 kg (257 lb 6.4 oz)   LMP  (LMP Unknown)   SpO2 96%   BMI 42.83 kg/m²  Body mass index is 42.83 kg/m².    Physical Exam  Constitutional:       Appearance: Normal appearance. She is obese.   Eyes:      Pupils: Pupils are equal, round, and reactive to light.   Cardiovascular:      Rate and Rhythm: Normal rate and regular rhythm.      Pulses: Normal pulses.      Heart sounds: Normal heart sounds.   Pulmonary:      Effort: Pulmonary effort is normal.      Breath sounds: Normal breath sounds.   Abdominal:      General: Bowel sounds are normal.      Palpations: Abdomen is soft.   Neurological:      Mental Status: She is alert and oriented to person, place, and time.   Psychiatric:         Mood and Affect: Mood normal.         Behavior: Behavior normal.           Assessment & Plan:     Carolynn shen 58 y.o. female with the following -     1. Major depressive disorder, recurrent episode, in full remission (HCC)  Chronic, unstable  We will start patient on Cymbalta to help with depression as well as pain management.  Will start low-dose due to potential interaction with Wellbutrin.  Patient educated regarding when to discontinue medication and follow-up.   - DULoxetine (CYMBALTA) 20 MG Cap DR Particles; Take 1 Capsule by mouth every day.  Dispense: 100 " Capsule; Refill: 3  - Continue buPROPion (WELLBUTRIN XL) 300 MG XL tablet, Take 1 Tablet by mouth every morning., Disp: 90 Tablet, Rfl: 3    2. Hx of lumbosacral spine surgery  Chronic, stable  Patient's pain has been under control since surgery.  She is taking medication as directed and has an appointment with her surgeon next week.  -Follow-up with surgeon    3. Morbid obesity with BMI of 40.0-44.9, adult (HCC)  Chronic, stable  We discussed dietary modifications to help with weight management including increasing fruits and vegetables in her diet as well as cutting back on processed, fatty, greasy, sugary foods.      Other orders  - levothyroxine (EUTHYROX) 25 MCG Tab; TAKE 1 TABLET BY MOUTH ONCE DAILY IN THE MORNING ON AN EMPTY STOMACH. OK TO CRUSH        Anticipatory guidance included the following: Patient counseled about skin care, diet, supplements, smoking, drugs/alcohol use, safe sex and exercise.     Return in about 6 weeks (around 1/22/2024).    Please note that this dictation was created using voice recognition software. I have made every reasonable attempt to correct obvious errors, but I expect that there are errors of grammar and possibly content that I did not discover before finalizing the note.

## 2024-01-05 ENCOUNTER — APPOINTMENT (OUTPATIENT)
Dept: MEDICAL GROUP | Facility: PHYSICIAN GROUP | Age: 59
End: 2024-01-05
Attending: FAMILY MEDICINE
Payer: MEDICARE

## 2024-01-22 ENCOUNTER — APPOINTMENT (OUTPATIENT)
Dept: MEDICAL GROUP | Facility: MEDICAL CENTER | Age: 59
End: 2024-01-22
Payer: MEDICARE

## 2024-02-21 DIAGNOSIS — M51.36 DDD (DEGENERATIVE DISC DISEASE), LUMBAR: ICD-10-CM

## 2024-02-21 RX ORDER — TIZANIDINE 4 MG/1
TABLET ORAL
Qty: 100 TABLET | Refills: 3 | Status: SHIPPED | OUTPATIENT
Start: 2024-02-21

## 2024-02-21 NOTE — TELEPHONE ENCOUNTER
Received request via: Pharmacy    Was the patient seen in the last year in this department? Yes    Does the patient have an active prescription (recently filled or refills available) for medication(s) requested? No    Pharmacy Name: [facility]    Does the patient have [facility] and need 100 day supply (blood pressure, diabetes and cholesterol meds only)? Medication is not for cholesterol, blood pressure or diabetes

## 2024-02-26 ENCOUNTER — OFFICE VISIT (OUTPATIENT)
Dept: MEDICAL GROUP | Facility: MEDICAL CENTER | Age: 59
End: 2024-02-26
Payer: MEDICARE

## 2024-02-26 VITALS
DIASTOLIC BLOOD PRESSURE: 74 MMHG | BODY MASS INDEX: 45.58 KG/M2 | WEIGHT: 267 LBS | HEART RATE: 95 BPM | OXYGEN SATURATION: 95 % | TEMPERATURE: 97.3 F | SYSTOLIC BLOOD PRESSURE: 118 MMHG | HEIGHT: 64 IN

## 2024-02-26 DIAGNOSIS — R73.03 PREDIABETES: ICD-10-CM

## 2024-02-26 DIAGNOSIS — R05.1 ACUTE COUGH: ICD-10-CM

## 2024-02-26 DIAGNOSIS — R52 BODY ACHES: ICD-10-CM

## 2024-02-26 DIAGNOSIS — Z98.84 S/P BARIATRIC SURGERY: ICD-10-CM

## 2024-02-26 DIAGNOSIS — E66.01 MORBID OBESITY WITH BMI OF 45.0-49.9, ADULT (HCC): ICD-10-CM

## 2024-02-26 DIAGNOSIS — F33.42 MAJOR DEPRESSIVE DISORDER, RECURRENT EPISODE, IN FULL REMISSION (HCC): ICD-10-CM

## 2024-02-26 DIAGNOSIS — F33.1 MAJOR DEPRESSIVE DISORDER, RECURRENT, MODERATE (HCC): ICD-10-CM

## 2024-02-26 DIAGNOSIS — U07.1 COVID-19: ICD-10-CM

## 2024-02-26 DIAGNOSIS — I10 ESSENTIAL HYPERTENSION: ICD-10-CM

## 2024-02-26 DIAGNOSIS — R50.9 FEVER, UNSPECIFIED FEVER CAUSE: ICD-10-CM

## 2024-02-26 DIAGNOSIS — I27.20 PULMONARY HTN (HCC): ICD-10-CM

## 2024-02-26 DIAGNOSIS — E55.9 VITAMIN D DEFICIENCY: ICD-10-CM

## 2024-02-26 LAB
FLUAV RNA SPEC QL NAA+PROBE: NEGATIVE
FLUBV RNA SPEC QL NAA+PROBE: NEGATIVE
RSV RNA SPEC QL NAA+PROBE: NEGATIVE
SARS-COV-2 RNA RESP QL NAA+PROBE: POSITIVE

## 2024-02-26 PROCEDURE — 3078F DIAST BP <80 MM HG: CPT

## 2024-02-26 PROCEDURE — 0241U POCT CEPHEID COV-2, FLU A/B, RSV - PCR: CPT

## 2024-02-26 PROCEDURE — 3074F SYST BP LT 130 MM HG: CPT

## 2024-02-26 PROCEDURE — 99214 OFFICE O/P EST MOD 30 MIN: CPT

## 2024-02-26 RX ORDER — BENZONATATE 100 MG/1
100 CAPSULE ORAL 3 TIMES DAILY PRN
Qty: 60 CAPSULE | Refills: 1 | Status: SHIPPED | OUTPATIENT
Start: 2024-02-26

## 2024-02-26 ASSESSMENT — FIBROSIS 4 INDEX: FIB4 SCORE: 0.78

## 2024-02-26 NOTE — PROGRESS NOTES
Chief Complaint   Patient presents with    Cough     Pt states she has had a cough for about 4 days, congestions and chest tightness, low grade fever, fatigue and body aches.        HPI: Patient is a 59 y.o. female complaining of 4 days of illness including: chills, barking, painful, and productive of clear sputum cough, shortness of breath, facial pain, fever, nasal congestion, rhinorrhea, sore throat, wheezing. She did take a home COVID-19 test which was negative.   Mucus is: thick.  Similarly ill exposures: yes.   Treatments tried: chlorceden  She  reports that she has never smoked. She has never used smokeless tobacco..     ROS:  No fever, cough, nausea, changes in bowel movements or skin rash.      I reviewed the patient's medications, allergies and medical history:  Current Outpatient Medications   Medication Sig Dispense Refill    benzonatate (TESSALON) 100 MG Cap Take 1 Capsule by mouth 3 times a day as needed for Cough. 60 Capsule 1    tizanidine (ZANAFLEX) 4 MG Tab TAKE 1 TABLET BY MOUTH AT BEDTIME AS NEEDED FOR MUSCLE SPASM 100 Tablet 3    acetaminophen (TYLENOL) 500 MG Tab Take 2 Tablets by mouth every 8 hours. 120 Tablet 0    amLODIPine (NORVASC) 10 MG Tab Take 1 Tablet by mouth every day. 100 Tablet 3    sucralfate (CARAFATE) 1 GM Tab Take 1 Tablet by mouth 2 times a day. 120 Tablet 3    losartan (COZAAR) 50 MG Tab Take 1 Tablet by mouth every day. 100 Tablet 1    cyclobenzaprine (FLEXERIL) 10 mg Tab Take 1 Tablet by mouth 3 times a day as needed for Muscle Spasms. 90 Tablet 3    multivitamin Tab Take 1 Tablet by mouth every day.      levothyroxine (EUTHYROX) 25 MCG Tab TAKE 1 TABLET BY MOUTH ONCE DAILY IN THE MORNING ON AN EMPTY STOMACH. OK TO CRUSH (Patient not taking: Reported on 2/26/2024) 100 Tablet 3    gabapentin (NEURONTIN) 300 MG Cap Take 900 mg by mouth 3 times a day.       No current facility-administered medications for this visit.     Naprosyn [naproxen], Penicillins, Latex, Tape, Nsaids,  "and Cortisone  Past Medical History:   Diagnosis Date    Allergy     Anesthesia     PONV    Anxiety     Arthritis     \"allover\"    ASTHMA     mild no inhalers    Bronchitis 2014    Constipation     constipation    Diabetes ([facility]) 08/31/2020    no meds currently since gastric bypass    Elevated glucose     per epic lab results    GERD (gastroesophageal reflux disease)     Gynecological disorder     Total hysterectomy    Heart burn     Heart murmur     took PhenPhen, no longer problem    Hiatus hernia syndrome     possible    Hypertension     pt states well controlled on meds    Indigestion     Left breast mass 08/03/2017    Major depressive disorder 02/27/2018    Migraine     Morbid obesity with BMI of 45.0-49.9, adult ([facility]) 03/17/2017    MSSA bacteremia 02/12/2021    Pain 08/31/2020    chronic lower back, knees, shoulders, 6/10    Pneumonia     PONV (postoperative nausea and vomiting)     Pregnant 1983/1990    Primary osteoarthritis involving multiple joints     Pulmonary emboli ([facility]) 2017    no longer on blood thinners    Sacroiliitis ([facility]) 08/02/2017    Stomach ulcer     Type 2 diabetes mellitus without complication ([facility]) 07/20/2018    Unspecified disorder of thyroid 2020    hypo/ not on any medications    Unspecified urinary incontinence     mild/ wears pads    Upper GI bleed         EXAM:  /74   Pulse 95   Temp 36.3 °C (97.3 °F) (Temporal)   Ht 1.626 m (5' 4\")   Wt 121 kg (267 lb)   SpO2 95%   General: Alert, no conversational dyspnea or audible wheeze, non-toxic appearance.  Eyes: PERRL, conjunctiva slightly injected, no eye discharge.  Ears: Normal pinnae,TM's normal bilaterally.  Nares: Patent with thin mucus.  Sinuses: non tender over maxillary / frontal sinuses.  Throat: Erythematous injection without exudate.   Neck: Supple, with no adenopathy.  Lungs: Clear to auscultation bilaterally, no wheeze, crackles or rhonchi.   Heart: Regular rate without murmur.  Skin: Warm and dry without rash.   "   ASSESSMENT:   1. COVID-19    2. Acute cough    3. Body aches    4. Fever, unspecified fever cause    5. Prediabetes    6. Vitamin D deficiency    7. Essential hypertension    8. S/P bariatric surgery    9. Morbid obesity with BMI of 45.0-49.9, adult (HCC)    10. Major depressive disorder, recurrent episode, in full remission (HCC)    11. Pulmonary HTN (HCC)    12. Major depressive disorder, recurrent, moderate (HCC)           HCC Gap Form    Diagnosis to address: I70.0 - Atherosclerosis of aorta (HCC)  Assessment and plan: Chronic, stable. Continue with current defined treatment plan: continue to monitor diet. Follow-up at least annually.  Diagnosis: F33.42 - Major depressive disorder, recurrent episode, in full remission (HCC)  Assessment and plan: Chronic, stable. Continue with current defined treatment plan: no longer taking Wellbutrin, or Cymbalta. She feels like she is doing well. Follow-up at least annually.  Diagnosis: I27.20 - Pulmonary HTN (HCC)  Assessment and plan: Chronic, stable. Continue with current defined treatment plan: no shortness of breath. Lung sounds clear bilaterally. Follow-up at least annually.  Diagnosis: F33.1 - Major depressive disorder, recurrent, moderate (HCC)  Assessment and plan: Chronic, stable. Continue with current defined treatment plan: no longer taking Wellbutrin, or Cymbalta. She feels like she is doing well. . Follow-up at least annually.  Last edited 02/26/24 15:51 PST by JOHN Coates.         PLAN:  1. Educated patient that majority of upper respiratory infections are viral and do not need antibiotics. Patient declined Paxlovid at this time.  2. Twice daily use of nasal saline rinse or Neti-Pot.  3. OTC anti-pyretics and decongestants as needed.  4. Follow-up in office or urgent care for worsening symptoms, difficulty breathing, lack of expected recovery, or should new symptoms or problems arise.

## 2024-03-06 ENCOUNTER — APPOINTMENT (OUTPATIENT)
Dept: FAMILY PLANNING/WOMEN'S HEALTH CLINIC | Facility: PHYSICIAN GROUP | Age: 59
End: 2024-03-06
Attending: FAMILY MEDICINE
Payer: MEDICARE

## 2024-03-22 DIAGNOSIS — G43.919 INTRACTABLE MIGRAINE WITHOUT STATUS MIGRAINOSUS, UNSPECIFIED MIGRAINE TYPE: ICD-10-CM

## 2024-03-22 RX ORDER — SUMATRIPTAN 50 MG/1
50 TABLET, FILM COATED ORAL
Qty: 10 TABLET | Refills: 3 | Status: SHIPPED | OUTPATIENT
Start: 2024-03-22

## 2024-04-12 ENCOUNTER — APPOINTMENT (OUTPATIENT)
Dept: FAMILY PLANNING/WOMEN'S HEALTH CLINIC | Facility: PHYSICIAN GROUP | Age: 59
End: 2024-04-12
Attending: FAMILY MEDICINE
Payer: MEDICARE

## 2024-04-18 ENCOUNTER — TELEPHONE (OUTPATIENT)
Dept: HEALTH INFORMATION MANAGEMENT | Facility: OTHER | Age: 59
End: 2024-04-18
Payer: MEDICARE

## 2024-04-19 DIAGNOSIS — K21.9 GASTROESOPHAGEAL REFLUX DISEASE WITHOUT ESOPHAGITIS: ICD-10-CM

## 2024-04-19 RX ORDER — SUCRALFATE 1 G/1
1 TABLET ORAL
Qty: 270 TABLET | Refills: 3 | Status: SHIPPED | OUTPATIENT
Start: 2024-04-19 | End: 2025-04-19

## 2024-04-19 RX ORDER — FAMOTIDINE 20 MG/1
20 TABLET, FILM COATED ORAL 2 TIMES DAILY
Qty: 60 TABLET | Refills: 6 | Status: SHIPPED | OUTPATIENT
Start: 2024-04-19

## 2024-05-20 DIAGNOSIS — I10 ESSENTIAL HYPERTENSION: ICD-10-CM

## 2024-05-21 ENCOUNTER — PATIENT MESSAGE (OUTPATIENT)
Dept: MEDICAL GROUP | Facility: MEDICAL CENTER | Age: 59
End: 2024-05-21
Payer: MEDICARE

## 2024-05-21 DIAGNOSIS — Z98.890 HX OF LUMBOSACRAL SPINE SURGERY: ICD-10-CM

## 2024-05-21 DIAGNOSIS — M15.9 PRIMARY OSTEOARTHRITIS INVOLVING MULTIPLE JOINTS: ICD-10-CM

## 2024-05-21 DIAGNOSIS — M43.10 DEGENERATIVE SPONDYLOLISTHESIS: ICD-10-CM

## 2024-05-21 NOTE — TELEPHONE ENCOUNTER
Received request via: Pharmacy    Was the patient seen in the last year in this department? Yes    Does the patient have an active prescription (recently filled or refills available) for medication(s) requested? No    Pharmacy Name:  [facility]     Does the patient have [facility] and need 100 day supply (blood pressure, diabetes and cholesterol meds only)? Yes, quantity updated to 100 days

## 2024-05-22 RX ORDER — LOSARTAN POTASSIUM 50 MG/1
50 TABLET ORAL DAILY
Qty: 100 TABLET | Refills: 1 | Status: SHIPPED | OUTPATIENT
Start: 2024-05-22

## 2024-05-23 RX ORDER — GABAPENTIN 300 MG/1
900 CAPSULE ORAL 3 TIMES DAILY
Qty: 810 CAPSULE | Refills: 3 | Status: SHIPPED | OUTPATIENT
Start: 2024-05-23 | End: 2025-05-23

## 2024-05-23 RX ORDER — CYCLOBENZAPRINE HCL 10 MG
10 TABLET ORAL 3 TIMES DAILY PRN
Qty: 90 TABLET | Refills: 3
Start: 2024-05-23

## 2024-06-11 ASSESSMENT — PATIENT HEALTH QUESTIONNAIRE - PHQ9
2. FEELING DOWN, DEPRESSED, IRRITABLE, OR HOPELESS: MORE THAN HALF THE DAYS
1. LITTLE INTEREST OR PLEASURE IN DOING THINGS: MORE THAN HALF THE DAYS

## 2024-06-11 ASSESSMENT — ACTIVITIES OF DAILY LIVING (ADL): BATHING_REQUIRES_ASSISTANCE: 1

## 2024-06-11 ASSESSMENT — ENCOUNTER SYMPTOMS: GENERAL WELL-BEING: FAIR

## 2024-06-14 ENCOUNTER — APPOINTMENT (OUTPATIENT)
Dept: FAMILY PLANNING/WOMEN'S HEALTH CLINIC | Facility: PHYSICIAN GROUP | Age: 59
End: 2024-06-14
Attending: FAMILY MEDICINE
Payer: MEDICARE

## 2024-06-14 ENCOUNTER — APPOINTMENT (OUTPATIENT)
Dept: LAB | Facility: MEDICAL CENTER | Age: 59
End: 2024-06-14
Payer: MEDICARE

## 2024-06-14 DIAGNOSIS — I10 ESSENTIAL HYPERTENSION: ICD-10-CM

## 2024-06-14 DIAGNOSIS — F33.42 MAJOR DEPRESSIVE DISORDER, RECURRENT EPISODE, IN FULL REMISSION (HCC): ICD-10-CM

## 2024-06-14 DIAGNOSIS — I70.0 ATHEROSCLEROSIS OF AORTA (HCC): ICD-10-CM

## 2024-06-14 DIAGNOSIS — I27.20 PULMONARY HTN (HCC): ICD-10-CM

## 2024-06-14 DIAGNOSIS — E66.01 MORBID OBESITY WITH BMI OF 45.0-49.9, ADULT (HCC): ICD-10-CM

## 2024-06-14 DIAGNOSIS — F33.1 MAJOR DEPRESSIVE DISORDER, RECURRENT, MODERATE (HCC): ICD-10-CM

## 2024-06-14 DIAGNOSIS — E66.01 MORBID OBESITY WITH BMI OF 40.0-44.9, ADULT (HCC): ICD-10-CM

## 2024-07-26 PROBLEM — F33.1 MAJOR DEPRESSIVE DISORDER, RECURRENT, MODERATE (HCC): Status: ACTIVE | Noted: 2018-02-27

## 2024-08-01 ENCOUNTER — APPOINTMENT (OUTPATIENT)
Dept: FAMILY PLANNING/WOMEN'S HEALTH CLINIC | Facility: PHYSICIAN GROUP | Age: 59
End: 2024-08-01
Attending: FAMILY MEDICINE
Payer: MEDICARE

## 2024-08-01 PROBLEM — I27.20 PULMONARY HTN (HCC): Status: RESOLVED | Noted: 2017-08-31 | Resolved: 2024-08-01

## 2024-08-01 NOTE — ASSESSMENT & PLAN NOTE
Chronic, Stable. BP in office today is 116/78. She does not check her BP at home. She currently takes losartan 50 mg daily and amlodipine 10 mg daily. Follow up with PCP for continued monitoring and management.

## 2024-08-01 NOTE — ASSESSMENT & PLAN NOTE
Chronic, stable. Currently taking ??? units of cholecalciferol daily. Followed by primary care provider.

## 2024-08-01 NOTE — ASSESSMENT & PLAN NOTE
Chronic, stable. Weight in office today is ___. BMI ____. We discussed her current diet/exercise regimen. Encouraged to remain physically active as well as monitor intake of excess calories. Follow up with PCP for continued monitoring.

## 2024-08-01 NOTE — ASSESSMENT & PLAN NOTE
Chronic, stable. Found on prior imaging. Patient not on any medical therapy for cholesterol. Lipids well controlled for the most part. Follow up with PCP for continued monitoring.    Lab Results   Component Value Date/Time    CHOLSTRLTOT 164 02/13/2023 03:46 PM    TRIGLYCERIDE 173 (H) 02/13/2023 03:46 PM    HDL 49 02/13/2023 03:46 PM    LDL 80 02/13/2023 03:46 PM

## 2024-08-01 NOTE — ASSESSMENT & PLAN NOTE
Chronic, stable. Last A1c in July 2023 was 6.1. She has a hx of gastric sleeve surgery and boris-en-y which helped bring her out of the diabetic category. We discussed her dietary/lifestyle regimen. Follow up with PCP for continued monitoring.  Lab Results   Component Value Date/Time    HBA1C 6.1 (H) 07/19/2023 1020    AVGLUC 128 07/19/2023 1020

## 2024-08-01 NOTE — ASSESSMENT & PLAN NOTE
Chronic, stable. PHQ-9 in office today is 17 with suicidal/harmful thoughts. She denies intent or plan. States she has too much that is important to be around for, such as seeing her grand kids. Currently taking Wellbutrin 300 mg daily. States she feels this works well for her. Offered therapy and/or psychiatry for medical management. She declines at this time. Follow up with PCP as needed.

## 2024-08-02 ENCOUNTER — APPOINTMENT (OUTPATIENT)
Dept: FAMILY PLANNING/WOMEN'S HEALTH CLINIC | Facility: PHYSICIAN GROUP | Age: 59
End: 2024-08-02
Attending: FAMILY MEDICINE
Payer: MEDICARE

## 2024-08-02 VITALS
WEIGHT: 262 LBS | DIASTOLIC BLOOD PRESSURE: 78 MMHG | HEIGHT: 65 IN | BODY MASS INDEX: 43.65 KG/M2 | SYSTOLIC BLOOD PRESSURE: 116 MMHG

## 2024-08-02 DIAGNOSIS — I27.20 PULMONARY HTN (HCC): ICD-10-CM

## 2024-08-02 DIAGNOSIS — R19.5 POSITIVE COLORECTAL CANCER SCREENING USING COLOGUARD TEST: ICD-10-CM

## 2024-08-02 DIAGNOSIS — E66.01 MORBID OBESITY WITH BMI OF 45.0-49.9, ADULT (HCC): ICD-10-CM

## 2024-08-02 DIAGNOSIS — R73.03 PREDIABETES: ICD-10-CM

## 2024-08-02 DIAGNOSIS — I70.0 ATHEROSCLEROSIS OF AORTA (HCC): ICD-10-CM

## 2024-08-02 DIAGNOSIS — F33.1 MAJOR DEPRESSIVE DISORDER, RECURRENT, MODERATE (HCC): ICD-10-CM

## 2024-08-02 DIAGNOSIS — Z98.84 S/P BARIATRIC SURGERY: ICD-10-CM

## 2024-08-02 DIAGNOSIS — M48.062 LUMBAR STENOSIS WITH NEUROGENIC CLAUDICATION: ICD-10-CM

## 2024-08-02 DIAGNOSIS — E66.01 MORBID OBESITY WITH BMI OF 40.0-44.9, ADULT (HCC): ICD-10-CM

## 2024-08-02 DIAGNOSIS — I10 ESSENTIAL HYPERTENSION: ICD-10-CM

## 2024-08-02 DIAGNOSIS — E55.9 VITAMIN D DEFICIENCY: ICD-10-CM

## 2024-08-02 PROCEDURE — 3074F SYST BP LT 130 MM HG: CPT

## 2024-08-02 PROCEDURE — G0439 PPPS, SUBSEQ VISIT: HCPCS

## 2024-08-02 PROCEDURE — 3078F DIAST BP <80 MM HG: CPT

## 2024-08-02 PROCEDURE — 1126F AMNT PAIN NOTED NONE PRSNT: CPT

## 2024-08-02 RX ORDER — BUPROPION HYDROCHLORIDE 300 MG/1
300 TABLET ORAL EVERY MORNING
COMMUNITY
Start: 2024-07-12

## 2024-08-02 SDOH — ECONOMIC STABILITY: INCOME INSECURITY: IN THE LAST 12 MONTHS, WAS THERE A TIME WHEN YOU WERE NOT ABLE TO PAY THE MORTGAGE OR RENT ON TIME?: NO

## 2024-08-02 SDOH — ECONOMIC STABILITY: INCOME INSECURITY: HOW HARD IS IT FOR YOU TO PAY FOR THE VERY BASICS LIKE FOOD, HOUSING, MEDICAL CARE, AND HEATING?: NOT HARD AT ALL

## 2024-08-02 SDOH — ECONOMIC STABILITY: FOOD INSECURITY: WITHIN THE PAST 12 MONTHS, YOU WORRIED THAT YOUR FOOD WOULD RUN OUT BEFORE YOU GOT MONEY TO BUY MORE.: NEVER TRUE

## 2024-08-02 SDOH — ECONOMIC STABILITY: FOOD INSECURITY: WITHIN THE PAST 12 MONTHS, THE FOOD YOU BOUGHT JUST DIDN'T LAST AND YOU DIDN'T HAVE MONEY TO GET MORE.: NEVER TRUE

## 2024-08-02 SDOH — ECONOMIC STABILITY: HOUSING INSECURITY: IN THE LAST 12 MONTHS, HOW MANY PLACES HAVE YOU LIVED?: 1

## 2024-08-02 ASSESSMENT — PATIENT HEALTH QUESTIONNAIRE - PHQ9
1. LITTLE INTEREST OR PLEASURE IN DOING THINGS: MORE THAN HALF THE DAYS
CLINICAL INTERPRETATION OF PHQ2 SCORE: 4
5. POOR APPETITE OR OVEREATING: 3 - NEARLY EVERY DAY
2. FEELING DOWN, DEPRESSED, IRRITABLE, OR HOPELESS: SEVERAL DAYS
SUM OF ALL RESPONSES TO PHQ QUESTIONS 1-9: 17

## 2024-08-02 ASSESSMENT — PAIN SCALES - GENERAL: PAINLEVEL: NO PAIN

## 2024-08-02 ASSESSMENT — ENCOUNTER SYMPTOMS: GENERAL WELL-BEING: POOR

## 2024-08-02 ASSESSMENT — ACTIVITIES OF DAILY LIVING (ADL): BATHING_REQUIRES_ASSISTANCE: 1

## 2024-08-02 ASSESSMENT — FIBROSIS 4 INDEX: FIB4 SCORE: 0.78

## 2024-08-02 NOTE — ASSESSMENT & PLAN NOTE
Chronic, stable. Weight in office today is 262 lbs. BMI 44.28 kg/m2. We discussed her current diet/exercise regimen. She does not exercise because she is limited to pain and overall body habitus. We discussed some ways she can increase overall activity in her home, taking breaks as needed. She has a hx of both gastric sleeve and boris-en-Y surgery. These ultimately were unsuccessful and caused her to be readmitted to the hospital for emergency surgery. Follow up with PCP for continued monitoring.

## 2024-08-02 NOTE — ASSESSMENT & PLAN NOTE
She had a positive cologuard in 2022. She is not interested in doing a confirmatory colonoscopy. She feels it was likely a false positive. Encourage repeating this test or considering colonoscopy in the future. Follow up woth PCP as scheduled.

## 2024-08-02 NOTE — ASSESSMENT & PLAN NOTE
Chronic, stable. She has had injections and surgery but still has pain. Manages with robaxin, flexeril, and gabapentin. She feels like it is overall well controlled with her current medication regimen. Not seeing pain management anymore. Follow up with PCP.

## 2024-08-14 DIAGNOSIS — K21.9 GASTROESOPHAGEAL REFLUX DISEASE WITHOUT ESOPHAGITIS: ICD-10-CM

## 2024-08-14 RX ORDER — SUCRALFATE 1 G/1
1 TABLET ORAL 2 TIMES DAILY
Qty: 180 TABLET | Refills: 3 | Status: SHIPPED | OUTPATIENT
Start: 2024-08-14 | End: 2025-08-14

## 2024-08-14 NOTE — TELEPHONE ENCOUNTER
Received request via: Pharmacy    Was the patient seen in the last year in this department? Yes    Does the patient have an active prescription (recently filled or refills available) for medication(s) requested? No    Pharmacy Name: [facility]    Does the patient have [facility] Plus and need 100-day supply? (This applies to ALL medications) Yes, quantity updated to 100 days

## 2024-08-16 ENCOUNTER — APPOINTMENT (OUTPATIENT)
Dept: MEDICAL GROUP | Facility: MEDICAL CENTER | Age: 59
End: 2024-08-16
Payer: MEDICARE

## 2024-09-30 ENCOUNTER — HOSPITAL ENCOUNTER (OUTPATIENT)
Dept: LAB | Facility: MEDICAL CENTER | Age: 59
End: 2024-09-30
Payer: MEDICARE

## 2024-09-30 DIAGNOSIS — R73.03 PREDIABETES: ICD-10-CM

## 2024-09-30 DIAGNOSIS — E66.01 MORBID OBESITY WITH BMI OF 45.0-49.9, ADULT (HCC): ICD-10-CM

## 2024-09-30 DIAGNOSIS — E55.9 VITAMIN D DEFICIENCY: ICD-10-CM

## 2024-09-30 DIAGNOSIS — Z98.84 S/P BARIATRIC SURGERY: ICD-10-CM

## 2024-09-30 LAB
25(OH)D3 SERPL-MCNC: 16 NG/ML (ref 30–100)
ALBUMIN SERPL BCP-MCNC: 4.2 G/DL (ref 3.2–4.9)
ALBUMIN/GLOB SERPL: 1.4 G/DL
ALP SERPL-CCNC: 105 U/L (ref 30–99)
ALT SERPL-CCNC: 23 U/L (ref 2–50)
ANION GAP SERPL CALC-SCNC: 14 MMOL/L (ref 7–16)
AST SERPL-CCNC: 22 U/L (ref 12–45)
BILIRUB SERPL-MCNC: 0.9 MG/DL (ref 0.1–1.5)
BUN SERPL-MCNC: 15 MG/DL (ref 8–22)
CALCIUM ALBUM COR SERPL-MCNC: 9.5 MG/DL (ref 8.5–10.5)
CALCIUM SERPL-MCNC: 9.7 MG/DL (ref 8.5–10.5)
CHLORIDE SERPL-SCNC: 103 MMOL/L (ref 96–112)
CHOLEST SERPL-MCNC: 146 MG/DL (ref 100–199)
CO2 SERPL-SCNC: 23 MMOL/L (ref 20–33)
CREAT SERPL-MCNC: 0.54 MG/DL (ref 0.5–1.4)
EST. AVERAGE GLUCOSE BLD GHB EST-MCNC: 123 MG/DL
FOLATE SERPL-MCNC: 6.7 NG/ML
GFR SERPLBLD CREATININE-BSD FMLA CKD-EPI: 104 ML/MIN/1.73 M 2
GLOBULIN SER CALC-MCNC: 2.9 G/DL (ref 1.9–3.5)
GLUCOSE SERPL-MCNC: 87 MG/DL (ref 65–99)
HBA1C MFR BLD: 5.9 % (ref 4–5.6)
HDLC SERPL-MCNC: 48 MG/DL
LDLC SERPL CALC-MCNC: 73 MG/DL
POTASSIUM SERPL-SCNC: 4.4 MMOL/L (ref 3.6–5.5)
PROT SERPL-MCNC: 7.1 G/DL (ref 6–8.2)
PTH-INTACT SERPL-MCNC: 44.9 PG/ML (ref 14–72)
SODIUM SERPL-SCNC: 140 MMOL/L (ref 135–145)
TRIGL SERPL-MCNC: 127 MG/DL (ref 0–149)
TSH SERPL DL<=0.005 MIU/L-ACNC: 2.39 UIU/ML (ref 0.38–5.33)
VIT B12 SERPL-MCNC: 278 PG/ML (ref 211–911)

## 2024-09-30 PROCEDURE — 83036 HEMOGLOBIN GLYCOSYLATED A1C: CPT

## 2024-09-30 PROCEDURE — 83970 ASSAY OF PARATHORMONE: CPT

## 2024-09-30 PROCEDURE — 82746 ASSAY OF FOLIC ACID SERUM: CPT

## 2024-09-30 PROCEDURE — 80053 COMPREHEN METABOLIC PANEL: CPT

## 2024-09-30 PROCEDURE — 84425 ASSAY OF VITAMIN B-1: CPT

## 2024-09-30 PROCEDURE — 36415 COLL VENOUS BLD VENIPUNCTURE: CPT

## 2024-09-30 PROCEDURE — 84443 ASSAY THYROID STIM HORMONE: CPT

## 2024-09-30 PROCEDURE — 82607 VITAMIN B-12: CPT

## 2024-09-30 PROCEDURE — 82306 VITAMIN D 25 HYDROXY: CPT

## 2024-09-30 PROCEDURE — 86341 ISLET CELL ANTIBODY: CPT

## 2024-09-30 PROCEDURE — 80061 LIPID PANEL: CPT

## 2024-09-30 PROCEDURE — 84630 ASSAY OF ZINC: CPT

## 2024-10-02 PROBLEM — M18.12 PRIMARY OSTEOARTHRITIS OF FIRST CARPOMETACARPAL JOINT OF LEFT HAND: Status: ACTIVE | Noted: 2024-10-02

## 2024-10-03 LAB
GAD65 AB SER IA-ACNC: <5 IU/ML (ref 0–5)
ZINC SERPL-MCNC: 68.8 UG/DL (ref 60–120)

## 2024-10-04 LAB — VIT B1 BLD-MCNC: 95 NMOL/L (ref 70–180)

## 2024-10-08 ENCOUNTER — OFFICE VISIT (OUTPATIENT)
Dept: MEDICAL GROUP | Facility: MEDICAL CENTER | Age: 59
End: 2024-10-08
Payer: MEDICARE

## 2024-10-08 VITALS
HEIGHT: 64 IN | HEART RATE: 94 BPM | BODY MASS INDEX: 44.35 KG/M2 | TEMPERATURE: 97.3 F | DIASTOLIC BLOOD PRESSURE: 66 MMHG | OXYGEN SATURATION: 92 % | WEIGHT: 259.8 LBS | SYSTOLIC BLOOD PRESSURE: 128 MMHG

## 2024-10-08 DIAGNOSIS — G43.919 INTRACTABLE MIGRAINE WITHOUT STATUS MIGRAINOSUS, UNSPECIFIED MIGRAINE TYPE: ICD-10-CM

## 2024-10-08 DIAGNOSIS — E66.01 MORBID OBESITY WITH BMI OF 40.0-44.9, ADULT (HCC): ICD-10-CM

## 2024-10-08 DIAGNOSIS — M15.0 PRIMARY OSTEOARTHRITIS INVOLVING MULTIPLE JOINTS: ICD-10-CM

## 2024-10-08 DIAGNOSIS — E55.9 VITAMIN D DEFICIENCY: ICD-10-CM

## 2024-10-08 DIAGNOSIS — Z98.890 HX OF LUMBOSACRAL SPINE SURGERY: ICD-10-CM

## 2024-10-08 DIAGNOSIS — M51.369 DEGENERATION OF INTERVERTEBRAL DISC OF LUMBAR REGION, UNSPECIFIED WHETHER PAIN PRESENT: ICD-10-CM

## 2024-10-08 DIAGNOSIS — F41.9 ANXIETY: ICD-10-CM

## 2024-10-08 DIAGNOSIS — I10 ESSENTIAL HYPERTENSION: ICD-10-CM

## 2024-10-08 DIAGNOSIS — Z23 NEED FOR VACCINATION: ICD-10-CM

## 2024-10-08 DIAGNOSIS — M43.10 DEGENERATIVE SPONDYLOLISTHESIS: ICD-10-CM

## 2024-10-08 DIAGNOSIS — M79.602 LEFT ARM PAIN: ICD-10-CM

## 2024-10-08 DIAGNOSIS — K21.9 GASTROESOPHAGEAL REFLUX DISEASE WITHOUT ESOPHAGITIS: ICD-10-CM

## 2024-10-08 DIAGNOSIS — R25.2 CRAMP IN LIMB: ICD-10-CM

## 2024-10-08 DIAGNOSIS — F33.1 MAJOR DEPRESSIVE DISORDER, RECURRENT, MODERATE (HCC): ICD-10-CM

## 2024-10-08 PROCEDURE — 3074F SYST BP LT 130 MM HG: CPT

## 2024-10-08 PROCEDURE — 99214 OFFICE O/P EST MOD 30 MIN: CPT | Mod: 25

## 2024-10-08 PROCEDURE — G0008 ADMIN INFLUENZA VIRUS VAC: HCPCS

## 2024-10-08 PROCEDURE — 3078F DIAST BP <80 MM HG: CPT

## 2024-10-08 PROCEDURE — 90656 IIV3 VACC NO PRSV 0.5 ML IM: CPT

## 2024-10-08 RX ORDER — BUPROPION HYDROCHLORIDE 150 MG/1
300 TABLET ORAL EVERY MORNING
Qty: 100 TABLET | Refills: 3 | Status: SHIPPED | OUTPATIENT
Start: 2024-10-08

## 2024-10-08 RX ORDER — SUMATRIPTAN 50 MG/1
50 TABLET, FILM COATED ORAL
Qty: 10 TABLET | Refills: 3 | Status: SHIPPED | OUTPATIENT
Start: 2024-10-08

## 2024-10-08 RX ORDER — ERGOCALCIFEROL 1.25 MG/1
50000 CAPSULE ORAL
Qty: 12 CAPSULE | Refills: 5 | Status: SHIPPED | OUTPATIENT
Start: 2024-10-08

## 2024-10-08 RX ORDER — CYCLOBENZAPRINE HCL 10 MG
10 TABLET ORAL 2 TIMES DAILY PRN
Qty: 180 TABLET | Refills: 6 | Status: SHIPPED | OUTPATIENT
Start: 2024-10-08 | End: 2025-10-08

## 2024-10-08 RX ORDER — AMLODIPINE BESYLATE 10 MG/1
10 TABLET ORAL DAILY
Qty: 100 TABLET | Refills: 3 | Status: SHIPPED | OUTPATIENT
Start: 2024-10-08

## 2024-10-08 RX ORDER — SUCRALFATE 1 G/1
1 TABLET ORAL 2 TIMES DAILY
Qty: 180 TABLET | Refills: 3 | Status: SHIPPED | OUTPATIENT
Start: 2024-10-08 | End: 2025-10-08

## 2024-10-08 RX ORDER — LOSARTAN POTASSIUM 50 MG/1
50 TABLET ORAL DAILY
Qty: 100 TABLET | Refills: 1 | Status: SHIPPED | OUTPATIENT
Start: 2024-10-08

## 2024-10-08 RX ORDER — FAMOTIDINE 20 MG/1
20 TABLET, FILM COATED ORAL 2 TIMES DAILY
Qty: 60 TABLET | Refills: 6 | Status: SHIPPED | OUTPATIENT
Start: 2024-10-08

## 2024-10-08 RX ORDER — ACETAMINOPHEN 500 MG
1000 TABLET ORAL EVERY 8 HOURS
Qty: 120 TABLET | Refills: 0 | Status: SHIPPED | OUTPATIENT
Start: 2024-10-08

## 2024-10-08 ASSESSMENT — FIBROSIS 4 INDEX: FIB4 SCORE: 1.02

## 2024-10-08 ASSESSMENT — ENCOUNTER SYMPTOMS
PALPITATIONS: 0
FEVER: 0
CHILLS: 0
SHORTNESS OF BREATH: 0
COUGH: 0
ORTHOPNEA: 0

## 2024-11-11 DIAGNOSIS — K21.9 GASTROESOPHAGEAL REFLUX DISEASE WITHOUT ESOPHAGITIS: ICD-10-CM

## 2024-11-11 RX ORDER — FAMOTIDINE 20 MG/1
20 TABLET, FILM COATED ORAL 2 TIMES DAILY
Qty: 60 TABLET | Refills: 6 | Status: SHIPPED | OUTPATIENT
Start: 2024-11-11 | End: 2024-11-11 | Stop reason: SDUPTHER

## 2024-11-11 RX ORDER — FAMOTIDINE 20 MG/1
20 TABLET, FILM COATED ORAL 2 TIMES DAILY
Qty: 200 TABLET | Refills: 3 | Status: SHIPPED | OUTPATIENT
Start: 2024-11-11 | End: 2025-11-11

## 2024-11-11 NOTE — TELEPHONE ENCOUNTER
Received request via: Pharmacy    Was the patient seen in the last year in this department? Yes    Does the patient have an active prescription (recently filled or refills available) for medication(s) requested? No    Pharmacy Name: [facility]    Does the patient have [facility] and need 100-day supply? (This applies to ALL medications) Yes, quantity updated to 100 days

## 2025-01-24 NOTE — PROGRESS NOTES
Pt down to [facility], report called to [facility] mejia RN. Home infusion teaching completed.     Discharging Patient home per physician order.  Discharged with home infusion box, Meds to beds to be delivered to discharge lounge and belongings.  Demonstrated understanding of discharge instructions, follow up appointments, home medications, prescriptions, home care for surgical wound, and nursing care instructions for home infusions.  Ambulating without assistance, voiding without difficulty, pain well controlled, tolerating oral medications, oxygen saturation greater than 90% , tolerating diet.   Educational handouts given and discussed.  Verbalized understanding of discharge instructions and educational handouts.  All questions answered.  Belongings with patient at time of discharge.   Topic: Abnormal perimenopausal bleeding    Will schedule patient for baseline pelvic ultrasound for complete evaluation of the pelvis including endometrial stripe    Will also schedule for yearly examination to obtain Pap smear    All questions answered for patient at today's visit    She was told to call for any problems, questions, issues or concerns which may arise for her

## 2025-02-19 ENCOUNTER — APPOINTMENT (OUTPATIENT)
Dept: MEDICAL GROUP | Facility: MEDICAL CENTER | Age: 60
End: 2025-02-19
Payer: MEDICARE

## 2025-03-05 ENCOUNTER — APPOINTMENT (OUTPATIENT)
Dept: MEDICAL GROUP | Facility: MEDICAL CENTER | Age: 60
End: 2025-03-05
Payer: MEDICARE

## 2025-03-24 ENCOUNTER — APPOINTMENT (OUTPATIENT)
Dept: MEDICAL GROUP | Facility: MEDICAL CENTER | Age: 60
End: 2025-03-24
Payer: MEDICARE

## 2025-04-22 ENCOUNTER — APPOINTMENT (OUTPATIENT)
Dept: MEDICAL GROUP | Facility: MEDICAL CENTER | Age: 60
End: 2025-04-22
Payer: MEDICARE

## 2025-04-22 ENCOUNTER — TELEPHONE (OUTPATIENT)
Dept: HEALTH INFORMATION MANAGEMENT | Facility: OTHER | Age: 60
End: 2025-04-22

## 2025-04-30 NOTE — PROGRESS NOTES
Comprehensive Health Assessment Program     Carolynn Casey is a 59 y.o. here for her comprehensive health assessment.    Patient Active Problem List    Diagnosis Date Noted    Atherosclerosis of aorta ([facility]) 12/05/2023    Spinal stenosis, lumbar region, with neurogenic claudication 08/17/2023    Morbid obesity with BMI of 40.0-44.9, adult ([facility]) 05/25/2023    Lumbar stenosis with neurogenic claudication 05/11/2023    Positive colorectal cancer screening using Cologuard test 02/16/2022    Essential hypertension 09/08/2020    Hx of lumbosacral spine surgery 09/08/2020    Prediabetes 01/06/2020    S/P bariatric surgery 05/20/2019    Major depressive disorder, recurrent, moderate ([facility]) 02/27/2018    Vitamin D deficiency 02/05/2018    Primary osteoarthritis involving multiple joints 08/03/2017       Current Outpatient Medications   Medication Sig Dispense Refill    buPROPion (WELLBUTRIN XL) 300 MG XL tablet Take 300 mg by mouth every morning.      gabapentin (NEURONTIN) 300 MG Cap TAKE THREE CAPSULES BY MOUTH THREE TIMES A  Capsule 2    cyclobenzaprine (FLEXERIL) 10 mg Tab TAKE 1 TABLET BY MOUTH TWICE A DAY AS NEEDED FOR MUSCLES SPASMS FOR UP TO 30 DAYS 60 Tablet 2    losartan (COZAAR) 50 MG Tab TAKE 1 TABLET BY MOUTH DAILY 100 Tablet 1    sucralfate (CARAFATE) 1 GM Tab Take 1 Tablet by mouth 3 times a day before meals. 270 Tablet 3    famotidine (PEPCID) 20 MG Tab Take 1 Tablet by mouth 2 times a day. 60 Tablet 6    SUMAtriptan (IMITREX) 50 MG Tab Take 1 Tablet by mouth one time as needed for Migraine for up to 1 dose. 10 Tablet 3    tizanidine (ZANAFLEX) 4 MG Tab TAKE 1 TABLET BY MOUTH AT BEDTIME AS NEEDED FOR MUSCLE SPASM 100 Tablet 3    acetaminophen (TYLENOL) 500 MG Tab Take 2 Tablets by mouth every 8 hours. 120 Tablet 0    amLODIPine (NORVASC) 10 MG Tab Take 1 Tablet by mouth every day. 100 Tablet 3    multivitamin Tab Take 1 Tablet by mouth every day.       No current facility-administered medications    Caller: Geeta Blair    Relationship: Self    Best call back number: [phone]    What is the best time to reach you: ANY    Who are you requesting to speak with (clinical staff, provider,  specific staff member): ANY    What was the call regarding: CALLING TO SEE WHAT TIME HER PROCEDURE IS ON 5-6-25 SHE HAS NOT RECEIVED A CALL YET.  ALSO SHE NEEDS TO KNOW WHAT TO PREPARE FOR.       for this visit.          Current supplements as per medication list.     Allergies:   Naprosyn [naproxen], Penicillins, Latex, Tape, Nsaids, and Cortisone  Social History     Tobacco Use    Smoking status: Never    Smokeless tobacco: Never    Tobacco comments:     continue to avoid   Vaping Use    Vaping status: Never Used   Substance Use Topics    Alcohol use: Not Currently     Alcohol/week: 0.0 oz    Drug use: Never     Family History   Problem Relation Age of Onset    Hypertension Mother     Cancer Mother 81        Breast cancer    Heart Disease Father     Heart Attack Father     Hypertension Father     Lung Disease Sister         asthma    Hypertension Sister     Arthritis Sister         knee    Arthritis Brother     Hypertension Brother     Diabetes Brother     Heart Disease Maternal Aunt     Hypertension Maternal Aunt     Cancer Maternal Aunt 80         from breast cancer    Stroke Maternal Uncle     Heart Disease Maternal Uncle     Hypertension Maternal Uncle     Heart Disease Paternal Grandmother     Psychiatric Illness Paternal Grandfather         Suicide    Alcohol/Drug Paternal Grandfather     Arthritis Maternal Grandmother     Arthritis Maternal Grandfather     Arthritis Brother     Hyperlipidemia Neg Hx      Carolynn  has a past medical history of Allergy, Anesthesia, Anxiety, Arthritis, ASTHMA, Bronchitis (), Constipation, Diabetes ([facility]) (2020), Elevated glucose, GERD (gastroesophageal reflux disease), Gynecological disorder, Heart burn, Heart murmur, Hiatus hernia syndrome, Hypertension, Indigestion, Left breast mass (2017), Major depressive disorder (2018), Migraine, Morbid obesity with BMI of 45.0-49.9, adult ([facility]) (2017), MSSA bacteremia (2021), Pain (2020), Pneumonia, PONV (postoperative nausea and vomiting), Pregnant (), Primary osteoarthritis involving multiple joints, Pulmonary emboli ([facility]) (), Pulmonary HTN ([facility]) (2017), Sacroiliitis  (HCC) (08/02/2017), Stomach ulcer, Type 2 diabetes mellitus without complication (HCC) (07/20/2018), Unspecified disorder of thyroid (2020), Unspecified urinary incontinence, and Upper GI bleed.    She has no past medical history of Acute nasopharyngitis, Anginal syndrome (HCC), Arrhythmia, Bowel habit changes, Breath shortness, Cancer (HCC), Carcinoma in situ of respiratory system, Cataract, Congestive heart failure (HCC), Continuous ambulatory peritoneal dialysis status (HCC), Coughing blood, Dental disorder, Dialysis patient (HCC), Emphysema of lung (HCC), Glaucoma, Heart valve disease, Hemorrhagic disorder (HCC), Hepatitis A, Hepatitis B, Hepatitis C, High cholesterol, Infectious disease, Jaundice, Myocardial infarct (HCC), Pacemaker, Renal disorder, Rheumatic fever, Seizure (HCC), Sleep apnea, Snoring, Stroke (HCC), Tuberculosis, or Urinary bladder disorder.   Past Surgical History:   Procedure Laterality Date    GENERAL SURGERY Left 8/17/2023    Procedure: left L2-3 oblique lumbar decompression and instrumented fusion.;  Surgeon: Brigette Cherry M.D.;  Location: [facility];  Service: Orthopedics    NV LAP,DIAGNOSTIC ABDOMEN N/A 02/11/2021    Procedure: LAPAROSCOPY - DIAGNOSTIC;  Surgeon: Gadiel Cox M.D.;  Location: [facility];  Service: General    GINGER BY LAPAROSCOPY  01/13/2021    Procedure: CHOLECYSTECTOMY, LAPAROSCOPIC;  Surgeon: Gadiel Cox M.D.;  Location: [facility];  Service: General    GASTROENTEROSTOMY, LAPAROSCOPIC N/A 10/02/2020    Procedure: GASTROENTEROSTOMY, LAPAROSCOPIC;  Surgeon: Gadiel Cox M.D.;  Location: [facility];  Service: General    GASTROSCOPY  09/02/2020    Procedure: GASTROSCOPY-WITH WATS BIOPSY;  Surgeon: Gadiel Cox M.D.;  Location: [facility];  Service: General    SPINAL CORD STIMULATOR  07/2020    NV LAP, SELENE RESTRICT PROC, LONGITUDINAL GAS*  05/15/2019    Procedure: GASTRECTOMY, SLEEVE, LAPAROSCOPIC;  Surgeon: Gadiel CHOI  RODOLFO Cox;  Location: SURGERY [facility];  Service: General    SPINAL CORD STIMULATOR  08/16/2018    Procedure: SPINAL CORD STIMULATOR- PERMANENT;  Surgeon: Dominique Saha M.D.;  Location: SURGERY [facility];  Service: Pain Management    OTHER ABDOMINAL SURGERY  2018    gastric sleeve    S I JOINT FUSION Right 08/02/2017    Procedure: S I JOINT FUSION;  Surgeon: Alfredo Belcher M.D.;  Location: SURGERY [facility];  Service:     FUSION, SPINE, XLIF  02/17/2016    Procedure: EXTREME LATERAL INTERBODY FUSION far lateral interbody  L3-4 ;  Surgeon: Alfredo Belcher M.D.;  Location: SURGERY [facility];  Service:     LUMBAR DECOMPRESSION  02/17/2016    Procedure: LUMBAR DECOMPRESSION L3-4;  Surgeon: Alfredo Belcher M.D.;  Location: SURGERY [facility];  Service:     KNEE REVISION TOTAL Right 10/08/2015    Procedure: KNEE REVISION TOTAL KNEE ARTHROPLASTY;  Surgeon: Ihsan Hawthorne M.D.;  Location: SURGERY [facility];  Service:     LUMBAR FUSION ANTERIOR  2013    LAMINOTOMY  2006    Dr. [facility]    KNEE ARTHROPLASTY TOTAL Left 2003    KNEE ARTHROPLASTY TOTAL Right 2003    SHOULDER ARTHROSCOPY W/ ROTATOR CUFF REPAIR  2001    LEFT    SHOULDER ARTHROSCOPY Right 2001    APPENDECTOMY  1998    1997    ABDOMINAL HYSTERECTOMY TOTAL  1995    CARPAL TUNNEL RELEASE Right 1983    OPEN REDUCTION  2001, 1990    CLAVICLE BILAT    PRIMARY C SECTION  1983, 1990    x 2       Screening:  In the last six months have you experienced any leakage of urine? Yes, when she sneezes and coughs. Does wear a pad. Stress incontinence    Depression Screening  Little interest or pleasure in doing things?  2 - more than half the days  Feeling down, depressed , or hopeless? 2 - more than half the days  Trouble falling or staying asleep, or sleeping too much?  3 - nearly every day  Feeling tired or having little energy?  1 - several days  Poor appetite or overeating?  3 - nearly every day  Feeling bad about  yourself - or that you are a failure or have let yourself or your family down? 3 - nearly every day  Trouble concentrating on things, such as reading the newspaper or watching television? 2 - more than half the days  Moving or speaking so slowly that other people could have noticed.  Or the opposite - being so fidgety or restless that you have been moving around a lot more than usual?  0 - not at all  Thoughts that you would be better off dead, or of hurting yourself?  1 - several days  Patient Health Questionnaire Score: 17    If depressive symptoms identified deferred to follow up visit unless specifically addressed in assessment and plan.    Interpretation of PHQ-9 Total Score   Score Severity   1-4 No Depression   5-9 Mild Depression   10-14 Moderate Depression   15-19 Moderately Severe Depression   20-27 Severe Depression    Screening for Cognitive Impairment  Do you or any of your friends or family members have any concern about your memory? Yes normal for age   Three Minute Recall (Leader, Season, Table) 3/3    Paramjit clock face with all 12 numbers and set the hands to show 10 minutes after 11.  Yes    Cognitive concerns identified deferred for follow up unless specifically addressed in assessment and plan.    Fall Risk Assessment  Has the patient had two or more falls in the last year or any fall with injury in the last year?  No    Safety Assessment  Do you always wear your seatbelt?  Yes  Any changes to home needed to function safely? Yes  Difficulty hearing.  Yes does not wear hearing aids. Can hear well most of the time.  Patient counseled about all safety risks that were identified.    Functional Assessment ADLs  Are there any barriers preventing you from cooking for yourself or meeting nutritional needs?  No.    Are there any barriers preventing you from driving safely or obtaining transportation?  No.    Are there any barriers preventing you from using a telephone or calling for help?  No    Are there  any barriers preventing you from shopping?  No.    Are there any barriers preventing you from taking care of your own finances?  No    Are there any barriers preventing you from managing your medications?  No    Are there any barriers preventing you from showering, bathing or dressing yourself? Yes Shower very small   Are there any barriers preventing you from doing housework or laundry? No    Are there any barriers preventing you from using the toilet?No    Are you currently engaging in any exercise or physical activity?  No.   Limited by pain    Self-Assessment of Health  What is your perception of your health? poor    Do you sleep more than six hours a night? No    In the past 7 days, how much did pain keep you from doing your normal work? Some    Do you spend quality time with family or friends (virtually or in person)? Yes    Do you usually eat a heart healthy diet that constists of a variety of fruits, vegetables, whole grains and fiber? No    Do you eat foods high in fat and/or Fast Food more than three times per week? No    How concerned are you that your medical conditions are not being well managed? a little    Are you worried that in the next 2 months, you may not have stable housing that you own, rent, or stay in as part of a household? No        Advance Care Planning  Do you have an Advance Directive, Living Will, Durable Power of , or POLST? No                 Health Maintenance Summary            Overdue - COVID-19 Vaccine (6 - 2023-24 season) Overdue since 9/1/2023 01/21/2022  Imm Admin: PFIZER PURPLE CAP SARS-COV-2 VACCINATION (12+)    04/23/2021  Imm Admin: PFIZER PURPLE CAP SARS-COV-2 VACCINATION (12+)    04/23/2021  Imm Admin: PFIZER PURPLE CAP SARS-COV-2 VACCINATION (12+)    03/31/2021  Imm Admin: PFIZER PURPLE CAP SARS-COV-2 VACCINATION (12+)    03/31/2021  Imm Admin: PFIZER PURPLE CAP SARS-COV-2 VACCINATION (12+)    Only the first 5 history entries have been loaded, but more  history exists.              Overdue - Zoster (Shingles) Vaccines (2 of 2) Overdue since 11/21/2023 09/26/2023  Imm Admin: Zoster Vaccine Recombinant (RZV) (SHINGRIX)              Influenza Vaccine (1) Next due on 9/1/2024 09/26/2023  Imm Admin: Influenza Vaccine Quad Inj (Pf)    10/12/2022  Imm Admin: Influenza Vaccine Quad Recombinant    10/05/2021  Imm Admin: Influenza Vaccine Quad Recombinant    09/18/2020  Imm Admin: Influenza Vaccine Quad Recombinant    12/12/2019  Imm Admin: Influenza Vaccine Quad Inj (Pf)    Only the first 5 history entries have been loaded, but more history exists.              Colorectal Cancer Screening (Colon Cancer Screening Cologuard Stool (FIT DNA) - Every 3 Years) Tentatively due on 2/7/2025 02/07/2022  COLOGUARD COLON CANCER SCREENING              Annual Wellness Visit (Yearly) Next due on 8/2/2025 08/02/2024  Level of Service: [place] ANNUAL WELLNESS VISIT-INCLUDES PPPS SUBSEQUE*    05/23/2023  Level of Service: [place] ANNUAL WELLNESS VISIT-INCLUDES PPPS SUBSEQUE*    11/23/2022  Level of Service: TN ANNUAL WELLNESS VISIT-INCLUDES PPPS SUBSEQUE*    04/28/2022  Level of Service: TN ANNUAL WELLNESS VISIT-INCLUDES PPPS SUBSEQUE*    07/20/2021  Level of Service: TN ANNUAL WELLNESS VISIT-INCLUDES PPPS SUBSEQUE*    Only the first 5 history entries have been loaded, but more history exists.              Mammogram (Every 2 Years) Next due on 11/16/2025 11/16/2023  MA-DIAGNOSTIC MAMMO BILAT W/TOMOSYNTHESIS W/CAD    09/15/2022  MA-DIAGNOSTIC MAMMO BILAT W/TOMOSYNTHESIS W/CAD    09/13/2021  MA-DIAGNOSTIC MAMMO BILAT W/TOMOSYNTHESIS W/CAD    10/18/2019  MA-MAMMO DIAGNOSTIC BILAT W/RICARDO W/CAD    04/01/2019  MA-MAMMO DIAGNOSTIC BILAT W/RICARDO W/CAD    Only the first 5 history entries have been loaded, but more history exists.              IMM DTaP/Tdap/Td Vaccine (3 - Td or Tdap) Next due on 9/30/2026 09/30/2016  Imm Admin: Tdap Vaccine    04/24/2014  Imm Admin: Tdap Vaccine               Hepatitis B Vaccine (Hep B) (Series Information) Completed      03/15/2010  Imm Admin: Hepatitis B Vaccine (Adol/Adult)    2009  Imm Admin: Hepatitis B Vaccine (Adol/Adult)    2009  Imm Admin: Hepatitis B Vaccine (Adol/Adult)              Hepatitis C Screening  Completed      08/10/2020  Hepatitis C Antibody component of HCV Scrn ( [id number] 1xLife)    08/10/2020  Done              Pneumococcal Vaccine: 0-64 Years (Series Information) Aged Out      08/10/2020  Imm Admin: Pneumococcal Conjugate Vaccine (Prevnar/PCV-13)    2018  Imm Admin: Pneumococcal polysaccharide vaccine (PPSV-23)              Hepatitis A Vaccine (Hep A) (Series Information) Aged Out      No completion history exists for this topic.              HPV Vaccines (Series Information) Aged Out      No completion history exists for this topic.              Polio Vaccine (Inactivated Polio) (Series Information) Aged Out      No completion history exists for this topic.              Meningococcal Immunization (Series Information) Aged Out      No completion history exists for this topic.              Discontinued - A1c Screening  Ordered on 2024        Frequency changed to Never automatically (Topic No Longer Applies)    2023  A1c    2023  HEMOGLOBIN A1C    2021  HEMOGLOBIN A1C    2021  HEMOGLOBIN A1C    Only the first 5 history entries have been loaded, but more history exists.              Discontinued - Fasting Lipid Profile  Ordered on 2024        Frequency changed to Never automatically (Topic No Longer Applies)    2023  Lipid Profile    2023  Lipid Profile    2021  Lipid Profile    2021  Lipid Profile    Only the first 5 history entries have been loaded, but more history exists.              Discontinued - Diabetes: Urine Protein Screening  Discontinued        Frequency changed to Never automatically (Topic No Longer Applies)    2020  MICROALBUMIN CREAT  "RATIO URINE    03/16/2015  URINE PROTEIN RANDOM    03/16/2015  MICROALBUMIN CREAT RATIO URINE    02/16/2012  MICROALBUMIN CREAT RATIO URINE    Only the first 5 history entries have been loaded, but more history exists.              Discontinued - SERUM CREATININE  Ordered on 2/26/2024        Frequency changed to Never automatically (Topic No Longer Applies)    08/18/2023  Basic Metabolic Panel (BMP)    07/19/2023  Basic Metabolic Panel    05/25/2023  Basic Metabolic Panel    02/13/2023  Comp Metabolic Panel    Only the first 5 history entries have been loaded, but more history exists.                    Patient Care Team:  MINGO Coates as PCP - General (Nurse Practitioner Family)  Kandace Dent M.D. as PCP - [facility] Paneled  Getachew Hansen M.D. (Surgery)  Gadiel Cox M.D. as Consulting Physician (Surgery)  Kimberly Meier C.N.A. (Inactive) as [facility]   [facility] as Home Health Provider  Nigel Oquendo P.A.-C. as Attending Team Physician (Geriatrics)    Financial Resource Strain: Low Risk  (8/2/2024)    Overall Financial Resource Strain (CARDIA)     Difficulty of Paying Living Expenses: Not hard at all      Transportation Needs: No Transportation Needs (8/2/2024)    PRAPARE - Transportation     Lack of Transportation (Medical): No     Lack of Transportation (Non-Medical): No      Food Insecurity: No Food Insecurity (8/2/2024)    Hunger Vital Sign     Worried About Running Out of Food in the Last Year: Never true     Ran Out of Food in the Last Year: Never true        Encounter Vitals  Blood Pressure: 116/78  Weight: 119 kg (262 lb)  Height: 163.8 cm (5' 4.5\")  BMI (Calculated): 44.28  Pain Score: No pain     Alert, oriented in no acute distress.  Eye contact is good, speech goal directed, affect calm.    Assessment and Plan. The following treatment and monitoring plan is recommended:  Atherosclerosis of aorta (HCC)  Chronic, stable. Found on prior " imaging. Patient not on any medical therapy for cholesterol. Lipids well controlled for the most part. Follow up with PCP for continued monitoring.    Lab Results   Component Value Date/Time    CHOLSTRLTOT 164 02/13/2023 03:46 PM    TRIGLYCERIDE 173 (H) 02/13/2023 03:46 PM    HDL 49 02/13/2023 03:46 PM    LDL 80 02/13/2023 03:46 PM       Essential hypertension  Chronic, Stable. BP in office today is 116/78. She does not check her BP at home. She currently takes losartan 50 mg daily and amlodipine 10 mg daily. Follow up with PCP for continued monitoring and management.    Major depressive disorder, recurrent, moderate (HCC)  Chronic, stable. PHQ-9 in office today is 17 with suicidal/harmful thoughts. She denies intent or plan. States she has too much that is important to be around for, such as seeing her grand kids. Currently taking Wellbutrin 300 mg daily. States she feels this works well for her. Offered therapy and/or psychiatry for medical management. She declines at this time. Follow up with PCP as needed.    Prediabetes  Chronic, stable. Last A1c in July 2023 was 6.1. She has a hx of gastric sleeve surgery and boris-en-y which helped bring her out of the diabetic category. We discussed her dietary/lifestyle regimen. Follow up with PCP for continued monitoring.  Lab Results   Component Value Date/Time    HBA1C 6.1 (H) 07/19/2023 1020    AVGLUC 128 07/19/2023 1020     Lumbar stenosis with neurogenic claudication  Chronic, stable. She has had injections and surgery but still has pain. Manages with robaxin, flexeril, and gabapentin. She feels like it is overall well controlled with her current medication regimen. Not seeing pain management anymore. Follow up with PCP.     S/P bariatric surgery  Morbid obesity with BMI of 40.0-44.9, adult (HCC)  Chronic, stable. Weight in office today is 262 lbs. BMI 44.28 kg/m2. We discussed her current diet/exercise regimen. She does not exercise because she is limited to pain and  overall body habitus. We discussed some ways she can increase overall activity in her home, taking breaks as needed. She has a hx of both gastric sleeve and boris-en-Y surgery. These ultimately were unsuccessful and caused her to be readmitted to the hospital for emergency surgery. Follow up with PCP for continued monitoring.    Positive colorectal cancer screening using Cologuard test  She had a positive cologuard in 2022. She is not interested in doing a confirmatory colonoscopy. She feels it was likely a false positive. Encourage repeating this test or considering colonoscopy in the future. Follow up woth PCP as scheduled.       Services suggested: No services needed at this time  Health Care Screening: Age-appropriate preventive services recommended by USPTF and ACIP covered by Medicare were discussed today. Services ordered if indicated and agreed upon by the patient.  Referrals offered: Community-based lifestyle interventions to reduce health risks and promote self-management and wellness, fall prevention, nutrition, physical activity, tobacco-use cessation, weight loss, and mental health services as per orders if indicated.    Discussion today about general wellness and lifestyle habits:    Prevent falls and reduce trip hazards; Cautioned about securing or removing rugs.  Have a working fire alarm and carbon monoxide detector.  Engage in regular physical activity and social activities.    Follow-up: Return for appointment with Primary Care Provider as needed..

## 2025-05-01 PROBLEM — G43.919 INTRACTABLE MIGRAINE, UNSPECIFIED MIGRAINE TYPE: Status: ACTIVE | Noted: 2025-05-01

## 2025-05-07 NOTE — ASSESSMENT & PLAN NOTE
Chronic, ongoing. PHQ 9/27, denying SI. Pt d/c bupropion 300mg daily a couple months ago due to s/e of drowsiness. Despite this, she feels she is coping better than previously. She notes some challenges concerning her 's health. Pt uses emmanuel painting to help regular mood. She is not interested in therapy, Follow up with PCP.

## 2025-05-07 NOTE — ASSESSMENT & PLAN NOTE
Chronic, stable. BP today 118/88. Pt does not monitor BP at home. Pt reports periodic dizziness with standing. Denies dizziness/lightheadedness. Continue current treatment regime: losartan 50mg daily, amlodipine 10mg daily. Follow up with PCP annually for continued monitoring and management.

## 2025-05-07 NOTE — ASSESSMENT & PLAN NOTE
Chronic, ongoing. BMI today 45.83 Associated with HTN, prediabetes. She has a hx of multiple bariatric surgeries as well as medication to reduce weight without any significant success. Continue to encourage healthy calorie conscious diet with regular physical activity with goal of weight loss. Follow up with PCP at least annually for continued monitoring and management.

## 2025-05-08 ENCOUNTER — OFFICE VISIT (OUTPATIENT)
Dept: FAMILY PLANNING/WOMEN'S HEALTH CLINIC | Facility: PHYSICIAN GROUP | Age: 60
End: 2025-05-08
Payer: MEDICARE

## 2025-05-08 VITALS
WEIGHT: 267 LBS | SYSTOLIC BLOOD PRESSURE: 118 MMHG | DIASTOLIC BLOOD PRESSURE: 88 MMHG | HEIGHT: 64 IN | HEART RATE: 70 BPM | BODY MASS INDEX: 45.58 KG/M2 | OXYGEN SATURATION: 95 %

## 2025-05-08 DIAGNOSIS — I10 ESSENTIAL HYPERTENSION: ICD-10-CM

## 2025-05-08 DIAGNOSIS — G43.919 INTRACTABLE MIGRAINE, UNSPECIFIED MIGRAINE TYPE: ICD-10-CM

## 2025-05-08 DIAGNOSIS — F33.1 MAJOR DEPRESSIVE DISORDER, RECURRENT, MODERATE (HCC): ICD-10-CM

## 2025-05-08 DIAGNOSIS — E66.01 MORBID OBESITY WITH BMI OF 40.0-44.9, ADULT (HCC): ICD-10-CM

## 2025-05-08 DIAGNOSIS — I27.20 PULMONARY HYPERTENSION (HCC): ICD-10-CM

## 2025-05-08 PROCEDURE — 3074F SYST BP LT 130 MM HG: CPT | Performed by: PHYSICIAN ASSISTANT

## 2025-05-08 PROCEDURE — 3079F DIAST BP 80-89 MM HG: CPT | Performed by: PHYSICIAN ASSISTANT

## 2025-05-08 PROCEDURE — G0439 PPPS, SUBSEQ VISIT: HCPCS | Performed by: PHYSICIAN ASSISTANT

## 2025-05-08 PROCEDURE — 1126F AMNT PAIN NOTED NONE PRSNT: CPT | Performed by: PHYSICIAN ASSISTANT

## 2025-05-08 SDOH — ECONOMIC STABILITY: FOOD INSECURITY: WITHIN THE PAST 12 MONTHS, THE FOOD YOU BOUGHT JUST DIDN'T LAST AND YOU DIDN'T HAVE MONEY TO GET MORE.: NEVER TRUE

## 2025-05-08 SDOH — ECONOMIC STABILITY: FOOD INSECURITY: WITHIN THE PAST 12 MONTHS, YOU WORRIED THAT YOUR FOOD WOULD RUN OUT BEFORE YOU GOT THE MONEY TO BUY MORE.: NEVER TRUE

## 2025-05-08 SDOH — ECONOMIC STABILITY: FOOD INSECURITY: HOW HARD IS IT FOR YOU TO PAY FOR THE VERY BASICS LIKE FOOD, HOUSING, MEDICAL CARE, AND HEATING?: NOT HARD AT ALL

## 2025-05-08 SDOH — ECONOMIC STABILITY: TRANSPORTATION INSECURITY: IN THE PAST 12 MONTHS, HAS LACK OF TRANSPORTATION KEPT YOU FROM MEDICAL APPOINTMENTS OR FROM GETTING MEDICATIONS?: NO

## 2025-05-08 ASSESSMENT — ACTIVITIES OF DAILY LIVING (ADL)
LACK_OF_TRANSPORTATION: NO
BATHING_REQUIRES_ASSISTANCE: 0

## 2025-05-08 ASSESSMENT — PATIENT HEALTH QUESTIONNAIRE - PHQ9
SUM OF ALL RESPONSES TO PHQ QUESTIONS 1-9: 9
CLINICAL INTERPRETATION OF PHQ2 SCORE: 1
5. POOR APPETITE OR OVEREATING: 0 - NOT AT ALL

## 2025-05-08 ASSESSMENT — FIBROSIS 4 INDEX: FIB4 SCORE: 1.04

## 2025-05-08 ASSESSMENT — ENCOUNTER SYMPTOMS: GENERAL WELL-BEING: FAIR

## 2025-05-08 ASSESSMENT — PAIN SCALES - GENERAL: PAINLEVEL_OUTOF10: NO PAIN

## 2025-05-08 NOTE — ASSESSMENT & PLAN NOTE
Chronic, ongoing. Noted on echocardiogram in 2021 with RVSP 44mmHg and +JVP. She denies dyspnea but does admit to occasional wheezing. She struggles with snoring and insomnia but has never completed sleep study. Follow up with PCP.

## 2025-05-08 NOTE — PROGRESS NOTES
Comprehensive Health Assessment Program     Carolynn Casey is a 60 y.o. here for her comprehensive health assessment.    Patient Active Problem List    Diagnosis Date Noted    Intractable migraine, unspecified migraine type 05/01/2025    Primary osteoarthritis of first carpometacarpal joint of left hand 10/02/2024    Atherosclerosis of aorta ([facility]) 12/05/2023    Spinal stenosis, lumbar region, with neurogenic claudication 08/17/2023    Morbid obesity with BMI of 40.0-44.9, adult ([facility]) 05/25/2023    Lumbar stenosis with neurogenic claudication 05/11/2023    Positive colorectal cancer screening using Cologuard test 02/16/2022    Essential hypertension 09/08/2020    Hx of lumbosacral spine surgery 09/08/2020    Prediabetes 01/06/2020    S/P bariatric surgery 05/20/2019    Major depressive disorder, recurrent, moderate ([facility]) 02/27/2018    Vitamin D deficiency 02/05/2018    Pulmonary hypertension ([facility]) 08/31/2017    Primary osteoarthritis involving multiple joints 08/03/2017       Current Outpatient Medications   Medication Sig Dispense Refill    famotidine (PEPCID) 20 MG Tab Take 1 Tablet by mouth 2 times a day. 200 Tablet 3    amLODIPine (NORVASC) 10 MG Tab Take 1 Tablet by mouth every day. 100 Tablet 3    acetaminophen (TYLENOL) 500 MG Tab Take 2 Tablets by mouth every 8 hours. 120 Tablet 0    losartan (COZAAR) 50 MG Tab Take 1 Tablet by mouth every day. 100 Tablet 1    sucralfate (CARAFATE) 1 GM Tab Take 1 Tablet by mouth 2 times a day. 180 Tablet 3    SUMAtriptan (IMITREX) 50 MG Tab Take 1 Tablet by mouth one time as needed for Migraine for up to 1 dose. 10 Tablet 3    cyclobenzaprine (FLEXERIL) 10 mg Tab Take 1 Tablet by mouth 2 times a day as needed for Muscle Spasms. (Patient taking differently: Take 10 mg by mouth 2 times a day as needed for Muscle Spasms. 1-2x daily) 180 Tablet 6    tizanidine (ZANAFLEX) 4 MG Tab TAKE 1 TABLET BY MOUTH AT BEDTIME AS NEEDED FOR MUSCLE SPASM 100 Tablet 3    gabapentin  (NEURONTIN) 300 MG Cap TAKE THREE CAPSULES BY MOUTH THREE TIMES A  Capsule 2    multivitamin Tab Take 1 Tablet by mouth every day.      ergocalciferol (DRISDOL) 85121 UNIT capsule Take 1 Capsule by mouth every 7 days. (Patient not taking: Reported on 2025) 12 Capsule 5     No current facility-administered medications for this visit.          Current supplements as per medication list.     Allergies:   Naprosyn [naproxen], Penicillins, Latex, Tape, Nsaids, and Cortisone  Social History     Tobacco Use    Smoking status: Never    Smokeless tobacco: Never    Tobacco comments:     continue to avoid   Vaping Use    Vaping status: Never Used   Substance Use Topics    Alcohol use: Not Currently     Alcohol/week: 0.0 oz    Drug use: Never     Family History   Problem Relation Age of Onset    Hypertension Mother     Cancer Mother 81        Breast cancer    Heart Disease Father     Heart Attack Father     Hypertension Father     Lung Disease Sister         asthma    Hypertension Sister     Arthritis Sister         knee    Arthritis Brother     Hypertension Brother     Diabetes Brother     Heart Disease Maternal Aunt     Hypertension Maternal Aunt     Cancer Maternal Aunt 80         from breast cancer    Stroke Maternal Uncle     Heart Disease Maternal Uncle     Hypertension Maternal Uncle     Heart Disease Paternal Grandmother     Psychiatric Illness Paternal Grandfather         Suicide    Alcohol/Drug Paternal Grandfather     Arthritis Maternal Grandmother     Arthritis Maternal Grandfather     Arthritis Brother     Hyperlipidemia Neg Hx      Carolynn  has a past medical history of Allergy, Anesthesia, Anxiety, Arthritis, ASTHMA, Bronchitis (), Constipation, Diabetes (HCC) (2020), Elevated glucose, GERD (gastroesophageal reflux disease), Gynecological disorder, Heart burn, Heart murmur, Hiatus hernia syndrome, Hypertension, Indigestion, Left breast mass (2017), Major depressive disorder  (02/27/2018), Migraine, Morbid obesity with BMI of 45.0-49.9, adult (HCC) (03/17/2017), MSSA bacteremia (02/12/2021), Pain (08/31/2020), Pneumonia, PONV (postoperative nausea and vomiting), Pregnant (1983/1990), Primary osteoarthritis involving multiple joints, Pulmonary emboli (HCC) (2017), Pulmonary HTN (HCC) (08/31/2017), Sacroiliitis (HCC) (08/02/2017), Stomach ulcer, Type 2 diabetes mellitus without complication (HCC) (07/20/2018), Unspecified disorder of thyroid (2020), Unspecified urinary incontinence, and Upper GI bleed.    She has no past medical history of Acute nasopharyngitis, Anginal syndrome (HCC), Arrhythmia, Bowel habit changes, Breath shortness, Cancer (HCC), Carcinoma in situ of respiratory system, Cataract, Congestive heart failure (HCC), Continuous ambulatory peritoneal dialysis status ([facility]), Coughing blood, Dental disorder, Dialysis patient (HCC), Emphysema of lung (HCC), Glaucoma, Heart valve disease, Hemorrhagic disorder (HCC), Hepatitis A, Hepatitis B, Hepatitis C, High cholesterol, Infectious disease, Jaundice, Myocardial infarct (HCC), Pacemaker, Renal disorder, Rheumatic fever, Seizure (HCC), Sleep apnea, Snoring, Stroke (HCC), Tuberculosis, or Urinary bladder disorder.   Past Surgical History:   Procedure Laterality Date    GENERAL SURGERY Left 8/17/2023    Procedure: left L2-3 oblique lumbar decompression and instrumented fusion.;  Surgeon: Brigette Cherry M.D.;  Location: [facility];  Service: Orthopedics    [place] LAP,DIAGNOSTIC ABDOMEN N/A 02/11/2021    Procedure: LAPAROSCOPY - DIAGNOSTIC;  Surgeon: Gadiel Cox M.D.;  Location: [facility];  Service: General    GINGER BY LAPAROSCOPY  01/13/2021    Procedure: CHOLECYSTECTOMY, LAPAROSCOPIC;  Surgeon: Gadiel Cox M.D.;  Location: [facility];  Service: General    GASTROENTEROSTOMY, LAPAROSCOPIC N/A 10/02/2020    Procedure: GASTROENTEROSTOMY, LAPAROSCOPIC;  Surgeon: Gadiel Cox M.D.;  Location: [facility]  [place];  Service: General    GASTROSCOPY  09/02/2020    Procedure: GASTROSCOPY-WITH WATS BIOPSY;  Surgeon: Gadiel Cox M.D.;  Location: SURGERY [facility];  Service: General    SPINAL CORD STIMULATOR  07/2020    DE LAP SELENE RESTRICT PROC LONGITUDINAL GAS*  05/15/2019    Procedure: GASTRECTOMY, SLEEVE, LAPAROSCOPIC;  Surgeon: Gadiel Cox M.D.;  Location: SURGERY [facility];  Service: General    SPINAL CORD STIMULATOR  08/16/2018    Procedure: SPINAL CORD STIMULATOR- PERMANENT;  Surgeon: Dominique Saha M.D.;  Location: [facility];  Service: Pain Management    OTHER ABDOMINAL SURGERY  2018    gastric sleeve    S I JOINT FUSION Right 08/02/2017    Procedure: S I JOINT FUSION;  Surgeon: Alfredo Belcher M.D.;  Location: [facility];  Service:     FUSION, SPINE, XLIF  02/17/2016    Procedure: EXTREME LATERAL INTERBODY FUSION far lateral interbody  L3-4 ;  Surgeon: Alfredo Belcher M.D.;  Location: SURGERY [facility];  Service:     LUMBAR DECOMPRESSION  02/17/2016    Procedure: LUMBAR DECOMPRESSION L3-4;  Surgeon: Alfredo Belcher M.D.;  Location: SURGERY [facility];  Service:     KNEE REVISION TOTAL Right 10/08/2015    Procedure: KNEE REVISION TOTAL KNEE ARTHROPLASTY;  Surgeon: Ihsan Hawthorne M.D.;  Location: [facility];  Service:     LUMBAR FUSION ANTERIOR  2013    LAMINOTOMY  2006    Dr. [facility]    KNEE ARTHROPLASTY TOTAL Left 2003    KNEE ARTHROPLASTY TOTAL Right 2003    SHOULDER ARTHROSCOPY W/ ROTATOR CUFF REPAIR  2001    LEFT    SHOULDER ARTHROSCOPY Right 2001    APPENDECTOMY  1998 1997    ABDOMINAL HYSTERECTOMY TOTAL  1995    CARPAL TUNNEL RELEASE Right 1983    OPEN REDUCTION  2001, 1990    CLAVICLE BILAT    PRIMARY C SECTION  1983, 1990    x 2       Screening:  In the last six months have you experienced any leakage of urine? No    Depression Screening  Little interest or pleasure in doing things?  0 - not at all  Feeling down, depressed ,  or hopeless? 1 - several days  Trouble falling or staying asleep, or sleeping too much?  3 - nearly every day  Feeling tired or having little energy?  2 - more than half the days  Poor appetite or overeating?  0 - not at all  Feeling bad about yourself - or that you are a failure or have let yourself or your family down? 1 - several days  Trouble concentrating on things, such as reading the newspaper or watching television? 2 - more than half the days  Moving or speaking so slowly that other people could have noticed.  Or the opposite - being so fidgety or restless that you have been moving around a lot more than usual?  0 - not at all  Thoughts that you would be better off dead, or of hurting yourself?  0 - not at all  Patient Health Questionnaire Score: 9    If depressive symptoms identified deferred to follow up visit unless specifically addressed in assessment and plan.    Interpretation of PHQ-9 Total Score   Score Severity   1-4 No Depression   5-9 Mild Depression   10-14 Moderate Depression   15-19 Moderately Severe Depression   20-27 Severe Depression    Screening for Cognitive Impairment  Do you or any of your friends or family members have any concern about your memory? No  Three Minute Recall (Village, Kitchen, Baby) 3/3    Paramjit clock face with all 12 numbers and set the hands to show 10 minutes past 11.  Yes 5  Cognitive concerns identified deferred for follow up unless specifically addressed in assessment and plan.    Fall Risk Assessment  Has the patient had two or more falls in the last year or any fall with injury in the last year?  No    Safety Assessment  Do you always wear your seatbelt?  Yes  Any changes to home needed to function safely? No  Difficulty hearing.  No  Patient counseled about all safety risks that were identified.    Functional Assessment ADLs  Are there any barriers preventing you from cooking for yourself or meeting nutritional needs?  No.    Are there any barriers preventing you  from driving safely or obtaining transportation?  Yes.   her to and from her appointments   Are there any barriers preventing you from using a telephone or calling for help?  No    Are there any barriers preventing you from shopping?  No.    Are there any barriers preventing you from taking care of your own finances?  No    Are there any barriers preventing you from managing your medications?  No    Are there any barriers preventing you from showering, bathing or dressing yourself? No    Are there any barriers preventing you from doing housework or laundry? No    Are there any barriers preventing you from using the toilet?No    Are you currently engaging in any exercise or physical activity?  No.      Self-Assessment of Health  What is your perception of your health? Fair    Do you sleep more than six hours a night? Yes    In the past 7 days, how much did pain keep you from doing your normal work? None    Do you spend quality time with family or friends (virtually or in person)? Yes    Do you usually eat a heart healthy diet that constists of a variety of fruits, vegetables, whole grains and fiber? No    Do you eat foods high in fat and/or Fast Food more than three times per week? No    How concerned are you that your medical conditions are not being well managed? Not at all    Are you worried that in the next 2 months, you may not have stable housing that you own, rent, or stay in as part of a household? No        Advance Care Planning  Do you have an Advance Directive, Living Will, Durable Power of , or POLST? No                 Health Maintenance Summary            Current Care Gaps       COVID-19 Vaccine (6 - 2024-25 season) Overdue since 9/1/2024 01/21/2022  Imm Admin: PFIZER PURPLE CAP SARS-COV-2 VACCINATION (12+)    04/23/2021  Imm Admin: PFIZER PURPLE CAP SARS-COV-2 VACCINATION (12+)    04/23/2021  Imm Admin: PFIZER PURPLE CAP SARS-COV-2 VACCINATION (12+)    03/31/2021  Imm Admin:  PFIZER PURPLE CAP SARS-COV-2 VACCINATION (12+)    03/31/2021  Imm Admin: PFIZER PURPLE CAP SARS-COV-2 VACCINATION (12+)     Only the first 5 history entries have been loaded, but more history exists.            Colorectal Cancer Screening (Colon Cancer Screening Cologuard Stool (FIT DNA) - Every 3 Years) Overdue since 2/7/2025 02/07/2022  COLOGUARD COLON CANCER SCREENING                      Awaiting Completion       Mammogram (Yearly) Scheduled for 5/19/2025 05/02/2025  Order placed for MA-SCREENING MAMMO BILAT W/TOMOSYNTHESIS W/CAD by Team, [facility]    11/16/2023  MA-DIAGNOSTIC MAMMO BILAT W/TOMOSYNTHESIS W/CAD    09/15/2022  MA-DIAGNOSTIC MAMMO BILAT W/TOMOSYNTHESIS W/CAD    09/13/2021  MA-DIAGNOSTIC MAMMO BILAT W/TOMOSYNTHESIS W/CAD    10/18/2019  MA-MAMMO DIAGNOSTIC BILAT W/RICARDO W/CAD      Only the first 5 history entries have been loaded, but more history exists.                      Upcoming       Pneumococcal Vaccine: 50+ Years (3 of 3 - PCV20 or PCV21) Next due on 8/10/2025      08/10/2020  Imm Admin: Pneumococcal Conjugate Vaccine (Prevnar/PCV-13)    02/27/2018  Imm Admin: Pneumococcal polysaccharide vaccine (PPSV-23)              Annual Wellness Visit (Yearly) Next due on 5/8/2026 05/08/2025  Level of Service: ID ANNUAL WELLNESS VISIT-INCLUDES PPPS SUBSEQUE*    08/02/2024  Level of Service: ID ANNUAL WELLNESS VISIT-INCLUDES PPPS SUBSEQUE*    05/23/2023  Level of Service: ID ANNUAL WELLNESS VISIT-INCLUDES PPPS SUBSEQUE*    11/23/2022  Level of Service: ID ANNUAL WELLNESS VISIT-INCLUDES PPPS SUBSEQUE*    04/28/2022  Level of Service: ID ANNUAL WELLNESS VISIT-INCLUDES PPPS SUBSEQUE*      Only the first 5 history entries have been loaded, but more history exists.              IMM DTaP/Tdap/Td Vaccine (3 - Td or Tdap) Next due on 9/30/2026 09/30/2016  Imm Admin: Tdap Vaccine    04/24/2014  Imm Admin: Tdap Vaccine                      Completed or No Longer Recommended       Hepatitis  B Vaccine (Hep B) (Series Information) Completed      03/15/2010  Imm Admin: Hepatitis B Vaccine (Adol/Adult)    2009  Imm Admin: Hepatitis B Vaccine (Adol/Adult)    2009  Imm Admin: Hepatitis B Vaccine (Adol/Adult)              Influenza Vaccine (Series Information) Completed      10/08/2024  Imm Admin: Influenza split virus trivalent (PF)    2023  Imm Admin: Influenza Vaccine Quad Inj (Pf)    10/12/2022  Imm Admin: Influenza Vaccine Quad Recombinant    10/05/2021  Imm Admin: Influenza Vaccine Quad Recombinant    2020  Imm Admin: Influenza Vaccine Quad Recombinant      Only the first 5 history entries have been loaded, but more history exists.              Zoster (Shingles) Vaccines (Series Information) Completed      2024  Imm Admin: Zoster Vaccine Recombinant (RZV) (SHINGRIX)    2023  Imm Admin: Zoster Vaccine Recombinant (RZV) (SHINGRIX)              Hepatitis C Screening  Completed      08/10/2020  Done    08/10/2020  Hepatitis C Antibody component of HCV Scrn ( 4711-2683 1xLife)              Hepatitis A Vaccine (Hep A) (Series Information) Aged Out      No completion history exists for this topic.              HPV Vaccines (Series Information) Aged Out     No completion history exists for this topic.              Polio Vaccine (Inactivated Polio) (Series Information) Aged Out     No completion history exists for this topic.              Meningococcal Immunization (Series Information) Aged Out     No completion history exists for this topic.              A1c Screening  Discontinued        Frequency changed to Never automatically (Topic No Longer Applies)    2024  HEMOGLOBIN A1C    2023  A1c    2023  HEMOGLOBIN A1C    2021  HEMOGLOBIN A1C     Only the first 5 history entries have been loaded, but more history exists.            Fasting Lipid Profile  Discontinued        Frequency changed to Never automatically (Topic No Longer Applies)    2024   Lipid Profile    02/13/2023  Lipid Profile    02/13/2023  Lipid Profile    12/14/2021  Lipid Profile      Only the first 5 history entries have been loaded, but more history exists.              Diabetes: Urine Protein Screening  Discontinued        Frequency changed to Never automatically (Topic No Longer Applies)    04/21/2020  MICROALBUMIN CREAT RATIO URINE    03/16/2015  MICROALBUMIN CREAT RATIO URINE    03/16/2015  URINE PROTEIN RANDOM    02/16/2012  MICROALBUMIN CREAT RATIO URINE      Only the first 5 history entries have been loaded, but more history exists.              SERUM CREATININE  Discontinued        Frequency changed to Never automatically (Topic No Longer Applies)    09/30/2024  Comp Metabolic Panel    08/18/2023  Basic Metabolic Panel (BMP)    07/19/2023  Basic Metabolic Panel    05/25/2023  Basic Metabolic Panel      Only the first 5 history entries have been loaded, but more history exists.                            Patient Care Team:  MINGO Coates as PCP - General (Nurse Practitioner Family)  Kandace Dent M.D. as PCP - [facility] Paneled  Getachew Hansen M.D. (Surgery)  Gadiel Cox M.D. as Consulting Physician (Surgery)  Kimberly Meier C.N.A. (Inactive) as [facility]   [facility] as Home Health Provider  Nigel Oquendo P.A.-C. as Attending Team Physician (Geriatrics)    Financial Resource Strain: Low Risk  (5/8/2025)    Overall Financial Resource Strain (CARDIA)     Difficulty of Paying Living Expenses: Not hard at all      Transportation Needs: No Transportation Needs (5/8/2025)    PRAPARE - Transportation     Lack of Transportation (Medical): No     Lack of Transportation (Non-Medical): No      Food Insecurity: No Food Insecurity (5/8/2025)    Hunger Vital Sign     Worried About Running Out of Food in the Last Year: Never true     Ran Out of Food in the Last Year: Never true        Encounter Vitals  Blood Pressure: 118/88  Pulse:  "70  Pulse Oximetry: 95 %  Weight: 121 kg (267 lb)  Height: 162.6 cm (5' 4\") (pt reported)  BMI (Calculated): 45.83  Pain Score: No pain     Physical Exam:  Constitutional: NAD  HENMT: NC/AT, EOMI,  Cardiovascular: RRR, No m/r/g  Lungs: CTAB, no w/r/r  Extremities: No c/c/e  Skin: No lesions notes  Neurologic: Alert & oriented x3, CN II-XII grossly intact    Assessment and Plan. The following treatment and monitoring plan is recommended:  Morbid obesity with BMI of 40.0-44.9, adult (HCC)  Chronic, ongoing. BMI today 45.83 Associated with HTN, prediabetes. She has a hx of multiple bariatric surgeries as well as medication to reduce weight without any significant success. Continue to encourage healthy calorie conscious diet with regular physical activity with goal of weight loss. Follow up with PCP at least annually for continued monitoring and management.    Major depressive disorder, recurrent, moderate (HCC)  Chronic, ongoing. PHQ 9/27, denying SI. Pt d/c bupropion 300mg daily a couple months ago due to s/e of drowsiness. Despite this, she feels she is coping better than previously. She notes some challenges concerning her 's health. Pt uses emmanuel painting to help regular mood. She is not interested in therapy, Follow up with PCP.    Essential hypertension  Chronic, stable. BP today 118/88. Pt does not monitor BP at home. Pt reports periodic dizziness with standing. Denies dizziness/lightheadedness. Continue current treatment regime: losartan 50mg daily, amlodipine 10mg daily. Follow up with PCP annually for continued monitoring and management.     Intractable migraine, unspecified migraine type  Chronic, stable. Pt requires Imitrex twice/monthly and finds this is working well for her. Follow up with PCP per routine.    Pulmonary hypertension (HCC)  Chronic, ongoing. Noted on echocardiogram in 2021 with RVSP 44mmHg and +JVP. She denies dyspnea but does admit to occasional wheezing. She struggles with " snoring and insomnia but has never completed sleep study. Follow up with PCP.    Services suggested: No services needed at this time  Health Care Screening: Age-appropriate preventive services recommended by USPTF and ACIP covered by Medicare were discussed today. Services ordered if indicated and agreed upon by the patient.  Referrals offered: Community-based lifestyle interventions to reduce health risks and promote self-management and wellness, fall prevention, nutrition, physical activity, tobacco-use cessation, weight loss, and mental health services as per orders if indicated.    Discussion today about general wellness and lifestyle habits:    Prevent falls and reduce trip hazards; Cautioned about securing or removing rugs.  Have a working fire alarm and carbon monoxide detector.  Engage in regular physical activity and social activities.    Follow-up: No follow-ups on file.

## 2025-05-08 NOTE — ASSESSMENT & PLAN NOTE
Chronic, stable. Pt requires Imitrex twice/monthly and finds this is working well for her. Follow up with PCP per routine.

## 2025-05-19 ENCOUNTER — APPOINTMENT (OUTPATIENT)
Dept: RADIOLOGY | Facility: MEDICAL CENTER | Age: 60
End: 2025-05-19
Payer: MEDICARE

## 2025-05-19 DIAGNOSIS — Z12.31 VISIT FOR SCREENING MAMMOGRAM: ICD-10-CM

## 2025-06-06 DIAGNOSIS — I10 ESSENTIAL HYPERTENSION: ICD-10-CM

## 2025-06-10 DIAGNOSIS — G43.919 INTRACTABLE MIGRAINE WITHOUT STATUS MIGRAINOSUS, UNSPECIFIED MIGRAINE TYPE: ICD-10-CM

## 2025-06-10 RX ORDER — LOSARTAN POTASSIUM 50 MG/1
50 TABLET ORAL DAILY
Qty: 100 TABLET | Refills: 3 | Status: SHIPPED | OUTPATIENT
Start: 2025-06-10

## 2025-06-10 NOTE — TELEPHONE ENCOUNTER
Received request via: Pharmacy    Was the patient seen in the last year in this department? Yes    Does the patient have an active prescription (recently filled or refills available) for medication(s) requested? No    Pharmacy Name: [facility]    Does the patient have assisted Plus and need 100-day supply? (This applies to ALL medications) Yes, quantity updated to 100 days

## 2025-06-10 NOTE — TELEPHONE ENCOUNTER
Received request via: Pharmacy    Was the patient seen in the last year in this department? Yes    Does the patient have an active prescription (recently filled or refills available) for medication(s) requested? No    [facility] [id number] [facility] [place] - [address]     Does the patient have half-way Plus and need 100-day supply? (This applies to ALL medications) Yes, quantity updated to 100 days

## 2025-06-11 RX ORDER — SUMATRIPTAN 50 MG/1
50 TABLET, FILM COATED ORAL
Qty: 10 TABLET | Refills: 6 | Status: SHIPPED | OUTPATIENT
Start: 2025-06-11

## 2025-06-25 DIAGNOSIS — F41.9 ANXIETY: Primary | ICD-10-CM

## 2025-06-25 RX ORDER — BUPROPION HYDROCHLORIDE 300 MG/1
300 TABLET ORAL EVERY MORNING
Qty: 30 TABLET | OUTPATIENT
Start: 2025-06-25

## 2025-06-25 RX ORDER — BUPROPION HYDROCHLORIDE 150 MG/1
150 TABLET ORAL EVERY MORNING
Qty: 100 TABLET | Refills: 3 | Status: SHIPPED | OUTPATIENT
Start: 2025-06-25

## 2025-06-25 NOTE — TELEPHONE ENCOUNTER
Received request via: Pharmacy    Was the patient seen in the last year in this department? Yes    Does the patient have an active prescription (recently filled or refills available) for medication(s) requested? No    [facility] [id number] [facility] [place] - [address]     Does the patient have detention Plus and need 100-day supply? (This applies to ALL medications) Yes, quantity updated to 100 days

## 2025-06-26 ENCOUNTER — APPOINTMENT (OUTPATIENT)
Dept: MEDICAL GROUP | Facility: MEDICAL CENTER | Age: 60
End: 2025-06-26
Payer: MEDICARE

## 2025-07-28 ENCOUNTER — APPOINTMENT (OUTPATIENT)
Dept: MEDICAL GROUP | Facility: MEDICAL CENTER | Age: 60
End: 2025-07-28
Payer: MEDICARE

## 2025-07-29 ENCOUNTER — OFFICE VISIT (OUTPATIENT)
Dept: MEDICAL GROUP | Facility: MEDICAL CENTER | Age: 60
End: 2025-07-29
Payer: MEDICARE

## 2025-07-29 VITALS
HEIGHT: 64 IN | BODY MASS INDEX: 46.37 KG/M2 | SYSTOLIC BLOOD PRESSURE: 132 MMHG | WEIGHT: 271.61 LBS | OXYGEN SATURATION: 94 % | HEART RATE: 92 BPM | DIASTOLIC BLOOD PRESSURE: 62 MMHG | TEMPERATURE: 97.9 F | RESPIRATION RATE: 17 BRPM

## 2025-07-29 DIAGNOSIS — M25.471 BILATERAL SWELLING OF FEET AND ANKLES: ICD-10-CM

## 2025-07-29 DIAGNOSIS — R06.2 WHEEZING: ICD-10-CM

## 2025-07-29 DIAGNOSIS — M25.475 BILATERAL SWELLING OF FEET AND ANKLES: ICD-10-CM

## 2025-07-29 DIAGNOSIS — I10 ESSENTIAL HYPERTENSION: ICD-10-CM

## 2025-07-29 DIAGNOSIS — N64.4 BREAST PAIN, LEFT: Primary | ICD-10-CM

## 2025-07-29 DIAGNOSIS — F43.9 STRESS AT HOME: ICD-10-CM

## 2025-07-29 DIAGNOSIS — M48.062 LUMBAR STENOSIS WITH NEUROGENIC CLAUDICATION: ICD-10-CM

## 2025-07-29 DIAGNOSIS — M15.0 PRIMARY OSTEOARTHRITIS INVOLVING MULTIPLE JOINTS: ICD-10-CM

## 2025-07-29 DIAGNOSIS — F33.1 MAJOR DEPRESSIVE DISORDER, RECURRENT, MODERATE (HCC): ICD-10-CM

## 2025-07-29 DIAGNOSIS — K21.9 GASTROESOPHAGEAL REFLUX DISEASE WITHOUT ESOPHAGITIS: ICD-10-CM

## 2025-07-29 DIAGNOSIS — G43.919 INTRACTABLE MIGRAINE WITHOUT STATUS MIGRAINOSUS, UNSPECIFIED MIGRAINE TYPE: ICD-10-CM

## 2025-07-29 DIAGNOSIS — Z98.890 HX OF LUMBOSACRAL SPINE SURGERY: ICD-10-CM

## 2025-07-29 DIAGNOSIS — I27.20 PULMONARY HYPERTENSION (HCC): ICD-10-CM

## 2025-07-29 DIAGNOSIS — M43.10 DEGENERATIVE SPONDYLOLISTHESIS: ICD-10-CM

## 2025-07-29 DIAGNOSIS — R73.03 PREDIABETES: ICD-10-CM

## 2025-07-29 DIAGNOSIS — N64.4 BREAST PAIN, RIGHT: ICD-10-CM

## 2025-07-29 DIAGNOSIS — E66.01 MORBID OBESITY WITH BMI OF 45.0-49.9, ADULT (HCC): ICD-10-CM

## 2025-07-29 DIAGNOSIS — M25.472 BILATERAL SWELLING OF FEET AND ANKLES: ICD-10-CM

## 2025-07-29 DIAGNOSIS — M48.062 SPINAL STENOSIS, LUMBAR REGION, WITH NEUROGENIC CLAUDICATION: ICD-10-CM

## 2025-07-29 DIAGNOSIS — M25.474 BILATERAL SWELLING OF FEET AND ANKLES: ICD-10-CM

## 2025-07-29 DIAGNOSIS — M51.369 DEGENERATION OF INTERVERTEBRAL DISC OF LUMBAR REGION, UNSPECIFIED WHETHER PAIN PRESENT: ICD-10-CM

## 2025-07-29 RX ORDER — FAMOTIDINE 20 MG/1
20 TABLET, FILM COATED ORAL 2 TIMES DAILY
Qty: 200 TABLET | Refills: 3 | Status: SHIPPED | OUTPATIENT
Start: 2025-07-29 | End: 2026-07-29

## 2025-07-29 RX ORDER — CYCLOBENZAPRINE HCL 10 MG
10 TABLET ORAL 2 TIMES DAILY PRN
Qty: 180 TABLET | Refills: 6 | Status: SHIPPED | OUTPATIENT
Start: 2025-07-29 | End: 2026-07-29

## 2025-07-29 RX ORDER — SUMATRIPTAN 50 MG/1
50 TABLET, FILM COATED ORAL
Qty: 10 TABLET | Refills: 6 | Status: SHIPPED | OUTPATIENT
Start: 2025-07-29

## 2025-07-29 RX ORDER — LOSARTAN POTASSIUM 100 MG/1
100 TABLET ORAL DAILY
Qty: 100 TABLET | Refills: 3 | Status: SHIPPED | OUTPATIENT
Start: 2025-07-29

## 2025-07-29 RX ORDER — SUCRALFATE 1 G/1
1 TABLET ORAL 2 TIMES DAILY
Qty: 180 TABLET | Refills: 3 | Status: SHIPPED | OUTPATIENT
Start: 2025-07-29 | End: 2026-07-29

## 2025-07-29 RX ORDER — FUROSEMIDE 20 MG/1
20 TABLET ORAL DAILY
Qty: 7 TABLET | Refills: 0 | Status: SHIPPED | OUTPATIENT
Start: 2025-07-29

## 2025-07-29 RX ORDER — ACETAMINOPHEN 500 MG
1000 TABLET ORAL EVERY 8 HOURS
Qty: 120 TABLET | Refills: 3 | Status: SHIPPED | OUTPATIENT
Start: 2025-07-29

## 2025-07-29 RX ORDER — GABAPENTIN 300 MG/1
900 CAPSULE ORAL 3 TIMES DAILY
Qty: 810 CAPSULE | Refills: 3 | Status: SHIPPED | OUTPATIENT
Start: 2025-07-29

## 2025-07-29 RX ORDER — SERTRALINE HYDROCHLORIDE 25 MG/1
25 TABLET, FILM COATED ORAL DAILY
Qty: 100 TABLET | Refills: 3 | Status: SHIPPED | OUTPATIENT
Start: 2025-07-29

## 2025-07-29 ASSESSMENT — ENCOUNTER SYMPTOMS
COUGH: 0
CHILLS: 0
BACK PAIN: 1
FEVER: 0
SHORTNESS OF BREATH: 0
PALPITATIONS: 0
ORTHOPNEA: 0

## 2025-07-29 ASSESSMENT — FIBROSIS 4 INDEX: FIB4 SCORE: 1.04

## 2025-07-29 NOTE — PROGRESS NOTES
Subjective:     Verbal consent was acquired by the patient to use [employer] ambient listening note generation during this visit Yes    CC: Medication Refill (All meds), Referral Needed (mammogram), and Other (Swelling in legs up past knee and feet, has been going on for a couple months. /Pt states it feels like she has an extra breath Can be felt and heard) when sleeping and awake. )      HPI:   Carolynn is a 60 y.o. female who presents today for:    History of Present Illness  The patient presents for evaluation of lower extremity edema, dyspnea, and mastalgia in the left breast.    Dyspnea  She reports experiencing abnormal respiratory patterns, which commenced approximately 3 weeks ago, characterized by a sensation of requiring a second breath while at rest. Additionally, she mentions intermittent wheezing, first observed over the past weekend, and is uncertain if this is attributable to allergies. She has a known diagnosis of pulmonary hypertension.  - Onset: Approximately 3 weeks ago.  - Character: Abnormal respiratory patterns, sensation of requiring a second breath while at rest, intermittent wheezing.  - Timing: Wheezing first observed over the past weekend.  - Severity: Known diagnosis of pulmonary hypertension.    Psychological Stress  The patient is experiencing significant psychological stress due to her family's health issues, including her son's surgery, her daughter's recent procedure, and her 's impending brain surgery. She has discontinued bupropion (Wellbutrin) due to drowsiness and perceived ineffectiveness and is contemplating initiating sertraline (Zoloft), which her daughter has found beneficial.    Lower Extremity Edema  She experienced severe lower extremity edema during a trip to [place] on 07/14/2025, which has persisted. Three days ago, she described a sensation of her feet feeling as though they were going to explode due to the swelling. She has not utilized compression  stockings for relief. Her current medications include amlodipine and losartan, which she has been taking for an extended period.  - Onset: During a trip to [place] on 07/14/2025.  - Duration: Persistent since the trip.  - Character: Severe swelling, sensation of feet feeling as though they were going to explode.  - Alleviating/Aggravating Factors: Has not utilized compression stockings for relief.  - Severity: Severe enough to describe feet feeling as though they were going to explode.    Weight Gain and Medications  The patient reports weight gain and is currently prescribed gabapentin, cyclobenzaprine (Flexeril), tizanidine, sucralfate (Carafate), and sumatriptan. She has been on gabapentin for nearly a decade, taking 3 capsules three times daily. She administers cyclobenzaprine in the morning and tizanidine at night.    Mastalgia  She is experiencing mastalgia in her left breast, similar to previous episodes last year, but with increased intensity. The pain radiates to her axilla. She also reports pain in her right breast upon compression, severe enough to prevent her from wearing a seatbelt over her chest. She has been using acetaminophen (Tylenol) twice daily for pain management.  - Onset: Similar to previous episodes last year.  - Location: Left breast, radiates to axilla; pain in right breast upon compression.  - Character: Increased intensity of mastalgia, severe enough to prevent wearing a seatbelt over her chest.  - Alleviating Factors: Using acetaminophen (Tylenol) twice daily for pain management.  - Severity: Severe enough to prevent wearing a seatbelt over her chest.    Spinal Cord Stimulator Issues  The patient has a spinal cord stimulator, which she attempted to adjust approximately one month ago, but it was non-functional. She believes she requires a new device or a battery replacement. Financial constraints prevent her from consulting the physician who implanted the device, and she is  "encountering difficulty finding another provider willing to treat her. She is currently unemployed due to her inability to stand for prolonged periods secondary to leg pain and has been seeking employment since June 2024.  - Onset: Attempted to adjust approximately one month ago.  - Character: Non-functional spinal cord stimulator.  - Alleviating/Aggravating Factors: Believes she requires a new device or a battery replacement; financial constraints prevent consulting the physician who implanted the device.  - Severity: Unemployed due to inability to stand for prolonged periods secondary to leg pain.    PAST SURGICAL HISTORY:  - Spinal cord stimulator implantation  - Echocardiogram in 2021  - Echocardiogram in 2017         Allergies: Naprosyn [naproxen], Penicillins, Latex, Tape, Nsaids, and Cortisone     Medications: Current Medications[1]      ROS:  Review of Systems   Constitutional:  Negative for chills and fever.   Respiratory:  Negative for cough and shortness of breath.    Cardiovascular:  Positive for leg swelling. Negative for chest pain, palpitations and orthopnea.   Musculoskeletal:  Positive for back pain.       Objective:     Exam:  /62   Pulse 92   Temp 36.6 °C (97.9 °F) (Temporal)   Resp 17   Ht 1.626 m (5' 4\")   Wt 123 kg (271 lb 9.7 oz)   LMP  (LMP Unknown)   SpO2 94%   BMI 46.62 kg/m²  Body mass index is 46.62 kg/m².    Physical Exam  Constitutional:       Appearance: Normal appearance.   Eyes:      Pupils: Pupils are equal, round, and reactive to light.   Cardiovascular:      Rate and Rhythm: Normal rate and regular rhythm.      Pulses: Normal pulses.      Heart sounds: Normal heart sounds.   Pulmonary:      Effort: Pulmonary effort is normal.      Breath sounds: Normal breath sounds.   Abdominal:      General: Bowel sounds are normal.      Palpations: Abdomen is soft.   Musculoskeletal:      Right lower leg: Tenderness present. 3+ Edema present.      Left lower leg: Tenderness " present. 3+ Edema present.   Neurological:      Mental Status: She is alert and oriented to person, place, and time.   Psychiatric:         Mood and Affect: Mood normal.         Behavior: Behavior normal.           Assessment & Plan:     Carolynn a 60 y.o. female with the following -     Assessment & Plan     1. Stress at home  2. Major depressive disorder, recurrent, moderate (HCC)  Chronic, unstable  The stress could be contributing to memory issues and overall well-being. Wellbutrin was not helping her depression. She would like to start alternative treatment.   Treatment plan: Zoloft was prescribed to help manage stress and improve mood.  - sertraline (ZOLOFT) 25 MG tablet; Take 1 Tablet by mouth every day.  Dispense: 100 Tablet; Refill: 3    3. Wheezing  Acute, complicated  The differential includes airway restriction, fluid over load, CHF pulmonary hypertension or fluid overload.  Diagnostic plan: An ultrasound of the heart was ordered to evaluate her known diagnosis of pulmonary hypertension. Last ECHO showed EF of 70%.  Concern for heart failure. Lung sounds clear bilaterally today.   - EC-ECHOCARDIOGRAM COMPLETE W/O CONT; Future    4. Pulmonary hypertension (HCC)  Chronic, stable  Last ECHO showed EF of 70%.  Concern for worsening pulmonary hypertension. Lung sounds clear bilaterally today.   - EC-ECHOCARDIOGRAM COMPLETE W/O CONT; Future    5. Essential hypertension  Chronic, stable  Will stop amlodipine due to ankle swelling and increase losartan to 100 mg daily. May need to add diuretic pending how she responds to temporary use of lasix.   - losartan (COZAAR) 100 MG Tab; Take 1 Tablet by mouth every day.  Dispense: 100 Tablet; Refill: 3    6. Bilateral swelling of feet and ankles  Undiagnosed problem with uncertain prognosis  The leg swelling could be a side effect of amlodipine.  Diagnostic plan: Laboratory tests were ordered to assess fluid overload. An ultrasound of the heart was also ordered to rule out  any cardiac issues contributing to the swelling.  Treatment plan: Compression socks were recommended for use. A diuretic, Lasix 20 mg, was prescribed to be taken once daily in the morning for a week to help reduce fluid retention. Recommend monitoring/ reducing salt intake.  Clinical decision making: If blood pressure increases, she should inform the office immediately with stopping amlodipine. .  - EC-ECHOCARDIOGRAM COMPLETE W/O CONT; Future  - proBrain Natriuretic Peptide, NT; Future  - furosemide (LASIX) 20 MG Tab; Take 1 Tablet by mouth every day.  Dispense: 7 Tablet; Refill: 0    7. Morbid obesity with BMI of 45.0-49.9, adult (HCC)  Chronic, unstable  The weight gain could be related to fluid retention.  Diagnostic plan: Laboratory tests were ordered to assess fluid levels.  Treatment plan: A diuretic, Lasix 20 mg, was prescribed to be taken once daily in the morning for a week to help reduce fluid retention.   - Patient identified as having weight management issue.  Appropriate orders and counseling given.  - TSH WITH REFLEX TO FT4; Future  - Comp Metabolic Panel; Future  - Lipid Profile; Future  - CBC WITHOUT DIFFERENTIAL; Future    8. Prediabetes  Chronic, stable  Due to recheck labs.   - HEMOGLOBIN A1C; Future    9. Breast pain, left  10. Breast pain, right  Undiagnosed problem with uncertain prognosis  A repeat mammogram and an ultrasound of the left breast were ordered to investigate the persistent pain and tenderness in the nipple area and under the armpit of the left arm. Mammogram last year showed cyst under areola. She reports new pain in the right breast.   - MA-DIAGNOSTIC MAMMO BILAT W/TOMOSYNTHESIS W/CAD; Future  - US-SCREENING WHOLE BREAST BILATERAL (3D SCREENING); Future    11. Primary osteoarthritis involving multiple joints  Chronic, stable  - acetaminophen (TYLENOL) 500 MG Tab; Take 2 Tablets by mouth every 8 hours.  Dispense: 120 Tablet; Refill: 3  - gabapentin (NEURONTIN) 300 MG Cap; Take 3  Capsules by mouth 3 times a day.  Dispense: 810 Capsule; Refill: 3  - cyclobenzaprine (FLEXERIL) 10 MG Tab; Take 1 Tablet by mouth 2 times a day as needed for Muscle Spasms.  Dispense: 180 Tablet; Refill: 6    12. Degenerative spondylolisthesis  13. Hx of lumbosacral spine surgery  14. Degeneration of intervertebral disc of lumbar region, unspecified whether pain present  15. Lumbar stenosis with neurogenic claudication  16. Spinal stenosis, lumbar region, with neurogenic claudication  Chronic, unstable  - tizanidine (ZANAFLEX) 4 MG Tab; TAKE 1 TABLET BY MOUTH AT BEDTIME AS NEEDED FOR MUSCLE SPASM  Dispense: 100 Tablet; Refill: 3  - gabapentin (NEURONTIN) 300 MG Cap; Take 3 Capsules by mouth 3 times a day.  Dispense: 810 Capsule; Refill: 3    17. Intractable migraine without status migrainosus, unspecified migraine type  Chronic, stable  - SUMAtriptan (IMITREX) 50 MG Tab; Take 1 Tablet by mouth one time as needed for Migraine for up to 1 dose.  Dispense: 10 Tablet; Refill: 6    18. Gastroesophageal reflux disease without esophagitis  Chronic, stable  - famotidine (PEPCID) 20 MG Tab; Take 1 Tablet by mouth 2 times a day.  Dispense: 200 Tablet; Refill: 3  - sucralfate (CARAFATE) 1 g Tab; Take 1 Tablet by mouth 2 times a day.  Dispense: 180 Tablet; Refill: 3        Anticipatory guidance included the following: Patient counseled about skin care, diet, supplements, smoking, drugs/alcohol use, safe sex and exercise.     Return in about 2 weeks (around 8/12/2025) for F/U Anxiety, F/U depression, swelling.    Please note that this dictation was created using voice recognition software. I have made every reasonable attempt to correct obvious errors, but I expect that there are errors of grammar and possibly content that I did not discover before finalizing the note.           [1]   Current Outpatient Medications:     acetaminophen (TYLENOL) 500 MG Tab, Take 2 Tablets by mouth every 8 hours., Disp: 120 Tablet, Rfl: 3     sertraline (ZOLOFT) 25 MG tablet, Take 1 Tablet by mouth every day., Disp: 100 Tablet, Rfl: 3    losartan (COZAAR) 100 MG Tab, Take 1 Tablet by mouth every day., Disp: 100 Tablet, Rfl: 3    gabapentin (NEURONTIN) 300 MG Cap, Take 3 Capsules by mouth 3 times a day., Disp: 810 Capsule, Rfl: 3    cyclobenzaprine (FLEXERIL) 10 MG Tab, Take 1 Tablet by mouth 2 times a day as needed for Muscle Spasms., Disp: 180 Tablet, Rfl: 6    tizanidine (ZANAFLEX) 4 MG Tab, TAKE 1 TABLET BY MOUTH AT BEDTIME AS NEEDED FOR MUSCLE SPASM, Disp: 100 Tablet, Rfl: 3    famotidine (PEPCID) 20 MG Tab, Take 1 Tablet by mouth 2 times a day., Disp: 200 Tablet, Rfl: 3    sucralfate (CARAFATE) 1 g Tab, Take 1 Tablet by mouth 2 times a day., Disp: 180 Tablet, Rfl: 3    SUMAtriptan (IMITREX) 50 MG Tab, Take 1 Tablet by mouth one time as needed for Migraine for up to 1 dose., Disp: 10 Tablet, Rfl: 6    furosemide (LASIX) 20 MG Tab, Take 1 Tablet by mouth every day., Disp: 7 Tablet, Rfl: 0    multivitamin Tab, Take 1 Tablet by mouth every day., Disp: , Rfl:

## 2025-07-30 NOTE — Clinical Note
Carolynn Casey  [address]  [place] [address]    July 30, 2025    Member Name: Carolynn Casey   Member Number: [id number]   Reference Number: [id number]   Approved Services: Echos and EKG   Approved Service Dates: 07/29/2025 - 10/27/2025   Requesting Provider: Mariola Ford   Requested Provider: [facility]     Dear Carolynn Casey:    The following medical service(s) requested by Mariola Ford have been approved:    Procedure Code Procedure Code Name Requested Quantity Approved Quantity Status   10484 (CPT®) [place] ECHO HEART XTHORACIC,COMPLETE W DOPPLER 1 1 Authorized       Approved Quantity means the number of visits approved for medication treatments and/or medical services.    The services should be provided by [facility] no later than 10/27/2025. Please contact the provider listed below with any questions. Your plan benefit may require a deductible, co-payment, or co-insurance for these services.    Provider Information:  [facility]  [phone]    Important Note for Members:    This authorization does not guarantee that [facility] or [facility] will pay for your care. Payment depends on your plan details, if you're eligible for coverage on the day you receive care, and whether the service follows plan rules. These include things such as reviewing if the service is medically necessary. We recommend reviewing your Evidence of Coverage before receiving any care.    Important Note for Providers:    Payment by [facility] or [facility] for these services is subject to the terms of the Evidence of Coverage or Summary Plan Description, eligibility at the time of service, standard processing procedures and confirmation of benefit coverage. All claims are subject to standard processing procedures, including but not limited to coding edits, medical necessity determinations, and other plan policies in effect at the time of claim  adjudication. Providers are required to follow all [facility] Administrative Guidelines and requirements.    For any questions or additional information, please contact Customer Service:    [facility] Toll Free: [phone]  TTY users dial: [phone]   Call Center Hours:  Oct 1 - Mar 31, Mon - Fri 7 AM to 8 PM PST  Oct 1 - Mar 31, Sat - Sun 8 AM to 8 PM PST  Apr 1 - Sep 30, Mon - Fri 7 AM to 8 PM PST   Office Hours: Mon - Fri 8 AM to 5 PM PST   E-mail: [email or web address]   Website:  [email or web address]      This information is available for free in other languages. Please contact Customer Service at the phone number above for more information. [facility] complies with applicable Federal civil rights laws and does not discriminate on the basis of race, color, national origin, age, disability or sex.    Sincerely,     Healthcare Utilization Management Department     Cc: [facility]   Mariola Ford    Multi-Language Insert  Multi- Services  English: We have free  services to answer any questions you may have about our health or drug plan.  To get an , just call us at [phone].  Someone who speaks English/Language can help you.  This is a free service.  Yakut: Tenemos servicios de intérprete sin costo alguno  para responder cualquier pregunta que pueda tener sobre nuestro plan de maximo o medicamentos. Para hablar con un intérprete, por favor llame al [phone]. Alguien que hable español le podrá ayudar. Jackie es un servicio gratuito.  Chinese Mandarin: ?????????????????????????????? ???????????????? [phone]????????????????? ?????????  Chinese Cantonese: ?????????????????????????????? ????????????? [phone]???????????????????? ????????  Tagalog:  Greyson young.   Fermin muñozyunior alfonso burns  [phone]. Osiel yuniorcarline Huffman.  Al reid.  Australian:  Nous proposons ekaterina services gratuits d'interprétation pour répondre à toutes vickie questions relatives à notre régime de santé ou d'assurance-médicaments. Pour accéder au service d'interprétation, il vous suffit de nous appeler au [phone]. Un interlocuteur parMission Valley Medical Centers pourra vous aider. Ce service est gratuit.  Telugu:  Zen tôi có d?ch v? thông d?ch mi?n phí ð? tr? l?i các câu h?i v? chýõng s?c kh?e và chýõng trình thu?c men. N?u quí v? c?n thông d?ch viên tad g?i [phone] s? có nhân viên nói ti?ng Vi?t giúp ð? quí v?. Ðây là d?ch v? mi?n phí .  Citizen of [place]:  Unser [facility] Jeromeser DolmetschersOhio State University Wexner Medical Centerice beantwortet Ihren Fragen zu unserem Gesundheits- und Arzneimittelplan. Unsere Dolmetscher erreichen Sie [phone]. Man wird Ryann katrin auf [facility]. Dieser Service ist kostenlos.  Irish:  ??? ?? ?? ?? ?? ??? ?? ??? ?? ???? ?? ?? ???? ???? ????. ?? ???? ????? ?? [phone] ??? ??? ????.  ???? ?? ???? ?? ?? ????. ? ???? ??? ?????.   Iraqi: Giovanna pruitt âîçíèêíóò âîïðîñû îòíîñèòåëüíî ñòðàõîâîãî èëè ìåäèêàìåíòíîãî ïëàíà, âû ìîæåòå âîñïîëüçîâàòüñÿ íàøèìè áåñïëàòíûìè óñëóãàìè ïåðåâîä÷èêîâ. ×òîáû âîñïîëüçîâàòüñÿ óñëóãàìè ïåðåâîä÷èêà, ïîçâîíèòå íàì ïî òåëåôîíó [phone]. Âàì îêàæåò ïîìîùü ñîòðóäíèê, êîòîðûé ãîâîðèò ïî-póññêè. Äàííàÿ óñëóãà áåñïëàòíàÿ.  Persian: ÅääÇ äÞÏã ÎÏãÇÊ ÇáãÊÑÌã ÇáÝæÑí ÇáãÌÇäíÉ ááÅÌÇÈÉ Úä Ãí ÃÓÆáÉ ÊÊÚáÞ ÈÇáÕÍÉ Ãæ ÌÏæá ÇáÃÏæíÉ áÏíäÇ. ááÍÕæá Úáì ãÊÑÌã ÝæÑí¡ áíÓ Úáíß Óæì ÇáÇÊÕÇá ÈäÇ Úáì [phone] . ÓíÞæã ÔÎÕ ãÇ íÊÍÏË ÇáÚÑÈíÉ ÈãÓÇÚÏÊß. åÐå ÎÏãÉ ãÌÇäíÉ.  Michela: ????? ????????? ?? ??? ?? ????? ?? ???? ??? ???? ???? ?? ?????? ?? ???? ???? ?? ??? ????? ??? ????? ???????? ?????? ?????? ???. ?? ???????? ??????? ???? ?? ???, ?? ???? [phone] ?? ??? ????. ??? ??????? ?? ?????? ????? ?? ???? ??? ?? ????  ??. ?? ?? ????? ???? ??.   Hungarian:  È disponibile un servizio di interpretariato gratuito per rispondere a eventuali domande sul nostro piano sanitario e farmaceutico. Per un interprete, contattare il eladio [phone]. Un nostro incaricato cl parla Italianovi fornirà l'assistenza necessaria. È un servizio gratuito.  Portugués:  Dispomos de serviços de interpretação gratuitos para responder a qualquer questão que tenha acerca do nosso plano de saúde ou de medicação. Para obter um intérprete, contacte-nos através do número [phone]. Irá encontrar alguém que fale o idioma  Português para o ajudar. Jackie serviço é gratuito.  Turkish Creole:  Nou genyen sèvis entèprèt gratis sera reponn tout kesyon ou ta genyen konsènan plan medikal oswa dwòg nou an.  Sera jwenn yon diamond, jis rele nou nan [phone]. Yon moun ki pale Kreyòl kapab gordo w.  Sa a se yon sèvis ki gratis.  Polish:  Umo¿liwiamy bezp³atne skorzystanie z us³ug t³umacza ustnego, który pomo¿e w uzyskaniu odpowiedzi na temat planu zdrowotnego lub dawkowania leków. Marissa skorzystaæ z pomocy t³umacza znaj¹cereece contreras¿y zadzwoniæ pod numer [phone]. Ta us³uga jest bezp³atna.  Portuguese: ????? ??????? ????????????????????? ??????????????????????????????????[phone] ???????????????? ? ????????????????? ?????

## 2025-08-06 ENCOUNTER — APPOINTMENT (OUTPATIENT)
Dept: CARDIOLOGY | Facility: MEDICAL CENTER | Age: 60
End: 2025-08-06
Payer: MEDICARE

## 2025-08-14 DIAGNOSIS — Z90.3 S/P GASTRIC SLEEVE PROCEDURE: ICD-10-CM

## 2025-08-14 DIAGNOSIS — E66.01 MORBID OBESITY WITH BMI OF 45.0-49.9, ADULT (HCC): Primary | ICD-10-CM

## 2025-08-19 DIAGNOSIS — M48.062 LUMBAR STENOSIS WITH NEUROGENIC CLAUDICATION: ICD-10-CM

## 2025-08-19 DIAGNOSIS — M15.0 PRIMARY OSTEOARTHRITIS INVOLVING MULTIPLE JOINTS: Primary | ICD-10-CM

## 2025-08-19 DIAGNOSIS — M43.10 DEGENERATIVE SPONDYLOLISTHESIS: ICD-10-CM

## 2025-08-19 DIAGNOSIS — Z90.3 S/P GASTRIC SLEEVE PROCEDURE: ICD-10-CM

## 2025-08-19 DIAGNOSIS — Z98.890 HX OF LUMBOSACRAL SPINE SURGERY: ICD-10-CM

## 2025-08-19 DIAGNOSIS — M51.369 DEGENERATION OF INTERVERTEBRAL DISC OF LUMBAR REGION, UNSPECIFIED WHETHER PAIN PRESENT: ICD-10-CM

## 2025-08-19 DIAGNOSIS — E66.01 MORBID OBESITY WITH BMI OF 45.0-49.9, ADULT (HCC): Primary | ICD-10-CM

## 2025-08-29 DIAGNOSIS — M25.475 BILATERAL SWELLING OF FEET AND ANKLES: ICD-10-CM

## 2025-08-29 DIAGNOSIS — M25.471 BILATERAL SWELLING OF FEET AND ANKLES: ICD-10-CM

## 2025-08-29 DIAGNOSIS — M25.472 BILATERAL SWELLING OF FEET AND ANKLES: ICD-10-CM

## 2025-08-29 DIAGNOSIS — M25.474 BILATERAL SWELLING OF FEET AND ANKLES: ICD-10-CM

## 2025-08-29 RX ORDER — FUROSEMIDE 20 MG/1
20 TABLET ORAL DAILY
Qty: 7 TABLET | Refills: 0 | Status: SHIPPED | OUTPATIENT
Start: 2025-08-29

## 2025-09-08 ENCOUNTER — APPOINTMENT (OUTPATIENT)
Dept: MEDICAL GROUP | Facility: MEDICAL CENTER | Age: 60
End: 2025-09-08
Payer: MEDICARE

## (undated) DEVICE — TROCAR 5X100 NON BLADED Z-TH - READ KII (6/BX)

## (undated) DEVICE — CANISTER SUCTION 3000ML MECHANICAL FILTER AUTO SHUTOFF MEDI-VAC NONSTERILE LF DISP  (40EA/CA)

## (undated) DEVICE — SET LEADWIRE 5 LEAD BEDSIDE DISPOSABLE ECG (1SET OF 5/EA)

## (undated) DEVICE — BANDAID SHEER STRIP 3/4 IN (100EA/BX 12BX/CA)

## (undated) DEVICE — PACK MINOR BASIN - (2EA/CA)

## (undated) DEVICE — PACK LAP CHOLE OR - (2EA/CA)

## (undated) DEVICE — DRAPE LARGE 3 QUARTER - (20/CA)

## (undated) DEVICE — SET EXTENSION WITH 2 PORTS (48EA/CA) ***PART #2C8610 IS A SUBSTITUTE*****

## (undated) DEVICE — HEAD HOLDER JUNIOR/ADULT

## (undated) DEVICE — PROTECTOR ULNA NERVE - (36PR/CA)

## (undated) DEVICE — SCISSORS 5MM CVD (6EA/BX)

## (undated) DEVICE — TUBING CLEARLINK DUO-VENT - C-FLO (48EA/CA)

## (undated) DEVICE — BLADE SURGICAL #10 - (50/BX)

## (undated) DEVICE — TROCAR Z THREAD12MM OPTICAL - NON BLADED (6/BX)

## (undated) DEVICE — BAG RETRIEVAL 10ML (10EA/BX)

## (undated) DEVICE — KIT ROOM DECONTAMINATION

## (undated) DEVICE — GLOVE SZ 6 BIOGEL PI MICRO - PF LF (50PR/BX 4BX/CA)

## (undated) DEVICE — LACTATED RINGERS INJ 1000 ML - (14EA/CA 60CA/PF)

## (undated) DEVICE — DRAPE C-ARM LARGE 41IN X 74 IN - (10/BX 2BX/CA)

## (undated) DEVICE — TRAP POLYP E-TRAP (25EA/BX)

## (undated) DEVICE — SET SUCTION/IRRIGATION WITH DISPOSABLE TIP (6/CA )PART #0250-070-520 IS A SUB

## (undated) DEVICE — DERMABOND ADVANCED - (12EA/BX)

## (undated) DEVICE — KIT ANESTHESIA W/CIRCUIT & 3/LT BAG W/FILTER (20EA/CA)

## (undated) DEVICE — PIN PERCUTANEOUS STERILE 150MM

## (undated) DEVICE — CHLORAPREP 26 ML APPLICATOR - ORANGE TINT(25/CA)

## (undated) DEVICE — ARMREST CRADLE FOAM - (2PR/PK 12PR/CA)

## (undated) DEVICE — SLEEVE, VASO, THIGH, MED

## (undated) DEVICE — BOVIE  BLADE 6 EXTENDED - (50/PK)

## (undated) DEVICE — SYSTEM CALIBARATION  GASTRECTOMY 40FR (5/BX)

## (undated) DEVICE — SET TUBING PNEUMOCLEAR HIGH FLOW SMOKE EVACUATION (10EA/BX)

## (undated) DEVICE — DRAPE 36X28IN RAD CARM BND BG - (25/CA) O

## (undated) DEVICE — SUTURE 0 LIGATING REEL VICRYL PLUS (12PK/BX)

## (undated) DEVICE — CLIP APPLIER 10MM ENDO LARGE (3EA/BX)

## (undated) DEVICE — SENSOR SPO2 NEO LNCS ADHESIVE (20/BX) SEE USER NOTES

## (undated) DEVICE — SUTURE 1 VICRYL PLUS CTX - 8 X 18 INCH (12/BX)

## (undated) DEVICE — PERISTRIP 60 STAPLE LINE REINFORCEMENT (6EA/CA)

## (undated) DEVICE — SYRINGE 20 ML LL (50EA/BX 4BX/CA)

## (undated) DEVICE — STAPLER POWERED 60MM (3EA/BX)

## (undated) DEVICE — MASK ANESTHESIA ADULT  - (100/CA)

## (undated) DEVICE — SUTURE 2-0 VICRYL PLUS CT-1 36 (36PK/BX)"

## (undated) DEVICE — GLOVE BIOGEL SZ 8.5 SURGICAL PF LTX - (50PR/BX 4BX/CA)

## (undated) DEVICE — HEADREST PRONEVIEW LARGE - (10/CA)

## (undated) DEVICE — SUTURE 4-0 VICRYL PLUSFS-1 - 27 INCH (36/BX)

## (undated) DEVICE — GLOVE BIOGEL PI INDICATOR SZ 7.5 SURGICAL PF LF -(50/BX 4BX/CA)

## (undated) DEVICE — SYRINGE 10 ML CONTROL LL (25EA/BX 4BX/CA)

## (undated) DEVICE — SUTURE GENERAL

## (undated) DEVICE — SUCTION INSTRUMENT YANKAUER BULBOUS TIP W/O VENT (50EA/CA)

## (undated) DEVICE — ENDOSTITCH10MM SUTURING DEVIC - (3/CA)

## (undated) DEVICE — TUBE E-T HI-LO CUFF 7.0MM (10EA/PK)

## (undated) DEVICE — RELOAD WITH GRIPPING SURGACE TECHNOLOGY GREEN 60MM (12EA/BX)

## (undated) DEVICE — GOWN SURGEONS LARGE - (32/CA)

## (undated) DEVICE — GLOVE SZ 7 BIOGEL PI MICRO - PF LF (50PR/BX 4BX/CA)

## (undated) DEVICE — GVL 3 STAT DISPOSABLE - (10/BX)

## (undated) DEVICE — CONTAINER, SPECIMEN, STERILE

## (undated) DEVICE — NEEDLE NON SAFETY HYPO 22 GA X 1 1/2 IN (100/BX)

## (undated) DEVICE — TUBE E-T HI-LO CUFF 7.5MM (10EA/PK)

## (undated) DEVICE — BITE BLOCK ADULT 60FR (100EA/CA)

## (undated) DEVICE — GLOVE BIOGEL PI INDICATOR SZ 7.0 SURGICAL PF LF - (50/BX 4BX/CA)

## (undated) DEVICE — ELECTRODE DUAL RETURN W/ CORD - (50/PK)

## (undated) DEVICE — SEALER ARTICULATING TISSUE NSEAL (6/BX)

## (undated) DEVICE — DRAPE IOBAN II INCISE 23X17 - (10EA/BX 4BX/CA)

## (undated) DEVICE — GLOVE SZ 6.5 BIOGEL PI MICRO - PF LF (50PR/BX)

## (undated) DEVICE — SUTURE 2-0 ETHILON FS - (36/BX) 18 INCH

## (undated) DEVICE — CANNULA W/SEAL 5X100 Z-THRE - ADED KII (12/BX)

## (undated) DEVICE — BLADE SURGICAL CLIPPER - (50EA/CA)

## (undated) DEVICE — CANISTER SUCTION RIGID RED 1500CC (40EA/CA)

## (undated) DEVICE — ENDOSTITCH LOAD UNIT 2-0 POLY (12EA/CA)

## (undated) DEVICE — DILATOR

## (undated) DEVICE — STAPLE 60MM GRAY (12/BX)

## (undated) DEVICE — ELECTRODE 850 FOAM ADHESIVE - HYDROGEL RADIOTRNSPRNT (50/PK)

## (undated) DEVICE — SODIUM CHL IRRIGATION 0.9% 1000ML (12EA/CA)

## (undated) DEVICE — CANNULA W/SEAL12X100ZTHREAD - (12/BX)

## (undated) DEVICE — PAD LAP STERILE 18 X 18 - (5/PK 40PK/CA)

## (undated) DEVICE — SUCTION TIP STRAIGHT ARGYLE - 50EA/CA

## (undated) DEVICE — SOD. CHL. INJ. 0.9% 1000 ML - (14EA/CA 60CA/PF)

## (undated) DEVICE — STAPLE 60MM WHTE 2.6MM - (12/BX) WAS PART #ECR60W

## (undated) DEVICE — DRAPE LAPAROTOMY T SHEET - (12EA/CA)

## (undated) DEVICE — RELOAD WITH GRIPPING SURFACE TECHNOLOGY BLUE 60MM (12EA/BX)

## (undated) DEVICE — BLANKET WARMING UPPER BODY - (10/CA)

## (undated) DEVICE — TUBING C&T SET FLYING LEADS DRAIN TUBING (10EA/BX)

## (undated) DEVICE — DRESSING TRANSPARENT FILM TEGADERM 4 X 4.75" (50EA/BX)"

## (undated) DEVICE — TROCAR SEPARATOR 15MMZTHREAD - (6/BX)

## (undated) DEVICE — GLOVE BIOGEL SZ 8 SURGICAL PF LTX - (50PR/BX 4BX/CA)

## (undated) DEVICE — TRAY FOLEY CATHETER STATLOCK 16FR SURESTEP  (10EA/CA)

## (undated) DEVICE — STERI STRIP COMPOUND BENZOIN - TINCTURE 0.6ML WITH APPLICATOR (40EA/BX)

## (undated) DEVICE — GLOVE BIOGEL INDICATOR SZ 7.5 SURGICAL PF LTX - (50PR/BX 4BX/CA)

## (undated) DEVICE — SUTURE 2-0 VICRYL PLUS CT-1 - 8 X 18 INCH(12/BX)

## (undated) DEVICE — BLADE SURGICAL #11 - (50/BX)

## (undated) DEVICE — SENSOR SPO2 ADULT LNCS ADTX (20/BX) ORDER ITEM #19593

## (undated) DEVICE — SPONGE PEANUT - (5/PK 50PK/CA)

## (undated) DEVICE — GLOVE BIOGEL INDICATOR SZ 8.5 SURGICAL PF LTX - (50/BX 4BX/CA)

## (undated) DEVICE — SENSOR OXIMETER ADULT SPO2 RD SET (20EA/BX)

## (undated) DEVICE — PACK JACKSON TABLE KIT W/OUT - HR (6EA/CA)

## (undated) DEVICE — TRAY CATHETER FOLEY URINE METER W/STATLOCK 350ML (10EA/CA)

## (undated) DEVICE — NEPTUNE 4 PORT MANIFOLD - (20/PK)

## (undated) DEVICE — CLIP MED LG INTNL HRZN TI ESCP - (20/BX)

## (undated) DEVICE — GLOVE BIOGEL PI INDICATOR SZ 6.5 SURGICAL PF LF - (50/BX 4BX/CA)

## (undated) DEVICE — DRAPE STRLE REG TOWEL 18X24 - (10/BX 4BX/CA)"

## (undated) DEVICE — TISSEEL 4ML ----MUST ORDER A MIN OF 6EA----

## (undated) DEVICE — GOWN WARMING STANDARD FLEX - (30/CA)

## (undated) DEVICE — SYRINGE DISP. 50CC LS - (40/BX)

## (undated) DEVICE — COVER LIGHT HANDLE ALC PLUS DISP (18EA/BX)

## (undated) DEVICE — GLOVE BIOGEL PI ULTRATOUCH SZ 7.5 SURGICAL PF LF -(50/BX 4BX/CA)

## (undated) DEVICE — LACTATED RINGERS INJ. 500 ML - (24EA/CA)

## (undated) DEVICE — BAG RETRIEVAL 12/15 MM INZII (5EA/CA) THIS WILL REPLACE ITEM 75018

## (undated) DEVICE — COVER MAYO STAND X-LG - (22EA/CA)

## (undated) DEVICE — SUTURE2-0 27IN VCRL ANTI VIOL (36PK/BX)

## (undated) DEVICE — BAG SPONGE COUNT 10.25 X 32 - BLUE (250/CA)

## (undated) DEVICE — PATIENT REMOTE KIT

## (undated) DEVICE — SYRINGE LOSS OF RESIST. 50/BX - (50/CA)

## (undated) DEVICE — TOWEL STOP TIMEOUT SAFETY FLAG (40EA/CA)

## (undated) DEVICE — DRESSING NON ADHERENT 3 X 4 - STERILE (100/BX 12BX/CA)

## (undated) DEVICE — DETERGENT RENUZYME PLUS 10 OZ PACKET (50/BX)

## (undated) DEVICE — SLEEVE, VASO, REPROC, LARGE

## (undated) DEVICE — SUTURE 3-0 VICRYL PLUS SH - 8X 18 INCH (12/BX)

## (undated) DEVICE — CLIP MED INTNL HRZN TI ESCP - (25/BX)

## (undated) DEVICE — SYRINGE DISP. 60 CC LL - (30/BX, 12BX/CA)**WHEN THESE ARE GONE ORDER #500206**

## (undated) DEVICE — NEEDLE INSFL 120MM 14GA VRRS - (20/BX)

## (undated) DEVICE — CAPTIVATOR II-15MM ROUND STIFF (40/BX)

## (undated) DEVICE — SODIUM CHL. IRRIGATION 0.9% 3000ML (4EA/CA 65CA/PF)

## (undated) DEVICE — PACK GASTRIC BANDING OR - (1/CA)

## (undated) DEVICE — GLOVE SIZE 6.5  SURGEON ACCELERATOR FREE GREEN (50PR/BX)

## (undated) DEVICE — CELLSAVER PACK

## (undated) DEVICE — IPG TEMPLATE

## (undated) DEVICE — STAPLER SKIN DISP - (6/BX 10BX/CA) VISISTAT

## (undated) DEVICE — KNIFE

## (undated) DEVICE — APPLICATOR DUPLO SPRAYER (5EA/CA)

## (undated) DEVICE — CLOSURE SKIN STRIP 1/2 X 4 IN - (STERI STRIP) (50/BX 4BX/CA)

## (undated) DEVICE — PACK NEURO - (2EA/CA)

## (undated) DEVICE — TUBE CONNECT SUCTION CLEAR 120 X 1/4" (50EA/CA)"

## (undated) DEVICE — CELLSAVER STAT

## (undated) DEVICE — GLOVE BIOGEL PI INDICATOR SZ 8.0 SURGICAL PF LF -(50/BX 4BX/CA)

## (undated) DEVICE — GLOVE SZ 8 BIOGEL PI MICRO - PF LF (50PR/BX)

## (undated) DEVICE — GOWN SURGEONS X-LARGE - DISP. (30/CA)

## (undated) DEVICE — TUBE LUKI TRAP STERILE DISP. - 8CC (12/BX 4BX/CA)

## (undated) DEVICE — NEEDLE MONOPTY 14GAX16CM - (10EA/CA)

## (undated) DEVICE — TUBE SUCTION YANKAUER  1/4 X 6FT (20EA/CA)"

## (undated) DEVICE — SPONGE DRAIN 4 X 4IN 6-PLY - (2/PK25PK/BX12BX/CS)

## (undated) DEVICE — BLADE SURGICAL #15 - (50/BX 3BX/CA)

## (undated) DEVICE — BAG DRAINAGE URINARY CLOSED 2000ML (20EA/CA)

## (undated) DEVICE — CLIP SM INTNL HRZN TI ESCP LGT - (24EA/PK 25PK/BX)

## (undated) DEVICE — SUTURE 0 VICRYL PLUS UR-6 - 27 INCH (36/BX)

## (undated) DEVICE — RELOAD WITH GRIPPING SURFACE TECHNOLOGY GOLD 60MM (12EA/BX)

## (undated) DEVICE — INTRAOP NEURO IN OR 1:1 PER 15 MIN

## (undated) DEVICE — NEEDLE SPINAL NON-SAFETY 25GA X 3 (25EA/BX)"

## (undated) DEVICE — SPONGE GAUZESTER 4 X 4 4PLY - (128PK/CA)

## (undated) DEVICE — WATER IRRIGATION STERILE 1000ML (12EA/CA)

## (undated) DEVICE — WATER IRRIG. STER. 1500 ML - (9/CA)

## (undated) DEVICE — SPHERE NAVIGATION STEALTH (5EA/TY 12TY/PK)

## (undated) DEVICE — KIT SURGIFLO W/OUT THROMBIN - (6EA/CA)

## (undated) DEVICE — DRAPE VERTICAL ISOLATION - (10EA/CA)

## (undated) DEVICE — SPONGE RADIOPAQUE CTN X-LG - STERILE (50PK/CA) MADE TO ORDER ITEM AND HAS A 4-6 WEEK LEAD TIME

## (undated) DEVICE — SUTURE 4-0 30CM STRATAFIX SPIRAL PS-2 (12EA/BX)

## (undated) DEVICE — SUTURE 0 ETHIBOND CT-1 - (12/BX) 18 INCH

## (undated) DEVICE — TUBE CONNECTING SUCTION - CLEAR PLASTIC STERILE 72 IN (50EA/CA)

## (undated) DEVICE — Device

## (undated) DEVICE — PEN SKIN MARKER W/RULER - (50EA/BX)

## (undated) DEVICE — DRAIN J-VAC 10MM FLAT - (10/CA)